# Patient Record
Sex: MALE | Race: ASIAN | NOT HISPANIC OR LATINO | URBAN - METROPOLITAN AREA
[De-identification: names, ages, dates, MRNs, and addresses within clinical notes are randomized per-mention and may not be internally consistent; named-entity substitution may affect disease eponyms.]

---

## 2017-02-20 ENCOUNTER — INPATIENT (INPATIENT)
Facility: HOSPITAL | Age: 82
LOS: 0 days | Discharge: HOME | DRG: 313 | End: 2017-02-21
Attending: INTERNAL MEDICINE | Admitting: INTERNAL MEDICINE
Payer: COMMERCIAL

## 2017-02-20 VITALS
DIASTOLIC BLOOD PRESSURE: 65 MMHG | HEART RATE: 63 BPM | OXYGEN SATURATION: 97 % | TEMPERATURE: 98 F | SYSTOLIC BLOOD PRESSURE: 132 MMHG

## 2017-02-20 DIAGNOSIS — R07.89 OTHER CHEST PAIN: ICD-10-CM

## 2017-02-20 LAB
ALBUMIN SERPL ELPH-MCNC: 3.1 G/DL — LOW (ref 3.3–5)
ALP SERPL-CCNC: 70 U/L — SIGNIFICANT CHANGE UP (ref 30–120)
ALT FLD-CCNC: 10 U/L DA — SIGNIFICANT CHANGE UP (ref 10–60)
ANION GAP SERPL CALC-SCNC: 6 MMOL/L — SIGNIFICANT CHANGE UP (ref 5–17)
APPEARANCE UR: CLEAR — SIGNIFICANT CHANGE UP
APTT BLD: 30.1 SEC — SIGNIFICANT CHANGE UP (ref 27.5–37.4)
AST SERPL-CCNC: 18 U/L — SIGNIFICANT CHANGE UP (ref 10–40)
BACTERIA # UR AUTO: ABNORMAL
BASOPHILS # BLD AUTO: 0.1 K/UL — SIGNIFICANT CHANGE UP (ref 0–0.2)
BASOPHILS NFR BLD AUTO: 0.9 % — SIGNIFICANT CHANGE UP (ref 0–2)
BILIRUB SERPL-MCNC: 1 MG/DL — SIGNIFICANT CHANGE UP (ref 0.2–1.2)
BILIRUB UR-MCNC: NEGATIVE — SIGNIFICANT CHANGE UP
BUN SERPL-MCNC: 15 MG/DL — SIGNIFICANT CHANGE UP (ref 7–23)
CALCIUM SERPL-MCNC: 10.7 MG/DL — HIGH (ref 8.4–10.5)
CHLORIDE SERPL-SCNC: 109 MMOL/L — HIGH (ref 96–108)
CHOLEST SERPL-MCNC: 171 MG/DL — SIGNIFICANT CHANGE UP (ref 10–199)
CK MB BLD-MCNC: <1.1 % — SIGNIFICANT CHANGE UP (ref 0–3.5)
CK MB CFR SERPL CALC: <0.3 NG/ML — SIGNIFICANT CHANGE UP (ref 0–3.6)
CK SERPL-CCNC: 24 U/L — LOW (ref 39–308)
CK SERPL-CCNC: 27 U/L — LOW (ref 39–308)
CK SERPL-CCNC: 61 U/L — SIGNIFICANT CHANGE UP (ref 39–308)
CO2 SERPL-SCNC: 29 MMOL/L — SIGNIFICANT CHANGE UP (ref 22–31)
COLOR SPEC: YELLOW — SIGNIFICANT CHANGE UP
CREAT SERPL-MCNC: 0.87 MG/DL — SIGNIFICANT CHANGE UP (ref 0.5–1.3)
D DIMER BLD IA.RAPID-MCNC: 206 NG/ML DDU — SIGNIFICANT CHANGE UP
DIFF PNL FLD: ABNORMAL
EOSINOPHIL # BLD AUTO: 0.1 K/UL — SIGNIFICANT CHANGE UP (ref 0–0.5)
EOSINOPHIL NFR BLD AUTO: 1.7 % — SIGNIFICANT CHANGE UP (ref 0–6)
EPI CELLS # UR: SIGNIFICANT CHANGE UP
GLUCOSE SERPL-MCNC: 106 MG/DL — HIGH (ref 70–99)
GLUCOSE UR QL: NEGATIVE MG/DL — SIGNIFICANT CHANGE UP
HBA1C BLD-MCNC: 5.8 % — HIGH (ref 4–5.6)
HCT VFR BLD CALC: 45.5 % — SIGNIFICANT CHANGE UP (ref 39–50)
HDLC SERPL-MCNC: 55 MG/DL — SIGNIFICANT CHANGE UP (ref 40–125)
HGB BLD-MCNC: 14.8 G/DL — SIGNIFICANT CHANGE UP (ref 13–17)
INR BLD: 1.2 RATIO — HIGH (ref 0.88–1.16)
KETONES UR-MCNC: NEGATIVE — SIGNIFICANT CHANGE UP
LEUKOCYTE ESTERASE UR-ACNC: ABNORMAL
LIPID PNL WITH DIRECT LDL SERPL: 102 MG/DL — SIGNIFICANT CHANGE UP
LYMPHOCYTES # BLD AUTO: 2.3 K/UL — SIGNIFICANT CHANGE UP (ref 1–3.3)
LYMPHOCYTES # BLD AUTO: 30.2 % — SIGNIFICANT CHANGE UP (ref 13–44)
MCHC RBC-ENTMCNC: 32.1 PG — SIGNIFICANT CHANGE UP (ref 27–34)
MCHC RBC-ENTMCNC: 32.6 GM/DL — SIGNIFICANT CHANGE UP (ref 32–36)
MCV RBC AUTO: 98.5 FL — SIGNIFICANT CHANGE UP (ref 80–100)
MONOCYTES # BLD AUTO: 0.7 K/UL — SIGNIFICANT CHANGE UP (ref 0–0.9)
MONOCYTES NFR BLD AUTO: 8.9 % — SIGNIFICANT CHANGE UP (ref 2–14)
NEUTROPHILS # BLD AUTO: 4.5 K/UL — SIGNIFICANT CHANGE UP (ref 1.8–7.4)
NEUTROPHILS NFR BLD AUTO: 58.3 % — SIGNIFICANT CHANGE UP (ref 43–77)
NITRITE UR-MCNC: NEGATIVE — SIGNIFICANT CHANGE UP
PH UR: 6 — SIGNIFICANT CHANGE UP (ref 4.8–8)
PLATELET # BLD AUTO: 159 K/UL — SIGNIFICANT CHANGE UP (ref 150–400)
POTASSIUM SERPL-MCNC: 4.2 MMOL/L — SIGNIFICANT CHANGE UP (ref 3.5–5.3)
POTASSIUM SERPL-SCNC: 4.2 MMOL/L — SIGNIFICANT CHANGE UP (ref 3.5–5.3)
PROT SERPL-MCNC: 6.9 G/DL — SIGNIFICANT CHANGE UP (ref 6–8.3)
PROT UR-MCNC: 30 MG/DL
PROTHROM AB SERPL-ACNC: 13.5 SEC — HIGH (ref 10–13.1)
RBC # BLD: 4.62 M/UL — SIGNIFICANT CHANGE UP (ref 4.2–5.8)
RBC # FLD: 12.8 % — SIGNIFICANT CHANGE UP (ref 10.3–14.5)
RBC CASTS # UR COMP ASSIST: ABNORMAL /HPF (ref 0–4)
SODIUM SERPL-SCNC: 144 MMOL/L — SIGNIFICANT CHANGE UP (ref 135–145)
SP GR SPEC: 1.02 — SIGNIFICANT CHANGE UP (ref 1.01–1.02)
T3 SERPL-MCNC: 94 NG/DL — SIGNIFICANT CHANGE UP (ref 80–200)
T4 AB SER-ACNC: 5.7 UG/DL — SIGNIFICANT CHANGE UP (ref 4.6–12)
TOTAL CHOLESTEROL/HDL RATIO MEASUREMENT: 3.1 RATIO — LOW (ref 3.4–9.6)
TRIGL SERPL-MCNC: 68 MG/DL — SIGNIFICANT CHANGE UP (ref 10–149)
TROPONIN I SERPL-MCNC: 0 NG/ML — LOW (ref 0.02–0.06)
TROPONIN I SERPL-MCNC: 0 NG/ML — LOW (ref 0.02–0.06)
TROPONIN I SERPL-MCNC: 0.01 NG/ML — LOW (ref 0.02–0.06)
TSH SERPL-MCNC: 1.58 UIU/ML — SIGNIFICANT CHANGE UP (ref 0.27–4.2)
UROBILINOGEN FLD QL: NEGATIVE MG/DL — SIGNIFICANT CHANGE UP
VIT B12 SERPL-MCNC: 1146 PG/ML — HIGH (ref 243–894)
WBC # BLD: 7.7 K/UL — SIGNIFICANT CHANGE UP (ref 3.8–10.5)
WBC # FLD AUTO: 7.7 K/UL — SIGNIFICANT CHANGE UP (ref 3.8–10.5)
WBC UR QL: ABNORMAL

## 2017-02-20 PROCEDURE — 71010: CPT | Mod: 26

## 2017-02-20 PROCEDURE — 99285 EMERGENCY DEPT VISIT HI MDM: CPT

## 2017-02-20 PROCEDURE — 93970 EXTREMITY STUDY: CPT | Mod: 26

## 2017-02-20 PROCEDURE — 93306 TTE W/DOPPLER COMPLETE: CPT | Mod: 26

## 2017-02-20 PROCEDURE — 93010 ELECTROCARDIOGRAM REPORT: CPT

## 2017-02-20 PROCEDURE — 99223 1ST HOSP IP/OBS HIGH 75: CPT

## 2017-02-20 RX ORDER — ATORVASTATIN CALCIUM 80 MG/1
20 TABLET, FILM COATED ORAL AT BEDTIME
Qty: 0 | Refills: 0 | Status: DISCONTINUED | OUTPATIENT
Start: 2017-02-20 | End: 2017-02-21

## 2017-02-20 RX ORDER — ACETAMINOPHEN 500 MG
650 TABLET ORAL EVERY 6 HOURS
Qty: 0 | Refills: 0 | Status: DISCONTINUED | OUTPATIENT
Start: 2017-02-20 | End: 2017-02-21

## 2017-02-20 RX ORDER — METOPROLOL TARTRATE 50 MG
12.5 TABLET ORAL EVERY 12 HOURS
Qty: 0 | Refills: 0 | Status: DISCONTINUED | OUTPATIENT
Start: 2017-02-20 | End: 2017-02-21

## 2017-02-20 RX ORDER — ENOXAPARIN SODIUM 100 MG/ML
40 INJECTION SUBCUTANEOUS DAILY
Qty: 0 | Refills: 0 | Status: DISCONTINUED | OUTPATIENT
Start: 2017-02-20 | End: 2017-02-21

## 2017-02-20 RX ORDER — FINASTERIDE 5 MG/1
5 TABLET, FILM COATED ORAL DAILY
Qty: 0 | Refills: 0 | Status: DISCONTINUED | OUTPATIENT
Start: 2017-02-20 | End: 2017-02-21

## 2017-02-20 RX ORDER — ASPIRIN/CALCIUM CARB/MAGNESIUM 324 MG
81 TABLET ORAL DAILY
Qty: 0 | Refills: 0 | Status: DISCONTINUED | OUTPATIENT
Start: 2017-02-20 | End: 2017-02-21

## 2017-02-20 RX ADMIN — Medication 12.5 MILLIGRAM(S): at 18:31

## 2017-02-20 RX ADMIN — ATORVASTATIN CALCIUM 20 MILLIGRAM(S): 80 TABLET, FILM COATED ORAL at 21:02

## 2017-02-20 RX ADMIN — ENOXAPARIN SODIUM 40 MILLIGRAM(S): 100 INJECTION SUBCUTANEOUS at 11:30

## 2017-02-20 RX ADMIN — Medication 81 MILLIGRAM(S): at 11:30

## 2017-02-20 RX ADMIN — FINASTERIDE 5 MILLIGRAM(S): 5 TABLET, FILM COATED ORAL at 18:31

## 2017-02-20 NOTE — H&P ADULT. - RS GEN PE MLT RESP DETAILS PC
breath sounds equal/respirations non-labored/clear to auscultation bilaterally/good air movement/airway patent

## 2017-02-20 NOTE — H&P ADULT. - HISTORY OF PRESENT ILLNESS
This is an 86 YO M brought to ER because of chest pain.   According to the patient he woke up with chest pain in lower front of his chest. Pain was on and off non radiating. He felt pressure and burning in nature.

## 2017-02-21 ENCOUNTER — TRANSCRIPTION ENCOUNTER (OUTPATIENT)
Age: 82
End: 2017-02-21

## 2017-02-21 VITALS
RESPIRATION RATE: 16 BRPM | SYSTOLIC BLOOD PRESSURE: 113 MMHG | OXYGEN SATURATION: 99 % | TEMPERATURE: 98 F | DIASTOLIC BLOOD PRESSURE: 73 MMHG | HEART RATE: 74 BPM

## 2017-02-21 DIAGNOSIS — R07.89 OTHER CHEST PAIN: ICD-10-CM

## 2017-02-21 DIAGNOSIS — I24.9 ACUTE ISCHEMIC HEART DISEASE, UNSPECIFIED: ICD-10-CM

## 2017-02-21 DIAGNOSIS — N40.0 BENIGN PROSTATIC HYPERPLASIA WITHOUT LOWER URINARY TRACT SYMPTOMS: ICD-10-CM

## 2017-02-21 LAB
CK SERPL-CCNC: 39 U/L — SIGNIFICANT CHANGE UP (ref 39–308)
CULTURE RESULTS: NO GROWTH — SIGNIFICANT CHANGE UP
SPECIMEN SOURCE: SIGNIFICANT CHANGE UP
TROPONIN I SERPL-MCNC: 0 NG/ML — LOW (ref 0.02–0.06)

## 2017-02-21 PROCEDURE — 84484 ASSAY OF TROPONIN QUANT: CPT

## 2017-02-21 PROCEDURE — 71045 X-RAY EXAM CHEST 1 VIEW: CPT

## 2017-02-21 PROCEDURE — 85027 COMPLETE CBC AUTOMATED: CPT

## 2017-02-21 PROCEDURE — 87086 URINE CULTURE/COLONY COUNT: CPT

## 2017-02-21 PROCEDURE — 99232 SBSQ HOSP IP/OBS MODERATE 35: CPT

## 2017-02-21 PROCEDURE — 99285 EMERGENCY DEPT VISIT HI MDM: CPT

## 2017-02-21 PROCEDURE — 84443 ASSAY THYROID STIM HORMONE: CPT

## 2017-02-21 PROCEDURE — 85610 PROTHROMBIN TIME: CPT

## 2017-02-21 PROCEDURE — 81001 URINALYSIS AUTO W/SCOPE: CPT

## 2017-02-21 PROCEDURE — 93970 EXTREMITY STUDY: CPT

## 2017-02-21 PROCEDURE — 83036 HEMOGLOBIN GLYCOSYLATED A1C: CPT

## 2017-02-21 PROCEDURE — 80061 LIPID PANEL: CPT

## 2017-02-21 PROCEDURE — 84436 ASSAY OF TOTAL THYROXINE: CPT

## 2017-02-21 PROCEDURE — 36415 COLL VENOUS BLD VENIPUNCTURE: CPT

## 2017-02-21 PROCEDURE — 82553 CREATINE MB FRACTION: CPT

## 2017-02-21 PROCEDURE — 93005 ELECTROCARDIOGRAM TRACING: CPT

## 2017-02-21 PROCEDURE — 82550 ASSAY OF CK (CPK): CPT

## 2017-02-21 PROCEDURE — 84480 ASSAY TRIIODOTHYRONINE (T3): CPT

## 2017-02-21 PROCEDURE — 93306 TTE W/DOPPLER COMPLETE: CPT

## 2017-02-21 PROCEDURE — 85730 THROMBOPLASTIN TIME PARTIAL: CPT

## 2017-02-21 PROCEDURE — 82607 VITAMIN B-12: CPT

## 2017-02-21 PROCEDURE — G0378: CPT

## 2017-02-21 PROCEDURE — 80053 COMPREHEN METABOLIC PANEL: CPT

## 2017-02-21 PROCEDURE — 85379 FIBRIN DEGRADATION QUANT: CPT

## 2017-02-21 RX ORDER — FAMOTIDINE 10 MG/ML
1 INJECTION INTRAVENOUS
Qty: 30 | Refills: 0 | OUTPATIENT
Start: 2017-02-21 | End: 2017-03-23

## 2017-02-21 RX ORDER — ATORVASTATIN CALCIUM 80 MG/1
1 TABLET, FILM COATED ORAL
Qty: 30 | Refills: 0 | OUTPATIENT
Start: 2017-02-21 | End: 2017-03-23

## 2017-02-21 RX ORDER — METOPROLOL TARTRATE 50 MG
0.5 TABLET ORAL
Qty: 30 | Refills: 0 | OUTPATIENT
Start: 2017-02-21 | End: 2017-03-23

## 2017-02-21 RX ORDER — ACETAMINOPHEN 500 MG
2 TABLET ORAL
Qty: 0 | Refills: 0 | COMMUNITY
Start: 2017-02-21

## 2017-02-21 RX ADMIN — Medication 81 MILLIGRAM(S): at 12:33

## 2017-02-21 RX ADMIN — ENOXAPARIN SODIUM 40 MILLIGRAM(S): 100 INJECTION SUBCUTANEOUS at 12:32

## 2017-02-21 NOTE — DIETITIAN INITIAL EVALUATION ADULT. - NS AS NUTRI INTERV MEALS SNACK
General/healthful diet/Texture-modified diet/[facility] soft diet, dash/tlc restriction/Fat - modified diet/Mineral - modified diet

## 2017-02-21 NOTE — DIETITIAN INITIAL EVALUATION ADULT. - OTHER INFO
87M adm c chest pain. Pt denies chest pain or sob and states he feels well at present. Pt on [facility] [place] Soft, DASH/TLC diet at present and tolerating well. Reports he had a fairly good appetite at breakfast. Pt noted not to eat beef and pork (kitchen aware). Pt states he wears dentures, which he states are at his bedside. Reports he consumes softer items at home. Pt states his UBW is around 155# but thinks he weighs 151# at present (adm wt 158.5#); pt appears well-nourished given advanced age c decreased muscle/fat mass. Skin intact.

## 2017-02-21 NOTE — DISCHARGE NOTE ADULT - HOSPITAL COURSE
This is an 88 YO M brought to ER because of chest pain.   According to the patient he woke up with chest pain in lower front of his chest. Pain was on and off non radiating. He felt pressure and burning in nature.

## 2017-02-21 NOTE — DISCHARGE NOTE ADULT - CARE PROVIDER_API CALL
Chidi Gary), Internal Medicine  [address] [place] [address]  Phone: [phone]  Fax: [phone]

## 2017-02-21 NOTE — DISCHARGE NOTE ADULT - CARE PLAN
Principal Discharge DX:	Chest pain, musculoskeletal  Goal:	CE X 3, negative  Instructions for follow-up, activity and diet:	Cardiologist  Secondary Diagnosis:	Benign prostatic hyperplasia, presence of lower urinary tract symptoms unspecified, unspecified morphology  Secondary Diagnosis:	Hypercholesteremia  Secondary Diagnosis:	ACS (acute coronary syndrome)

## 2017-02-21 NOTE — DISCHARGE NOTE ADULT - MEDICATION SUMMARY - MEDICATIONS TO TAKE
I will START or STAY ON the medications listed below when I get home from the hospital:    finasteride  -- 1 tab(s) by mouth once a day  -- Indication: For Benign prostatic hyperplasia, presence of lower urinary tract symptoms unspecified, unspecified morphology    aspirin 81 mg oral tablet  -- 1 tab(s) by mouth once a day  -- Indication: For ACS (acute coronary syndrome)    acetaminophen 325 mg oral tablet  -- 2 tab(s) by mouth every 6 hours, As needed, Moderate Pain (4 - 6) or Temp > 100.5  -- Indication: For pain and fever    atorvastatin 20 mg oral tablet  -- 1 tab(s) by mouth once a day (at bedtime)  -- Indication: For Hypercholesterolemia    metoprolol tartrate 25 mg oral tablet  -- 0.5 tab(s) by mouth 2 times a day  -- Indication: For ACS (acute coronary syndrome)    Pepcid 20 mg oral tablet  -- 1 tab(s) by mouth once a day  -- Indication: For Other chest pain    nitrofurantoin  -- 1 tab(s) by mouth 2 times a day  -- Indication: For UTI

## 2017-02-21 NOTE — DISCHARGE NOTE ADULT - SECONDARY DIAGNOSIS.
Benign prostatic hyperplasia, presence of lower urinary tract symptoms unspecified, unspecified morphology Hypercholesteremia ACS (acute coronary syndrome)

## 2017-07-31 ENCOUNTER — INPATIENT (INPATIENT)
Facility: HOSPITAL | Age: 82
LOS: 2 days | Discharge: HOME HEALTH | DRG: 872 | End: 2017-08-03
Attending: INTERNAL MEDICINE | Admitting: INTERNAL MEDICINE
Payer: COMMERCIAL

## 2017-07-31 VITALS
WEIGHT: 149.91 LBS | RESPIRATION RATE: 16 BRPM | SYSTOLIC BLOOD PRESSURE: 100 MMHG | HEART RATE: 72 BPM | TEMPERATURE: 100 F | DIASTOLIC BLOOD PRESSURE: 55 MMHG | HEIGHT: 67 IN | OXYGEN SATURATION: 97 %

## 2017-07-31 DIAGNOSIS — N41.0 ACUTE PROSTATITIS: ICD-10-CM

## 2017-07-31 DIAGNOSIS — R50.9 FEVER, UNSPECIFIED: ICD-10-CM

## 2017-07-31 DIAGNOSIS — Z95.5 PRESENCE OF CORONARY ANGIOPLASTY IMPLANT AND GRAFT: Chronic | ICD-10-CM

## 2017-07-31 DIAGNOSIS — E78.5 HYPERLIPIDEMIA, UNSPECIFIED: ICD-10-CM

## 2017-07-31 LAB
ALBUMIN SERPL ELPH-MCNC: 2.9 G/DL — LOW (ref 3.3–5)
ALP SERPL-CCNC: 58 U/L — SIGNIFICANT CHANGE UP (ref 30–120)
ALT FLD-CCNC: 10 U/L DA — SIGNIFICANT CHANGE UP (ref 10–60)
ANION GAP SERPL CALC-SCNC: 7 MMOL/L — SIGNIFICANT CHANGE UP (ref 5–17)
APPEARANCE UR: ABNORMAL
AST SERPL-CCNC: 20 U/L — SIGNIFICANT CHANGE UP (ref 10–40)
BACTERIA # UR AUTO: ABNORMAL
BASOPHILS # BLD AUTO: 0 K/UL — SIGNIFICANT CHANGE UP (ref 0–0.2)
BASOPHILS NFR BLD AUTO: 0.1 % — SIGNIFICANT CHANGE UP (ref 0–2)
BILIRUB SERPL-MCNC: 1.3 MG/DL — HIGH (ref 0.2–1.2)
BILIRUB UR-MCNC: NEGATIVE — SIGNIFICANT CHANGE UP
BUN SERPL-MCNC: 16 MG/DL — SIGNIFICANT CHANGE UP (ref 7–23)
CALCIUM SERPL-MCNC: 10.2 MG/DL — SIGNIFICANT CHANGE UP (ref 8.4–10.5)
CHLORIDE SERPL-SCNC: 100 MMOL/L — SIGNIFICANT CHANGE UP (ref 96–108)
CO2 SERPL-SCNC: 28 MMOL/L — SIGNIFICANT CHANGE UP (ref 22–31)
COLOR SPEC: SIGNIFICANT CHANGE UP
CREAT SERPL-MCNC: 0.99 MG/DL — SIGNIFICANT CHANGE UP (ref 0.5–1.3)
DIFF PNL FLD: ABNORMAL
EOSINOPHIL # BLD AUTO: 0 K/UL — SIGNIFICANT CHANGE UP (ref 0–0.5)
EOSINOPHIL NFR BLD AUTO: 0 % — SIGNIFICANT CHANGE UP (ref 0–6)
EPI CELLS # UR: SIGNIFICANT CHANGE UP
GLUCOSE SERPL-MCNC: 109 MG/DL — HIGH (ref 70–99)
GLUCOSE UR QL: NEGATIVE MG/DL — SIGNIFICANT CHANGE UP
HCT VFR BLD CALC: 38 % — LOW (ref 39–50)
HGB BLD-MCNC: 13 G/DL — SIGNIFICANT CHANGE UP (ref 13–17)
KETONES UR-MCNC: NEGATIVE — SIGNIFICANT CHANGE UP
LACTATE SERPL-SCNC: 1.2 MMOL/L — SIGNIFICANT CHANGE UP (ref 0.7–2)
LEUKOCYTE ESTERASE UR-ACNC: ABNORMAL
LYMPHOCYTES # BLD AUTO: 15.1 % — SIGNIFICANT CHANGE UP (ref 13–44)
LYMPHOCYTES # BLD AUTO: 2.3 K/UL — SIGNIFICANT CHANGE UP (ref 1–3.3)
MAGNESIUM SERPL-MCNC: 1.8 MG/DL — SIGNIFICANT CHANGE UP (ref 1.6–2.6)
MCHC RBC-ENTMCNC: 32.8 PG — SIGNIFICANT CHANGE UP (ref 27–34)
MCHC RBC-ENTMCNC: 34.1 GM/DL — SIGNIFICANT CHANGE UP (ref 32–36)
MCV RBC AUTO: 96.1 FL — SIGNIFICANT CHANGE UP (ref 80–100)
MONOCYTES # BLD AUTO: 1.2 K/UL — HIGH (ref 0–0.9)
MONOCYTES NFR BLD AUTO: 8.1 % — SIGNIFICANT CHANGE UP (ref 2–14)
NEUTROPHILS # BLD AUTO: 11.8 K/UL — HIGH (ref 1.8–7.4)
NEUTROPHILS NFR BLD AUTO: 77.4 % — HIGH (ref 43–77)
NITRITE UR-MCNC: POSITIVE
PH UR: 6.5 — SIGNIFICANT CHANGE UP (ref 5–8)
PLATELET # BLD AUTO: 150 K/UL — SIGNIFICANT CHANGE UP (ref 150–400)
POTASSIUM SERPL-MCNC: 3.9 MMOL/L — SIGNIFICANT CHANGE UP (ref 3.5–5.3)
POTASSIUM SERPL-SCNC: 3.9 MMOL/L — SIGNIFICANT CHANGE UP (ref 3.5–5.3)
PROT SERPL-MCNC: 6.7 G/DL — SIGNIFICANT CHANGE UP (ref 6–8.3)
PROT UR-MCNC: 30 MG/DL
RBC # BLD: 3.96 M/UL — LOW (ref 4.2–5.8)
RBC # FLD: 12.1 % — SIGNIFICANT CHANGE UP (ref 10.3–14.5)
RBC CASTS # UR COMP ASSIST: ABNORMAL /HPF (ref 0–4)
SODIUM SERPL-SCNC: 135 MMOL/L — SIGNIFICANT CHANGE UP (ref 135–145)
SP GR SPEC: 1.01 — SIGNIFICANT CHANGE UP (ref 1.01–1.02)
UROBILINOGEN FLD QL: NEGATIVE MG/DL — SIGNIFICANT CHANGE UP
WBC # BLD: 15.2 K/UL — HIGH (ref 3.8–10.5)
WBC # FLD AUTO: 15.2 K/UL — HIGH (ref 3.8–10.5)
WBC UR QL: >50

## 2017-07-31 PROCEDURE — 99285 EMERGENCY DEPT VISIT HI MDM: CPT

## 2017-07-31 PROCEDURE — 76775 US EXAM ABDO BACK WALL LIM: CPT | Mod: 26

## 2017-07-31 PROCEDURE — 93010 ELECTROCARDIOGRAM REPORT: CPT

## 2017-07-31 PROCEDURE — 71010: CPT | Mod: 26

## 2017-07-31 RX ORDER — ACETAMINOPHEN 500 MG
650 TABLET ORAL EVERY 6 HOURS
Qty: 0 | Refills: 0 | Status: DISCONTINUED | OUTPATIENT
Start: 2017-07-31 | End: 2017-08-03

## 2017-07-31 RX ORDER — ASPIRIN/CALCIUM CARB/MAGNESIUM 324 MG
81 TABLET ORAL DAILY
Qty: 0 | Refills: 0 | Status: DISCONTINUED | OUTPATIENT
Start: 2017-07-31 | End: 2017-08-03

## 2017-07-31 RX ORDER — CEFTRIAXONE 500 MG/1
1 INJECTION, POWDER, FOR SOLUTION INTRAMUSCULAR; INTRAVENOUS ONCE
Qty: 0 | Refills: 0 | Status: COMPLETED | OUTPATIENT
Start: 2017-07-31 | End: 2017-07-31

## 2017-07-31 RX ORDER — SODIUM CHLORIDE 9 MG/ML
1000 INJECTION INTRAMUSCULAR; INTRAVENOUS; SUBCUTANEOUS
Qty: 0 | Refills: 0 | Status: DISCONTINUED | OUTPATIENT
Start: 2017-07-31 | End: 2017-08-02

## 2017-07-31 RX ORDER — ENOXAPARIN SODIUM 100 MG/ML
40 INJECTION SUBCUTANEOUS DAILY
Qty: 0 | Refills: 0 | Status: DISCONTINUED | OUTPATIENT
Start: 2017-07-31 | End: 2017-08-03

## 2017-07-31 RX ORDER — ATORVASTATIN CALCIUM 80 MG/1
20 TABLET, FILM COATED ORAL AT BEDTIME
Qty: 0 | Refills: 0 | Status: DISCONTINUED | OUTPATIENT
Start: 2017-07-31 | End: 2017-08-03

## 2017-07-31 RX ORDER — LACTOBACILLUS ACIDOPHILUS 100MM CELL
1 CAPSULE ORAL
Qty: 0 | Refills: 0 | Status: DISCONTINUED | OUTPATIENT
Start: 2017-07-31 | End: 2017-08-03

## 2017-07-31 RX ORDER — ASPIRIN/CALCIUM CARB/MAGNESIUM 324 MG
81 TABLET ORAL DAILY
Qty: 0 | Refills: 0 | Status: DISCONTINUED | OUTPATIENT
Start: 2017-07-31 | End: 2017-07-31

## 2017-07-31 RX ORDER — CEFAZOLIN SODIUM 1 G
1000 VIAL (EA) INJECTION ONCE
Qty: 0 | Refills: 0 | Status: COMPLETED | OUTPATIENT
Start: 2017-07-31 | End: 2017-07-31

## 2017-07-31 RX ORDER — SODIUM CHLORIDE 9 MG/ML
1000 INJECTION INTRAMUSCULAR; INTRAVENOUS; SUBCUTANEOUS ONCE
Qty: 0 | Refills: 0 | Status: COMPLETED | OUTPATIENT
Start: 2017-07-31 | End: 2017-07-31

## 2017-07-31 RX ORDER — NITROFURANTOIN MACROCRYSTAL 50 MG
1 CAPSULE ORAL
Qty: 0 | Refills: 0 | COMMUNITY

## 2017-07-31 RX ORDER — FINASTERIDE 5 MG/1
5 TABLET, FILM COATED ORAL DAILY
Qty: 0 | Refills: 0 | Status: DISCONTINUED | OUTPATIENT
Start: 2017-07-31 | End: 2017-08-03

## 2017-07-31 RX ORDER — FAMOTIDINE 10 MG/ML
20 INJECTION INTRAVENOUS DAILY
Qty: 0 | Refills: 0 | Status: DISCONTINUED | OUTPATIENT
Start: 2017-07-31 | End: 2017-08-03

## 2017-07-31 RX ORDER — TAMSULOSIN HYDROCHLORIDE 0.4 MG/1
0.4 CAPSULE ORAL AT BEDTIME
Qty: 0 | Refills: 0 | Status: DISCONTINUED | OUTPATIENT
Start: 2017-07-31 | End: 2017-08-01

## 2017-07-31 RX ADMIN — SODIUM CHLORIDE 100 MILLILITER(S): 9 INJECTION INTRAMUSCULAR; INTRAVENOUS; SUBCUTANEOUS at 13:50

## 2017-07-31 RX ADMIN — ENOXAPARIN SODIUM 40 MILLIGRAM(S): 100 INJECTION SUBCUTANEOUS at 17:46

## 2017-07-31 RX ADMIN — SODIUM CHLORIDE 1000 MILLILITER(S): 9 INJECTION INTRAMUSCULAR; INTRAVENOUS; SUBCUTANEOUS at 11:59

## 2017-07-31 RX ADMIN — SODIUM CHLORIDE 1000 MILLILITER(S): 9 INJECTION INTRAMUSCULAR; INTRAVENOUS; SUBCUTANEOUS at 10:40

## 2017-07-31 RX ADMIN — Medication 100 MILLIGRAM(S): at 10:40

## 2017-07-31 RX ADMIN — ATORVASTATIN CALCIUM 20 MILLIGRAM(S): 80 TABLET, FILM COATED ORAL at 21:33

## 2017-07-31 RX ADMIN — FAMOTIDINE 20 MILLIGRAM(S): 10 INJECTION INTRAVENOUS at 17:46

## 2017-07-31 RX ADMIN — Medication 81 MILLIGRAM(S): at 17:46

## 2017-07-31 RX ADMIN — CEFTRIAXONE 100 GRAM(S): 500 INJECTION, POWDER, FOR SOLUTION INTRAMUSCULAR; INTRAVENOUS at 13:51

## 2017-07-31 RX ADMIN — Medication 1 TABLET(S): at 17:46

## 2017-07-31 RX ADMIN — TAMSULOSIN HYDROCHLORIDE 0.4 MILLIGRAM(S): 0.4 CAPSULE ORAL at 21:33

## 2017-07-31 RX ADMIN — FINASTERIDE 5 MILLIGRAM(S): 5 TABLET, FILM COATED ORAL at 17:46

## 2017-07-31 NOTE — H&P ADULT - NSHPLABSRESULTS_GEN_ALL_CORE
12.1   8.1   )-----------( 118      ( 01 Aug 2017 07:34 )             35.5     01 Aug 2017 07:34    141    |  108    |  9      ----------------------------<  105    4.0     |  27     |  0.74     Ca    9.3        01 Aug 2017 07:34  Mg     1.8       2017 10:27    TPro  6.7    /  Alb  2.9    /  TBili  1.3    /  DBili  x      /  AST  20     /  ALT  10     /  AlkPhos  58     2017 10:27    LIVER FUNCTIONS - ( 2017 10:27 )  Alb: 2.9 g/dL / Pro: 6.7 g/dL / ALK PHOS: 58 U/L / ALT: 10 U/L DA / AST: 20 U/L / GGT: x             CAPILLARY BLOOD GLUCOSE            Urinalysis Basic - ( 2017 10:33 )    Color: x / Appearance: Turbid / S.010 / pH: x  Gluc: x / Ketone: Negative  / Bili: Negative / Urobili: Negative mg/dL   Blood: x / Protein: 30 mg/dL / Nitrite: Positive   Leuk Esterase: Moderate / RBC: 3-5 /HPF / WBC >50   Sq Epi: x / Non Sq Epi: x / Bacteria: Many

## 2017-07-31 NOTE — ED ADULT NURSE NOTE - PMH
Benign prostatic hyperplasia, presence of lower urinary tract symptoms unspecified, unspecified morphology    Hyperlipidemia

## 2017-07-31 NOTE — ED PROVIDER NOTE - PROGRESS NOTE DETAILS
Spoke with Dr. Gary recommend to ID, Consult for Dr. Thakkar and accepts pt admission Dr. Harding Note: Dr. Thakkar recommend [facility], [facility].

## 2017-07-31 NOTE — ED ADULT TRIAGE NOTE - PAIN: PRESENCE, MLM
Detail Level: Detailed Continue Regimen: Bactrim DS one pill BID until course is complete \\nAugmentin 875mg one p.o.  BID until course is complete denies pain/discomfort

## 2017-07-31 NOTE — H&P ADULT - HISTORY OF PRESENT ILLNESS
This is a 87y Male complaining of urinary/bladder trouble.  pt with hx of Hyperlipidemia, Benign prostatic hyperplasia, UTI's presents to ED with son for worsening symptoms of chills, fevers, confusion and urinary frequency. per son pt was recently treated for UTI and placed on ampicillin. Denies chest pain, SOB, nausea, vomiting. No further complaints at this time.

## 2017-07-31 NOTE — ED ADULT NURSE NOTE - OBJECTIVE STATEMENT
Patient walked into ER c/o not feeling any better since having a UTI.  Patient on Ampicillin  for 10 days.

## 2017-07-31 NOTE — ED PROVIDER NOTE - OBJECTIVE STATEMENT
86 y/o M pt with hx of Hyperlipidemia, Benign prostatic hyperplasia, UTI's presents to ED with son for worsening symptoms of chills, fevers, confusion and urinary frequency. per son pt was recently treated for UTI and placed on ampicillin. Denies chest pain, SOB, nausea, vomiting. No further complaints at this time.

## 2017-08-01 LAB
ANION GAP SERPL CALC-SCNC: 6 MMOL/L — SIGNIFICANT CHANGE UP (ref 5–17)
BASOPHILS # BLD AUTO: 0 K/UL — SIGNIFICANT CHANGE UP (ref 0–0.2)
BASOPHILS NFR BLD AUTO: 0.3 % — SIGNIFICANT CHANGE UP (ref 0–2)
BUN SERPL-MCNC: 9 MG/DL — SIGNIFICANT CHANGE UP (ref 7–23)
CALCIUM SERPL-MCNC: 9.3 MG/DL — SIGNIFICANT CHANGE UP (ref 8.4–10.5)
CHLORIDE SERPL-SCNC: 108 MMOL/L — SIGNIFICANT CHANGE UP (ref 96–108)
CHOLEST SERPL-MCNC: 93 MG/DL — SIGNIFICANT CHANGE UP (ref 10–199)
CO2 SERPL-SCNC: 27 MMOL/L — SIGNIFICANT CHANGE UP (ref 22–31)
CREAT SERPL-MCNC: 0.74 MG/DL — SIGNIFICANT CHANGE UP (ref 0.5–1.3)
EOSINOPHIL # BLD AUTO: 0.1 K/UL — SIGNIFICANT CHANGE UP (ref 0–0.5)
EOSINOPHIL NFR BLD AUTO: 0.8 % — SIGNIFICANT CHANGE UP (ref 0–6)
GLUCOSE SERPL-MCNC: 105 MG/DL — HIGH (ref 70–99)
HCT VFR BLD CALC: 35.5 % — LOW (ref 39–50)
HDLC SERPL-MCNC: 38 MG/DL — LOW (ref 40–125)
HGB BLD-MCNC: 12.1 G/DL — LOW (ref 13–17)
LIPID PNL WITH DIRECT LDL SERPL: 46 MG/DL — SIGNIFICANT CHANGE UP
LYMPHOCYTES # BLD AUTO: 1.2 K/UL — SIGNIFICANT CHANGE UP (ref 1–3.3)
LYMPHOCYTES # BLD AUTO: 14.3 % — SIGNIFICANT CHANGE UP (ref 13–44)
MCHC RBC-ENTMCNC: 33.3 PG — SIGNIFICANT CHANGE UP (ref 27–34)
MCHC RBC-ENTMCNC: 34.1 GM/DL — SIGNIFICANT CHANGE UP (ref 32–36)
MCV RBC AUTO: 97.6 FL — SIGNIFICANT CHANGE UP (ref 80–100)
MONOCYTES # BLD AUTO: 0.8 K/UL — SIGNIFICANT CHANGE UP (ref 0–0.9)
MONOCYTES NFR BLD AUTO: 10.1 % — SIGNIFICANT CHANGE UP (ref 2–14)
NEUTROPHILS # BLD AUTO: 6 K/UL — SIGNIFICANT CHANGE UP (ref 1.8–7.4)
NEUTROPHILS NFR BLD AUTO: 74.5 % — SIGNIFICANT CHANGE UP (ref 43–77)
PLATELET # BLD AUTO: 118 K/UL — LOW (ref 150–400)
POTASSIUM SERPL-MCNC: 4 MMOL/L — SIGNIFICANT CHANGE UP (ref 3.5–5.3)
POTASSIUM SERPL-SCNC: 4 MMOL/L — SIGNIFICANT CHANGE UP (ref 3.5–5.3)
RBC # BLD: 3.64 M/UL — LOW (ref 4.2–5.8)
RBC # FLD: 11.8 % — SIGNIFICANT CHANGE UP (ref 10.3–14.5)
SODIUM SERPL-SCNC: 141 MMOL/L — SIGNIFICANT CHANGE UP (ref 135–145)
T3 SERPL-MCNC: 63 NG/DL — LOW (ref 80–200)
T4 AB SER-ACNC: 4.8 UG/DL — SIGNIFICANT CHANGE UP (ref 4.6–12)
TOTAL CHOLESTEROL/HDL RATIO MEASUREMENT: 2.4 RATIO — LOW (ref 3.4–9.6)
TRIGL SERPL-MCNC: 47 MG/DL — SIGNIFICANT CHANGE UP (ref 10–149)
TSH SERPL-MCNC: 0.86 UIU/ML — SIGNIFICANT CHANGE UP (ref 0.27–4.2)
WBC # BLD: 8.1 K/UL — SIGNIFICANT CHANGE UP (ref 3.8–10.5)
WBC # FLD AUTO: 8.1 K/UL — SIGNIFICANT CHANGE UP (ref 3.8–10.5)

## 2017-08-01 PROCEDURE — 76870 US EXAM SCROTUM: CPT | Mod: 26

## 2017-08-01 PROCEDURE — 74176 CT ABD & PELVIS W/O CONTRAST: CPT | Mod: 26

## 2017-08-01 RX ORDER — CEFTRIAXONE 500 MG/1
INJECTION, POWDER, FOR SOLUTION INTRAMUSCULAR; INTRAVENOUS
Qty: 0 | Refills: 0 | Status: COMPLETED | OUTPATIENT
Start: 2017-08-01 | End: 2017-08-02

## 2017-08-01 RX ORDER — CEFTRIAXONE 500 MG/1
1 INJECTION, POWDER, FOR SOLUTION INTRAMUSCULAR; INTRAVENOUS EVERY 24 HOURS
Qty: 0 | Refills: 0 | Status: COMPLETED | OUTPATIENT
Start: 2017-08-02 | End: 2017-08-02

## 2017-08-01 RX ORDER — CEFTRIAXONE 500 MG/1
1 INJECTION, POWDER, FOR SOLUTION INTRAMUSCULAR; INTRAVENOUS ONCE
Qty: 0 | Refills: 0 | Status: COMPLETED | OUTPATIENT
Start: 2017-08-01 | End: 2017-08-01

## 2017-08-01 RX ORDER — TAMSULOSIN HYDROCHLORIDE 0.4 MG/1
0.8 CAPSULE ORAL AT BEDTIME
Qty: 0 | Refills: 0 | Status: DISCONTINUED | OUTPATIENT
Start: 2017-08-01 | End: 2017-08-03

## 2017-08-01 RX ADMIN — SODIUM CHLORIDE 100 MILLILITER(S): 9 INJECTION INTRAMUSCULAR; INTRAVENOUS; SUBCUTANEOUS at 11:27

## 2017-08-01 RX ADMIN — Medication 1 TABLET(S): at 13:53

## 2017-08-01 RX ADMIN — Medication 1 TABLET(S): at 08:36

## 2017-08-01 RX ADMIN — FINASTERIDE 5 MILLIGRAM(S): 5 TABLET, FILM COATED ORAL at 11:27

## 2017-08-01 RX ADMIN — TAMSULOSIN HYDROCHLORIDE 0.8 MILLIGRAM(S): 0.4 CAPSULE ORAL at 21:14

## 2017-08-01 RX ADMIN — Medication 1 TABLET(S): at 17:23

## 2017-08-01 RX ADMIN — ENOXAPARIN SODIUM 40 MILLIGRAM(S): 100 INJECTION SUBCUTANEOUS at 11:26

## 2017-08-01 RX ADMIN — CEFTRIAXONE 100 GRAM(S): 500 INJECTION, POWDER, FOR SOLUTION INTRAMUSCULAR; INTRAVENOUS at 11:25

## 2017-08-01 RX ADMIN — Medication 81 MILLIGRAM(S): at 11:26

## 2017-08-01 RX ADMIN — FAMOTIDINE 20 MILLIGRAM(S): 10 INJECTION INTRAVENOUS at 11:26

## 2017-08-01 RX ADMIN — ATORVASTATIN CALCIUM 20 MILLIGRAM(S): 80 TABLET, FILM COATED ORAL at 21:15

## 2017-08-01 NOTE — PROGRESS NOTE ADULT - SUBJECTIVE AND OBJECTIVE BOX
Patient is a 87y old  Male who presents with a chief complaint of Fever and chills (2017 19:35)      INTERVAL OVER NIGHT EVENTS:    MEDICATIONS  (STANDING):  sodium chloride 0.9%. 1000 milliLiter(s) (100 mL/Hr) IV Continuous <Continuous>  finasteride 5 milliGRAM(s) Oral daily  atorvastatin 20 milliGRAM(s) Oral at bedtime  aspirin enteric coated 81 milliGRAM(s) Oral daily  enoxaparin Injectable 40 milliGRAM(s) SubCutaneous daily  lactobacillus acidophilus 1 Tablet(s) Oral three times a day with meals  famotidine    Tablet 20 milliGRAM(s) Oral daily  tamsulosin 0.4 milliGRAM(s) Oral at bedtime    MEDICATIONS  (PRN):  acetaminophen   Tablet 650 milliGRAM(s) Oral every 6 hours PRN Moderate Pain (4 - 6) or Temp > 100.5  acetaminophen   Tablet. 650 milliGRAM(s) Oral every 6 hours PRN Moderate Pain (4 - 6)      Allergies    No Known Allergies    Intolerances        Vital Signs Last 24 Hrs  T(C): 37.4 (01 Aug 2017 05:17), Max: 37.6 (2017 08:53)  T(F): 99.4 (01 Aug 2017 05:17), Max: 99.7 (2017 08:53)  HR: 61 (01 Aug 2017 05:17) (60 - 84)  BP: 111/56 (01 Aug 2017 05:17) (100/55 - 136/73)  BP(mean): --  RR: 18 (01 Aug 2017 05:17) (16 - 18)  SpO2: 95% (01 Aug 2017 05:17) (95% - 99%)    PHYSICAL EXAM:  GENERAL: NAD, well-groomed, well-developed  HEAD:  Atraumatic, Normocephalic  EYES: EOMI, PERRLA, conjunctiva and sclera clear  ENMT: No tonsillar erythema, exudates, or enlargement; Moist mucous membranes, Good dentition, No lesions  NECK: Supple, No JVD, Normal thyroid  NERVOUS SYSTEM:  Alert & Oriented X3, Motor Strength 5/5 B/L upper and lower extremities; DTRs 2+ intact and symmetric  CHEST/LUNG: Clear to percussion bilaterally; No rales, rhonchi, wheezing, or rubs  HEART: Regular rate and rhythm; No murmurs, rubs, or gallops  ABDOMEN: Soft, Nontender, Nondistended; Bowel sounds present  EXTREMITIES:  2+ Peripheral Pulses, No clubbing, cyanosis, or edema  LYMPH: No lymphadenopathy noted  SKIN: No rashes or lesions    LABS:                        13.0   15.2  )-----------( 150      ( 2017 10:27 )             38.0         135  |  100  |  16  ----------------------------<  109<H>  3.9   |  28  |  0.99    Ca    10.2      2017 10:27  Mg     1.8         TPro  6.7  /  Alb  2.9<L>  /  TBili  1.3<H>  /  DBili  x   /  AST  20  /  ALT  10  /  AlkPhos  58        Urinalysis Basic - ( 2017 10:33 )    Color: x / Appearance: Turbid / S.010 / pH: x  Gluc: x / Ketone: Negative  / Bili: Negative / Urobili: Negative mg/dL   Blood: x / Protein: 30 mg/dL / Nitrite: Positive   Leuk Esterase: Moderate / RBC: 3-5 /HPF / WBC >50   Sq Epi: x / Non Sq Epi: x / Bacteria: Many      CAPILLARY BLOOD GLUCOSE          RADIOLOGY & ADDITIONAL TESTS:    Imaging Personally Reviewed:  [ ] YES  [ ] NO    Consultant(s) Notes Reviewed:  [ ] YES  [ ] NO    Care Discussed with Consultants/Other Providers [ ] YES  [ ] NO

## 2017-08-01 NOTE — PHYSICAL THERAPY INITIAL EVALUATION ADULT - TRANSFER TRAINING, PT EVAL
Goals (3-5 sessions): Sit<->stand independently      Stairs: Up/down 1 flight with railing independently

## 2017-08-01 NOTE — CONSULT NOTE ADULT - ASSESSMENT
UTI (E Coli) and right epididymitis requiring antibiotic therapy, longstanding history BPH with LUTS and presenting with UR- Nj in place and will increase Tamsulosin, continue Finasteride offer voiding trial.  will request Scrotal sono to evaluate epididymis and testes

## 2017-08-01 NOTE — PHYSICAL THERAPY INITIAL EVALUATION ADULT - PERTINENT HX OF CURRENT PROBLEM, REHAB EVAL
Pt. admitted with worsening symptoms of chills, fevers, confusion and urinary frequency.  Renal US-> mild bilateral hydronephrosis; bladder stones/debris.

## 2017-08-01 NOTE — PROGRESS NOTE ADULT - SUBJECTIVE AND OBJECTIVE BOX
IDRIS DANIEL is a yMale , patient examined and chart reviewed. Patient seen and examined today being followed for possible sepsis secondary to UTI      INTERVAL HPI/ OVERNIGHT EVENTS: Events noted, had PVR of over 500 so Nj placed. Renal sono shows bilateral hydronephrosis with left kidney stone . he feels much better     PAST MEDICAL & SURGICAL HISTORY:  Hyperlipidemia  Benign prostatic hyperplasia, presence of lower urinary tract symptoms unspecified, unspecified morphology  H/O heart artery stent      For details regarding the patient's social history, family history, and other miscellaneous elements, please refer the initial infectious diseases consultation and/or the admitting history and physical examination for this admission.   :     ROS:  CONSTITUTIONAL:  Positive for fever or chills, feels well, good appetite  EYES:  Negative  blurry vision or double vision  CARDIOVASCULAR:  Negative for chest pain or palpitations  RESPIRATORY:  Negative for cough, wheezing, or SOB   GASTROINTESTINAL:  Negative for nausea, vomiting, diarrhea, constipation, or abdominal pain  GENITOURINARY:  positive for  frequency, urgency or dysuria  NEUROLOGIC:  No headache, confusion, dizziness, lightheadedness  All other systems were reviewed and are negative         Current inpatient medications :    ANTIBIOTICS/RELEVANT:  lactobacillus acidophilus 1 Tablet(s) Oral three times a day with meals      sodium chloride 0.9%. 1000 milliLiter(s) IV Continuous <Continuous>  finasteride 5 milliGRAM(s) Oral daily  acetaminophen   Tablet 650 milliGRAM(s) Oral every 6 hours PRN  atorvastatin 20 milliGRAM(s) Oral at bedtime  aspirin enteric coated 81 milliGRAM(s) Oral daily  enoxaparin Injectable 40 milliGRAM(s) SubCutaneous daily  acetaminophen   Tablet. 650 milliGRAM(s) Oral every 6 hours PRN  famotidine    Tablet 20 milliGRAM(s) Oral daily  tamsulosin 0.4 milliGRAM(s) Oral at bedtime      Objective:     @ 07:  -   @ 07:00  --------------------------------------------------------  IN: 3600 mL / OUT: 2795 mL / NET: 805 mL      T(C): 37.4 (17 @ 05:17), Max: 37.5 (17 @ 21:00)  HR: 61 (17 @ 05:17) (60 - 84)  BP: 111/56 (17 @ 05:17) (111/56 - 136/73)  RR: 18 (17 @ 05:17) (17 - 18)  SpO2: 95% (17 @ 05:17) (95% - 99%)  Wt(kg): --      Physical Exam:  General: well developed well nourished, in no acute distress  Eyes: sclera anicteric, pupils equal and reactive to light  ENMT: buccal mucosa moist, pharynx not injected  Neck: supple, trachea midline  Lungs: clear, no wheeze/rhonchi  Cardiovascular: regular rate and rhythm, S1 S2  Abdomen: soft, nontender, no organomegaly present, bowel sounds normal  Neurological: alert and oriented x3, Cranial Nerves II-XII grossly intact  Skin: no increased ecchymosis/petechiae/purpura  Lymph Nodes: no palpable cervical/supraclavicular lymph nodes enlargements  Extremities: no cyanosis/clubbing/edema            LABS:                          12.1   8.1   )-----------( 118      ( 01 Aug 2017 07:34 )             35.5       08-    141  |  108  |  9   ----------------------------<  105<H>  4.0   |  27  |  0.74    Ca    9.3      01 Aug 2017 07:34  Mg     1.8         TPro  6.7  /  Alb  2.9<L>  /  TBili  1.3<H>  /  DBili  x   /  AST  20  /  ALT  10  /  AlkPhos  58          Urinalysis Basic - ( 2017 10:33 )    Color: x / Appearance: Turbid / S.010 / pH: x  Gluc: x / Ketone: Negative  / Bili: Negative / Urobili: Negative mg/dL   Blood: x / Protein: 30 mg/dL / Nitrite: Positive   Leuk Esterase: Moderate / RBC: 3-5 /HPF / WBC >50   Sq Epi: x / Non Sq Epi: x / Bacteria: Many              RECENT CULTURES:  Culture - Urine (17 @ 15:10)    Specimen Source: .Urine Clean Catch (Midstream)    Culture Results:   >100,000 CFU/ml Escherichia coli        RADIOLOGY & ADDITIONAL STUDIES:  US Renal (17 @ 20:15) >  IMPRESSION:    Mild bilateral hydronephrosis. 1.4 cm stone lower pole left kidney.    Urinary bladder contains stones and debris. See above report.        Assessment :     88 y/o M pt with hx of Hyperlipidemia, Benign prostatic hyperplasia, UTI's presents to ED with son for worsening  symptoms of chills, fevers, confusion and urinary frequency has been on and off antibiotics for past few  weeks most likely has complicated UTI with prostatitis .He has urinary retention secondary to BPH with possible bladder stone . he also has left renal stone . Real sono shows bilateral hydronephrosis probably related to bladder out let obstruction from BPH.      Plan :   - will continue with IV Rocephin 1 gram daily pending cs results   - Await urology evaluation   - get ct stone study to rule out obstructing stone   - ambulate as tolerated   -     I have discussed the above plan of care with patient and his Son present at bedside  in detail. They expressed understanding of the  treatment plan . Risks, benefits and alternatives discussed in detail. I have asked if they have any questions or concerns and appropriately addressed them to the best of my ability .    > 35 minutes were spent in direct patient care reviewing notes, medications ,labs data/ imaging , discussion with multidisciplinary team.    Thank you for allowing me to participate in care of your patient .    Humphrey Thakkar MD  [phone]

## 2017-08-01 NOTE — CONSULT NOTE ADULT - SUBJECTIVE AND OBJECTIVE BOX
Infectious Diseases Consult by Humphrey Thakkar MD    Reason for Consult :    HPI:          86 y/o M pt with hx of Hyperlipidemia, Benign prostatic hyperplasia, UTI's presents to ED with son for worsening  symptoms of chills, fevers, confusion and urinary frequency. Per son pt was recently treated for UTI and placed on ampicillin. He saw urologist Dr. Reees and was placed on PO antibiotics . Denies chest pain, SOB, nausea, vomiting. No further complaints at this time. he claims he has difficulty urinating . 	      Past Medical & Surgical Hx:  PAST MEDICAL & SURGICAL HISTORY:  Hyperlipidemia  Benign prostatic hyperplasia, presence of lower urinary tract symptoms unspecified, unspecified morphology  H/O heart artery stent      Social History-- Retired lives alone   EtOH: denies   Tobacco: denies   Drug Use: denies     FAMILY HISTORY:      Allergies    No Known Allergies    Intolerances    Home/ Out patient  Medications :    Current Inpatient Medications :    ANTIBIOTICS:       OTHER RELEVANT MEDICATIONS :  sodium chloride 0.9%. 1000 milliLiter(s) IV Continuous <Continuous>  finasteride 5 milliGRAM(s) Oral daily  acetaminophen   Tablet 650 milliGRAM(s) Oral every 6 hours PRN  atorvastatin 20 milliGRAM(s) Oral at bedtime  aspirin enteric coated 81 milliGRAM(s) Oral daily  enoxaparin Injectable 40 milliGRAM(s) SubCutaneous daily  acetaminophen   Tablet. 650 milliGRAM(s) Oral every 6 hours PRN  famotidine    Tablet 20 milliGRAM(s) Oral daily      ROS:  CONSTITUTIONAL:  Positive  fever and chills, feels weak good appetite  EYES:  Negative  blurry vision or double vision  CARDIOVASCULAR:  Negative for chest pain or palpitations  RESPIRATORY:  Negative for cough, wheezing, or SOB   GASTROINTESTINAL:  Negative for nausea, vomiting, diarrhea, constipation, or abdominal pain  GENITOURINARY:  Positive frequency, urgency , dysuria or hematuria   NEUROLOGIC:  No headache, confusion, dizziness, lightheadedness  All other systems were reviewed and are negative          I&O's Detail    2017 07:01  -  2017 19:13  --------------------------------------------------------  IN:    Sodium Chloride 0.9% IV Bolus: 2000 mL  Total IN: 2000 mL    OUT:    Voided: 220 mL  Total OUT: 220 mL    Total NET: 1780 mL      Physical Exam:  Vital Signs Last 24 Hrs  T(C): 37.4 (2017 16:30), Max: 37.6 (2017 08:53)  T(F): 99.3 (2017 16:30), Max: 99.7 (2017 08:53)  HR: 77 (2017 16:30) (72 - 84)  BP: 136/73 (2017 16:30) (100/55 - 136/73)  BP(mean): --  RR: 18 (2017 16:30) (16 - 18)  SpO2: 96% (2017 16:30) (96% - 98%)  Height (cm): 170.18 ( @ 08:53)  Weight (kg): 68 ( @ 08:53)  BMI (kg/m2): 23.5 ( @ 08:53)  BSA (m2): 1.79 ( @ 08:53)    General: well developed well nourished, in no acute distress  Eyes: sclera anicteric, pupils equal and reactive to light  ENMT: buccal mucosa moist, pharynx not injected  Neck: supple, trachea midline  Lungs: clear, no wheeze/rhonchi  Cardiovascular: regular rate and rhythm, S1 S2  Abdomen: soft, nontender, no organomegaly present, bowel sounds normal, supra pubic fullness   Neurological:  alert and oriented x3, Cranial Nerves II-XII grossly intact  Skin:no increased ecchymosis/petechiae/purpura  Lymph Nodes: no palpable cervical/supraclavicular lymph nodes enlargements  Extremities: no cyanosis/clubbing/edema    Labs:         135  |  100  |  16  ----------------------------<  109<H>  3.9   |  28  |  0.99    Ca    10.2      2017 10:27  Mg     1.8         TPro  6.7  /  Alb  2.9<L>  /  TBili  1.3<H>  /  DBili  x   /  AST  20  /  ALT  10  /  AlkPhos  58                            13.0   15.2  )-----------( 150      ( 2017 10:27 )             38.0         Urinalysis Basic - ( 2017 10:33 )    Color: x / Appearance: Turbid / S.010 / pH: x  Gluc: x / Ketone: Negative  / Bili: Negative / Urobili: Negative mg/dL   Blood: x / Protein: 30 mg/dL / Nitrite: Positive   Leuk Esterase: Moderate / RBC: 3-5 /HPF / WBC >50   Sq Epi: x / Non Sq Epi: x / Bacteria: Many      LIVER FUNCTIONS - ( 2017 10:27 )  Alb: 2.9 g/dL / Pro: 6.7 g/dL / ALK PHOS: 58 U/L / ALT: 10 U/L DA / AST: 20 U/L / GGT: x               RECENT CULTURES:          RADIOLOGY & ADDITIONAL STUDIES:  Xray Chest 1 View AP/PA (17 @ 10:35) >    Impression: No active infiltrates.    Assessment :     86 y/o M pt with hx of Hyperlipidemia, Benign prostatic hyperplasia, UTI's presents to ED with son for worsening  symptoms of chills, fevers, confusion and urinary frequency has been on and off antibiotics for past few  weeks most likely has complicated UTI with prostatitis . he may have urinary retention .    Plan;  - check bladder scan to rule out retention   - get renal sonogram   - trend CBC   - will call his urologist in am to see what cs he had most recently.      Continue with present regime .  Appropriate use of antibiotics and adverse effects reviewed.      I have discussed the above plan of care with patient and family in detail. They expressed understanding of the treatment plan . Risks, benefits and alternatives discussed in detail. I have asked if they have any questions or concerns and appropriately addressed them to the best of my ability .      > 45 minutes spent in direct patient care reviewing  the notes, lab data/ imaging , discussion with multidisciplinary team. All questions were addressed and answered to the best of my capacity .    Thank you for allowing me to participate in the care of your patient .      Humphrey Thakkar MD  [phone]
CHIEF COMPLAINT: Difficulty urinating, fever and chills    HISTORY OF PRESENT ILLNESS:  This is a 87y Male complaining of urinary/bladder trouble.  History of Hyperlipidemia, Benign Prostatic Hyperplasia on long term Flomax/Proscar, Urolithiasis  UTI's presents to ED with son for worsening symptoms of chills, fevers, confusion and urinary frequency. per son pt was recently treated for UTI and placed on ampicillin. Denies chest pain, SOB, nausea, vomiting. No further complaints at this time.    Ur C&S positive fro E. Coli,on Ceftriaxone,  Renal Sono with hydro and left calculus, cath placed for UR and subsequent CTT without hydronephrosis, left urolithiasis noted, and renal function wnl.    PAST MEDICAL & SURGICAL HISTORY:  Hyperlipidemia  Benign prostatic hyperplasia, presence of lower urinary tract symptoms unspecified, unspecified morphology  H/O heart artery stent  Urolithiais      REVIEW OF SYSTEMS:    CONSTITUTIONAL: C/O  weakness, fevers,  chills  EYES/ENT: No visual changes;  No vertigo or throat pain   NECK: No pain or stiffness  RESPIRATORY: No cough, wheezing, hemoptysis; No shortness of breath  CARDIOVASCULAR: No chest pain or palpitations  GASTROINTESTINAL: No abdominal or epigastric pain. No nausea, vomiting, or hematemesis; No diarrhea or constipation. No melena or hematochezia.  GENITOURINARY: urinary retention  NEUROLOGICAL: No numbness or weakness  SKIN: No itching, burning, rashes, or lesions       MEDICATIONS  (STANDING):  sodium chloride 0.9%. 1000 milliLiter(s) (100 mL/Hr) IV Continuous <Continuous>  finasteride 5 milliGRAM(s) Oral daily  atorvastatin 20 milliGRAM(s) Oral at bedtime  aspirin enteric coated 81 milliGRAM(s) Oral daily  enoxaparin Injectable 40 milliGRAM(s) SubCutaneous daily  lactobacillus acidophilus 1 Tablet(s) Oral three times a day with meals  famotidine    Tablet 20 milliGRAM(s) Oral daily  tamsulosin 0.4 milliGRAM(s) Oral at bedtime  cefTRIAXone   IVPB   IV Intermittent     MEDICATIONS  (PRN):  acetaminophen   Tablet 650 milliGRAM(s) Oral every 6 hours PRN Moderate Pain (4 - 6) or Temp > 100.5  acetaminophen   Tablet. 650 milliGRAM(s) Oral every 6 hours PRN Moderate Pain (4 - 6)      Allergies    No Known Allergies    Intolerances        SOCIAL HISTORY:  no ETOH/Tobacco    FAMILY HISTORY:  Gu family history denied      Vital Signs Last 24 Hrs  T(C): 36.6 (01 Aug 2017 17:12), Max: 37.5 (2017 21:00)  T(F): 97.9 (01 Aug 2017 17:12), Max: 99.5 (2017 21:00)  HR: 53 (01 Aug 2017 17:12) (53 - 67)  BP: 98/55 (01 Aug 2017 17:12) (93/58 - 121/68)  BP(mean): --  RR: 16 (01 Aug 2017 17:12) (16 - 18)  SpO2: 96% (01 Aug 2017 17:12) (95% - 99%)    PHYSICAL EXAM:    Constitutional: NAD, well-developed  HEENT: KILO, EOMI, Normal Hearing, MMM  Neck: No LAD, No JVD  Back: Normal spine flexure, No CVA tenderness  Respiratory: CTAB   Cardiovascular: S1 and S2, RRR, no M/G/R  Abd: BS+, soft, NT/ND, No CVAT  : Normal circumcised phallus, patent meatus with indwelling Nj and grossly clear urine, bilateral descended testes, induration, enlargement  and tenderness right epididymis  Extremities: No peripheral edema  Vascular: 2+ peripheral pulses  Neurological: A/O x 3, no focal deficits  Psychiatric: Normal mood, normal affect      LABS:                        12.1   8.1   )-----------( 118      ( 01 Aug 2017 07:34 )             35.5     08-01    141  |  108  |  9   ----------------------------<  105<H>  4.0   |  27  |  0.74    Ca    9.3      01 Aug 2017 07:34  Mg     1.8     -    TPro  6.7  /  Alb  2.9<L>  /  TBili  1.3<H>  /  DBili  x   /  AST  20  /  ALT  10  /  AlkPhos  58  07-31      Urinalysis Basic - ( 2017 10:33 )    Color: x / Appearance: Turbid / S.010 / pH: x  Gluc: x / Ketone: Negative  / Bili: Negative / Urobili: Negative mg/dL   Blood: x / Protein: 30 mg/dL / Nitrite: Positive   Leuk Esterase: Moderate / RBC: 3-5 /HPF / WBC >50   Sq Epi: x / Non Sq Epi: x / Bacteria: Many      Urine Culture: Culture Results:   >100,000 CFU/ml Escherichia coli (17 @ 15:10)      Hemoglobin: 12.1 g/dL ( @ 07:34)  Hematocrit: 35.5 % ( @ 07:34)  Hematocrit: 38.0 % ( @ 10:27)  Hemoglobin: 13.0 g/dL ( @ 10:27)      RADIOLOGY & ADDITIONAL STUDIES:< from: CT Renal Stone Hunt (17 @ 09:25) >  EXAM:  CT RENAL STONE HUNT                                  PROCEDURE DATE:  2017          INTERPRETATION:  Clinical information: Pain, bleeding from urinary tract.    Comparison exam dated 2011    Axial images obtained, coronal and sagittal images computer reformatted.    Noncontrast study.    Trace bibasilar effusions. Interstitial changes present at the lung   bases, septal thickening.    Unenhanced liver unremarkable with the exception of the presence of   multiple 2 mm calcifications peripheral region right lobe. The   gallbladder contains a large stone. The spleen is normal in size and   configuration. Unenhanced pancreas is unremarkable. No adrenal lesions   evident.    Right kidney shows no masses obstructive changes or calculi. Minimal   right perinephric stranding present.    Left kidney demonstrates the presence of a stone in the lower pole   collecting system,  measuring 14 mm.   2 mm stone present upper pole collecting system left kidney. Small cyst   present, lower pole. No left hydronephrosis. Left perinephric stranding   present.    Ectasia, atherosclerotic changes, lower abdominal aorta. No   retroperitoneal lymphadenopathy. No ascites. No biliary ductal dilatation.    Small hiatus hernia present. No bowelobstruction. No sign of   appendicitis.    Nj catheter balloon visible within urinary bladder. Urinary bladder   wall is thickened. Enlarged prostate containing multiple calcifications.   Posteriorly situated bladder diverticulum containing multiple stones. No   free pelvic fluid.   Fat-containing umbilical hernia present. Right   inguinal hernia present containing nonobstructed small bowel. Possible   postsurgical changes in the left inguinal region, correlate clinically.    No acute appearing osseous abnormalities.    IMPRESSION: No hydronephrotic changes. Left nephrolithiasis. See multiple   additional findings as described above.                  MERA GONZALES M.D.,ATTENDING RADIOLOGIST  This document has been electronically signed.Aug  1 2017  9:43AM              < end of copied text >

## 2017-08-02 ENCOUNTER — TRANSCRIPTION ENCOUNTER (OUTPATIENT)
Age: 82
End: 2017-08-02

## 2017-08-02 LAB
-  AMIKACIN: SIGNIFICANT CHANGE UP
-  AMPICILLIN/SULBACTAM: SIGNIFICANT CHANGE UP
-  AMPICILLIN: SIGNIFICANT CHANGE UP
-  AZTREONAM: SIGNIFICANT CHANGE UP
-  CEFAZOLIN: SIGNIFICANT CHANGE UP
-  CEFEPIME: SIGNIFICANT CHANGE UP
-  CEFOXITIN: SIGNIFICANT CHANGE UP
-  CEFTAZIDIME: SIGNIFICANT CHANGE UP
-  CEFTRIAXONE: SIGNIFICANT CHANGE UP
-  CIPROFLOXACIN: SIGNIFICANT CHANGE UP
-  ERTAPENEM: SIGNIFICANT CHANGE UP
-  GENTAMICIN: SIGNIFICANT CHANGE UP
-  IMIPENEM: SIGNIFICANT CHANGE UP
-  LEVOFLOXACIN: SIGNIFICANT CHANGE UP
-  MEROPENEM: SIGNIFICANT CHANGE UP
-  NITROFURANTOIN: SIGNIFICANT CHANGE UP
-  PIPERACILLIN/TAZOBACTAM: SIGNIFICANT CHANGE UP
-  TOBRAMYCIN: SIGNIFICANT CHANGE UP
-  TRIMETHOPRIM/SULFAMETHOXAZOLE: SIGNIFICANT CHANGE UP
ANION GAP SERPL CALC-SCNC: 6 MMOL/L — SIGNIFICANT CHANGE UP (ref 5–17)
BASOPHILS # BLD AUTO: 0 K/UL — SIGNIFICANT CHANGE UP (ref 0–0.2)
BASOPHILS NFR BLD AUTO: 0.4 % — SIGNIFICANT CHANGE UP (ref 0–2)
BUN SERPL-MCNC: 7 MG/DL — SIGNIFICANT CHANGE UP (ref 7–23)
CALCIUM SERPL-MCNC: 8.9 MG/DL — SIGNIFICANT CHANGE UP (ref 8.4–10.5)
CHLORIDE SERPL-SCNC: 107 MMOL/L — SIGNIFICANT CHANGE UP (ref 96–108)
CO2 SERPL-SCNC: 25 MMOL/L — SIGNIFICANT CHANGE UP (ref 22–31)
CREAT SERPL-MCNC: 0.64 MG/DL — SIGNIFICANT CHANGE UP (ref 0.5–1.3)
CULTURE RESULTS: SIGNIFICANT CHANGE UP
EOSINOPHIL # BLD AUTO: 0.2 K/UL — SIGNIFICANT CHANGE UP (ref 0–0.5)
EOSINOPHIL NFR BLD AUTO: 2.4 % — SIGNIFICANT CHANGE UP (ref 0–6)
GLUCOSE SERPL-MCNC: 109 MG/DL — HIGH (ref 70–99)
HCT VFR BLD CALC: 36.2 % — LOW (ref 39–50)
HGB BLD-MCNC: 12 G/DL — LOW (ref 13–17)
LYMPHOCYTES # BLD AUTO: 1.5 K/UL — SIGNIFICANT CHANGE UP (ref 1–3.3)
LYMPHOCYTES # BLD AUTO: 20.2 % — SIGNIFICANT CHANGE UP (ref 13–44)
MCHC RBC-ENTMCNC: 31.3 PG — SIGNIFICANT CHANGE UP (ref 27–34)
MCHC RBC-ENTMCNC: 33.2 GM/DL — SIGNIFICANT CHANGE UP (ref 32–36)
MCV RBC AUTO: 94.2 FL — SIGNIFICANT CHANGE UP (ref 80–100)
METHOD TYPE: SIGNIFICANT CHANGE UP
MONOCYTES # BLD AUTO: 0.8 K/UL — SIGNIFICANT CHANGE UP (ref 0–0.9)
MONOCYTES NFR BLD AUTO: 11.1 % — SIGNIFICANT CHANGE UP (ref 2–14)
NEUTROPHILS # BLD AUTO: 4.9 K/UL — SIGNIFICANT CHANGE UP (ref 1.8–7.4)
NEUTROPHILS NFR BLD AUTO: 65.8 % — SIGNIFICANT CHANGE UP (ref 43–77)
ORGANISM # SPEC MICROSCOPIC CNT: SIGNIFICANT CHANGE UP
ORGANISM # SPEC MICROSCOPIC CNT: SIGNIFICANT CHANGE UP
PLATELET # BLD AUTO: 139 K/UL — LOW (ref 150–400)
POTASSIUM SERPL-MCNC: 3.2 MMOL/L — LOW (ref 3.5–5.3)
POTASSIUM SERPL-SCNC: 3.2 MMOL/L — LOW (ref 3.5–5.3)
RBC # BLD: 3.84 M/UL — LOW (ref 4.2–5.8)
RBC # FLD: 11.7 % — SIGNIFICANT CHANGE UP (ref 10.3–14.5)
SODIUM SERPL-SCNC: 138 MMOL/L — SIGNIFICANT CHANGE UP (ref 135–145)
SPECIMEN SOURCE: SIGNIFICANT CHANGE UP
WBC # BLD: 7.5 K/UL — SIGNIFICANT CHANGE UP (ref 3.8–10.5)
WBC # FLD AUTO: 7.5 K/UL — SIGNIFICANT CHANGE UP (ref 3.8–10.5)

## 2017-08-02 RX ORDER — CEFPODOXIME PROXETIL 100 MG
100 TABLET ORAL EVERY 12 HOURS
Qty: 0 | Refills: 0 | Status: DISCONTINUED | OUTPATIENT
Start: 2017-08-03 | End: 2017-08-03

## 2017-08-02 RX ORDER — AMPICILLIN TRIHYDRATE 250 MG
1 CAPSULE ORAL
Qty: 0 | Refills: 0 | COMMUNITY

## 2017-08-02 RX ORDER — TAMSULOSIN HYDROCHLORIDE 0.4 MG/1
1 CAPSULE ORAL
Qty: 0 | Refills: 0 | COMMUNITY
Start: 2017-08-02 | End: 2017-09-01

## 2017-08-02 RX ORDER — POTASSIUM CHLORIDE 20 MEQ
40 PACKET (EA) ORAL ONCE
Qty: 0 | Refills: 0 | Status: COMPLETED | OUTPATIENT
Start: 2017-08-02 | End: 2017-08-02

## 2017-08-02 RX ORDER — FAMOTIDINE 10 MG/ML
1 INJECTION INTRAVENOUS
Qty: 30 | Refills: 0 | OUTPATIENT
Start: 2017-08-02 | End: 2017-09-01

## 2017-08-02 RX ORDER — LACTOBACILLUS ACIDOPHILUS 100MM CELL
1 CAPSULE ORAL
Qty: 0 | Refills: 0 | COMMUNITY
Start: 2017-08-02 | End: 2017-08-22

## 2017-08-02 RX ORDER — CEFPODOXIME PROXETIL 100 MG
1 TABLET ORAL
Qty: 14 | Refills: 0 | OUTPATIENT
Start: 2017-08-02 | End: 2017-08-09

## 2017-08-02 RX ORDER — TAMSULOSIN HYDROCHLORIDE 0.4 MG/1
2 CAPSULE ORAL
Qty: 60 | Refills: 0 | OUTPATIENT
Start: 2017-08-02 | End: 2017-09-01

## 2017-08-02 RX ADMIN — ATORVASTATIN CALCIUM 20 MILLIGRAM(S): 80 TABLET, FILM COATED ORAL at 21:13

## 2017-08-02 RX ADMIN — Medication 1 TABLET(S): at 18:27

## 2017-08-02 RX ADMIN — FAMOTIDINE 20 MILLIGRAM(S): 10 INJECTION INTRAVENOUS at 12:59

## 2017-08-02 RX ADMIN — FINASTERIDE 5 MILLIGRAM(S): 5 TABLET, FILM COATED ORAL at 12:59

## 2017-08-02 RX ADMIN — Medication 1 TABLET(S): at 07:58

## 2017-08-02 RX ADMIN — Medication 1 TABLET(S): at 13:00

## 2017-08-02 RX ADMIN — Medication 81 MILLIGRAM(S): at 12:59

## 2017-08-02 RX ADMIN — CEFTRIAXONE 100 GRAM(S): 500 INJECTION, POWDER, FOR SOLUTION INTRAMUSCULAR; INTRAVENOUS at 08:44

## 2017-08-02 RX ADMIN — ENOXAPARIN SODIUM 40 MILLIGRAM(S): 100 INJECTION SUBCUTANEOUS at 12:59

## 2017-08-02 RX ADMIN — TAMSULOSIN HYDROCHLORIDE 0.8 MILLIGRAM(S): 0.4 CAPSULE ORAL at 21:14

## 2017-08-02 RX ADMIN — Medication 40 MILLIEQUIVALENT(S): at 13:00

## 2017-08-02 RX ADMIN — Medication 650 MILLIGRAM(S): at 13:00

## 2017-08-02 NOTE — DISCHARGE NOTE ADULT - PLAN OF CARE
7 more days oral antibiotics Follow urologist Flomax 0.8 mg PO daily, Proscar 5 mg PO daily Nj's in follow  Lipitor

## 2017-08-02 NOTE — DISCHARGE NOTE ADULT - HOME CARE AGENCY
[facility] - ([phone])  Nurse to visit the day after hospital discharge; physical therapist to follow. Please contact the home care agency at the above phone number if you have not heard from them by 12 noon on the day after your hospital discharge.

## 2017-08-02 NOTE — DISCHARGE NOTE ADULT - CARE PROVIDERS DIRECT ADDRESSES
[email or web address]

## 2017-08-02 NOTE — PROGRESS NOTE ADULT - SUBJECTIVE AND OBJECTIVE BOX
CHIEF COMPLAINT:     HISTORY OF PRESENT ILLNESS:  Failed TOV today,  Bladder scan with greater than 700 cc, patient uncomfortable.     This is a 87y Male complaining of urinary/bladder trouble.  History of Hyperlipidemia, Benign Prostatic Hyperplasia on long term Flomax/Proscar, Urolithiasis  UTI's presents to ED with son for worsening symptoms of chills, fevers, confusion and urinary frequency. per son pt was recently treated for UTI and placed on ampicillin. Denies chest pain, SOB, nausea, vomiting. No further complaints at this time.    Ur C&S positive fro E. Coli,on Ceftriaxone,  Renal Sono with hydro and left calculus, cath placed for UR and subsequent CTT without hydronephrosis, left urolithiasis noted, and renal function wnl.  PAST MEDICAL & SURGICAL HISTORY:  Hyperlipidemia  Benign prostatic hyperplasia, presence of lower urinary tract symptoms unspecified, unspecified morphology  H/O heart artery stent        MEDICATIONS  (STANDING):  finasteride 5 milliGRAM(s) Oral daily  atorvastatin 20 milliGRAM(s) Oral at bedtime  aspirin enteric coated 81 milliGRAM(s) Oral daily  enoxaparin Injectable 40 milliGRAM(s) SubCutaneous daily  lactobacillus acidophilus 1 Tablet(s) Oral three times a day with meals  famotidine    Tablet 20 milliGRAM(s) Oral daily  tamsulosin 0.8 milliGRAM(s) Oral at bedtime    MEDICATIONS  (PRN):  acetaminophen   Tablet 650 milliGRAM(s) Oral every 6 hours PRN Moderate Pain (4 - 6) or Temp > 100.5  acetaminophen   Tablet. 650 milliGRAM(s) Oral every 6 hours PRN Moderate Pain (4 - 6)      Allergies    No Known Allergies    Intolerances      Vital Signs Last 24 Hrs  T(C): 36.9 (02 Aug 2017 13:30), Max: 37.5 (02 Aug 2017 09:30)  T(F): 98.4 (02 Aug 2017 13:30), Max: 99.5 (02 Aug 2017 09:30)  HR: 94 (02 Aug 2017 13:30) (53 - 94)  BP: 122/80 (02 Aug 2017 13:30) (98/55 - 147/75)  BP(mean): --  RR: 18 (02 Aug 2017 13:30) (16 - 18)  SpO2: 6% (02 Aug 2017 13:30) (6% - 100%)    PHYSICAL EXAM:    Constitutional: NAD, well-developed    HEENT: KILO, EOMI, Normal Hearing, MMM  Neck: No LAD, No JVD  Back: Normal spine flexure, No CVA tenderness  Respiratory: CTAB   Cardiovascular: S1 and S2, RRR, no M/G/R  Abd: BS+, soft, No CVAT, bladder percussible 2 fb above symphysis  : Normal circumcised phallus, patent meatus  bilateral descended testes, induration, enlargement  and tenderness right epididymis  Extremities: No peripheral edema    LABS:                        12.0   7.5   )-----------( 139      ( 02 Aug 2017 07:46 )             36.2     08-02    138  |  107  |  7   ----------------------------<  109<H>  3.2<L>   |  25  |  0.64    Ca    8.9      02 Aug 2017 07:46          Urine Culture:   Hemoglobin: 12.0 g/dL (08-02 @ 07:46)  Hematocrit: 36.2 % (08-02 @ 07:46)  Hemoglobin: 12.1 g/dL (08-01 @ 07:34)  Hematocrit: 35.5 % (08-01 @ 07:34)      RADIOLOGY & ADDITIONAL STUDIES:

## 2017-08-02 NOTE — PROGRESS NOTE ADULT - SUBJECTIVE AND OBJECTIVE BOX
IDRIS DANIEL is a yMale , patient examined and chart reviewed. Patient seen and examined today being followed for possible sepsis secondary to UTI      INTERVAL HPI/ OVERNIGHT EVENTS: Events noted, seen by Urology nighat dc this am await to void  . he feels much better and wants to go home     PAST MEDICAL & SURGICAL HISTORY:  Hyperlipidemia  Benign prostatic hyperplasia, presence of lower urinary tract symptoms unspecified, unspecified morphology  H/O heart artery stent      For details regarding the patient's social history, family history, and other miscellaneous elements, please refer the initial infectious diseases consultation and/or the admitting history and physical examination for this admission.   :     ROS:  CONSTITUTIONAL:  Positive for fever or chills, feels well, good appetite  EYES:  Negative  blurry vision or double vision  CARDIOVASCULAR:  Negative for chest pain or palpitations  RESPIRATORY:  Negative for cough, wheezing, or SOB   GASTROINTESTINAL:  Negative for nausea, vomiting, diarrhea, constipation, or abdominal pain  GENITOURINARY:  positive for  frequency, urgency or dysuria  NEUROLOGIC:  No headache, confusion, dizziness, lightheadedness  All other systems were reviewed and are negative         Current inpatient medications :    MEDICATIONS  (STANDING):  sodium chloride 0.9%. 1000 milliLiter(s) (100 mL/Hr) IV Continuous <Continuous>  finasteride 5 milliGRAM(s) Oral daily  atorvastatin 20 milliGRAM(s) Oral at bedtime  aspirin enteric coated 81 milliGRAM(s) Oral daily  enoxaparin Injectable 40 milliGRAM(s) SubCutaneous daily  lactobacillus acidophilus 1 Tablet(s) Oral three times a day with meals  famotidine    Tablet 20 milliGRAM(s) Oral daily  cefTRIAXone   IVPB   IV Intermittent   cefTRIAXone   IVPB 1 Gram(s) IV Intermittent every 24 hours  tamsulosin 0.8 milliGRAM(s) Oral at bedtime    MEDICATIONS  (PRN):  acetaminophen   Tablet 650 milliGRAM(s) Oral every 6 hours PRN Moderate Pain (4 - 6) or Temp > 100.5  acetaminophen   Tablet. 650 milliGRAM(s) Oral every 6 hours PRN Moderate Pain (4 - 6)    Objective:  Vital Signs Last 24 Hrs  T(C): 37.2 (02 Aug 2017 05:34), Max: 37.2 (02 Aug 2017 05:34)  T(F): 98.9 (02 Aug 2017 05:34), Max: 98.9 (02 Aug 2017 05:34)  HR: 54 (02 Aug 2017 05:34) (53 - 64)  BP: 113/71 (02 Aug 2017 05:34) (93/58 - 147/75)  BP(mean): --  RR: 18 (02 Aug 2017 05:34) (16 - 18)  SpO2: 98% (02 Aug 2017 05:34) (96% - 100%)    Physical Exam:  General: well developed well nourished, in no acute distress  Eyes: sclera anicteric, pupils equal and reactive to light  ENMT: buccal mucosa moist, pharynx not injected  Neck: supple, trachea midline  Lungs: clear, no wheeze/rhonchi  Cardiovascular: regular rate and rhythm, S1 S2  Abdomen: soft, nontender, no organomegaly present, bowel sounds normal  Neurological: alert and oriented x3, Cranial Nerves II-XII grossly intact  Skin: no increased ecchymosis/petechiae/purpura  Lymph Nodes: no palpable cervical/supraclavicular lymph nodes enlargements  Extremities: no cyanosis/clubbing/edema            LABS:                              12.0   7.5   )-----------( 139      ( 02 Aug 2017 07:46 )             36.2     02 Aug 2017 07:46    138    |  107    |  7      ----------------------------<  109    3.2     |  25     |  0.64     Ca    8.9        02 Aug 2017 07:46          CAPILLARY BLOOD GLUCOSE            Urinalysis Basic - ( 2017 10:33 )    Color: x / Appearance: Turbid / S.010 / pH: x  Gluc: x / Ketone: Negative  / Bili: Negative / Urobili: Negative mg/dL   Blood: x / Protein: 30 mg/dL / Nitrite: Positive   Leuk Esterase: Moderate / RBC: 3-5 /HPF / WBC >50   Sq Epi: x / Non Sq Epi: x / Bacteria: Many              RECENT CULTURES:  Culture - Urine (17 @ 15:10)    Specimen Source: .Urine Clean Catch (Midstream)    Culture Results:   >100,000 CFU/ml Escherichia coli        RADIOLOGY & ADDITIONAL STUDIES:  US Testicles (17 @ 23:41) >  IMPRESSION:       1.  No evidence of torsion of the right testis.    2.  Wedge shaped hypoechoic avascular region of the medial left testis   may   represent infarct versus rete testis. The remainder of the left testis   demonstrates normal vascularity. No evidence of torsion of the left   testis.    3.  Moderate right hydrocele.    4.  Moderate left hydrocele.       CT Renal Stone Hunt (17 @ 09:25) >    IMPRESSION: No hydronephrotic changes. Left nephrolithiasis. See multiple   additional findings as described above.      US Renal (17 @ 20:15) >  IMPRESSION:    Mild bilateral hydronephrosis. 1.4 cm stone lower pole left kidney.    Urinary bladder contains stones and debris. See above report.        Assessment :     86 y/o M pt with hx of Hyperlipidemia, Benign prostatic hyperplasia, UTI's presents to ED with son for worsening  symptoms of chills, fevers, confusion and urinary frequency has been on and off antibiotics for past few  weeks most likely has complicated UTI with prostatitis .He has urinary retention secondary to BPH with possible bladder stone . he also has left renal stone . Real sono shows bilateral hydronephrosis probably related to bladder out let obstruction from BPH.      Plan :   - will continue with IV Rocephin 1 gram daily , follow up cs if sensitive to all antibiotics change to po antibiotics   - Await voiding   - dc ivf   -  ambulate as tolerated   - DC planning     I have discussed the above plan of care with patient and his Son present at bedside  in detail. They expressed understanding of the  treatment plan . Risks, benefits and alternatives discussed in detail. I have asked if they have any questions or concerns and appropriately addressed them to the best of my ability .    > 35 minutes were spent in direct patient care reviewing notes, medications ,labs data/ imaging , discussion with multidisciplinary team.    Thank you for allowing me to participate in care of your patient .    Humphrey Thakkar MD  [phone] IDRIS DANIEL is a yMale , patient examined and chart reviewed. Patient seen and examined today being followed for possible sepsis secondary to UTI      INTERVAL HPI/ OVERNIGHT EVENTS: Events noted, seen by Urology nighat dc this am await to void  . he feels much better and wants to go home     PAST MEDICAL & SURGICAL HISTORY:  Hyperlipidemia  Benign prostatic hyperplasia, presence of lower urinary tract symptoms unspecified, unspecified morphology  H/O heart artery stent      For details regarding the patient's social history, family history, and other miscellaneous elements, please refer the initial infectious diseases consultation and/or the admitting history and physical examination for this admission.   :     ROS:  CONSTITUTIONAL:  Positive for fever or chills, feels well, good appetite  EYES:  Negative  blurry vision or double vision  CARDIOVASCULAR:  Negative for chest pain or palpitations  RESPIRATORY:  Negative for cough, wheezing, or SOB   GASTROINTESTINAL:  Negative for nausea, vomiting, diarrhea, constipation, or abdominal pain  GENITOURINARY:  positive for  frequency, urgency or dysuria  NEUROLOGIC:  No headache, confusion, dizziness, lightheadedness  All other systems were reviewed and are negative         Current inpatient medications :    MEDICATIONS  (STANDING):  sodium chloride 0.9%. 1000 milliLiter(s) (100 mL/Hr) IV Continuous <Continuous>  finasteride 5 milliGRAM(s) Oral daily  atorvastatin 20 milliGRAM(s) Oral at bedtime  aspirin enteric coated 81 milliGRAM(s) Oral daily  enoxaparin Injectable 40 milliGRAM(s) SubCutaneous daily  lactobacillus acidophilus 1 Tablet(s) Oral three times a day with meals  famotidine    Tablet 20 milliGRAM(s) Oral daily  cefTRIAXone   IVPB   IV Intermittent   cefTRIAXone   IVPB 1 Gram(s) IV Intermittent every 24 hours  tamsulosin 0.8 milliGRAM(s) Oral at bedtime    MEDICATIONS  (PRN):  acetaminophen   Tablet 650 milliGRAM(s) Oral every 6 hours PRN Moderate Pain (4 - 6) or Temp > 100.5  acetaminophen   Tablet. 650 milliGRAM(s) Oral every 6 hours PRN Moderate Pain (4 - 6)    Objective:  Vital Signs Last 24 Hrs  T(C): 37.2 (02 Aug 2017 05:34), Max: 37.2 (02 Aug 2017 05:34)  T(F): 98.9 (02 Aug 2017 05:34), Max: 98.9 (02 Aug 2017 05:34)  HR: 54 (02 Aug 2017 05:34) (53 - 64)  BP: 113/71 (02 Aug 2017 05:34) (93/58 - 147/75)  BP(mean): --  RR: 18 (02 Aug 2017 05:34) (16 - 18)  SpO2: 98% (02 Aug 2017 05:34) (96% - 100%)    Physical Exam:  General: well developed well nourished, in no acute distress  Eyes: sclera anicteric, pupils equal and reactive to light  ENMT: buccal mucosa moist, pharynx not injected  Neck: supple, trachea midline  Lungs: clear, no wheeze/rhonchi  Cardiovascular: regular rate and rhythm, S1 S2  Abdomen: soft, nontender, no organomegaly present, bowel sounds normal  Neurological: alert and oriented x3, Cranial Nerves II-XII grossly intact  Skin: no increased ecchymosis/petechiae/purpura  Lymph Nodes: no palpable cervical/supraclavicular lymph nodes enlargements  Extremities: no cyanosis/clubbing/edema            LABS:                              12.0   7.5   )-----------( 139      ( 02 Aug 2017 07:46 )             36.2     02 Aug 2017 07:46    138    |  107    |  7      ----------------------------<  109    3.2     |  25     |  0.64     Ca    8.9        02 Aug 2017 07:46          CAPILLARY BLOOD GLUCOSE            Urinalysis Basic - ( 2017 10:33 )    Color: x / Appearance: Turbid / S.010 / pH: x  Gluc: x / Ketone: Negative  / Bili: Negative / Urobili: Negative mg/dL   Blood: x / Protein: 30 mg/dL / Nitrite: Positive   Leuk Esterase: Moderate / RBC: 3-5 /HPF / WBC >50   Sq Epi: x / Non Sq Epi: x / Bacteria: Many              RECENT CULTURES:  Culture - Urine (17 @ 15:10)    -  Ampicillin: R >16    -  Aztreonam: S <=4    -  Cefazolin: S 4    -  Ciprofloxacin: R >2    -  Nitrofurantoin: S <=32    -  Piperacillin/Tazobactam: S <=8    -  Trimethoprim/Sulfamethoxazole: S     -  Amikacin: S <=8    -  Ertapenem: S <=0.5    -  Tobramycin: I 8    -  Cefoxitin: S <=4    -  Imipenem: S <=1    -  Levofloxacin: R >4    -  Meropenem: S <=1    -  Ampicillin/Sulbactam: R >16/8    -  Cefepime: S <=2    -  Ceftazidime: S <=1    -  Ceftriaxone: S <=1    -  Gentamicin: R >8    Specimen Source: .Urine Clean Catch (Midstream)    Culture Results:   >100,000 CFU/ml Escherichia coli    Organism Identification: Escherichia coli    Organism: Escherichia coli    Method Type: LUIS        RADIOLOGY & ADDITIONAL STUDIES:  US Testicles (17 @ 23:41) >  IMPRESSION:       1.  No evidence of torsion of the right testis.    2.  Wedge shaped hypoechoic avascular region of the medial left testis   may   represent infarct versus rete testis. The remainder of the left testis   demonstrates normal vascularity. No evidence of torsion of the left   testis.    3.  Moderate right hydrocele.    4.  Moderate left hydrocele.       CT Renal Stone Hunt (17 @ 09:25) >    IMPRESSION: No hydronephrotic changes. Left nephrolithiasis. See multiple   additional findings as described above.      US Renal (17 @ 20:15) >  IMPRESSION:    Mild bilateral hydronephrosis. 1.4 cm stone lower pole left kidney.    Urinary bladder contains stones and debris. See above report.        Assessment :     86 y/o M pt with hx of Hyperlipidemia, Benign prostatic hyperplasia, UTI's presents to ED with son for worsening  symptoms of chills, fevers, confusion and urinary frequency has been on and off antibiotics for past few  weeks most likely has complicated UTI with prostatitis .He has urinary retention secondary to BPH with possible bladder stone . he also has left renal stone . Real sono shows bilateral hydronephrosis probably related to bladder out let obstruction from BPH.      Plan :   - will change to po Vantin 100 mg bid x 7 days from am   - Await voiding   - dc ivf   -  ambulate as tolerated   - DC planning once patient voids   - follow up with urology on dc     I have discussed the above plan of care with patient and his Son present at bedside  in detail. They expressed understanding of the  treatment plan . Risks, benefits and alternatives discussed in detail. I have asked if they have any questions or concerns and appropriately addressed them to the best of my ability .    > 35 minutes were spent in direct patient care reviewing notes, medications ,labs data/ imaging , discussion with multidisciplinary team.    Thank you for allowing me to participate in care of your patient .    Humphrey Thakkar MD  [phone]

## 2017-08-02 NOTE — DISCHARGE NOTE ADULT - CARE PROVIDER_API CALL
Toan Reese), Urology  [address] [place] [address]  Phone: [phone]  Fax: [phone] Toan Reese), Urology  [address] [place] [address]  Phone: [phone]  Fax: [phone]    Chidi Gary), Internal Medicine  [address] [place] [address]  Phone: [phone]  Fax: [phone]

## 2017-08-02 NOTE — DISCHARGE NOTE ADULT - SECONDARY DIAGNOSIS.
Benign prostatic hyperplasia, presence of lower urinary tract symptoms unspecified, unspecified morphology Hyperlipidemia, unspecified hyperlipidemia type Fever with chills

## 2017-08-02 NOTE — DISCHARGE NOTE ADULT - CARE PLAN
Principal Discharge DX:	Acute prostatitis  Goal:	7 more days oral antibiotics  Instructions for follow-up, activity and diet:	Follow urologist  Secondary Diagnosis:	Benign prostatic hyperplasia, presence of lower urinary tract symptoms unspecified, unspecified morphology  Secondary Diagnosis:	Hyperlipidemia, unspecified hyperlipidemia type  Secondary Diagnosis:	Fever with chills Principal Discharge DX:	Acute prostatitis  Goal:	7 more days oral antibiotics  Instructions for follow-up, activity and diet:	Follow urologist  Secondary Diagnosis:	Benign prostatic hyperplasia, presence of lower urinary tract symptoms unspecified, unspecified morphology  Goal:	Flomax 0.8 mg PO daily, Proscar 5 mg PO daily  Instructions for follow-up, activity and diet:	Nj's in follow   Secondary Diagnosis:	Hyperlipidemia, unspecified hyperlipidemia type  Goal:	Lipitor  Secondary Diagnosis:	Fever with chills

## 2017-08-02 NOTE — DISCHARGE NOTE ADULT - PATIENT PORTAL LINK FT
“You can access the [facility] Patient Portal, offered by [facility], by registering with the following website: [email or web address]”

## 2017-08-02 NOTE — DISCHARGE NOTE ADULT - MEDICATION SUMMARY - MEDICATIONS TO TAKE
I will START or STAY ON the medications listed below when I get home from the hospital:    finasteride  -- 1 tab(s) by mouth once a day  -- Indication: For BPH    acetaminophen 325 mg oral tablet  -- 2 tab(s) by mouth every 6 hours, As needed, Moderate Pain (4 - 6) or Temp > 100.5  -- Indication: For pain and fever    aspirin 81 mg oral tablet  -- 1 tab(s) by mouth once a day  -- Indication: For CAD    tamsulosin 0.4 mg oral capsule  -- 2 cap(s) by mouth once a day (at bedtime)  -- Indication: For BPH    atorvastatin 20 mg oral tablet  -- 1 tab(s) by mouth once a day (at bedtime)  -- Indication: For Hyperlipidemia    cefpodoxime 100 mg oral tablet  -- 1 tab(s) by mouth every 12 hours  -- Indication: For UTI/Prostatitis     famotidine 20 mg oral tablet  -- 1 tab(s) by mouth once a day  -- Indication: For GI prophy    lactobacillus acidophilus oral capsule  -- 1 cap(s) by mouth 3 times a day  -- Indication: For Probiotics

## 2017-08-02 NOTE — PROGRESS NOTE ADULT - ASSESSMENT
Failed  trial of void, BPH, continue Flomax 0.8 mg/d and Finasteride    !6 fr coude catheter placed without problem and 700+cc clear yellow urine drained, Catheter to remain in place.

## 2017-08-02 NOTE — DISCHARGE NOTE ADULT - HOSPITAL COURSE
Patient brought to ER because of fever and chills and diagnosed acute prostatitis. Initially treated with IV antibiotics his blood C&S are no growth. Urine C&S positive for > 100 K E coli and sensitive for cephalosporins. Patient home on 7 days Vantin and advised to follow Dr Toan TORRES. Patient brought to ER because of fever and chills and diagnosed acute prostatitis. Initially treated with IV antibiotics his blood C&S are no growth. Urine C&S positive for > 100 K E coli and sensitive for cephalosporins. Patient home on 7 days Vantin and advised to follow Dr Toan TORRES.   Patient had urinary retention and Nj's cath inserted by urologist and advised to follow them in there office.

## 2017-08-03 VITALS
OXYGEN SATURATION: 98 % | DIASTOLIC BLOOD PRESSURE: 62 MMHG | RESPIRATION RATE: 17 BRPM | TEMPERATURE: 99 F | HEART RATE: 62 BPM | SYSTOLIC BLOOD PRESSURE: 104 MMHG

## 2017-08-03 DIAGNOSIS — N40.1 BENIGN PROSTATIC HYPERPLASIA WITH LOWER URINARY TRACT SYMPTOMS: ICD-10-CM

## 2017-08-03 PROCEDURE — 76870 US EXAM SCROTUM: CPT

## 2017-08-03 PROCEDURE — 96374 THER/PROPH/DIAG INJ IV PUSH: CPT

## 2017-08-03 PROCEDURE — 85027 COMPLETE CBC AUTOMATED: CPT

## 2017-08-03 PROCEDURE — 87040 BLOOD CULTURE FOR BACTERIA: CPT

## 2017-08-03 PROCEDURE — 76775 US EXAM ABDO BACK WALL LIM: CPT

## 2017-08-03 PROCEDURE — 80053 COMPREHEN METABOLIC PANEL: CPT

## 2017-08-03 PROCEDURE — 80048 BASIC METABOLIC PNL TOTAL CA: CPT

## 2017-08-03 PROCEDURE — 81001 URINALYSIS AUTO W/SCOPE: CPT

## 2017-08-03 PROCEDURE — 87086 URINE CULTURE/COLONY COUNT: CPT

## 2017-08-03 PROCEDURE — 71045 X-RAY EXAM CHEST 1 VIEW: CPT

## 2017-08-03 PROCEDURE — 83735 ASSAY OF MAGNESIUM: CPT

## 2017-08-03 PROCEDURE — 97161 PT EVAL LOW COMPLEX 20 MIN: CPT

## 2017-08-03 PROCEDURE — 97116 GAIT TRAINING THERAPY: CPT

## 2017-08-03 PROCEDURE — 84436 ASSAY OF TOTAL THYROXINE: CPT

## 2017-08-03 PROCEDURE — 83605 ASSAY OF LACTIC ACID: CPT

## 2017-08-03 PROCEDURE — 80061 LIPID PANEL: CPT

## 2017-08-03 PROCEDURE — 84443 ASSAY THYROID STIM HORMONE: CPT

## 2017-08-03 PROCEDURE — 97110 THERAPEUTIC EXERCISES: CPT

## 2017-08-03 PROCEDURE — 74176 CT ABD & PELVIS W/O CONTRAST: CPT

## 2017-08-03 PROCEDURE — 99285 EMERGENCY DEPT VISIT HI MDM: CPT | Mod: 25

## 2017-08-03 PROCEDURE — 87186 SC STD MICRODIL/AGAR DIL: CPT

## 2017-08-03 PROCEDURE — 93005 ELECTROCARDIOGRAM TRACING: CPT

## 2017-08-03 PROCEDURE — 84480 ASSAY TRIIODOTHYRONINE (T3): CPT

## 2017-08-03 RX ADMIN — Medication 1 TABLET(S): at 07:49

## 2017-08-03 RX ADMIN — Medication 1 TABLET(S): at 12:46

## 2017-08-03 RX ADMIN — Medication 100 MILLIGRAM(S): at 05:54

## 2017-08-03 RX ADMIN — Medication 1 ENEMA: at 11:17

## 2017-08-03 RX ADMIN — ENOXAPARIN SODIUM 40 MILLIGRAM(S): 100 INJECTION SUBCUTANEOUS at 11:03

## 2017-08-03 RX ADMIN — Medication 100 MILLIGRAM(S): at 17:36

## 2017-08-03 RX ADMIN — Medication 1 TABLET(S): at 17:36

## 2017-08-03 RX ADMIN — Medication 81 MILLIGRAM(S): at 11:01

## 2017-08-03 RX ADMIN — FINASTERIDE 5 MILLIGRAM(S): 5 TABLET, FILM COATED ORAL at 11:03

## 2017-08-03 RX ADMIN — FAMOTIDINE 20 MILLIGRAM(S): 10 INJECTION INTRAVENOUS at 11:03

## 2017-08-03 NOTE — PROGRESS NOTE ADULT - ATTENDING COMMENTS
- Abx changed to PO Vantin 100 mg bid x 7 days   - DC plan ELIANE vs home with HHA   - D/C home with disla   - Ambulate as tolerated   - Follow up with urology on dc

## 2017-08-03 NOTE — PROGRESS NOTE ADULT - PROBLEM SELECTOR PLAN 4
He has urinary retention secondary to BPH with possible bladder stone . he also has left renal stone . Real sono shows bilateral hydronephrosis probably related to bladder out let obstruction from BPH.

## 2017-08-03 NOTE — PROGRESS NOTE ADULT - SUBJECTIVE AND OBJECTIVE BOX
Patient is a 87y old  Male who presents with a chief complaint of Fever and chills (2017 19:35)      INTERVAL OVER NIGHT EVENTS:    MEDICATIONS  (STANDING):  sodium chloride 0.9%. 1000 milliLiter(s) (100 mL/Hr) IV Continuous <Continuous>  finasteride 5 milliGRAM(s) Oral daily  atorvastatin 20 milliGRAM(s) Oral at bedtime  aspirin enteric coated 81 milliGRAM(s) Oral daily  enoxaparin Injectable 40 milliGRAM(s) SubCutaneous daily  lactobacillus acidophilus 1 Tablet(s) Oral three times a day with meals  famotidine    Tablet 20 milliGRAM(s) Oral daily  tamsulosin 0.4 milliGRAM(s) Oral at bedtime    MEDICATIONS  (PRN):  acetaminophen   Tablet 650 milliGRAM(s) Oral every 6 hours PRN Moderate Pain (4 - 6) or Temp > 100.5  acetaminophen   Tablet. 650 milliGRAM(s) Oral every 6 hours PRN Moderate Pain (4 - 6)      Allergies    No Known Allergies    Intolerances        Vital Signs Last 24 Hrs  T(C): 37.4 (01 Aug 2017 05:17), Max: 37.6 (2017 08:53)  T(F): 99.4 (01 Aug 2017 05:17), Max: 99.7 (2017 08:53)  HR: 61 (01 Aug 2017 05:17) (60 - 84)  BP: 111/56 (01 Aug 2017 05:17) (100/55 - 136/73)  BP(mean): --  RR: 18 (01 Aug 2017 05:17) (16 - 18)  SpO2: 95% (01 Aug 2017 05:17) (95% - 99%)    PHYSICAL EXAM:  GENERAL: NAD, well-groomed, well-developed  HEAD:  Atraumatic, Normocephalic  EYES: EOMI, PERRLA, conjunctiva and sclera clear  ENMT: No tonsillar erythema, exudates, or enlargement; Moist mucous membranes, Good dentition, No lesions  NECK: Supple, No JVD, Normal thyroid  NERVOUS SYSTEM:  Alert & Oriented X3, Motor Strength 5/5 B/L upper and lower extremities; DTRs 2+ intact and symmetric  CHEST/LUNG: Clear to percussion bilaterally; No rales, rhonchi, wheezing, or rubs  HEART: Regular rate and rhythm; No murmurs, rubs, or gallops  ABDOMEN: Soft, Nontender, Nondistended; Bowel sounds present, Nj's cath in place and draining clear urine.   EXTREMITIES:  2+ Peripheral Pulses, No clubbing, cyanosis, or edema  LYMPH: No lymphadenopathy noted  SKIN: No rashes or lesions    LABS:                        13.0   15.2  )-----------( 150      ( 2017 10:27 )             38.0         135  |  100  |  16  ----------------------------<  109<H>  3.9   |  28  |  0.99    Ca    10.2      2017 10:27  Mg     1.8         TPro  6.7  /  Alb  2.9<L>  /  TBili  1.3<H>  /  DBili  x   /  AST  20  /  ALT  10  /  AlkPhos  58        Urinalysis Basic - ( 2017 10:33 )    Color: x / Appearance: Turbid / S.010 / pH: x  Gluc: x / Ketone: Negative  / Bili: Negative / Urobili: Negative mg/dL   Blood: x / Protein: 30 mg/dL / Nitrite: Positive   Leuk Esterase: Moderate / RBC: 3-5 /HPF / WBC >50   Sq Epi: x / Non Sq Epi: x / Bacteria: Many      CAPILLARY BLOOD GLUCOSE          RADIOLOGY & ADDITIONAL TESTS:    Imaging Personally Reviewed:  [ ] YES  [ ] NO    Consultant(s) Notes Reviewed:  [ ] YES  [ ] NO    Care Discussed with Consultants/Other Providers [ ] YES  [ ] NO

## 2017-08-03 NOTE — PROGRESS NOTE ADULT - SUBJECTIVE AND OBJECTIVE BOX
IDRIS DANIEL is a yMale , patient examined and chart reviewed. Patient seen and examined today being followed for possible sepsis secondary to UTI      INTERVAL HPI/ OVERNIGHT EVENTS: Events noted, seen by Urology , disla reinserted as unable to void , to be dc with disla. As per PT may need ELIANE . Patient refuses rehab .     PAST MEDICAL & SURGICAL HISTORY:  Hyperlipidemia  Benign prostatic hyperplasia, presence of lower urinary tract symptoms unspecified, unspecified morphology  H/O heart artery stent      For details regarding the patient's social history, family history, and other miscellaneous elements, please refer the initial infectious diseases consultation and/or the admitting history and physical examination for this admission.   :     ROS:  CONSTITUTIONAL:  Positive for fever or chills, feels well, good appetite  EYES:  Negative  blurry vision or double vision  CARDIOVASCULAR:  Negative for chest pain or palpitations  RESPIRATORY:  Negative for cough, wheezing, or SOB   GASTROINTESTINAL:  Negative for nausea, vomiting, diarrhea, constipation, or abdominal pain  GENITOURINARY:  positive for  frequency, urgency or dysuria  NEUROLOGIC:  No headache, confusion, dizziness, lightheadedness  All other systems were reviewed and are negative         Current inpatient medications :    MEDICATIONS  (STANDING):    MEDICATIONS  (STANDING):  finasteride 5 milliGRAM(s) Oral daily  atorvastatin 20 milliGRAM(s) Oral at bedtime  aspirin enteric coated 81 milliGRAM(s) Oral daily  enoxaparin Injectable 40 milliGRAM(s) SubCutaneous daily  lactobacillus acidophilus 1 Tablet(s) Oral three times a day with meals  famotidine    Tablet 20 milliGRAM(s) Oral daily  tamsulosin 0.8 milliGRAM(s) Oral at bedtime  cefpodoxime 100 milliGRAM(s) Oral every 12 hours    MEDICATIONS  (PRN):  acetaminophen   Tablet 650 milliGRAM(s) Oral every 6 hours PRN Moderate Pain (4 - 6) or Temp > 100.5  acetaminophen   Tablet. 650 milliGRAM(s) Oral every 6 hours PRN Moderate Pain (4 - 6)    Objective:  Vital Signs Last 24 Hrs  T(C): 36.7 (03 Aug 2017 05:10), Max: 37.5 (02 Aug 2017 09:30)  T(F): 98 (03 Aug 2017 05:10), Max: 99.5 (02 Aug 2017 09:30)  HR: 65 (03 Aug 2017 05:10) (51 - 94)  BP: 124/71 (03 Aug 2017 05:10) (116/71 - 132/51)  BP(mean): --  RR: 17 (03 Aug 2017 05:10) (17 - 18)  SpO2: 97% (03 Aug 2017 05:10) (6% - 99%)    Physical Exam:  General: well developed well nourished, in no acute distress  Eyes: sclera anicteric, pupils equal and reactive to light  ENMT: buccal mucosa moist, pharynx not injected  Neck: supple, trachea midline  Lungs: clear, no wheeze/rhonchi  Cardiovascular: regular rate and rhythm, S1 S2  Abdomen: soft, nontender, no organomegaly present, bowel sounds normal  Neurological: alert and oriented x3, Cranial Nerves II-XII grossly intact  Skin: no increased ecchymosis/petechiae/purpura  Lymph Nodes: no palpable cervical/supraclavicular lymph nodes enlargements  Extremities: no cyanosis/clubbing/edema            LABS:                              12.0   7.5   )-----------( 139      ( 02 Aug 2017 07:46 )             36.2     02 Aug 2017 07:46    138    |  107    |  7      ----------------------------<  109    3.2     |  25     |  0.64     Ca    8.9        02 Aug 2017 07:46          CAPILLARY BLOOD GLUCOSE            Urinalysis Basic - ( 2017 10:33 )    Color: x / Appearance: Turbid / S.010 / pH: x  Gluc: x / Ketone: Negative  / Bili: Negative / Urobili: Negative mg/dL   Blood: x / Protein: 30 mg/dL / Nitrite: Positive   Leuk Esterase: Moderate / RBC: 3-5 /HPF / WBC >50   Sq Epi: x / Non Sq Epi: x / Bacteria: Many              RECENT CULTURES:  Culture - Urine (17 @ 15:10)    -  Ampicillin: R >16    -  Aztreonam: S <=4    -  Cefazolin: S 4    -  Ciprofloxacin: R >2    -  Nitrofurantoin: S <=32    -  Piperacillin/Tazobactam: S <=8    -  Trimethoprim/Sulfamethoxazole: S     -  Amikacin: S <=8    -  Ertapenem: S <=0.5    -  Tobramycin: I 8    -  Cefoxitin: S <=4    -  Imipenem: S <=1    -  Levofloxacin: R >4    -  Meropenem: S <=1    -  Ampicillin/Sulbactam: R >16/8    -  Cefepime: S <=2    -  Ceftazidime: S <=1    -  Ceftriaxone: S <=1    -  Gentamicin: R >8    Specimen Source: .Urine Clean Catch (Midstream)    Culture Results:   >100,000 CFU/ml Escherichia coli    Organism Identification: Escherichia coli    Organism: Escherichia coli    Method Type: [facility]        RADIOLOGY & ADDITIONAL STUDIES:  US Testicles (17 @ 23:41) >  IMPRESSION:       1.  No evidence of torsion of the right testis.    2.  Wedge shaped hypoechoic avascular region of the medial left testis   may   represent infarct versus rete testis. The remainder of the left testis   demonstrates normal vascularity. No evidence of torsion of the left   testis.    3.  Moderate right hydrocele.    4.  Moderate left hydrocele.       CT Renal Stone Hunt (17 @ 09:25) >    IMPRESSION: No hydronephrotic changes. Left nephrolithiasis. See multiple   additional findings as described above.      US Renal (17 @ 20:15) >  IMPRESSION:    Mild bilateral hydronephrosis. 1.4 cm stone lower pole left kidney.    Urinary bladder contains stones and debris. See above report.        Assessment :     88 y/o M pt with hx of Hyperlipidemia, Benign prostatic hyperplasia, UTI's presents to ED with son for worsening  symptoms of chills, fevers, confusion and urinary frequency has been on and off antibiotics for past few  weeks most likely has complicated UTI with prostatitis .He has urinary retention secondary to BPH with possible bladder stone . he also has left renal stone . Real sono shows bilateral hydronephrosis probably related to bladder out let obstruction from BPH.      Plan :   - will change to po Vantin 100 mg bid x 7 days   - DC plan ELIANE vs home with HHA   - to be dc home with disla   -  ambulate as tolerated   - follow up with urology on dc     I have discussed the above plan of care with patient and his Son present at bedside  in detail. They expressed understanding of the  treatment plan . Risks, benefits and alternatives discussed in detail. I have asked if they have any questions or concerns and appropriately addressed them to the best of my ability .    > 35 minutes were spent in direct patient care reviewing notes, medications ,labs data/ imaging , discussion with multidisciplinary team.    Thank you for allowing me to participate in care of your patient .    Humphrey Thakkar MD  [phone]

## 2017-08-05 LAB
CULTURE RESULTS: SIGNIFICANT CHANGE UP
CULTURE RESULTS: SIGNIFICANT CHANGE UP
SPECIMEN SOURCE: SIGNIFICANT CHANGE UP
SPECIMEN SOURCE: SIGNIFICANT CHANGE UP

## 2017-08-22 ENCOUNTER — EMERGENCY (EMERGENCY)
Facility: HOSPITAL | Age: 82
LOS: 1 days | Discharge: HOME | End: 2017-08-22
Attending: EMERGENCY MEDICINE | Admitting: EMERGENCY MEDICINE
Payer: COMMERCIAL

## 2017-08-22 VITALS
OXYGEN SATURATION: 96 % | DIASTOLIC BLOOD PRESSURE: 58 MMHG | HEART RATE: 96 BPM | SYSTOLIC BLOOD PRESSURE: 106 MMHG | WEIGHT: 154.98 LBS | TEMPERATURE: 98 F | HEIGHT: 67 IN | RESPIRATION RATE: 18 BRPM

## 2017-08-22 DIAGNOSIS — T83.031A LEAKAGE OF INDWELLING URETHRAL CATHETER, INITIAL ENCOUNTER: ICD-10-CM

## 2017-08-22 DIAGNOSIS — Z95.5 PRESENCE OF CORONARY ANGIOPLASTY IMPLANT AND GRAFT: Chronic | ICD-10-CM

## 2017-08-22 PROBLEM — E78.5 HYPERLIPIDEMIA, UNSPECIFIED: Chronic | Status: ACTIVE | Noted: 2017-07-31

## 2017-08-22 PROCEDURE — 99283 EMERGENCY DEPT VISIT LOW MDM: CPT | Mod: 25

## 2017-08-22 PROCEDURE — 99284 EMERGENCY DEPT VISIT MOD MDM: CPT

## 2017-08-22 PROCEDURE — 51702 INSERT TEMP BLADDER CATH: CPT

## 2017-08-22 NOTE — ED ADULT NURSE NOTE - OBJECTIVE STATEMENT
88 year old male with long term disla catheter insertion presents to ED with CC of leakage of urine at the junction of catheter and leg back

## 2017-08-22 NOTE — ED ADULT NURSE REASSESSMENT NOTE - NS ED NURSE REASSESS COMMENT FT1
complete disla catheter system changed  pt given extra supplies for home   spare leg bag and disla catheter anchor.. pt and wife instructed on proper care of catheter and surrounding skin. given moisture barrier cream for skin irritation around  catheter; pt has follow-up appt with urology on Sept 6th . All questions answered .

## 2017-08-25 ENCOUNTER — EMERGENCY (EMERGENCY)
Facility: HOSPITAL | Age: 82
LOS: 1 days | Discharge: HOME | End: 2017-08-25
Attending: EMERGENCY MEDICINE | Admitting: EMERGENCY MEDICINE
Payer: COMMERCIAL

## 2017-08-25 VITALS
OXYGEN SATURATION: 98 % | HEART RATE: 69 BPM | SYSTOLIC BLOOD PRESSURE: 119 MMHG | RESPIRATION RATE: 16 BRPM | DIASTOLIC BLOOD PRESSURE: 64 MMHG

## 2017-08-25 VITALS
HEIGHT: 67 IN | RESPIRATION RATE: 16 BRPM | WEIGHT: 154.98 LBS | SYSTOLIC BLOOD PRESSURE: 117 MMHG | DIASTOLIC BLOOD PRESSURE: 60 MMHG | HEART RATE: 92 BPM | TEMPERATURE: 99 F | OXYGEN SATURATION: 97 %

## 2017-08-25 DIAGNOSIS — Z95.5 PRESENCE OF CORONARY ANGIOPLASTY IMPLANT AND GRAFT: Chronic | ICD-10-CM

## 2017-08-25 LAB
APPEARANCE UR: ABNORMAL
BILIRUB UR-MCNC: NEGATIVE — SIGNIFICANT CHANGE UP
COLOR SPEC: YELLOW — SIGNIFICANT CHANGE UP
DIFF PNL FLD: ABNORMAL
GLUCOSE UR QL: NEGATIVE MG/DL — SIGNIFICANT CHANGE UP
KETONES UR-MCNC: NEGATIVE — SIGNIFICANT CHANGE UP
LEUKOCYTE ESTERASE UR-ACNC: ABNORMAL
NITRITE UR-MCNC: NEGATIVE — SIGNIFICANT CHANGE UP
PH UR: 6.5 — SIGNIFICANT CHANGE UP (ref 5–8)
PROT UR-MCNC: 30 MG/DL
SP GR SPEC: 1.01 — SIGNIFICANT CHANGE UP (ref 1.01–1.02)
UROBILINOGEN FLD QL: NEGATIVE MG/DL — SIGNIFICANT CHANGE UP

## 2017-08-25 PROCEDURE — 99283 EMERGENCY DEPT VISIT LOW MDM: CPT | Mod: 25

## 2017-08-25 PROCEDURE — 81001 URINALYSIS AUTO W/SCOPE: CPT

## 2017-08-25 PROCEDURE — 99283 EMERGENCY DEPT VISIT LOW MDM: CPT

## 2017-08-25 NOTE — ED ADULT NURSE NOTE - PMH
Benign prostatic hyperplasia, presence of lower urinary tract symptoms unspecified, unspecified morphology    GERD (gastroesophageal reflux disease)    Hyperlipidemia

## 2017-08-25 NOTE — ED PROVIDER NOTE - CHPI ED SYMPTOMS NEG
no vomiting/no nausea/no diarrhea/no chills/no CP. no SOB, no abd pain/no fever no vomiting/no diarrhea/no dysuria/no chills/no nausea/no burning urination/no CP. no SOB, no abd pain/no fever/no hematuria

## 2017-08-25 NOTE — ED ADULT NURSE NOTE - OBJECTIVE STATEMENT
pt c/o disla falling out today. had disla put in august 3rd pt c/o disla falling out today. had disla put in august 3rd by urologist. pt c/o pain and discomfort to penis.

## 2017-08-25 NOTE — ED ADULT NURSE REASSESSMENT NOTE - NS ED NURSE REASSESS COMMENT FT1
Indwelling disla cath placed as per MD order, sterile technique maintained, Pt tolerated procedure well. Clear yellow urine draining to leg bag secured with adhesive tape and leg straps. 600ml of urine drained. will continue to monitor. pt reports much more comfortable expresses urinary relief.

## 2017-08-25 NOTE — ED PROVIDER NOTE - PLAN OF CARE
Return to the ED for any new or worsening symptoms  Take your mediation as previously prescribed  Follow up with your urologist in 2-3 days for a recheck   Advance activity as tolerated   Keep Nj in place until urology follow up

## 2017-08-25 NOTE — ED PROVIDER NOTE - GENITOURINARY [+], MLM
+ urinary retention, + urinary frequency DIFFICULTY URINATING/+ urinary retention, + urinary frequency

## 2017-08-25 NOTE — ED PROVIDER NOTE - OBJECTIVE STATEMENT
89 y/o M pt w/ PMHx of HLD, benign prostate hyperplasia presents to the ED c/o urinary catheter complications. Pt states his Nj came out on its own this morning. Pt's son states that ever since the Nj came out the pt has been going to the bathroom approximately every 30 minutes. Pt states when he uses the bathroom a small amount of urine comes out and he feels like his bladder is still full. Pt denies fever, chills, N/V, diarrhea, CP, SOB, abd pain, any surgery on his prostate or any other complaints at this time. Urologist: Dr. Newberry

## 2017-08-25 NOTE — ED PROVIDER NOTE - NONTENDER LOCATION
Distended suprapubic region/umbilical/right costovertebral angle/left upper quadrant/right upper quadrant/left costovertebral angle/right lower quadrant/left lower quadrant/suprapubic/periumbilical

## 2017-08-25 NOTE — ED PROVIDER NOTE - NS_ ATTENDINGSCRIBEDETAILS _ED_A_ED_FT
Pt is a 87 yo male who presents to the ED with a cc of urinary retention.  PMhx of HLD, BPH on Proscar and Flomax, GERD.  Pt was recently admitted at the end of July with prostatitis and urinary retention.  he completed his abx course and was discharged home with Nj in place.  Pt reports that this morning the Nj fell out on its own.  He reports that since then he has been urinating approx every 30 min but only trickles and is not able to completely void his bladder.  he has developed suprapubic pressure/ discomfort.  Denies fever, chills, N/V/D/C, CP, SOB, abd pain, ext numbness or weakness.  On exam pt resting comfortably in bed NAD, NCAT, PERRL, heart RRR, lungs CTA bilaterally, abd soft NT/ND, distended bladder noted no TTP.  Nj placed by nursing staff with clear yellow urine noted.

## 2017-10-28 ENCOUNTER — EMERGENCY (EMERGENCY)
Facility: HOSPITAL | Age: 82
LOS: 1 days | Discharge: HOME | End: 2017-10-28
Attending: EMERGENCY MEDICINE | Admitting: EMERGENCY MEDICINE
Payer: COMMERCIAL

## 2017-10-28 VITALS
TEMPERATURE: 98 F | OXYGEN SATURATION: 98 % | RESPIRATION RATE: 16 BRPM | HEIGHT: 67 IN | SYSTOLIC BLOOD PRESSURE: 141 MMHG | HEART RATE: 63 BPM | DIASTOLIC BLOOD PRESSURE: 75 MMHG | WEIGHT: 154.98 LBS

## 2017-10-28 VITALS
HEART RATE: 60 BPM | DIASTOLIC BLOOD PRESSURE: 65 MMHG | RESPIRATION RATE: 16 BRPM | TEMPERATURE: 98 F | OXYGEN SATURATION: 98 % | SYSTOLIC BLOOD PRESSURE: 118 MMHG

## 2017-10-28 DIAGNOSIS — Z95.5 PRESENCE OF CORONARY ANGIOPLASTY IMPLANT AND GRAFT: Chronic | ICD-10-CM

## 2017-10-28 LAB
APPEARANCE UR: CLEAR — SIGNIFICANT CHANGE UP
BACTERIA # UR AUTO: ABNORMAL
BILIRUB UR-MCNC: NEGATIVE — SIGNIFICANT CHANGE UP
COLOR SPEC: YELLOW — SIGNIFICANT CHANGE UP
DIFF PNL FLD: ABNORMAL
EPI CELLS # UR: SIGNIFICANT CHANGE UP
GLUCOSE UR QL: NEGATIVE MG/DL — SIGNIFICANT CHANGE UP
KETONES UR-MCNC: NEGATIVE — SIGNIFICANT CHANGE UP
LEUKOCYTE ESTERASE UR-ACNC: ABNORMAL
NITRITE UR-MCNC: POSITIVE
PH UR: 7 — SIGNIFICANT CHANGE UP (ref 5–8)
PROT UR-MCNC: 30 MG/DL
RBC CASTS # UR COMP ASSIST: ABNORMAL /HPF (ref 0–4)
SP GR SPEC: 1.01 — SIGNIFICANT CHANGE UP (ref 1.01–1.02)
UROBILINOGEN FLD QL: NEGATIVE MG/DL — SIGNIFICANT CHANGE UP
WBC UR QL: ABNORMAL

## 2017-10-28 PROCEDURE — 99283 EMERGENCY DEPT VISIT LOW MDM: CPT

## 2017-10-28 PROCEDURE — 87086 URINE CULTURE/COLONY COUNT: CPT

## 2017-10-28 PROCEDURE — 81001 URINALYSIS AUTO W/SCOPE: CPT

## 2017-10-28 PROCEDURE — 51702 INSERT TEMP BLADDER CATH: CPT

## 2017-10-28 PROCEDURE — 99283 EMERGENCY DEPT VISIT LOW MDM: CPT | Mod: 25

## 2017-10-28 RX ORDER — FINASTERIDE 5 MG/1
1 TABLET, FILM COATED ORAL
Qty: 0 | Refills: 0 | COMMUNITY

## 2017-10-28 NOTE — ED ADULT NURSE REASSESSMENT NOTE - NS ED NURSE REASSESS COMMENT FT1
Pt current Nj catheter / urinary catheter removed and replaced with a new F 16 urinary catheter. Catheter inserted with ease. A small amount of blood clot noted otherwise urine flow is good - draining yellow color urine.

## 2017-10-28 NOTE — ED ADULT NURSE NOTE - OBJECTIVE STATEMENT
Pt complains of supra pubic pain associated with no urine coming out from his Nj catheter started at 4 pm today. Pt reported current urinary catheter was placed 4-5 weeks ago.

## 2017-10-28 NOTE — ED PROVIDER NOTE - OBJECTIVE STATEMENT
87 y/o M pt with history of BPH, GERD, hyperlipidemia, coronary stent presents to the ED c/o urinary retention caused by blocked disla catheter since 16:00 today. Denies nausea, vomiting, fever. No further complaints at this time.

## 2017-10-28 NOTE — ED PROVIDER NOTE - NS_ ATTENDINGSCRIBEDETAILS _ED_A_ED_FT
Eduin Rivers MD - The scribe's documentation has been prepared under my direction and personally reviewed by me in its entirety. I confirm that the note above accurately reflects all work, treatment, procedures, and medical decision making performed by me.

## 2017-10-31 ENCOUNTER — EMERGENCY (EMERGENCY)
Facility: HOSPITAL | Age: 82
LOS: 1 days | Discharge: HOME | End: 2017-10-31
Attending: EMERGENCY MEDICINE | Admitting: EMERGENCY MEDICINE
Payer: COMMERCIAL

## 2017-10-31 VITALS
HEIGHT: 67 IN | DIASTOLIC BLOOD PRESSURE: 72 MMHG | SYSTOLIC BLOOD PRESSURE: 139 MMHG | RESPIRATION RATE: 18 BRPM | OXYGEN SATURATION: 98 % | TEMPERATURE: 98 F | WEIGHT: 153 LBS | HEART RATE: 66 BPM

## 2017-10-31 VITALS
OXYGEN SATURATION: 97 % | SYSTOLIC BLOOD PRESSURE: 145 MMHG | RESPIRATION RATE: 18 BRPM | DIASTOLIC BLOOD PRESSURE: 76 MMHG | HEART RATE: 64 BPM | TEMPERATURE: 98 F

## 2017-10-31 DIAGNOSIS — Z95.5 PRESENCE OF CORONARY ANGIOPLASTY IMPLANT AND GRAFT: Chronic | ICD-10-CM

## 2017-10-31 LAB
CULTURE RESULTS: SIGNIFICANT CHANGE UP
SPECIMEN SOURCE: SIGNIFICANT CHANGE UP

## 2017-10-31 PROCEDURE — 51702 INSERT TEMP BLADDER CATH: CPT

## 2017-10-31 PROCEDURE — 99283 EMERGENCY DEPT VISIT LOW MDM: CPT | Mod: 25

## 2017-10-31 PROCEDURE — 99283 EMERGENCY DEPT VISIT LOW MDM: CPT

## 2017-10-31 RX ORDER — LIDOCAINE HCL 20 MG/ML
10 VIAL (ML) INJECTION ONCE
Qty: 0 | Refills: 0 | Status: COMPLETED | OUTPATIENT
Start: 2017-10-31 | End: 2017-10-31

## 2017-10-31 RX ORDER — CEFUROXIME AXETIL 250 MG
500 TABLET ORAL ONCE
Qty: 0 | Refills: 0 | Status: COMPLETED | OUTPATIENT
Start: 2017-10-31 | End: 2017-10-31

## 2017-10-31 RX ORDER — CEFUROXIME AXETIL 250 MG
1 TABLET ORAL
Qty: 20 | Refills: 0 | OUTPATIENT
Start: 2017-10-31 | End: 2017-11-10

## 2017-10-31 RX ADMIN — Medication 10 MILLILITER(S): at 00:30

## 2017-10-31 NOTE — ED ADULT NURSE NOTE - NS ED NURSE DC INFO COMPLEXITY
Patient asked questions/Returned Demonstration/Complex: Multiple Rx/Tx. Pt has difficulty understanding. Requires additional help/Verbalized Understanding

## 2017-10-31 NOTE — ED ADULT TRIAGE NOTE - CHIEF COMPLAINT QUOTE
disla cath fell out. seen in this ER last Sat for urinary retention. disla to leg bag placed at that time.

## 2017-10-31 NOTE — ED ADULT NURSE NOTE - CHPI ED SYMPTOMS NEG
no vomiting/no hematuria/no diarrhea/no abdominal distension/no chills/no blood in stool/no fever/no nausea

## 2017-10-31 NOTE — ED PROVIDER NOTE - OBJECTIVE STATEMENT
87yo male who present with urinary retention tonite. pt was seen here 2 days ago, had disla placed and sent home with catheter, he states tonite the catheter fell out and he is unable to go to the bathroom, no back pain, no fever, chills,no vomiting, no other comalints

## 2017-10-31 NOTE — ED ADULT NURSE NOTE - OBJECTIVE STATEMENT
PT STATES THE TUBE JUST FELL OUT ABOUT 30 MINUTES AGO/PT HAS URINARY CATH DEFLATED ATTACHED TO LEG BAG ON RT LEG

## 2017-11-01 DIAGNOSIS — R33.9 RETENTION OF URINE, UNSPECIFIED: ICD-10-CM

## 2018-02-20 NOTE — H&P ADULT - NEUROLOGICAL
occasional bleeding from hemorrhoids details… Alert & oriented; no sensory, motor or coordination deficits, normal reflexes

## 2018-03-23 ENCOUNTER — APPOINTMENT (OUTPATIENT)
Dept: CV DIAGNOSTICS | Facility: HOSPITAL | Age: 83
End: 2018-03-23
Payer: MEDICARE

## 2018-03-23 ENCOUNTER — OUTPATIENT (OUTPATIENT)
Dept: OUTPATIENT SERVICES | Facility: HOSPITAL | Age: 83
LOS: 1 days | End: 2018-03-23

## 2018-03-23 DIAGNOSIS — R07.89 OTHER CHEST PAIN: ICD-10-CM

## 2018-03-23 DIAGNOSIS — Z95.5 PRESENCE OF CORONARY ANGIOPLASTY IMPLANT AND GRAFT: Chronic | ICD-10-CM

## 2018-03-23 PROCEDURE — 78452 HT MUSCLE IMAGE SPECT MULT: CPT | Mod: 26

## 2018-03-23 PROCEDURE — 93016 CV STRESS TEST SUPVJ ONLY: CPT | Mod: GC

## 2018-03-23 PROCEDURE — 93018 CV STRESS TEST I&R ONLY: CPT | Mod: GC

## 2018-09-10 ENCOUNTER — EMERGENCY (EMERGENCY)
Facility: HOSPITAL | Age: 83
LOS: 1 days | Discharge: LEFT BEFORE DISCHARGE | End: 2018-09-10

## 2018-09-10 VITALS
OXYGEN SATURATION: 97 % | RESPIRATION RATE: 16 BRPM | HEART RATE: 97 BPM | SYSTOLIC BLOOD PRESSURE: 131 MMHG | WEIGHT: 151.9 LBS | DIASTOLIC BLOOD PRESSURE: 83 MMHG | TEMPERATURE: 97 F

## 2018-09-10 DIAGNOSIS — Z95.5 PRESENCE OF CORONARY ANGIOPLASTY IMPLANT AND GRAFT: Chronic | ICD-10-CM

## 2018-09-10 NOTE — ED ADULT NURSE NOTE - EXPLANATION OF PATIENT'S REASON FOR LEAVING
patient states he feels better during triage and will go to see his PMD tomorrow, does not feel he needs emergent care

## 2018-09-10 NOTE — ED ADULT NURSE NOTE - PMH
Benign prostatic hyperplasia, presence of lower urinary tract symptoms unspecified, unspecified morphology    CAD (coronary artery disease)    GERD (gastroesophageal reflux disease)    Hyperlipidemia

## 2018-09-10 NOTE — ED ADULT NURSE NOTE - NSIMPLEMENTINTERV_GEN_ALL_ED
Implemented All Fall Risk Interventions:  [place] to call system. Call bell, personal items and telephone within reach. Instruct patient to call for assistance. Room bathroom lighting operational. Non-slip footwear when patient is off stretcher. Physically safe environment: no spills, clutter or unnecessary equipment. Stretcher in lowest position, wheels locked, appropriate side rails in place. Provide visual cue, wrist band, yellow gown, etc. Monitor gait and stability. Monitor for mental status changes and reorient to person, place, and time. Review medications for side effects contributing to fall risk. Reinforce activity limits and safety measures with patient and family.

## 2018-09-11 PROBLEM — K21.9 GASTRO-ESOPHAGEAL REFLUX DISEASE WITHOUT ESOPHAGITIS: Chronic | Status: ACTIVE | Noted: 2017-08-25

## 2018-09-11 PROBLEM — N40.0 BENIGN PROSTATIC HYPERPLASIA WITHOUT LOWER URINARY TRACT SYMPTOMS: Chronic | Status: ACTIVE | Noted: 2017-02-21

## 2018-10-16 ENCOUNTER — TRANSCRIPTION ENCOUNTER (OUTPATIENT)
Age: 83
End: 2018-10-16

## 2018-10-17 ENCOUNTER — INPATIENT (INPATIENT)
Facility: HOSPITAL | Age: 83
LOS: 6 days | Discharge: NURSING, REHAB OR LONG-TERM CARE | DRG: 853 | End: 2018-10-24
Attending: FAMILY MEDICINE | Admitting: INTERNAL MEDICINE
Payer: COMMERCIAL

## 2018-10-17 VITALS — SYSTOLIC BLOOD PRESSURE: 111 MMHG | DIASTOLIC BLOOD PRESSURE: 68 MMHG

## 2018-10-17 DIAGNOSIS — K21.9 GASTRO-ESOPHAGEAL REFLUX DISEASE WITHOUT ESOPHAGITIS: ICD-10-CM

## 2018-10-17 DIAGNOSIS — A41.9 SEPSIS, UNSPECIFIED ORGANISM: ICD-10-CM

## 2018-10-17 DIAGNOSIS — N13.30 UNSPECIFIED HYDRONEPHROSIS: ICD-10-CM

## 2018-10-17 DIAGNOSIS — K59.00 CONSTIPATION, UNSPECIFIED: ICD-10-CM

## 2018-10-17 DIAGNOSIS — Z95.5 PRESENCE OF CORONARY ANGIOPLASTY IMPLANT AND GRAFT: Chronic | ICD-10-CM

## 2018-10-17 DIAGNOSIS — N40.1 BENIGN PROSTATIC HYPERPLASIA WITH LOWER URINARY TRACT SYMPTOMS: ICD-10-CM

## 2018-10-17 DIAGNOSIS — E78.5 HYPERLIPIDEMIA, UNSPECIFIED: ICD-10-CM

## 2018-10-17 DIAGNOSIS — N39.0 URINARY TRACT INFECTION, SITE NOT SPECIFIED: ICD-10-CM

## 2018-10-17 DIAGNOSIS — Z98.890 OTHER SPECIFIED POSTPROCEDURAL STATES: Chronic | ICD-10-CM

## 2018-10-17 DIAGNOSIS — H40.9 UNSPECIFIED GLAUCOMA: ICD-10-CM

## 2018-10-17 DIAGNOSIS — N20.1 CALCULUS OF URETER: ICD-10-CM

## 2018-10-17 DIAGNOSIS — Z29.9 ENCOUNTER FOR PROPHYLACTIC MEASURES, UNSPECIFIED: ICD-10-CM

## 2018-10-17 DIAGNOSIS — I25.10 ATHEROSCLEROTIC HEART DISEASE OF NATIVE CORONARY ARTERY WITHOUT ANGINA PECTORIS: ICD-10-CM

## 2018-10-17 LAB
ALBUMIN SERPL ELPH-MCNC: 2 G/DL — LOW (ref 3.3–5)
ALBUMIN SERPL ELPH-MCNC: 2.2 G/DL — LOW (ref 3.3–5)
ALP SERPL-CCNC: 66 U/L — SIGNIFICANT CHANGE UP (ref 40–120)
ALP SERPL-CCNC: 71 U/L — SIGNIFICANT CHANGE UP (ref 40–120)
ALT FLD-CCNC: 11 U/L — LOW (ref 12–78)
ALT FLD-CCNC: 20 U/L — SIGNIFICANT CHANGE UP (ref 12–78)
AMYLASE P1 CFR SERPL: 46 U/L — SIGNIFICANT CHANGE UP (ref 25–115)
ANION GAP SERPL CALC-SCNC: 10 MMOL/L — SIGNIFICANT CHANGE UP (ref 5–17)
ANION GAP SERPL CALC-SCNC: 9 MMOL/L — SIGNIFICANT CHANGE UP (ref 5–17)
APPEARANCE UR: ABNORMAL
APTT BLD: 26.4 SEC — LOW (ref 27.5–37.4)
AST SERPL-CCNC: 27 U/L — SIGNIFICANT CHANGE UP (ref 15–37)
AST SERPL-CCNC: 45 U/L — HIGH (ref 15–37)
BASE EXCESS BLDV CALC-SCNC: -5.2 MMOL/L — LOW (ref -2–2)
BASOPHILS # BLD AUTO: 0.07 K/UL — SIGNIFICANT CHANGE UP (ref 0–0.2)
BASOPHILS NFR BLD AUTO: 1 % — SIGNIFICANT CHANGE UP (ref 0–2)
BILIRUB SERPL-MCNC: 1.7 MG/DL — HIGH (ref 0.2–1.2)
BILIRUB SERPL-MCNC: 2.4 MG/DL — HIGH (ref 0.2–1.2)
BILIRUB UR-MCNC: NEGATIVE — SIGNIFICANT CHANGE UP
BLOOD GAS COMMENTS, VENOUS: SIGNIFICANT CHANGE UP
BUN SERPL-MCNC: 16 MG/DL — SIGNIFICANT CHANGE UP (ref 7–23)
BUN SERPL-MCNC: 16 MG/DL — SIGNIFICANT CHANGE UP (ref 7–23)
CALCIUM SERPL-MCNC: 8.1 MG/DL — LOW (ref 8.5–10.1)
CALCIUM SERPL-MCNC: 8.4 MG/DL — LOW (ref 8.5–10.1)
CHLORIDE SERPL-SCNC: 109 MMOL/L — HIGH (ref 96–108)
CHLORIDE SERPL-SCNC: 110 MMOL/L — HIGH (ref 96–108)
CO2 SERPL-SCNC: 21 MMOL/L — LOW (ref 22–31)
CO2 SERPL-SCNC: 23 MMOL/L — SIGNIFICANT CHANGE UP (ref 22–31)
COLOR SPEC: YELLOW — SIGNIFICANT CHANGE UP
CREAT SERPL-MCNC: 1.4 MG/DL — HIGH (ref 0.5–1.3)
CREAT SERPL-MCNC: 1.4 MG/DL — HIGH (ref 0.5–1.3)
DIFF PNL FLD: ABNORMAL
EOSINOPHIL # BLD AUTO: 0 K/UL — SIGNIFICANT CHANGE UP (ref 0–0.5)
EOSINOPHIL NFR BLD AUTO: 0 % — SIGNIFICANT CHANGE UP (ref 0–6)
GLUCOSE SERPL-MCNC: 207 MG/DL — HIGH (ref 70–99)
GLUCOSE SERPL-MCNC: 86 MG/DL — SIGNIFICANT CHANGE UP (ref 70–99)
GLUCOSE UR QL: NEGATIVE — SIGNIFICANT CHANGE UP
HCO3 BLDV-SCNC: 20 MMOL/L — LOW (ref 21–29)
HCT VFR BLD CALC: 37.9 % — LOW (ref 39–50)
HCT VFR BLD CALC: 40.2 % — SIGNIFICANT CHANGE UP (ref 39–50)
HGB BLD-MCNC: 12.5 G/DL — LOW (ref 13–17)
HGB BLD-MCNC: 13.3 G/DL — SIGNIFICANT CHANGE UP (ref 13–17)
HOROWITZ INDEX BLDV+IHG-RTO: 28 — SIGNIFICANT CHANGE UP
INR BLD: 1.36 RATIO — HIGH (ref 0.88–1.16)
KETONES UR-MCNC: NEGATIVE — SIGNIFICANT CHANGE UP
LACTATE SERPL-SCNC: 2.4 MMOL/L — HIGH (ref 0.7–2)
LACTATE SERPL-SCNC: 3.1 MMOL/L — HIGH (ref 0.7–2)
LACTATE SERPL-SCNC: 3.9 MMOL/L — HIGH (ref 0.7–2)
LEUKOCYTE ESTERASE UR-ACNC: ABNORMAL
LIDOCAIN IGE QN: 50 U/L — LOW (ref 73–393)
LYMPHOCYTES # BLD AUTO: 0.65 K/UL — LOW (ref 1–3.3)
LYMPHOCYTES # BLD AUTO: 9 % — LOW (ref 13–44)
MAGNESIUM SERPL-MCNC: 1.8 MG/DL — SIGNIFICANT CHANGE UP (ref 1.6–2.6)
MCHC RBC-ENTMCNC: 30.6 PG — SIGNIFICANT CHANGE UP (ref 27–34)
MCHC RBC-ENTMCNC: 30.6 PG — SIGNIFICANT CHANGE UP (ref 27–34)
MCHC RBC-ENTMCNC: 33 GM/DL — SIGNIFICANT CHANGE UP (ref 32–36)
MCHC RBC-ENTMCNC: 33.1 GM/DL — SIGNIFICANT CHANGE UP (ref 32–36)
MCV RBC AUTO: 92.4 FL — SIGNIFICANT CHANGE UP (ref 80–100)
MCV RBC AUTO: 92.7 FL — SIGNIFICANT CHANGE UP (ref 80–100)
MONOCYTES # BLD AUTO: 0.14 K/UL — SIGNIFICANT CHANGE UP (ref 0–0.9)
MONOCYTES NFR BLD AUTO: 2 % — SIGNIFICANT CHANGE UP (ref 2–14)
NEUTROPHILS # BLD AUTO: 6.08 K/UL — SIGNIFICANT CHANGE UP (ref 1.8–7.4)
NEUTROPHILS NFR BLD AUTO: 71 % — SIGNIFICANT CHANGE UP (ref 43–77)
NITRITE UR-MCNC: NEGATIVE — SIGNIFICANT CHANGE UP
NRBC # BLD: 0 /100 WBCS — SIGNIFICANT CHANGE UP (ref 0–0)
NT-PROBNP SERPL-SCNC: 2618 PG/ML — HIGH (ref 0–450)
PCO2 BLDV: 39 MMHG — SIGNIFICANT CHANGE UP (ref 35–50)
PH BLDV: 7.32 — LOW (ref 7.35–7.45)
PH UR: 7 — SIGNIFICANT CHANGE UP (ref 5–8)
PHOSPHATE SERPL-MCNC: 1.8 MG/DL — LOW (ref 2.5–4.5)
PLATELET # BLD AUTO: 129 K/UL — LOW (ref 150–400)
PLATELET # BLD AUTO: 142 K/UL — LOW (ref 150–400)
PO2 BLDV: 39 MMHG — SIGNIFICANT CHANGE UP (ref 25–45)
POTASSIUM SERPL-MCNC: 3.8 MMOL/L — SIGNIFICANT CHANGE UP (ref 3.5–5.3)
POTASSIUM SERPL-MCNC: 3.8 MMOL/L — SIGNIFICANT CHANGE UP (ref 3.5–5.3)
POTASSIUM SERPL-SCNC: 3.8 MMOL/L — SIGNIFICANT CHANGE UP (ref 3.5–5.3)
POTASSIUM SERPL-SCNC: 3.8 MMOL/L — SIGNIFICANT CHANGE UP (ref 3.5–5.3)
PROT SERPL-MCNC: 5.3 G/DL — LOW (ref 6–8.3)
PROT SERPL-MCNC: 5.9 G/DL — LOW (ref 6–8.3)
PROT UR-MCNC: 25 MG/DL
PROTHROM AB SERPL-ACNC: 14.9 SEC — HIGH (ref 9.8–12.7)
RAPID RVP RESULT: DETECTED
RBC # BLD: 4.09 M/UL — LOW (ref 4.2–5.8)
RBC # BLD: 4.35 M/UL — SIGNIFICANT CHANGE UP (ref 4.2–5.8)
RBC # FLD: 13.7 % — SIGNIFICANT CHANGE UP (ref 10.3–14.5)
RBC # FLD: 13.8 % — SIGNIFICANT CHANGE UP (ref 10.3–14.5)
RV+EV RNA SPEC QL NAA+PROBE: DETECTED
SAO2 % BLDV: 69 % — SIGNIFICANT CHANGE UP (ref 67–88)
SODIUM SERPL-SCNC: 141 MMOL/L — SIGNIFICANT CHANGE UP (ref 135–145)
SODIUM SERPL-SCNC: 141 MMOL/L — SIGNIFICANT CHANGE UP (ref 135–145)
SP GR SPEC: 1.01 — SIGNIFICANT CHANGE UP (ref 1.01–1.02)
UROBILINOGEN FLD QL: NEGATIVE — SIGNIFICANT CHANGE UP
WBC # BLD: 26.66 K/UL — HIGH (ref 3.8–10.5)
WBC # BLD: 7.24 K/UL — SIGNIFICANT CHANGE UP (ref 3.8–10.5)
WBC # FLD AUTO: 26.66 K/UL — HIGH (ref 3.8–10.5)
WBC # FLD AUTO: 7.24 K/UL — SIGNIFICANT CHANGE UP (ref 3.8–10.5)

## 2018-10-17 PROCEDURE — 76705 ECHO EXAM OF ABDOMEN: CPT | Mod: 26

## 2018-10-17 PROCEDURE — 99223 1ST HOSP IP/OBS HIGH 75: CPT | Mod: AI,GC

## 2018-10-17 PROCEDURE — 99285 EMERGENCY DEPT VISIT HI MDM: CPT

## 2018-10-17 PROCEDURE — 74176 CT ABD & PELVIS W/O CONTRAST: CPT | Mod: 26

## 2018-10-17 PROCEDURE — 99292 CRITICAL CARE ADDL 30 MIN: CPT | Mod: 25

## 2018-10-17 PROCEDURE — 71045 X-RAY EXAM CHEST 1 VIEW: CPT | Mod: 26

## 2018-10-17 PROCEDURE — 71045 X-RAY EXAM CHEST 1 VIEW: CPT | Mod: 26,77

## 2018-10-17 PROCEDURE — 93010 ELECTROCARDIOGRAM REPORT: CPT

## 2018-10-17 PROCEDURE — 70450 CT HEAD/BRAIN W/O DYE: CPT | Mod: 26

## 2018-10-17 PROCEDURE — 99291 CRITICAL CARE FIRST HOUR: CPT

## 2018-10-17 PROCEDURE — 71250 CT THORAX DX C-: CPT | Mod: 26

## 2018-10-17 PROCEDURE — 36556 INSERT NON-TUNNEL CV CATH: CPT | Mod: GC

## 2018-10-17 PROCEDURE — 99291 CRITICAL CARE FIRST HOUR: CPT | Mod: 25

## 2018-10-17 RX ORDER — ASPIRIN/CALCIUM CARB/MAGNESIUM 324 MG
81 TABLET ORAL DAILY
Qty: 0 | Refills: 0 | Status: DISCONTINUED | OUTPATIENT
Start: 2018-10-17 | End: 2018-10-24

## 2018-10-17 RX ORDER — SODIUM CHLORIDE 9 MG/ML
1000 INJECTION, SOLUTION INTRAVENOUS ONCE
Qty: 0 | Refills: 0 | Status: COMPLETED | OUTPATIENT
Start: 2018-10-17 | End: 2018-10-17

## 2018-10-17 RX ORDER — SODIUM CHLORIDE 9 MG/ML
1000 INJECTION INTRAMUSCULAR; INTRAVENOUS; SUBCUTANEOUS
Qty: 0 | Refills: 0 | Status: DISCONTINUED | OUTPATIENT
Start: 2018-10-17 | End: 2018-10-18

## 2018-10-17 RX ORDER — INSULIN LISPRO 100/ML
VIAL (ML) SUBCUTANEOUS AT BEDTIME
Qty: 0 | Refills: 0 | Status: DISCONTINUED | OUTPATIENT
Start: 2018-10-17 | End: 2018-10-17

## 2018-10-17 RX ORDER — SODIUM CHLORIDE 9 MG/ML
1000 INJECTION INTRAMUSCULAR; INTRAVENOUS; SUBCUTANEOUS ONCE
Qty: 0 | Refills: 0 | Status: COMPLETED | OUTPATIENT
Start: 2018-10-17 | End: 2018-10-17

## 2018-10-17 RX ORDER — INSULIN LISPRO 100/ML
VIAL (ML) SUBCUTANEOUS
Qty: 0 | Refills: 0 | Status: DISCONTINUED | OUTPATIENT
Start: 2018-10-17 | End: 2018-10-17

## 2018-10-17 RX ORDER — PIPERACILLIN AND TAZOBACTAM 4; .5 G/20ML; G/20ML
3.38 INJECTION, POWDER, LYOPHILIZED, FOR SOLUTION INTRAVENOUS EVERY 8 HOURS
Qty: 0 | Refills: 0 | Status: DISCONTINUED | OUTPATIENT
Start: 2018-10-17 | End: 2018-10-17

## 2018-10-17 RX ORDER — ESCITALOPRAM OXALATE 10 MG/1
10 TABLET, FILM COATED ORAL DAILY
Qty: 0 | Refills: 0 | Status: DISCONTINUED | OUTPATIENT
Start: 2018-10-17 | End: 2018-10-24

## 2018-10-17 RX ORDER — ACETAMINOPHEN 500 MG
650 TABLET ORAL ONCE
Qty: 0 | Refills: 0 | Status: COMPLETED | OUTPATIENT
Start: 2018-10-17 | End: 2018-10-17

## 2018-10-17 RX ORDER — NOREPINEPHRINE BITARTRATE/D5W 8 MG/250ML
0.05 PLASTIC BAG, INJECTION (ML) INTRAVENOUS
Qty: 8 | Refills: 0 | Status: DISCONTINUED | OUTPATIENT
Start: 2018-10-17 | End: 2018-10-17

## 2018-10-17 RX ORDER — SODIUM CHLORIDE 9 MG/ML
500 INJECTION INTRAMUSCULAR; INTRAVENOUS; SUBCUTANEOUS ONCE
Qty: 0 | Refills: 0 | Status: COMPLETED | OUTPATIENT
Start: 2018-10-17 | End: 2018-10-17

## 2018-10-17 RX ORDER — DEXTROSE 50 % IN WATER 50 %
15 SYRINGE (ML) INTRAVENOUS ONCE
Qty: 0 | Refills: 0 | Status: DISCONTINUED | OUTPATIENT
Start: 2018-10-17 | End: 2018-10-24

## 2018-10-17 RX ORDER — FINASTERIDE 5 MG/1
5 TABLET, FILM COATED ORAL DAILY
Qty: 0 | Refills: 0 | Status: DISCONTINUED | OUTPATIENT
Start: 2018-10-17 | End: 2018-10-24

## 2018-10-17 RX ORDER — GLUCAGON INJECTION, SOLUTION 0.5 MG/.1ML
1 INJECTION, SOLUTION SUBCUTANEOUS ONCE
Qty: 0 | Refills: 0 | Status: DISCONTINUED | OUTPATIENT
Start: 2018-10-17 | End: 2018-10-24

## 2018-10-17 RX ORDER — ONDANSETRON 8 MG/1
4 TABLET, FILM COATED ORAL ONCE
Qty: 0 | Refills: 0 | Status: COMPLETED | OUTPATIENT
Start: 2018-10-17 | End: 2018-10-17

## 2018-10-17 RX ORDER — AZITHROMYCIN 500 MG/1
500 TABLET, FILM COATED ORAL ONCE
Qty: 0 | Refills: 0 | Status: COMPLETED | OUTPATIENT
Start: 2018-10-17 | End: 2018-10-17

## 2018-10-17 RX ORDER — PANTOPRAZOLE SODIUM 20 MG/1
40 TABLET, DELAYED RELEASE ORAL ONCE
Qty: 0 | Refills: 0 | Status: COMPLETED | OUTPATIENT
Start: 2018-10-17 | End: 2018-10-17

## 2018-10-17 RX ORDER — SODIUM CHLORIDE 9 MG/ML
1000 INJECTION, SOLUTION INTRAVENOUS
Qty: 0 | Refills: 0 | Status: DISCONTINUED | OUTPATIENT
Start: 2018-10-17 | End: 2018-10-24

## 2018-10-17 RX ORDER — PIPERACILLIN AND TAZOBACTAM 4; .5 G/20ML; G/20ML
3.38 INJECTION, POWDER, LYOPHILIZED, FOR SOLUTION INTRAVENOUS EVERY 8 HOURS
Qty: 0 | Refills: 0 | Status: DISCONTINUED | OUTPATIENT
Start: 2018-10-17 | End: 2018-10-19

## 2018-10-17 RX ORDER — CHLORHEXIDINE GLUCONATE 213 G/1000ML
1 SOLUTION TOPICAL DAILY
Qty: 0 | Refills: 0 | Status: DISCONTINUED | OUTPATIENT
Start: 2018-10-17 | End: 2018-10-17

## 2018-10-17 RX ORDER — NOREPINEPHRINE BITARTRATE/D5W 8 MG/250ML
0.04 PLASTIC BAG, INJECTION (ML) INTRAVENOUS
Qty: 8 | Refills: 0 | Status: DISCONTINUED | OUTPATIENT
Start: 2018-10-17 | End: 2018-10-17

## 2018-10-17 RX ORDER — DEXTROSE 50 % IN WATER 50 %
25 SYRINGE (ML) INTRAVENOUS ONCE
Qty: 0 | Refills: 0 | Status: DISCONTINUED | OUTPATIENT
Start: 2018-10-17 | End: 2018-10-24

## 2018-10-17 RX ORDER — INSULIN LISPRO 100/ML
VIAL (ML) SUBCUTANEOUS EVERY 6 HOURS
Qty: 0 | Refills: 0 | Status: DISCONTINUED | OUTPATIENT
Start: 2018-10-17 | End: 2018-10-22

## 2018-10-17 RX ORDER — VANCOMYCIN HCL 1 G
1000 VIAL (EA) INTRAVENOUS ONCE
Qty: 0 | Refills: 0 | Status: COMPLETED | OUTPATIENT
Start: 2018-10-17 | End: 2018-10-17

## 2018-10-17 RX ORDER — TAMSULOSIN HYDROCHLORIDE 0.4 MG/1
0.4 CAPSULE ORAL AT BEDTIME
Qty: 0 | Refills: 0 | Status: DISCONTINUED | OUTPATIENT
Start: 2018-10-17 | End: 2018-10-24

## 2018-10-17 RX ORDER — VANCOMYCIN HCL 1 G
1000 VIAL (EA) INTRAVENOUS EVERY 24 HOURS
Qty: 0 | Refills: 0 | Status: CANCELLED | OUTPATIENT
Start: 2018-10-18 | End: 2018-10-17

## 2018-10-17 RX ORDER — PIPERACILLIN AND TAZOBACTAM 4; .5 G/20ML; G/20ML
3.38 INJECTION, POWDER, LYOPHILIZED, FOR SOLUTION INTRAVENOUS ONCE
Qty: 0 | Refills: 0 | Status: COMPLETED | OUTPATIENT
Start: 2018-10-17 | End: 2018-10-17

## 2018-10-17 RX ORDER — AZITHROMYCIN 500 MG/1
TABLET, FILM COATED ORAL
Qty: 0 | Refills: 0 | Status: DISCONTINUED | OUTPATIENT
Start: 2018-10-17 | End: 2018-10-17

## 2018-10-17 RX ORDER — DEXTROSE 50 % IN WATER 50 %
12.5 SYRINGE (ML) INTRAVENOUS ONCE
Qty: 0 | Refills: 0 | Status: DISCONTINUED | OUTPATIENT
Start: 2018-10-17 | End: 2018-10-24

## 2018-10-17 RX ORDER — AZITHROMYCIN 500 MG/1
500 TABLET, FILM COATED ORAL DAILY
Qty: 0 | Refills: 0 | Status: DISCONTINUED | OUTPATIENT
Start: 2018-10-17 | End: 2018-10-17

## 2018-10-17 RX ORDER — ASPIRIN/CALCIUM CARB/MAGNESIUM 324 MG
81 TABLET ORAL DAILY
Qty: 0 | Refills: 0 | Status: CANCELLED | OUTPATIENT
Start: 2018-10-18 | End: 2018-10-17

## 2018-10-17 RX ORDER — NOREPINEPHRINE BITARTRATE/D5W 8 MG/250ML
0.05 PLASTIC BAG, INJECTION (ML) INTRAVENOUS
Qty: 16 | Refills: 0 | Status: DISCONTINUED | OUTPATIENT
Start: 2018-10-17 | End: 2018-10-20

## 2018-10-17 RX ORDER — ACETAMINOPHEN 500 MG
325 TABLET ORAL ONCE
Qty: 0 | Refills: 0 | Status: COMPLETED | OUTPATIENT
Start: 2018-10-17 | End: 2018-10-17

## 2018-10-17 RX ADMIN — SODIUM CHLORIDE 500 MILLILITER(S): 9 INJECTION INTRAMUSCULAR; INTRAVENOUS; SUBCUTANEOUS at 12:39

## 2018-10-17 RX ADMIN — Medication 62.5 MILLIMOLE(S): at 22:30

## 2018-10-17 RX ADMIN — Medication 110 MILLIGRAM(S): at 20:27

## 2018-10-17 RX ADMIN — SODIUM CHLORIDE 1000 MILLILITER(S): 9 INJECTION INTRAMUSCULAR; INTRAVENOUS; SUBCUTANEOUS at 12:50

## 2018-10-17 RX ADMIN — SODIUM CHLORIDE 1000 MILLILITER(S): 9 INJECTION INTRAMUSCULAR; INTRAVENOUS; SUBCUTANEOUS at 11:45

## 2018-10-17 RX ADMIN — SODIUM CHLORIDE 2000 MILLILITER(S): 9 INJECTION, SOLUTION INTRAVENOUS at 14:48

## 2018-10-17 RX ADMIN — SODIUM CHLORIDE 1000 MILLILITER(S): 9 INJECTION INTRAMUSCULAR; INTRAVENOUS; SUBCUTANEOUS at 11:50

## 2018-10-17 RX ADMIN — CHLORHEXIDINE GLUCONATE 1 APPLICATION(S): 213 SOLUTION TOPICAL at 18:15

## 2018-10-17 RX ADMIN — Medication 3.66 MICROGRAM(S)/KG/MIN: at 20:26

## 2018-10-17 RX ADMIN — Medication 1000 MILLIGRAM(S): at 13:50

## 2018-10-17 RX ADMIN — SODIUM CHLORIDE 500 MILLILITER(S): 9 INJECTION INTRAMUSCULAR; INTRAVENOUS; SUBCUTANEOUS at 13:23

## 2018-10-17 RX ADMIN — PIPERACILLIN AND TAZOBACTAM 25 GRAM(S): 4; .5 INJECTION, POWDER, LYOPHILIZED, FOR SOLUTION INTRAVENOUS at 21:27

## 2018-10-17 RX ADMIN — SODIUM CHLORIDE 1000 MILLILITER(S): 9 INJECTION INTRAMUSCULAR; INTRAVENOUS; SUBCUTANEOUS at 12:30

## 2018-10-17 RX ADMIN — PANTOPRAZOLE SODIUM 40 MILLIGRAM(S): 20 TABLET, DELAYED RELEASE ORAL at 12:16

## 2018-10-17 RX ADMIN — Medication 325 MILLIGRAM(S): at 12:30

## 2018-10-17 RX ADMIN — ONDANSETRON 4 MILLIGRAM(S): 8 TABLET, FILM COATED ORAL at 12:44

## 2018-10-17 RX ADMIN — Medication 650 MILLIGRAM(S): at 11:30

## 2018-10-17 RX ADMIN — Medication 250 MILLIGRAM(S): at 12:51

## 2018-10-17 RX ADMIN — Medication 1: at 23:45

## 2018-10-17 RX ADMIN — SODIUM CHLORIDE 30 MILLILITER(S): 9 INJECTION INTRAMUSCULAR; INTRAVENOUS; SUBCUTANEOUS at 20:27

## 2018-10-17 RX ADMIN — Medication 110 MILLIGRAM(S): at 15:09

## 2018-10-17 RX ADMIN — Medication 6.59 MICROGRAM(S)/KG/MIN: at 17:08

## 2018-10-17 RX ADMIN — Medication 6.59 MICROGRAM(S)/KG/MIN: at 13:33

## 2018-10-17 RX ADMIN — PIPERACILLIN AND TAZOBACTAM 200 GRAM(S): 4; .5 INJECTION, POWDER, LYOPHILIZED, FOR SOLUTION INTRAVENOUS at 12:18

## 2018-10-17 RX ADMIN — Medication 650 MILLIGRAM(S): at 12:30

## 2018-10-17 RX ADMIN — Medication 325 MILLIGRAM(S): at 11:30

## 2018-10-17 RX ADMIN — SODIUM CHLORIDE 2000 MILLILITER(S): 9 INJECTION INTRAMUSCULAR; INTRAVENOUS; SUBCUTANEOUS at 13:35

## 2018-10-17 RX ADMIN — AZITHROMYCIN 255 MILLIGRAM(S): 500 TABLET, FILM COATED ORAL at 13:53

## 2018-10-17 RX ADMIN — PIPERACILLIN AND TAZOBACTAM 3.38 GRAM(S): 4; .5 INJECTION, POWDER, LYOPHILIZED, FOR SOLUTION INTRAVENOUS at 12:50

## 2018-10-17 NOTE — PROGRESS NOTE ADULT - SUBJECTIVE AND OBJECTIVE BOX
Pt is an 89yr old man with PMHx including CAD s/p stent x2 (), BPH s/p surgical intervention (2017) and UTI. Pt presents to the ER on 10/17 with chief complaint of  symptoms. Of note, pt with recent trip to [facility]. Pt with temp of 106 in the ER with rigors, refractory hypotension requiring pressor support, leukocytosis with bandemia and lactic acidosis with multiple sources of infection. Pt admitted for management of septic shock.    Pt now s/p cystoscopy with left ureter stenting. Pt returned from the OR on 0.5mcg/kg/min of levophed.     Vital Signs Last 24 Hrs  T(C): 37.6 (17 Oct 2018 17:12), Max: 41.1 (17 Oct 2018 11:39)  T(F): 99.6 (17 Oct 2018 17:12), Max: 106 (17 Oct 2018 11:39)  HR: 63 (17 Oct 2018 20:15) (62 - 112)  BP: 144/66 (17 Oct 2018 20:15) (60/36 - 148/65)  BP(mean): 95 (17 Oct 2018 20:15) (67 - 95)  RR: 20 (17 Oct 2018 20:15) (12 - 32)  SpO2: 100% (17 Oct 2018 20:15) (89% - 100%)    CBC Full  -  ( 17 Oct 2018 11:50 )  WBC Count : 7.24 K/uL  Hemoglobin : 13.3 g/dL  Hematocrit : 40.2 %  Platelet Count - Automated : 129 K/uL  Mean Cell Volume : 92.4 fl  Mean Cell Hemoglobin : 30.6 pg  Mean Cell Hemoglobin Concentration : 33.1 gm/dL  Auto Neutrophil # : 6.08 K/uL  Auto Lymphocyte # : 0.65 K/uL  Auto Monocyte # : 0.14 K/uL  Auto Eosinophil # : 0.00 K/uL  Auto Basophil # : 0.07 K/uL  Auto Neutrophil % : 71.0 %  Auto Lymphocyte % : 9.0 %  Auto Monocyte % : 2.0 %  Auto Eosinophil % : 0.0 %  Auto Basophil % : 1.0 %    10-17    141  |  109<H>  |  16  ----------------------------<  86  3.8   |  23  |  1.40<H>    Ca    8.4<L>      17 Oct 2018 12:42    TPro  5.3<L>  /  Alb  2.0<L>  /  TBili  1.7<H>  /  DBili  x   /  AST  27  /  ALT  11<L>  /  AlkPhos  66  10-17        LIVER FUNCTIONS - ( 17 Oct 2018 12:42 )  Alb: 2.0 g/dL / Pro: 5.3 g/dL / ALK PHOS: 66 U/L / ALT: 11 U/L / AST: 27 U/L / GGT: x             Urinalysis Basic - ( 17 Oct 2018 11:50 )    Color: Yellow / Appearance: Slightly Turbid / S.010 / pH: x  Gluc: x / Ketone: Negative  / Bili: Negative / Urobili: Negative   Blood: x / Protein: 25 mg/dL / Nitrite: Negative   Leuk Esterase: Moderate / RBC: 3-5 /HPF / WBC 11-25   Sq Epi: x / Non Sq Epi: Occasional / Bacteria: TNTC    Home Medications:  aspirin 81 mg oral tablet: 1 tab(s) orally once a day (17 Oct 2018 14:21)  dorzolamide 2% ophthalmic solution: 1 drop(s) to each affected eye 3 times a day (17 Oct 2018 14:21)  finasteride: 5 milligram(s) orally once a day (17 Oct 2018 14:21)  lactulose 10 g oral powder for reconstitution: orally 3 times a day (17 Oct 2018 14:21)  Lexapro 10 mg oral tablet: 1 tab(s) orally once a day (17 Oct 2018 14:21)  Multiple Vitamins oral tablet: 1 tab(s) orally once a day (17 Oct 2018 14:21)  tamsulosin 0.4 mg oral capsule: 1 cap(s) orally once a day (at bedtime) (17 Oct 2018 14:21)  MEDICATIONS  (STANDING):  aspirin  chewable 81 milliGRAM(s) Oral daily  doxycycline IVPB 100 milliGRAM(s) IV Intermittent every 12 hours  norepinephrine Infusion 0.05 MICROgram(s)/kG/Min (3.656 mL/Hr) IV Continuous <Continuous>  sodium chloride 0.9%. 1000 milliLiter(s) (30 mL/Hr) IV Continuous <Continuous>    MEDICATIONS  (PRN):

## 2018-10-17 NOTE — CONSULT NOTE ADULT - ATTENDING COMMENTS
Patient assessed independently from above    89M PMH HLD, CAD, stents, BPH, nephrolithiasis, presents with high fever and hypotension, found to have multiple sources of sepsis with septic shock. He has extensive right sided pneumonia, left obstructive hydroureteronephrosis with pyelonephritis, gallstone with GB wall thickening and pericholecystic fluid suspicious for acute cholecystitis. He is also positive for entero/rhinovirus.     He received 4.5L of ivf and started on Levophed  Lung US with diffuse b-lines b/l with thickened and irregular pleura on right lung and left base, no significant pleural effusion  Bedside ECHO with limited cardiac views, overall mild to moderate LV systolic dysfunction, VTI 15 cm, systolic septal flattening     Plan-  Neuro: AAO x 3, no focal deficits, CT head negative for acute pathology  Cardiac: continue levophed for HD support, continue aspirin, check official ECHO, no symptoms suggestive of active cardiac ischemia at present, avoid more ivf  Pulm: avoid more ivf, resp status is stable on NC at present, may need diuresis  GI: keep NPO for now  Renal: insert disla, monitor UO, renal fn and lytes  ID: started on empiric vanc, zosyn, doxy, sent BCx, UCx, Legionella and Strep Ags and RMSF serology. He needs source control initially of his left hydroureteronephrosis - scheduled to go for ureteral stent placement tonight. He may need a perc re tube also however if his HD status improves after ureteral stent placement would get HIDA first before placing a re tube. He is seen by surgery but is not a candidate for any surgical intervention at this time. His CBD is not dilated (5mm) and LFTs are not significantly elevated, perc re tube can likely wait until am unless his condition deteriorates overnight - he will then need to be transferred to [facility] emergently.   Msk: PT eval when stable  PPx: start lovenox in am depending on need for perc re     Findings and plan discussed extensively with patient's son and daughter (HCP) including possible need to transfer to [facility] if his condition worsens    Patient is critically ill, 90mins spent Patient assessed independently from above    89M PMH HLD, CAD, stents, BPH, nephrolithiasis, presents with high fever (106F) and hypotension, found to have multiple sources of sepsis with septic shock. He has extensive right sided pneumonia, left obstructive hydroureteronephrosis with pyelonephritis, gallstone with GB wall thickening and pericholecystic fluid suspicious for acute cholecystitis. He is also positive for entero/rhinovirus.     He recently traveled to [place]  He does not have any rash  He received 4.5L of ivf and started on Levophed  Lung US with diffuse b-lines b/l with thickened and irregular pleura on right lung and left base, no significant pleural effusion  Bedside ECHO with limited cardiac views, overall mild to moderate LV systolic dysfunction, VTI 15 cm, systolic septal flattening     Plan-  Neuro: AAO x 3, no focal deficits, CT head negative for acute pathology  Cardiac: continue levophed for HD support, continue aspirin, check official ECHO, no symptoms suggestive of active cardiac ischemia at present, avoid more ivf  Pulm: avoid more ivf, resp status is stable on NC at present, may need diuresis  GI: keep NPO for now  Renal: insert disla, monitor UO, renal fn and lytes  ID: started on empiric vanc, zosyn, doxy, sent BCx, UCx, Legionella and Strep Ags and RMSF serology. He needs source control initially of his left hydroureteronephrosis - scheduled to go for ureteral stent placement tonight. He may need a perc re tube also however if his HD status improves after ureteral stent placement would get HIDA first before placing a re tube. He is seen by surgery but is not a candidate for any surgical intervention at this time. His CBD is not dilated (5mm) and LFTs are not significantly elevated, perc re tube can likely wait until am unless his condition deteriorates overnight - he will then need to be transferred to [facility] emergently.   Msk: PT eval when stable  PPx: start lovenox in am depending on need for perc re     Findings and plan discussed extensively with patient's son and daughter (HCP) including possible need to transfer to [facility] if his condition worsens    Patient is critically ill, 90mins spent Patient assessed independently from above    89M PMH HLD, CAD, stents, BPH, nephrolithiasis, presents with high fever (106F) and hypotension, found to have multiple sources of sepsis with septic shock. He has extensive right sided pneumonia, left obstructive hydroureteronephrosis with pyelonephritis, gallstone with GB wall thickening and pericholecystic fluid suspicious for acute cholecystitis. He is also positive for entero/rhinovirus.     He recently traveled to [place]  He does not have any rash  He received 4.5L of ivf and started on Levophed  Lung US with diffuse b-lines b/l with thickened and irregular pleura on right lung and left base, no significant pleural effusion  Bedside ECHO with limited cardiac views, overall mild to moderate LV systolic dysfunction, VTI 15 cm, systolic septal flattening     Plan-  Neuro: AAO x 3, no focal deficits, CT head negative for acute pathology  Cardiac: continue levophed for HD support, continue aspirin, check official ECHO, no symptoms suggestive of active cardiac ischemia at present, avoid more ivf  Pulm: avoid more ivf, resp status is stable on NC at present, may need diuresis  GI: keep NPO for now  Renal: insert disla, monitor UO, renal fn and lytes  ID: started on empiric vanc, zosyn, doxy, sent BCx, UCx, Legionella and Strep Ags and RMSF serology. He needs source control initially of his left hydroureteronephrosis - scheduled to go for ureteral stent placement tonight. He may need a perc re tube also however if his HD status improves after ureteral stent placement would get HIDA first before placing a re tube. He is seen by surgery but is not a candidate for any surgical intervention at this time. His CBD is not dilated (5mm) and LFTs are not significantly elevated, perc re tube can likely wait until am unless his condition deteriorates overnight - he will then need to be transferred to [facility] emergently.   Msk: PT eval when stable  PPx: start lovenox in am depending on need for perc re     Findings and plan discussed extensively with patient's son and daughter (HCP) including possible need to transfer to [facility] if his condition worsens    Patient is critically ill, 90mins spent    No medical contraindication for emergent cystoscopy and left ureteral stent placement, BP is stable on Levo, RIJ central line placed.

## 2018-10-17 NOTE — CONSULT NOTE ADULT - SUBJECTIVE AND OBJECTIVE BOX
[facility]         Latisha Ash, Bayron, Rhonda, Tony, Joselo, Peter        [phone] (office)    Reason for Consult: pre-procedural clearance for ureteral stent    Interval HPI: Patient seen and examined at bedside. No acute events overnight.     HPI:  Patient is an 89 year old male with a PMH of CAD s/p two cardiac stents in , BPH with prostate surgery last year, history of UTI who presented to the ED with complaints of weakness, fever, rigors, and dark, foul-smelling urine for one day. Patient's 2 adult sons are present at bedside who stated that patient's wife  3 weeks ago on home hospice care secondary to end-stage COPD and URTI. One of his son's has been living with him since January. Patient's baseline is ambulatory without assistance and he is competent in ADLs. Patient got his flu shot in September.    In the ED, patient's vitals were:  T(C): 38.7 (17 Oct 2018 12:26), Max: 41.1 (17 Oct 2018 11:39)  T(F): 101.7 (17 Oct 2018 12:26), Max: 106 (17 Oct 2018 11:39)  HR: 85 (17 Oct 2018 12:49) (84 - 112)  BP: 84/36 (17 Oct 2018 12:49) (71/40 - 124/65)  RR: 22 (17 Oct 2018 12:49) (22 - 32)  SpO2: 97% (17 Oct 2018 12:49) (89% - 97%)    Band neutrophils 13.0, INR 1.36, Creatinine 1.4, Calcium 8.4, Protein 5.3, Bilirubin 1.7, GFR 44, Lipase 72, Lactate 3.9.  Positive UA, positive RVP.  CXR: Low lung volumes. No change heart mediastinum. Generalized nonspecific ground-glass parenchymal process. In setting of fever inflammation/infection is considered. Element of congestion is also possible. No sapna lobar consolidation or pleural effusion.  In the ED, patient received 500 mg IV azithromycin, 1 dose IV zosyn, 1 gm IV vancomycin, 3.5 L NS, tylenol. Admitted to ICU for sepsis secondary to UTI. (17 Oct 2018 13:36)    Patient seen and examined at the bedside, drowsy, NAD, daughter present at the bedside. Patient drowsy but rousable, history taken from daughter. Currently on pressors.    PAST MEDICAL & SURGICAL HISTORY:  CAD (coronary artery disease)  GERD (gastroesophageal reflux disease)  Hyperlipidemia  Benign prostatic hyperplasia, presence of lower urinary tract symptoms unspecified, unspecified morphology  H/O heart artery stent      SOCIAL HISTORY: No active tobacco, alcohol or illicit drug use  social:  3 weeks ago, son currently living with him  Ambulation: independently, sometimes uses cane  Flu shot 2018    FAMILY HISTORY:  No pertinent family history in first degree relatives      Home Medications:  aspirin 81 mg oral tablet: 1 tab(s) orally once a day (17 Oct 2018 14:21)  dorzolamide 2% ophthalmic solution: 1 drop(s) to each affected eye 3 times a day (17 Oct 2018 14:21)  finasteride: 5 milligram(s) orally once a day (17 Oct 2018 14:21)  lactulose 10 g oral powder for reconstitution: orally 3 times a day (17 Oct 2018 14:21)  Lexapro 10 mg oral tablet: 1 tab(s) orally once a day (17 Oct 2018 14:21)  Multiple Vitamins oral tablet: 1 tab(s) orally once a day (17 Oct 2018 14:21)  tamsulosin 0.4 mg oral capsule: 1 cap(s) orally once a day (at bedtime) (17 Oct 2018 14:21)      MEDICATIONS  (STANDING):  doxycycline IVPB      norepinephrine Infusion 0.05 MICROgram(s)/kG/Min (6.591 mL/Hr) IV Continuous <Continuous>  piperacillin/tazobactam IVPB. 3.375 Gram(s) IV Intermittent every 8 hours    MEDICATIONS  (PRN):      Allergies    No Known Allergies    Intolerances        REVIEW OF SYSTEMS: Unable to obtain    VITAL SIGNS:   Vital Signs Last 24 Hrs  T(C): 37.6 (17 Oct 2018 17:12), Max: 41.1 (17 Oct 2018 11:39)  T(F): 99.6 (17 Oct 2018 17:12), Max: 106 (17 Oct 2018 11:39)  HR: 80 (17 Oct 2018 17:12) (79 - 112)  BP: 112/58 (17 Oct 2018 17:12) (60/36 - 124/65)  BP(mean): 79 (17 Oct 2018 16:45) (67 - 80)  RR: 17 (17 Oct 2018 17:12) (17 - 32)  SpO2: 99% (17 Oct 2018 17:12) (89% - 99%)    I&O's Summary    17 Oct 2018 07:01  -  17 Oct 2018 17:26  --------------------------------------------------------  IN: 146.2 mL / OUT: 150 mL / NET: -3.8 mL        PHYSICAL EXAM:  Constitutional: NAD, Drowsy bur rousable  HEENT NC/AT, moist mucous membranes  Pulmonary: Non-labored, breath sounds are clear bilaterally, no wheezing, rales or rhonchi  Cardiovascular: +S1, S2, RRR, no murmurs, rubs, gallops  Gastrointestinal: Soft, nontender, nondistended, normoactive bowel sounds  Extremities: No peripheral edema appreciated at present.  Neurological: unable to obtain  Skin: No obvious lesions/rashes  Psych: Mood & affect appropriate    LABS: All Labs Reviewed:                        13.3   7.24  )-----------( 129      ( 17 Oct 2018 11:50 )             40.2     17 Oct 2018 12:42    141    |  109    |  16     ----------------------------<  86     3.8     |  23     |  1.40     Ca    8.4        17 Oct 2018 12:42    TPro  5.3    /  Alb  2.0    /  TBili  1.7    /  DBili  x      /  AST  27     /  ALT  11     /  AlkPhos  66     17 Oct 2018 12:42    PT/INR - ( 17 Oct 2018 11:50 )   PT: 14.9 sec;   INR: 1.36 ratio         PTT - ( 17 Oct 2018 11:50 )  PTT:26.4 sec      Blood Culture:         EKG: Sinus tachycardia at 127 BPM    RADIOLOGY:  < from: CT Chest No Cont (10.17.18 @ 15:41) >  EXAM:  CT CHEST                            PROCEDURE DATE:  10/17/2018          INTERPRETATION:  History: Fever    CT chest abdomen and pelvis no oral or IV contrast.    Limited by lack of any contrast and beam hardening artifact.  Extensive airspace infiltrate in the right upper right lower lobe   consistent with pneumonia in appropriate clinical setting. Mild dependent   atelectatic changes left base. Significant pleural effusion.  Central airways patent. No mediastinal adenopathy. Lack of IVcontrast   limits hilar evaluation. Atherosclerotic nonaneurysmal thoracic aorta.   Hiatal hernia. Normal heart size with coronary calcification. Gallbladder   distended with calcified stone. Small pericholecystic fluid and   pericholecystic stranding concerning for acute cholecystitis. Correlate   with right upper quadrant ultrasound. No gross biliary dilatation.   Unenhanced liver spleen not remarkable. Peripancreatic edema suggestive   pancreatitis. Correlate with amylase, lipase levels  No adrenal nodules. There is a 1.1 cm nonobstructive calculus left   kidney. There is mild obstructive left hydroureteronephrosis secondary to   a 6 mm mid left ureteral calculus. Bilateral perirenal stranding is   nonspecific but can be seen in the setting of UTI. Please correlate.  Prostate not significantly enlarged, demonstrates multiple calcareous   deposits. Mild bladder wall thickening and associated stranding   suggestive of cystitis. Correlate with urinalysis. Multiple bladder   calculi largest measuring up to 1.1 cm. Small fat-containing umbilical   hernia.  No inflamed or obstructed bowel. No extraluminal fluid or gas.  There is a right inguinal hernia containing fat and nonobstructive large   bowel.    Impression:    Limited by lack of contrast and artifact.  Extensive right lung pneumonia.  Cholelithiasis. Possible acute cholecystitis. Correlate with right upper   quadrant ultrasound and/or HIDA scan.  Findings suggestive of pancreatitis. Correlate with amylase, lipase levels  Mildleft hydronephrosis secondary to a mid left ureteral calculus.   Additional nonobstructive left nephrolithiasis.  Bladder calculi. Bladder wall thickening suggestive of cystitis in the   appropriate setting.  Additional findings as discussed        CAROLYNN FAITH M.D., ATTENDING RADIOLOGIST  This document has been electronically signed. Oct 17 2018  3:57PM    < end of copied text >    < from: CT Head No Cont (10.. @ 15:37) >    EXAM:  CT BRAIN                            PROCEDURE DATE:  10/17/2018          INTERPRETATION:  CLINICAL STATEMENT: Fever AMS    TECHNIQUE: CT of the head was performed without IV contrast.    COMPARISON: None.    FINDINGS:  There is moderate diffuse parenchymal volume loss. There are areas of low   attenuation in the periventricular white matter likely related to mild   chronic microvascular ischemic changes.    There is no acute intracranial hemorrhage, parenchymal mass, mass effect   or midline shift. There is no acute territorial infarct. There is no   hydrocephalus. Nonspecific prominence bilateral optic nerve sheath.    The cranium is intact.     The visualized paranasal sinuses are   well-aerated.    IMPRESSION:  No acute intracranial hemorrhage or acute territorial infarct.        BERNIE WOLF M.D., ATTENDING RADIOLOGIST  This document has been electronically signed. Oct 17 2018  3:51PM    < end of copied text >      CXR:  < from: Xray Chest 1 View AP/PA (10.17.18 @ 12:10) >  EXAM:  XR CHEST AP OR PA 1V                            PROCEDURE DATE:  10/17/2018          INTERPRETATION:  History: Fever.     AP chest. Prior 2017.  Low lung volumes. No change heart mediastinum. Generalized nonspecific   groundglass parenchymal process. In setting of fever   inflammation/infection is considered. Element of congestion is also   possible. No sapna lobar consolidation or pleural effusion.    Impression: Nonspecific bilateral groundglass process.          CAROLYNN FAITH M.D., ATTENDING RADIOLOGIST  This document has been electronically signed. Oct 17 2018 12:23PM    < end of copied text >

## 2018-10-17 NOTE — H&P ADULT - PROBLEM SELECTOR PLAN 8
IMPROVE VTE Individual Risk Assessment          RISK                                                          Points  [  ] Previous VTE                                                3  [  ] Thrombophilia                                             2  [  ] Lower limb paralysis                                   2        (unable to hold up >15 seconds)    [  ] Current Cancer                                             2         (within 6 months)  [  ] Immobilization > 24 hrs                              1  [  ] ICU/CCU stay > 24 hours                             1  [ x ] Age > 60                                                         1    IMPROVE VTE Score: 1    VTE prophylaxis as per ICU

## 2018-10-17 NOTE — CONSULT NOTE ADULT - SUBJECTIVE AND OBJECTIVE BOX
Patient is a 89y old  Male who presents with a chief complaint of   PAST MEDICAL & SURGICAL HISTORY:  CAD (coronary artery disease)  GERD (gastroesophageal reflux disease)  Hyperlipidemia  Benign prostatic hyperplasia, presence of lower urinary tract symptoms unspecified, unspecified morphology  H/O heart artery stent      BRIEF HOSPITAL COURSE:    Review of Systems:                                                    All other ROS are negative.    ICU Vital Signs Last 24 Hrs  T(C): 38.7 (17 Oct 2018 12:26), Max: 41.1 (17 Oct 2018 11:39)  T(F): 101.7 (17 Oct 2018 12:26), Max: 106 (17 Oct 2018 11:39)  HR: 85 (17 Oct 2018 12:49) (84 - 112)  BP: 84/36 (17 Oct 2018 12:49) (71/40 - 124/65)  BP(mean): --  ABP: --  ABP(mean): --  RR: 22 (17 Oct 2018 12:49) (22 - 32)  SpO2: 97% (17 Oct 2018 12:49) (89% - 97%)    Physical Examination:    General:     HEENT:     PULM:     CVS:     ABD:     EXT:     SKIN:     Neuro:          LABS:                        13.3   7.24  )-----------( 129      ( 17 Oct 2018 11:50 )             40.2                 CAPILLARY BLOOD GLUCOSE          PT/INR - ( 17 Oct 2018 11:50 )   PT: 14.9 sec;   INR: 1.36 ratio         PTT - ( 17 Oct 2018 11:50 )  PTT:26.4 sec  Urinalysis Basic - ( 17 Oct 2018 11:50 )    Color: Yellow / Appearance: Slightly Turbid / S.010 / pH: x  Gluc: x / Ketone: Negative  / Bili: Negative / Urobili: Negative   Blood: x / Protein: 25 mg/dL / Nitrite: Negative   Leuk Esterase: Moderate / RBC: 3-5 /HPF / WBC 11-25   Sq Epi: x / Non Sq Epi: Occasional / Bacteria: TNTC      CULTURES:      Medications:  azithromycin   Tablet 500 milliGRAM(s) Oral daily    norepinephrine Infusion 0.05 MICROgram(s)/kG/Min IV Continuous <Continuous>                  sodium chloride 0.9% Bolus 1000 milliLiter(s) IV Bolus once            RADIOLOGY/IMAGING/ECHO    Critical care point of care ultrasound:    Assessment/Plan:        Critical Care time:   (Reviewing data, imaging, discussing with multidisciplinary team, non inclusive of procedures, discussing goals of care with patient/family) Patient is a 89y old  Male hx CAD/cardiac stents 2yrs ago, HLD, BPH, s/p cysto 1yr ago who presents with a chief complaint of rigors and [id number] hrs.  In ED, fever 106F rectal.  Recent travel to [place] 2 wks ago visiting his son. Denies sob, chest pain, N/V/D. Mild productive cough this reported by son.      PAST MEDICAL & SURGICAL HISTORY:  CAD (coronary artery disease)  GERD (gastroesophageal reflux disease)  Hyperlipidemia  Benign prostatic hyperplasia, presence of lower urinary tract symptoms unspecified, unspecified morphology  H/O heart artery stent      BRIEF HOSPITAL COURSE:    Review of Systems:    +fever  +weakness  Neg sob  Neg chest pain  Neg abd pain  Neg dysuria  Neg LOC or dizziness                                                  All other ROS are negative.    ICU Vital Signs Last 24 Hrs  T(C): 38.7 (17 Oct 2018 12:26), Max: 41.1 (17 Oct 2018 11:39)  T(F): 101.7 (17 Oct 2018 12:26), Max: 106 (17 Oct 2018 11:39)  HR: 85 (17 Oct 2018 12:49) (84 - 112)  BP: 84/36 (17 Oct 2018 12:49) (71/40 - 124/65)  BP(mean): --  ABP: --  ABP(mean): --  RR: 22 (17 Oct 2018 12:49) (22 - 32)  SpO2: 97% (17 Oct 2018 12:49) (89% - 97%)    Physical Examination:    General: alert, frail, ill appearing, responsive     HEENT: normacephalic, pupils equal and reactive     PULM: Fine crackles RLL and RML, left clear     CVS: s1s2 RRR    ABD: soft +BS nt nd no rebound     EXT: no edema, moves all extremities    SKIN: warm, no cyanosis     Neuro: alert, oriented, follows commands and answers simple questions          LABS:                        13.3   7.24  )-----------( 129      ( 17 Oct 2018 11:50 )             40.2                 CAPILLARY BLOOD GLUCOSE          PT/INR - ( 17 Oct 2018 11:50 )   PT: 14.9 sec;   INR: 1.36 ratio         PTT - ( 17 Oct 2018 11:50 )  PTT:26.4 sec  Urinalysis Basic - ( 17 Oct 2018 11:50 )    Color: Yellow / Appearance: Slightly Turbid / S.010 / pH: x  Gluc: x / Ketone: Negative  / Bili: Negative / Urobili: Negative   Blood: x / Protein: 25 mg/dL / Nitrite: Negative   Leuk Esterase: Moderate / RBC: 3-5 /HPF / WBC 11-25   Sq Epi: x / Non Sq Epi: Occasional / Bacteria: TNTC      CULTURES:      Medications:    tamsulosin 0.4 mg oral capsule: 1 cap(s) orally once a day (at bedtime), Last Dose Taken:    · 	atorvastatin 20 mg oral tablet: 1 tab(s) orally once a day (at bedtime), Last Dose Taken:    · 	aspirin 81 mg oral tablet: 1 tab(s) orally once a day, Last Dose Taken:    · 	Lexapro 10 mg oral tablet: 1 tab(s) orally once a day, Last Dose Taken:    · 	Multiple Vitamins oral tablet: 1 tab(s) orally once a day, Last Dose Taken:    · 	dorzolamide 2% ophthalmic solution: 1 drop(s) to each affected eye 3 times a day, Last Dose Taken:    · 	finasteride: 5 milligram(s) orally once a day, Last Dose Taken:    · 	lactulose 10 g oral powder for reconstitution: orally 3 times a day, Last Dose Yunior          RADIOLOGY/IMAGING/ECHOCr     Critical care point of care ultrasound:    Assessment/Plan:    89y old  Male hx CAD/cardiac stents 2yrs ago, HLD, BPH adm to ICU w/ septic shock, r/o pneumonia, UTI, and ATUL.    CV-shock, likely sepsis, started on levo gtt. titrate to keep MAP>65, may need CVP catheter   Pulm-stable on nc, CXR>nonspecific groundglass appearance   GI-npo for now  Renal-ATUL, likely ATN 2/2 shock, dehydration, monitor I/O's  ID-severe sepsis, no leukocytosis, 13 Bands,  serial lactic levels, +RPV, check urine for legionella, f/u Bcx/Ucx's, Vanco/Zosyn/Azithro, add Doxycycline as per ID, cont IVF hydration   GI/DVt ppx     Discussed w/ Dr Josue        Critical Care time: 50min   (Reviewing data, imaging, discussing with multidisciplinary team, non inclusive of procedures, discussing goals of care with patient/family)

## 2018-10-17 NOTE — H&P ADULT - PROBLEM SELECTOR PLAN 3
chronic  s/p 2 coronary artery stents in 2014  continue chronic  s/p 2 coronary artery stents in 2014  continue asa

## 2018-10-17 NOTE — H&P ADULT - ATTENDING COMMENTS
pt seen and examine see above- 89 year old male with a PMH of CAD s/p two cardiac stents in 2014, BPH with prostate surgery last year, history of UTI who presented to the ED with complaints of weakness, fever, rigors, and dark, foul-smelling urine for one day admitted with sever sepsis hypotensive shock  on presser support  sec to  possible uti  / rvp positive  , ct abd / pelvis with out contrast as per icu , post iv fluid bolus 31/2 lit  , fu blood cult / ucult , iv abx  zithromax  , doxy  , post one vaco dose , id zelentz . hx cad - cont asa as per icu  , hx bph on meds continue as per icu  strict is and os  / r/o urinary retention . pt family bed side  aware full tt plan / son , pt is full code. pt seen and examine see above- 89 year old male with a PMH of CAD s/p two cardiac stents in 2014, BPH with prostate surgery last year, history of UTI who presented to the ED with complaints of weakness, fever, rigors, and dark, foul-smelling urine for one day admitted with sever sepsis hypotensive shock  on presser support  sec to  possible uti  / rvp positive  , ct  chest abd / pelvis with out contrast as per icu , post iv fluid bolus 31/2 lit  , fu blood cult / ucult , iv abx  zithromax  , doxy  , post one vaco dose , id zelentz . hx cad - cont asa as per icu  , hx bph on meds continue as per icu   care   mouna   sec to dehydration   r/o urinary retention  or hydronephrosis . pt family bed side  aware full tt plan / son , pt is full code.

## 2018-10-17 NOTE — H&P ADULT - PROBLEM SELECTOR PLAN 1
Lactate 3.9, temp 106 to 101.7, bands 13%  sepsis secondary to UTI  Admit to ICU  follow up blood cultures, urine cultures, repeat lactate, urine antigens  follow up CT head, CT chest, CT abdomen/pelvis  IV azithromycin  plan per ICU Lactate 3.9, temp 106 to 101.7, bands 13% sever   sepsis / hypotension  secondary to possible  UTI  Admit to ICU  follow up blood cultures, urine cultures, repeat lactate, urine antigens  follow up CT head, CT chest, CT abdomen/pelvis  IV azithromycin , vanco ,zosyne   plan per ICU , ct abd / pelvis

## 2018-10-17 NOTE — CONSULT NOTE ADULT - SUBJECTIVE AND OBJECTIVE BOX
Full note to follow  Asked by Dr. JAMARI Josue to see patient for severe sepsis  Patient with acute onset of high grade fever, report of abdominal pain and discolored urine.  Here had T106F, shock, lethargy.  Exam without clear/convincing source of infection on exam  Looks dehydrated  Chest exam grossly clear, patient not hypoxic  Abdominal exam benign  CXR interstitial infiltrates on my review of the film  WBC normal with some left shift  TB min elevated  May have been here ~1 month ago and received ceftin. Perhaps UTI at that time per family. Limited supporting documentation on visit.  Recent trip to [place] after passing of his wife, no known unusual exposures there.  Vanco x1  Zosyn 3.3.75g IVPB Q8H  Concur with azithro pending clinical course and legionella ag  Creatinine 1.4, will likely worsen with shock  Doxycycline 100mg IVPB Q12H pending RMSF serology  CT noncontrast C/A/P. Doubt he will take PO contrast and with ?ATUL defer IV contrast  F/U Cx data  D/W ICU team  Thank you for the courtesy of this referral.    Justo Petersen MD  [phone]  ------------------------------  [facility], Division of Infectious Diseases  Iban Petersen, JAMARI Neil, ROSELINE DANIEL, [facility]  89y, Male  [id number]    HPI--  *** insert HPI ***    PMH/PSH--  CAD (coronary artery disease)  GERD (gastroesophageal reflux disease)  Hyperlipidemia  Benign prostatic hyperplasia, presence of lower urinary tract symptoms unspecified, unspecified morphology  H/O heart artery stent      Allergies--      Medications--  Antibiotics: azithromycin  IVPB      doxycycline IVPB      doxycycline IVPB 100 milliGRAM(s) IV Intermittent once    Immunologic:   Other: lactated ringers Bolus  norepinephrine Infusion      Social History--  EtOH: denies ***  Tobacco: denies ***  Drug Use: denies ***    Family/Marital History--  No pertinent family history in first degree relatives    Remainder not relevant to clinical concern.    Travel/Environmental/Occupational History:  *** insert T/E/O Hx ***    Review of Systems:  A >=10-point review of systems was obtained.     Pertinent positives and negatives--  Constitutional: No fevers. No Chills. No Rigors.   Eyes:  ENMT:  Cardiovascular: No chest pain. No palpitations.  Respiratory: No shortness of breath. No cough.  Gastrointestinal: No nausea or vomiting. No diarrhea or constipation.   Genitourinary:  Musculoskeletal:  Skin:  Neurologic:  Psychiatric: Pleasant. Appropriate affect.  Endocrine:  Heme/Lymphatic:  Allergy/Immunologic:    Review of systems otherwise negative except as previously noted.    Physical Exam--  Vital Signs: T(F): 101.7 (10-17-18 @ 12:26), Max: 106 (10-17-18 @ 11:39)  HR: 86 (10-17-18 @ 14:01)  BP: 63/35 (10-17-18 @ 14:01)  RR: 22 (10-17-18 @ 13:53)  SpO2: 98% (10-17-18 @ 13:53)  Wt(kg): --  General: Nontoxic-appearing Male in no acute distress.  HEENT: AT/NC. PERRL. EOMI. Anicteric. Conjunctiva pink and moist. Oropharynx clear. Dentition fair.  Neck: Not rigid. No sense of mass.  Nodes: None palpable.  Lungs: Clear bilaterally without rales, wheezing or rhonchi  Heart: Regular rate and rhythm. No Murmur. No rub. No gallop. No palpable thrill.  Abdomen: Bowel sounds present and normoactive. Soft. Nondistended. Nontender. No sense of mass. No organomegaly.  Back: No spinal tenderness. No costovertebral angle tenderness.   Extremities: No cyanosis or clubbing. No edema.   Skin: Warm. Dry. Good turgor. No rash. No vasculitic stigmata.  Psychiatric: Appropriate affect and mood for situation.         Laboratory & Imaging Data--  CBC                        13.3   7.24  )-----------( 129      ( 17 Oct 2018 11:50 )             40.2       Chemistries  10-17    141  |  109<H>  |  16  ----------------------------<  86  3.8   |  23  |  1.40<H>    Ca    8.4<L>      17 Oct 2018 12:42    TPro  5.3<L>  /  Alb  2.0<L>  /  TBili  1.7<H>  /  DBili  x   /  AST  27  /  ALT  11<L>  /  AlkPhos  66  10-17      Culture Data          Assessment--      Suggestions--        Justo Petersen MD  [phone] Full note to follow  Asked by Dr. JAMARI Josue to see patient for severe sepsis  Patient with acute onset of high grade fever, report of abdominal pain and discolored urine.  Here had T106F, shock, lethargy.  Exam without clear/convincing source of infection on exam  Looks dehydrated  Chest exam grossly clear, patient not hypoxic  Abdominal exam benign  CXR interstitial infiltrates on my review of the film  WBC normal with some left shift  TB min elevated  May have been here ~1 month ago and received ceftin. Perhaps UTI at that time per family. Limited supporting documentation on visit.  Recent trip to [place] after passing of his wife, no known unusual exposures there.  Vanco x1  Zosyn 3.3.75g IVPB Q8H  Concur with azithro pending clinical course and legionella ag  Creatinine 1.4, will likely worsen with shock  Doxycycline 100mg IVPB Q12H pending RMSF serology  CT noncontrast C/A/P. Doubt he will take PO contrast and with ?ATUL defer IV contrast  F/U Cx data  D/W ICU team  Thank you for the courtesy of this referral.    Justo Petersen MD  [phone]  ------------------------------  [facility], Division of Infectious Diseases  Iban Petersen, JAMARI Neil, ROSELINE DANIEL, [facility]  89y, Male  [phone]    Patient previously known to Dr. MARIZA Thakkar, who will assume care for patient 10/18 upon his return.    HPI--  89M, very lethargic and not a reliable historian. son & son-in-law at bedside provide much of the immediate history. Patient's wife passed away about 3 weeks ago. Patient had recently been with family in [place] after this and doing reasonably well. No unusual encounters in [place]. Patient had been at his usual baseline ~48h prior to admission, eating and drinking reasonably well with no specif or new complaints. The night before admission patient had dinner as usual and went to bed. In the middle of the night he woke up and was quite weak, requiring assistance to get to the bathroom. He went back to bed but continued to feel generally unwell and became weak an lethargoc. At home he developed fever to >104F with chills and ultimately was rut to the emergency room. Here he had temeprature to 106F, and developed hypotension which has not responded well to IVF. He has been placed on pressors.     Patient's family notes no sick contacts. There was one episode of N/V and and urine was noted to be dark. No clear urinary symptoms PTA. Per discussion with ICU team may have had some abdominal pain. +rigors with chills. No known cough or SOB. No known CP. No rash, No clear insect bites. No recent sick contacts.    There is some data from the a possible ER visit here a month ago, but it is fragmentary. Family thinks it may have been for a UTI and based on the ED note now looks like received Ceftin.    PMH/PSH--  CAD (coronary artery disease)  GERD (gastroesophageal reflux disease)  Hyperlipidemia  Benign prostatic hyperplasia, presence of lower urinary tract symptoms unspecified, unspecified morphology  H/O heart artery stent      Allergies--NKDA      Medications--  Antibiotics: azithromycin  IVPB      doxycycline IVPB      doxycycline IVPB 100 milliGRAM(s) IV Intermittent once    Immunologic:   Other: lactated ringers Bolus  norepinephrine Infusion      Social History--  EtOH: denies  Tobacco: denies   Drug Use: denies    Family/Marital History--  No pertinent family history in first degree relatives      Review of Systems:  Review of systems unable secondary to clinical condition.     Physical Exam--  Vital Signs: T(F): 101.7 (10-17-18 @ 12:26), Max: 106 (10-17-18 @ 11:39)  HR: 86 (10-17-18 @ 14:01)  BP: 63/35 (10-17-18 @ 14:01)  RR: 22 (10-17-18 @ 13:53)  SpO2: 98% (10-17-18 @ 13:53)  Wt(kg): --  General: Frail but nontoxic-appearing Male in no acute distress.  HEENT: AT/NC. Pupils/EOM unable secondary to patient compliance. Oropharynx/dentition unable secondary to patient compliance.  Anicteric. Conjunctiva pink and moist.  Neck: Not rigid. No sense of mass.  Nodes: None palpable.  Lungs: diminished breath sounds bilaterally without rales, wheezing or rhonchi poor effort  Heart: Regular rate and rhythm. No Murmur. No rub. No gallop. No palpable thrill.  Abdomen: Bowel sounds present and normoactive. Soft. Nondistended. Nontender. No sense of mass. No organomegaly.  Back: No spinal tenderness. No costovertebral angle tenderness.   Extremities: No cyanosis or clubbing. No edema.   Skin: Warm. Dry. Poor turgor. No rash. No vasculitic stigmata.  Psychiatric: Unable        Laboratory & Imaging Data--  CBC                        13.3   7.24  )-----------( 129      ( 17 Oct 2018 11:50 )             40.2       Chemistries  10-17    141  |  109<H>  |  16  ----------------------------<  86  3.8   |  23  |  1.40<H>    Ca    8.4<L>      17 Oct 2018 12:42    TPro  5.3<L>  /  Alb  2.0<L>  /  TBili  1.7<H>  /  DBili  x   /  AST  27  /  ALT  11<L>  /  AlkPhos  66  10-17    CXR on my review with interstitial infiltrates R>L    Urinalysis (10.17.18 @ 11:50)    Glucose Qualitative, Urine: Negative    Blood, Urine: Moderate    pH Urine: 7.0    Color: Yellow    Urine Appearance: Slightly Turbid    Bilirubin: Negative    Ketone - Urine: Negative    Specific Gravity: 1.010    Protein, Urine: 25 mg/dL    Urobilinogen: Negative    Nitrite: Negative    Leukocyte Esterase Concentration: Moderate  Urine Microscopic-Add On (NC) (10.17.18 @ 11:50)    Red Blood Cell - Urine: 3-5 /HPF    White Blood Cell - Urine: 11-25    Bacteria: TNTC    Comment - Urine: a few amorphous sediment    Epithelial Cells: Occasional    Rapid Respiratory Viral Panel (10.17.18 @ 11:50)    Rapid RVP Result: Detected: The [employer] RVP Rapid uses polymerase chain reaction (PCR) and melt  curve analysis to screen for adenovirus; coronavirus HKU1, NL63, 229E,  OC43; human metapneumovirus (hMPV); human enterovirus/rhinovirus  (Entero/RV); influenza A; influenza A/H1;influenza A/H3; influenza  A/H1-2009; influenza B; parainfluenza viruses 1, 2, 3, 4; respiratory  syncytial virus; Bordetella pertussis; Mycoplasma pneumoniae; and  Chlamydophila pneumoniae.    Entero/Rhinovirus (RapRVP): Detected    Culture Data  None

## 2018-10-17 NOTE — H&P ADULT - PROBLEM SELECTOR PLAN 2
chronic, on flomax and finasteride  follow up urine culture, urine antigens  plan per ICU chronic, on flomax and finasteride / strict is/os    follow up urine culture, urine antigens  plan per ICU

## 2018-10-17 NOTE — ED PROVIDER NOTE - PHYSICAL EXAMINATION
Gen: lethargic, rigors noted  Head/eyes: NC/AT, PERRL, no scleral icterus  ENT: airway patent  Neck: supple, trachea midline  Pulm: coarse breath sounds b/l, tachypneic   CV: tachycardic, no M/R/G, +2 dist pulses (radial, pedal DP/PT, popliteal)  Abd: soft, NT/ND, +BS, no guarding/rebound tenderness  Musculoskeletal: no edema/erythema/cyanosis, FROM in all extremities, no C/T/L spine ttp  Skin: no rash, no vesicles, no petechaie, no ecchymosis, no swelling  Neuro: lethargic, moving all extremities

## 2018-10-17 NOTE — H&P ADULT - NSHPREVIEWOFSYSTEMS_GEN_ALL_CORE
Constitutional: complains of fever, chills, weakness, fatigue, malaise  Respiratory: Denies shortness of breath, cough, wheezing  Cardiovascular: Denies chest pain, palpitations, edema  Gastrointestinal: Denies nausea, vomiting, diarrhea, constipation, abdominal pain  Genitourinary: foul-smelling, dark urine; Denies hematuria  Neurologic: complains of weakness; Denies syncope, loss of consciousness, headache  ROS negative except as noted above Constitutional: complains of  fever, chills, weakness, fatigue, malaise  Respiratory: Denies shortness of breath, cough, wheezing  Cardiovascular: Denies chest pain, palpitations, edema  Gastrointestinal: Denies nausea, vomiting, diarrhea, constipation, abdominal pain  Genitourinary: foul-smelling, dark urine; Denies hematuria  Neurologic: complains of weakness; Denies syncope, loss of consciousness, headache  ROS negative except as noted above

## 2018-10-17 NOTE — ED PROVIDER NOTE - OBJECTIVE STATEMENT
88 yo male hx of CAD with stent, UTI, BIBEMS c/o rigors/shakes and minimally answering questions.  Here with family.  Rectal temp here 106.  Unable to obtain further history from patient.  Family tried to give tylenol PO, but patient unable to swallow and vomited up coffee ground particles. 89 yo male hx of CAD with stent, UTI, BIBEMS c/o rigors/shakes and minimally answering questions.  Here with family.  Rectal temp here 106.  Unable to obtain further history from patient.  Family tried to give tylenol PO, but patient unable to swallow and vomited up coffee ground particles.  PMD Dr. Ness

## 2018-10-17 NOTE — CONSULT NOTE ADULT - ASSESSMENT
Severe sepsis with septic shock, possible ATUL  Unrevealing physical exam  Possible pneumonia on CXR but chest exam unimpressive, not hypoxic  Lethargic and did have episode of N/V, at risk for aspiration  Abnormal U/A no known symptoms save dark urine which is difficult to interpret in the setting of what looks like a dehydrated man with mild elevation in TB, otherwise unimpressive LF  Possible ATUL,, expect will worsen and LFT will rise related to shock in/of itself irrespective of any other infectious component affecting the relevant organ system  +RVP does does not explain this presentation  Mild elevation in TB but other LFT ok, abdominal exam benign  No rash  No evidence of SSTI  Doubt primary CNS infection here but may need to be excluded if no other source elucidated  Merits broad spectrum coverage to cover potential UTI, pneumonia, abdominal process, though no definitive clinical support for any of these processes and as such endorse pan-CT in this critically ill man    Suggestions--  Vanco x1  Zosyn 3.3.75g IVPB Q8H  Concur with azithro pending clinical course and legionella ag  Creatinine 1.4, will likely worsen with shock  Check RMSF serology  Doxycycline 100mg IVPB Q12H pending RMSF serology, can substitute this for Azithro unless +legionella Ag, then would alter back to AZM of FQ  CT noncontrast C/A/P. Doubt he will take PO contrast and with ?ATUL defer IV contrast  F/U Cx data  IVF  Pressors  D/W ICU team  Thank you for the courtesy of this referral.

## 2018-10-17 NOTE — CONSULT NOTE ADULT - SUBJECTIVE AND OBJECTIVE BOX
CHIEF COMPLAINT:    HPI:  Patient is an 89 year old male with a PMH of CAD s/p two cardiac stents in , BPH with prostate surgery last year, history of UTI who presented to the ED with complaints of weakness, fever, rigors, and dark, foul-smelling urine for one day. Patient's 2 adult sons are present at bedside who stated that patient's wife  3 weeks ago on home hospice care secondary to end-stage COPD and URTI. One of his son's has been living with him since January. Patient's baseline is ambulatory without assistance and he is competent in ADLs. Patient got his flu shot in September.    In the ED, patient's vitals were:  T(C): 38.7 (17 Oct 2018 12:26), Max: 41.1 (17 Oct 2018 11:39)  T(F): 101.7 (17 Oct 2018 12:26), Max: 106 (17 Oct 2018 11:39)  HR: 85 (17 Oct 2018 12:49) (84 - 112)  BP: 84/36 (17 Oct 2018 12:49) (71/40 - 124/65)  RR: 22 (17 Oct 2018 12:49) (22 - 32)  SpO2: 97% (17 Oct 2018 12:49) (89% - 97%)    Band neutrophils 13.0, INR 1.36, Creatinine 1.4, Calcium 8.4, Protein 5.3, Bilirubin 1.7, GFR 44, Lipase 72, Lactate 3.9.  Positive UA, positive RVP.  CXR: Low lung volumes. No change heart mediastinum. Generalized nonspecific ground-glass parenchymal process. In setting of fever inflammation/infection is considered. Element of congestion is also possible. No sapna lobar consolidation or pleural effusion.    In the ED, patient received 500 mg IV azithromycin, 1 dose IV zosyn, 1 gm IV vancomycin, 3.5 L NS, tylenol. Admitted to ICU for sepsis secondary to UTI. (17 Oct 2018 13:36)    CT AP - 6mm mid L ureteral calculus w/ L hydro. Pt tx'd to ICU for BP support w/ levophed.      PAST MEDICAL & SURGICAL HISTORY:  CAD (coronary artery disease)  GERD (gastroesophageal reflux disease)  Hyperlipidemia  Benign prostatic hyperplasia, presence of lower urinary tract symptoms unspecified, unspecified morphology  H/O heart artery stent      REVIEW OF SYSTEMS:    CONSTITUTIONAL: per hpi  EYES/ENT: No visual changes;  No vertigo or throat pain   NECK: No pain or stiffness  RESPIRATORY: No cough, wheezing, hemoptysis; No shortness of breath  CARDIOVASCULAR: No chest pain or palpitations  GASTROINTESTINAL: No abdominal or epigastric pain. No diarrhea or constipation. No melena or hematochezia.  GENITOURINARY per hpi  NEUROLOGICAL: No numbness or weakness  SKIN: No itching, burning, rashes, or lesions   All other review of systems is negative unless indicated above.    MEDICATIONS  (STANDING):  chlorhexidine 2% Cloths 1 Application(s) Topical daily  doxycycline IVPB      norepinephrine Infusion 0.05 MICROgram(s)/kG/Min (6.591 mL/Hr) IV Continuous <Continuous>  piperacillin/tazobactam IVPB. 3.375 Gram(s) IV Intermittent every 8 hours    MEDICATIONS  (PRN):      Allergies    No Known Allergies    Intolerances        Social History:  Alcohol: Denied  Smoking: Nonsmoker  Drug Use: Denied  Marital Status:     FAMILY HISTORY:  No pertinent family history in first degree relatives      Vital Signs Last 24 Hrs  T(C): 37.6 (17 Oct 2018 17:12), Max: 41.1 (17 Oct 2018 11:39)  T(F): 99.6 (17 Oct 2018 17:12), Max: 106 (17 Oct 2018 11:39)  HR: 78 (17 Oct 2018 18:00) (75 - 112)  BP: 129/65 (17 Oct 2018 18:00) (60/36 - 129/65)  BP(mean): 93 (17 Oct 2018 18:00) (67 - 93)  RR: 21 (17 Oct 2018 18:00) (12 - 32)  SpO2: 98% (17 Oct 2018 18:00) (89% - 99%)    PHYSICAL EXAM:    Constitutional: Obtunded  HEENT: KILO, EOMI  Neck: No LAD  Back: Normal spine flexure  Respiratory: oj BS  Cardiovascular: S1 and S2, RRR  Abd: BS+, soft, NT/ND, +L CVAT  : Normal phallus,open meatus,bilateral descended testes, no masses  Nj - clldy yellow urine  Extremities: No peripheral edema  Vascular: 2+ peripheral pulses  Neurological: no focal deficits  Musculoskeletal: 5/5 strength b/l upper and lower extremities  Skin: No rashes    LABS:                        13.3   7.24  )-----------( 129      ( 17 Oct 2018 11:50 )             40.2     10-17    141  |  109<H>  |  16  ----------------------------<  86  3.8   |  23  |  1.40<H>    Ca    8.4<L>      17 Oct 2018 12:42    TPro  5.3<L>  /  Alb  2.0<L>  /  TBili  1.7<H>  /  DBili  x   /  AST  27  /  ALT  11<L>  /  AlkPhos  66  10-17    PT/INR - ( 17 Oct 2018 11:50 )   PT: 14.9 sec;   INR: 1.36 ratio         PTT - ( 17 Oct 2018 11:50 )  PTT:26.4 sec  Urinalysis Basic - ( 17 Oct 2018 11:50 )    Color: Yellow / Appearance: Slightly Turbid / S.010 / pH: x  Gluc: x / Ketone: Negative  / Bili: Negative / Urobili: Negative   Blood: x / Protein: 25 mg/dL / Nitrite: Negative   Leuk Esterase: Moderate / RBC: 3-5 /HPF / WBC 11-25   Sq Epi: x / Non Sq Epi: Occasional / Bacteria: TNTC      Urine Culture:   Blood Cultures      RADIOLOGY & ADDITIONAL STUDIES:  < from: CT Chest No Cont (10.17.18 @ 15:41) >  EXAM:  CT CHEST                            PROCEDURE DATE:  10/17/2018          INTERPRETATION:  History: Fever    CT chest abdomen and pelvis no oral or IV contrast.    Limited by lack of any contrast and beam hardening artifact.  Extensive airspace infiltrate in the right upper right lower lobe   consistent with pneumonia in appropriate clinical setting. Mild dependent   atelectatic changes left base. Significant pleural effusion.  Central airways patent. No mediastinal adenopathy. Lack of IVcontrast   limits hilar evaluation. Atherosclerotic nonaneurysmal thoracic aorta.   Hiatal hernia. Normal heart size with coronary calcification. Gallbladder   distended with calcified stone. Small pericholecystic fluid and   pericholecystic stranding concerning for acute cholecystitis. Correlate   with right upper quadrant ultrasound. No gross biliary dilatation.   Unenhanced liver spleen not remarkable. Peripancreatic edema suggestive   pancreatitis. Correlate with amylase, lipase levels  No adrenal nodules. There is a 1.1 cm nonobstructive calculus left   kidney. There is mild obstructive left hydroureteronephrosis secondary to   a 6 mm mid left ureteral calculus. Bilateral perirenal stranding is   nonspecific but can be seen in the setting of UTI. Please correlate.  Prostate not significantly enlarged, demonstrates multiple calcareous   deposits. Mild bladder wall thickening and associated stranding   suggestive of cystitis. Correlate with urinalysis. Multiple bladder   calculi largest measuring up to 1.1 cm. Small fat-containing umbilical   hernia.  No inflamed or obstructed bowel. No extraluminal fluid or gas.  There is a right inguinal hernia containing fat and nonobstructive large   bowel.    Impression:    Limited by lack of contrast and artifact.  Extensive right lung pneumonia.  Cholelithiasis. Possible acute cholecystitis. Correlate with right upper   quadrant ultrasound and/or HIDA scan.  Findings suggestive of pancreatitis. Correlate with amylase, lipase levels  Mildleft hydronephrosis secondary to a mid left ureteral calculus.   Additional nonobstructive left nephrolithiasis.  Bladder calculi. Bladder wall thickening suggestive of cystitis in the   appropriate setting.  Additional findings as discussed    < end of copied text >

## 2018-10-17 NOTE — PROGRESS NOTE ADULT - ASSESSMENT
Unable to perform ROS as pt s/p intraprocedural propofol.  Labs, radiology reports reviewed.    Neuro: Arousable, answering yes/no questions. Denies pain, endorses being comfortable. PERRL, No overt deficit appreciated  Pulm: 100% on 2LNC, diminished bilaterally from midlung down, faint crackles bilaterally from midung down  Cardiac: 0.5mcg/kg/min levophed, SBP 120s, HR 60s sinus on tele, s1,s2  Ab: +BS, no sign of tenderness with palpation, softly distended  Ex: Warm, no BLE edema, +pedal pulses    Pt is an 89yr old man with PMHx as above, now in the ICU for management of septic shock secondary to left obstructive hydro with associated UTI s/p cystoscopy with left ureter stent, right side pneumonia, +RVP +/- acute cholecystitis +/- pancreatitis.     Neuro:  --Pain management prn  --Continue to assess mental status, anesthesia vs ams (head CT from 10/17 without acute pathology noted)  --Continue home Lexapro    Pulm:  --Supplemental o2 prn  --Incentive spirometer as able  --f/u vbg     Cardiac:  --Continue levophed, titrated for MAP greater than 65  --Will add vasopressin as second agent if clinically warranted    ID:  --f/u cultures  --Continue Vanc/Zosyn/Doxy for now  --f/u lactic acid    :  --Strict I/Os  --Hold IVF for now as pt with reported b lines on pocus and chest xray consistent with pulm edema  --Replete electrolytes prn  --Continue home finasteride and tamsulosin     GI:  --NPO overnight  --Possible HIDA scan as clinically warranted 10/18  --f/u amylase/lipase    Endo:  --Fingersticks q6, pt with glucose 86, now NPO.    Prophylaxis  --SCDs Unable to perform ROS as pt s/p intraprocedural propofol.  Labs, radiology reports reviewed.    Neuro: Arousable, answering yes/no questions. Denies pain, endorses being comfortable. PERRL, No overt deficit appreciated  Pulm: 100% on 2LNC, diminished bilaterally from midlung down, faint crackles bilaterally from midung down  Cardiac: 0.5mcg/kg/min levophed, SBP 120s, HR 60s sinus on tele, s1,s2  : Nj present, draining urine with approx 50ml/hr in bag upon receiving   Ab: +BS, no sign of tenderness with palpation, softly distended  Ex: Warm, no BLE edema, +pedal pulses    Pt is an 89yr old man with PMHx as above, now in the ICU for management of septic shock secondary to left obstructive hydro with associated UTI s/p cystoscopy with left ureter stent, right side pneumonia, +RVP +/- acute cholecystitis +/- pancreatitis.     Neuro:  --Pain management prn  --Continue to assess mental status, anesthesia vs ams (head CT from 10/17 without acute pathology noted)  --Continue home Lexapro    Pulm:  --Supplemental o2 prn  --Incentive spirometer as able  --f/u vbg     Cardiac:  --Continue levophed, titrated for MAP greater than 65  --Will add vasopressin as second agent if clinically warranted    ID:  --f/u cultures  --Continue Vanc/Zosyn/Doxy for now  --f/u lactic acid    :  --Strict I/Os  --Hold IVF for now as pt with reported b lines on pocus and chest xray consistent with pulm edema  --Replete electrolytes prn  --Continue home finasteride and tamsulosin     GI:  --NPO overnight  --Possible HIDA scan as clinically warranted 10/18  --f/u amylase/lipase    Endo:  --Fingersticks q6, pt with glucose 86, now NPO.    Prophylaxis  --SCDs    f/u    2100: pt with improving mental status, arousable, responding appropriately, A&Ox3, denies pain.   Pt with lactic acid 2.4 -->3.1. with acute leukocytosis on follow up labs. Likely reactionary as pt afebrile, without increasing pressor support, making 125ml/hr urine. Will continue current antibiotic regimen and repeat lactic acid at 0015, IVF as clinically warranted.   Phos 1.8, will replete with sodium phos as k 3.8 with potentially tenuous renal function.  Glucose 208 on repeat chemistry, will add low dose sliding scale insulin coverage Unable to perform ROS as pt s/p intraprocedural propofol.  Labs, radiology reports reviewed.    Neuro: Arousable, answering yes/no questions. Denies pain, endorses being comfortable. PERRL, No overt deficit appreciated  Pulm: 100% on 2LNC, diminished bilaterally from midlung down, faint crackles bilaterally from midung down  Cardiac: 0.5mcg/kg/min levophed, SBP 120s, HR 60s sinus on tele, s1,s2  : Nj present, draining urine with approx 50ml/hr in bag upon receiving   Ab: +BS, no sign of tenderness with palpation, softly distended  Ex: Warm, no BLE edema, +pedal pulses    Pt is an 89yr old man with PMHx as above, now in the ICU for management of septic shock secondary to left obstructive hydro with associated UTI s/p cystoscopy with left ureter stent, right side pneumonia, +RVP +/- acute cholecystitis +/- pancreatitis.     Neuro:  --Pain management prn  --Continue to assess mental status, anesthesia vs ams (head CT from 10/17 without acute pathology noted)  --Continue home Lexapro    Pulm:  --Supplemental o2 prn  --Incentive spirometer as able  --f/u vbg     Cardiac:  --Continue levophed, titrated for MAP greater than 65  --Will add vasopressin as second agent if clinically warranted    ID:  --f/u cultures  --Continue Vanc/Zosyn/Doxy for now  --f/u lactic acid    :  --Strict I/Os  --Hold IVF for now as pt with reported b lines on pocus and chest xray consistent with pulm edema  --Replete electrolytes prn  --Continue home finasteride and tamsulosin     GI:  --NPO overnight  --Possible HIDA scan as clinically warranted 10/18  --f/u amylase/lipase    Endo:  --Fingersticks q6, pt with glucose 86, now NPO.    Prophylaxis  --SCDs    f/u    2100: pt with improving mental status, arousable, responding appropriately, A&Ox3, denies pain.   Pt with lactic acid 2.4 -->3.1 and acute leukocytosis on follow up labs. Likely reactionary as pt afebrile, with decreasing pressor support (0.5mcg/kg/min -->0.3), making 125ml/hr urine. Will continue current antibiotic regimen and repeat lactic acid at 0015. Pt already received over 4L IVF and is currently receiving approx 70ml/hr from drips/medication. Pt with pulm edema on imaging and B lines per report on pocus, will defer additional bolus of IVF at this time. Will get CVP.  Phos 1.8, will replete with sodium phos as k 3.8 with potentially tenuous renal function.  Glucose 208 on repeat chemistry, will add low dose sliding scale insulin coverage  Case discussed with eICU Attending Dr. Plascencia who agrees with follow up plan above.

## 2018-10-17 NOTE — CONSULT NOTE ADULT - SUBJECTIVE AND OBJECTIVE BOX
Chief Complaint:  Patient is a 89y old  Male who presents with a chief complaint of sepsis secondary to UTI Patient is an 89 year old male with a PMH of CAD s/p two cardiac stents in , BPH with prostate surgery last year, history of UTI who presented to the ED with complaints of weakness, fever, rigors, and dark, foul-smelling urine for one day. Patient's 2 adult sons are present at bedside who stated that patient's wife  3 weeks ago on home hospice care secondary to end-stage COPD and URTI. One of his son's has been living with him since January. Patient's baseline is ambulatory without assistance and he is competent in ADLs. Patient got his flu shot in September.    In the ED, patient's vitals were:  T(C): 38.7 (17 Oct 2018 12:26), Max: 41.1 (17 Oct 2018 11:39)  T(F): 101.7 (17 Oct 2018 12:26), Max: 106 (17 Oct 2018 11:39)  HR: 85 (17 Oct 2018 12:49) (84 - 112)  BP: 84/36 (17 Oct 2018 12:49) (71/40 - 124/65)  RR: 22 (17 Oct 2018 12:49) (22 - 32)  SpO2: 97% (17 Oct 2018 12:49) (89% - 97%)    Band neutrophils 13.0, INR 1.36, Creatinine 1.4, Calcium 8.4, Protein 5.3, Bilirubin 1.7, GFR 44, Lipase 72, Lactate 3.9.  Positive UA, positive RVP.  CXR: Low lung volumes. No change heart mediastinum. Generalized nonspecific ground-glass parenchymal process. In setting of fever inflammation/infection is considered. Element of congestion is also possible. No sapna lobar consolidation or pleural effusion.    In the ED, patient received 500 mg IV azithromycin, 1 dose IV zosyn, 1 gm IV vancomycin, 3.5 L NS, tylenol. Admitted to ICU for sepsis secondary to UTI.       Review of Systems:  Review of Systems: Constitutional: complains of  fever, chills, weakness, fatigue, malaise  Respiratory: Denies shortness of breath, cough, wheezing  Cardiovascular: Denies chest pain, palpitations, edema  Gastrointestinal: Denies nausea, vomiting, diarrhea, constipation, abdominal pain  Genitourinary: foul-smelling, dark urine; Denies hematuria  Neurologic: complains of weakness; Denies syncope, loss of consciousness, headache ROS negative except as noted above	    called to see pt for ruq pain /? acute cholecystitis      HPI:    Allergies:  No Known Allergies      Medications:  chlorhexidine 2% Cloths 1 Application(s) Topical daily  doxycycline IVPB      norepinephrine Infusion 0.05 MICROgram(s)/kG/Min IV Continuous <Continuous>  piperacillin/tazobactam IVPB. 3.375 Gram(s) IV Intermittent every 8 hours      PMHX/PSHX:  CAD (coronary artery disease)  GERD (gastroesophageal reflux disease)  Hyperlipidemia  Benign prostatic hyperplasia, presence of lower urinary tract symptoms unspecified, unspecified morphology  H/O heart artery stent      Family history:  No pertinent family history in first degree relatives      Social History:     ROS:     General:  No wt loss, fevers, chills, night sweats, fatigue,   Eyes:  Good vision, no reported pain  ENT:  No sore throat, pain, runny nose, dysphagia  CV:  No pain, palpitations, hypo/hypertension  Resp:  No dyspnea, cough, tachypnea, wheezing  GI:  No pain, No nausea, No vomiting, No diarrhea, No constipation, No weight loss, No fever, No pruritis, No rectal bleeding, No tarry stools, No dysphagia,  :  No pain, bleeding, incontinence, nocturia  Muscle:  No pain, weakness  Neuro:  No weakness, tingling, memory problems  Psych:  No fatigue, insomnia, mood problems, depression  Endocrine:  No polyuria, polydipsia, cold/heat intolerance  Heme:  No petechiae, ecchymosis, easy bruisability  Skin:  No rash, tattoos, scars, edema      PHYSICAL EXAM:   Vital Signs:  Vital Signs Last 24 Hrs  T(C): 37.6 (17 Oct 2018 17:12), Max: 41.1 (17 Oct 2018 11:39)  T(F): 99.6 (17 Oct 2018 17:12), Max: 106 (17 Oct 2018 11:39)  HR: 78 (17 Oct 2018 18:00) (75 - 112)  BP: 129/65 (17 Oct 2018 18:00) (60/36 - 129/65)  BP(mean): 93 (17 Oct 2018 18:00) (67 - 93)  RR: 21 (17 Oct 2018 18:00) (12 - 32)  SpO2: 98% (17 Oct 2018 18:00) (89% - 99%)  Daily Height in cm: 170.18 (17 Oct 2018 17:12)    Daily     GENERAL:  Appears stated age, well-groomed, well-nourished, no distress  HEENT:  NC/AT,  conjunctivae clear and pink, no thyromegaly, nodules, adenopathy, no JVD, sclera -anicteric  CHEST:  Full & symmetric excursion, no increased effort, breath sounds clear  HEART:  Regular rhythm, S1, S2, no murmur/rub/S3/S4, no abdominal bruit, no edema  ABDOMEN:  Soft, non-tender, non-distended, normoactive bowel sounds,  no masses ,no hepato-splenomegaly, no signs of chronic liver disease  EXTEREMITIES:  no cyanosis,clubbing or edema  SKIN:  No rash/erythema/ecchymoses/petechiae/wounds/abscess/warm/dry  NEURO:  Alert, oriented, no asterixis, no tremor, no encephalopathy    LABS:                        13.3   7.24  )-----------( 129      ( 17 Oct 2018 11:50 )             40.2     10-17    141  |  109<H>  |  16  ----------------------------<  86  3.8   |  23  |  1.40<H>    Ca    8.4<L>      17 Oct 2018 12:42    TPro  5.3<L>  /  Alb  2.0<L>  /  TBili  1.7<H>  /  DBili  x   /  AST  27  /  ALT  11<L>  /  AlkPhos  66  10-17    LIVER FUNCTIONS - ( 17 Oct 2018 12:42 )  Alb: 2.0 g/dL / Pro: 5.3 g/dL / ALK PHOS: 66 U/L / ALT: 11 U/L / AST: 27 U/L / GGT: x           PT/INR - ( 17 Oct 2018 11:50 )   PT: 14.9 sec;   INR: 1.36 ratio         PTT - ( 17 Oct 2018 11:50 )  PTT:26.4 sec  Urinalysis Basic - ( 17 Oct 2018 11:50 )    Color: Yellow / Appearance: Slightly Turbid / S.010 / pH: x  Gluc: x / Ketone: Negative  / Bili: Negative / Urobili: Negative   Blood: x / Protein: 25 mg/dL / Nitrite: Negative   Leuk Esterase: Moderate / RBC: 3-5 /HPF / WBC 11-25   Sq Epi: x / Non Sq Epi: Occasional / Bacteria: TNTC      Amylase Serum48      Lipase serum72       Ammonia--      Imaging:

## 2018-10-17 NOTE — CONSULT NOTE ADULT - ASSESSMENT
Patient is an 89 year old male with a PMH of CAD s/p two cardiac stents in 2014, BPH with prostate surgery last year, history of UTI who presented to the ED with complaints of weakness, fever, rigors, and dark, foul-smelling urine for one day, with temp of 106F in ED. Admitted to ICU for management of sepsis 2/2 UTI. for Ureteral stent emergent procedure    - Patient admitted to ICU for management of sepsis secondary to UTI  - Patient is not complaining of any cardiac symptoms at this time, low suspicion for ACS at the present time  -Patient with hx of CAD, s/p stent x2, however no active ischemic process at present time  - Monitor closely for the development of anginal symptoms or clinical signs of ischemia.     - EKG shows sinus tachycardia at 127BPM  - Continue cardiac monitor to assess for occult arrhythmias    - No meaningful evidence of volume overload.  - Echocardiogram scheduled for tomorrow, please f/u    - BP maintained with pressor support, continue pressors as per ICU  - Discontinue any anti-hypertensives at present  - Continue ASA 81 and Statin      - Monitor and replete lytes, keep K>4, Mg>2.  - Strict I/Os, daily weights.  - Pt has no active ischemia, decompensated heart failure, unstable arrythmia, or severe stenotic valvular disease, and has 1 cardiac risk factors. In the setting of low risk procedure, ureteral stenting, he/she is optimized from cardiovascular standpoint to proceed with planned procedure with routine hemodynamic monitoring.   - Patient at risk of decompensation due to requiring pressor support   - Other cardiovascular workup will depend on clinical course.  - All other workup per primary team.  - Will continue to follow. Patient is an 89 year old male with a PMH of CAD s/p two cardiac stents in 2014, BPH with prostate surgery last year, history of UTI who presented to the ED with complaints of weakness, fever, rigors, and dark, foul-smelling urine for one day, with temp of 106F in ED. Admitted to ICU for management of sepsis 2/2 UTI. for Ureteral stent emergent procedure    - Patient admitted to ICU for management of sepsis secondary to UTI  - Has not had any new cardiac symptoms in recent weeks  -Patient with hx of CAD, s/p stent x2, however no active ischemic process at present time  - Monitor closely for the development of anginal symptoms or clinical signs of ischemia.   -If possible, should try to continue baby aspirin therapy    - EKG shows sinus tachycardia at 127BPM  - Continue cardiac monitor to assess for occult arrhythmias    - No meaningful evidence of volume overload.  - Echocardiogram scheduled for tomorrow, please f/u    - BP maintained with pressor support, continue pressors as per ICU and titrate down as tolerated  - Discontinue any anti-hypertensives at present  - Continue ASA 81 and Statin      - Monitor and replete lytes, keep K>4, Mg>2.  - Strict I/Os, daily weights.  - Pt has no active ischemia, decompensated heart failure, unstable arrythmia, or severe stenotic valvular disease.   He is optimized from cardiovascular standpoint to proceed with planned procedure with routine hemodynamic monitoring.   - Patient at increased risk of perioperative cardiac complications on the basis of his critical illness  - Other cardiovascular workup will depend on clinical course.  - All other workup per primary team.  - Will continue to follow.  Discussed with wife and ICU team.  Total time spent greater than 35 minutes.

## 2018-10-17 NOTE — H&P ADULT - NSHPSOCIALHISTORY_GEN_ALL_CORE
Patient lives at home with adult son; wife passed away on home hospice 3 weeks ago  Patient ambulates without assistance  Denies drug, alcohol, or tobacco use Patient lives at home with adult son; wife passed away on home hospice 3 weeks ago  Patient ambulates without assistance  Denies drug, alcohol, or tobacco use , flu shot sept .

## 2018-10-17 NOTE — H&P ADULT - PROBLEM SELECTOR PLAN 6
IMPROVE VTE Individual Risk Assessment          RISK                                                          Points  [  ] Previous VTE                                                3  [  ] Thrombophilia                                             2  [  ] Lower limb paralysis                                   2        (unable to hold up >15 seconds)    [  ] Current Cancer                                             2         (within 6 months)  [  ] Immobilization > 24 hrs                              1  [  ] ICU/CCU stay > 24 hours                             1  [ x ] Age > 60                                                         1    IMPROVE VTE Score: 1    VTE prophylaxis as per ICU chronic, on lactulose  had 1 bowel movement today

## 2018-10-17 NOTE — H&P ADULT - ASSESSMENT
Patient is an 89 year old male with a PMH of CAD s/p two cardiac stents in 2014, BPH with prostate surgery last year, history of UTI who presented to the ED with complaints of weakness, fever, rigors, and dark, foul-smelling urine for one day.

## 2018-10-17 NOTE — CHART NOTE - NSCHARTNOTEFT_GEN_A_CORE
Patient is a 89y old  Male who presents with a chief complaint of Septic shock (17 Oct 2018 13:25)    The patient was seen and examined at bedside. No complaints, resting.     Vital Signs Last 24 Hrs  T(C): 38.7 (17 Oct 2018 12:26), Max: 41.1 (17 Oct 2018 11:39)  T(F): 101.7 (17 Oct 2018 12:26), Max: 106 (17 Oct 2018 11:39)  HR: 85 (17 Oct 2018 12:49) (84 - 112)  BP: 84/36 (17 Oct 2018 12:49) (71/40 - 124/65)  BP(mean): --  RR: 22 (17 Oct 2018 12:49) (22 - 32)  SpO2: 97% (17 Oct 2018 12:49) (89% - 97%)                          13.3   7.24  )-----------( 129      ( 17 Oct 2018 11:50 )             40.2       Lactate, Blood: 3.9 mmol/L (10-17 @ 11:50)          MEDICATIONS  (STANDING):  azithromycin  IVPB 500 milliGRAM(s) IV Intermittent once  azithromycin  IVPB      norepinephrine Infusion 0.05 MICROgram(s)/kG/Min (6.591 mL/Hr) IV Continuous <Continuous>    MEDICATIONS  (PRN):      Antibiotics Given [X]    Physical Exam:  General: NAD, pleasant, awake, alert  HEENT: NCAT, MMM, EOMI  Cardio: +S1S2 No murmurs, gallops, rubs  Pulm: CTA B/L no wheezes, rales, rhonchi  Skin: Warm, Dry, Capillary refill <2 seconds, good skin turgor  Abd: Soft, NTND  Ext: no c/c/e, 2+ pulses throughout    A/P: 89y  Male p/w   [X] Blood cultures drawn  [X] Initial Lactate drawn  [X] Repeat Lactate ordered  [X] Antibiotics given  [X] Fluid resuscitation, 30cc/kg. Total amount of fluid given 3500 cc  [X] Attending Notification Patient is a 89y old  Male who presents with a chief complaint of Septic shock (17 Oct 2018 13:25)    The patient was seen and examined at bedside. No complaints, resting.     Vital Signs Last 24 Hrs  T(C): 38.7 (17 Oct 2018 12:26), Max: 41.1 (17 Oct 2018 11:39)  T(F): 101.7 (17 Oct 2018 12:26), Max: 106 (17 Oct 2018 11:39)  HR: 85 (17 Oct 2018 12:49) (84 - 112)  BP: 84/36 (17 Oct 2018 12:49) (71/40 - 124/65)  BP(mean): --  RR: 22 (17 Oct 2018 12:49) (22 - 32)  SpO2: 97% (17 Oct 2018 12:49) (89% - 97%)                          13.3   7.24  )-----------( 129      ( 17 Oct 2018 11:50 )             40.2       Lactate, Blood: 3.9 mmol/L (10-17 @ 11:50)          MEDICATIONS  (STANDING):  azithromycin  IVPB 500 milliGRAM(s) IV Intermittent once  azithromycin  IVPB      norepinephrine Infusion 0.05 MICROgram(s)/kG/Min (6.591 mL/Hr) IV Continuous <Continuous>    MEDICATIONS  (PRN):      Antibiotics Given [X]    Physical Exam:  General: NAD, pleasant, awake, alert  HEENT: NCAT, MMM, EOMI  Cardio: +S1S2 No murmurs, gallops, rubs  Pulm: CTA B/L no wheezes, rales, rhonchi  Skin: Warm, Dry, Capillary refill <2 seconds, good skin turgor  Abd: Soft, nondistended, suprapubic tenderness  Ext: no c/c/e, 2+ pulses throughout    A/P: 89y  Male p/w   [X] Blood cultures drawn  [X] Initial Lactate drawn  [X] Repeat Lactate ordered  [X] Antibiotics given  [X] Fluid resuscitation, 30cc/kg. Total amount of fluid given 3500 cc  [X] Attending Notification

## 2018-10-17 NOTE — ED PROVIDER NOTE - PROGRESS NOTE DETAILS
patient now more awake, talking discussed case with [facility] ICU PA, will accept to ICU wants peripheral levophed, Dr. Santiago will admit

## 2018-10-17 NOTE — CONSULT NOTE ADULT - PROBLEM SELECTOR RECOMMENDATION 9
gerd precautions  in and out  ppi once a day  may need egd  advance diet
Emergent c/stent L. O/B/R/Q discussed w/ daughter and son. Consent obtained

## 2018-10-17 NOTE — PROGRESS NOTE ADULT - ATTENDING COMMENTS
Pt seen and examined with surgical team. Clinically improved. Awaiting HIDA scan to determine if acute re is present

## 2018-10-17 NOTE — PROCEDURE NOTE - NSPOSTPRCRAD_GEN_A_CORE
post-procedure radiography performed central line located in the superior vena cava/post-procedure radiography performed

## 2018-10-17 NOTE — ED ADULT NURSE NOTE - NSIMPLEMENTINTERV_GEN_ALL_ED
Implemented All Fall with Harm Risk Interventions:  [place] to call system. Call bell, personal items and telephone within reach. Instruct patient to call for assistance. Room bathroom lighting operational. Non-slip footwear when patient is off stretcher. Physically safe environment: no spills, clutter or unnecessary equipment. Stretcher in lowest position, wheels locked, appropriate side rails in place. Provide visual cue, wrist band, yellow gown, etc. Monitor gait and stability. Monitor for mental status changes and reorient to person, place, and time. Review medications for side effects contributing to fall risk. Reinforce activity limits and safety measures with patient and family. Provide visual clues: red socks.

## 2018-10-17 NOTE — ED ADULT NURSE NOTE - OBJECTIVE STATEMENT
pt BIBA from home.  as per EMS, pt family reports unk tmax and gave PO tylenol at home.  pt vomited tylenol on ambulance, coffee ground emesis noted.  +rigors

## 2018-10-17 NOTE — PROGRESS NOTE ADULT - SUBJECTIVE AND OBJECTIVE BOX
IDRIS DANIEL  MRN-[id number] 89y    GENERAL SURGERY/ DR. CRUM     88 YO MALE WITH PAST MEDICAL HISTORY OF CAD S/P 2 CORONARY STENT IN 2014, BPH, UTI PRESENTED TO ER C/O RIGORS SINCE TUESDAY NOW IN ICU ON PRESSORS AWAITING EMERGENCY CYSTO, LEFT  STENT FOR LEFT WITH DR. HOFF FOR  LEFT HYDROURETERONEPHROSIS, SURGICAL CONSULT CALLED FOR  CT FINDINGS OF ACUTE CHOLECYSTITIS , A RUQ ULTRASOUND WAS DONE, OFFICIAL REPORT IS PENDING.     PATIENTS DAUGHTER AT BEDSIDE, NO C/O N/V, ABDOMINAL PAIN, JAUNDICE, H/O PUD, ETOH USE.      PIn the ED, patient's vitals were:  T(C): 38.7 (17 Oct 2018 12:26), Max: 41.1 (17 Oct 2018 11:39)  T(F): 101.7 (17 Oct 2018 12:26), Max: 106 (17 Oct 2018 11:39)  HR: 85 (17 Oct 2018 12:49) (84 - 112)  BP: 84/36 (17 Oct 2018 12:49) (71/40 - 124/65)  RR: 22 (17 Oct 2018 12:49) (22 - 32)  SpO2: 97% (17 Oct 2018 12:49) (89% - 97%)    Band neutrophils 13.0, INR 1.36, Creatinine 1.4, Calcium 8.4, Protein 5.3, Bilirubin 1.7, GFR 44, Lipase 72, Lactate 3.9.  Positive UA, positive RVP.  CXR: Low lung volumes. No change heart mediastinum. Generalized nonspecific ground-glass parenchymal process. In setting of fever inflammation/infection is considered. Element of congestion is also possible. No sapna lobar consolidation or pleural effusion.    In the ED, patient received 500 mg IV azithromycin, 1 dose IV zosyn, 1 gm IV vancomycin, 3.5 L NS, tylenol. Admitted to ICU for sepsis secondary to UTI.    MEDICATIONS    doxycycline IVPB      norepinephrine Infusion 0.05 MICROgram(s)/kG/Min (6.591 mL/Hr) IV Continuous <Continuous>  piperacillin/tazobactam IVPB. 3.375 Gram(s) IV Intermittent every 8 hours    ICU Vital Signs Last 24 Hrs  T(C): 37.6 (17 Oct 2018 17:12), Max: 41.1 (17 Oct 2018 11:39)  T(F): 99.6 (17 Oct 2018 17:12), Max: 106 (17 Oct 2018 11:39)  HR: 80 (17 Oct 2018 17:12) (79 - 112)  BP: 112/58 (17 Oct 2018 17:12) (60/36 - 124/65)  BP(mean): 79 (17 Oct 2018 16:45) (67 - 80)  ABP: --  ABP(mean): --  RR: 17 (17 Oct 2018 17:12) (17 - 32)  SpO2: 99% (17 Oct 2018 17:12) (89% - 99%)      10-17-18 @ 07:01  -  10-17-18 @ 17:29  --------------------------------------------------------  IN: 146.2 mL / OUT: 150 mL / NET: -3.8 mL    SCLERA: ANICTERIC  SKIN: NOT JAUNDICE    ABDOMEN : DISTENDED, + BS, REDUCIBLE UMBILICAL HERNIA, NO MASS/ ORGANOMEGALY    NON TENDER TO PALPATION, NO REBOUND/ RIGIDITY/ GUARDING                                       13.3   7.24  )-----------( 129      ( 17 Oct 2018 11:50 )             40.2      10-17    141  |  109<H>  |  16  ----------------------------<  86  3.8   |  23  |  1.40<H>    Ca    8.4<L>      17 Oct 2018 12:42    TPro  5.3<L>  /  Alb  2.0<L>  /  TBili  1.7<H>  /  DBili  x   /  AST  27  /  ALT  11<L>  /  AlkPhos  66  10-17    Lactate, Blood (10.17.18 @ 14:25)    Lactate, Blood: 2.4 mmol/L    Lactate, Blood (10.17.18 @ 11:50)    Lactate, Blood: 3.9 mmol/L    Amylase, Serum Total (10.17.18 @ 12:42)    Amylase, Serum Total: 48 U/L    Lipase, Serum (10.17.18 @ 12:42)    Lipase, Serum: 72 U/L    Urine Microscopic-Add On (NC) (10.17.18 @ 11:50)    Red Blood Cell - Urine: 3-5 /HPF    White Blood Cell - Urine: 11-25    Bacteria: TNTC    Comment - Urine: a few amorphous sediment    Epithelial Cells: Occasional    Rapid Respiratory Viral Panel (10.17.18 @ 11:50)    Entero/Rhinovirus (RapRVP): Detected    CT Chest No Cont (10.17.18 @ 15:41)   EXAM:  CT CHEST                          PROCEDURE DATE:  10/17/2018      INTERPRETATION:  History: Fever    CT chest abdomen and pelvis no oral or IV contrast.    Limited by lack of any contrast and beam hardening artifact.  Extensive airspace infiltrate in the right upper right lower lobe   consistent with pneumonia in appropriate clinical setting. Mild dependent   atelectatic changes left base. Significant pleural effusion.  Central airways patent. No mediastinal adenopathy. Lack of IVcontrast   limits hilar evaluation. Atherosclerotic nonaneurysmal thoracic aorta.   Hiatal hernia. Normal heart size with coronary calcification. Gallbladder   distended with calcified stone. Small pericholecystic fluid and   pericholecystic stranding concerning for acute cholecystitis. Correlate   with right upper quadrant ultrasound. No gross biliary dilatation.   Unenhanced liver spleen not remarkable. Peripancreatic edema suggestive   pancreatitis. Correlate with amylase, lipase levels  No adrenal nodules. There is a 1.1 cm nonobstructive calculus left   kidney. There is mild obstructive left hydroureteronephrosis secondary to   a 6 mm mid left ureteral calculus. Bilateral perirenal stranding is   nonspecific but can be seen in the setting of UTI. Please correlate.  Prostate not significantly enlarged, demonstrates multiple calcareous   deposits. Mild bladder wall thickening and associated stranding   suggestive of cystitis. Correlate with urinalysis. Multiple bladder   calculi largest measuring up to 1.1 cm. Small fat-containing umbilical   hernia.  No inflamed or obstructed bowel. No extraluminal fluid or gas.  There is a right inguinal hernia containing fat and nonobstructive large   bowel.    Impression:    Limited by lack of contrast and artifact.  Extensive right lung pneumonia.  Cholelithiasis. Possible acute cholecystitis. Correlate with right upper   quadrant ultrasound and/or HIDA scan.  Findings suggestive of pancreatitis. Correlate with amylase, lipase levels  Mild left hydronephrosis secondary to a mid left ureteral calculus.   Additional nonobstructive left nephrolithiasis.  Bladder calculi. Bladder wall thickening suggestive of cystitis in the   appropriate setting.  Additional findings as discussed    CAROLYNN MARCELL M.D., ATTENDING RADIOLOGIST    Xray Chest 1 View AP/PA (10.17.18 @ 12:10)   EXAM:  XR CHEST AP OR PA 1V                         PROCEDURE DATE:  10/17/2018      INTERPRETATION:  History: Fever.     AP chest. Prior 7/31/2017.  Low lung volumes. No change heart mediastinum. Generalized nonspecific   groundglass parenchymal process. In setting of fever   inflammation/infection is considered. Element of congestion is also   possible. No sapna lobar consolidation or pleural effusion.    Impression: Nonspecific bilateral groundglass process.    CAROLYNN FAITH M.D., ATTENDING RADIOLOGIST                              ASSESSMENT &  PLAN:      88 YO MALE IN SEPTIC SHOCK ON PRESSORS   RIGHT PNEUMONIA, + ENTERO/ RHINOVIRUS, LEFT HYDROURETERONEPHROSIS, R/O UTI, CHOLECYSTITIS WITH CHOLELITHIASIS, ? PANCREATITIS    AWAITING CYSTO, LEFT STENT AT 7:30 PM TODAY WITH UROLOGY FOR LEFT HYDROURETERONEPHROSIS  F/U OFFICIAL REPORT ABDOMINAL ULTRASOUND  REPEAT AND F/U LFTs , AMYLASE, LIPASE IN AM  CONTINUE ANTIBIOTICS   NPO   GI CONSULT DR. COP[facility] CALLED   ID CONSULT NOTED , ID  FOLLOW UP    REEVALUATE IN AM FOR POSSIBLE PERCUTANEOUS CHOLECYSTOSTOMY BY IR     ABOVE DISCUSSED WITH DR. CRUM AND  ICU DR. WEISS

## 2018-10-17 NOTE — H&P ADULT - NSHPPHYSICALEXAM_GEN_ALL_CORE
Physical Exam:  General: weak, lethargic, well-nourished, NAD  HEENT: normocephalic, atraumatic, PERRLA, EOMI, dry mucous membranes   Neck: supple, non-tender, no masses  Neurology: CN II-XII grossly intact, sensation intact  Respiratory: clear to auscultation bilaterally; no wheezes  CV: regular rate and rhythm, soft S1/S2  Abdominal: soft, suprapubic tenderness present, non-distended, bowel sounds present in all 4 quadrants  Extremities: no clubbing, cyanosis, or edema; palpable peripheral pulses  Skin: warm, dry, normal color, no rash or abnormal lesions Physical Exam:  General: weak, lethargic, well-nourished, NAD  HEENT: normocephalic, atraumatic, PERRLA, EOMI,  +dry mucous membranes   Neck: supple, non-tender, no masses  Neurology:  aao/3 CN II-XII grossly intact, sensation intact  Respiratory: clear to auscultation bilaterally; no wheezes  CV: regular rate and rhythm, soft S1/S2  Abdominal: soft, suprapubic tenderness present, non-distended, bowel sounds present in all 4 quadrants  Extremities: no clubbing, cyanosis, or edema; palpable peripheral pulses  Skin: warm, dry, normal color, no rash or abnormal lesions Physical Exam:  General: weak, lethargic, well-nourished, NAD  HEENT: normocephalic, atraumatic, PERRLA, EOMI,  +dry mucous membranes   Neck: supple, non-tender, no masses  Neurology:  aao/3 CN II-XII grossly intact, sensation intact  Respiratory:  auscultation bilaterally/ rt low crackles / lt no rale  or no wheezes  CV: regular rate and rhythm, soft S1/S2  Abdominal: soft, suprapubic tenderness present, non-distended, bowel sounds present in all 4 quadrants  Extremities: no clubbing, cyanosis, or edema; palpable peripheral pulses  Skin: warm, dry, normal color, no rash or abnormal lesions

## 2018-10-17 NOTE — H&P ADULT - HISTORY OF PRESENT ILLNESS
Patient is an 89 year old male with a PMH of CAD s/p two cardiac stents in , BPH with prostate surgery last year, history of UTI who presented to the ED with complaints of weakness, fever, rigors, and dark, foul-smelling urine for one day. Patient's 2 adult sons are present at bedside who stated that patient's wife  3 weeks ago on home hospice care secondary to end-stage COPD and URTI. One of his son's has been living with him since January. Patient's baseline is ambulatory without assistance and he is competent in ADLs. Patient got his flu shot in September.    In the ED, patient's vitals were:  T(C): 38.7 (17 Oct 2018 12:26), Max: 41.1 (17 Oct 2018 11:39)  T(F): 101.7 (17 Oct 2018 12:26), Max: 106 (17 Oct 2018 11:39)  HR: 85 (17 Oct 2018 12:49) (84 - 112)  BP: 84/36 (17 Oct 2018 12:49) (71/40 - 124/65)  RR: 22 (17 Oct 2018 12:49) (22 - 32)  SpO2: 97% (17 Oct 2018 12:49) (89% - 97%)    Band neutrophils 13.0, INR 1.36, Creatinine 1.4, Calcium 8.4, Protein 5.3, Bilirubin 1.7, GFR 44, Lipase 72, Lactate 3.9.  Positive UA, positive RVP.  CXR: Low lung volumes. No change heart mediastinum. Generalized nonspecific ground-glass parenchymal process. In setting of fever inflammation/infection is considered. Element of congestion is also possible. No sapna lobar consolidation or pleural effusion.    In the ED, patient received 500 mg IV azithromycin, 1 dose IV zosyn, 1 gm IV vancomycin, 3.5 L NS, tylenol. Admitted to ICU for sepsis secondary to UTI.

## 2018-10-17 NOTE — BRIEF OPERATIVE NOTE - PROCEDURE
<<-----Click on this checkbox to enter Procedure Cystoscopy with insertion of ureteral stent in adult  10/17/2018  Left  Active  [id number]

## 2018-10-17 NOTE — CONSULT NOTE ADULT - REASON FOR ADMISSION
Septic shock
sepsis secondary to UTI

## 2018-10-17 NOTE — ED ADULT NURSE REASSESSMENT NOTE - NS ED NURSE REASSESS COMMENT FT1
Pt evaluated at bedside by Dr Santiago, Nicola Josue, Toña all came to see pt at bedside.  IVF maintained, levo increased to 0.5mcg/kg/min BP 87/45pt mentation is improved, pt awake and talking.  answering questions, able to follow small commands.  verbal report given to ICU RN Julia.  awaiting transport to CT scan and ICU.

## 2018-10-17 NOTE — PROCEDURE NOTE - NSPROCDETAILS_GEN_ALL_CORE
guidewire recovered/lumen(s) aspirated and flushed/sterile technique, catheter placed/sterile dressing applied/ultrasound guidance

## 2018-10-18 DIAGNOSIS — N13.9 OBSTRUCTIVE AND REFLUX UROPATHY, UNSPECIFIED: ICD-10-CM

## 2018-10-18 DIAGNOSIS — A41.51 SEPSIS DUE TO ESCHERICHIA COLI [E. COLI]: ICD-10-CM

## 2018-10-18 DIAGNOSIS — K80.20 CALCULUS OF GALLBLADDER WITHOUT CHOLECYSTITIS WITHOUT OBSTRUCTION: ICD-10-CM

## 2018-10-18 LAB
ALBUMIN SERPL ELPH-MCNC: 2.1 G/DL — LOW (ref 3.3–5)
ALP SERPL-CCNC: 65 U/L — SIGNIFICANT CHANGE UP (ref 40–120)
ALT FLD-CCNC: 15 U/L — SIGNIFICANT CHANGE UP (ref 12–78)
ANION GAP SERPL CALC-SCNC: 6 MMOL/L — SIGNIFICANT CHANGE UP (ref 5–17)
APTT BLD: 24.4 SEC — LOW (ref 27.5–37.4)
AST SERPL-CCNC: 30 U/L — SIGNIFICANT CHANGE UP (ref 15–37)
BASOPHILS # BLD AUTO: 0 K/UL — SIGNIFICANT CHANGE UP (ref 0–0.2)
BASOPHILS NFR BLD AUTO: 0 % — SIGNIFICANT CHANGE UP (ref 0–2)
BILIRUB SERPL-MCNC: 1.7 MG/DL — HIGH (ref 0.2–1.2)
BUN SERPL-MCNC: 14 MG/DL — SIGNIFICANT CHANGE UP (ref 7–23)
CALCIUM SERPL-MCNC: 8.8 MG/DL — SIGNIFICANT CHANGE UP (ref 8.5–10.1)
CHLORIDE SERPL-SCNC: 114 MMOL/L — HIGH (ref 96–108)
CK SERPL-CCNC: 153 U/L — SIGNIFICANT CHANGE UP (ref 26–308)
CO2 SERPL-SCNC: 25 MMOL/L — SIGNIFICANT CHANGE UP (ref 22–31)
CREAT SERPL-MCNC: 1.1 MG/DL — SIGNIFICANT CHANGE UP (ref 0.5–1.3)
E COLI DNA BLD POS QL NAA+NON-PROBE: SIGNIFICANT CHANGE UP
EOSINOPHIL # BLD AUTO: 0 K/UL — SIGNIFICANT CHANGE UP (ref 0–0.5)
EOSINOPHIL NFR BLD AUTO: 0 % — SIGNIFICANT CHANGE UP (ref 0–6)
GLUCOSE SERPL-MCNC: 140 MG/DL — HIGH (ref 70–99)
GRAM STN FLD: SIGNIFICANT CHANGE UP
GRAM STN FLD: SIGNIFICANT CHANGE UP
HBA1C BLD-MCNC: 5.9 % — HIGH (ref 4–5.6)
HCT VFR BLD CALC: 35.4 % — LOW (ref 39–50)
HGB BLD-MCNC: 11.8 G/DL — LOW (ref 13–17)
INR BLD: 1.57 RATIO — HIGH (ref 0.88–1.16)
LACTATE SERPL-SCNC: 1.1 MMOL/L — SIGNIFICANT CHANGE UP (ref 0.7–2)
LACTATE SERPL-SCNC: 2.1 MMOL/L — HIGH (ref 0.7–2)
LEGIONELLA AG UR QL: NEGATIVE — SIGNIFICANT CHANGE UP
LYMPHOCYTES # BLD AUTO: 1.32 K/UL — SIGNIFICANT CHANGE UP (ref 1–3.3)
LYMPHOCYTES # BLD AUTO: 6 % — LOW (ref 13–44)
MAGNESIUM SERPL-MCNC: 1.9 MG/DL — SIGNIFICANT CHANGE UP (ref 1.6–2.6)
MCHC RBC-ENTMCNC: 30.8 PG — SIGNIFICANT CHANGE UP (ref 27–34)
MCHC RBC-ENTMCNC: 33.3 GM/DL — SIGNIFICANT CHANGE UP (ref 32–36)
MCV RBC AUTO: 92.4 FL — SIGNIFICANT CHANGE UP (ref 80–100)
METHOD TYPE: SIGNIFICANT CHANGE UP
MONOCYTES # BLD AUTO: 0.66 K/UL — SIGNIFICANT CHANGE UP (ref 0–0.9)
MONOCYTES NFR BLD AUTO: 3 % — SIGNIFICANT CHANGE UP (ref 2–14)
NEUTROPHILS # BLD AUTO: 19.73 K/UL — HIGH (ref 1.8–7.4)
NEUTROPHILS NFR BLD AUTO: 80 % — HIGH (ref 43–77)
PHOSPHATE SERPL-MCNC: 2.3 MG/DL — LOW (ref 2.5–4.5)
PLATELET # BLD AUTO: 124 K/UL — LOW (ref 150–400)
POTASSIUM SERPL-MCNC: 3.7 MMOL/L — SIGNIFICANT CHANGE UP (ref 3.5–5.3)
POTASSIUM SERPL-SCNC: 3.7 MMOL/L — SIGNIFICANT CHANGE UP (ref 3.5–5.3)
PROT SERPL-MCNC: 5.6 G/DL — LOW (ref 6–8.3)
PROTHROM AB SERPL-ACNC: 17.3 SEC — HIGH (ref 9.8–12.7)
RBC # BLD: 3.83 M/UL — LOW (ref 4.2–5.8)
RBC # FLD: 13.9 % — SIGNIFICANT CHANGE UP (ref 10.3–14.5)
SODIUM SERPL-SCNC: 145 MMOL/L — SIGNIFICANT CHANGE UP (ref 135–145)
SPECIMEN SOURCE: SIGNIFICANT CHANGE UP
SPECIMEN SOURCE: SIGNIFICANT CHANGE UP
WBC # BLD: 21.92 K/UL — HIGH (ref 3.8–10.5)
WBC # FLD AUTO: 21.92 K/UL — HIGH (ref 3.8–10.5)

## 2018-10-18 PROCEDURE — 99233 SBSQ HOSP IP/OBS HIGH 50: CPT

## 2018-10-18 PROCEDURE — 78226 HEPATOBILIARY SYSTEM IMAGING: CPT | Mod: 26

## 2018-10-18 PROCEDURE — 93306 TTE W/DOPPLER COMPLETE: CPT | Mod: 26

## 2018-10-18 PROCEDURE — 99291 CRITICAL CARE FIRST HOUR: CPT

## 2018-10-18 RX ORDER — ENOXAPARIN SODIUM 100 MG/ML
40 INJECTION SUBCUTANEOUS DAILY
Qty: 0 | Refills: 0 | Status: DISCONTINUED | OUTPATIENT
Start: 2018-10-18 | End: 2018-10-24

## 2018-10-18 RX ORDER — PANTOPRAZOLE SODIUM 20 MG/1
40 TABLET, DELAYED RELEASE ORAL
Qty: 0 | Refills: 0 | Status: DISCONTINUED | OUTPATIENT
Start: 2018-10-18 | End: 2018-10-24

## 2018-10-18 RX ORDER — SIMETHICONE 80 MG/1
80 TABLET, CHEWABLE ORAL EVERY 6 HOURS
Qty: 0 | Refills: 0 | Status: DISCONTINUED | OUTPATIENT
Start: 2018-10-18 | End: 2018-10-24

## 2018-10-18 RX ORDER — SODIUM CHLORIDE 9 MG/ML
1000 INJECTION, SOLUTION INTRAVENOUS
Qty: 0 | Refills: 0 | Status: DISCONTINUED | OUTPATIENT
Start: 2018-10-18 | End: 2018-10-19

## 2018-10-18 RX ORDER — GLYCERIN ADULT
1 SUPPOSITORY, RECTAL RECTAL ONCE
Qty: 0 | Refills: 0 | Status: COMPLETED | OUTPATIENT
Start: 2018-10-18 | End: 2018-10-18

## 2018-10-18 RX ORDER — ATORVASTATIN CALCIUM 80 MG/1
20 TABLET, FILM COATED ORAL AT BEDTIME
Qty: 0 | Refills: 0 | Status: DISCONTINUED | OUTPATIENT
Start: 2018-10-18 | End: 2018-10-24

## 2018-10-18 RX ORDER — POTASSIUM PHOSPHATE, MONOBASIC POTASSIUM PHOSPHATE, DIBASIC 236; 224 MG/ML; MG/ML
15 INJECTION, SOLUTION INTRAVENOUS ONCE
Qty: 0 | Refills: 0 | Status: COMPLETED | OUTPATIENT
Start: 2018-10-18 | End: 2018-10-18

## 2018-10-18 RX ORDER — LACTULOSE 10 G/15ML
10 SOLUTION ORAL EVERY 6 HOURS
Qty: 0 | Refills: 0 | Status: DISCONTINUED | OUTPATIENT
Start: 2018-10-18 | End: 2018-10-19

## 2018-10-18 RX ADMIN — ATORVASTATIN CALCIUM 20 MILLIGRAM(S): 80 TABLET, FILM COATED ORAL at 21:40

## 2018-10-18 RX ADMIN — PIPERACILLIN AND TAZOBACTAM 25 GRAM(S): 4; .5 INJECTION, POWDER, LYOPHILIZED, FOR SOLUTION INTRAVENOUS at 13:32

## 2018-10-18 RX ADMIN — PIPERACILLIN AND TAZOBACTAM 25 GRAM(S): 4; .5 INJECTION, POWDER, LYOPHILIZED, FOR SOLUTION INTRAVENOUS at 21:39

## 2018-10-18 RX ADMIN — ENOXAPARIN SODIUM 40 MILLIGRAM(S): 100 INJECTION SUBCUTANEOUS at 18:31

## 2018-10-18 RX ADMIN — ESCITALOPRAM OXALATE 10 MILLIGRAM(S): 10 TABLET, FILM COATED ORAL at 13:32

## 2018-10-18 RX ADMIN — SODIUM CHLORIDE 75 MILLILITER(S): 9 INJECTION, SOLUTION INTRAVENOUS at 05:27

## 2018-10-18 RX ADMIN — PIPERACILLIN AND TAZOBACTAM 25 GRAM(S): 4; .5 INJECTION, POWDER, LYOPHILIZED, FOR SOLUTION INTRAVENOUS at 05:13

## 2018-10-18 RX ADMIN — Medication 30 MILLILITER(S): at 18:30

## 2018-10-18 RX ADMIN — Medication 1 SUPPOSITORY(S): at 13:32

## 2018-10-18 RX ADMIN — PANTOPRAZOLE SODIUM 40 MILLIGRAM(S): 20 TABLET, DELAYED RELEASE ORAL at 13:32

## 2018-10-18 RX ADMIN — SIMETHICONE 80 MILLIGRAM(S): 80 TABLET, CHEWABLE ORAL at 23:32

## 2018-10-18 RX ADMIN — Medication 1: at 18:30

## 2018-10-18 RX ADMIN — POTASSIUM PHOSPHATE, MONOBASIC POTASSIUM PHOSPHATE, DIBASIC 62.5 MILLIMOLE(S): 236; 224 INJECTION, SOLUTION INTRAVENOUS at 05:36

## 2018-10-18 RX ADMIN — SODIUM CHLORIDE 75 MILLILITER(S): 9 INJECTION, SOLUTION INTRAVENOUS at 21:40

## 2018-10-18 RX ADMIN — Medication 30 MILLILITER(S): at 23:31

## 2018-10-18 RX ADMIN — LACTULOSE 10 GRAM(S): 10 SOLUTION ORAL at 23:31

## 2018-10-18 RX ADMIN — LACTULOSE 10 GRAM(S): 10 SOLUTION ORAL at 18:30

## 2018-10-18 RX ADMIN — TAMSULOSIN HYDROCHLORIDE 0.4 MILLIGRAM(S): 0.4 CAPSULE ORAL at 21:40

## 2018-10-18 RX ADMIN — Medication 110 MILLIGRAM(S): at 05:36

## 2018-10-18 RX ADMIN — SIMETHICONE 80 MILLIGRAM(S): 80 TABLET, CHEWABLE ORAL at 18:30

## 2018-10-18 RX ADMIN — FINASTERIDE 5 MILLIGRAM(S): 5 TABLET, FILM COATED ORAL at 13:32

## 2018-10-18 RX ADMIN — Medication 1: at 00:06

## 2018-10-18 NOTE — PROGRESS NOTE ADULT - ASSESSMENT
Assessment  Pt is an 89yr old man with PMHx as above, now in the ICU for management of septic shock secondary to left obstructive hydro with associated UTI s/p cystoscopy with left ureter stent, now off pressors, right side pneumonia, +RVP +/- acute cholecystitis +/- pancreatitis.     Plan-    Neuro: continue lexapro  Cardio: levophed ggt   Pulm: stable, ORA  ID: on Zosyn, bcx w/ gram neg rods, E Coli by PCR   GI:  Renal/lytes:  Endo:  Heme/Onc:  Skin:  Dispo:    Lines:   Nj:   Diet/GI prophylaxis:   Anticoagulation:   PT/OT: Assessment  Pt is an 89yr old man PMHx CAD s/p two cardiac stents in 2014, BPH,  now in the ICU for management of septic shock secondary to left obstructive hydro with associated UTI s/p cystoscopy with left ureter stent, now off pressors, right side pneumonia, +RVP, cholelithiasis and pericholecystic fluid on CT with some peripancreatic edema (lipase trending down), HIDA scan negative for cholecystitis.     Plan-    Neuro: continue lexapro  Cardio: levophed ggt (now on 0.2), ASA 81  Pulm: stable, now on nasal canula 2L at 97%  ID: on Zosyn, bcx w/ gram neg rods, E Coli by PCR w/ sensitivities pending, RVP+, HIDA negative for cholecystitis, peripancreatic edema on CT lipase trending down, no abdominal pain   GI: protonix 40 mg, simethicone and maalox q6h, lactulose q6h, HIDA negative for cholecystitis,   Renal/lytes: Continue home finasteride and tamsulosin , replete lytes prn   Endo: HbA1C 5.9 on insulin sliding scale   Heme/Onc: WBC trending down to 21 from 26, H/H stable   Dispo: continue ICU management Assessment  Pt is an 89yr old man PMHx CAD s/p two cardiac stents in 2014, BPH,  p/w F 106, rigors and chills with foul smelling urine, now in the ICU for management of septic shock secondary to left obstructive hydro with associated UTI s/p cystoscopy with left ureter stent, now off pressors, right side pneumonia, +RVP, cholelithiasis and pericholecystic fluid on CT with some peripancreatic edema (lipase trending down), HIDA scan negative for cholecystitis., now on Zosyn (10/17-), afebrile on low dose levophed ggt    Plan-    Neuro: continue lexapro  Cardio: levophed ggt (now on 0.2), ASA 81  Pulm: stable, now on nasal canula 2L at 97%  ID: on Zosyn, bcx w/ gram neg rods, E Coli by PCR w/ sensitivities pending, RVP+, HIDA negative for cholecystitis, peripancreatic edema on CT lipase trending down, no abdominal pain, f/u blood and urine cx, urine strep ag, and yann mountain spotted fever   GI: protonix 40 mg, simethicone and maalox q6h, lactulose q6h, HIDA negative for cholecystitis,   Renal/lytes: Continue home finasteride and tamsulosin , replete lytes prn   Endo: HbA1C 5.9 on insulin sliding scale   Heme/Onc: WBC trending down to 21 from 26, H/H stable   Dispo: continue ICU management

## 2018-10-18 NOTE — PROGRESS NOTE ADULT - SUBJECTIVE AND OBJECTIVE BOX
IDRIS DANIEL  [id number] 89y    GENERAL SURGERY/ DR. CRUM    SON AT BEDSIDE  ALERT AND AWAKE , NO COMPLAINTS  DENIES ANY N/V, ABDOMINAL PAIN  NO BM/ FLATUS    MEDICATIONS  (STANDING):  aspirin  chewable 81 milliGRAM(s) Oral daily  dextrose 5%. 1000 milliLiter(s) (50 mL/Hr) IV Continuous <Continuous>  dextrose 50% Injectable 12.5 Gram(s) IV Push once  dextrose 50% Injectable 25 Gram(s) IV Push once  dextrose 50% Injectable 25 Gram(s) IV Push once  doxycycline IVPB 100 milliGRAM(s) IV Intermittent every 12 hours  escitalopram 10 milliGRAM(s) Oral daily  finasteride 5 milliGRAM(s) Oral daily  insulin lispro (HumaLOG) corrective regimen sliding scale   SubCutaneous every 6 hours  lactated ringers. 1000 milliLiter(s) (75 mL/Hr) IV Continuous <Continuous>  norepinephrine Infusion 0.05 MICROgram(s)/kG/Min (3.656 mL/Hr) IV Continuous <Continuous>  piperacillin/tazobactam IVPB. 3.375 Gram(s) IV Intermittent every 8 hours  tamsulosin 0.4 milliGRAM(s) Oral at bedtime    MEDICATIONS  (PRN):  dextrose 40% Gel 15 Gram(s) Oral once PRN Blood Glucose LESS THAN 70 milliGRAM(s)/deciliter  glucagon  Injectable 1 milliGRAM(s) IntraMuscular once PRN Glucose LESS THAN 70 milligrams/deciliter      ICU Vital Signs Last 24 Hrs  T(C): 37.2 (18 Oct 2018 05:22), Max: 41.1 (17 Oct 2018 11:39)  T(F): 98.9 (18 Oct 2018 05:22), Max: 106 (17 Oct 2018 11:39)  HR: 73 (18 Oct 2018 05:30) (60 - 112)  BP: 108/58 (18 Oct 2018 05:30) (60/36 - 163/63)  BP(mean): 79 (18 Oct 2018 05:30) (7 - 103)  ABP: --  ABP(mean): --  RR: 12 (18 Oct 2018 05:30) (10 - 34)  SpO2: 98% (18 Oct 2018 05:30) (89% - 100%)    10-17-18 @ 07:01  -  10-18-18 @ 06:19  --------------------------------------------------------  IN: 2313.2 mL / OUT: 2530 mL / NET: -216.8 mL    REN CATH 2530 ML CLEAR URINE      SCLERA: ANICTERIC  SKIN: NOT JAUNDICE    ABDOMEN : SOME DISTENTION,  + BS, REDUCIBLE UMBILICAL HERNIA    MILD TENDERNESS ON DEEP PALPATION RUQ AND MIDEPIGASTRIC   NO REBOUND/ GUARDING,  NEGATIVE MOREAU'S SIGN                         11.8   21.92 )-----------( 124      ( 18 Oct 2018 04:54 )             35.4     Complete Blood Count (10.17.18 @ 20:40)    Nucleated RBC: 0 /100 WBCs    WBC Count: 26.66: Specimen Integrity Verified. K/uL    RBC Count: 4.09 M/uL    Hemoglobin: 12.5 g/dL    Hematocrit: 37.9 %    Mean Cell Volume: 92.7 fl    Mean Cell Hemoglobin: 30.6 pg    Mean Cell Hemoglobin Conc: 33.0 gm/dL    Red Cell Distrib Width: 13.7 %    Platelet Count - Automated: 142 K/uL     10-18    145  |  114<H>  |  14  ----------------------------<  140<H>  3.7   |  25  |  1.10    Ca    8.8      18 Oct 2018 04:54  Phos  2.3     10-18  Mg     1.9     10-18    TPro  5.6<L>  /  Alb  2.1<L>  /  TBili  1.7<H>  /  DBili  .40<H>  /  AST  30  /  ALT  15  /  AlkPhos  65  10-18    Lactate, Blood (10.18.18 @ 04:54)    Lactate, Blood: 1.1 mmol/L    US Gallbladder (10.17.18 @ 17:01) >  EXAM:  US GALLBLADDER                          PROCEDURE DATE:  10/17/2018      INTERPRETATION:  History: Evaluate gallbladder in ICU patient    Portable ultrasound of the gallbladder is performed and correlated with   pertinent images from the CAT scan of earlier in the day.    The gallbladder demonstrates a large stone measuring 2.4 x 1.1 x 1.8 cm.   There is gallbladder wall edema with thickening which measures up to 7   mm. The common duct is normal at 5 mm. There is a negative sonographic   Moreau's sign.    Impression: Cholelithiasis with single large gallstone. There is   gallbladder wall thickening. This may indicate acute or chronic   cholecystitis.    ABEL SHEPHERD M.D.,ATTENDING RADIOLOGIST                        ASSESSMENT &  PLAN:      89 YO MALE SEPTIC SHOCK ON LOW DOSE PRESSORS   RIGHT PNEUMONIA, + ENTERO/ RHINOVIRUS, LEFT HYDROURETERONEPHROSIS, CHOLECYSTITIS WITH CHOLELITHIASIS, ? PANCREATITIS    S/P  CYSTO, LEFT STENT   GALLBLADDER ULTRASOUND AND GI CONSULT NOTED   CONTINUE ANTIBIOTICS   NPO   ID AND GI  FOLLOW UP    HIDA SCAN ,  REEVALUATE  FOR POSSIBLE PERCUTANEOUS CHOLECYSTOSTOMY BY IR

## 2018-10-18 NOTE — PROGRESS NOTE ADULT - ASSESSMENT
89 year old male with a PMH of CAD s/p two cardiac stents in 2014, BPH with prostate surgery last year, history of UTI who presented to the ED with complaints of weakness, fever, rigors, and dark, foul-smelling urine for one day, with temp of 106F in ED. Admitted to ICU for management of sepsis 2/2 UTI. s/p Ureteral stent emergent procedure    - Patient admitted to ICU for management of urosepsis, requiring ureteral stent  - remains with pressor-dependent hypotension, though his pressor requirements are decreased  - cont to wean pressors as tolerated    - no acute ischemia  -Patient with hx of CAD, s/p stent x2    -cont asa  -statin unless contraindicated    -remains in sr  -monitor for af/vt     - No meaningful evidence of volume overload.  - await echo     - Monitor and replete lytes, keep K>4, Mg>2.  - Strict I/Os, daily weights.   - Other cardiovascular workup will depend on clinical course.  - All other workup per primary team.  - Will continue to follow.    The patient is at risk of abrupt decompensation.  35 minutes of critical care time was spent with this patient.

## 2018-10-18 NOTE — PROGRESS NOTE ADULT - PROBLEM SELECTOR PLAN 3
initially some concern for acute cholecystitis, but pt has no symptoms or physical exam findings of acute re and the HIDA scan this morning was normal.  outpt f/up

## 2018-10-18 NOTE — PROGRESS NOTE ADULT - PROBLEM SELECTOR PLAN 3
CT showed extensive right lung pneumonia, ? acute re, ?pancreatitis  US showed cholelithiasis with single large gallstone, wall thickening, ?acute or chronic cholecystitis  amylase/lipase not elevated  check hida scan  may need perc drainage  f/u surgery recs  trend lfts  f/u bld cxs  id following  pain management  monitor abd exam  icu care  will follow start bowel regimen  lactulose 10cc q 6 hours and glycerin suppository  gas pill with simethicone and maalox q 6 hours  in and out

## 2018-10-18 NOTE — PROGRESS NOTE ADULT - ATTENDING COMMENTS
Note written by attending, see above.  Time spent: 45min. More than 50% of the visit was spent counseling the patient and pt's daughter on his condition - septic shock, E coli bacteremia, obstructive uropathy.

## 2018-10-18 NOTE — PROGRESS NOTE ADULT - SUBJECTIVE AND OBJECTIVE BOX
Patient is a 89y old  Male who presents with a chief complaint of fever and foul smelling urine.       INTERVAL HPI/OVERNIGHT EVENTS: Pt had emergent left ureteral stent placement to decompress left hydronephrosis. Levophed has been almost tapered off. HIDA scan this morning negative for acute cholecystitis. Pt states he feels much better. Denies fever, dysuria, CP, SOB, palpitations, abd pain, cough.     MEDICATIONS  (STANDING):  aluminum hydroxide/magnesium hydroxide/simethicone Suspension 30 milliLiter(s) Oral every 6 hours  aspirin  chewable 81 milliGRAM(s) Oral daily  atorvastatin 20 milliGRAM(s) Oral at bedtime  dextrose 5%. 1000 milliLiter(s) (50 mL/Hr) IV Continuous <Continuous>  dextrose 50% Injectable 12.5 Gram(s) IV Push once  dextrose 50% Injectable 25 Gram(s) IV Push once  dextrose 50% Injectable 25 Gram(s) IV Push once  enoxaparin Injectable 40 milliGRAM(s) SubCutaneous daily  escitalopram 10 milliGRAM(s) Oral daily  finasteride 5 milliGRAM(s) Oral daily  insulin lispro (HumaLOG) corrective regimen sliding scale   SubCutaneous every 6 hours  lactated ringers. 1000 milliLiter(s) (75 mL/Hr) IV Continuous <Continuous>  lactulose Syrup 10 Gram(s) Oral every 6 hours  norepinephrine Infusion 0.05 MICROgram(s)/kG/Min (3.656 mL/Hr) IV Continuous <Continuous>  pantoprazole    Tablet 40 milliGRAM(s) Oral before breakfast  piperacillin/tazobactam IVPB. 3.375 Gram(s) IV Intermittent every 8 hours  simethicone 80 milliGRAM(s) Chew every 6 hours  tamsulosin 0.4 milliGRAM(s) Oral at bedtime    MEDICATIONS  (PRN):  dextrose 40% Gel 15 Gram(s) Oral once PRN Blood Glucose LESS THAN 70 milliGRAM(s)/deciliter  glucagon  Injectable 1 milliGRAM(s) IntraMuscular once PRN Glucose LESS THAN 70 milligrams/deciliter      Allergies    No Known Allergies    Intolerances        REVIEW OF SYSTEMS:  CONSTITUTIONAL: +generalized weakness; No fever or chills  HEENT:  No headache, no sore throat  RESPIRATORY: No cough, wheezing, or shortness of breath  CARDIOVASCULAR: No chest pain, palpitations, or leg swelling  GASTROINTESTINAL: No abd pain, nausea, vomiting, or diarrhea  GENITOURINARY: Urinary urgency improving; No dysuria, or hematuria  NEUROLOGICAL: no focal weakness or dizziness  MUSCULOSKELETAL: no myalgias     Vital Signs Last 24 Hrs  T(C): 36.8 (18 Oct 2018 19:59), Max: 37.4 (18 Oct 2018 13:57)  T(F): 98.2 (18 Oct 2018 19:59), Max: 99.3 (18 Oct 2018 13:57)  HR: 71 (18 Oct 2018 22:00) (66 - 106)  BP: 127/59 (18 Oct 2018 22:00) (83/49 - 141/64)  BP(mean): 85 (18 Oct 2018 22:00) (61 - 94)  RR: 24 (18 Oct 2018 22:00) (10 - 51)  SpO2: 100% (18 Oct 2018 22:00) (96% - 100%)    PHYSICAL EXAM:  GENERAL: NAD  HEENT:  EOMI, moist mucous membranes  CHEST/LUNG:  CTA b/l, no rales, wheezes, or rhonchi  HEART:  RRR, S1, S2  ABDOMEN:  BS+, soft, nontender, nondistended  EXTREMITIES: no edema, cyanosis, or calf tenderness  NERVOUS SYSTEM: AA&Ox3    LABS:                        11.8   21.92 )-----------( 124      ( 18 Oct 2018 04:54 )             35.4     CBC Full  -  ( 18 Oct 2018 04:54 )  WBC Count : 21.92 K/uL  Hemoglobin : 11.8 g/dL  Hematocrit : 35.4 %  Platelet Count - Automated : 124 K/uL  Mean Cell Volume : 92.4 fl  Mean Cell Hemoglobin : 30.8 pg  Mean Cell Hemoglobin Concentration : 33.3 gm/dL  Auto Neutrophil # : 19.73 K/uL  Auto Lymphocyte # : 1.32 K/uL  Auto Monocyte # : 0.66 K/uL  Auto Eosinophil # : 0.00 K/uL  Auto Basophil # : 0.00 K/uL  Auto Neutrophil % : 80.0 %  Auto Lymphocyte % : 6.0 %  Auto Monocyte % : 3.0 %  Auto Eosinophil % : 0.0 %  Auto Basophil % : 0.0 %    18 Oct 2018 04:54    145    |  114    |  14     ----------------------------<  140    3.7     |  25     |  1.10     Ca    8.8        18 Oct 2018 04:54  Phos  2.3       18 Oct 2018 04:54  Mg     1.9       18 Oct 2018 04:54    TPro  5.6    /  Alb  2.1    /  TBili  1.7    /  DBili  .40    /  AST  30     /  ALT  15     /  AlkPhos  65     18 Oct 2018 04:54    PT/INR - ( 18 Oct 2018 04:54 )   PT: 17.3 sec;   INR: 1.57 ratio         PTT - ( 18 Oct 2018 04:54 )  PTT:24.4 sec  Urinalysis Basic - ( 17 Oct 2018 11:50 )    Color: Yellow / Appearance: Slightly Turbid / S.010 / pH: x  Gluc: x / Ketone: Negative  / Bili: Negative / Urobili: Negative   Blood: x / Protein: 25 mg/dL / Nitrite: Negative   Leuk Esterase: Moderate / RBC: 3-5 /HPF / WBC 11-25   Sq Epi: x / Non Sq Epi: Occasional / Bacteria: TNTC      CAPILLARY BLOOD GLUCOSE      POCT Blood Glucose.: 151 mg/dL (18 Oct 2018 18:11)  POCT Blood Glucose.: 109 mg/dL (18 Oct 2018 11:35)  POCT Blood Glucose.: 128 mg/dL (18 Oct 2018 05:07)  POCT Blood Glucose.: 189 mg/dL (17 Oct 2018 23:45)        Culture - Urine (collected 10-17-18 @ 16:03)  Source: .Urine Clean Catch (Midstream)  Preliminary Report (10-18-18 @ 15:42):    >100,000 CFU/ml Escherichia coli    Culture - Blood (collected 10-17-18 @ 15:39)  Source: .Blood Blood  Gram Stain (10-18-18 @ 03:31):    Growth in aerobic and anaerobic bottles:    Gram Negative Rods  Preliminary Report (10-18-18 @ 19:05):    Growth in aerobic and anaerobic bottles:    Escherichia coli [facility] [place] [id number]    Culture - Blood (collected 10-17-18 @ 15:39)  Source: .Blood Blood  Gram Stain (10-18-18 @ 04:34):    Growth in aerobic and anaerobic bottles:    Gram Negative Rods  Preliminary Report (10-18-18 @ 19:11):    Growth in aerobic and anaerobic bottles: Escherichia coli    "Due to technical problems, Proteus sp. will Not be reported as part of    the BCID panel until further notice"    ***Blood Panel PCR results on this specimen are available    approximately 3 hours after the Gram stain result.***    Gram stain, PCR, and/or culture results may not always    correspond due to difference in methodologies.    ************************************************************    This PCR assay was performed using [employer].    The following targets are tested for: Enterococcus,    vancomycin resistant enterococci, Listeria monocytogenes,    coagulase negative staphylococci, S. aureus,    methicillin resistant S. aureus, Streptococcus agalactiae    (Group B), S. pneumoniae, S.pyogenes (Group A),    Acinetobacter baumannii, Enterobacter cloacae, E. coli,    Klebsiella oxytoca, K. pneumoniae, Proteus sp.,    Serratia marcescens, Haemophilus influenzae,    Neisseria meningitidis, Pseudomonas aeruginosa, Candida    albicans, C. glabrata, C krusei, C parapsilosis,    C. tropicalis and the KPC resistance gene.  Organism: Blood Culture PCR (10-18-18 @ 06:21)  Organism: Blood Culture PCR (10-18-18 @ 06:21)      -  Escherichia coli: Detec      Method Type: PCR        RADIOLOGY & ADDITIONAL TESTS:    Personally reviewed.     Consultant(s) Notes Reviewed:  [x] YES  [ ] NO Patient is a 89y old  Male who presents with a chief complaint of fever and foul smelling urine.       INTERVAL HPI/OVERNIGHT EVENTS: Pt had emergent left ureteral stent placement to decompress left hydronephrosis. Levophed has been almost tapered off. HIDA scan this morning negative for acute cholecystitis. Pt states he feels much better. Denies fever, dysuria, CP, SOB, palpitations, abd pain, cough.     MEDICATIONS  (STANDING):  aluminum hydroxide/magnesium hydroxide/simethicone Suspension 30 milliLiter(s) Oral every 6 hours  aspirin  chewable 81 milliGRAM(s) Oral daily  atorvastatin 20 milliGRAM(s) Oral at bedtime  dextrose 5%. 1000 milliLiter(s) (50 mL/Hr) IV Continuous <Continuous>  dextrose 50% Injectable 12.5 Gram(s) IV Push once  dextrose 50% Injectable 25 Gram(s) IV Push once  dextrose 50% Injectable 25 Gram(s) IV Push once  enoxaparin Injectable 40 milliGRAM(s) SubCutaneous daily  escitalopram 10 milliGRAM(s) Oral daily  finasteride 5 milliGRAM(s) Oral daily  insulin lispro (HumaLOG) corrective regimen sliding scale   SubCutaneous every 6 hours  lactated ringers. 1000 milliLiter(s) (75 mL/Hr) IV Continuous <Continuous>  lactulose Syrup 10 Gram(s) Oral every 6 hours  norepinephrine Infusion 0.05 MICROgram(s)/kG/Min (3.656 mL/Hr) IV Continuous <Continuous>  pantoprazole    Tablet 40 milliGRAM(s) Oral before breakfast  piperacillin/tazobactam IVPB. 3.375 Gram(s) IV Intermittent every 8 hours  simethicone 80 milliGRAM(s) Chew every 6 hours  tamsulosin 0.4 milliGRAM(s) Oral at bedtime    MEDICATIONS  (PRN):  dextrose 40% Gel 15 Gram(s) Oral once PRN Blood Glucose LESS THAN 70 milliGRAM(s)/deciliter  glucagon  Injectable 1 milliGRAM(s) IntraMuscular once PRN Glucose LESS THAN 70 milligrams/deciliter      Allergies    No Known Allergies    Intolerances        REVIEW OF SYSTEMS:  CONSTITUTIONAL: +generalized weakness; No fever or chills  HEENT:  No headache, no sore throat  RESPIRATORY: No cough, wheezing, or shortness of breath  CARDIOVASCULAR: No chest pain, palpitations, or leg swelling  GASTROINTESTINAL: No abd pain, nausea, vomiting, or diarrhea  GENITOURINARY: Urinary urgency improving; No dysuria, or hematuria  NEUROLOGICAL: no focal weakness or dizziness  MUSCULOSKELETAL: no myalgias     Vital Signs Last 24 Hrs  T(F): 97.8 (10-18-18 @ 08:29), Max: 106 (10-17-18 @ 11:39)  HR: 70 (10-18-18 @ 09:00) (60 - 112)  BP: 97/50 (10-18-18 @ 09:00) (60/36 - 163/63)  RR: 20 (10-18-18 @ 09:00) (10 - 34)  SpO2: 100% (10-18-18 @ 09:00) (89% - 100%)    PHYSICAL EXAM:  GENERAL: NAD, frail, covered in several blankets  HEENT:  poor dentition, anicteric, moist mucous membranes  CHEST/LUNG: somewhat diminished breath sounds on right base, exam limited as pt not taking deep breaths ; rest CTA  HEART:  RRR, S1, S2  ABDOMEN:  BS+, soft, nontender, nondistended  EXTREMITIES: no edema, cyanosis, or calf tenderness  NERVOUS SYSTEM: answering questions and following commands appropriately for the most part    LABS:                        11.8   21.92 )-----------( 124      ( 18 Oct 2018 04:54 )             35.4     CBC Full  -  ( 18 Oct 2018 04:54 )  WBC Count : 21.92 K/uL  Hemoglobin : 11.8 g/dL  Hematocrit : 35.4 %  Platelet Count - Automated : 124 K/uL  Mean Cell Volume : 92.4 fl  Mean Cell Hemoglobin : 30.8 pg  Mean Cell Hemoglobin Concentration : 33.3 gm/dL  Auto Neutrophil # : 19.73 K/uL  Auto Lymphocyte # : 1.32 K/uL  Auto Monocyte # : 0.66 K/uL  Auto Eosinophil # : 0.00 K/uL  Auto Basophil # : 0.00 K/uL  Auto Neutrophil % : 80.0 %  Auto Lymphocyte % : 6.0 %  Auto Monocyte % : 3.0 %  Auto Eosinophil % : 0.0 %  Auto Basophil % : 0.0 %    18 Oct 2018 04:54    145    |  114    |  14     ----------------------------<  140    3.7     |  25     |  1.10     Ca    8.8        18 Oct 2018 04:54  Phos  2.3       18 Oct 2018 04:54  Mg     1.9       18 Oct 2018 04:54    TPro  5.6    /  Alb  2.1    /  TBili  1.7    /  DBili  .40    /  AST  30     /  ALT  15     /  AlkPhos  65     18 Oct 2018 04:54    PT/INR - ( 18 Oct 2018 04:54 )   PT: 17.3 sec;   INR: 1.57 ratio         PTT - ( 18 Oct 2018 04:54 )  PTT:24.4 sec  Urinalysis Basic - ( 17 Oct 2018 11:50 )    Color: Yellow / Appearance: Slightly Turbid / S.010 / pH: x  Gluc: x / Ketone: Negative  / Bili: Negative / Urobili: Negative   Blood: x / Protein: 25 mg/dL / Nitrite: Negative   Leuk Esterase: Moderate / RBC: 3-5 /HPF / WBC 11-25   Sq Epi: x / Non Sq Epi: Occasional / Bacteria: TNTC      CAPILLARY BLOOD GLUCOSE      POCT Blood Glucose.: 109 mg/dL (18 Oct 2018 11:35)  POCT Blood Glucose.: 128 mg/dL (18 Oct 2018 05:07)  POCT Blood Glucose.: 189 mg/dL (17 Oct 2018 23:45)        Culture - Urine (collected 10-17-18 @ 16:03)  Source: .Urine Clean Catch (Midstream)  Preliminary Report (10-18-18 @ 15:42):    >100,000 CFU/ml Escherichia coli    Culture - Blood (collected 10-17-18 @ 15:39)  Source: .Blood Blood  Gram Stain (10-18-18 @ 03:31):    Growth in aerobic and anaerobic bottles:    Gram Negative Rods  Preliminary Report (10-18-18 @ 19:05):    Growth in aerobic and anaerobic bottles:    Escherichia coli [facility] [place] [id number]    Culture - Blood (collected 10-17-18 @ 15:39)  Source: .Blood Blood  Gram Stain (10-18-18 @ 04:34):    Growth in aerobic and anaerobic bottles:    Gram Negative Rods  Preliminary Report (10-18-18 @ 19:11):    Growth in aerobic and anaerobic bottles: Escherichia coli    "Due to technical problems, Proteus sp. will Not be reported as part of    the BCID panel until further notice"    ***Blood Panel PCR results on this specimen are available    approximately 3 hours after the Gram stain result.***    Gram stain, PCR, and/or culture results may not always    correspond due to difference in methodologies.    ************************************************************    This PCR assay was performed using [employer].    The following targets are tested for: Enterococcus,    vancomycin resistant enterococci, Listeria monocytogenes,    coagulase negative staphylococci, S. aureus,    methicillin resistant S. aureus, Streptococcus agalactiae    (Group B), S. pneumoniae, S.pyogenes (Group A),    Acinetobacter baumannii, Enterobacter cloacae, E. coli,    Klebsiella oxytoca, K. pneumoniae, Proteus sp.,    Serratia marcescens, Haemophilus influenzae,    Neisseria meningitidis, Pseudomonas aeruginosa, Candida    albicans, C. glabrata, C krusei, C parapsilosis,    C. tropicalis and the KPC resistance gene.  Organism: Blood Culture PCR (10-18-18 @ 06:21)  Organism: Blood Culture PCR (10-18-18 @ 06:21)      -  Escherichia coli: Detec      Method Type: PCR        RADIOLOGY & ADDITIONAL TESTS:  CT Chest/Abd/P: Limited by lack of contrast and artifact.  Extensive right lung pneumonia.  Cholelithiasis. Possible acute cholecystitis. Correlate with right upper   quadrant ultrasound and/or HIDA scan.  Findings suggestive of pancreatitis. Correlate with amylase, lipase levels  Mild left hydronephrosis secondary to a mid left ureteral calculus.   Additional nonobstructive left nephrolithiasis.  Bladder calculi. Bladder wall thickening suggestive of cystitis in the   appropriate setting.    Personally reviewed.     Consultant(s) Notes Reviewed:  [x] YES  [ ] NO

## 2018-10-18 NOTE — PROGRESS NOTE ADULT - SUBJECTIVE AND OBJECTIVE BOX
24 hour events:     no acute events        Review of Systems:  Constitutional: No fever  Neuro: No headache  Resp: No cough  CVS: No chest pain  GI: No abdominal pain  : No dysuria  Skin: No itching  Msk: No joint pain      T(F): 97.8 (10-18-18 @ 08:29), Max: 106 (10-17-18 @ 11:39)  HR: 70 (10-18-18 @ 09:00) (60 - 112)  BP: 97/50 (10-18-18 @ 09:00) (60/36 - 163/63)  RR: 20 (10-18-18 @ 09:00) (10 - 34)  SpO2: 100% (10-18-18 @ 09:00) (89% - 100%)        10-17-18 @ 07:01  -  10-18-18 @ 07:00  --------------------------------------------------------  IN: 2395.2 mL / OUT: 2880 mL / NET: -484.8 mL    10-18-18 @ 07:01  -  10-18-18 @ 11:24  --------------------------------------------------------  IN: 152.8 mL / OUT: 170 mL / NET: -17.2 mL        CAPILLARY BLOOD GLUCOSE      POCT Blood Glucose.: 128 mg/dL (18 Oct 2018 05:07)      I&O's Summary    17 Oct 2018 07:  -  18 Oct 2018 07:00  --------------------------------------------------------  IN: 2395.2 mL / OUT: 2880 mL / NET: -484.8 mL    18 Oct 2018 07:01  -  18 Oct 2018 11:24  --------------------------------------------------------  IN: 152.8 mL / OUT: 170 mL / NET: -17.2 mL        Physical Exam:   Gen:  Neuro:  HEENT:  Resp:  CVS:  Abd:  Ext:  Skin:    Meds:  piperacillin/tazobactam IVPB. IV Intermittent    norepinephrine Infusion IV Continuous  tamsulosin Oral    dextrose 40% Gel Oral PRN  dextrose 50% Injectable IV Push  dextrose 50% Injectable IV Push  dextrose 50% Injectable IV Push  finasteride Oral  glucagon  Injectable IntraMuscular PRN  insulin lispro (HumaLOG) corrective regimen sliding scale SubCutaneous      escitalopram Oral      aspirin  chewable Oral        dextrose 5%. IV Continuous  lactated ringers. IV Continuous                                  11.8   21.92 )-----------( 124      ( 18 Oct 2018 04:54 )             35.4     Bands 10.0  Bands 13.0    10-18    145  |  114<H>  |  14  ----------------------------<  140<H>  3.7   |  25  |  1.10    Ca    8.8      18 Oct 2018 04:54  Phos  2.3     10-18  Mg     1.9     10-18    TPro  5.6<L>  /  Alb  2.1<L>  /  TBili  1.7<H>  /  DBili  .40<H>  /  AST  30  /  ALT  15  /  AlkPhos  65  10-18    Lactate 1.1           10-18 @ 04:54    Lactate 2.1           10-17 @ 23:57    Lactate 3.1           10-17 @ 20:40    Lactate 2.4           10-17 @ 14:25    Lactate 3.9           10-17 @ 11:50      CARDIAC MARKERS ( 18 Oct 2018 04:54 )  x     / x     / 153 U/L / x     / x          PT/INR - ( 18 Oct 2018 04:54 )   PT: 17.3 sec;   INR: 1.57 ratio         PTT - ( 18 Oct 2018 04:54 )  PTT:24.4 sec  Urinalysis Basic - ( 17 Oct 2018 11:50 )    Color: Yellow / Appearance: Slightly Turbid / S.010 / pH: x  Gluc: x / Ketone: Negative  / Bili: Negative / Urobili: Negative   Blood: x / Protein: 25 mg/dL / Nitrite: Negative   Leuk Esterase: Moderate / RBC: 3-5 /HPF / WBC 11-25   Sq Epi: x / Non Sq Epi: Occasional / Bacteria: TNTC      .Blood Blood   Growth in aerobic bottle:  Gram Negative Rods  "Due to technical problems, Proteus sp. will Not be reported as part of  the BCID panel until further notice"  ***Blood Panel PCR results on this specimen are available  approximately 3 hours after theGram stain result.***  Gram stain, PCR, and/or culture results may not always  correspond due to difference in methodologies.  ************************************************************  This PCR assay was performed using [employer].  The following targets are tested for: Enterococcus,  vancomycin resistant enterococci, Listeria monocytogenes,  coagulase negative staphylococci, S. aureus,  methicillin resistant S. aureus, Streptococcus agalactiae  (Group B), S. pneumoniae, S. pyogenes (Group A),  Acinetobacter baumannii, Enterobacter cloacae, E. coli,  Klebsiella oxytoca, K. pneumoniae, Proteus sp.,  Serratia marcescens, Haemophilus influenzae,  Neisseria meningitidis, Pseudomonas aeruginosa, Candida  albicans, C. glabrata, C krusei,C parapsilosis,  C. tropicalis and the KPC resistance gene.  Growth in anaerobic bottle:  Gram Negative Rods   Growth in aerobic and anaerobic bottles:  Gram Negative Rods 10-17 @ 15:39      Rapid RVP Result: Detected (10-17 @ 11:50)          Radiology: f/u HIDA scan today      CENTRAL LINE: N/Y          DATE INSERTED:              REMOVE: Y/N  REN: N/Y                       DATE INSERTED:              REMOVE: Y/N  A-LINE: N/Y                       DATE INSERTED:              REMOVE: Y/N    GLOBAL ISSUE/BEST PRACTICE:  Analgesia:  pain well controlled  Sedation: none  CAM-ICU:   HOB elevation: yes  Stress ulcer prophylaxis:  VTE prophylaxis:   Glycemic control: well controlled  Nutrition: NPO    CODE STATUS: FULL 24 hour events:     no acute events        Review of Systems:  Constitutional: No fever  Neuro: No headache  Resp: No cough  CVS: No chest pain  GI: No abdominal pain  : No dysuria  Skin: No itching  Msk: No joint pain      T(F): 97.8 (10-18-18 @ 08:29), Max: 106 (10-17-18 @ 11:39)  HR: 70 (10-18-18 @ 09:00) (60 - 112)  BP: 97/50 (10-18-18 @ 09:00) (60/36 - 163/63)  RR: 20 (10-18-18 @ 09:00) (10 - 34)  SpO2: 100% (10-18-18 @ 09:00) (89% - 100%)        10-17-18 @ 07:01  -  10-18-18 @ 07:00  --------------------------------------------------------  IN: 2395.2 mL / OUT: 2880 mL / NET: -484.8 mL    10-18-18 @ 07:01  -  10-18-18 @ 11:24  --------------------------------------------------------  IN: 152.8 mL / OUT: 170 mL / NET: -17.2 mL        CAPILLARY BLOOD GLUCOSE      POCT Blood Glucose.: 128 mg/dL (18 Oct 2018 05:07)      I&O's Summary    17 Oct 2018 07:  -  18 Oct 2018 07:00  --------------------------------------------------------  IN: 2395.2 mL / OUT: 2880 mL / NET: -484.8 mL    18 Oct 2018 07:01  -  18 Oct 2018 11:24  --------------------------------------------------------  IN: 152.8 mL / OUT: 170 mL / NET: -17.2 mL        Physical Exam:   Gen: shivering this AM and cold, a second heated blanket provided  Neuro: follows commands, nonfocal, AOx2 (patient wasn't sure what month/date it was)  HEENT: sclera clear, oropharynx dry  Resp: CTAB though poor effort, no use of accessory muscles   CVS:  regular rate and rhythm, normal S1/1S2  Abd: soft, nontender, no rebound or guarding, + bowel sounds  Ext: no clubbing, cyanosis or edema  Skin: warm and well perfused     Meds:  piperacillin/tazobactam IVPB. IV Intermittent    norepinephrine Infusion IV Continuous  tamsulosin Oral    dextrose 40% Gel Oral PRN  dextrose 50% Injectable IV Push  dextrose 50% Injectable IV Push  dextrose 50% Injectable IV Push  finasteride Oral  glucagon  Injectable IntraMuscular PRN  insulin lispro (HumaLOG) corrective regimen sliding scale SubCutaneous      escitalopram Oral      aspirin  chewable Oral        dextrose 5%. IV Continuous  lactated ringers. IV Continuous                                  11.8   21.92 )-----------( 124      ( 18 Oct 2018 04:54 )             35.4     Bands 10.0  Bands 13.0    10-18    145  |  114<H>  |  14  ----------------------------<  140<H>  3.7   |  25  |  1.10    Ca    8.8      18 Oct 2018 04:54  Phos  2.3     10  Mg     1.9     10-18    TPro  5.6<L>  /  Alb  2.1<L>  /  TBili  1.7<H>  /  DBili  .40<H>  /  AST  30  /  ALT  15  /  AlkPhos  65  10-18    Lactate 1.1           10-18 @ 04:54    Lactate 2.1           10 @ 23:57    Lactate 3.1           10-17 @ 20:40    Lactate 2.4           10 @ 14:25    Lactate 3.9           10 @ 11:50      CARDIAC MARKERS ( 18 Oct 2018 04:54 )  x     / x     / 153 U/L / x     / x          PT/INR - ( 18 Oct 2018 04:54 )   PT: 17.3 sec;   INR: 1.57 ratio         PTT - ( 18 Oct 2018 04:54 )  PTT:24.4 sec  Urinalysis Basic - ( 17 Oct 2018 11:50 )    Color: Yellow / Appearance: Slightly Turbid / S.010 / pH: x  Gluc: x / Ketone: Negative  / Bili: Negative / Urobili: Negative   Blood: x / Protein: 25 mg/dL / Nitrite: Negative   Leuk Esterase: Moderate / RBC: 3-5 /HPF / WBC 11-25   Sq Epi: x / Non Sq Epi: Occasional / Bacteria: TNTC      .Blood Blood   Growth in aerobic bottle:  Gram Negative Rods  "Due to technical problems, Proteus sp. will Not be reported as part of  the BCID panel until further notice"  ***Blood Panel PCR results on this specimen are available  approximately 3 hours after theGram stain result.***  Gram stain, PCR, and/or culture results may not always  correspond due to difference in methodologies.  ************************************************************  This PCR assay was performed using [employer].  The following targets are tested for: Enterococcus,  vancomycin resistant enterococci, Listeria monocytogenes,  coagulase negative staphylococci, S. aureus,  methicillin resistant S. aureus, Streptococcus agalactiae  (Group B), S. pneumoniae, S. pyogenes (Group A),  Acinetobacter baumannii, Enterobacter cloacae, E. coli,  Klebsiella oxytoca, K. pneumoniae, Proteus sp.,  Serratia marcescens, Haemophilus influenzae,  Neisseria meningitidis, Pseudomonas aeruginosa, Candida  albicans, C. glabrata, C krusei,C parapsilosis,  C. tropicalis and the KPC resistance gene.  Growth in anaerobic bottle:  Gram Negative Rods   Growth in aerobic and anaerobic bottles:  Gram Negative Rods 10-17 @ 15:39      Rapid RVP Result: Detected (10-17 @ 11:50)          Radiology: f/u HIDA scan today      CENTRAL LINE: Y          DATE INSERTED: 10/17             REMOVE: N  REN: Y                       DATE INSERTED:  10/17            REMOVE: N  A-LINE: N    GLOBAL ISSUE/BEST PRACTICE:  Analgesia:  pain well controlled  Sedation: none  CAM-ICU:   HOB elevation: yes  Stress ulcer prophylaxis: protonix 40 mg daily   VTE prophylaxis: lovenox 40 mg daily   Glycemic control: well controlled  Nutrition: NPO    CODE STATUS: FULL

## 2018-10-18 NOTE — PROGRESS NOTE ADULT - SUBJECTIVE AND OBJECTIVE BOX
The patient was interviewed and evaluated.    89y Male    T(C): 36.6 (10-18-18 @ 08:29), Max: 41.1 (10-17-18 @ 11:39)  HR: 68 (10-18-18 @ 08:29) (60 - 112)  BP: 91/50 (10-18-18 @ 08:29) (60/36 - 163/63)  RR: 21 (10-18-18 @ 08:29) (10 - 34)  SpO2: 100% (10-18-18 @ 08:29) (89% - 100%)  Wt(kg): --    No Nausea/vomiting, recall, sore throat or headache.    No anesthesia related complaints or sequelae.

## 2018-10-18 NOTE — PROGRESS NOTE ADULT - SUBJECTIVE AND OBJECTIVE BOX
IDRIS DANIEL is a 89y Male , patient examined and chart reviewed. Patient being followed for severe sepsis secondary to obstructive uropathy . Had emergent cystoscopy and placement of left ureteral stent .     INTERVAL HPI/ OVERNIGHT EVENTS: Events noted doing much better , off pressors . More alert and awake, hungry wants food . Had emergent cystoscopy and insertion of left ureteral stent for obstruction left ureteral stone . Blood cs positive for GNR. He was treated for UTI 1 month ago . His abdominal pain is resolved       Past Medical History--  PAST MEDICAL & SURGICAL HISTORY:  CAD (coronary artery disease)  GERD (gastroesophageal reflux disease)  Hyperlipidemia  Benign prostatic hyperplasia, presence of lower urinary tract symptoms unspecified, unspecified morphology  H/O heart artery stent      For details regarding the patient's social history, family history, and other miscellaneous elements, please refer the initial infectious diseases consultation and/or the admitting history and physical examination for this admission.      ROS:  CONSTITUTIONAL:  Negative fever or chills, feels well, good appetite  EYES:  Negative  blurry vision or double vision  CARDIOVASCULAR:  Negative for chest pain or palpitations  RESPIRATORY:  Negative for cough, wheezing, or SOB   GASTROINTESTINAL:  Negative for nausea, vomiting, diarrhea, constipation, or abdominal pain  GENITOURINARY:  Negative frequency, urgency , dysuria or hematuria   NEUROLOGIC:  No headache, confusion, dizziness, lightheadedness  All other systems were reviewed and are negative     Allergies:      Current inpatient medications :    ANTIBIOTICS/RELEVANT:  doxycycline IVPB 100 milliGRAM(s) IV Intermittent every 12 hours  piperacillin/tazobactam IVPB. 3.375 Gram(s) IV Intermittent every 8 hours      aspirin  chewable 81 milliGRAM(s) Oral daily  dextrose 40% Gel 15 Gram(s) Oral once PRN  dextrose 5%. 1000 milliLiter(s) IV Continuous <Continuous>  dextrose 50% Injectable 12.5 Gram(s) IV Push once  dextrose 50% Injectable 25 Gram(s) IV Push once  dextrose 50% Injectable 25 Gram(s) IV Push once  escitalopram 10 milliGRAM(s) Oral daily  finasteride 5 milliGRAM(s) Oral daily  glucagon  Injectable 1 milliGRAM(s) IntraMuscular once PRN  insulin lispro (HumaLOG) corrective regimen sliding scale   SubCutaneous every 6 hours  lactated ringers. 1000 milliLiter(s) IV Continuous <Continuous>  norepinephrine Infusion 0.05 MICROgram(s)/kG/Min IV Continuous <Continuous>  tamsulosin 0.4 milliGRAM(s) Oral at bedtime      Objective:    10-17 @ 07:01  -  10-18 @ 07:00  --------------------------------------------------------  IN: 2395.2 mL / OUT: 2880 mL / NET: -484.8 mL    10-18 @ 07:01  -  10-18 @ 11:02  --------------------------------------------------------  IN: 152.8 mL / OUT: 170 mL / NET: -17.2 mL      T(C): 36.6 (10-18-18 @ 08:29), Max: 41.1 (10-17-18 @ 11:39)  HR: 70 (10-18-18 @ 09:00) (60 - 112)  BP: 97/50 (10-18-18 @ 09:00) (60/36 - 163/63)  RR: 20 (10-18-18 @ 09:00) (10 - 34)  SpO2: 100% (10-18-18 @ 09:00) (89% - 100%)  Wt(kg): --      Physical Exam:  GEN: NAD, pleasant in NAD  HEENT: normocephalic and atraumatic. EOMI. KILO. Moist mucosa. Clear Posterior pharynx.  NECK: Supple. No carotid bruits.  No lymphadenopathy or thyromegaly.  LUNGS: Clear to auscultation.  HEART: Regular rate and rhythm without murmur.  ABDOMEN: Soft, nontender, and nondistended.  Positive bowel sounds.  No hepatosplenomegaly was noted. NO CVA tenderness   EXTREMITIES: Without any cyanosis, clubbing, rash, lesions or edema.  NEUROLOGIC: A & O x3, No focal neurological deficits   SKIN: No ulceration or induration present.      LABS:                        11.8   21.92 )-----------( 124      ( 18 Oct 2018 04:54 )             35.4       10-18    145  |  114<H>  |  14  ----------------------------<  140<H>  3.7   |  25  |  1.10    Ca    8.8      18 Oct 2018 04:54  Phos  2.3     10-18  Mg     1.9     10-18    TPro  5.6<L>  /  Alb  2.1<L>  /  TBili  1.7<H>  /  DBili  .40<H>  /  AST  30  /  ALT  15  /  AlkPhos  65  10-18      PT/INR - ( 18 Oct 2018 04:54 )   PT: 17.3 sec;   INR: 1.57 ratio         PTT - ( 18 Oct 2018 04:54 )  PTT:24.4 sec  Urinalysis Basic - ( 17 Oct 2018 11:50 )    Color: Yellow / Appearance: Slightly Turbid / S.010 / pH: x  Gluc: x / Ketone: Negative  / Bili: Negative / Urobili: Negative   Blood: x / Protein: 25 mg/dL / Nitrite: Negative   Leuk Esterase: Moderate / RBC: 3-5 /HPF / WBC 11-25   Sq Epi: x / Non Sq Epi: Occasional / Bacteria: TNTC    Lactate, Blood: 1.1 mmol/L (10-18-18 @ 04:54)  Lactate, Blood: 2.1 mmol/L (10-17-18 @ 23:57)  Lactate, Blood: 3.1 mmol/L (10-17-18 @ 20:40)  Lactate, Blood: 2.4 mmol/L (10-17-18 @ 14:25)  Lactate, Blood: 3.9 mmol/L (10-17-18 @ 11:50)       CARDIAC MARKERS ( 18 Oct 2018 04:54 )  x     / x     / 153 U/L / x     / x            RECENT CULTURES:    Culture - Blood (collected 10-17-18 @ 15:39)  Source: .Blood Blood  Gram Stain (10-18-18 @ 03:31):    Growth in aerobic and anaerobic bottles:    Gram Negative Rods  Preliminary Report (10-18-18 @ 03:31):    Growth in aerobic and anaerobic bottles:    Gram Negative Rods    Culture - Blood (collected 10-17-18 @ 15:39)  Source: .Blood Blood  Gram Stain (10-18-18 @ 04:34):    Growth in aerobic and anaerobic bottles:    Gram Negative Rods  Preliminary Report (10-18-18 @ 04:35):    Growth in aerobic bottle:    Gram Negative Rods    "Due to technical problems, Proteus sp. will Not be reported as part of    the BCID panel until further notice"    ***Blood Panel PCR results on this specimen are available    approximately 3 hours after theGram stain result.***    Gram stain, PCR, and/or culture results may not always    correspond due to difference in methodologies.    ************************************************************    This PCR assay was performed using [employer].    The following targets are tested for: Enterococcus,    vancomycin resistant enterococci, Listeria monocytogenes,    coagulase negative staphylococci, S. aureus,    methicillin resistant S. aureus, Streptococcus agalactiae    (Group B), S. pneumoniae, S. pyogenes (Group A),    Acinetobacter baumannii, Enterobacter cloacae, E. coli,    Klebsiella oxytoca, K. pneumoniae, Proteus sp.,    Serratia marcescens, Haemophilus influenzae,    Neisseria meningitidis, Pseudomonas aeruginosa, Candida    albicans, C. glabrata, C krusei,C parapsilosis,    C. tropicalis and the KPC resistance gene.    Growth in anaerobic bottle:    Gram Negative Rods  Organism: Blood Culture PCR (10-18-18 @ 06:21)  Organism: Blood Culture PCR (10-18-18 @ 06:21)      -  Escherichia coli: Detec      Method Type: PCR            RADIOLOGY & ADDITIONAL STUDIES:  Xray Chest 1 View- PORTABLE-Urgent (10.17.18 @ 18:27) >  Impression: Right IJ line overlying the junction of the SVC and right   atrium without pneumothorax  Focal consolidation is now seen at the right lung base superimposed on   prominent interstitial markings    US Gallbladder (10.17.18 @ 17:01) >    Impression: Cholelithiasis with single large gallstone. There is   gallbladder wall thickening. This may indicate acute or chronic   cholecystitis.    < from: CT Chest No Cont (10.17.18 @ 15:41) >  Limited by lack of any contrast and beam hardening artifact.  Extensive airspace infiltrate in the right upper right lower lobe   consistent with pneumonia in appropriate clinical setting. Mild dependent   atelectatic changes left base. Significant pleural effusion.  Central airways patent. No mediastinal adenopathy. Lack of IVcontrast   limits hilar evaluation. Atherosclerotic nonaneurysmal thoracic aorta.   Hiatal hernia. Normal heart size with coronary calcification. Gallbladder   distended with calcified stone. Small pericholecystic fluid and   pericholecystic stranding concerning for acute cholecystitis. Correlate   with right upper quadrant ultrasound. No gross biliary dilatation.   Unenhanced liver spleen not remarkable. Peripancreatic edema suggestive   pancreatitis. Correlate with amylase, lipase levels  No adrenal nodules. There is a 1.1 cm nonobstructive calculus left   kidney. There is mild obstructive left hydroureteronephrosis secondary to   a 6 mm mid left ureteral calculus. Bilateral perirenal stranding is   nonspecific but can be seen in the setting of UTI. Please correlate.  Prostate not significantly enlarged, demonstrates multiple calcareous   deposits. Mild bladder wall thickening and associated stranding   suggestive of cystitis. Correlate with urinalysis. Multiple bladder   calculi largest measuring up to 1.1 cm. Small fat-containing umbilical   hernia.  No inflamed or obstructed bowel. No extraluminal fluid or gas.  There is a right inguinal hernia containing fat and nonobstructive large   bowel.    Impression:    Limited by lack of contrast and artifact.  Extensive right lung pneumonia.  Cholelithiasis. Possible acute cholecystitis. Correlate with right upper   quadrant ultrasound and/or HIDA scan.  Findings suggestive of pancreatitis. Correlate with amylase, lipase levels  Mildleft hydronephrosis secondary to a mid left ureteral calculus.   Additional nonobstructive left nephrolithiasis.  Bladder calculi. Bladder wall thickening suggestive of cystitis in the   appropriate setting.  Additional findings as discussed      Assessment :    89y old  Male hx CAD/cardiac stents 2yrs ago, HLD, BPH adm to ICU w/ septic shock secondary to E coli bacteremia likely secondary to obstructive uropathy for left ureteral stone. Ct chest and abdomen also shows extensive right dised pneumonia and possible gall stone pancreatitis, although he has minimal cough or GI symptoms .    He has responded to antibiotics and supportive care and improved remarkably once he has left ureteral stent placed, so suspect primary problem to be  infection     he had ATUL which has improved     Plan:     -will dc Doxycycline , continue with IV Zosyn pending final cs   - repeat blood cs x 2   - follow HIDA scan   - trend cbc, LFT's   - resume diet after HIDA scan       I have discussed the above plan of care with patient and his Son present at bedside  in detail. They expressed understanding of the the treatment plan . Risks, benefits and alternatives discussed in detail. I have asked if they have any questions or concerns and appropriately addressed them to the best of my ability .      Critical care time greater then 35 minutes reviewing notes, labs data/ imaging , discussion with multidisciplinary team.    Thank you for allowing me to participate in care of your patient .        Humphrey Thakkar MD  [phone] IDRIS DANIEL is a 89y Male , patient examined and chart reviewed. He is well known to me from prior hospitalization . Appreciated Dr. Petersen's coverage,  Patient being followed for severe sepsis secondary to obstructive uropathy . Had emergent cystoscopy and placement of left ureteral stent .     INTERVAL HPI/ OVERNIGHT EVENTS: Events noted doing much better , off pressors . More alert and awake, hungry wants food . Had emergent cystoscopy and insertion of left ureteral stent for obstruction left ureteral stone . Blood cs positive for GNR. He was treated for UTI 1 month ago . His abdominal pain is resolved       Past Medical History--  PAST MEDICAL & SURGICAL HISTORY:  CAD (coronary artery disease)  GERD (gastroesophageal reflux disease)  Hyperlipidemia  Benign prostatic hyperplasia, presence of lower urinary tract symptoms unspecified, unspecified morphology  H/O heart artery stent      For details regarding the patient's social history, family history, and other miscellaneous elements, please refer the initial infectious diseases consultation and/or the admitting history and physical examination for this admission.      ROS:  CONSTITUTIONAL:  Negative fever or chills, feels well, good appetite  EYES:  Negative  blurry vision or double vision  CARDIOVASCULAR:  Negative for chest pain or palpitations  RESPIRATORY:  Negative for cough, wheezing, or SOB   GASTROINTESTINAL:  Negative for nausea, vomiting, diarrhea, constipation, or abdominal pain  GENITOURINARY:  Negative frequency, urgency , dysuria or hematuria   NEUROLOGIC:  No headache, confusion, dizziness, lightheadedness  All other systems were reviewed and are negative     Allergies:      Current inpatient medications :    ANTIBIOTICS/RELEVANT:  doxycycline IVPB 100 milliGRAM(s) IV Intermittent every 12 hours  piperacillin/tazobactam IVPB. 3.375 Gram(s) IV Intermittent every 8 hours      aspirin  chewable 81 milliGRAM(s) Oral daily  dextrose 40% Gel 15 Gram(s) Oral once PRN  dextrose 5%. 1000 milliLiter(s) IV Continuous <Continuous>  dextrose 50% Injectable 12.5 Gram(s) IV Push once  dextrose 50% Injectable 25 Gram(s) IV Push once  dextrose 50% Injectable 25 Gram(s) IV Push once  escitalopram 10 milliGRAM(s) Oral daily  finasteride 5 milliGRAM(s) Oral daily  glucagon  Injectable 1 milliGRAM(s) IntraMuscular once PRN  insulin lispro (HumaLOG) corrective regimen sliding scale   SubCutaneous every 6 hours  lactated ringers. 1000 milliLiter(s) IV Continuous <Continuous>  norepinephrine Infusion 0.05 MICROgram(s)/kG/Min IV Continuous <Continuous>  tamsulosin 0.4 milliGRAM(s) Oral at bedtime      Objective:    10-17 @ 07:01  -  10-18 @ 07:00  --------------------------------------------------------  IN: 2395.2 mL / OUT: 2880 mL / NET: -484.8 mL    10-18 @ 07:01  -  10-18 @ 11:02  --------------------------------------------------------  IN: 152.8 mL / OUT: 170 mL / NET: -17.2 mL      T(C): 36.6 (10-18-18 @ 08:29), Max: 41.1 (10-17-18 @ 11:39)  HR: 70 (10-18-18 @ 09:00) (60 - 112)  BP: 97/50 (10-18-18 @ 09:00) (60/36 - 163/63)  RR: 20 (10-18-18 @ 09:00) (10 - 34)  SpO2: 100% (10-18-18 @ 09:00) (89% - 100%)  Wt(kg): --      Physical Exam:  GEN: NAD, pleasant in NAD  HEENT: normocephalic and atraumatic. EOMI. KILO. Moist mucosa. Clear Posterior pharynx.  NECK: Supple. No carotid bruits.  No lymphadenopathy or thyromegaly.  LUNGS: Clear to auscultation.  HEART: Regular rate and rhythm without murmur.  ABDOMEN: Soft, nontender, and nondistended.  Positive bowel sounds.  No hepatosplenomegaly was noted. NO CVA tenderness   EXTREMITIES: Without any cyanosis, clubbing, rash, lesions or edema.  NEUROLOGIC: A & O x3, No focal neurological deficits   SKIN: No ulceration or induration present.      LABS:                        11.8   21.92 )-----------( 124      ( 18 Oct 2018 04:54 )             35.4       10-18    145  |  114<H>  |  14  ----------------------------<  140<H>  3.7   |  25  |  1.10    Ca    8.8      18 Oct 2018 04:54  Phos  2.3     10-18  Mg     1.9     10-18    TPro  5.6<L>  /  Alb  2.1<L>  /  TBili  1.7<H>  /  DBili  .40<H>  /  AST  30  /  ALT  15  /  AlkPhos  65  10-18      PT/INR - ( 18 Oct 2018 04:54 )   PT: 17.3 sec;   INR: 1.57 ratio         PTT - ( 18 Oct 2018 04:54 )  PTT:24.4 sec  Urinalysis Basic - ( 17 Oct 2018 11:50 )    Color: Yellow / Appearance: Slightly Turbid / S.010 / pH: x  Gluc: x / Ketone: Negative  / Bili: Negative / Urobili: Negative   Blood: x / Protein: 25 mg/dL / Nitrite: Negative   Leuk Esterase: Moderate / RBC: 3-5 /HPF / WBC 11-25   Sq Epi: x / Non Sq Epi: Occasional / Bacteria: TNTC    Lactate, Blood: 1.1 mmol/L (10-18-18 @ 04:54)  Lactate, Blood: 2.1 mmol/L (10-17-18 @ 23:57)  Lactate, Blood: 3.1 mmol/L (10-17-18 @ 20:40)  Lactate, Blood: 2.4 mmol/L (10-17-18 @ 14:25)  Lactate, Blood: 3.9 mmol/L (10-17-18 @ 11:50)       CARDIAC MARKERS ( 18 Oct 2018 04:54 )  x     / x     / 153 U/L / x     / x            RECENT CULTURES:    Culture - Blood (collected 10-17-18 @ 15:39)  Source: .Blood Blood  Gram Stain (10-18-18 @ 03:31):    Growth in aerobic and anaerobic bottles:    Gram Negative Rods  Preliminary Report (10-18-18 @ 03:31):    Growth in aerobic and anaerobic bottles:    Gram Negative Rods    Culture - Blood (collected 10-17-18 @ 15:39)  Source: .Blood Blood  Gram Stain (10-18-18 @ 04:34):    Growth in aerobic and anaerobic bottles:    Gram Negative Rods  Preliminary Report (10-18-18 @ 04:35):    Growth in aerobic bottle:    Gram Negative Rods    "Due to technical problems, Proteus sp. will Not be reported as part of    the BCID panel until further notice"    ***Blood Panel PCR results on this specimen are available    approximately 3 hours after theGram stain result.***    Gram stain, PCR, and/or culture results may not always    correspond due to difference in methodologies.    ************************************************************    This PCR assay was performed using [employer].    The following targets are tested for: Enterococcus,    vancomycin resistant enterococci, Listeria monocytogenes,    coagulase negative staphylococci, S. aureus,    methicillin resistant S. aureus, Streptococcus agalactiae    (Group B), S. pneumoniae, S. pyogenes (Group A),    Acinetobacter baumannii, Enterobacter cloacae, E. coli,    Klebsiella oxytoca, K. pneumoniae, Proteus sp.,    Serratia marcescens, Haemophilus influenzae,    Neisseria meningitidis, Pseudomonas aeruginosa, Candida    albicans, C. glabrata, C krusei,C parapsilosis,    C. tropicalis and the KPC resistance gene.    Growth in anaerobic bottle:    Gram Negative Rods  Organism: Blood Culture PCR (10-18-18 @ 06:21)  Organism: Blood Culture PCR (10-18-18 @ 06:21)      -  Escherichia coli: Detec      Method Type: PCR            RADIOLOGY & ADDITIONAL STUDIES:  Xray Chest 1 View- PORTABLE-Urgent (10.17.18 @ 18:27) >  Impression: Right IJ line overlying the junction of the SVC and right   atrium without pneumothorax  Focal consolidation is now seen at the right lung base superimposed on   prominent interstitial markings    US Gallbladder (10.17.18 @ 17:01) >    Impression: Cholelithiasis with single large gallstone. There is   gallbladder wall thickening. This may indicate acute or chronic   cholecystitis.     CT Chest No Cont (10.17.18 @ 15:41) >  Limited by lack of any contrast and beam hardening artifact.  Extensive airspace infiltrate in the right upper right lower lobe   consistent with pneumonia in appropriate clinical setting. Mild dependent   atelectatic changes left base. Significant pleural effusion.  Central airways patent. No mediastinal adenopathy. Lack of IVcontrast   limits hilar evaluation. Atherosclerotic nonaneurysmal thoracic aorta.   Hiatal hernia. Normal heart size with coronary calcification. Gallbladder   distended with calcified stone. Small pericholecystic fluid and   pericholecystic stranding concerning for acute cholecystitis. Correlate   with right upper quadrant ultrasound. No gross biliary dilatation.   Unenhanced liver spleen not remarkable. Peripancreatic edema suggestive   pancreatitis. Correlate with amylase, lipase levels  No adrenal nodules. There is a 1.1 cm nonobstructive calculus left   kidney. There is mild obstructive left hydroureteronephrosis secondary to   a 6 mm mid left ureteral calculus. Bilateral perirenal stranding is   nonspecific but can be seen in the setting of UTI. Please correlate.  Prostate not significantly enlarged, demonstrates multiple calcareous   deposits. Mild bladder wall thickening and associated stranding   suggestive of cystitis. Correlate with urinalysis. Multiple bladder   calculi largest measuring up to 1.1 cm. Small fat-containing umbilical   hernia.  No inflamed or obstructed bowel. No extraluminal fluid or gas.  There is a right inguinal hernia containing fat and nonobstructive large   bowel.    Impression:    Limited by lack of contrast and artifact.  Extensive right lung pneumonia.  Cholelithiasis. Possible acute cholecystitis. Correlate with right upper   quadrant ultrasound and/or HIDA scan.  Findings suggestive of pancreatitis. Correlate with amylase, lipase levels  Mildleft hydronephrosis secondary to a mid left ureteral calculus.   Additional nonobstructive left nephrolithiasis.  Bladder calculi. Bladder wall thickening suggestive of cystitis in the   appropriate setting.  Additional findings as discussed      Assessment :    89y old  Male hx CAD/cardiac stents 2yrs ago, HLD, BPH adm to ICU w/ septic shock secondary to E coli bacteremia likely secondary to obstructive uropathy for left ureteral stone. Ct chest and abdomen also shows extensive right dised pneumonia and possible gall stone pancreatitis, although he has minimal cough or GI symptoms .    He has responded to antibiotics and supportive care and improved remarkably once he has left ureteral stent placed, so suspect primary problem to be  infection     he had ATUL which has improved     Plan:     -will dc Doxycycline , continue with IV Zosyn pending final cs   - repeat blood cs x 2   - follow HIDA scan   - trend cbc, LFT's   - resume diet after HIDA scan   - repeat CXR in am     D/W with ICU team       I have discussed the above plan of care with patient and his Son present at bedside  in detail. They expressed understanding of the the treatment plan . Risks, benefits and alternatives discussed in detail. I have asked if they have any questions or concerns and appropriately addressed them to the best of my ability .      Critical care time greater then 35 minutes reviewing notes, labs data/ imaging , discussion with multidisciplinary team.    Thank you for allowing me to participate in care of your patient .        Humphrey Thakkar MD  [phone]

## 2018-10-18 NOTE — DIETITIAN INITIAL EVALUATION ADULT. - PROBLEM SELECTOR PLAN 2
chronic, on flomax and finasteride / strict is/os    follow up urine culture, urine antigens  plan per ICU

## 2018-10-18 NOTE — DIETITIAN INITIAL EVALUATION ADULT. - NS AS NUTRI INTERV MEALS SNACK
Other (specify)/When medically feasible recommend clear liquid diet and advance as tolerated to Dash/TLC, mechanical soft, no beef/no pork.

## 2018-10-18 NOTE — DIETITIAN INITIAL EVALUATION ADULT. - OTHER INFO
Pt A+Ox4 with son at bedside. Dx Sepsis. S/p cystoscopy with ureteral stent placement POD1. Plan for HIDA today. R/o acute re, pancreatitis. NPO on IVF. No report N/V at present. Hx constipation. BM documented 10/18. Per pt/family fair appetite/po intake pta however reports pt drinking well. Poor dentition- needs chopped foods. No beef/no pork. Pt agreeable to nutritional supplements upon diet initiation/advancement.

## 2018-10-18 NOTE — PROGRESS NOTE ADULT - ATTENDING COMMENTS
88 yo man with h/o CAD s/p stents x2 in 2014, p/w fever 106 and rigors, found to have elevated lactate, bandemia, ATUL, R consolidation on POCUS, + RVP entero/rhonivirus, CTA suggestive of pancreatitis, and acute cholecystitis as well as obstructing L ureteral stone and L hydro; shock on iv pressors;  yesterday had L ureteral stent placed and has had significant improvement since with decreased pressor requirement, decreased lactate.  Today had HIDA neg for acute cholecystitis.  Blood cx growing E. coli    neuro: MS is good, NAD, awake and alert, has not required pain meds  CV: remains on minimal iv norepi; ASA restarted; prophylactic AC started  Pulm: R sided consolidation on CT/XR; without respiratory symptoms or compromise, +RVP  GI: CT suggested pancreatitis and cholecystitis; no clinical evidence of either; HIDA neg; can take po and advance diet as tolerated; continue home lactulose and PPI for GERD  :  ATUL, nephrolithiasis, s/p L ureteral stent yesterday; good UOP, improved creatinine  ID: urosepsis/E coli bacteremia; Abx narrowed to Zosyn; responding well after stent placement; f/u cultures and sensitivities 88 yo man with h/o CAD s/p stents x2 in 2014, p/w fever 106 and rigors, found to have elevated lactate, bandemia, ATUL, R consolidation on POCUS, + RVP entero/rhonivirus, CTA suggestive of pancreatitis, and acute cholecystitis as well as obstructing L ureteral stone and L hydro; shock on iv pressors;  yesterday had L ureteral stent placed and has had significant improvement since with decreased pressor requirement, decreased lactate.  Today had HIDA neg for acute cholecystitis.  Blood cx growing E. coli    neuro: MS is good, NAD, awake and alert, has not required pain meds  CV: remains on minimal iv norepi; ASA restarted; prophylactic AC started  Pulm: R sided consolidation on CT/XR; without respiratory symptoms or compromise, +RVP  GI: CT suggested pancreatitis and cholecystitis; no clinical evidence of either; HIDA neg; can take po and advance diet as tolerated; continue home lactulose and PPI for GERD  :  ATUL, nephrolithiasis, s/p L ureteral stent yesterday; good UOP, improved creatinine  ID: urosepsis/E coli bacteremia; Abx narrowed to Zosyn; responding well after stent placement; f/u cultures and sensitivities  Endo: HgbA1c 5.9 pre diabetic likely; continue FS ISS  Much improved but remains guarded

## 2018-10-18 NOTE — DIETITIAN INITIAL EVALUATION ADULT. - FACTORS AFF FOOD INTAKE
persistent constipation/dx sepsis r/o PNA, acute cholesystitis, pancreatitis/difficulty chewing/other (specify)

## 2018-10-18 NOTE — PROGRESS NOTE ADULT - SUBJECTIVE AND OBJECTIVE BOX
[facility]    Latisha Ash, Bayron, Rhonda, Tony, Joselo, Peter      [phone]    CHIEF COMPLAINT: Patient is a 89y old  Male who presents with a chief complaint of sepsis secondary to UTI (18 Oct 2018 09:08)      Follow Up: urosepsis, hypotension    Interim history: The patient reports no new symptoms.  Denies chest discomfort and shortness of breath.  No abdominal pain.  No new neurologic symptoms.      MEDICATIONS  (STANDING):  aspirin  chewable 81 milliGRAM(s) Oral daily  dextrose 5%. 1000 milliLiter(s) (50 mL/Hr) IV Continuous <Continuous>  dextrose 50% Injectable 12.5 Gram(s) IV Push once  dextrose 50% Injectable 25 Gram(s) IV Push once  dextrose 50% Injectable 25 Gram(s) IV Push once  doxycycline IVPB 100 milliGRAM(s) IV Intermittent every 12 hours  escitalopram 10 milliGRAM(s) Oral daily  finasteride 5 milliGRAM(s) Oral daily  insulin lispro (HumaLOG) corrective regimen sliding scale   SubCutaneous every 6 hours  lactated ringers. 1000 milliLiter(s) (75 mL/Hr) IV Continuous <Continuous>  norepinephrine Infusion 0.05 MICROgram(s)/kG/Min (3.656 mL/Hr) IV Continuous <Continuous>  piperacillin/tazobactam IVPB. 3.375 Gram(s) IV Intermittent every 8 hours  tamsulosin 0.4 milliGRAM(s) Oral at bedtime    MEDICATIONS  (PRN):  dextrose 40% Gel 15 Gram(s) Oral once PRN Blood Glucose LESS THAN 70 milliGRAM(s)/deciliter  glucagon  Injectable 1 milliGRAM(s) IntraMuscular once PRN Glucose LESS THAN 70 milligrams/deciliter      REVIEW OF SYSTEMS:  eye, ent, GI, , allergic, dermatologic, musculoskeletal and neurologic are negative except as described above    Vital Signs Last 24 Hrs  T(C): 36.6 (18 Oct 2018 08:29), Max: 41.1 (17 Oct 2018 11:39)  T(F): 97.8 (18 Oct 2018 08:29), Max: 106 (17 Oct 2018 11:39)  HR: 70 (18 Oct 2018 09:00) (60 - 112)  BP: 97/50 (18 Oct 2018 09:00) (60/36 - 163/63)  BP(mean): 69 (18 Oct 2018 09:00) (7 - 103)  RR: 20 (18 Oct 2018 09:00) (10 - 34)  SpO2: 100% (18 Oct 2018 09:00) (89% - 100%)    I&O's Summary    17 Oct 2018 07:01  -  18 Oct 2018 07:00  --------------------------------------------------------  IN: 2395.2 mL / OUT: 2880 mL / NET: -484.8 mL    18 Oct 2018 07:01  -  18 Oct 2018 09:18  --------------------------------------------------------  IN: 152.8 mL / OUT: 170 mL / NET: -17.2 mL        Telemetry past 24h:sr    PHYSICAL EXAM:    Constitutional: well-nourished, well-developed, NAD   HEENT:  MMM, sclerae anicteric, conjunctivae clear, no oral cyanosis.  Pulmonary: Non-labored, breath sounds are clear bilaterally, No wheezing, rales or rhonchi  Cardiovascular: Regular, S1 and S2.  No murmur.  No rubs, gallops or clicks  Gastrointestinal: Bowel Sounds present, soft, nontender.   Lymph: No peripheral edema.   Neurological: Alert, no focal deficits  Skin: No rashes.  Psych:  Mood & affect appropriate    LABS: All Labs Reviewed:                        11.8   21.92 )-----------( 124      ( 18 Oct 2018 04:54 )             35.4                         12.5   26.66 )-----------( 142      ( 17 Oct 2018 20:40 )             37.9                         13.3   7.24  )-----------( 129      ( 17 Oct 2018 11:50 )             40.2     18 Oct 2018 04:54    145    |  114    |  14     ----------------------------<  140    3.7     |  25     |  1.10   17 Oct 2018 20:40    141    |  110    |  16     ----------------------------<  207    3.8     |  21     |  1.40   17 Oct 2018 12:42    141    |  109    |  16     ----------------------------<  86     3.8     |  23     |  1.40     Ca    8.8        18 Oct 2018 04:54  Ca    8.1        17 Oct 2018 20:40  Ca    8.4        17 Oct 2018 12:42  Phos  2.3       18 Oct 2018 04:54  Phos  1.8       17 Oct 2018 20:40  Mg     1.9       18 Oct 2018 04:54  Mg     1.8       17 Oct 2018 20:40    TPro  5.6    /  Alb  2.1    /  TBili  1.7    /  DBili  .40    /  AST  30     /  ALT  15     /  AlkPhos  65     18 Oct 2018 04:54  TPro  5.9    /  Alb  2.2    /  TBili  2.4    /  DBili  x      /  AST  45     /  ALT  20     /  AlkPhos  71     17 Oct 2018 20:40  TPro  5.3    /  Alb  2.0    /  TBili  1.7    /  DBili  x      /  AST  27     /  ALT  11     /  AlkPhos  66     17 Oct 2018 12:42    PT/INR - ( 18 Oct 2018 04:54 )   PT: 17.3 sec;   INR: 1.57 ratio         PTT - ( 18 Oct 2018 04:54 )  PTT:24.4 sec  CARDIAC MARKERS ( 18 Oct 2018 04:54 )  x     / x     / 153 U/L / x     / x          Blood Culture: Organism Blood Culture PCR  Gram Stain Blood -- Gram Stain   Growth in aerobic and anaerobic bottles:  Gram Negative Rods  Specimen Source .Blood Blood  Culture-Blood --      10-17 @ 20:40  Pro Bnp [id number]        RADIOLOGY:    EKG:    Echo:

## 2018-10-18 NOTE — PROGRESS NOTE ADULT - PROBLEM SELECTOR PLAN 2
tart bowel regimen  lactulose 10cc q 6 hours and glycerin suppository  gas pill with simethicone and maalox q 6 hours  in and out gerd precautions  elev hob  ppi qd

## 2018-10-18 NOTE — PROGRESS NOTE ADULT - SUBJECTIVE AND OBJECTIVE BOX
INTERVAL HPI/OVERNIGHT EVENTS:  pt seen and examined  denies n/v/d/abd pain  per overnight rn no vomiting no diarrhea  labs noted      MEDICATIONS  (STANDING):  aspirin  chewable 81 milliGRAM(s) Oral daily  dextrose 5%. 1000 milliLiter(s) (50 mL/Hr) IV Continuous <Continuous>  dextrose 50% Injectable 12.5 Gram(s) IV Push once  dextrose 50% Injectable 25 Gram(s) IV Push once  dextrose 50% Injectable 25 Gram(s) IV Push once  doxycycline IVPB 100 milliGRAM(s) IV Intermittent every 12 hours  escitalopram 10 milliGRAM(s) Oral daily  finasteride 5 milliGRAM(s) Oral daily  insulin lispro (HumaLOG) corrective regimen sliding scale   SubCutaneous every 6 hours  lactated ringers. 1000 milliLiter(s) (75 mL/Hr) IV Continuous <Continuous>  norepinephrine Infusion 0.05 MICROgram(s)/kG/Min (3.656 mL/Hr) IV Continuous <Continuous>  piperacillin/tazobactam IVPB. 3.375 Gram(s) IV Intermittent every 8 hours  tamsulosin 0.4 milliGRAM(s) Oral at bedtime    MEDICATIONS  (PRN):  dextrose 40% Gel 15 Gram(s) Oral once PRN Blood Glucose LESS THAN 70 milliGRAM(s)/deciliter  glucagon  Injectable 1 milliGRAM(s) IntraMuscular once PRN Glucose LESS THAN 70 milligrams/deciliter      Allergies    No Known Allergies    Intolerances        Review of Systems:    General:  No wt loss, fevers, chills, night sweats, fatigue   Eyes:  Good vision, no reported pain  ENT:  No sore throat, pain, runny nose, dysphagia  CV:  No pain, palpitations, hypo/hypertension  Resp:  No dyspnea, cough, tachypnea, wheezing  GI:  No pain, No nausea, No vomiting, No diarrhea, No constipation, No weight loss, No fever, No pruritis, No rectal bleeding, No melena, No dysphagia  :  No pain, bleeding, incontinence, nocturia  Muscle:  No pain, weakness  Neuro:  No weakness, tingling, memory problems  Psych:  No fatigue, insomnia, mood problems, depression  Endocrine:  No polyuria, polydypsia, cold/heat intolerance  Heme:  No petechiae, ecchymosis, easy bruisability  Skin:  No rash, tattoos, scars, edema      Vital Signs Last 24 Hrs  T(C): 36.6 (18 Oct 2018 08:29), Max: 41.1 (17 Oct 2018 11:39)  T(F): 97.8 (18 Oct 2018 08:29), Max: 106 (17 Oct 2018 11:39)  HR: 70 (18 Oct 2018 09:00) (60 - 112)  BP: 97/50 (18 Oct 2018 09:00) (60/36 - 163/63)  BP(mean): 69 (18 Oct 2018 09:00) (7 - 103)  RR: 20 (18 Oct 2018 09:00) (10 - 34)  SpO2: 100% (18 Oct 2018 09:00) (89% - 100%)    PHYSICAL EXAM:    Constitutional: NAD, lying in bed  HEENT: ncat  Neck: No LAD  Respiratory: dec bs  Cardiovascular: S1 and S2, RRR  Gastrointestinal: soft mild ttp ruq/epigastric region nd  Extremities: No peripheral edema  Vascular: 2+ peripheral pulses  Neurological: Awake alert able to make needs known  Skin: No rashes      LABS:                        11.8   21.92 )-----------( 124      ( 18 Oct 2018 04:54 )             35.4     10-18    145  |  114<H>  |  14  ----------------------------<  140<H>  3.7   |  25  |  1.10    Ca    8.8      18 Oct 2018 04:54  Phos  2.3     10-18  Mg     1.9     10-18    TPro  5.6<L>  /  Alb  2.1<L>  /  TBili  1.7<H>  /  DBili  .40<H>  /  AST  30  /  ALT  15  /  AlkPhos  65  10-18    PT/INR - ( 18 Oct 2018 04:54 )   PT: 17.3 sec;   INR: 1.57 ratio         PTT - ( 18 Oct 2018 04:54 )  PTT:24.4 sec  Urinalysis Basic - ( 17 Oct 2018 11:50 )    Color: Yellow / Appearance: Slightly Turbid / S.010 / pH: x  Gluc: x / Ketone: Negative  / Bili: Negative / Urobili: Negative   Blood: x / Protein: 25 mg/dL / Nitrite: Negative   Leuk Esterase: Moderate / RBC: 3-5 /HPF / WBC 11-25   Sq Epi: x / Non Sq Epi: Occasional / Bacteria: TNTC        RADIOLOGY & ADDITIONAL TESTS:

## 2018-10-18 NOTE — DIETITIAN INITIAL EVALUATION ADULT. - PROBLEM SELECTOR PLAN 1
Lactate 3.9, temp 106 to 101.7, bands 13% sever   sepsis / hypotension  secondary to possible  UTI  Admit to ICU  follow up blood cultures, urine cultures, repeat lactate, urine antigens  follow up CT head, CT chest, CT abdomen/pelvis  IV azithromycin , vanco ,zosyne   plan per ICU , ct abd / pelvis

## 2018-10-18 NOTE — PROGRESS NOTE ADULT - PROBLEM SELECTOR PLAN 1
Lactate 3.9, temp 106 to 101.7, bands 13% on admission  due to obstructed left kidney from left ureteral stone -- now s/p emergent ureteral stent placement with decompression of the left hydro on 10/17  -pt greatly improved subsequent to the stent placement ; blood and urine cultures growing E coli, so urine is the likely source  -patient suspected to also have pneumonia based on CT but very little respiratory symptoms   -question of acute cholecystitis on CT so HIDA done today and is normal.   Admit to ICU  follow up blood cultures, urine cultures sensitivities -- f/up repeat BCx to ensure clearance  follow up CT head, CT chest, CT abdomen/pelvis  Abx tapered down to zosyn as per ID recs

## 2018-10-18 NOTE — PROGRESS NOTE ADULT - PROBLEM SELECTOR PLAN 1
gerd precautions  elev hob  ppi qd CT showed extensive right lung pneumonia, ? acute re, ?pancreatitis  US showed cholelithiasis with single large gallstone, wall thickening, ?acute or chronic cholecystitis  amylase/lipase not elevated  check hida scan  ?may need perc drainage  f/u surgery recs  trend lfts  f/u bld cxs  id following  abx per id  npo/ivf  pain management  monitor abd exam  icu care  will follow, further recs pending above

## 2018-10-19 ENCOUNTER — TRANSCRIPTION ENCOUNTER (OUTPATIENT)
Age: 83
End: 2018-10-19

## 2018-10-19 LAB
-  AMIKACIN: SIGNIFICANT CHANGE UP
-  AMIKACIN: SIGNIFICANT CHANGE UP
-  AMOXICILLIN/CLAVULANIC ACID: SIGNIFICANT CHANGE UP
-  AMPICILLIN/SULBACTAM: SIGNIFICANT CHANGE UP
-  AMPICILLIN/SULBACTAM: SIGNIFICANT CHANGE UP
-  AMPICILLIN: SIGNIFICANT CHANGE UP
-  AMPICILLIN: SIGNIFICANT CHANGE UP
-  AZTREONAM: SIGNIFICANT CHANGE UP
-  AZTREONAM: SIGNIFICANT CHANGE UP
-  CEFAZOLIN: SIGNIFICANT CHANGE UP
-  CEFAZOLIN: SIGNIFICANT CHANGE UP
-  CEFEPIME: SIGNIFICANT CHANGE UP
-  CEFEPIME: SIGNIFICANT CHANGE UP
-  CEFOXITIN: SIGNIFICANT CHANGE UP
-  CEFOXITIN: SIGNIFICANT CHANGE UP
-  CEFTRIAXONE: SIGNIFICANT CHANGE UP
-  CEFTRIAXONE: SIGNIFICANT CHANGE UP
-  CIPROFLOXACIN: SIGNIFICANT CHANGE UP
-  CIPROFLOXACIN: SIGNIFICANT CHANGE UP
-  ERTAPENEM: SIGNIFICANT CHANGE UP
-  ERTAPENEM: SIGNIFICANT CHANGE UP
-  GENTAMICIN: SIGNIFICANT CHANGE UP
-  GENTAMICIN: SIGNIFICANT CHANGE UP
-  IMIPENEM: SIGNIFICANT CHANGE UP
-  IMIPENEM: SIGNIFICANT CHANGE UP
-  LEVOFLOXACIN: SIGNIFICANT CHANGE UP
-  LEVOFLOXACIN: SIGNIFICANT CHANGE UP
-  MEROPENEM: SIGNIFICANT CHANGE UP
-  MEROPENEM: SIGNIFICANT CHANGE UP
-  NITROFURANTOIN: SIGNIFICANT CHANGE UP
-  PIPERACILLIN/TAZOBACTAM: SIGNIFICANT CHANGE UP
-  PIPERACILLIN/TAZOBACTAM: SIGNIFICANT CHANGE UP
-  TIGECYCLINE: SIGNIFICANT CHANGE UP
-  TOBRAMYCIN: SIGNIFICANT CHANGE UP
-  TOBRAMYCIN: SIGNIFICANT CHANGE UP
-  TRIMETHOPRIM/SULFAMETHOXAZOLE: SIGNIFICANT CHANGE UP
-  TRIMETHOPRIM/SULFAMETHOXAZOLE: SIGNIFICANT CHANGE UP
ALBUMIN SERPL ELPH-MCNC: 2.2 G/DL — LOW (ref 3.3–5)
ALP SERPL-CCNC: 66 U/L — SIGNIFICANT CHANGE UP (ref 40–120)
ALT FLD-CCNC: 15 U/L — SIGNIFICANT CHANGE UP (ref 12–78)
ANION GAP SERPL CALC-SCNC: 7 MMOL/L — SIGNIFICANT CHANGE UP (ref 5–17)
APTT BLD: 34.2 SEC — SIGNIFICANT CHANGE UP (ref 27.5–37.4)
AST SERPL-CCNC: 33 U/L — SIGNIFICANT CHANGE UP (ref 15–37)
BASOPHILS # BLD AUTO: 0.01 K/UL — SIGNIFICANT CHANGE UP (ref 0–0.2)
BASOPHILS NFR BLD AUTO: 0.1 % — SIGNIFICANT CHANGE UP (ref 0–2)
BILIRUB DIRECT SERPL-MCNC: 0.4 MG/DL — HIGH (ref 0.05–0.2)
BILIRUB INDIRECT FLD-MCNC: 1.2 MG/DL — HIGH (ref 0.2–1)
BILIRUB SERPL-MCNC: 1.6 MG/DL — HIGH (ref 0.2–1.2)
BUN SERPL-MCNC: 15 MG/DL — SIGNIFICANT CHANGE UP (ref 7–23)
CALCIUM SERPL-MCNC: 9.2 MG/DL — SIGNIFICANT CHANGE UP (ref 8.5–10.1)
CHLORIDE SERPL-SCNC: 108 MMOL/L — SIGNIFICANT CHANGE UP (ref 96–108)
CO2 SERPL-SCNC: 28 MMOL/L — SIGNIFICANT CHANGE UP (ref 22–31)
CREAT SERPL-MCNC: 0.92 MG/DL — SIGNIFICANT CHANGE UP (ref 0.5–1.3)
CULTURE RESULTS: SIGNIFICANT CHANGE UP
EOSINOPHIL # BLD AUTO: 0.14 K/UL — SIGNIFICANT CHANGE UP (ref 0–0.5)
EOSINOPHIL NFR BLD AUTO: 1.1 % — SIGNIFICANT CHANGE UP (ref 0–6)
GLUCOSE SERPL-MCNC: 103 MG/DL — HIGH (ref 70–99)
HCT VFR BLD CALC: 35.9 % — LOW (ref 39–50)
HGB BLD-MCNC: 11.8 G/DL — LOW (ref 13–17)
IMM GRANULOCYTES NFR BLD AUTO: 0.4 % — SIGNIFICANT CHANGE UP (ref 0–1.5)
INR BLD: 1.52 RATIO — HIGH (ref 0.88–1.16)
LYMPHOCYTES # BLD AUTO: 0.65 K/UL — LOW (ref 1–3.3)
LYMPHOCYTES # BLD AUTO: 5.3 % — LOW (ref 13–44)
MAGNESIUM SERPL-MCNC: 1.9 MG/DL — SIGNIFICANT CHANGE UP (ref 1.6–2.6)
MCHC RBC-ENTMCNC: 30.5 PG — SIGNIFICANT CHANGE UP (ref 27–34)
MCHC RBC-ENTMCNC: 32.9 GM/DL — SIGNIFICANT CHANGE UP (ref 32–36)
MCV RBC AUTO: 92.8 FL — SIGNIFICANT CHANGE UP (ref 80–100)
METHOD TYPE: SIGNIFICANT CHANGE UP
METHOD TYPE: SIGNIFICANT CHANGE UP
MONOCYTES # BLD AUTO: 0.84 K/UL — SIGNIFICANT CHANGE UP (ref 0–0.9)
MONOCYTES NFR BLD AUTO: 6.8 % — SIGNIFICANT CHANGE UP (ref 2–14)
NEUTROPHILS # BLD AUTO: 10.61 K/UL — HIGH (ref 1.8–7.4)
NEUTROPHILS NFR BLD AUTO: 86.3 % — HIGH (ref 43–77)
ORGANISM # SPEC MICROSCOPIC CNT: SIGNIFICANT CHANGE UP
PHOSPHATE SERPL-MCNC: 2 MG/DL — LOW (ref 2.5–4.5)
PLATELET # BLD AUTO: 113 K/UL — LOW (ref 150–400)
POTASSIUM SERPL-MCNC: 3.6 MMOL/L — SIGNIFICANT CHANGE UP (ref 3.5–5.3)
POTASSIUM SERPL-SCNC: 3.6 MMOL/L — SIGNIFICANT CHANGE UP (ref 3.5–5.3)
PROT SERPL-MCNC: 6.1 G/DL — SIGNIFICANT CHANGE UP (ref 6–8.3)
PROTHROM AB SERPL-ACNC: 16.7 SEC — HIGH (ref 9.8–12.7)
R RICKETTSI AB SER-ACNC: NEGATIVE — SIGNIFICANT CHANGE UP
R RICKETTSI IGM SER-ACNC: 0.61 INDEX — SIGNIFICANT CHANGE UP (ref 0–0.89)
RBC # BLD: 3.87 M/UL — LOW (ref 4.2–5.8)
RBC # FLD: 14.1 % — SIGNIFICANT CHANGE UP (ref 10.3–14.5)
RICK SF IGG TITR SER IF: NEGATIVE — SIGNIFICANT CHANGE UP
RICK SF IGM TITR SER IF: 0.61 INDEX — SIGNIFICANT CHANGE UP (ref 0–0.89)
SODIUM SERPL-SCNC: 143 MMOL/L — SIGNIFICANT CHANGE UP (ref 135–145)
SPECIMEN SOURCE: SIGNIFICANT CHANGE UP
WBC # BLD: 12.3 K/UL — HIGH (ref 3.8–10.5)
WBC # FLD AUTO: 12.3 K/UL — HIGH (ref 3.8–10.5)

## 2018-10-19 PROCEDURE — 99233 SBSQ HOSP IP/OBS HIGH 50: CPT

## 2018-10-19 PROCEDURE — 71045 X-RAY EXAM CHEST 1 VIEW: CPT | Mod: 26

## 2018-10-19 PROCEDURE — 99291 CRITICAL CARE FIRST HOUR: CPT

## 2018-10-19 PROCEDURE — 99233 SBSQ HOSP IP/OBS HIGH 50: CPT | Mod: GC

## 2018-10-19 RX ORDER — POTASSIUM CHLORIDE 20 MEQ
40 PACKET (EA) ORAL ONCE
Qty: 0 | Refills: 0 | Status: COMPLETED | OUTPATIENT
Start: 2018-10-19 | End: 2018-10-19

## 2018-10-19 RX ORDER — CEFTRIAXONE 500 MG/1
2 INJECTION, POWDER, FOR SOLUTION INTRAMUSCULAR; INTRAVENOUS EVERY 24 HOURS
Qty: 0 | Refills: 0 | Status: COMPLETED | OUTPATIENT
Start: 2018-10-20 | End: 2018-10-22

## 2018-10-19 RX ORDER — POTASSIUM CHLORIDE 20 MEQ
40 PACKET (EA) ORAL ONCE
Qty: 0 | Refills: 0 | Status: DISCONTINUED | OUTPATIENT
Start: 2018-10-19 | End: 2018-10-19

## 2018-10-19 RX ORDER — CEFTRIAXONE 500 MG/1
2 INJECTION, POWDER, FOR SOLUTION INTRAMUSCULAR; INTRAVENOUS ONCE
Qty: 0 | Refills: 0 | Status: COMPLETED | OUTPATIENT
Start: 2018-10-19 | End: 2018-10-19

## 2018-10-19 RX ORDER — ACETAMINOPHEN 500 MG
650 TABLET ORAL EVERY 6 HOURS
Qty: 0 | Refills: 0 | Status: DISCONTINUED | OUTPATIENT
Start: 2018-10-19 | End: 2018-10-24

## 2018-10-19 RX ORDER — CEFTRIAXONE 500 MG/1
INJECTION, POWDER, FOR SOLUTION INTRAMUSCULAR; INTRAVENOUS
Qty: 0 | Refills: 0 | Status: COMPLETED | OUTPATIENT
Start: 2018-10-19 | End: 2018-10-22

## 2018-10-19 RX ORDER — LACTOBACILLUS ACIDOPHILUS 100MM CELL
1 CAPSULE ORAL
Qty: 0 | Refills: 0 | Status: DISCONTINUED | OUTPATIENT
Start: 2018-10-19 | End: 2018-10-24

## 2018-10-19 RX ADMIN — LACTULOSE 10 GRAM(S): 10 SOLUTION ORAL at 05:01

## 2018-10-19 RX ADMIN — Medication 81 MILLIGRAM(S): at 11:47

## 2018-10-19 RX ADMIN — Medication 30 MILLILITER(S): at 11:46

## 2018-10-19 RX ADMIN — SIMETHICONE 80 MILLIGRAM(S): 80 TABLET, CHEWABLE ORAL at 05:02

## 2018-10-19 RX ADMIN — SIMETHICONE 80 MILLIGRAM(S): 80 TABLET, CHEWABLE ORAL at 11:47

## 2018-10-19 RX ADMIN — Medication 650 MILLIGRAM(S): at 05:15

## 2018-10-19 RX ADMIN — Medication 650 MILLIGRAM(S): at 06:09

## 2018-10-19 RX ADMIN — Medication 650 MILLIGRAM(S): at 12:33

## 2018-10-19 RX ADMIN — ESCITALOPRAM OXALATE 10 MILLIGRAM(S): 10 TABLET, FILM COATED ORAL at 11:47

## 2018-10-19 RX ADMIN — Medication 30 MILLILITER(S): at 22:55

## 2018-10-19 RX ADMIN — SIMETHICONE 80 MILLIGRAM(S): 80 TABLET, CHEWABLE ORAL at 17:36

## 2018-10-19 RX ADMIN — PIPERACILLIN AND TAZOBACTAM 25 GRAM(S): 4; .5 INJECTION, POWDER, LYOPHILIZED, FOR SOLUTION INTRAVENOUS at 05:01

## 2018-10-19 RX ADMIN — SIMETHICONE 80 MILLIGRAM(S): 80 TABLET, CHEWABLE ORAL at 22:56

## 2018-10-19 RX ADMIN — TAMSULOSIN HYDROCHLORIDE 0.4 MILLIGRAM(S): 0.4 CAPSULE ORAL at 22:56

## 2018-10-19 RX ADMIN — Medication 30 MILLILITER(S): at 17:36

## 2018-10-19 RX ADMIN — Medication 40 MILLIEQUIVALENT(S): at 11:46

## 2018-10-19 RX ADMIN — PIPERACILLIN AND TAZOBACTAM 25 GRAM(S): 4; .5 INJECTION, POWDER, LYOPHILIZED, FOR SOLUTION INTRAVENOUS at 14:09

## 2018-10-19 RX ADMIN — ENOXAPARIN SODIUM 40 MILLIGRAM(S): 100 INJECTION SUBCUTANEOUS at 11:46

## 2018-10-19 RX ADMIN — CEFTRIAXONE 100 GRAM(S): 500 INJECTION, POWDER, FOR SOLUTION INTRAMUSCULAR; INTRAVENOUS at 19:12

## 2018-10-19 RX ADMIN — Medication 30 MILLILITER(S): at 05:01

## 2018-10-19 RX ADMIN — PANTOPRAZOLE SODIUM 40 MILLIGRAM(S): 20 TABLET, DELAYED RELEASE ORAL at 06:09

## 2018-10-19 RX ADMIN — Medication 650 MILLIGRAM(S): at 13:15

## 2018-10-19 RX ADMIN — Medication 1 TABLET(S): at 17:36

## 2018-10-19 RX ADMIN — Medication 1: at 17:35

## 2018-10-19 RX ADMIN — FINASTERIDE 5 MILLIGRAM(S): 5 TABLET, FILM COATED ORAL at 11:47

## 2018-10-19 NOTE — DISCHARGE NOTE ADULT - CARE PROVIDERS DIRECT ADDRESSES
[email or web address]

## 2018-10-19 NOTE — PROGRESS NOTE ADULT - SUBJECTIVE AND OBJECTIVE BOX
24 hour events:     Bcx w/ Ecoli (Ucx as well)     Review of Systems:  Constitutional: No fever, chills, fatigue  Neuro: No headache, numbness, weakness  Resp: No cough, wheezing, shortness of breath  CVS: No chest pain, palpitations, leg swelling  GI: No abdominal pain, nausea, vomiting, diarrhea   : No dysuria, frequency, incontinence  Skin: No itching, burning, rashes, or lesions   Msk: No joint pain or swelling  Psych: No depression, anxiety, mood swings    T(F): 92.9 (10-19-18 @ 07:40), Max: 99.3 (10-18-18 @ 13:57)  HR: 56 (10-19-18 @ 07:00) (56 - 106)  BP: 93/54 (10-19-18 @ 07:00) (91/50 - 156/71)  RR: 15 (10-19-18 @ 07:00) (15 - 51)  SpO2: 99% (10-19-18 @ 07:00) (96% - 100%)      10-18-18 @ 07:01  -  10-19-18 @ 07:00  --------------------------------------------------------  IN: 2342.6 mL / OUT: 2910 mL / NET: -567.4 mL        CAPILLARY BLOOD GLUCOSE      POCT Blood Glucose.: 101 mg/dL (19 Oct 2018 05:08)      I&O's Summary    18 Oct 2018 07:01  -  19 Oct 2018 07:00  --------------------------------------------------------  IN: 2342.6 mL / OUT: 2910 mL / NET: -567.4 mL        Physical Exam:   Gen:  Neuro:  HEENT:  Resp:  CVS:  Abd:  Ext:  Skin:    Meds:  piperacillin/tazobactam IVPB. IV Intermittent    norepinephrine Infusion IV Continuous  tamsulosin Oral    atorvastatin Oral  dextrose 40% Gel Oral PRN  dextrose 50% Injectable IV Push  dextrose 50% Injectable IV Push  dextrose 50% Injectable IV Push  finasteride Oral  glucagon  Injectable IntraMuscular PRN  insulin lispro (HumaLOG) corrective regimen sliding scale SubCutaneous      acetaminophen   Tablet .. Oral PRN  escitalopram Oral      aspirin  chewable Oral  enoxaparin Injectable SubCutaneous    aluminum hydroxide/magnesium hydroxide/simethicone Suspension Oral  lactulose Syrup Oral  pantoprazole    Tablet Oral  simethicone Chew      dextrose 5%. IV Continuous  lactated ringers. IV Continuous                                  11.8   12.30 )-----------( 113      ( 19 Oct 2018 05:19 )             35.9       10    143  |  108  |  15  ----------------------------<  103<H>  3.6   |  28  |  0.92    Ca    9.2      19 Oct 2018 05:19  Phos  2.0     10-19  Mg     1.9     10-19    TPro  6.1  /  Alb  2.2<L>  /  TBili  1.6<H>  /  DBili  .40<H>  /  AST  33  /  ALT  15  /  AlkPhos  66  1019      CARDIAC MARKERS ( 18 Oct 2018 04:54 )  x     / x     / 153 U/L / x     / x          PT/INR - ( 19 Oct 2018 05:19 )   PT: 16.7 sec;   INR: 1.52 ratio         PTT - ( 19 Oct 2018 05:19 )  PTT:34.2 sec  Urinalysis Basic - ( 17 Oct 2018 11:50 )    Color: Yellow / Appearance: Slightly Turbid / S.010 / pH: x  Gluc: x / Ketone: Negative  / Bili: Negative / Urobili: Negative   Blood: x / Protein: 25 mg/dL / Nitrite: Negative   Leuk Esterase: Moderate / RBC: 3-5 /HPF / WBC 11-25   Sq Epi: x / Non Sq Epi: Occasional / Bacteria: TNTC      .Urine Clean Catch (Midstream)   >100,000 CFU/ml Escherichia coli -- 10-17 @ 16:03  .Blood Blood   Growth in aerobic and anaerobic bottles: Escherichia coli  "Due to technical problems, Proteus sp. will Not be reported as part of  the BCID panel until further notice"  ***Blood Panel PCR results on this specimen are available  approximately 3 hours after the Gram stain result.***  Gram stain, PCR, and/or culture results may not always  correspond due to difference in methodologies.  ************************************************************  This PCR assay was performed using [employer].  The following targets are tested for: Enterococcus,  vancomycin resistant enterococci, Listeria monocytogenes,  coagulase negative staphylococci, S. aureus,  methicillin resistant S. aureus, Streptococcus agalactiae  (Group B), S. pneumoniae, S.pyogenes (Group A),  Acinetobacter baumannii, Enterobacter cloacae, E. coli,  Klebsiella oxytoca, K. pneumoniae, Proteus sp.,  Serratia marcescens, Haemophilus influenzae,  Neisseria meningitidis, Pseudomonas aeruginosa, Candida  albicans, C. glabrata, C krusei, C parapsilosis,  C. tropicalis and the KPC resistance gene.   Growth in aerobic and anaerobic bottles:  Gram Negative Rods 10-17 @ 15:39      Rapid RVP Result: Detected (10-17 @ 11:50)        Radiology: HIDA negative for cholecystitis       CENTRAL LINE: Y          DATE INSERTED: 10/17             REMOVE: N  REN: N                     A-LINE: N    GLOBAL ISSUE/BEST PRACTICE:  Analgesia:  pain well controlled  Sedation: none  CAM-ICU:   HOB elevation: yes  Stress ulcer prophylaxis: protonix 40 mg daily   VTE prophylaxis: lovenox 40 mg daily   Glycemic control: well controlled  Nutrition: NPO    CODE STATUS: FULL 24 hour events:     Bcx w/ Ecoli (Ucx as well)     Review of Systems:  Constitutional: No fever, chills, fatigue  Neuro: No headache, numbness, weakness  Resp: No cough, wheezing, shortness of breath  CVS: No chest pain, palpitations, leg swelling  GI: No abdominal pain, nausea, vomiting, diarrhea   : No dysuria, frequency, incontinence  Skin: No itching, burning, rashes, or lesions   Msk: No joint pain or swelling  Psych: No depression, anxiety, mood swings    T(F): 92.9 (10-19-18 @ 07:40), Max: 99.3 (10-18-18 @ 13:57)  HR: 56 (10-19-18 @ 07:00) (56 - 106)  BP: 93/54 (10-19-18 @ 07:00) (91/50 - 156/71)  RR: 15 (10-19-18 @ 07:00) (15 - 51)  SpO2: 99% (10-19-18 @ 07:00) (96% - 100%)      10-18-18 @ 07:01  -  10-19-18 @ 07:00  --------------------------------------------------------  IN: 2342.6 mL / OUT: 2910 mL / NET: -567.4 mL        CAPILLARY BLOOD GLUCOSE      POCT Blood Glucose.: 101 mg/dL (19 Oct 2018 05:08)      I&O's Summary    18 Oct 2018 07:01  -  19 Oct 2018 07:00  --------------------------------------------------------  IN: 2342.6 mL / OUT: 2910 mL / NET: -567.4 mL        Physical Exam:   Gen: well appearing sitting in chair  Neuro: follows commands, nonfocal   HEENT: sclera clear, oropharynx without exudates0  Resp: poor inspiratory effort, slight rhonci , no use of accessory muscles   CVS: regular rate and rhythm, no murmurs appreciated  Abd: soft, nontender  Ext: no clubbing or cyanosis, mild pain to palpation on L thigh from mid-thigh to knee  Skin: warm and well perfused    Meds:  piperacillin/tazobactam IVPB. IV Intermittent    norepinephrine Infusion IV Continuous  tamsulosin Oral    atorvastatin Oral  dextrose 40% Gel Oral PRN  dextrose 50% Injectable IV Push  dextrose 50% Injectable IV Push  dextrose 50% Injectable IV Push  finasteride Oral  glucagon  Injectable IntraMuscular PRN  insulin lispro (HumaLOG) corrective regimen sliding scale SubCutaneous      acetaminophen   Tablet .. Oral PRN  escitalopram Oral      aspirin  chewable Oral  enoxaparin Injectable SubCutaneous    aluminum hydroxide/magnesium hydroxide/simethicone Suspension Oral  lactulose Syrup Oral  pantoprazole    Tablet Oral  simethicone Chew      dextrose 5%. IV Continuous  lactated ringers. IV Continuous                                  11.8   12.30 )-----------( 113      ( 19 Oct 2018 05:19 )             35.9       10-    143  |  108  |  15  ----------------------------<  103<H>  3.6   |  28  |  0.92    Ca    9.2      19 Oct 2018 05:19  Phos  2.0     10-  Mg     1.9     10-    TPro  6.1  /  Alb  2.2<L>  /  TBili  1.6<H>  /  DBili  .40<H>  /  AST  33  /  ALT  15  /  AlkPhos  66  10-19      CARDIAC MARKERS ( 18 Oct 2018 04:54 )  x     / x     / 153 U/L / x     / x          PT/INR - ( 19 Oct 2018 05:19 )   PT: 16.7 sec;   INR: 1.52 ratio         PTT - ( 19 Oct 2018 05:19 )  PTT:34.2 sec  Urinalysis Basic - ( 17 Oct 2018 11:50 )    Color: Yellow / Appearance: Slightly Turbid / S.010 / pH: x  Gluc: x / Ketone: Negative  / Bili: Negative / Urobili: Negative   Blood: x / Protein: 25 mg/dL / Nitrite: Negative   Leuk Esterase: Moderate / RBC: 3-5 /HPF / WBC 11-25   Sq Epi: x / Non Sq Epi: Occasional / Bacteria: TNTC      .Urine Clean Catch (Midstream)   >100,000 CFU/ml Escherichia coli -- 10-17 @ 16:03  .Blood Blood   Growth in aerobic and anaerobic bottles: Escherichia coli  "Due to technical problems, Proteus sp. will Not be reported as part of  the BCID panel until further notice"  ***Blood Panel PCR results on this specimen are available  approximately 3 hours after the Gram stain result.***  Gram stain, PCR, and/or culture results may not always  correspond due to difference in methodologies.  ************************************************************  This PCR assay was performed using [employer].  The following targets are tested for: Enterococcus,  vancomycin resistant enterococci, Listeria monocytogenes,  coagulase negative staphylococci, S. aureus,  methicillin resistant S. aureus, Streptococcus agalactiae  (Group B), S. pneumoniae, S.pyogenes (Group A),  Acinetobacter baumannii, Enterobacter cloacae, E. coli,  Klebsiella oxytoca, K. pneumoniae, Proteus sp.,  Serratia marcescens, Haemophilus influenzae,  Neisseria meningitidis, Pseudomonas aeruginosa, Candida  albicans, C. glabrata, C krusei, C parapsilosis,  C. tropicalis and the KPC resistance gene.   Growth in aerobic and anaerobic bottles:  Gram Negative Rods 10-17 @ 15:39      Rapid RVP Result: Detected (10-17 @ 11:50)        Radiology: HIDA negative for cholecystitis       CENTRAL LINE: Y          DATE INSERTED: 10/17             REMOVE: Y (to be d/c'd today)   REN: N   (d/c'd today)                   A-LINE: N    GLOBAL ISSUE/BEST PRACTICE:  Analgesia:  pain well controlled  Sedation: none  CAM-ICU:   HOB elevation: yes  Stress ulcer prophylaxis: protonix 40 mg daily   VTE prophylaxis: lovenox 40 mg daily   Glycemic control: well controlled  Nutrition: NPO    CODE STATUS: FULL

## 2018-10-19 NOTE — PROGRESS NOTE ADULT - ASSESSMENT
89M with PMHx including CAD s/p stent x2 (2014), BPH s/p surgical intervention (2017) and UTI. admitted with septic shock, acute cholecystitis, pancreatitis, left hydronephrosis, Possible pna, +RVP entero/rhinovirus, mouna, E. Coli bactermia, Hypomagnesemia    -Neuro stable  -HD's improved, now off pressors  -Respiratory stable  -Although CT evidence of pancreatitis/cholecystitis, HIDA was negative. Monitor  -PO diet. PPI.   -Monitor UOP/Lytes. Replete Mg+  -Abx adjusted. Monitor wbc/temp  -S/P left ureter stent placement. Monitor UOP  -DVT ppx  -Supportive care

## 2018-10-19 NOTE — DISCHARGE NOTE ADULT - LEARNING BEHAVIORAL ACTIVITIES TO COPE WITH URGES. FOR EXAMPLE, DISTRACTION AND CHANGING ROUTINES.
Repeat  PSA in 6 months  TRT with Androgel  Repeat T level in 8 weeks. F/U  In 6 months.   Call with dose adjustments as needed.   Statement Selected

## 2018-10-19 NOTE — PROGRESS NOTE ADULT - ASSESSMENT
Assessment  Pt is an 89yr old man PMHx CAD s/p two cardiac stents in 2014, BPH,  p/w F 106, rigors and chills with foul smelling urine, now in the ICU for management of septic shock secondary to left obstructive hydro with associated UTI s/p cystoscopy with left ureter stent, now off pressors, right side pneumonia suspicion by CT in [place] though respiratory status stable ORA w/ O2 sat >95%, +RVP, cholelithiasis and pericholecystic fluid on CT with some peripancreatic edema (lipase trending down), HIDA scan negative for cholecystitis., now on Zosyn (10/17-), Ucx and Bcx now with E coli (sensitivities pending), afebrile and now off pressors.       Plan  Neuro: continue lexapro  Cardio: now off pressors, BP stable, continue ASA 81 and statin  Pulm: stable, ORA at 97%  ID: on Zosyn, ucx and bcx w/ E coli w/ sensitivities pending, RVP+, HIDA negative for cholecystitis, peripancreatic edema on CT lipase trending down, no abdominal pain, f/u blood and urine cx, urine legionnella negative, f/u strep ag, and yann mountain spotted fever   GI: protonix 40 mg, simethicone and maalox q6h, lactulose q6h, HIDA negative for cholecystitis, appreciate GI recs   Renal/lytes: Continue home finasteride and tamsulosin , replete lytes prn   Endo: HbA1C 5.9 on insulin sliding scale   Heme/Onc: WBC trending down to 21 from 26, H/H stable   Dispo: continue ICU management Assessment  Pt is an 89yr old man PMHx CAD s/p two cardiac stents in 2014, BPH,  p/w F 106, rigors and chills with foul smelling urine, now in the ICU for management of septic shock secondary to left obstructive hydro with associated UTI s/p cystoscopy with left ureter stent, now off pressors, right side pneumonia suspicion by CT in [place] though respiratory status stable ORA w/ O2 sat >95%, +RVP, cholelithiasis and pericholecystic fluid on CT with some peripancreatic edema (lipase trending down), HIDA scan negative for cholecystitis., now on Zosyn (10/17-), Ucx and Bcx now with E coli (sensitivities pending), afebrile and now off pressors w/ stable BP .       Plan  Neuro: continue lexapro  Cardio: now off pressors, BP stable, continue ASA 81 and statin  Pulm: stable, ORA at 97%  ID: on Zosyn, ucx and bcx w/ E coli w/ sensitivities pending, RVP+, HIDA negative for cholecystitis, peripancreatic edema on CT lipase trending down, no abdominal pain, f/u blood and urine cx, urine legionnella negative, f/u strep ag, and yann mountain spotted fever   GI: protonix 40 mg, simethicone and maalox q6h, lactulose q6h, HIDA negative for cholecystitis, appreciate GI recs   Renal/lytes: Continue home finasteride and tamsulosin , replete lytes prn   Endo: HbA1C 5.9 on insulin sliding scale   Heme/Onc: WBC trending down to 21 from 26, H/H stable   Dispo: continue ICU management Assessment  Pt is an 89yr old man PMHx CAD s/p two cardiac stents in 2014, BPH,  p/w F 106, rigors and chills with foul smelling urine, now in the ICU for management of septic shock secondary to left obstructive hydro with associated UTI s/p cystoscopy with left ureter stent, now off pressors, right side pneumonia suspicion by CT in [place] though respiratory status stable ORA w/ O2 sat >95%, +RVP, cholelithiasis and pericholecystic fluid on CT with some peripancreatic edema (lipase trending down), HIDA scan negative for cholecystitis., now on Zosyn (10/17-), Ucx and Bcx now with E coli (sensitivities pending), afebrile and now off pressors w/ stable BP .       Plan  Neuro: continue lexapro  Cardio: now off pressors, BP stable, continue ASA 81 and statin  Pulm: stable, ORA at 97%  ID: on Zosyn, ucx and bcx w/ E coli w/ sensitivities pending, switch to Rocephin 2 g IV daily pending sensitivity results per ID, appreciate recs,  RVP+, HIDA negative for cholecystitis, peripancreatic edema on CT lipase trending down, no abdominal pain, f/u blood and urine cx, urine legionnella negative, f/u strep ag, and yann mountain spotted fever   GI: protonix 40 mg, simethicone and maalox q6h, lactulose q6h, HIDA negative for cholecystitis, appreciate GI recs   Renal/lytes: Continue home finasteride and tamsulosin , replete lytes prn   Endo: HbA1C 5.9 on insulin sliding scale   Heme/Onc: WBC trending down to 21 from 26, H/H stable   Dispo: continue ICU management Assessment  Pt is an 89yr old man PMHx CAD s/p two cardiac stents in 2014, BPH,  p/w F 106, rigors and chills with foul smelling urine, now in the ICU for management of septic shock secondary to left obstructive hydro with associated UTI s/p cystoscopy with left ureter stent, now off pressors, right side pneumonia suspicion by CT in [place] though respiratory status stable ORA w/ O2 sat >95%, +RVP, cholelithiasis and pericholecystic fluid on CT with some peripancreatic edema (lipase trending down), HIDA scan negative for cholecystitis., now on Zosyn (10/17-), Ucx and Bcx now with E coli (sensitivities pending), afebrile and now off pressors w/ stable BP .       Plan  Neuro: continue lexapro  Cardio: now off pressors, BP stable, continue ASA 81 and statin  Pulm: stable, ORA at 97%  ID: on Zosyn, ucx and bcx w/ E coli w/ sensitivities pending, switch to Rocephin 2 g IV daily pending sensitivity results per ID, appreciate recs,  RVP+, HIDA negative for cholecystitis, peripancreatic edema on CT lipase trending down, no abdominal pain, f/u blood and urine cx, urine legionnella negative, f/u strep ag, and yann mountain spotted fever   GI: protonix 40 mg, simethicone and maalox q6h, lactulose q6h, HIDA negative for cholecystitis, appreciate GI recs   Renal/lytes: Continue home finasteride and tamsulosin , replete lytes prn   Endo: HbA1C 5.9 on insulin sliding scale   Heme/Onc: WBC trending down to 21 from 26, H/H stable   MSK: complained of left leg pain from midthigh to above the knee, old pain from home, does not radiate, mild pain, gets worse at the end of a day of walking, slightly tender to palpation, pain elicited by hip flexion, given tylenol 650 and warm compresses and pain subsided, will continue to reassess  Dispo: continue ICU management

## 2018-10-19 NOTE — PROGRESS NOTE ADULT - SUBJECTIVE AND OBJECTIVE BOX
Patient is a 89y old  Male who presents with a chief complaint of fever and foul smelling urine.      INTERVAL HPI/OVERNIGHT EVENTS: Pt states he is improving. Still reports generalized weakness. Denies fever, chills, abd pain, CP, SOB,     MEDICATIONS  (STANDING):  aluminum hydroxide/magnesium hydroxide/simethicone Suspension 30 milliLiter(s) Oral every 6 hours  aspirin  chewable 81 milliGRAM(s) Oral daily  atorvastatin 20 milliGRAM(s) Oral at bedtime  cefTRIAXone   IVPB      dextrose 5%. 1000 milliLiter(s) (50 mL/Hr) IV Continuous <Continuous>  dextrose 50% Injectable 12.5 Gram(s) IV Push once  dextrose 50% Injectable 25 Gram(s) IV Push once  dextrose 50% Injectable 25 Gram(s) IV Push once  enoxaparin Injectable 40 milliGRAM(s) SubCutaneous daily  escitalopram 10 milliGRAM(s) Oral daily  finasteride 5 milliGRAM(s) Oral daily  insulin lispro (HumaLOG) corrective regimen sliding scale   SubCutaneous every 6 hours  lactobacillus acidophilus 1 Tablet(s) Oral three times a day with meals  norepinephrine Infusion 0.05 MICROgram(s)/kG/Min (3.656 mL/Hr) IV Continuous <Continuous>  pantoprazole    Tablet 40 milliGRAM(s) Oral before breakfast  simethicone 80 milliGRAM(s) Chew every 6 hours  tamsulosin 0.4 milliGRAM(s) Oral at bedtime    MEDICATIONS  (PRN):  acetaminophen   Tablet .. 650 milliGRAM(s) Oral every 6 hours PRN Moderate Pain (4 - 6)  dextrose 40% Gel 15 Gram(s) Oral once PRN Blood Glucose LESS THAN 70 milliGRAM(s)/deciliter  glucagon  Injectable 1 milliGRAM(s) IntraMuscular once PRN Glucose LESS THAN 70 milligrams/deciliter      Allergies    No Known Allergies    Intolerances        REVIEW OF SYSTEMS:  CONSTITUTIONAL: No fever or chills  HEENT:  No headache, no sore throat  RESPIRATORY: No cough, wheezing, or shortness of breath  CARDIOVASCULAR: No chest pain, palpitations, or leg swelling  GASTROINTESTINAL: No abd pain, nausea, vomiting, or diarrhea  GENITOURINARY: No dysuria, frequency, or hematuria  NEUROLOGICAL: no focal weakness or dizziness  MUSCULOSKELETAL: no myalgias     Vital Signs Last 24 Hrs  T(C): 36.9 (19 Oct 2018 20:58), Max: 36.9 (19 Oct 2018 20:58)  T(F): 98.4 (19 Oct 2018 20:58), Max: 98.4 (19 Oct 2018 20:58)  HR: 62 (19 Oct 2018 20:58) (54 - 79)  BP: 129/75 (19 Oct 2018 20:58) (85/54 - 156/71)  BP(mean): 99 (19 Oct 2018 20:00) (64 - 102)  RR: 19 (19 Oct 2018 20:58) (15 - 38)  SpO2: 96% (19 Oct 2018 20:58) (95% - 100%)    PHYSICAL EXAM:  GENERAL: NAD  HEENT:  EOMI, moist mucous membranes  CHEST/LUNG:  CTA b/l, no rales, wheezes, or rhonchi  HEART:  RRR, S1, S2  ABDOMEN:  BS+, soft, nontender, nondistended  EXTREMITIES: no edema, cyanosis, or calf tenderness  NERVOUS SYSTEM: AA&Ox3    LABS:                        11.8   12.30 )-----------( 113      ( 19 Oct 2018 05:19 )             35.9     CBC Full  -  ( 19 Oct 2018 05:19 )  WBC Count : 12.30 K/uL  Hemoglobin : 11.8 g/dL  Hematocrit : 35.9 %  Platelet Count - Automated : 113 K/uL  Mean Cell Volume : 92.8 fl  Mean Cell Hemoglobin : 30.5 pg  Mean Cell Hemoglobin Concentration : 32.9 gm/dL  Auto Neutrophil # : 10.61 K/uL  Auto Lymphocyte # : 0.65 K/uL  Auto Monocyte # : 0.84 K/uL  Auto Eosinophil # : 0.14 K/uL  Auto Basophil # : 0.01 K/uL  Auto Neutrophil % : 86.3 %  Auto Lymphocyte % : 5.3 %  Auto Monocyte % : 6.8 %  Auto Eosinophil % : 1.1 %  Auto Basophil % : 0.1 %    19 Oct 2018 05:19    143    |  108    |  15     ----------------------------<  103    3.6     |  28     |  0.92     Ca    9.2        19 Oct 2018 05:19  Phos  2.0       19 Oct 2018 05:19  Mg     1.9       19 Oct 2018 05:19    TPro  6.1    /  Alb  2.2    /  TBili  1.6    /  DBili  .40    /  AST  33     /  ALT  15     /  AlkPhos  66     19 Oct 2018 05:19    PT/INR - ( 19 Oct 2018 05:19 )   PT: 16.7 sec;   INR: 1.52 ratio         PTT - ( 19 Oct 2018 05:19 )  PTT:34.2 sec    CAPILLARY BLOOD GLUCOSE      POCT Blood Glucose.: 162 mg/dL (19 Oct 2018 17:24)  POCT Blood Glucose.: 124 mg/dL (19 Oct 2018 11:31)  POCT Blood Glucose.: 101 mg/dL (19 Oct 2018 05:08)  POCT Blood Glucose.: 98 mg/dL (18 Oct 2018 23:17)        Culture - Blood (collected 10-18-18 @ 18:37)  Source: .Blood Blood-Peripheral  Preliminary Report (10-19-18 @ 19:01):    No growth to date.    Culture - Blood (collected 10-18-18 @ 18:37)  Source: .Blood Blood-Peripheral  Preliminary Report (10-19-18 @ 19:01):    No growth to date.    Culture - Urine (collected 10-17-18 @ 16:03)  Source: .Urine Clean Catch (Midstream)  Final Report (10-19-18 @ 17:17):    >100,000 CFU/ml Escherichia coli  Organism: Escherichia coli (10-19-18 @ 17:17)  Organism: Escherichia coli (10-19-18 @ 17:17)      -  Amikacin: S <=8      -  Amoxicillin/Clavulanic Acid: S <=8/4      -  Ampicillin: R >16 These ampicillin results predict results for amoxicillin      -  Ampicillin/Sulbactam: R >16/8      -  Aztreonam: S <=4      -  Cefazolin: R 8 For uncomplicated UTI with K. pneumoniae, E. coli, or P. mirablis: LUIS <=16 is sensitive and LUIS >=32 is resistant. This also predicts results for oral agents cefaclor, cefdinir, cefpodoxime, cefprozil, cefuroxime axetil, cephalexin and locarbef for uncomplicated UTI. Note that some isolates may be susceptible to these agents while testing resistant to cefazolin.      -  Cefepime: S <=2      -  Cefoxitin: S <=4      -  Ceftriaxone: S <=1 Enterobacter, Citrobacter, and Serratia may develop resistance during prolonged therapy      -  Ciprofloxacin: R >2      -  Ertapenem: S <=0.5      -  Gentamicin: R >8      -  Imipenem: S <=1      -  Levofloxacin: R >4      -  Meropenem: S <=1      -  Nitrofurantoin: S <=32 Should not be used to treat pyelonephritis      -  Piperacillin/Tazobactam: S <=8      -  Tigecycline: S <=1      -  Tobramycin: R >8      -  Trimethoprim/Sulfamethoxazole: S 2/38      Method Type: LUIS    Culture - Blood (collected 10-17-18 @ 15:39)  Source: .Blood Blood  Gram Stain (10-18-18 @ 03:31):    Growth in aerobic and anaerobic bottles:    Gram Negative Rods  Final Report (10-19-18 @ 19:33):    Growth in aerobic and anaerobic bottles:    Escherichia coli [facility] [place] [id number]    Culture - Blood (collected 10-17-18 @ 15:39)  Source: .Blood Blood  Gram Stain (10-18-18 @ 04:34):    Growth in aerobic and anaerobic bottles:    Gram Negative Rods  Final Report (10-19-18 @ 19:32):    Growth in aerobic and anaerobic bottles: Escherichia coli    "Due to technical problems, Proteus sp. will Not be reported as part of    the BCID panel until further notice"    ***Blood Panel PCR results on this specimen are available    approximately 3 hours after the Gram stain result.***    Gram stain, PCR, and/or culture results may not always    correspond due to difference in methodologies.    ************************************************************    This PCR assay was performed using [employer].    The following targets are tested for: Enterococcus,    vancomycin resistant enterococci, Listeria monocytogenes,    coagulase negative staphylococci, S. aureus,    methicillin resistant S. aureus, Streptococcus agalactiae    (Group B), S. pneumoniae, S.pyogenes (Group A),    Acinetobacter baumannii, Enterobacter cloacae, E. coli,    Klebsiella oxytoca, K. pneumoniae, Proteus sp.,    Serratia marcescens, Haemophilus influenzae,    Neisseria meningitidis, Pseudomonas aeruginosa, Candida    albicans, C. glabrata, C krusei, C parapsilosis,    C. tropicalis and the KPC resistance gene.  Organism: Blood Culture PCR  Escherichia coli (10-19-18 @ 19:32)  Organism: Escherichia coli (10-19-18 @ 19:32)      -  Amikacin: S <=8      -  Ampicillin: R >16 These ampicillin results predict results for amoxicillin      -  Ampicillin/Sulbactam: R >16/8      -  Aztreonam: S <=4      -  Cefazolin: S <=2      -  Cefepime: S <=2      -  Cefoxitin: S <=4      -  Ceftriaxone: S <=1 Enterobacter, Citrobacter, and Serratia may develop resistance during prolonged therapy      -  Ciprofloxacin: R >2      -  Ertapenem: S <=0.5      -  Gentamicin: R >8      -  Imipenem: S <=1      -  Levofloxacin: R >4      -  Meropenem: S <=1      -  Piperacillin/Tazobactam: S <=8      -  Tobramycin: I 8      -  Trimethoprim/Sulfamethoxazole: S 1/19      Method Type: LUIS  Organism: Blood Culture PCR (10-19-18 @ 19:32)      -  Escherichia coli: Detec      Method Type: PCR        RADIOLOGY & ADDITIONAL TESTS:    Personally reviewed.     Consultant(s) Notes Reviewed:  [x] YES  [ ] NO Patient is a 89y old  Male who presents with a chief complaint of fever and foul smelling urine.      INTERVAL HPI/OVERNIGHT EVENTS: Pt states he is improving. Still reports generalized weakness. Denies fever, chills, abd pain, CP, SOB,     MEDICATIONS  (STANDING):  aluminum hydroxide/magnesium hydroxide/simethicone Suspension 30 milliLiter(s) Oral every 6 hours  aspirin  chewable 81 milliGRAM(s) Oral daily  atorvastatin 20 milliGRAM(s) Oral at bedtime  cefTRIAXone   IVPB      dextrose 5%. 1000 milliLiter(s) (50 mL/Hr) IV Continuous <Continuous>  dextrose 50% Injectable 12.5 Gram(s) IV Push once  dextrose 50% Injectable 25 Gram(s) IV Push once  dextrose 50% Injectable 25 Gram(s) IV Push once  enoxaparin Injectable 40 milliGRAM(s) SubCutaneous daily  escitalopram 10 milliGRAM(s) Oral daily  finasteride 5 milliGRAM(s) Oral daily  insulin lispro (HumaLOG) corrective regimen sliding scale   SubCutaneous every 6 hours  lactobacillus acidophilus 1 Tablet(s) Oral three times a day with meals  norepinephrine Infusion 0.05 MICROgram(s)/kG/Min (3.656 mL/Hr) IV Continuous <Continuous>  pantoprazole    Tablet 40 milliGRAM(s) Oral before breakfast  simethicone 80 milliGRAM(s) Chew every 6 hours  tamsulosin 0.4 milliGRAM(s) Oral at bedtime    MEDICATIONS  (PRN):  acetaminophen   Tablet .. 650 milliGRAM(s) Oral every 6 hours PRN Moderate Pain (4 - 6)  dextrose 40% Gel 15 Gram(s) Oral once PRN Blood Glucose LESS THAN 70 milliGRAM(s)/deciliter  glucagon  Injectable 1 milliGRAM(s) IntraMuscular once PRN Glucose LESS THAN 70 milligrams/deciliter        Allergies    No Known Allergies    Intolerances        REVIEW OF SYSTEMS:  CONSTITUTIONAL: +generalized weakness; No fever or chills  HEENT:  No headache, no sore throat  RESPIRATORY: No cough, wheezing, or shortness of breath  CARDIOVASCULAR: No chest pain, palpitations, or leg swelling  GASTROINTESTINAL: No abd pain, nausea, vomiting, or diarrhea  GENITOURINARY: Urinary urgency improving; No dysuria, or hematuria  NEUROLOGICAL: no focal weakness or dizziness  MUSCULOSKELETAL: no myalgias     Vital Signs Last 24 Hrs  T(C): 36.9 (19 Oct 2018 20:58), Max: 36.9 (19 Oct 2018 20:58)  T(F): 98.4 (19 Oct 2018 20:58), Max: 98.4 (19 Oct 2018 20:58)  HR: 62 (19 Oct 2018 20:58) (54 - 79)  BP: 129/75 (19 Oct 2018 20:58) (85/54 - 156/71)  BP(mean): 99 (19 Oct 2018 20:00) (64 - 102)  RR: 19 (19 Oct 2018 20:58) (15 - 38)  SpO2: 96% (19 Oct 2018 20:58) (95% - 100%)    PHYSICAL EXAM:  GENERAL: NAD, frail  HEENT:  poor dentition, anicteric, moist mucous membranes  CHEST/LUNG: somewhat diminished breath sounds on right base, exam limited as pt not taking deep breaths ; rest of lungs CTA  HEART:  RRR, S1, S2  ABDOMEN:  BS+, soft, nontender, nondistended  EXTREMITIES: no edema, cyanosis, or calf tenderness  NERVOUS SYSTEM: answering questions and following commands appropriately    LABS:                        11.8   12.30 )-----------( 113      ( 19 Oct 2018 05:19 )             35.9     CBC Full  -  ( 19 Oct 2018 05:19 )  WBC Count : 12.30 K/uL  Hemoglobin : 11.8 g/dL  Hematocrit : 35.9 %  Platelet Count - Automated : 113 K/uL  Mean Cell Volume : 92.8 fl  Mean Cell Hemoglobin : 30.5 pg  Mean Cell Hemoglobin Concentration : 32.9 gm/dL  Auto Neutrophil # : 10.61 K/uL  Auto Lymphocyte # : 0.65 K/uL  Auto Monocyte # : 0.84 K/uL  Auto Eosinophil # : 0.14 K/uL  Auto Basophil # : 0.01 K/uL  Auto Neutrophil % : 86.3 %  Auto Lymphocyte % : 5.3 %  Auto Monocyte % : 6.8 %  Auto Eosinophil % : 1.1 %  Auto Basophil % : 0.1 %    19 Oct 2018 05:19    143    |  108    |  15     ----------------------------<  103    3.6     |  28     |  0.92     Ca    9.2        19 Oct 2018 05:19  Phos  2.0       19 Oct 2018 05:19  Mg     1.9       19 Oct 2018 05:19    TPro  6.1    /  Alb  2.2    /  TBili  1.6    /  DBili  .40    /  AST  33     /  ALT  15     /  AlkPhos  66     19 Oct 2018 05:19    PT/INR - ( 19 Oct 2018 05:19 )   PT: 16.7 sec;   INR: 1.52 ratio         PTT - ( 19 Oct 2018 05:19 )  PTT:34.2 sec    CAPILLARY BLOOD GLUCOSE      POCT Blood Glucose.: 162 mg/dL (19 Oct 2018 17:24)  POCT Blood Glucose.: 124 mg/dL (19 Oct 2018 11:31)  POCT Blood Glucose.: 101 mg/dL (19 Oct 2018 05:08)  POCT Blood Glucose.: 98 mg/dL (18 Oct 2018 23:17)        Culture - Blood (collected 10-18-18 @ 18:37)  Source: .Blood Blood-Peripheral  Preliminary Report (10-19-18 @ 19:01):    No growth to date.    Culture - Blood (collected 10-18-18 @ 18:37)  Source: .Blood Blood-Peripheral  Preliminary Report (10-19-18 @ 19:01):    No growth to date.    Culture - Urine (collected 10-17-18 @ 16:03)  Source: .Urine Clean Catch (Midstream)  Final Report (10-19-18 @ 17:17):    >100,000 CFU/ml Escherichia coli  Organism: Escherichia coli (10-19-18 @ 17:17)  Organism: Escherichia coli (10-19-18 @ 17:17)      -  Amikacin: S <=8      -  Amoxicillin/Clavulanic Acid: S <=8/4      -  Ampicillin: R >16 These ampicillin results predict results for amoxicillin      -  Ampicillin/Sulbactam: R >16/8      -  Aztreonam: S <=4      -  Cefazolin: R 8 For uncomplicated UTI with K. pneumoniae, E. coli, or P. mirablis: LUIS <=16 is sensitive and LUIS >=32 is resistant. This also predicts results for oral agents cefaclor, cefdinir, cefpodoxime, cefprozil, cefuroxime axetil, cephalexin and locarbef for uncomplicated UTI. Note that some isolates may be susceptible to these agents while testing resistant to cefazolin.      -  Cefepime: S <=2      -  Cefoxitin: S <=4      -  Ceftriaxone: S <=1 Enterobacter, Citrobacter, and Serratia may develop resistance during prolonged therapy      -  Ciprofloxacin: R >2      -  Ertapenem: S <=0.5      -  Gentamicin: R >8      -  Imipenem: S <=1      -  Levofloxacin: R >4      -  Meropenem: S <=1      -  Nitrofurantoin: S <=32 Should not be used to treat pyelonephritis      -  Piperacillin/Tazobactam: S <=8      -  Tigecycline: S <=1      -  Tobramycin: R >8      -  Trimethoprim/Sulfamethoxazole: S 2/38      Method Type: LUIS    Culture - Blood (collected 10-17-18 @ 15:39)  Source: .Blood Blood  Gram Stain (10-18-18 @ 03:31):    Growth in aerobic and anaerobic bottles:    Gram Negative Rods  Final Report (10-19-18 @ 19:33):    Growth in aerobic and anaerobic bottles:    Escherichia coli [facility] [id number]    Culture - Blood (collected 10-17-18 @ 15:39)  Source: .Blood Blood  Gram Stain (10-18-18 @ 04:34):    Growth in aerobic and anaerobic bottles:    Gram Negative Rods  Final Report (10-19-18 @ 19:32):    Growth in aerobic and anaerobic bottles: Escherichia coli    "Due to technical problems, Proteus sp. will Not be reported as part of    the BCID panel until further notice"    ***Blood Panel PCR results on this specimen are available    approximately 3 hours after the Gram stain result.***    Gram stain, PCR, and/or culture results may not always    correspond due to difference in methodologies.    ************************************************************    This PCR assay was performed using [employer].    The following targets are tested for: Enterococcus,    vancomycin resistant enterococci, Listeria monocytogenes,    coagulase negative staphylococci, S. aureus,    methicillin resistant S. aureus, Streptococcus agalactiae    (Group B), S. pneumoniae, S.pyogenes (Group A),    Acinetobacter baumannii, Enterobacter cloacae, E. coli,    Klebsiella oxytoca, K. pneumoniae, Proteus sp.,    Serratia marcescens, Haemophilus influenzae,    Neisseria meningitidis, Pseudomonas aeruginosa, Candida    albicans, C. glabrata, C krusei, C parapsilosis,    C. tropicalis and the KPC resistance gene.  Organism: Blood Culture PCR  Escherichia coli (10-19-18 @ 19:32)  Organism: Escherichia coli (10-19-18 @ 19:32)      -  Amikacin: S <=8      -  Ampicillin: R >16 These ampicillin results predict results for amoxicillin      -  Ampicillin/Sulbactam: R >16/8      -  Aztreonam: S <=4      -  Cefazolin: S <=2      -  Cefepime: S <=2      -  Cefoxitin: S <=4      -  Ceftriaxone: S <=1 Enterobacter, Citrobacter, and Serratia may develop resistance during prolonged therapy      -  Ciprofloxacin: R >2      -  Ertapenem: S <=0.5      -  Gentamicin: R >8      -  Imipenem: S <=1      -  Levofloxacin: R >4      -  Meropenem: S <=1      -  Piperacillin/Tazobactam: S <=8      -  Tobramycin: I 8      -  Trimethoprim/Sulfamethoxazole: S 1/19      Method Type: LUIS  Organism: Blood Culture PCR (10-19-18 @ 19:32)      -  Escherichia coli: Detec      Method Type: PCR        RADIOLOGY & ADDITIONAL TESTS:    Personally reviewed.     Consultant(s) Notes Reviewed:  [x] YES  [ ] NO

## 2018-10-19 NOTE — PROGRESS NOTE ADULT - ATTENDING COMMENTS
88 yo man with h/o CAD s/p stents x2 in 2014, p/w fever 106 and rigors, found to have elevated lactate, bandemia, ATUL, R consolidation on POCUS, + RVP entero/rhonivirus, CTA suggestive of pancreatitis, and acute cholecystitis as well as obstructing L ureteral stone and L hydro; shock on iv pressors;  had L ureteral stent placed 10/17 and has had significant improvement since with decreased pressor requirement (now off x 36 hours), decreased lactate.  HIDA neg for acute cholecystitis.  Blood cx growing E. coli, sens Pending    neuro: MS is good, NAD, awake and alert, has not required pain meds; lexapro  CV: off pressors; ASA restarted; prophylactic AC   Pulm:  +RVP, stable on RA  GI: CT suggested pancreatitis and cholecystitis; no clinical evidence of either; HIDA neg; can take po and advance diet as tolerated; continue home lactulose and PPI for GERD, monitor bm's and adjust lactulose as needed  :  ATUL, nephrolithiasis, s/p L ureteral stent 10/17; good UOP, improved creatinine  ID: urosepsis/E coli bacteremia; Abx narrowed to Zosyn; responding well after stent placement; f/u E coli sensitivities; if sensitive can change to ceftriaxone 2gm q 24h as per ID  Endo: HgbA1c 5.9 pre diabetic likely; continue FS ISS  Much improved and stable for floor bed

## 2018-10-19 NOTE — PHYSICAL THERAPY INITIAL EVALUATION ADULT - ADDITIONAL COMMENTS
Pt lives with his son and son's family in a house, + steps. Pt ambulates independently with SC or RW and is independent with ADLs.

## 2018-10-19 NOTE — DISCHARGE NOTE ADULT - CARE PLAN
Principal Discharge DX:	Septic shock due to Escherichia coli  Goal:	Resolved  Assessment and plan of treatment:	Due to UTI.  Complete course of antibiotics as prescribed.  Secondary Diagnosis:	Obstructive uropathy  Assessment and plan of treatment:	A ureteral stent was placed.  Follow up with urology for outpatient management.  Secondary Diagnosis:	GERD (gastroesophageal reflux disease)  Assessment and plan of treatment:	Continue home meds.  Secondary Diagnosis:	Hyperlipidemia  Assessment and plan of treatment:	Continue home meds.  Secondary Diagnosis:	BPH with obstruction/lower urinary tract symptoms  Assessment and plan of treatment:	Resume home meds.  Secondary Diagnosis:	Altered mental status  Assessment and plan of treatment:	Likely due to underlying dementia and acute metabolic encephalopathy from UTI and ATUL.  Improved.  Continue supportive care, reorientation.  Secondary Diagnosis:	Glaucoma  Assessment and plan of treatment:	Resume home meds.

## 2018-10-19 NOTE — DISCHARGE NOTE ADULT - SECONDARY DIAGNOSIS.
Obstructive uropathy GERD (gastroesophageal reflux disease) Hyperlipidemia BPH with obstruction/lower urinary tract symptoms Altered mental status Glaucoma

## 2018-10-19 NOTE — PROGRESS NOTE ADULT - SUBJECTIVE AND OBJECTIVE BOX
Patient is a 89y old  Male who presents with a chief complaint of sepsis secondary to UTI (19 Oct 2018 12:59)    24 hour events: Chart/labs reviewed. Pt seen and examined at bedside. No acute events    PAST MEDICAL & SURGICAL HISTORY:  CAD (coronary artery disease)  GERD (gastroesophageal reflux disease)  Hyperlipidemia  Benign prostatic hyperplasia, presence of lower urinary tract symptoms unspecified, unspecified morphology  H/O heart artery stent      Review of Systems:  Constitutional: No fever, chills, fatigue  Neuro: No headache, numbness, weakness  Resp: No cough, wheezing, shortness of breath  CVS: No chest pain, palpitations, leg swelling  GI: No abdominal pain, nausea, vomiting, diarrhea   : No dysuria, frequency, incontinence  Skin: No itching, burning, rashes, or lesions   Msk: No joint pain or swelling  Psych: No depression, anxiety, mood swings    T(F): 98.2 (10-19-18 @ 16:20), Max: 98.2 (10-18-18 @ 19:59)  HR: 65 (10-19-18 @ 19:00) (54 - 79)  BP: 123/57 (10-19-18 @ 19:00) (85/54 - 156/71)  RR: 17 (10-19-18 @ 19:00) (15 - 34)  SpO2: 98% (10-19-18 @ 19:00) (95% - 100%)  Wt(kg): --        CAPILLARY BLOOD GLUCOSE      POCT Blood Glucose.: 162 mg/dL (19 Oct 2018 17:24)      I&O's Summary    18 Oct 2018 07:01  -  19 Oct 2018 07:00  --------------------------------------------------------  IN: 2342.6 mL / OUT: 2910 mL / NET: -567.4 mL    19 Oct 2018 07:01  -  19 Oct 2018 19:25  --------------------------------------------------------  IN: 1260 mL / OUT: 0 mL / NET: 1260 mL        Physical Exam:     Gen: WD/WG male  Neuro: A&Ox3, non-focal  HEENT: NC/AT  Resp: Some rhonchi  CVS: nl S1/S2, RRR  Abd: soft, nt, nd, +bs  Ext: no edema, +pulses  Skin: well perfused, warm      Meds:    cefTRIAXone   IVPB   norepinephrine Infusion IV Continuous  tamsulosin Oral  atorvastatin Oral  finasteride Oral  glucagon  Injectable IntraMuscular PRN  insulin lispro (HumaLOG) corrective regimen sliding scale SubCutaneous  acetaminophen   Tablet .. Oral PRN  escitalopram Oral  aspirin  chewable Oral  enoxaparin Injectable SubCutaneous  aluminum hydroxide/magnesium hydroxide/simethicone Suspension Oral  pantoprazole    Tablet Oral  simethicone Chew  dextrose 5%. IV Continuous  lactobacillus acidophilus Oral                            11.8   12.30 )-----------( 113      ( 19 Oct 2018 05:19 )             35.9       10-19    143  |  108  |  15  ----------------------------<  103<H>  3.6   |  28  |  0.92    Ca    9.2      19 Oct 2018 05:19  Phos  2.0     10-19  Mg     1.9     10-19    TPro  6.1  /  Alb  2.2<L>  /  TBili  1.6<H>  /  DBili  .40<H>  /  AST  33  /  ALT  15  /  AlkPhos  66  10-19      CARDIAC MARKERS ( 18 Oct 2018 04:54 )  x     / x     / 153 U/L / x     / x          PT/INR - ( 19 Oct 2018 05:19 )   PT: 16.7 sec;   INR: 1.52 ratio         PTT - ( 19 Oct 2018 05:19 )  PTT:34.2 sec    .Blood Blood-Peripheral   No growth to date. -- 10-18 @ 18:37  .Urine Clean Catch (Midstream)   >100,000 CFU/ml Escherichia coli -- 10-17 @ 16:03  .Blood Blood   Growth in aerobic and anaerobic bottles: Escherichia coli  "Due to technical problems, Proteus sp. will Not be reported as part of  the BCID panel until further notice"  ***Blood Panel PCR results on this specimen are available  approximately 3 hours after the Gram stain result.***  Gram stain, PCR, and/or culture results may not always  correspond due to difference in methodologies.  ************************************************************  This PCR assay was performed using [employer].  The following targets are tested for: Enterococcus,  vancomycin resistant enterococci, Listeria monocytogenes,  coagulase negative staphylococci, S. aureus,  methicillin resistant S. aureus, Streptococcus agalactiae  (Group B), S. pneumoniae, S.pyogenes (Group A),  Acinetobacter baumannii, Enterobacter cloacae, E. coli,  Klebsiella oxytoca, K. pneumoniae, Proteus sp.,  Serratia marcescens, Haemophilus influenzae,  Neisseria meningitidis, Pseudomonas aeruginosa, Candida  albicans, C. glabrata, C krusei, C parapsilosis,  C. tropicalis and the KPC resistance gene.   Growth in aerobic and anaerobic bottles:  Gram Negative Rods 10-17 @ 15:39      Rapid RVP Result: Detected (10-17 @ 11:50)      Radiology: ***    Bedside lung ultrasound: ***    Bedside ECHO: ***    CENTRAL LINE: Y/N          DATE INSERTED:              REMOVE: Y/N    REN: Y/N                        DATE INSERTED:              REMOVE: Y/N    A-LINE: Y/N                       DATE INSERTED:              REMOVE: Y/N    GLOBAL ISSUE/BEST PRACTICE:  Analgesia:  Sedation:  HOB elevation: yes  Stress ulcer prophylaxis:  VTE prophylaxis:  Glycemic control:  Nutrition:      CODE STATUS: ***  GOC discussion: Y  Critical care time spent (mins): ***  (Reviewing data, imaging, discussing with multidisciplinary team, non inclusive of procedures, discussing goals of care with patient/family) Patient is a 89y old  Male who presents with a chief complaint of sepsis secondary to UTI (19 Oct 2018 12:59)    24 hour events: Chart/labs reviewed. Pt seen and examined at bedside. No acute events. No off pressor support    PAST MEDICAL & SURGICAL HISTORY:  CAD (coronary artery disease)  GERD (gastroesophageal reflux disease)  Hyperlipidemia  Benign prostatic hyperplasia, presence of lower urinary tract symptoms unspecified, unspecified morphology  H/O heart artery stent      Review of Systems:  Constitutional: No fever, chills, fatigue  Neuro: No headache, numbness, weakness  Resp: No cough, wheezing, shortness of breath  CVS: No chest pain, palpitations, leg swelling  GI: No abdominal pain, nausea, vomiting, diarrhea   : No dysuria, frequency, incontinence  Skin: No itching, burning, rashes, or lesions   Msk: No joint pain or swelling  Psych: No depression, anxiety, mood swings    T(F): 98.2 (10-19-18 @ 16:20), Max: 98.2 (10-18-18 @ 19:59)  HR: 65 (10-19-18 @ 19:00) (54 - 79)  BP: 123/57 (10-19-18 @ 19:00) (85/54 - 156/71)  RR: 17 (10-19-18 @ 19:00) (15 - 34)  SpO2: 98% (10-19-18 @ 19:00) (95% - 100%)  Wt(kg): --        CAPILLARY BLOOD GLUCOSE      POCT Blood Glucose.: 162 mg/dL (19 Oct 2018 17:24)      I&O's Summary    18 Oct 2018 07:01  -  19 Oct 2018 07:00  --------------------------------------------------------  IN: 2342.6 mL / OUT: 2910 mL / NET: -567.4 mL    19 Oct 2018 07:01  -  19 Oct 2018 19:25  --------------------------------------------------------  IN: 1260 mL / OUT: 0 mL / NET: 1260 mL        Physical Exam:     Gen: WD/WG male  Neuro: A&Ox3, non-focal  HEENT: NC/AT  Resp: Some rhonchi  CVS: nl S1/S2, RRR  Abd: soft, nt, nd, +bs  Ext: no edema, +pulses  Skin: well perfused, warm      Meds:    cefTRIAXone   IVPB   norepinephrine Infusion IV Continuous  tamsulosin Oral  atorvastatin Oral  finasteride Oral  glucagon  Injectable IntraMuscular PRN  insulin lispro (HumaLOG) corrective regimen sliding scale SubCutaneous  acetaminophen   Tablet .. Oral PRN  escitalopram Oral  aspirin  chewable Oral  enoxaparin Injectable SubCutaneous  aluminum hydroxide/magnesium hydroxide/simethicone Suspension Oral  pantoprazole    Tablet Oral  simethicone Chew  dextrose 5%. IV Continuous  lactobacillus acidophilus Oral                            11.8   12.30 )-----------( 113      ( 19 Oct 2018 05:19 )             35.9       10-19    143  |  108  |  15  ----------------------------<  103<H>  3.6   |  28  |  0.92    Ca    9.2      19 Oct 2018 05:19  Phos  2.0     10-19  Mg     1.9     10-19    TPro  6.1  /  Alb  2.2<L>  /  TBili  1.6<H>  /  DBili  .40<H>  /  AST  33  /  ALT  15  /  AlkPhos  66  10-19      CARDIAC MARKERS ( 18 Oct 2018 04:54 )  x     / x     / 153 U/L / x     / x          PT/INR - ( 19 Oct 2018 05:19 )   PT: 16.7 sec;   INR: 1.52 ratio         PTT - ( 19 Oct 2018 05:19 )  PTT:34.2 sec    .Blood Blood-Peripheral   No growth to date. -- 10-18 @ 18:37  .Urine Clean Catch (Midstream)   >100,000 CFU/ml Escherichia coli -- 10-17 @ 16:03  .Blood Blood   Growth in aerobic and anaerobic bottles: Escherichia coli  "Due to technical problems, Proteus sp. will Not be reported as part of  the BCID panel until further notice"  ***Blood Panel PCR results on this specimen are available  approximately 3 hours after the Gram stain result.***  Gram stain, PCR, and/or culture results may not always  correspond due to difference in methodologies.  ************************************************************  This PCR assay was performed using [employer].  The following targets are tested for: Enterococcus,  vancomycin resistant enterococci, Listeria monocytogenes,  coagulase negative staphylococci, S. aureus,  methicillin resistant S. aureus, Streptococcus agalactiae  (Group B), S. pneumoniae, S.pyogenes (Group A),  Acinetobacter baumannii, Enterobacter cloacae, E. coli,  Klebsiella oxytoca, K. pneumoniae, Proteus sp.,  Serratia marcescens, Haemophilus influenzae,  Neisseria meningitidis, Pseudomonas aeruginosa, Candida  albicans, C. glabrata, C krusei, C parapsilosis,  C. tropicalis and the KPC resistance gene.   Growth in aerobic and anaerobic bottles:  Gram Negative Rods 10-17 @ 15:39      Rapid RVP Result: Detected (10-17 @ 11:50)    CXR:    Findings: Multifocal patchy opacities throughout both lungs appear grossly   unchanged when compared to the prior x-ray examination. The heart size is at   the upper limits of normal. The aorta is tortuous. There are mild multilevel   degenerative changes of the thoracic spine.     There is a right IJ central line present with its tip at the SVC.     IMPRESSION: As above, no interval change.       CENTRAL LINE: Yes    REN: Yes    A-LINE: No    GLOBAL ISSUE/BEST PRACTICE:  Analgesia: no  Sedation: no  HOB elevation: yes  Stress ulcer prophylaxis: yes  VTE prophylaxis: yes  Glycemic control: yes  Nutrition: yes      CODE STATUS: Full  GOC discussion: Y  Critical care time spent (mins): 25  (Reviewing data, imaging, discussing with multidisciplinary team, non inclusive of procedures, discussing goals of care with patient/family)

## 2018-10-19 NOTE — DISCHARGE NOTE ADULT - MEDICATION SUMMARY - MEDICATIONS TO STOP TAKING
I will STOP taking the medications listed below when I get home from the hospital:    lactulose 10 g oral powder for reconstitution  -- orally 3 times a day

## 2018-10-19 NOTE — PROGRESS NOTE ADULT - SUBJECTIVE AND OBJECTIVE BOX
IDRIS DANIEL  MRN-[id number] 89y    GENERAL SURGERY/ DR. [facility]    POOR INTAKE , HOWEVER TOLERATING  PUREE DIET   NO FEVER,  CHILLS, N/V, ABDOMINAL PAIN   REN DISCONTINUED 6 AM THIS MORNING     MEDICATIONS  (STANDING):  aluminum hydroxide/magnesium hydroxide/simethicone Suspension 30 milliLiter(s) Oral every 6 hours  aspirin  chewable 81 milliGRAM(s) Oral daily  atorvastatin 20 milliGRAM(s) Oral at bedtime  dextrose 5%. 1000 milliLiter(s) (50 mL/Hr) IV Continuous <Continuous>  dextrose 50% Injectable 12.5 Gram(s) IV Push once  dextrose 50% Injectable 25 Gram(s) IV Push once  dextrose 50% Injectable 25 Gram(s) IV Push once  enoxaparin Injectable 40 milliGRAM(s) SubCutaneous daily  escitalopram 10 milliGRAM(s) Oral daily  finasteride 5 milliGRAM(s) Oral daily  insulin lispro (HumaLOG) corrective regimen sliding scale   SubCutaneous every 6 hours  lactated ringers. 1000 milliLiter(s) (75 mL/Hr) IV Continuous <Continuous>  lactulose Syrup 10 Gram(s) Oral every 6 hours  norepinephrine Infusion 0.05 MICROgram(s)/kG/Min (3.656 mL/Hr) IV Continuous <Continuous>  pantoprazole    Tablet 40 milliGRAM(s) Oral before breakfast  piperacillin/tazobactam IVPB. 3.375 Gram(s) IV Intermittent every 8 hours  simethicone 80 milliGRAM(s) Chew every 6 hours  tamsulosin 0.4 milliGRAM(s) Oral at bedtime    ICU Vital Signs Last 24 Hrs  T(C): 36.4 (18 Oct 2018 23:57), Max: 37.4 (18 Oct 2018 13:57)  T(F): 97.5 (18 Oct 2018 23:57), Max: 99.3 (18 Oct 2018 13:57)  HR: 70 (19 Oct 2018 06:00) (68 - 106)  BP: 118/55 (19 Oct 2018 06:00) (83/49 - 156/71)  BP(mean): 79 (19 Oct 2018 06:00) (61 - 102)  ABP: --  ABP(mean): --  RR: 30 (19 Oct 2018 06:00) (16 - 51)  SpO2: 100% (19 Oct 2018 06:00) (96% - 100%)      10-17-18 @ 07:01  -  10-18-18 @ 07:00  --------------------------------------------------------  IN: 2395.2 mL / OUT: 2880 mL / NET: -484.8 mL    10-18-18 @ 07:01  -  10-19-18 @ 06:54  --------------------------------------------------------  IN: 2342.6 mL / OUT: 2910 mL / NET: -567.4 mL       ABDOMEN : + BS, REDUCIBLE UMBILICAL HERNIA, SOFT, NOT DISTENDED, NON TENDER TO  PALPATION                                              11.8   12.30 )-----------( 113      ( 19 Oct 2018 05:19 )             35.9      10-19    143  |  108  |  15  ----------------------------<  103<H>  3.6   |  28  |  0.92    Ca    9.2      19 Oct 2018 05:19  Phos  2.0     10-19  Mg     1.9     10-19    TPro  6.1  /  Alb  2.2<L>  /  TBili  1.6<H>  /  DBili  .40<H>  /  AST  33  /  ALT  15  /  AlkPhos  66  10-19    NM Hepatobiliary Scan w/wo Gall Bladder (10.18.18 @ 12:43)   EXAM:  NM HEPATOBILIARY IMG                          INTERPRETATION:  RADIOPHARMACEUTICAL: 3.0 mCi Tc-99m-Mebrofenin, I.V.    CLINICAL STATEMENT: 89-year-old male with cholelithiasis and sepsis.   There is gallbladder wall edema with thickening on ultrasound from   10/17/2018. Referred to evaluate for acute cholecystitis which may be    the source of sepsis.    TECHNIQUE:  Dynamic imaging of the anterior abdomen was performed for 40   minutes following injection of radiotracer. Static images of the abdomen   in the anterior, right lateral, and CAMPBELL views were obtained immediately   thereafter.    FINDINGS: There is prompt, homogeneous uptake of radiotracer by the   hepatocytes. Activity is first seen inthe gallbladder at 30 minutes and   in the bowel at 10 minutes. There is good clearance of activity from the   liver by the end of the study.    IMPRESSION: Normal hepatobiliary scan.    No evidence of acute cholecystitis.    KENNETH RAYO M.D., NUCLEAR MEDICINE ATTENDING      ASSESSMENT &  PLAN:      89 YO MALE SEPTIC SHOCK RESOLVING   RIGHT PNEUMONIA, + ENTERO/ RHINOVIRUS, LEFT HYDROURETERONEPHROSIS  S/P  CYSTO, LEFT STENT , REN CATH DISCONTINUED 6 AM ,  HIDA SCAN NEGATIVE FOR ACUTE CHOLECYSTITIS, TOLERATING DIET , ASYMPTOMATIC   ANTIBIOTICS AS PER ID ON ZOSYN   ID, GI, CARDIOLOGY, MEDICAL AND ICU MANAGEMENT NOTED

## 2018-10-19 NOTE — PROGRESS NOTE ADULT - SUBJECTIVE AND OBJECTIVE BOX
ID progress note     Name: IDRIS DANIEL  Age: 89y  Gender: Male  MRN: [id number]    Interval History-- Events noted, doing much better, awake, alert , offers no new complaints . Afebrile . Has not been OOB . Repeat blood cs pending   Notes reviewed    Past Medical History--  CAD (coronary artery disease)  GERD (gastroesophageal reflux disease)  Hyperlipidemia  Benign prostatic hyperplasia, presence of lower urinary tract symptoms unspecified, unspecified morphology  H/O heart artery stent      For details regarding the patient's social history, family history, and other miscellaneous elements, please refer the initial infectious diseases consultation and/or the admitting history and physical examination for this admission.    Allergies--  Allergies    No Known Allergies    Intolerances        Medications--  Antibiotics:  piperacillin/tazobactam IVPB. 3.375 Gram(s) IV Intermittent every 8 hours    Immunologic:    Other:  acetaminophen   Tablet .. PRN  aluminum hydroxide/magnesium hydroxide/simethicone Suspension  aspirin  chewable  atorvastatin  dextrose 40% Gel PRN  dextrose 5%.  dextrose 50% Injectable  dextrose 50% Injectable  dextrose 50% Injectable  enoxaparin Injectable  escitalopram  finasteride  glucagon  Injectable PRN  insulin lispro (HumaLOG) corrective regimen sliding scale  lactated ringers.  lactulose Syrup  norepinephrine Infusion  pantoprazole    Tablet  potassium chloride   Powder  simethicone  tamsulosin      Review of Systems--  A 10-point review of systems was obtained.     Pertinent positives and negatives--  Constitutional: No fevers. No Chills. No Rigors.   Cardiovascular: No chest pain. No palpitations.  Respiratory: No shortness of breath. No cough.  Gastrointestinal: No nausea or vomiting. No diarrhea or constipation.   Psychiatric: Pleasant. Appropriate affect.    Review of systems otherwise negative except as previously noted.    Physical Examination--  Vital Signs: T(F): 98.2 (10-19-18 @ 07:40), Max: 99.3 (10-18-18 @ 13:57)  HR: 67 (10-19-18 @ 09:00)  BP: 85/54 (10-19-18 @ 09:00)  RR: 26 (10-19-18 @ 09:00)  SpO2: 96% (10-19-18 @ 09:00)  Wt(kg): --  General: Nontoxic-appearing Male in no acute distress.  HEENT: AT/NC. PERRL. EOMI. Anicteric. Conjunctiva pink and moist. Oropharynx clear. Dentition fair.  Neck: Not rigid. No sense of mass. right CVP in place   Nodes: None palpable.  Lungs: Coarse breath sounds  bilaterally without rales, wheezing or rhonchi  Heart: Regular rate and rhythm. No Murmur. No rub. No gallop. No palpable thrill.  Abdomen: Bowel sounds present and normoactive. Soft. Nondistended. Nontender. No sense of mass. No organomegaly.  Back: No spinal tenderness. No costovertebral angle tenderness.   Extremities: No cyanosis or clubbing. No edema.   Skin: Warm. Dry. Good turgor. No rash. No vasculitic stigmata.  Psychiatric: Appropriate affect and mood for situation.         Laboratory Studies--  CBC                        11.8   12.30 )-----------( 113      ( 19 Oct 2018 05:19 )             35.9       Chemistries  10-19    143  |  108  |  15  ----------------------------<  103<H>  3.6   |  28  |  0.92    Ca    9.2      19 Oct 2018 05:19  Phos  2.0     10-19  Mg     1.9     10-19    TPro  6.1  /  Alb  2.2<L>  /  TBili  1.6<H>  /  DBili  .40<H>  /  AST  33  /  ALT  15  /  AlkPhos  66  10-19    CAPILLARY BLOOD GLUCOSE      POCT Blood Glucose.: 101 mg/dL (19 Oct 2018 05:08)  POCT Blood Glucose.: 98 mg/dL (18 Oct 2018 23:17)  POCT Blood Glucose.: 151 mg/dL (18 Oct 2018 18:11)  POCT Blood Glucose.: 109 mg/dL (18 Oct 2018 11:35)    Lactate, Blood: 1.1 mmol/L (10-18-18 @ 04:54)  Lactate, Blood: 2.1 mmol/L (10-17-18 @ 23:57)  Lactate, Blood: 3.1 mmol/L (10-17-18 @ 20:40)  Lactate, Blood: 2.4 mmol/L (10-17-18 @ 14:25)  Lactate, Blood: 3.9 mmol/L (10-17-18 @ 11:50)      RECENT CULTURES    Culture - Urine (collected 17 Oct 2018 16:03)  Source: .Urine Clean Catch (Midstream)  Preliminary Report (18 Oct 2018 15:42):    >100,000 CFU/ml Escherichia coli    Culture - Blood (collected 17 Oct 2018 15:39)  Source: .Blood Blood  Gram Stain (18 Oct 2018 03:31):    Growth in aerobic and anaerobic bottles:    Gram Negative Rods  Preliminary Report (18 Oct 2018 19:05):    Growth in aerobic and anaerobic bottles:    Escherichia coli [facility] [id number]    Culture - Blood (collected 17 Oct 2018 15:39)  Source: .Blood Blood  Gram Stain (18 Oct 2018 04:34):    Growth in aerobic and anaerobic bottles:    Gram Negative Rods  Preliminary Report (18 Oct 2018 19:11):    Growth in aerobic and anaerobic bottles: Escherichia coli    "Due to technical problems, Proteus sp. will Not be reported as part of    the BCID panel until further notice"    ***Blood Panel PCR results on this specimen are available    approximately 3 hours after the Gram stain result.***    Gram stain, PCR, and/or culture results may not always    correspond due to difference in methodologies.    ************************************************************    This PCR assay was performed using [employer].    The following targets are tested for: Enterococcus,    vancomycin resistant enterococci, Listeria monocytogenes,    coagulase negative staphylococci, S. aureus,    methicillin resistant S. aureus, Streptococcus agalactiae    (Group B), S. pneumoniae, S.pyogenes (Group A),    Acinetobacter baumannii, Enterobacter cloacae, E. coli,    Klebsiella oxytoca, K. pneumoniae, Proteus sp.,    Serratia marcescens, Haemophilus influenzae,    Neisseria meningitidis, Pseudomonas aeruginosa, Candida    albicans, C. glabrata, C krusei, C parapsilosis,    C. tropicalis and the KPC resistance gene.  Organism: Blood Culture PCR (18 Oct 2018 06:21)  Organism: Blood Culture PCR (18 Oct 2018 06:21)        RADIOLOGY:  Xray Chest 1 View- PORTABLE-Routine (10.19.18 @ 06:05) >  Comparison: Prior chest x-ray examination from 10/17/2018.    Findings: Multifocal patchy opacities throughout both lungs appear   grossly unchanged when compared to the prior x-ray examination. The heart   size is at the upper limits of normal. The aorta is tortuous. There are   mild multilevel degenerative changes of the thoracic spine.    There is a right IJ central line present with its tip at the SVC.    IMPRESSION: As above, no interval change.      NM Hepatobiliary Scan w/wo Gall Bladder (10.18.18 @ 12:43) >    IMPRESSION: Normal hepatobiliary scan.    No evidence of acute cholecystitis.     CT Chest , Abdomen and pelvis No Cont (10.17.18 @ 15:41) >    Impression:    Limited by lack of contrast and artifact.  Extensive right lung pneumonia.  Cholelithiasis. Possible acute cholecystitis. Correlate with right upper   quadrant ultrasound and/or HIDA scan.  Findings suggestive of pancreatitis. Correlate with amylase, lipase levels  Mildleft hydronephrosis secondary to a mid left ureteral calculus.   Additional nonobstructive left nephrolithiasis.  Bladder calculi. Bladder wall thickening suggestive of cystitis in the   appropriate setting.  Additional findings as discussed    Assessment--    89y old  Male hx CAD/cardiac stents 2yrs ago, HLD, BPH adm to ICU w/ septic shock secondary to E coli bacteremia likely secondary to obstructive uropathy for left ureteral stone. Ct chest and abdomen also shows extensive right sided  pneumonia and possible gall stone pancreatitis, although he has minimal cough or GI symptoms .    He has responded to antibiotics and supportive care and improved remarkably once he has left ureteral stent placed, so suspect primary problem to be  infection     he had ATUL which has improved     CXR findings may suggest superimposed fluid overload         Plan:     -will continue with IV Zosyn pending final cs , if E coli sensitive to Rocephin , he can be changed to IV Rocephin 2 grams daily   - repeat blood cs x 2 pending   - consider DC CVP line   - trend cbc, LFT's   - PT evaluation and OOB     D/W with ICU team     Continue with present regime .  Appropriate use of antibiotics and adverse effects reviewed.    I have discussed the above plan of care with patient and his family in detail. They expressed understanding of the treatment plan . Risks, benefits and alternatives discussed in detail. I have asked if they have any questions or concerns and appropriately addressed them to the best of my ability .      > 35 minutes spent in direct patient care reviewing  the notes, lab data/ imaging , discussion with multidisciplinary team. All questions were addressed and answered to the best of my capacity .    Thank you for allowing me to participate in the care of your patient .        Humphrey Thakkar MD  [phone]

## 2018-10-19 NOTE — PROGRESS NOTE ADULT - PROBLEM SELECTOR PLAN 2
due to left ureteral stone  -stent placed and rec to f/up as outpatient when stable for lithotripsy and stent removal

## 2018-10-19 NOTE — PROGRESS NOTE ADULT - PROBLEM SELECTOR PLAN 1
Lactate 3.9, temp 106F, bands 13% on admission  due to obstructed left kidney from left ureteral stone -- now s/p emergent ureteral stent placement with decompression of the left hydro on 10/17  -pt greatly improved subsequent to the stent placement ; blood and urine cultures growing E coli, so urine is the likely source  -leukocytosis improving well; now afebrile, tapered off pressors.  -patient suspected to also have pneumonia based on CT but very little respiratory symptoms   -question of acute cholecystitis on CT so HIDA done yesterday and is normal.   -blood and urine cultures back this evening with slight inconsistency (BCx sensitive to cephalexin while urine culture is resistant to it) -- this is pertinent because it makes transition to po antibiotics less straightforward (fluoroquinolones resistance; Bactrim and macrobid likely not preferred in cases of pyelonephritis). F/up ID recs.  -consider switching zosyn to rocephin 2g IV daily in the meantime as per ID recs.  -repeat BCx negative x 24 hours this evening.

## 2018-10-19 NOTE — DISCHARGE NOTE ADULT - PATIENT PORTAL LINK FT
You can access the [facility], offered by [facility], by registering with the following website: [email or web address]

## 2018-10-19 NOTE — PROGRESS NOTE ADULT - PROBLEM SELECTOR PLAN 3
initially some concern for acute cholecystitis, but pt has no symptoms or physical exam findings of acute re and the HIDA scan was normal.  outpt f/up  pt has indirect hyperbilirubinemia which is not consistent with biliary obstruction -- given H&H stable hemolysis unlikely ; perhaps element of Gilbert's syndrome

## 2018-10-19 NOTE — PROGRESS NOTE ADULT - SUBJECTIVE AND OBJECTIVE BOX
Follow up: urosepsis, hypotension    HPI:  Patient is an 89 year old male with a PMH of CAD s/p two cardiac stents in , BPH with prostate surgery last year, history of UTI who presented to the ED with complaints of weakness, fever, rigors, and dark, foul-smelling urine for one day. Patient's 2 adult sons are present at bedside who stated that patient's wife  3 weeks ago on home hospice care secondary to end-stage COPD and URTI. One of his son's has been living with him since January. Patient's baseline is ambulatory without assistance and he is competent in ADLs. Patient got his flu shot in September.    He is sleeping this morning but arousable.      PAST MEDICAL & SURGICAL HISTORY:  CAD (coronary artery disease)  GERD (gastroesophageal reflux disease)  Hyperlipidemia  Benign prostatic hyperplasia, presence of lower urinary tract symptoms unspecified, unspecified morphology  H/O heart artery stent      MEDICATIONS  (STANDING):  aluminum hydroxide/magnesium hydroxide/simethicone Suspension 30 milliLiter(s) Oral every 6 hours  aspirin  chewable 81 milliGRAM(s) Oral daily  atorvastatin 20 milliGRAM(s) Oral at bedtime  dextrose 5%. 1000 milliLiter(s) (50 mL/Hr) IV Continuous <Continuous>  dextrose 50% Injectable 12.5 Gram(s) IV Push once  dextrose 50% Injectable 25 Gram(s) IV Push once  dextrose 50% Injectable 25 Gram(s) IV Push once  enoxaparin Injectable 40 milliGRAM(s) SubCutaneous daily  escitalopram 10 milliGRAM(s) Oral daily  finasteride 5 milliGRAM(s) Oral daily  insulin lispro (HumaLOG) corrective regimen sliding scale   SubCutaneous every 6 hours  lactated ringers. 1000 milliLiter(s) (75 mL/Hr) IV Continuous <Continuous>  lactulose Syrup 10 Gram(s) Oral every 6 hours  norepinephrine Infusion 0.05 MICROgram(s)/kG/Min (3.656 mL/Hr) IV Continuous <Continuous>  pantoprazole    Tablet 40 milliGRAM(s) Oral before breakfast  piperacillin/tazobactam IVPB. 3.375 Gram(s) IV Intermittent every 8 hours  potassium chloride   Powder 40 milliEquivalent(s) Oral once  simethicone 80 milliGRAM(s) Chew every 6 hours  tamsulosin 0.4 milliGRAM(s) Oral at bedtime    MEDICATIONS  (PRN):  acetaminophen   Tablet .. 650 milliGRAM(s) Oral every 6 hours PRN Moderate Pain (4 - 6)  dextrose 40% Gel 15 Gram(s) Oral once PRN Blood Glucose LESS THAN 70 milliGRAM(s)/deciliter  glucagon  Injectable 1 milliGRAM(s) IntraMuscular once PRN Glucose LESS THAN 70 milligrams/deciliter      Vital Signs Last 24 Hrs  T(C): 36.8 (19 Oct 2018 07:40), Max: 37.4 (18 Oct 2018 13:57)  T(F): 98.2 (19 Oct 2018 07:40), Max: 99.3 (18 Oct 2018 13:57)  HR: 59 (19 Oct 2018 08:00) (56 - 106)  BP: 99/54 (19 Oct 2018 08:00) (93/54 - 156/71)  BP(mean): 73 (19 Oct 2018 08:00) (68 - 102)  RR: 24 (19 Oct 2018 08:00) (15 - 51)  SpO2: 97% (19 Oct 2018 08:00) (96% - 100%)    I&O's Summary    18 Oct 2018 07:01  -  19 Oct 2018 07:00  --------------------------------------------------------  IN: 2342.6 mL / OUT: 2910 mL / NET: -567.4 mL        PHYSICAL EXAM:    Constitutional: NAD, well-developed  Eyes:  Pupils round, no lesions  ENMT: no exudate or erythema  Pulmonary: Non-labored, breath sounds are clear bilaterally, No wheezing, rales or rhonchi  Cardiovascular: PMI not palpable RRR normal S1 and S2, no murmurs, rubs, gallops or clicks  Gastrointestinal: Bowel Sounds present, soft, nontender.   Lymph: No cervical lymphadenopathy.  Neurological:, no focal deficits  Skin: No rashes.  No cyanosis.  Psych:  cannott assess  Ext: No lower ext edema                            11.8   12.30 )-----------( 113      ( 19 Oct 2018 05:19 )             35.9     10-19    143  |  108  |  15  ----------------------------<  103<H>  3.6   |  28  |  0.92    Ca    9.2      19 Oct 2018 05:19  Phos  2.0     10  Mg     1.9     10-19    TPro  6.1  /  Alb  2.2<L>  /  TBili  1.6<H>  /  DBili  .40<H>  /  AST  33  /  ALT  15  /  AlkPhos  66  10-19    < from: Xray Chest 1 View- PORTABLE-Routine (10.19.18 @ 06:05) >    EXAM:  XR CHEST PORTABLE ROUTINE 1V                            PROCEDURE DATE:  10/19/2018          INTERPRETATION:  Clinical information: Pneumonia.    Technique: Frontal view of the chest.    Comparison: Prior chest x-ray examination from 10/17/2018.    Findings: Multifocal patchy opacities throughout both lungs appear   grossly unchanged when compared to the prior x-ray examination. The heart   size is at the upper limits of normal. The aorta is tortuous. There are   mild multilevel degenerative changes of the thoracic spine.    There is a right IJ central line present with its tip at the SVC.    IMPRESSION: As above, no interval change.                MAKENZIE JORDAN M.D., ATTENDING RADIOLOGIST  This document has been electronically signed. Oct 19 2018  9:23AM                < end of copied text >  < from: TTE Echo Doppler w/o Cont (10.18.18 @ 16:14) >     EXAM:  ECHO TTE W/O CON COMP W/DOPPLR         PROCEDURE DATE:  10/18/2018        INTERPRETATION:  INDICATION: CHF  Referring M.D.:Santiago  Blood Pressure 140/60        Weight (kg) :78     Height (cm):170       BSA (sq m): 1.9  Technician: KL    Dimensions:    LA 2.8       Normal Values: 2.0 - 4.0 cm    Ao 3.5        Normal Values: 2.0 - 3.8 cm  SEPTUM O.8       Normal Values: 0.6 - 1.2 cm  PWT 0.8       Normal Values: 0.6 - 1.1 cm  LVIDd 3.6         Normal Values: 3.0 - 5.6 cm  LVIDs 2.5         Normal Values: 1.8 - 4.0 cm      OBSERVATIONS:    Mitral Valve: Mitral annular calcification with mild mitral regurgitation  Aortic Valve/Aorta: Aortic sclerosis  Tricuspid Valve: Normal tricuspid valve. Mild tricuspid regurgitation.  Pulmonic Valve: The pulmonic valve is not well visualized. Probably   normal.  Left Atrium: Normal  Right Atrium: Normal  Left Ventricle: Normal left ventricular size, wall thickness, and global   systolic function. The EF is approximately 55%.  Right Ventricle: Normal right ventricular size and function.  Pericardium/Pleura: No pericardial effusion noted.  Pulmonary/RV Pressure: The right ventricular systolic pressure is   estimated to be 22mmHg, assuming that the right atrial pressure is   estimated to be 8 mmHg. This is consistent with no pulmonary pressures.  LV Diastolic Function: Doppler evidence suggestive of diastolic   dysfunction.    Conclusion: Technically limited study  1. Normal left ventricular size, wall thickness, and global systolic   function  2. Mitral annular calcification with mild mitral regurgitation  3. Aortic sclerosis  4. Normal pulmonary pressures  5. Doppler evidence suggestive of diastolic dysfunction                  HERNESTO VAIL M.D., ATTENDING CARDIOLOGIST  This document has been electronically signed. Oct 19 2018  7:35AM                < end of copied text >

## 2018-10-19 NOTE — PROGRESS NOTE ADULT - ASSESSMENT
89yo M with a PMH of CAD s/p two cardiac stents in 2014, BPH with prostate surgery last year, history of UTI, who presented to the ED with complaints of weakness, fever, rigors, and dark, foul-smelling urine a/w septic shock with E coli bacteremia due to left pyelonephritis in setting of hydronephrosis from left ureteral obstructing calculus, s/p emergent left ureteral stent placement.

## 2018-10-19 NOTE — PROGRESS NOTE ADULT - ASSESSMENT
89 year old male with a PMH of CAD s/p two cardiac stents in 2014, BPH with prostate surgery last year, history of UTI who presented to the ED with complaints of weakness, fever, rigors, and dark, foul-smelling urine for one day, with temp of 106F in ED. Admitted to ICU for management of sepsis 2/2 UTI. s/p Ureteral stent emergent procedure   Patient admitted to ICU for management of urosepsis, requiring ureteral stent. He is much improved now off pressor therapy. No evidence for acute cardiac processes such as ischemia, heart failure  Echo with normal LV fct      Recommend:    - post op uro care  -cont asa  - DVT prophylaxis  -statin unless contraindicated  - Abx per ICU team  -remains in sr  -monitor for af/vt   - No meaningful evidence of volume overload.  - Monitor and replete lytes, keep K>4, Mg>2.  - Strict I/Os, daily weights.   - Other cardiovascular workup will depend on clinical course.  - All other workup per primary team.  - Will continue to follow.  - consider tsfr to floor    The patient is at risk of abrupt decompensation.  35 minutes of critical care time was spent with this patient.

## 2018-10-19 NOTE — DISCHARGE NOTE ADULT - PLAN OF CARE
Resolved Due to UTI.  Complete course of antibiotics as prescribed. A ureteral stent was placed.  Follow up with urology for outpatient management. Continue home meds. Resume home meds. Likely due to underlying dementia and acute metabolic encephalopathy from UTI and ATUL.  Improved.  Continue supportive care, reorientation.

## 2018-10-19 NOTE — DISCHARGE NOTE ADULT - CARE PROVIDER_API CALL
Angeline Ness), Internal Medicine  [address] [place] [address]  Phone: [phone]  Fax: [phone]    Luis F Bundy), Urology  [address] [place] [address]  Phone: [phone]  Fax: [phone]

## 2018-10-19 NOTE — PROGRESS NOTE ADULT - SUBJECTIVE AND OBJECTIVE BOX
INTERVAL HPI/OVERNIGHT EVENTS:  pt seen and examined earlier this am  denies n/v/abd pain, c/o loose stool x 1 yesterday  per overnight rn no acute gi issues labs noted    MEDICATIONS  (STANDING):  aluminum hydroxide/magnesium hydroxide/simethicone Suspension 30 milliLiter(s) Oral every 6 hours  aspirin  chewable 81 milliGRAM(s) Oral daily  atorvastatin 20 milliGRAM(s) Oral at bedtime  dextrose 5%. 1000 milliLiter(s) (50 mL/Hr) IV Continuous <Continuous>  dextrose 50% Injectable 12.5 Gram(s) IV Push once  dextrose 50% Injectable 25 Gram(s) IV Push once  dextrose 50% Injectable 25 Gram(s) IV Push once  enoxaparin Injectable 40 milliGRAM(s) SubCutaneous daily  escitalopram 10 milliGRAM(s) Oral daily  finasteride 5 milliGRAM(s) Oral daily  insulin lispro (HumaLOG) corrective regimen sliding scale   SubCutaneous every 6 hours  lactated ringers. 1000 milliLiter(s) (75 mL/Hr) IV Continuous <Continuous>  norepinephrine Infusion 0.05 MICROgram(s)/kG/Min (3.656 mL/Hr) IV Continuous <Continuous>  pantoprazole    Tablet 40 milliGRAM(s) Oral before breakfast  piperacillin/tazobactam IVPB. 3.375 Gram(s) IV Intermittent every 8 hours  potassium chloride   Powder 40 milliEquivalent(s) Oral once  simethicone 80 milliGRAM(s) Chew every 6 hours  tamsulosin 0.4 milliGRAM(s) Oral at bedtime    MEDICATIONS  (PRN):  acetaminophen   Tablet .. 650 milliGRAM(s) Oral every 6 hours PRN Moderate Pain (4 - 6)  dextrose 40% Gel 15 Gram(s) Oral once PRN Blood Glucose LESS THAN 70 milliGRAM(s)/deciliter  glucagon  Injectable 1 milliGRAM(s) IntraMuscular once PRN Glucose LESS THAN 70 milligrams/deciliter      Allergies    No Known Allergies    Intolerances        Review of Systems:    General:  No wt loss, fevers, chills, night sweats, fatigue   Eyes:  Good vision, no reported pain  ENT:  No sore throat, pain, runny nose, dysphagia  CV:  No pain, palpitations, hypo/hypertension  Resp:  No dyspnea, cough, tachypnea, wheezing  GI:  No pain, No nausea, No vomiting, loose stool x1 , No constipation, No weight loss, No fever, No pruritis, No rectal bleeding, No melena, No dysphagia  :  No pain, bleeding, incontinence, nocturia  Muscle:  No pain, weakness  Neuro:  No weakness, tingling, memory problems  Psych:  No fatigue, insomnia, mood problems, depression  Endocrine:  No polyuria, polydypsia, cold/heat intolerance  Heme:  No petechiae, ecchymosis, easy bruisability  Skin:  No rash, tattoos, scars, edema      Vital Signs Last 24 Hrs  T(C): 36.8 (19 Oct 2018 07:40), Max: 37.4 (18 Oct 2018 13:57)  T(F): 98.2 (19 Oct 2018 07:40), Max: 99.3 (18 Oct 2018 13:57)  HR: 54 (19 Oct 2018 11:00) (54 - 106)  BP: 95/55 (19 Oct 2018 11:00) (85/54 - 156/71)  BP(mean): 72 (19 Oct 2018 11:00) (64 - 102)  RR: 17 (19 Oct 2018 11:00) (15 - 49)  SpO2: 96% (19 Oct 2018 11:00) (96% - 100%)    PHYSICAL EXAM:    Constitutional: NAD, lying in bed  HEENT: ncat  Neck: No LAD  Respiratory: dec bs  Cardiovascular: S1 and S2, RRR  Gastrointestinal: soft nt nd  Extremities: No peripheral edema  Vascular: 2+ peripheral pulses  Neurological: Awake alert able to make needs known  Skin: No rashes      LABS:                        11.8   12.30 )-----------( 113      ( 19 Oct 2018 05:19 )             35.9     10-19    143  |  108  |  15  ----------------------------<  103<H>  3.6   |  28  |  0.92    Ca    9.2      19 Oct 2018 05:19  Phos  2.0     10-19  Mg     1.9     10-19    TPro  6.1  /  Alb  2.2<L>  /  TBili  1.6<H>  /  DBili  .40<H>  /  AST  33  /  ALT  15  /  AlkPhos  66  10-19    PT/INR - ( 19 Oct 2018 05:19 )   PT: 16.7 sec;   INR: 1.52 ratio         PTT - ( 19 Oct 2018 05:19 )  PTT:34.2 sec  Urinalysis Basic - ( 17 Oct 2018 11:50 )    Color: Yellow / Appearance: Slightly Turbid / S.010 / pH: x  Gluc: x / Ketone: Negative  / Bili: Negative / Urobili: Negative   Blood: x / Protein: 25 mg/dL / Nitrite: Negative   Leuk Esterase: Moderate / RBC: 3-5 /HPF / WBC 11-25   Sq Epi: x / Non Sq Epi: Occasional / Bacteria: TNTC        RADIOLOGY & ADDITIONAL TESTS:  < from: NM Hepatobiliary Scan w/wo Gall Bladder (10.18.18 @ 12:43) >    EXAM:  NM HEPATOBILIARY IMG                            PROCEDURE DATE:  10/18/2018          INTERPRETATION:  RADIOPHARMACEUTICAL: 3.0 mCi Tc-99m-Mebrofenin, I.V.    CLINICAL STATEMENT: 89-year-old male with cholelithiasis and sepsis.   There is gallbladder wall edema with thickening on ultrasound from   10/17/2018. Referred to evaluate for acute cholecystitis which may be    the source of sepsis.    TECHNIQUE:  Dynamic imaging of the anterior abdomen was performed for 40   minutes following injection of radiotracer. Static images of the abdomen   in the anterior, right lateral, and CAMPBELL views were obtained immediately   thereafter.    FINDINGS: There is prompt, homogeneous uptake of radiotracer by the   hepatocytes. Activity is first seen inthe gallbladder at 30 minutes and   in the bowel at 10 minutes. There is good clearance of activity from the   liver by the end of the study.    IMPRESSION: Normal hepatobiliary scan.    No evidence of acute cholecystitis.                    KENNETH RAYO M.D., NUCLEAR MEDICINE ATTENDING  This document has been electronically signed. Oct 18 2018 12:35PM                < end of copied text >

## 2018-10-19 NOTE — DISCHARGE NOTE ADULT - HOSPITAL COURSE
Pt is an 89yr old man PMHx CAD s/p two cardiac stents in 2014, BPH,  p/w F 106, rigors and chills with foul smelling urine, now in the ICU for management of septic shock secondary to left obstructive hydro with associated UTI s/p cystoscopy with left ureter stent, now off pressors, right side pneumonia suspicion by CT in RLL though respiratory status stable ORA w/ O2 sat >95%, +RVP, cholelithiasis and pericholecystic fluid on CT with some peripancreatic edema (lipase trending down), HIDA scan negative for cholecystitis., now on Zosyn (10/17-), Ucx and Bcx now with E coli (sensitivities pending), afebrile and now off pressors w/ stable BP. Pt is an 89yr old man PMHx CAD s/p two cardiac stents in 2014, BPH, p/w fever of 106F, rigors and chills with foul smelling urine, admitted to the ICU for management of septic shock due to E coli bacteremia in setting of left obstructive hydro with associated pyelonephritis s/p cystoscopy with left ureteral stent. Pt weaned off pressors. There was also evidence of right sided pneumonia on CT, however pt without respiratory symptoms and respiratory status stable on RA. RVP positive for rhino/enterovirus. Admission CT also showed cholelithiasis and pericholecystic fluid with some peripancreatic edema with some concern for acute re and pancreatitis on imaging. However, pt has no abdominal symptoms or tenderness on exam; lipase normal, HIDA scan negative for acute cholecystitis. Pt being treated with Zosyn (10/17-), Ucx and Bcx now with E coli (sensitivities pending), afebrile and now off pressors w/ stable BP. ID ([facility]), GI ([facility]), surgery ([facility]), and cardio ([facility]) consulting on the case. Pt is an 89yr old man PMHx CAD s/p two cardiac stents in 2014, BPH, p/w fever of 106F, rigors and chills with foul smelling urine, admitted to the ICU for management of septic shock due to E coli bacteremia in setting of left obstructive hydro with associated pyelonephritis s/p cystoscopy with left ureteral stent. Pt weaned off pressors. There was also evidence of right sided pneumonia on CT, however pt without respiratory symptoms and respiratory status stable on RA. RVP positive for rhino/enterovirus. Admission CT also showed cholelithiasis and pericholecystic fluid with some peripancreatic edema with some concern for acute re and pancreatitis on imaging. However, pt had no abdominal symptoms or tenderness on exam; lipase normal, HIDA scan negative for acute cholecystitis. Pt being treated with Zosyn and then transitioned to Rocephin based on culture sensitivities, bacteremia cleared, and then patient converted to Vantin to complete course outpatient. Followed by  (Niharika), ID ([facility]), GI ([facility]), surgery ([facility]), and cardio ([facility]) consulting on the case.     Patient continued to improve. ATUL resolved. Evaluated by PT and recommended ELIANE.     Seen and examined on day of discharge:  Vital Signs Last 24 Hrs  T(C): 36.8 (24 Oct 2018 05:41), Max: 37 (23 Oct 2018 13:07)  T(F): 98.3 (24 Oct 2018 05:41), Max: 98.6 (23 Oct 2018 13:07)  HR: 67 (24 Oct 2018 05:41) (65 - 90)  BP: 110/73 (24 Oct 2018 05:41) (90/57 - 115/69)  BP(mean): --  RR: 18 (23 Oct 2018 20:25) (18 - 18)  SpO2: 94% (23 Oct 2018 20:25) (94% - 94%)    PHYSICAL EXAM:     GENERAL: elderly male, no acute distress  HEENT: NC/AT, EOMI, neck supple, MMM  RESPIRATORY: LCTAB/L, no rhonchi, rales, or wheezing  CARDIOVASCULAR: RRR, no murmurs, gallops, rubs  ABDOMINAL: soft, non-tender, non-distended, positive bowel sounds   EXTREMITIES: no clubbing, cyanosis, or edema  NEUROLOGICAL: awake and alert, confused, follows commands, grossly non-focal otherwise  SKIN: no rashes or lesions   MUSCULOSKELETAL: no gross joint deformity    Patient is stable for discharge to [facility] ([place]) and outpatient urology follow-up for stent management.     Time spent: 40 minutes.

## 2018-10-19 NOTE — PROGRESS NOTE ADULT - ATTENDING COMMENTS
Note written by attending, see above.  Time spent: 40min. More than 50% of the visit was spent counseling the patient and pt's family on his condition - septic shock, E coli bacteremia, obstructive uropathy.

## 2018-10-20 DIAGNOSIS — R41.82 ALTERED MENTAL STATUS, UNSPECIFIED: ICD-10-CM

## 2018-10-20 LAB
ALBUMIN SERPL ELPH-MCNC: 2 G/DL — LOW (ref 3.3–5)
ALP SERPL-CCNC: 76 U/L — SIGNIFICANT CHANGE UP (ref 40–120)
ALT FLD-CCNC: 16 U/L — SIGNIFICANT CHANGE UP (ref 12–78)
ANION GAP SERPL CALC-SCNC: 6 MMOL/L — SIGNIFICANT CHANGE UP (ref 5–17)
AST SERPL-CCNC: 29 U/L — SIGNIFICANT CHANGE UP (ref 15–37)
BILIRUB SERPL-MCNC: 0.8 MG/DL — SIGNIFICANT CHANGE UP (ref 0.2–1.2)
BUN SERPL-MCNC: 14 MG/DL — SIGNIFICANT CHANGE UP (ref 7–23)
CALCIUM SERPL-MCNC: 8.6 MG/DL — SIGNIFICANT CHANGE UP (ref 8.5–10.1)
CHLORIDE SERPL-SCNC: 109 MMOL/L — HIGH (ref 96–108)
CO2 SERPL-SCNC: 27 MMOL/L — SIGNIFICANT CHANGE UP (ref 22–31)
CREAT SERPL-MCNC: 0.79 MG/DL — SIGNIFICANT CHANGE UP (ref 0.5–1.3)
GLUCOSE SERPL-MCNC: 102 MG/DL — HIGH (ref 70–99)
GRAM STN FLD: SIGNIFICANT CHANGE UP
HCT VFR BLD CALC: 33.2 % — LOW (ref 39–50)
HGB BLD-MCNC: 11 G/DL — LOW (ref 13–17)
INR BLD: 1.39 RATIO — HIGH (ref 0.88–1.16)
MAGNESIUM SERPL-MCNC: 1.9 MG/DL — SIGNIFICANT CHANGE UP (ref 1.6–2.6)
MCHC RBC-ENTMCNC: 30.3 PG — SIGNIFICANT CHANGE UP (ref 27–34)
MCHC RBC-ENTMCNC: 33.1 GM/DL — SIGNIFICANT CHANGE UP (ref 32–36)
MCV RBC AUTO: 91.5 FL — SIGNIFICANT CHANGE UP (ref 80–100)
NRBC # BLD: 0 /100 WBCS — SIGNIFICANT CHANGE UP (ref 0–0)
PHOSPHATE SERPL-MCNC: 1.4 MG/DL — LOW (ref 2.5–4.5)
PLATELET # BLD AUTO: 117 K/UL — LOW (ref 150–400)
POTASSIUM SERPL-MCNC: 3.4 MMOL/L — LOW (ref 3.5–5.3)
POTASSIUM SERPL-SCNC: 3.4 MMOL/L — LOW (ref 3.5–5.3)
PROT SERPL-MCNC: 5.8 G/DL — LOW (ref 6–8.3)
PROTHROM AB SERPL-ACNC: 15.2 SEC — HIGH (ref 9.8–12.7)
RBC # BLD: 3.63 M/UL — LOW (ref 4.2–5.8)
RBC # FLD: 13.5 % — SIGNIFICANT CHANGE UP (ref 10.3–14.5)
SODIUM SERPL-SCNC: 142 MMOL/L — SIGNIFICANT CHANGE UP (ref 135–145)
WBC # BLD: 8.3 K/UL — SIGNIFICANT CHANGE UP (ref 3.8–10.5)
WBC # FLD AUTO: 8.3 K/UL — SIGNIFICANT CHANGE UP (ref 3.8–10.5)

## 2018-10-20 PROCEDURE — 99232 SBSQ HOSP IP/OBS MODERATE 35: CPT

## 2018-10-20 PROCEDURE — 99233 SBSQ HOSP IP/OBS HIGH 50: CPT

## 2018-10-20 RX ORDER — POTASSIUM PHOSPHATE, MONOBASIC POTASSIUM PHOSPHATE, DIBASIC 236; 224 MG/ML; MG/ML
15 INJECTION, SOLUTION INTRAVENOUS ONCE
Qty: 0 | Refills: 0 | Status: COMPLETED | OUTPATIENT
Start: 2018-10-20 | End: 2018-10-20

## 2018-10-20 RX ADMIN — POTASSIUM PHOSPHATE, MONOBASIC POTASSIUM PHOSPHATE, DIBASIC 62.5 MILLIMOLE(S): 236; 224 INJECTION, SOLUTION INTRAVENOUS at 13:44

## 2018-10-20 RX ADMIN — Medication 1 TABLET(S): at 17:53

## 2018-10-20 RX ADMIN — Medication 30 MILLILITER(S): at 12:43

## 2018-10-20 RX ADMIN — Medication 81 MILLIGRAM(S): at 12:44

## 2018-10-20 RX ADMIN — ATORVASTATIN CALCIUM 20 MILLIGRAM(S): 80 TABLET, FILM COATED ORAL at 21:46

## 2018-10-20 RX ADMIN — TAMSULOSIN HYDROCHLORIDE 0.4 MILLIGRAM(S): 0.4 CAPSULE ORAL at 21:46

## 2018-10-20 RX ADMIN — FINASTERIDE 5 MILLIGRAM(S): 5 TABLET, FILM COATED ORAL at 12:44

## 2018-10-20 RX ADMIN — Medication 1 TABLET(S): at 09:29

## 2018-10-20 RX ADMIN — ESCITALOPRAM OXALATE 10 MILLIGRAM(S): 10 TABLET, FILM COATED ORAL at 12:44

## 2018-10-20 RX ADMIN — Medication 1 TABLET(S): at 12:44

## 2018-10-20 RX ADMIN — SIMETHICONE 80 MILLIGRAM(S): 80 TABLET, CHEWABLE ORAL at 12:44

## 2018-10-20 RX ADMIN — Medication 30 MILLILITER(S): at 17:53

## 2018-10-20 RX ADMIN — Medication 30 MILLILITER(S): at 05:17

## 2018-10-20 RX ADMIN — ENOXAPARIN SODIUM 40 MILLIGRAM(S): 100 INJECTION SUBCUTANEOUS at 12:44

## 2018-10-20 RX ADMIN — SIMETHICONE 80 MILLIGRAM(S): 80 TABLET, CHEWABLE ORAL at 17:53

## 2018-10-20 RX ADMIN — CEFTRIAXONE 100 GRAM(S): 500 INJECTION, POWDER, FOR SOLUTION INTRAMUSCULAR; INTRAVENOUS at 17:53

## 2018-10-20 RX ADMIN — SIMETHICONE 80 MILLIGRAM(S): 80 TABLET, CHEWABLE ORAL at 05:17

## 2018-10-20 NOTE — PROGRESS NOTE ADULT - PROBLEM SELECTOR PLAN 1
suspected urosepsis/E coli bacteremia  CT showed extensive right lung pneumonia, ? acute re, ?pancreatitis  US showed cholelithiasis with single large gallstone, wall thickening, ?acute or chronic cholecystitis  amylase/lipase not elevated  hida neg  abd pain resolved  f/u surgery recs  trend lfts  f/u repeat bld cxs  id following  abx per id  diet as tolerated   pain management  monitor abd exam  icu care  will follow

## 2018-10-20 NOTE — PROGRESS NOTE ADULT - ASSESSMENT
89 year old male with a PMH of CAD s/p two cardiac stents in 2014, BPH with prostate surgery last year, history of UTI who presented to the ED with complaints of weakness, fever, rigors, and dark, foul-smelling urine for one day, with temp of 106F in ED. Admitted to ICU for management of sepsis 2/2 UTI. s/p Ureteral stent emergent procedure   Patient admitted to ICU for management of urosepsis, requiring ureteral stent. He is much improved now off pressor therapy. No evidence for acute cardiac processes such as ischemia, heart failure  Echo with normal LV fct      Recommend:    - s/p cysto, left ureteral stent 10/17  - transferred from ICU to floor 10/20  -cont asa  - DVT prophylaxis  - cont with lipitor  - Abx per ID  - Hemodynamics stable per flow sheet off tele no evidence of arrhythmia HR stable  - monitor for af/vt   - No meaningful evidence of volume overload.  - Monitor and replete lytes, keep K>4, Mg>2.  - Strict I/Os, daily weights.   - Other cardiovascular workup will depend on clinical course.  - All other workup per primary team.  - Will continue to follow.    Nhi Reynoso, LEXI-C  Cardiology 89 year old male with a PMH of CAD s/p two cardiac stents in 2014, BPH with prostate surgery last year, history of UTI who presented to the ED with complaints of weakness, fever, rigors, and dark, foul-smelling urine for one day, with temp of 106F in ED. Admitted to ICU for management of sepsis 2/2 UTI. s/p Ureteral stent emergent procedure   Patient admitted to ICU for management of urosepsis, requiring ureteral stent. He is much improved now off pressor therapy. No evidence for acute cardiac processes such as ischemia, heart failure  Echo with normal LV fct      Recommend:    - s/p cysto, left ureteral stent 10/17  - transferred from ICU to floor 10/20  -cont asa  - DVT prophylaxis  - cont with lipitor  - Abx per ID  - Hemodynamics stable per flow sheet off tele no evidence of arrhythmia HR stable  - low grade fever this am with leukocytosis resolved.   - monitor for af/vt   - No meaningful evidence of volume overload.  - Monitor and replete lytes, keep K>4, Mg>2.  - Strict I/Os, daily weights.   - Other cardiovascular workup will depend on clinical course.  - All other workup per primary team.  - Will continue to follow.    Nhi Reynoso, LEXI-C  Cardiology 89 year old male with a PMH of CAD s/p two cardiac stents in 2014, BPH with prostate surgery last year, history of UTI who presented to the ED with complaints of weakness, fever, rigors, and dark, foul-smelling urine for one day, with temp of 106F in ED. Admitted to ICU for management of sepsis 2/2 UTI. s/p Ureteral stent emergent procedure   Patient admitted to ICU for management of urosepsis, requiring ureteral stent. He is much improved now off pressor therapy. No evidence for acute cardiac processes such as ischemia, heart failure  Echo with normal LV fct      Recommend:    - s/p cysto, left ureteral stent 10/17  - transferred from ICU to floor 10/20  -cont asa  - DVT prophylaxis  - cont with lipitor  - Abx per ID  - Hemodynamics stable per flow sheet off tele no evidence of arrhythmia HR stable  - low grade fever this am with leukocytosis resolved.   - monitor for af/vt   - No meaningful evidence of volume overload.  - Echo revealed LVEF 55% with normal LV systolic function, MAC with mild MR, Aortic sclerosis and doppler evidence of Diastolic Dysfunction.  - Monitor and replete lytes, keep K>4, Mg>2.  - Strict I/Os, daily weights.   - Other cardiovascular workup will depend on clinical course.  - All other workup per primary team.  - Will continue to follow.    Nhi Reynoso NP-TYE  Cardiology

## 2018-10-20 NOTE — PROGRESS NOTE ADULT - SUBJECTIVE AND OBJECTIVE BOX
ID progress note     Name: IDRIS DANIEL  Age: 89y  Gender: Male  MRN: [id number]    Interval History-- Events noted , doing well, appetite is better , remains confused , feels weak   Notes reviewed    Past Medical History--  CAD (coronary artery disease)  GERD (gastroesophageal reflux disease)  Hyperlipidemia  Benign prostatic hyperplasia, presence of lower urinary tract symptoms unspecified, unspecified morphology  H/O heart artery stent      For details regarding the patient's social history, family history, and other miscellaneous elements, please refer the initial infectious diseases consultation and/or the admitting history and physical examination for this admission.    Allergies--  Allergies    No Known Allergies    Intolerances        Medications--  Antibiotics:  cefTRIAXone   IVPB      cefTRIAXone   IVPB 2 Gram(s) IV Intermittent every 24 hours    Immunologic:    Other:  acetaminophen   Tablet .. PRN  aluminum hydroxide/magnesium hydroxide/simethicone Suspension  aspirin  chewable  atorvastatin  dextrose 40% Gel PRN  dextrose 5%.  dextrose 50% Injectable  dextrose 50% Injectable  dextrose 50% Injectable  enoxaparin Injectable  escitalopram  finasteride  glucagon  Injectable PRN  insulin lispro (HumaLOG) corrective regimen sliding scale  lactobacillus acidophilus  pantoprazole    Tablet  simethicone  tamsulosin      Review of Systems--  A 10-point review of systems was obtained.     Pertinent positives and negatives--  Constitutional: No fevers. No Chills. No Rigors.   Cardiovascular: No chest pain. No palpitations.  Respiratory: No shortness of breath. No cough.  Gastrointestinal: No nausea or vomiting. No diarrhea or constipation.   Psychiatric: Pleasant. Appropriate affect.    Review of systems otherwise negative except as previously noted.    Physical Examination--  Vital Signs: T(F): 99.4 (10-20-18 @ 12:43), Max: 100.1 (10-20-18 @ 04:45)  HR: 63 (10-20-18 @ 12:43)  BP: 120/67 (10-20-18 @ 12:43)  RR: 17 (10-20-18 @ 12:43)  SpO2: 96% (10-20-18 @ 12:43)  Wt(kg): --  General: Nontoxic-appearing Male in no acute distress.  HEENT: AT/NC. PERRL. EOMI. Anicteric. Conjunctiva pink and moist. Oropharynx clear. Dentition fair.  Neck: Not rigid. No sense of mass.  Nodes: None palpable.  Lungs: Clear bilaterally without rales, wheezing or rhonchi  Heart: Regular rate and rhythm. No Murmur. No rub. No gallop. No palpable thrill.  Abdomen: Bowel sounds present and normoactive. Soft. Nondistended. Nontender. No sense of mass. No organomegaly.  Back: No spinal tenderness. No costovertebral angle tenderness.   Extremities: No cyanosis or clubbing. No edema.   Skin: Warm. Dry. Good turgor. No rash. No vasculitic stigmata.  Psychiatric: Appropriate affect and mood for situation.         Laboratory Studies--  CBC                        11.0   8.30  )-----------( 117      ( 20 Oct 2018 07:35 )             33.2       Chemistries  10-20    142  |  109<H>  |  14  ----------------------------<  102<H>  3.4<L>   |  27  |  0.79    Ca    8.6      20 Oct 2018 07:35  Phos  1.4     10-20  Mg     1.9     10-20    TPro  5.8<L>  /  Alb  2.0<L>  /  TBili  0.8  /  DBili  x   /  AST  29  /  ALT  16  /  AlkPhos  76  10-20    CAPILLARY BLOOD GLUCOSE      POCT Blood Glucose.: 113 mg/dL (20 Oct 2018 11:32)  POCT Blood Glucose.: 107 mg/dL (20 Oct 2018 08:12)  POCT Blood Glucose.: 102 mg/dL (19 Oct 2018 22:31)  POCT Blood Glucose.: 162 mg/dL (19 Oct 2018 17:24)    Culture Data    Culture - Blood (collected 18 Oct 2018 18:37)  Source: .Blood Blood-Peripheral  Preliminary Report (19 Oct 2018 19:01):    No growth to date.    Culture - Blood (collected 18 Oct 2018 18:37)  Source: .Blood Blood-Peripheral  Gram Stain (20 Oct 2018 09:09):    Growth in anaerobic bottle: Gram Negative Rods  Preliminary Report (20 Oct 2018 09:09):    Growth in anaerobic bottle: Gram Negative Rods    Culture - Urine (collected 17 Oct 2018 16:03)  Source: .Urine Clean Catch (Midstream)  Final Report (19 Oct 2018 17:17):    >100,000 CFU/ml Escherichia coli  Organism: Escherichia coli (19 Oct 2018 17:17)  Organism: Escherichia coli (19 Oct 2018 17:17)    Culture - Blood (collected 17 Oct 2018 15:39)  Source: .Blood Blood  Gram Stain (18 Oct 2018 03:31):    Growth in aerobic and anaerobic bottles:    Gram Negative Rods  Final Report (19 Oct 2018 19:33):    Growth in aerobic and anaerobic bottles:    Escherichia coli [facility] [id number]    Culture - Blood (collected 17 Oct 2018 15:39)  Source: .Blood Blood  Gram Stain (18 Oct 2018 04:34):    Growth in aerobic and anaerobic bottles:    Gram Negative Rods  Final Report (19 Oct 2018 19:32):    Growth in aerobic and anaerobic bottles: Escherichia coli    "Due to technical problems, Proteus sp. will Not be reported as part of    the BCID panel until further notice"    ***Blood Panel PCR results on this specimen are available    approximately 3 hours after the Gram stain result.***    Gram stain, PCR, and/or culture results may not always    correspond due to difference in methodologies.    ************************************************************    This PCR assay was performed using [employer].    The following targets are tested for: Enterococcus,    vancomycin resistant enterococci, Listeria monocytogenes,    coagulase negative staphylococci, S. aureus,    methicillin resistant S. aureus, Streptococcus agalactiae    (Group B), S. pneumoniae, S.pyogenes (Group A),    Acinetobacter baumannii, Enterobacter cloacae, E. coli,    Klebsiella oxytoca, K. pneumoniae, Proteus sp.,    Serratia marcescens, Haemophilus influenzae,    Neisseria meningitidis, Pseudomonas aeruginosa, Candida    albicans, C. glabrata, C krusei, C parapsilosis,    C. tropicalis and the KPC resistance gene.  Organism: Blood Culture PCR  Escherichia coli (19 Oct 2018 19:32)  Organism: Escherichia coli (19 Oct 2018 19:32)  Organism: Blood Culture PCR (19 Oct 2018 19:32)      RADIOLOGY:   CT Chest , Abdomen and pelvis No Cont (10.17.18 @ 15:41) >    Impression:    Limited by lack of contrast and artifact.  Extensive right lung pneumonia.  Cholelithiasis. Possible acute cholecystitis. Correlate with right upper   quadrant ultrasound and/or HIDA scan.  Findings suggestive of pancreatitis. Correlate with amylase, lipase levels  Mildleft hydronephrosis secondary to a mid left ureteral calculus.   Additional nonobstructive left nephrolithiasis.  Bladder calculi. Bladder wall thickening suggestive of cystitis in the   appropriate setting.  Additional findings as discussed      Assessment--  89y old  Male hx CAD/cardiac stents 2yrs ago, HLD, BPH adm to ICU w/ septic shock secondary to E coli bacteremia likely secondary to obstructive uropathy for left ureteral stone. Ct chest and abdomen also shows extensive right sided  pneumonia and possible gall stone pancreatitis, although he has minimal cough or GI symptoms . Repeat blood cs also positive     He has responded to antibiotics and supportive care and improved remarkably once he has left ureteral stent placed, so suspect primary problem to be  infection     he had ATUL which has improved     CXR findings may suggest superimposed fluid overload       Suggestions--  - continue with IV Rocephin 2 gram daily till repeat  bc negative then change to po Vantin 200 mg bid to complete total 14 days of therapy   - PT evaluation for ambulation   - trend cbc     I have discussed the above plan of care with patient's family in detail. They expressed understanding of the treatment plan . Risks, benefits and alternatives discussed in detail. I have asked if they have any questions or concerns and appropriately addressed them to the best of my ability .      > 25 minutes spent in direct patient care reviewing  the notes, lab data/ imaging , discussion with multidisciplinary team. All questions were addressed and answered to the best of my capacity .    Thank you for allowing me to participate in the care of your patient .        Humphrey Thakkar MD  [phone]

## 2018-10-20 NOTE — PROGRESS NOTE ADULT - SUBJECTIVE AND OBJECTIVE BOX
INTERVAL HPI/OVERNIGHT EVENTS:    No new overnight event.  No N/V/D.  Tolerating diet.    Allergies    No Known Allergies    Intolerances          General:  No wt loss, fevers, chills, night sweats, fatigue,   Eyes:  Good vision, no reported pain  ENT:  No sore throat, pain, runny nose, dysphagia  CV:  No pain, palpitations, hypo/hypertension  Resp:  No dyspnea, cough, tachypnea, wheezing  GI:  No pain, No nausea, No vomiting, No diarrhea, No constipation, No weight loss, No fever, No pruritis, No rectal bleeding, No tarry stools, No dysphagia,  :  No pain, bleeding, incontinence, nocturia  Muscle:  No pain, weakness  Neuro:  No weakness, tingling, memory problems  Psych:  No fatigue, insomnia, mood problems, depression  Endocrine:  No polyuria, polydipsia, cold/heat intolerance  Heme:  No petechiae, ecchymosis, easy bruisability  Skin:  No rash, tattoos, scars, edema      PHYSICAL EXAM:   Vital Signs:  Vital Signs Last 24 Hrs  T(C): 37.4 (20 Oct 2018 12:43), Max: 37.8 (20 Oct 2018 04:45)  T(F): 99.4 (20 Oct 2018 12:43), Max: 100.1 (20 Oct 2018 04:45)  HR: 63 (20 Oct 2018 12:43) (62 - 74)  BP: 120/67 (20 Oct 2018 12:43) (119/85 - 129/75)  BP(mean): 99 (19 Oct 2018 20:00) (99 - 99)  RR: 17 (20 Oct 2018 12:43) (17 - 38)  SpO2: 96% (20 Oct 2018 12:43) (91% - 97%)  Daily     Daily I&O's Summary    19 Oct 2018 07:01  -  20 Oct 2018 07:00  --------------------------------------------------------  IN: 1720 mL / OUT: 150 mL / NET: 1570 mL    20 Oct 2018 07:01  -  20 Oct 2018 19:17  --------------------------------------------------------  IN: 300 mL / OUT: 0 mL / NET: 300 mL        GENERAL:  Appears stated age, well-groomed, well-nourished, no distress  HEENT:  NC/AT,  conjunctivae clear and pink, no thyromegaly, nodules, adenopathy, no JVD, sclera -anicteric  CHEST:  Full & symmetric excursion, no increased effort, breath sounds clear  HEART:  Regular rhythm, S1, S2, no murmur/rub/S3/S4, no abdominal bruit, no edema  ABDOMEN:  Soft, non-tender, non-distended, normoactive bowel sounds,  no masses ,no hepato-splenomegaly, no signs of chronic liver disease  EXTEREMITIES:  no cyanosis,clubbing or edema  SKIN:  No rash/erythema/ecchymoses/petechiae/wounds/abscess/warm/dry  NEURO:  Alert, oriented, no asterixis, no tremor, no encephalopathy      LABS:                        11.0   8.30  )-----------( 117      ( 20 Oct 2018 07:35 )             33.2     10-20    142  |  109<H>  |  14  ----------------------------<  102<H>  3.4<L>   |  27  |  0.79    Ca    8.6      20 Oct 2018 07:35  Phos  1.4     10-20  Mg     1.9     10-20    TPro  5.8<L>  /  Alb  2.0<L>  /  TBili  0.8  /  DBili  x   /  AST  29  /  ALT  16  /  AlkPhos  76  10-20    PT/INR - ( 20 Oct 2018 07:35 )   PT: 15.2 sec;   INR: 1.39 ratio         PTT - ( 19 Oct 2018 05:19 )  PTT:34.2 sec    amylaseAmylase, Serum Total: 46 U/L (10-17 @ 20:40)  Amylase, Serum Total: 48 U/L (10-17 @ 12:42)     lipaseLipase, Serum: 50 U/L (10-17 @ 20:40)  Lipase, Serum: 72 U/L (10-17 @ 12:42)    RADIOLOGY & ADDITIONAL TESTS:

## 2018-10-20 NOTE — PROGRESS NOTE ADULT - SUBJECTIVE AND OBJECTIVE BOX
[facility] -- Latisha Ash, Rhonda Verma, Joselo Jimenez Savella  Office # [phone]      Follow Up:  Urosepsis/Hypotension    Subjective/Observations: Seen and examined.  Son at bedside.  Lying flat, awake and alert with no new complaints.  Incontinent of loose stool being cleaned up.  No signs of orthopnea or PND.        REVIEW OF SYSTEMS: All other review of systems is negative unless indicated above    PAST MEDICAL & SURGICAL HISTORY:  CAD (coronary artery disease)  GERD (gastroesophageal reflux disease)  Hyperlipidemia  Benign prostatic hyperplasia, presence of lower urinary tract symptoms unspecified, unspecified morphology  H/O heart artery stent      MEDICATIONS  (STANDING):  aluminum hydroxide/magnesium hydroxide/simethicone Suspension 30 milliLiter(s) Oral every 6 hours  aspirin  chewable 81 milliGRAM(s) Oral daily  atorvastatin 20 milliGRAM(s) Oral at bedtime  cefTRIAXone   IVPB      cefTRIAXone   IVPB 2 Gram(s) IV Intermittent every 24 hours  dextrose 5%. 1000 milliLiter(s) (50 mL/Hr) IV Continuous <Continuous>  dextrose 50% Injectable 12.5 Gram(s) IV Push once  dextrose 50% Injectable 25 Gram(s) IV Push once  dextrose 50% Injectable 25 Gram(s) IV Push once  enoxaparin Injectable 40 milliGRAM(s) SubCutaneous daily  escitalopram 10 milliGRAM(s) Oral daily  finasteride 5 milliGRAM(s) Oral daily  insulin lispro (HumaLOG) corrective regimen sliding scale   SubCutaneous every 6 hours  lactobacillus acidophilus 1 Tablet(s) Oral three times a day with meals  norepinephrine Infusion 0.05 MICROgram(s)/kG/Min (3.656 mL/Hr) IV Continuous <Continuous>  pantoprazole    Tablet 40 milliGRAM(s) Oral before breakfast  simethicone 80 milliGRAM(s) Chew every 6 hours  tamsulosin 0.4 milliGRAM(s) Oral at bedtime    MEDICATIONS  (PRN):  acetaminophen   Tablet .. 650 milliGRAM(s) Oral every 6 hours PRN Moderate Pain (4 - 6)  dextrose 40% Gel 15 Gram(s) Oral once PRN Blood Glucose LESS THAN 70 milliGRAM(s)/deciliter  glucagon  Injectable 1 milliGRAM(s) IntraMuscular once PRN Glucose LESS THAN 70 milligrams/deciliter      Allergies    No Known Allergies    Intolerances            Vital Signs Last 24 Hrs  T(C): 37.8 (20 Oct 2018 04:45), Max: 37.8 (20 Oct 2018 04:45)  T(F): 100.1 (20 Oct 2018 04:45), Max: 100.1 (20 Oct 2018 04:45)  HR: 73 (20 Oct 2018 04:45) (54 - 74)  BP: 126/67 (20 Oct 2018 04:45) (95/55 - 135/64)  BP(mean): 99 (19 Oct 2018 20:00) (72 - 99)  RR: 19 (20 Oct 2018 04:45) (17 - 38)  SpO2: 91% (20 Oct 2018 04:45) (91% - 98%)    I&O's Summary    19 Oct 2018 07:01  -  20 Oct 2018 07:00  --------------------------------------------------------  IN: 1720 mL / OUT: 150 mL / NET: 1570 mL          PHYSICAL EXAM:  TELE: Not on tele  Constitutional: NAD, awake and alert, well-developed  HEENT: Moist Mucous Membranes, Anicteric  Pulmonary: Non-labored, breath sounds are clear bilaterally, No wheezing, rales or rhonchi  Cardiovascular: Regular, S1 and S2, No murmurs, rubs, gallops or clicks  Gastrointestinal: Bowel Sounds present, soft, nontender.   Lymph: No peripheral edema. No lymphadenopathy.  Skin: No visible rashes or ulcers.  Psych:  Mood & affect appropriate    LABS: All Labs Reviewed:                        11.0   8.30  )-----------( 117      ( 20 Oct 2018 07:35 )             33.2                         11.8   12.30 )-----------( 113      ( 19 Oct 2018 05:19 )             35.9                         11.8   21.92 )-----------( 124      ( 18 Oct 2018 04:54 )             35.4     20 Oct 2018 07:35    142    |  109    |  14     ----------------------------<  102    3.4     |  27     |  0.79   19 Oct 2018 05:19    143    |  108    |  15     ----------------------------<  103    3.6     |  28     |  0.92   18 Oct 2018 04:54    145    |  114    |  14     ----------------------------<  140    3.7     |  25     |  1.10     Ca    8.6        20 Oct 2018 07:35  Ca    9.2        19 Oct 2018 05:19  Ca    8.8        18 Oct 2018 04:54  Phos  1.4       20 Oct 2018 07:35  Phos  2.0       19 Oct 2018 05:19  Phos  2.3       18 Oct 2018 04:54  Mg     1.9       20 Oct 2018 07:35  Mg     1.9       19 Oct 2018 05:19  Mg     1.9       18 Oct 2018 04:54    TPro  5.8    /  Alb  2.0    /  TBili  0.8    /  DBili  x      /  AST  29     /  ALT  16     /  AlkPhos  76     20 Oct 2018 07:35  TPro  6.1    /  Alb  2.2    /  TBili  1.6    /  DBili  .40    /  AST  33     /  ALT  15     /  AlkPhos  66     19 Oct 2018 05:19  TPro  5.6    /  Alb  2.1    /  TBili  1.7    /  DBili  .40    /  AST  30     /  ALT  15     /  AlkPhos  65     18 Oct 2018 04:54    PT/INR - ( 20 Oct 2018 07:35 )   PT: 15.2 sec;   INR: 1.39 ratio         PTT - ( 19 Oct 2018 05:19 )  PTT:34.2 sec    < from: TTE Echo Doppler w/o Cont (10.18.18 @ 16:14) >  EXAM:  ECHO TTE W/O CON COMP W/DOPPLR         PROCEDURE DATE:  10/18/2018        INTERPRETATION:  INDICATION: CHF  Referring M.D.:Santiago  Blood Pressure 140/60        Weight (kg) :78     Height (cm):170       BSA (sq m): 1.9  Technician: KAYLA    Dimensions:    LA 2.8       Normal Values: 2.0 - 4.0 cm    Ao 3.5        Normal Values: 2.0 - 3.8 cm  SEPTUM O.8       Normal Values: 0.6 - 1.2 cm  PWT 0.8       Normal Values: 0.6 - 1.1 cm  LVIDd 3.6         Normal Values: 3.0 - 5.6 cm  LVIDs 2.5         Normal Values: 1.8 - 4.0 cm      OBSERVATIONS:    Mitral Valve: Mitral annular calcification with mild mitral regurgitation  Aortic Valve/Aorta: Aortic sclerosis  Tricuspid Valve: Normal tricuspid valve. Mild tricuspid regurgitation.  Pulmonic Valve: The pulmonic valve is not well visualized. Probably   normal.  Left Atrium: Normal  Right Atrium: Normal  Left Ventricle: Normal left ventricular size, wall thickness, and global   systolic function. The EF is approximately 55%.  Right Ventricle: Normal right ventricular size and function.  Pericardium/Pleura: No pericardial effusion noted.  Pulmonary/RV Pressure: The right ventricular systolic pressure is   estimated to be 22mmHg, assuming that the right atrial pressure is   estimated to be 8 mmHg. This is consistent with no pulmonary pressures.  LV Diastolic Function: Doppler evidence suggestive of diastolic   dysfunction.    Conclusion: Technically limited study  1. Normal left ventricular size, wall thickness, and global systolic   function  2. Mitral annular calcification with mild mitral regurgitation  3. Aortic sclerosis  4. Normal pulmonary pressures  5. Doppler evidence suggestive of diastolic dysfunction                  HERNESTO VERMA M.D., ATTENDING CARDIOLOGIST  This document has been electronically signed. Oct 19 2018  7:35AM          < end of copied text >    < from: Xray Chest 1 View- PORTABLE-Routine (10.19.18 @ 06:05) >  EXAM:  XR CHEST PORTABLE ROUTINE 1V                            PROCEDURE DATE:  10/19/2018          INTERPRETATION:  Clinical information: Pneumonia.    Technique: Frontal view of the chest.    Comparison: Prior chest x-ray examination from 10/17/2018.    Findings: Multifocal patchy opacities throughout both lungs appear   grossly unchanged when compared to the prior x-ray examination. The heart   size is at the upper limits of normal. The aorta is tortuous. There are   mild multilevel degenerative changes of the thoracic spine.    There is a right IJ central line present with its tip at the SVC.    IMPRESSION: As above, no interval change.                MAKENZIE JORDAN M.D., ATTENDING RADIOLOGIST  This document has been electronically signed. Oct 19 2018  9:23AM        < end of copied text >    < from: NM Hepatobiliary Scan w/wo Gall Bladder (10.18.18 @ 12:43) >  EXAM:  NM HEPATOBILIARY IMG                            PROCEDURE DATE:  10/18/2018          INTERPRETATION:  RADIOPHARMACEUTICAL: 3.0 mCi Tc-99m-Mebrofenin, I.V.    CLINICAL STATEMENT: 89-year-old male with cholelithiasis and sepsis.   There is gallbladder wall edema with thickening on ultrasound from   10/17/2018. Referred to evaluate for acute cholecystitis which may be    the source of sepsis.    TECHNIQUE:  Dynamic imaging of the anterior abdomen was performed for 40   minutes following injection of radiotracer. Static images of the abdomen   in the anterior, right lateral, and CAMPBELL views were obtained immediately   thereafter.    FINDINGS: There is prompt, homogeneous uptake of radiotracer by the   hepatocytes. Activity is first seen inthe gallbladder at 30 minutes and   in the bowel at 10 minutes. There is good clearance of activity from the   liver by the end of the study.    IMPRESSION: Normal hepatobiliary scan.    No evidence of acute cholecystitis.                    KENNETH RAYO M.D., NUCLEAR MEDICINE ATTENDING  This document has been electronically signed. Oct 18 2018 12:35PM                < end of copied text >

## 2018-10-20 NOTE — PROGRESS NOTE ADULT - PROBLEM SELECTOR PLAN 1
due to obstructed left kidney from left ureteral stone -- now s/p emergent ureteral stent placement with decompression of the left hydro on 10/17  Improved s/p stent placement ; blood and urine cultures growing E coli, so urine is the likely source. Now stable off pressors.  -Now on Rocephin.  -patient suspected to also have pneumonia based on CT but very little respiratory symptoms   -question of acute cholecystitis on CT. Had HIDA on 10/18--negative   -First set of blood and urine cultures show slight inconsistency (BCx sensitive to cephalexin while urine culture is resistant to it) Will discuss this with ID given its implications when choosing an oral agent to complete treatment course after bacteremia clears.   -Repeat blood cultures from 10/18 growing GNR. Continue abx and repeat set sent today.

## 2018-10-20 NOTE — PROGRESS NOTE ADULT - SUBJECTIVE AND OBJECTIVE BOX
HPI:  Patient is an 89 year old male with a PMH of CAD s/p two cardiac stents in 2014, BPH with prostate surgery last year, history of UTI who presented to the ED with complaints of weakness, fever, rigors, and dark, foul-smelling urine for one day.     INTERVAL EVENTS:   Patient seen and examined. Blood cultures from 10/18 resulted this AM--growing GNR. Repeat cultures sent. Patient confused today, asking to send a telegraph to a . Son reports he has memory issues at home, but says his current mental status if significantly off baseline. Temp 100.1 this AM.    REVIEW OF SYSTEMS:  Limited due to his confusion, but denies any chest pain, SOB, dizziness, N/V/D.     VITAL SIGNS:  Vital Signs Last 24 Hrs  T(C): 37.8 (10-20-18 @ 04:45), Max: 37.8 (10-20-18 @ 04:45)  T(F): 100.1 (10-20-18 @ 04:45), Max: 100.1 (10-20-18 @ 04:45)  HR: 73 (10-20-18 @ 04:45) (57 - 74)  BP: 126/67 (10-20-18 @ 04:45) (109/53 - 135/64)  BP(mean): 99 (10-19-18 @ 20:00) (77 - 99)  RR: 19 (10-20-18 @ 04:45) (17 - 38)  SpO2: 91% (10-20-18 @ 04:45) (91% - 98%)      PHYSICAL EXAM:     GENERAL: elderly male, no acute distress  HEENT: NC/AT, EOMI, neck supple, MMM  RESPIRATORY: LCTAB/L, no rhonchi, rales, or wheezing  CARDIOVASCULAR: RRR, no murmurs, gallops, rubs  ABDOMINAL: soft, non-tender, non-distended, positive bowel sounds   EXTREMITIES: no clubbing, cyanosis, or edema  NEUROLOGICAL: awake and alert, significantly confused, follows commands, grossly non-focal otherwise  SKIN: no rashes or lesions   MUSCULOSKELETAL: no gross joint deformity                          11.0   8.30  )-----------( 117      ( 20 Oct 2018 07:35 )             33.2     10-20    142  |  109<H>  |  14  ----------------------------<  102<H>  3.4<L>   |  27  |  0.79    Ca    8.6      20 Oct 2018 07:35  Phos  1.4     10-20  Mg     1.9     10-20    TPro  5.8<L>  /  Alb  2.0<L>  /  TBili  0.8  /  DBili  x   /  AST  29  /  ALT  16  /  AlkPhos  76  10-20    PT/INR - ( 20 Oct 2018 07:35 )   PT: 15.2 sec;   INR: 1.39 ratio         PTT - ( 19 Oct 2018 05:19 )  PTT:34.2 sec    CAPILLARY BLOOD GLUCOSE  POCT Blood Glucose.: 113 mg/dL (20 Oct 2018 11:32)  POCT Blood Glucose.: 107 mg/dL (20 Oct 2018 08:12)  POCT Blood Glucose.: 102 mg/dL (19 Oct 2018 22:31)  POCT Blood Glucose.: 162 mg/dL (19 Oct 2018 17:24)      MEDICATIONS  (STANDING):  aluminum hydroxide/magnesium hydroxide/simethicone Suspension 30 milliLiter(s) Oral every 6 hours  aspirin  chewable 81 milliGRAM(s) Oral daily  atorvastatin 20 milliGRAM(s) Oral at bedtime  cefTRIAXone   IVPB      cefTRIAXone   IVPB 2 Gram(s) IV Intermittent every 24 hours  dextrose 5%. 1000 milliLiter(s) (50 mL/Hr) IV Continuous <Continuous>  dextrose 50% Injectable 12.5 Gram(s) IV Push once  dextrose 50% Injectable 25 Gram(s) IV Push once  dextrose 50% Injectable 25 Gram(s) IV Push once  enoxaparin Injectable 40 milliGRAM(s) SubCutaneous daily  escitalopram 10 milliGRAM(s) Oral daily  finasteride 5 milliGRAM(s) Oral daily  insulin lispro (HumaLOG) corrective regimen sliding scale   SubCutaneous every 6 hours  lactobacillus acidophilus 1 Tablet(s) Oral three times a day with meals  pantoprazole    Tablet 40 milliGRAM(s) Oral before breakfast  potassium phosphate IVPB 15 milliMole(s) IV Intermittent once  simethicone 80 milliGRAM(s) Chew every 6 hours  tamsulosin 0.4 milliGRAM(s) Oral at bedtime    MEDICATIONS  (PRN):  acetaminophen   Tablet .. 650 milliGRAM(s) Oral every 6 hours PRN Moderate Pain (4 - 6)  dextrose 40% Gel 15 Gram(s) Oral once PRN Blood Glucose LESS THAN 70 milliGRAM(s)/deciliter  glucagon  Injectable 1 milliGRAM(s) IntraMuscular once PRN Glucose LESS THAN 70 milligrams/deciliter

## 2018-10-20 NOTE — PROVIDER CONTACT NOTE (CRITICAL VALUE NOTIFICATION) - ACTION/TREATMENT ORDERED:
IVF, IVAB
blood cultures to be repeated
no new order
see new order
continue antibiotic.

## 2018-10-21 LAB
ALBUMIN SERPL ELPH-MCNC: 2.2 G/DL — LOW (ref 3.3–5)
ALP SERPL-CCNC: 78 U/L — SIGNIFICANT CHANGE UP (ref 40–120)
ALT FLD-CCNC: 20 U/L — SIGNIFICANT CHANGE UP (ref 12–78)
ANION GAP SERPL CALC-SCNC: 8 MMOL/L — SIGNIFICANT CHANGE UP (ref 5–17)
AST SERPL-CCNC: 33 U/L — SIGNIFICANT CHANGE UP (ref 15–37)
BILIRUB SERPL-MCNC: 0.7 MG/DL — SIGNIFICANT CHANGE UP (ref 0.2–1.2)
BUN SERPL-MCNC: 11 MG/DL — SIGNIFICANT CHANGE UP (ref 7–23)
CALCIUM SERPL-MCNC: 8.7 MG/DL — SIGNIFICANT CHANGE UP (ref 8.5–10.1)
CHLORIDE SERPL-SCNC: 107 MMOL/L — SIGNIFICANT CHANGE UP (ref 96–108)
CO2 SERPL-SCNC: 26 MMOL/L — SIGNIFICANT CHANGE UP (ref 22–31)
CREAT SERPL-MCNC: 0.72 MG/DL — SIGNIFICANT CHANGE UP (ref 0.5–1.3)
CULTURE RESULTS: SIGNIFICANT CHANGE UP
GLUCOSE SERPL-MCNC: 98 MG/DL — SIGNIFICANT CHANGE UP (ref 70–99)
HCT VFR BLD CALC: 35.2 % — LOW (ref 39–50)
HGB BLD-MCNC: 11.6 G/DL — LOW (ref 13–17)
MCHC RBC-ENTMCNC: 30 PG — SIGNIFICANT CHANGE UP (ref 27–34)
MCHC RBC-ENTMCNC: 33 GM/DL — SIGNIFICANT CHANGE UP (ref 32–36)
MCV RBC AUTO: 91 FL — SIGNIFICANT CHANGE UP (ref 80–100)
NRBC # BLD: 0 /100 WBCS — SIGNIFICANT CHANGE UP (ref 0–0)
PHOSPHATE SERPL-MCNC: 2.1 MG/DL — LOW (ref 2.5–4.5)
PLATELET # BLD AUTO: 137 K/UL — LOW (ref 150–400)
POTASSIUM SERPL-MCNC: 3.4 MMOL/L — LOW (ref 3.5–5.3)
POTASSIUM SERPL-SCNC: 3.4 MMOL/L — LOW (ref 3.5–5.3)
PROT SERPL-MCNC: 6.3 G/DL — SIGNIFICANT CHANGE UP (ref 6–8.3)
RBC # BLD: 3.87 M/UL — LOW (ref 4.2–5.8)
RBC # FLD: 13.9 % — SIGNIFICANT CHANGE UP (ref 10.3–14.5)
SODIUM SERPL-SCNC: 141 MMOL/L — SIGNIFICANT CHANGE UP (ref 135–145)
SPECIMEN SOURCE: SIGNIFICANT CHANGE UP
WBC # BLD: 8.16 K/UL — SIGNIFICANT CHANGE UP (ref 3.8–10.5)
WBC # FLD AUTO: 8.16 K/UL — SIGNIFICANT CHANGE UP (ref 3.8–10.5)

## 2018-10-21 PROCEDURE — 99232 SBSQ HOSP IP/OBS MODERATE 35: CPT

## 2018-10-21 PROCEDURE — 99233 SBSQ HOSP IP/OBS HIGH 50: CPT

## 2018-10-21 RX ORDER — DORZOLAMIDE HYDROCHLORIDE 20 MG/ML
SOLUTION/ DROPS OPHTHALMIC
Qty: 0 | Refills: 0 | Status: DISCONTINUED | OUTPATIENT
Start: 2018-10-21 | End: 2018-10-21

## 2018-10-21 RX ORDER — DORZOLAMIDE HYDROCHLORIDE 20 MG/ML
1 SOLUTION/ DROPS OPHTHALMIC ONCE
Qty: 0 | Refills: 0 | Status: COMPLETED | OUTPATIENT
Start: 2018-10-21 | End: 2018-10-21

## 2018-10-21 RX ORDER — POTASSIUM PHOSPHATE, MONOBASIC POTASSIUM PHOSPHATE, DIBASIC 236; 224 MG/ML; MG/ML
15 INJECTION, SOLUTION INTRAVENOUS ONCE
Qty: 0 | Refills: 0 | Status: DISCONTINUED | OUTPATIENT
Start: 2018-10-21 | End: 2018-10-24

## 2018-10-21 RX ORDER — POTASSIUM CHLORIDE 20 MEQ
40 PACKET (EA) ORAL ONCE
Qty: 0 | Refills: 0 | Status: COMPLETED | OUTPATIENT
Start: 2018-10-21 | End: 2018-10-21

## 2018-10-21 RX ADMIN — CEFTRIAXONE 100 GRAM(S): 500 INJECTION, POWDER, FOR SOLUTION INTRAMUSCULAR; INTRAVENOUS at 17:38

## 2018-10-21 RX ADMIN — Medication 30 MILLILITER(S): at 00:10

## 2018-10-21 RX ADMIN — SIMETHICONE 80 MILLIGRAM(S): 80 TABLET, CHEWABLE ORAL at 23:42

## 2018-10-21 RX ADMIN — ATORVASTATIN CALCIUM 20 MILLIGRAM(S): 80 TABLET, FILM COATED ORAL at 21:46

## 2018-10-21 RX ADMIN — Medication 30 MILLILITER(S): at 05:43

## 2018-10-21 RX ADMIN — PANTOPRAZOLE SODIUM 40 MILLIGRAM(S): 20 TABLET, DELAYED RELEASE ORAL at 05:43

## 2018-10-21 RX ADMIN — ESCITALOPRAM OXALATE 10 MILLIGRAM(S): 10 TABLET, FILM COATED ORAL at 11:54

## 2018-10-21 RX ADMIN — FINASTERIDE 5 MILLIGRAM(S): 5 TABLET, FILM COATED ORAL at 11:53

## 2018-10-21 RX ADMIN — Medication 81 MILLIGRAM(S): at 11:53

## 2018-10-21 RX ADMIN — SIMETHICONE 80 MILLIGRAM(S): 80 TABLET, CHEWABLE ORAL at 00:10

## 2018-10-21 RX ADMIN — ENOXAPARIN SODIUM 40 MILLIGRAM(S): 100 INJECTION SUBCUTANEOUS at 11:54

## 2018-10-21 RX ADMIN — Medication 30 MILLILITER(S): at 23:42

## 2018-10-21 RX ADMIN — SIMETHICONE 80 MILLIGRAM(S): 80 TABLET, CHEWABLE ORAL at 11:54

## 2018-10-21 RX ADMIN — Medication 1 TABLET(S): at 11:53

## 2018-10-21 RX ADMIN — SIMETHICONE 80 MILLIGRAM(S): 80 TABLET, CHEWABLE ORAL at 17:44

## 2018-10-21 RX ADMIN — Medication 30 MILLILITER(S): at 17:45

## 2018-10-21 RX ADMIN — Medication 40 MILLIEQUIVALENT(S): at 11:54

## 2018-10-21 RX ADMIN — Medication 1 TABLET(S): at 17:44

## 2018-10-21 RX ADMIN — SIMETHICONE 80 MILLIGRAM(S): 80 TABLET, CHEWABLE ORAL at 05:43

## 2018-10-21 RX ADMIN — Medication 1 TABLET(S): at 08:39

## 2018-10-21 RX ADMIN — TAMSULOSIN HYDROCHLORIDE 0.4 MILLIGRAM(S): 0.4 CAPSULE ORAL at 21:46

## 2018-10-21 NOTE — PROGRESS NOTE ADULT - ASSESSMENT
89 year old male with a PMH of CAD s/p two cardiac stents in 2014, BPH with prostate surgery last year, history of UTI who presented to the ED with complaints of weakness, fever, rigors, and dark, foul-smelling urine for one day, with temp of 106F in ED. Admitted to ICU for management of sepsis 2/2 UTI. s/p Ureteral stent emergent procedure   Patient admitted to ICU for management of urosepsis, requiring ureteral stent. He is much improved now off pressor therapy. No evidence for acute cardiac processes such as ischemia, heart failure  Echo with normal LV fct      Recommend:    -cont asa  - DVT prophylaxis  - cont with lipitor  - Abx per ID  - Hemodynamics stable per flow sheet off tele no evidence of arrhythmia HR stable  - low grade fever this am with leukocytosis resolved.   - monitor for af/vt   - No meaningful evidence of volume overload.  - Echo revealed LVEF 55% with normal LV systolic function, MAC with mild MR, Aortic sclerosis and doppler evidence of Diastolic Dysfunction.  - Monitor and replete lytes, keep K>4, Mg>2.  - Strict I/Os, daily weights.   - Other cardiovascular workup will depend on clinical course.  - All other workup per primary team.  - Will continue to follow.    Luis E Baig [facility]  Cardiology

## 2018-10-21 NOTE — PROGRESS NOTE ADULT - PROBLEM SELECTOR PLAN 1
due to obstructed left kidney from left ureteral stone -- now s/p emergent ureteral stent placement with decompression of the left hydro on 10/17  Improved s/p stent placement ; blood and urine cultures growing E coli, so urine is the likely source. Now stable off pressors.  -Now on Rocephin.  -patient suspected to also have pneumonia based on CT but very little respiratory symptoms   -question of acute cholecystitis on CT. Had HIDA on 10/18--negative   -First set of blood and urine cultures show slight inconsistency (BCx sensitive to cephalexin while urine culture is resistant to it) Will discuss this with ID given its implications when choosing an oral agent to complete treatment course after bacteremia clears.   -Repeat blood cultures from 10/18 growing GNR. Continue abx and repeat set sent  yesterday, f/u result.

## 2018-10-21 NOTE — PROGRESS NOTE ADULT - SUBJECTIVE AND OBJECTIVE BOX
INTERVAL HPI/OVERNIGHT EVENTS:  No new overnight event.  No N/V/D.  Tolerating diet.    Allergies    No Known Allergies    Intolerances          General:  No wt loss, fevers, chills, night sweats, fatigue,   Eyes:  Good vision, no reported pain  ENT:  No sore throat, pain, runny nose, dysphagia  CV:  No pain, palpitations, hypo/hypertension  Resp:  No dyspnea, cough, tachypnea, wheezing  GI:  No pain, No nausea, No vomiting, No diarrhea, No constipation, No weight loss, No fever, No pruritis, No rectal bleeding, No tarry stools, No dysphagia,  :  No pain, bleeding, incontinence, nocturia  Muscle:  No pain, weakness  Neuro:  No weakness, tingling, memory problems  Psych:  No fatigue, insomnia, mood problems, depression  Endocrine:  No polyuria, polydipsia, cold/heat intolerance  Heme:  No petechiae, ecchymosis, easy bruisability  Skin:  No rash, tattoos, scars, edema      PHYSICAL EXAM:   Vital Signs:  Vital Signs Last 24 Hrs  T(C): 37.4 (21 Oct 2018 14:18), Max: 37.4 (21 Oct 2018 14:18)  T(F): 99.4 (21 Oct 2018 14:18), Max: 99.4 (21 Oct 2018 14:18)  HR: 63 (21 Oct 2018 14:18) (62 - 69)  BP: 128/68 (21 Oct 2018 14:18) (123/66 - 131/63)  BP(mean): --  RR: 15 (21 Oct 2018 14:18) (15 - 16)  SpO2: 93% (21 Oct 2018 14:18) (91% - 93%)  Daily     Daily I&O's Summary    20 Oct 2018 07:01  -  21 Oct 2018 07:00  --------------------------------------------------------  IN: 300 mL / OUT: 0 mL / NET: 300 mL        GENERAL:  Appears stated age, well-groomed, well-nourished, no distress  HEENT:  NC/AT,  conjunctivae clear and pink, no thyromegaly, nodules, adenopathy, no JVD, sclera -anicteric  CHEST:  Full & symmetric excursion, no increased effort, breath sounds clear  HEART:  Regular rhythm, S1, S2, no murmur/rub/S3/S4, no abdominal bruit, no edema  ABDOMEN:  Soft, non-tender, non-distended, normoactive bowel sounds,  no masses ,no hepato-splenomegaly, no signs of chronic liver disease  EXTEREMITIES:  no cyanosis,clubbing or edema  SKIN:  No rash/erythema/ecchymoses/petechiae/wounds/abscess/warm/dry  NEURO:  Alert, oriented, no asterixis, no tremor, no encephalopathy      LABS:                        11.6   8.16  )-----------( 137      ( 21 Oct 2018 07:29 )             35.2     10-21    141  |  107  |  11  ----------------------------<  98  3.4<L>   |  26  |  0.72    Ca    8.7      21 Oct 2018 07:29  Phos  2.1     10-21  Mg     1.9     10-20    TPro  6.3  /  Alb  2.2<L>  /  TBili  0.7  /  DBili  x   /  AST  33  /  ALT  20  /  AlkPhos  78  10-21    PT/INR - ( 20 Oct 2018 07:35 )   PT: 15.2 sec;   INR: 1.39 ratio             amylaseAmylase, Serum Total: 46 U/L (10-17 @ 20:40)  Amylase, Serum Total: 48 U/L (10-17 @ 12:42)     lipaseLipase, Serum: 50 U/L (10-17 @ 20:40)  Lipase, Serum: 72 U/L (10-17 @ 12:42)    RADIOLOGY & ADDITIONAL TESTS:

## 2018-10-21 NOTE — PROGRESS NOTE ADULT - SUBJECTIVE AND OBJECTIVE BOX
Coverage for Dr. Bundy  POD#4 left JJ stent insertion  INTERVAL Hx:  No acute events.  ID note appreciated: to continue Rocephin 2gms until negative blood cultures.  Afebrile.    MEDICATIONS  (STANDING):  aluminum hydroxide/magnesium hydroxide/simethicone Suspension 30 milliLiter(s) Oral every 6 hours  aspirin  chewable 81 milliGRAM(s) Oral daily  atorvastatin 20 milliGRAM(s) Oral at bedtime  cefTRIAXone   IVPB      cefTRIAXone   IVPB 2 Gram(s) IV Intermittent every 24 hours  dextrose 5%. 1000 milliLiter(s) (50 mL/Hr) IV Continuous <Continuous>  dextrose 50% Injectable 12.5 Gram(s) IV Push once  dextrose 50% Injectable 25 Gram(s) IV Push once  dextrose 50% Injectable 25 Gram(s) IV Push once  enoxaparin Injectable 40 milliGRAM(s) SubCutaneous daily  escitalopram 10 milliGRAM(s) Oral daily  finasteride 5 milliGRAM(s) Oral daily  insulin lispro (HumaLOG) corrective regimen sliding scale   SubCutaneous every 6 hours  lactobacillus acidophilus 1 Tablet(s) Oral three times a day with meals  pantoprazole    Tablet 40 milliGRAM(s) Oral before breakfast  potassium phosphate IVPB 15 milliMole(s) IV Intermittent once  simethicone 80 milliGRAM(s) Chew every 6 hours  tamsulosin 0.4 milliGRAM(s) Oral at bedtime    MEDICATIONS  (PRN):  acetaminophen   Tablet .. 650 milliGRAM(s) Oral every 6 hours PRN Moderate Pain (4 - 6)  dextrose 40% Gel 15 Gram(s) Oral once PRN Blood Glucose LESS THAN 70 milliGRAM(s)/deciliter  glucagon  Injectable 1 milliGRAM(s) IntraMuscular once PRN Glucose LESS THAN 70 milligrams/deciliter        Vital Signs Last 24 Hrs  T(C): 36.8 (21 Oct 2018 05:11), Max: 37.4 (20 Oct 2018 12:43)  T(F): 98.2 (21 Oct 2018 05:11), Max: 99.4 (20 Oct 2018 12:43)  HR: 62 (21 Oct 2018 05:11) (62 - 69)  BP: 123/66 (21 Oct 2018 05:11) (120/67 - 131/63)  BP(mean): --  RR: 16 (21 Oct 2018 05:11) (16 - 17)  SpO2: 91% (21 Oct 2018 05:11) (91% - 96%)    PHYSICAL EXAM:    ABDOMEN: soft, NT, no CVAT    LABS:                        11.6   8.16  )-----------( 137      ( 21 Oct 2018 07:29 )             35.2     10-21    141  |  107  |  11  ----------------------------<  98  3.4<L>   |  26  |  0.72    Ca    8.7      21 Oct 2018 07:29  Phos  2.1     10-21  Mg     1.9     10-20    TPro  6.3  /  Alb  2.2<L>  /  TBili  0.7  /  DBili  x   /  AST  33  /  ALT  20  /  AlkPhos  78  10-21    Urine culture:  10-18 @ 18:37 --   Growth in anaerobic bottle: Escherichia coli  See previous culture [id number]  Urine culture:  10-17 @ 16:03 --   >100,000 CFU/ml Escherichia coli  Urine culture:  10-17 @ 15:39 --   Growth in aerobic and anaerobic bottles: Escherichia coli  "Due to technical problems, Proteus sp. will Not be reported as part of  the BCID panel until further notice"  ***Blood Panel PCR results on this specimen are available  approximately 3 hours after the Gram stain result.***  Gram stain, PCR, and/or culture results may not always  correspond due to difference in methodologies.  ************************************************************  This PCR assay was performed using [employer].  The following targets are tested for: Enterococcus,  vancomycin resistant enterococci, Listeria monocytogenes,  coagulase negative staphylococci, S. aureus,  methicillin resistant S. aureus, Streptococcus agalactiae  (Group B), S. pneumoniae, S.pyogenes (Group A),  Acinetobacter baumannii, Enterobacter cloacae, E. coli,  Klebsiella oxytoca, K. pneumoniae, Proteus sp.,  Serratia marcescens, Haemophilus influenzae,  Neisseria meningitidis, Pseudomonas aeruginosa, Candida  albicans, C. glabrata, C krusei, C parapsilosis,  C. tropicalis and the KPC resistance gene.

## 2018-10-21 NOTE — PROGRESS NOTE ADULT - SUBJECTIVE AND OBJECTIVE BOX
[facility] -- Latisha Ash, Rhonda Verma, Joselo Jimenez Savella  Office # [phone]      Follow Up:  Urosepsis/Hypotension    Subjective/Observations: No events overnight resting comfortably in bed without complaints       REVIEW OF SYSTEMS: All other review of systems is negative unless indicated above    PAST MEDICAL & SURGICAL HISTORY:  CAD (coronary artery disease)  GERD (gastroesophageal reflux disease)  Hyperlipidemia  Benign prostatic hyperplasia, presence of lower urinary tract symptoms unspecified, unspecified morphology  H/O heart artery stent      MEDICATIONS  (STANDING):  aluminum hydroxide/magnesium hydroxide/simethicone Suspension 30 milliLiter(s) Oral every 6 hours  aspirin  chewable 81 milliGRAM(s) Oral daily  atorvastatin 20 milliGRAM(s) Oral at bedtime  cefTRIAXone   IVPB      cefTRIAXone   IVPB 2 Gram(s) IV Intermittent every 24 hours  dextrose 5%. 1000 milliLiter(s) (50 mL/Hr) IV Continuous <Continuous>  dextrose 50% Injectable 12.5 Gram(s) IV Push once  dextrose 50% Injectable 25 Gram(s) IV Push once  dextrose 50% Injectable 25 Gram(s) IV Push once  enoxaparin Injectable 40 milliGRAM(s) SubCutaneous daily  escitalopram 10 milliGRAM(s) Oral daily  finasteride 5 milliGRAM(s) Oral daily  insulin lispro (HumaLOG) corrective regimen sliding scale   SubCutaneous every 6 hours  lactobacillus acidophilus 1 Tablet(s) Oral three times a day with meals  pantoprazole    Tablet 40 milliGRAM(s) Oral before breakfast  potassium phosphate IVPB 15 milliMole(s) IV Intermittent once  simethicone 80 milliGRAM(s) Chew every 6 hours  tamsulosin 0.4 milliGRAM(s) Oral at bedtime    MEDICATIONS  (PRN):  acetaminophen   Tablet .. 650 milliGRAM(s) Oral every 6 hours PRN Moderate Pain (4 - 6)  dextrose 40% Gel 15 Gram(s) Oral once PRN Blood Glucose LESS THAN 70 milliGRAM(s)/deciliter  glucagon  Injectable 1 milliGRAM(s) IntraMuscular once PRN Glucose LESS THAN 70 milligrams/deciliter      Allergies    No Known Allergies    Intolerances            Vital Signs Last 24 Hrs  T(C): 36.8 (21 Oct 2018 05:11), Max: 36.8 (21 Oct 2018 05:11)  T(F): 98.2 (21 Oct 2018 05:11), Max: 98.2 (21 Oct 2018 05:11)  HR: 62 (21 Oct 2018 05:11) (62 - 69)  BP: 123/66 (21 Oct 2018 05:11) (123/66 - 131/63)  BP(mean): --  RR: 16 (21 Oct 2018 05:11) (16 - 16)  SpO2: 91% (21 Oct 2018 05:11) (91% - 93%)    I&O's Summary    20 Oct 2018 07:01  -  21 Oct 2018 07:00  --------------------------------------------------------  IN: 300 mL / OUT: 0 mL / NET: 300 mL          PHYSICAL EXAM:  TELE:   Constitutional: NAD, awake and alert, well-developed  HEENT: Moist Mucous Membranes, Anicteric  Pulmonary: Non-labored, breath sounds are clear bilaterally, No wheezing, rales or rhonchi  Cardiovascular: Regular, S1 and S2, No murmurs, rubs, gallops or clicks  Gastrointestinal: Bowel Sounds present, soft, nontender.   Lymph: No peripheral edema. No lymphadenopathy.  Skin: No visible rashes or ulcers.  Psych:  Mood & affect appropriate    LABS: All Labs Reviewed:                        11.6   8.16  )-----------( 137      ( 21 Oct 2018 07:29 )             35.2                         11.0   8.30  )-----------( 117      ( 20 Oct 2018 07:35 )             33.2                         11.8   12.30 )-----------( 113      ( 19 Oct 2018 05:19 )             35.9     21 Oct 2018 07:29    141    |  107    |  11     ----------------------------<  98     3.4     |  26     |  0.72   20 Oct 2018 07:35    142    |  109    |  14     ----------------------------<  102    3.4     |  27     |  0.79   19 Oct 2018 05:19    143    |  108    |  15     ----------------------------<  103    3.6     |  28     |  0.92     Ca    8.7        21 Oct 2018 07:29  Ca    8.6        20 Oct 2018 07:35  Ca    9.2        19 Oct 2018 05:19  Phos  2.1       21 Oct 2018 07:29  Phos  1.4       20 Oct 2018 07:35  Phos  2.0       19 Oct 2018 05:19  Mg     1.9       20 Oct 2018 07:35  Mg     1.9       19 Oct 2018 05:19    TPro  6.3    /  Alb  2.2    /  TBili  0.7    /  DBili  x      /  AST  33     /  ALT  20     /  AlkPhos  78     21 Oct 2018 07:29  TPro  5.8    /  Alb  2.0    /  TBili  0.8    /  DBili  x      /  AST  29     /  ALT  16     /  AlkPhos  76     20 Oct 2018 07:35  TPro  6.1    /  Alb  2.2    /  TBili  1.6    /  DBili  .40    /  AST  33     /  ALT  15     /  AlkPhos  66     19 Oct 2018 05:19    PT/INR - ( 20 Oct 2018 07:35 )   PT: 15.2 sec;   INR: 1.39 ratio                    ECG:    Echo:  < from: TTE Echo Doppler w/o Cont (10.18.18 @ 16:14) >  XAM:  ECHO TTE W/O CON COMP W/DOPPLR         PROCEDURE DATE:  10/18/2018        INTERPRETATION:  INDICATION: CHF  Referring M.D.:Jack  Blood Pressure 140/60        Weight (kg) :78     Height (cm):170       BSA (sq m): 1.9  Technician: KAYLA    Dimensions:    LA 2.8       Normal Values: 2.0 - 4.0 cm    Ao 3.5        Normal Values: 2.0 - 3.8 cm  SEPTUM O.8       Normal Values: 0.6 - 1.2 cm  PWT 0.8       Normal Values: 0.6 - 1.1 cm  LVIDd 3.6         Normal Values: 3.0 - 5.6 cm  LVIDs 2.5         Normal Values: 1.8 - 4.0 cm      OBSERVATIONS:    Mitral Valve: Mitral annular calcification with mild mitral regurgitation  Aortic Valve/Aorta: Aortic sclerosis  Tricuspid Valve: Normal tricuspid valve. Mild tricuspid regurgitation.  Pulmonic Valve: The pulmonic valve is not well visualized. Probably   normal.  Left Atrium: Normal  Right Atrium: Normal  Left Ventricle: Normal left ventricular size, wall thickness, and global   systolic function. The EF is approximately 55%.  Right Ventricle: Normal right ventricular size and function.  Pericardium/Pleura: No pericardial effusion noted.  Pulmonary/RV Pressure: The right ventricular systolic pressure is   estimated to be 22mmHg, assuming that the right atrial pressure is   estimated to be 8 mmHg. This is consistent with no pulmonary pressures.  LV Diastolic Function: Doppler evidence suggestive of diastolic   dysfunction.    Conclusion: Technically limited study  1. Normal left ventricular size, wall thickness, and global systolic   function  2. Mitral annular calcification with mild mitral regurgitation  3. Aortic sclerosis  4. Normal pulmonary pressures  5. Doppler evidence suggestive of diastolic dysfunction                  HERNESTO VERMA M.D., ATTENDING CARDIOLOGIST  This document has been electronically signed. Oct 19 2018  7:35AM                < end of copied text >    Radiology:  < from: Xray Chest 1 View- PORTABLE-Routine (10.19.18 @ 06:05) >  EXAM:  XR CHEST PORTABLE ROUTINE 1V                            PROCEDURE DATE:  10/19/2018          INTERPRETATION:  Clinical information: Pneumonia.    Technique: Frontal view of the chest.    Comparison: Prior chest x-ray examination from 10/17/2018.    Findings: Multifocal patchy opacities throughout both lungs appear   grossly unchanged when compared to the prior x-ray examination. The heart   size is at the upper limits of normal. The aorta is tortuous. There are   mild multilevel degenerative changes of the thoracic spine.    There is a right IJ central line present with its tip at the SVC.    IMPRESSION: As above, no interval change.                MAKENZIE JORDAN M.D., ATTENDING RADIOLOGIST  This document has been electronically signed. Oct 19 2018  9:23AM          < end of copied text >           Hernesto Baig [facility]   Cardiology

## 2018-10-21 NOTE — PROGRESS NOTE ADULT - ASSESSMENT
Improving after left ureteral stent placement.  Plan per ID  Outpatient f/u with Dr. Bundy for treatment of stone and stent removal

## 2018-10-21 NOTE — PROGRESS NOTE ADULT - SUBJECTIVE AND OBJECTIVE BOX
HPI:  Patient is an 89 year old male with a PMH of CAD s/p two cardiac stents in 2014, BPH with prostate surgery last year, history of UTI who presented to the ED with complaints of weakness, fever, rigors, and dark, foul-smelling urine for one day.     INTERVAL EVENTS:   Patient seen and examined. Several episodes of loose stool this AM. No abdominal pain/N/V. Eating small amounts per son at bedside.    REVIEW OF SYSTEMS:  Limited due to his confusion, but denies any chest pain, SOB, N/V/D.     VITAL SIGNS:  Vital Signs Last 24 Hrs  T(C): 37.4 (21 Oct 2018 14:18), Max: 37.4 (21 Oct 2018 14:18)  T(F): 99.4 (21 Oct 2018 14:18), Max: 99.4 (21 Oct 2018 14:18)  HR: 63 (21 Oct 2018 14:18) (62 - 69)  BP: 128/68 (21 Oct 2018 14:18) (123/66 - 131/63)  BP(mean): --  RR: 15 (21 Oct 2018 14:18) (15 - 16)  SpO2: 93% (21 Oct 2018 14:18) (91% - 93%)      PHYSICAL EXAM:     GENERAL: elderly male, no acute distress  HEENT: NC/AT, EOMI, neck supple, MMM  RESPIRATORY: LCTAB/L, no rhonchi, rales, or wheezing  CARDIOVASCULAR: RRR, no murmurs, gallops, rubs  ABDOMINAL: soft, non-tender, non-distended, positive bowel sounds   EXTREMITIES: no clubbing, cyanosis, or edema  NEUROLOGICAL: awake and alert, significantly confused, follows commands, grossly non-focal otherwise  SKIN: no rashes or lesions   MUSCULOSKELETAL: no gross joint deformity                          11.6   8.16  )-----------( 137      ( 21 Oct 2018 07:29 )             35.2   10-21    141  |  107  |  11  ----------------------------<  98  3.4<L>   |  26  |  0.72    Ca    8.7      21 Oct 2018 07:29  Phos  2.1     10-21  Mg     1.9     10-20    TPro  6.3  /  Alb  2.2<L>  /  TBili  0.7  /  DBili  x   /  AST  33  /  ALT  20  /  AlkPhos  78  10-21    CAPILLARY BLOOD GLUCOSE  POCT Blood Glucose.: 122 mg/dL (21 Oct 2018 11:35)  POCT Blood Glucose.: 102 mg/dL (21 Oct 2018 06:05)  POCT Blood Glucose.: 107 mg/dL (21 Oct 2018 00:03)  POCT Blood Glucose.: 118 mg/dL (20 Oct 2018 16:38)    MEDICATIONS  (STANDING):  aluminum hydroxide/magnesium hydroxide/simethicone Suspension 30 milliLiter(s) Oral every 6 hours  aspirin  chewable 81 milliGRAM(s) Oral daily  atorvastatin 20 milliGRAM(s) Oral at bedtime  cefTRIAXone   IVPB      cefTRIAXone   IVPB 2 Gram(s) IV Intermittent every 24 hours  dextrose 5%. 1000 milliLiter(s) (50 mL/Hr) IV Continuous <Continuous>  dextrose 50% Injectable 12.5 Gram(s) IV Push once  dextrose 50% Injectable 25 Gram(s) IV Push once  dextrose 50% Injectable 25 Gram(s) IV Push once  enoxaparin Injectable 40 milliGRAM(s) SubCutaneous daily  escitalopram 10 milliGRAM(s) Oral daily  finasteride 5 milliGRAM(s) Oral daily  insulin lispro (HumaLOG) corrective regimen sliding scale   SubCutaneous every 6 hours  lactobacillus acidophilus 1 Tablet(s) Oral three times a day with meals  pantoprazole    Tablet 40 milliGRAM(s) Oral before breakfast  potassium phosphate IVPB 15 milliMole(s) IV Intermittent once  simethicone 80 milliGRAM(s) Chew every 6 hours  tamsulosin 0.4 milliGRAM(s) Oral at bedtime    MEDICATIONS  (PRN):  acetaminophen   Tablet .. 650 milliGRAM(s) Oral every 6 hours PRN Moderate Pain (4 - 6)  dextrose 40% Gel 15 Gram(s) Oral once PRN Blood Glucose LESS THAN 70 milliGRAM(s)/deciliter  glucagon  Injectable 1 milliGRAM(s) IntraMuscular once PRN Glucose LESS THAN 70 milligrams/deciliter

## 2018-10-21 NOTE — PROGRESS NOTE ADULT - ATTENDING COMMENTS
Hypophosphatemia: repleted. Recheck tomorrow.  Diarrhea: likely antibiotic associated diarrhea in combination with puree diet. Will continue to monitor and may have to send stood for C.diff if diarrhea persists.

## 2018-10-21 NOTE — PROGRESS NOTE ADULT - SUBJECTIVE AND OBJECTIVE BOX
ID Progress note     Name: IDRIS DANIEL  Age: 89y  Gender: Male  MRN: [id number]    Interval History--  Events noted , doing well, appetite is better , remains confused , feels weak   Notes reviewed    Past Medical History--  CAD (coronary artery disease)  GERD (gastroesophageal reflux disease)  Hyperlipidemia  Benign prostatic hyperplasia, presence of lower urinary tract symptoms unspecified, unspecified morphology  H/O heart artery stent      For details regarding the patient's social history, family history, and other miscellaneous elements, please refer the initial infectious diseases consultation and/or the admitting history and physical examination for this admission.    Allergies--  Allergies    No Known Allergies    Intolerances        Medications--  Antibiotics:  cefTRIAXone   IVPB      cefTRIAXone   IVPB 2 Gram(s) IV Intermittent every 24 hours    Immunologic:    Other:  acetaminophen   Tablet .. PRN  aluminum hydroxide/magnesium hydroxide/simethicone Suspension  aspirin  chewable  atorvastatin  dextrose 40% Gel PRN  dextrose 5%.  dextrose 50% Injectable  dextrose 50% Injectable  dextrose 50% Injectable  enoxaparin Injectable  escitalopram  finasteride  glucagon  Injectable PRN  insulin lispro (HumaLOG) corrective regimen sliding scale  lactobacillus acidophilus  pantoprazole    Tablet  simethicone  tamsulosin      Review of Systems--  Review of systems unable to be obtained secondary to clinical condition.    Physical Examination--    Vital Signs: T(F): 98.2 (10-21-18 @ 05:11), Max: 99.4 (10-20-18 @ 12:43)  HR: 62 (10-21-18 @ 05:11)  BP: 123/66 (10-21-18 @ 05:11)  RR: 16 (10-21-18 @ 05:11)  SpO2: 91% (10-21-18 @ 05:11)  Wt(kg): --  General: Nontoxic-appearing Male in no acute distress.  HEENT: AT/NC. PERRL. EOMI. Anicteric. Conjunctiva pink and moist. Oropharynx clear. Dentition fair.  Neck: Not rigid. No sense of mass.  Nodes: None palpable.  Lungs: Clear bilaterally without rales, wheezing or rhonchi  Heart: Regular rate and rhythm. No Murmur. No rub. No gallop. No palpable thrill.  Abdomen: Bowel sounds present and normoactive. Soft. Nondistended. Nontender. No sense of mass. No organomegaly.  Back: No spinal tenderness. No costovertebral angle tenderness.   Extremities: No cyanosis or clubbing. No edema.   Skin: Warm. Dry. Good turgor. No rash. No vasculitic stigmata.  Psychiatric: Appropriate affect and mood for situation.       Laboratory Studies--  CBC                        11.6   8.16  )-----------( 137      ( 21 Oct 2018 07:29 )             35.2       Chemistries  10-21    141  |  107  |  11  ----------------------------<  98  3.4<L>   |  26  |  0.72    Ca    8.7      21 Oct 2018 07:29  Phos  2.1     10-21  Mg     1.9     10-20    TPro  6.3  /  Alb  2.2<L>  /  TBili  0.7  /  DBili  x   /  AST  33  /  ALT  20  /  AlkPhos  78  10-21      Culture Data    Culture - Blood (collected 18 Oct 2018 18:37)  Source: .Blood Blood-Peripheral  Preliminary Report (19 Oct 2018 19:01):    No growth to date.    Culture - Blood (collected 18 Oct 2018 18:37)  Source: .Blood Blood-Peripheral  Gram Stain (20 Oct 2018 09:09):    Growth in anaerobic bottle: Gram Negative Rods  Preliminary Report (20 Oct 2018 09:09):    Growth in anaerobic bottle: Gram Negative Rods    Culture - Urine (collected 17 Oct 2018 16:03)  Source: .Urine Clean Catch (Midstream)  Final Report (19 Oct 2018 17:17):    >100,000 CFU/ml Escherichia coli  Organism: Escherichia coli (19 Oct 2018 17:17)  Organism: Escherichia coli (19 Oct 2018 17:17)    Culture - Blood (collected 17 Oct 2018 15:39)  Source: .Blood Blood  Gram Stain (18 Oct 2018 03:31):    Growth in aerobic and anaerobic bottles:    Gram Negative Rods  Final Report (19 Oct 2018 19:33):    Growth in aerobic and anaerobic bottles:    Escherichia coli [facility] [id number]    Culture - Blood (collected 17 Oct 2018 15:39)  Source: .Blood Blood  Gram Stain (18 Oct 2018 04:34):    Growth in aerobic and anaerobic bottles:    Gram Negative Rods  Final Report (19 Oct 2018 19:32):    Growth in aerobic and anaerobic bottles: Escherichia coli    "Due to technical problems, Proteus sp. will Not be reported as part of    the BCID panel until further notice"    ***Blood Panel PCR results on this specimen are available    approximately 3 hours after the Gram stain result.***    Gram stain, PCR, and/or culture results may not always    correspond due to difference in methodologies.    ************************************************************    This PCR assay was performed using [employer].    The following targets are tested for: Enterococcus,    vancomycin resistant enterococci, Listeria monocytogenes,    coagulase negative staphylococci, S. aureus,    methicillin resistant S. aureus, Streptococcus agalactiae    (Group B), S. pneumoniae, S.pyogenes (Group A),    Acinetobacter baumannii, Enterobacter cloacae, E. coli,    Klebsiella oxytoca, K. pneumoniae, Proteus sp.,    Serratia marcescens, Haemophilus influenzae,    Neisseria meningitidis, Pseudomonas aeruginosa, Candida    albicans, C. glabrata, C krusei, C parapsilosis,    C. tropicalis and the KPC resistance gene.  Organism: Blood Culture PCR  Escherichia coli (19 Oct 2018 19:32)  Organism: Escherichia coli (19 Oct 2018 19:32)  Organism: Blood Culture PCR (19 Oct 2018 19:32)     CT Chest , Abdomen and pelvis No Cont (10.17.18 @ 15:41) >    Impression:    Limited by lack of contrast and artifact.  Extensive right lung pneumonia.  Cholelithiasis. Possible acute cholecystitis. Correlate with right upper   quadrant ultrasound and/or HIDA scan.  Findings suggestive of pancreatitis. Correlate with amylase, lipase levels  Mildleft hydronephrosis secondary to a mid left ureteral calculus.   Additional nonobstructive left nephrolithiasis.  Bladder calculi. Bladder wall thickening suggestive of cystitis in the   appropriate setting.  Additional findings as discussed      Assessment--  89y old  Male hx CAD/cardiac stents 2yrs ago, HLD, BPH adm to ICU w/ septic shock secondary to E coli bacteremia likely secondary to obstructive uropathy for left ureteral stone. Ct chest and abdomen also shows extensive right sided  pneumonia and possible gall stone pancreatitis, although he has minimal cough or GI symptoms . Repeat blood cs also positive     He has responded to antibiotics and supportive care and improved remarkably once he has left ureteral stent placed, so suspect primary problem to be  infection     he had ATUL which has improved     CXR findings may suggest superimposed fluid overload       Suggestions--  - continue with IV Rocephin 2 gram daily till repeat  bc negative then change to po Vantin 200 mg bid to complete total 14 days of therapy   - PT evaluation for ambulation   - trend cbc     Continue with present regime .  Appropriate use of antibiotics and adverse effects reviewed.    I have discussed the above plan of care with patient/family in detail. They expressed understanding of the treatment plan . Risks, benefits and alternatives discussed in detail. I have asked if they have any questions or concerns and appropriately addressed them to the best of my ability .      > 25 minutes spent in direct patient care reviewing  the notes, lab data/ imaging , discussion with multidisciplinary team. All questions were addressed and answered to the best of my capacity .    Thank you for allowing me to participate in the care of your patient .        Humphrey Thakkar MD  [phone]

## 2018-10-22 LAB
ANION GAP SERPL CALC-SCNC: 4 MMOL/L — LOW (ref 5–17)
BUN SERPL-MCNC: 11 MG/DL — SIGNIFICANT CHANGE UP (ref 7–23)
CALCIUM SERPL-MCNC: 9.4 MG/DL — SIGNIFICANT CHANGE UP (ref 8.5–10.1)
CHLORIDE SERPL-SCNC: 105 MMOL/L — SIGNIFICANT CHANGE UP (ref 96–108)
CO2 SERPL-SCNC: 30 MMOL/L — SIGNIFICANT CHANGE UP (ref 22–31)
CREAT SERPL-MCNC: 0.72 MG/DL — SIGNIFICANT CHANGE UP (ref 0.5–1.3)
GLUCOSE SERPL-MCNC: 100 MG/DL — HIGH (ref 70–99)
HCT VFR BLD CALC: 35.8 % — LOW (ref 39–50)
HGB BLD-MCNC: 12.4 G/DL — LOW (ref 13–17)
MCHC RBC-ENTMCNC: 30 PG — SIGNIFICANT CHANGE UP (ref 27–34)
MCHC RBC-ENTMCNC: 34.6 GM/DL — SIGNIFICANT CHANGE UP (ref 32–36)
MCV RBC AUTO: 86.7 FL — SIGNIFICANT CHANGE UP (ref 80–100)
NRBC # BLD: 0 /100 WBCS — SIGNIFICANT CHANGE UP (ref 0–0)
PHOSPHATE SERPL-MCNC: 2.2 MG/DL — LOW (ref 2.5–4.5)
PLATELET # BLD AUTO: 146 K/UL — LOW (ref 150–400)
POTASSIUM SERPL-MCNC: 4.1 MMOL/L — SIGNIFICANT CHANGE UP (ref 3.5–5.3)
POTASSIUM SERPL-SCNC: 4.1 MMOL/L — SIGNIFICANT CHANGE UP (ref 3.5–5.3)
RBC # BLD: 4.13 M/UL — LOW (ref 4.2–5.8)
RBC # FLD: 13.3 % — SIGNIFICANT CHANGE UP (ref 10.3–14.5)
S PNEUM AG UR QL: NEGATIVE — SIGNIFICANT CHANGE UP
SODIUM SERPL-SCNC: 139 MMOL/L — SIGNIFICANT CHANGE UP (ref 135–145)
WBC # BLD: 9.64 K/UL — SIGNIFICANT CHANGE UP (ref 3.8–10.5)
WBC # FLD AUTO: 9.64 K/UL — SIGNIFICANT CHANGE UP (ref 3.8–10.5)

## 2018-10-22 PROCEDURE — 99233 SBSQ HOSP IP/OBS HIGH 50: CPT

## 2018-10-22 PROCEDURE — 99232 SBSQ HOSP IP/OBS MODERATE 35: CPT

## 2018-10-22 RX ORDER — CEFPODOXIME PROXETIL 100 MG
200 TABLET ORAL EVERY 12 HOURS
Qty: 0 | Refills: 0 | Status: DISCONTINUED | OUTPATIENT
Start: 2018-10-23 | End: 2018-10-24

## 2018-10-22 RX ORDER — POTASSIUM PHOSPHATE, MONOBASIC POTASSIUM PHOSPHATE, DIBASIC 236; 224 MG/ML; MG/ML
15 INJECTION, SOLUTION INTRAVENOUS ONCE
Qty: 0 | Refills: 0 | Status: COMPLETED | OUTPATIENT
Start: 2018-10-22 | End: 2018-10-22

## 2018-10-22 RX ORDER — BRINZOLAMIDE 10 MG/ML
1 SUSPENSION/ DROPS OPHTHALMIC
Qty: 0 | Refills: 0 | Status: DISCONTINUED | OUTPATIENT
Start: 2018-10-22 | End: 2018-10-24

## 2018-10-22 RX ADMIN — Medication 81 MILLIGRAM(S): at 12:45

## 2018-10-22 RX ADMIN — ESCITALOPRAM OXALATE 10 MILLIGRAM(S): 10 TABLET, FILM COATED ORAL at 12:46

## 2018-10-22 RX ADMIN — CEFTRIAXONE 100 GRAM(S): 500 INJECTION, POWDER, FOR SOLUTION INTRAMUSCULAR; INTRAVENOUS at 17:23

## 2018-10-22 RX ADMIN — SIMETHICONE 80 MILLIGRAM(S): 80 TABLET, CHEWABLE ORAL at 17:15

## 2018-10-22 RX ADMIN — Medication 1 TABLET(S): at 12:45

## 2018-10-22 RX ADMIN — Medication 1 TABLET(S): at 10:51

## 2018-10-22 RX ADMIN — PANTOPRAZOLE SODIUM 40 MILLIGRAM(S): 20 TABLET, DELAYED RELEASE ORAL at 05:48

## 2018-10-22 RX ADMIN — SIMETHICONE 80 MILLIGRAM(S): 80 TABLET, CHEWABLE ORAL at 12:45

## 2018-10-22 RX ADMIN — FINASTERIDE 5 MILLIGRAM(S): 5 TABLET, FILM COATED ORAL at 12:45

## 2018-10-22 RX ADMIN — ENOXAPARIN SODIUM 40 MILLIGRAM(S): 100 INJECTION SUBCUTANEOUS at 12:45

## 2018-10-22 RX ADMIN — Medication 1 TABLET(S): at 17:15

## 2018-10-22 RX ADMIN — SIMETHICONE 80 MILLIGRAM(S): 80 TABLET, CHEWABLE ORAL at 05:48

## 2018-10-22 RX ADMIN — TAMSULOSIN HYDROCHLORIDE 0.4 MILLIGRAM(S): 0.4 CAPSULE ORAL at 21:20

## 2018-10-22 RX ADMIN — Medication 30 MILLILITER(S): at 05:48

## 2018-10-22 RX ADMIN — ATORVASTATIN CALCIUM 20 MILLIGRAM(S): 80 TABLET, FILM COATED ORAL at 21:20

## 2018-10-22 RX ADMIN — POTASSIUM PHOSPHATE, MONOBASIC POTASSIUM PHOSPHATE, DIBASIC 62.5 MILLIMOLE(S): 236; 224 INJECTION, SOLUTION INTRAVENOUS at 13:01

## 2018-10-22 NOTE — PROGRESS NOTE ADULT - PROBLEM SELECTOR PLAN 1
due to obstructed left kidney from left ureteral stone -- now s/p emergent ureteral stent placement with decompression of the left hydro on 10/17  Improved s/p stent placement ; blood and urine cultures growing E coli, so urine is the likely source. Now stable off pressors.  -Now on Rocephin.  -patient suspected to also have pneumonia based on CT but very little respiratory symptoms   -question of acute cholecystitis on CT. Had HIDA on 10/18--negative   -D/C Rocephin after today's dose and transition to Vantin for 14 days to complete course.

## 2018-10-22 NOTE — PROGRESS NOTE ADULT - ATTENDING COMMENTS
Hypophosphatemia: Repleted. Recheck tomorrow.  Diarrhea: ?likely antibiotic associated diarrhea in combination with puree diet. This has resolved today. Continue to monitor.  Dispo: Patient much weaker today. Will need ELIANE. PT aware and working on this.

## 2018-10-22 NOTE — PROGRESS NOTE ADULT - SUBJECTIVE AND OBJECTIVE BOX
INTERVAL HPI/OVERNIGHT EVENTS:  pt seen and examined family present  denies n/v/d/abd pain +bm yesterday  per overnight rn no acute gi issues  afebrile overnight no new labs to assess    MEDICATIONS  (STANDING):  aluminum hydroxide/magnesium hydroxide/simethicone Suspension 30 milliLiter(s) Oral every 6 hours  aspirin  chewable 81 milliGRAM(s) Oral daily  atorvastatin 20 milliGRAM(s) Oral at bedtime  cefTRIAXone   IVPB      cefTRIAXone   IVPB 2 Gram(s) IV Intermittent every 24 hours  dextrose 5%. 1000 milliLiter(s) (50 mL/Hr) IV Continuous <Continuous>  dextrose 50% Injectable 12.5 Gram(s) IV Push once  dextrose 50% Injectable 25 Gram(s) IV Push once  dextrose 50% Injectable 25 Gram(s) IV Push once  enoxaparin Injectable 40 milliGRAM(s) SubCutaneous daily  escitalopram 10 milliGRAM(s) Oral daily  finasteride 5 milliGRAM(s) Oral daily  insulin lispro (HumaLOG) corrective regimen sliding scale   SubCutaneous every 6 hours  lactobacillus acidophilus 1 Tablet(s) Oral three times a day with meals  pantoprazole    Tablet 40 milliGRAM(s) Oral before breakfast  potassium phosphate IVPB 15 milliMole(s) IV Intermittent once  simethicone 80 milliGRAM(s) Chew every 6 hours  tamsulosin 0.4 milliGRAM(s) Oral at bedtime    MEDICATIONS  (PRN):  acetaminophen   Tablet .. 650 milliGRAM(s) Oral every 6 hours PRN Moderate Pain (4 - 6)  dextrose 40% Gel 15 Gram(s) Oral once PRN Blood Glucose LESS THAN 70 milliGRAM(s)/deciliter  glucagon  Injectable 1 milliGRAM(s) IntraMuscular once PRN Glucose LESS THAN 70 milligrams/deciliter      Allergies    No Known Allergies    Intolerances        Review of Systems:    General:  No wt loss, fevers, chills, night sweats, fatigue   Eyes:  Good vision, no reported pain  ENT:  No sore throat, pain, runny nose, dysphagia  CV:  No pain, palpitations, hypo/hypertension  Resp:  No dyspnea, cough, tachypnea, wheezing  GI:  No pain, No nausea, No vomiting, No diarrhea, No constipation, No weight loss, No fever, No pruritis, No rectal bleeding, No melena, No dysphagia  :  No pain, bleeding, incontinence, nocturia  Muscle:  No pain, weakness  Neuro:  No weakness, tingling, memory problems  Psych:  No fatigue, insomnia, mood problems, depression  Endocrine:  No polyuria, polydypsia, cold/heat intolerance  Heme:  No petechiae, ecchymosis, easy bruisability  Skin:  No rash, tattoos, scars, edema      Vital Signs Last 24 Hrs  T(C): 36.9 (22 Oct 2018 04:49), Max: 37.4 (21 Oct 2018 14:18)  T(F): 98.5 (22 Oct 2018 04:49), Max: 99.4 (21 Oct 2018 14:18)  HR: 77 (22 Oct 2018 04:49) (60 - 77)  BP: 133/74 (22 Oct 2018 04:49) (117/72 - 133/74)  BP(mean): --  RR: 18 (22 Oct 2018 04:49) (15 - 18)  SpO2: 94% (22 Oct 2018 04:49) (93% - 100%)    PHYSICAL EXAM:  Constitutional: NAD, lying in bed  HEENT: ncat  Neck: No LAD  Respiratory: dec bs  Cardiovascular: S1 and S2, RRR  Gastrointestinal: soft nt nd  Extremities: No peripheral edema  Vascular: 2+ peripheral pulses  Neurological: Awake alert responds appropriately  Skin: No rashes      LABS:                        11.6   8.16  )-----------( 137      ( 21 Oct 2018 07:29 )             35.2     10-21    141  |  107  |  11  ----------------------------<  98  3.4<L>   |  26  |  0.72    Ca    8.7      21 Oct 2018 07:29  Phos  2.1     10-21    TPro  6.3  /  Alb  2.2<L>  /  TBili  0.7  /  DBili  x   /  AST  33  /  ALT  20  /  AlkPhos  78  10-21          RADIOLOGY & ADDITIONAL TESTS:

## 2018-10-22 NOTE — ADVANCED PRACTICE NURSE CONSULT - REASON FOR CONSULT
89y    Male    Patient is a 89y old  Male who presents with a chief complaint of sepsis secondary to UTI (22 Oct 2018 15:35)      HPI:  Patient is an 89 year old male with a PMH of CAD s/p two cardiac stents in , BPH with prostate surgery last year, history of UTI who presented to the ED with complaints of weakness, fever, rigors, and dark, foul-smelling urine for one day. Patient's 2 adult sons are present at bedside who stated that patient's wife  3 weeks ago on home hospice care secondary to end-stage COPD and URTI. One of his son's has been living with him since January. Patient's baseline is ambulatory without assistance and he is competent in ADLs. Patient got his flu shot in September.    In the ED, patient's vitals were:  T(C): 38.7 (17 Oct 2018 12:26), Max: 41.1 (17 Oct 2018 11:39)  T(F): 101.7 (17 Oct 2018 12:26), Max: 106 (17 Oct 2018 11:39)  HR: 85 (17 Oct 2018 12:49) (84 - 112)  BP: 84/36 (17 Oct 2018 12:49) (71/40 - 124/65)  RR: 22 (17 Oct 2018 12:49) (22 - 32)  SpO2: 97% (17 Oct 2018 12:49) (89% - 97%)    Band neutrophils 13.0, INR 1.36, Creatinine 1.4, Calcium 8.4, Protein 5.3, Bilirubin 1.7, GFR 44, Lipase 72, Lactate 3.9.  Positive UA, positive RVP.  CXR: Low lung volumes. No change heart mediastinum. Generalized nonspecific ground-glass parenchymal process. In setting of fever inflammation/infection is considered. Element of congestion is also possible. No sapna lobar consolidation or pleural effusion.    In the ED, patient received 500 mg IV azithromycin, 1 dose IV zosyn, 1 gm IV vancomycin, 3.5 L NS, tylenol. Admitted to ICU for sepsis secondary to UTI. (17 Oct 2018 13:36)      PAST MEDICAL & SURGICAL HISTORY:  CAD (coronary artery disease)  GERD (gastroesophageal reflux disease)  Hyperlipidemia  Benign prostatic hyperplasia, presence of lower urinary tract symptoms unspecified, unspecified morphology  H/O heart artery stent      MEDICATIONS  (STANDING):  aspirin  chewable 81 milliGRAM(s) Oral daily  atorvastatin 20 milliGRAM(s) Oral at bedtime  brinzolamide 1% Ophthalmic Suspension 1 Drop(s) Both EYES two times a day  dextrose 5%. 1000 milliLiter(s) (50 mL/Hr) IV Continuous <Continuous>  dextrose 50% Injectable 12.5 Gram(s) IV Push once  dextrose 50% Injectable 25 Gram(s) IV Push once  dextrose 50% Injectable 25 Gram(s) IV Push once  enoxaparin Injectable 40 milliGRAM(s) SubCutaneous daily  escitalopram 10 milliGRAM(s) Oral daily  finasteride 5 milliGRAM(s) Oral daily  lactobacillus acidophilus 1 Tablet(s) Oral three times a day with meals  pantoprazole    Tablet 40 milliGRAM(s) Oral before breakfast  potassium phosphate IVPB 15 milliMole(s) IV Intermittent once  simethicone 80 milliGRAM(s) Chew every 6 hours  tamsulosin 0.4 milliGRAM(s) Oral at bedtime

## 2018-10-22 NOTE — PROGRESS NOTE ADULT - ASSESSMENT
88yo M with a PMH of CAD s/p two cardiac stents in 2014, BPH with prostate surgery last year, history of UTI, who presented to the ED with complaints of weakness, fever, rigors, and dark, foul-smelling urine a/w septic shock with E coli bacteremia due to left pyelonephritis in setting of hydronephrosis from left ureteral obstructing calculus, s/p emergent left ureteral stent placement.

## 2018-10-22 NOTE — PROGRESS NOTE ADULT - SUBJECTIVE AND OBJECTIVE BOX
[facility] -- Latisha Ash, Bayron, Rhonda, Joselo Jimenez Savella  Office # [phone]      Follow Up:  CAD    Subjective/Observations: Patient seen and examined. Events noted. Resting comfortably in bed. No complaints of chest pain, dyspnea, or palpitations reported. No signs of orthopnea or PND.       REVIEW OF SYSTEMS: All other review of systems is negative unless indicated above    PAST MEDICAL & SURGICAL HISTORY:  CAD (coronary artery disease)  GERD (gastroesophageal reflux disease)  Hyperlipidemia  Benign prostatic hyperplasia, presence of lower urinary tract symptoms unspecified, unspecified morphology  H/O heart artery stent      MEDICATIONS  (STANDING):  aluminum hydroxide/magnesium hydroxide/simethicone Suspension 30 milliLiter(s) Oral every 6 hours  aspirin  chewable 81 milliGRAM(s) Oral daily  atorvastatin 20 milliGRAM(s) Oral at bedtime  cefTRIAXone   IVPB      cefTRIAXone   IVPB 2 Gram(s) IV Intermittent every 24 hours  dextrose 5%. 1000 milliLiter(s) (50 mL/Hr) IV Continuous <Continuous>  dextrose 50% Injectable 12.5 Gram(s) IV Push once  dextrose 50% Injectable 25 Gram(s) IV Push once  dextrose 50% Injectable 25 Gram(s) IV Push once  enoxaparin Injectable 40 milliGRAM(s) SubCutaneous daily  escitalopram 10 milliGRAM(s) Oral daily  finasteride 5 milliGRAM(s) Oral daily  insulin lispro (HumaLOG) corrective regimen sliding scale   SubCutaneous every 6 hours  lactobacillus acidophilus 1 Tablet(s) Oral three times a day with meals  pantoprazole    Tablet 40 milliGRAM(s) Oral before breakfast  potassium phosphate IVPB 15 milliMole(s) IV Intermittent once  simethicone 80 milliGRAM(s) Chew every 6 hours  tamsulosin 0.4 milliGRAM(s) Oral at bedtime    MEDICATIONS  (PRN):  acetaminophen   Tablet .. 650 milliGRAM(s) Oral every 6 hours PRN Moderate Pain (4 - 6)  dextrose 40% Gel 15 Gram(s) Oral once PRN Blood Glucose LESS THAN 70 milliGRAM(s)/deciliter  glucagon  Injectable 1 milliGRAM(s) IntraMuscular once PRN Glucose LESS THAN 70 milligrams/deciliter      Allergies    No Known Allergies    Intolerances            Vital Signs Last 24 Hrs  T(C): 36.9 (22 Oct 2018 04:49), Max: 37.4 (21 Oct 2018 14:18)  T(F): 98.5 (22 Oct 2018 04:49), Max: 99.4 (21 Oct 2018 14:18)  HR: 77 (22 Oct 2018 04:49) (60 - 77)  BP: 133/74 (22 Oct 2018 04:49) (117/72 - 133/74)  BP(mean): --  RR: 18 (22 Oct 2018 04:49) (15 - 18)  SpO2: 94% (22 Oct 2018 04:49) (93% - 100%)    I&O's Summary        PHYSICAL EXAM:     Constitutional: NAD, awake and alert, well-developed  HEENT: Moist Mucous Membranes, Anicteric  Pulmonary: Decreased breath sounds b/l. No rales, crackles or wheeze appreciated.   Cardiovascular: Regular, S1 and S2, 2/6SM   Gastrointestinal: Bowel Sounds present, soft, nontender.   Lymph: No peripheral edema. No lymphadenopathy.  Skin: No visible rashes or ulcers.  Psych:  Mood & affect appropriate for situation    LABS: All Labs Reviewed:                        12.4   9.64  )-----------( 146      ( 22 Oct 2018 08:16 )             35.8                         11.6   8.16  )-----------( 137      ( 21 Oct 2018 07:29 )             35.2                         11.0   8.30  )-----------( 117      ( 20 Oct 2018 07:35 )             33.2     21 Oct 2018 07:29    141    |  107    |  11     ----------------------------<  98     3.4     |  26     |  0.72   20 Oct 2018 07:35    142    |  109    |  14     ----------------------------<  102    3.4     |  27     |  0.79     Ca    8.7        21 Oct 2018 07:29  Ca    8.6        20 Oct 2018 07:35  Phos  2.1       21 Oct 2018 07:29  Phos  1.4       20 Oct 2018 07:35  Mg     1.9       20 Oct 2018 07:35    TPro  6.3    /  Alb  2.2    /  TBili  0.7    /  DBili  x      /  AST  33     /  ALT  20     /  AlkPhos  78     21 Oct 2018 07:29  TPro  5.8    /  Alb  2.0    /  TBili  0.8    /  DBili  x      /  AST  29     /  ALT  16     /  AlkPhos  76     20 Oct 2018 07:35

## 2018-10-22 NOTE — PROGRESS NOTE ADULT - SUBJECTIVE AND OBJECTIVE BOX
HPI:  Patient is an 89 year old male with a PMH of CAD s/p two cardiac stents in 2014, BPH with prostate surgery last year, history of UTI who presented to the ED with complaints of weakness, fever, rigors, and dark, foul-smelling urine for one day.     INTERVAL EVENTS:   Patient seen and examined. No abdominal pain/N/V. Diarrhea has resolved. Weak when trying to ambulate per staff.    REVIEW OF SYSTEMS:  Limited due to his confusion, but denies any chest pain, SOB, N/V/D.     VITAL SIGNS:  Vital Signs Last 24 Hrs  T(C): 37.1 (22 Oct 2018 14:25), Max: 37.2 (22 Oct 2018 10:38)  T(F): 98.8 (22 Oct 2018 14:25), Max: 98.9 (22 Oct 2018 10:38)  HR: 73 (22 Oct 2018 14:25) (60 - 85)  BP: 137/50 (22 Oct 2018 14:25) (108/66 - 137/50)  BP(mean): --  RR: 16 (22 Oct 2018 14:25) (16 - 18)  SpO2: 97% (22 Oct 2018 14:25) (94% - 100%)    PHYSICAL EXAM:     GENERAL: elderly male, no acute distress  HEENT: NC/AT, EOMI, neck supple, MMM  RESPIRATORY: LCTAB/L, no rhonchi, rales, or wheezing  CARDIOVASCULAR: RRR, no murmurs, gallops, rubs  ABDOMINAL: soft, non-tender, non-distended, positive bowel sounds   EXTREMITIES: no clubbing, cyanosis, or edema  NEUROLOGICAL: awake and alert, significantly confused, follows commands, grossly non-focal otherwise  SKIN: no rashes or lesions   MUSCULOSKELETAL: no gross joint deformity                        12.4   9.64  )-----------( 146      ( 22 Oct 2018 08:16 )             35.8   10-22    139  |  105  |  11  ----------------------------<  100<H>  4.1   |  30  |  0.72    Ca    9.4      22 Oct 2018 08:16  Phos  2.2     10-22    TPro  6.3  /  Alb  2.2<L>  /  TBili  0.7  /  DBili  x   /  AST  33  /  ALT  20  /  AlkPhos  78  10-21    CAPILLARY BLOOD GLUCOSE  POCT Blood Glucose.: 129 mg/dL (22 Oct 2018 11:38)  POCT Blood Glucose.: 101 mg/dL (22 Oct 2018 07:46)  POCT Blood Glucose.: 98 mg/dL (22 Oct 2018 05:42)  POCT Blood Glucose.: 94 mg/dL (21 Oct 2018 21:48)  POCT Blood Glucose.: 96 mg/dL (21 Oct 2018 17:36)    MEDICATIONS  (STANDING):  aspirin  chewable 81 milliGRAM(s) Oral daily  atorvastatin 20 milliGRAM(s) Oral at bedtime  brinzolamide 1% Ophthalmic Suspension 1 Drop(s) Both EYES two times a day  cefTRIAXone   IVPB      cefTRIAXone   IVPB 2 Gram(s) IV Intermittent every 24 hours  dextrose 5%. 1000 milliLiter(s) (50 mL/Hr) IV Continuous <Continuous>  dextrose 50% Injectable 12.5 Gram(s) IV Push once  dextrose 50% Injectable 25 Gram(s) IV Push once  dextrose 50% Injectable 25 Gram(s) IV Push once  enoxaparin Injectable 40 milliGRAM(s) SubCutaneous daily  escitalopram 10 milliGRAM(s) Oral daily  finasteride 5 milliGRAM(s) Oral daily  insulin lispro (HumaLOG) corrective regimen sliding scale   SubCutaneous every 6 hours  lactobacillus acidophilus 1 Tablet(s) Oral three times a day with meals  pantoprazole    Tablet 40 milliGRAM(s) Oral before breakfast  potassium phosphate IVPB 15 milliMole(s) IV Intermittent once  simethicone 80 milliGRAM(s) Chew every 6 hours  tamsulosin 0.4 milliGRAM(s) Oral at bedtime    MEDICATIONS  (PRN):  acetaminophen   Tablet .. 650 milliGRAM(s) Oral every 6 hours PRN Moderate Pain (4 - 6)  dextrose 40% Gel 15 Gram(s) Oral once PRN Blood Glucose LESS THAN 70 milliGRAM(s)/deciliter  glucagon  Injectable 1 milliGRAM(s) IntraMuscular once PRN Glucose LESS THAN 70 milligrams/deciliter

## 2018-10-22 NOTE — PROGRESS NOTE ADULT - NSHPATTENDINGPLANDISCUSS_GEN_ALL_CORE
surgery and patient.
patient/son, both counseled on current treatment plan, need for repeat cultures, altered mental status and suspected metabolic encephalopathy with likely underlying dementia, continued IV antibiotic therapy.
patient/son, both counseled on current treatment plan, need to continue iv abx until cultures negative, altered mental status and suspected metabolic encephalopathy with likely underlying dementia
patient/son, both counseled on current treatment plan, transition to po abx, altered mental status and suspected metabolic encephalopathy
pt, pt's daughter, consultants, ICU
pt, pt's family, consultants, ICU

## 2018-10-22 NOTE — PROGRESS NOTE ADULT - SUBJECTIVE AND OBJECTIVE BOX
ID Progress note     Name: IDRIS DANIEL  Age: 89y  Gender: Male  MRN: [id number]    Interval History-- awake doing well, weak. await PT to se patient .son at bedside   Notes reviewed    Past Medical History--  CAD (coronary artery disease)  GERD (gastroesophageal reflux disease)  Hyperlipidemia  Benign prostatic hyperplasia, presence of lower urinary tract symptoms unspecified, unspecified morphology  H/O heart artery stent      For details regarding the patient's social history, family history, and other miscellaneous elements, please refer the initial infectious diseases consultation and/or the admitting history and physical examination for this admission.    Allergies--  Allergies    No Known Allergies    Intolerances        Medications--  Antibiotics:  cefTRIAXone   IVPB      cefTRIAXone   IVPB 2 Gram(s) IV Intermittent every 24 hours    Immunologic:    Other:  acetaminophen   Tablet .. PRN  aluminum hydroxide/magnesium hydroxide/simethicone Suspension  aspirin  chewable  atorvastatin  brinzolamide 1% Ophthalmic Suspension  dextrose 40% Gel PRN  dextrose 5%.  dextrose 50% Injectable  dextrose 50% Injectable  dextrose 50% Injectable  enoxaparin Injectable  escitalopram  finasteride  glucagon  Injectable PRN  insulin lispro (HumaLOG) corrective regimen sliding scale  lactobacillus acidophilus  pantoprazole    Tablet  potassium phosphate IVPB  potassium phosphate IVPB  simethicone  tamsulosin      Review of Systems--  Review of systems unable to be obtained secondary to clinical condition.    Physical Examination--    Vital Signs: T(F): 98.9 (10-22-18 @ 10:38), Max: 99.4 (10-21-18 @ 14:18)  HR: 85 (10-22-18 @ 10:38)  BP: 108/66 (10-22-18 @ 10:38)  RR: 16 (10-22-18 @ 10:38)  SpO2: 94% (10-22-18 @ 10:38)  Wt(kg): --  General: Nontoxic-appearing frail Male in no acute distress.  HEENT: AT/NC. PERRL. EOMI. Anicteric. Conjunctiva pink and moist. Oropharynx clear. Dentition fair.  Neck: Not rigid. No sense of mass.  Nodes: None palpable.  Lungs: Clear bilaterally without rales, wheezing or rhonchi  Heart: Regular rate and rhythm. No Murmur. No rub. No gallop. No palpable thrill.  Abdomen: Bowel sounds present and normoactive. Soft. Nondistended. Nontender. No sense of mass. No organomegaly.  Back: No spinal tenderness. No costovertebral angle tenderness.   Extremities: No cyanosis or clubbing. No edema.   Skin: Warm. Dry. Good turgor. No rash. No vasculitic stigmata.  Psychiatric: Appropriate affect and mood for situation.         Laboratory Studies--  CBC                        12.4   9.64  )-----------( 146      ( 22 Oct 2018 08:16 )             35.8       Chemistries  10-22    139  |  105  |  11  ----------------------------<  100<H>  4.1   |  30  |  0.72    Ca    9.4      22 Oct 2018 08:16  Phos  2.2     10-22    TPro  6.3  /  Alb  2.2<L>  /  TBili  0.7  /  DBili  x   /  AST  33  /  ALT  20  /  AlkPhos  78  10-21      Culture Data    Culture - Blood (collected 20 Oct 2018 16:36)  Source: .Blood Blood-Venous  Preliminary Report (21 Oct 2018 17:01):    No growth to date.    Culture - Blood (collected 20 Oct 2018 16:36)  Source: .Blood Blood-Venous  Preliminary Report (21 Oct 2018 17:01):    No growth to date.    Culture - Blood (collected 18 Oct 2018 18:37)  Source: .Blood Blood-Peripheral  Preliminary Report (19 Oct 2018 19:01):    No growth to date.    Culture - Blood (collected 18 Oct 2018 18:37)  Source: .Blood Blood-Peripheral  Gram Stain (20 Oct 2018 09:09):    Growth in anaerobic bottle: Gram Negative Rods  Final Report (21 Oct 2018 09:03):    Growth in anaerobic bottle: Escherichia coli    See previous culture [id number]    Culture - Urine (collected 17 Oct 2018 16:03)  Source: .Urine Clean Catch (Midstream)  Final Report (19 Oct 2018 17:17):    >100,000 CFU/ml Escherichia coli  Organism: Escherichia coli (19 Oct 2018 17:17)  Organism: Escherichia coli (19 Oct 2018 17:17)    Culture - Blood (collected 17 Oct 2018 15:39)  Source: .Blood Blood  Gram Stain (18 Oct 2018 03:31):    Growth in aerobic and anaerobic bottles:    Gram Negative Rods  Final Report (19 Oct 2018 19:33):    Growth in aerobic and anaerobic bottles:    Escherichia coli [facility] [place] [id number]    Culture - Blood (collected 17 Oct 2018 15:39)  Source: .Blood Blood  Gram Stain (18 Oct 2018 04:34):    Growth in aerobic and anaerobic bottles:    Gram Negative Rods  Final Report (19 Oct 2018 19:32):    Growth in aerobic and anaerobic bottles: Escherichia coli    "Due to technical problems, Proteus sp. will Not be reported as part of    the BCID panel until further notice"    ***Blood Panel PCR results on this specimen are available    approximately 3 hours after the Gram stain result.***    Gram stain, PCR, and/or culture results may not always    correspond due to difference in methodologies.    ************************************************************    This PCR assay was performed using [employer].    The following targets are tested for: Enterococcus,    vancomycin resistant enterococci, Listeria monocytogenes,    coagulase negative staphylococci, S. aureus,    methicillin resistant S. aureus, Streptococcus agalactiae    (Group B), S. pneumoniae, S.pyogenes (Group A),    Acinetobacter baumannii, Enterobacter cloacae, E. coli,    Klebsiella oxytoca, K. pneumoniae, Proteus sp.,    Serratia marcescens, Haemophilus influenzae,    Neisseria meningitidis, Pseudomonas aeruginosa, Candida    albicans, C. glabrata, C krusei, C parapsilosis,    C. tropicalis and the KPC resistance gene.  Organism: Blood Culture PCR  Escherichia coli (19 Oct 2018 19:32)  Organism: Escherichia coli (19 Oct 2018 19:32)  Organism: Blood Culture PCR (19 Oct 2018 19:32)          Radiology:   CT Chest , Abdomen and pelvis No Cont (10.17.18 @ 15:41) >    Impression:    Limited by lack of contrast and artifact.  Extensive right lung pneumonia.  Cholelithiasis. Possible acute cholecystitis. Correlate with right upper   quadrant ultrasound and/or HIDA scan.  Findings suggestive of pancreatitis. Correlate with amylase, lipase levels  Mildleft hydronephrosis secondary to a mid left ureteral calculus.   Additional nonobstructive left nephrolithiasis.  Bladder calculi. Bladder wall thickening suggestive of cystitis in the   appropriate setting.  Additional findings as discussed      Assessment--  89y old  Male hx CAD/cardiac stents 2yrs ago, HLD, BPH adm to ICU w/ septic shock secondary to E coli bacteremia likely secondary to obstructive uropathy for left ureteral stone. Ct chest and abdomen also shows extensive right sided  pneumonia and possible gall stone pancreatitis, although he has minimal cough or GI symptoms . Repeat blood cs also positive     He has responded to antibiotics and supportive care and improved remarkably once he has left ureteral stent placed, so suspect primary problem to be  infection     he had ATUL which has improved     CXR findings may suggest superimposed fluid overload       Suggestions--  - DC IV Rocephin after today's dose as repeat  bc negative then change to po Vantin 200 mg bid to complete total 14 days of therapy from am  - PT evaluation for ambulation   - trend cbc   - dc planning with home care PT      Continue with present regime .  Appropriate use of antibiotics and adverse effects reviewed.    I have discussed the above plan of care with patient and family in detail. They expressed understanding of the treatment plan . Risks, benefits and alternatives discussed in detail. I have asked if they have any questions or concerns and appropriately addressed them to the best of my ability .      > 35 minutes spent in direct patient care reviewing  the notes, lab data/ imaging , discussion with multidisciplinary team. All questions were addressed and answered to the best of my capacity .    Thank you for allowing me to participate in the care of your patient .        Humphrey Thakkar MD  [phone]

## 2018-10-22 NOTE — PROGRESS NOTE ADULT - PROBLEM SELECTOR PLAN 3
resolved  no loose bms overnight per nursing  bacid tid  monitor stool output and need for further w/u if w recurrence of loose stools

## 2018-10-22 NOTE — ADVANCED PRACTICE NURSE CONSULT - ASSESSMENT
Vital Signs Last 24 Hrs  T(C): 37.1 (22 Oct 2018 14:25), Max: 37.2 (22 Oct 2018 10:38)  T(F): 98.8 (22 Oct 2018 14:25), Max: 98.9 (22 Oct 2018 10:38)  HR: 73 (22 Oct 2018 14:25) (60 - 85)  BP: 137/50 (22 Oct 2018 14:25) (108/66 - 137/50)  BP(mean): --  RR: 16 (22 Oct 2018 14:25) (16 - 18)  SpO2: 97% (22 Oct 2018 14:25) (94% - 100%)    Hemoglobin A1C, Whole Blood: 5.9 % (10-18-18 @ 07:35)   eGFR if Non African American: 83 mL/min/1.73M2 (10-22-18 @ 08:16)  eGFR if : 96 mL/min/1.73M2 (10-22-18 @ 08:16)  eGFR if Non African American: 83 mL/min/1.73M2 (10-21-18 @ 07:29)  eGFR if : 96 mL/min/1.73M2 (10-21-18 @ 07:29)  eGFR if Non African American: 80 mL/min/1.73M2 (10-20-18 @ 07:35)  eGFR if : 92 mL/min/1.73M2 (10-20-18 @ 07:35)      CAPILLARY BLOOD GLUCOSE      POCT Blood Glucose.: 109 mg/dL (22 Oct 2018 16:37)  POCT Blood Glucose.: 129 mg/dL (22 Oct 2018 11:38)  POCT Blood Glucose.: 101 mg/dL (22 Oct 2018 07:46)  POCT Blood Glucose.: 98 mg/dL (22 Oct 2018 05:42)  POCT Blood Glucose.: 94 mg/dL (21 Oct 2018 21:48)      DIET:

## 2018-10-22 NOTE — PROGRESS NOTE ADULT - PROBLEM SELECTOR PLAN 1
suspected urosepsis/E coli bacteremia  CT showed extensive right lung pneumonia, ? acute re, ?pancreatitis  US showed cholelithiasis with single large gallstone, wall thickening, ?acute or chronic cholecystitis  amylase/lipase not elevated  hida neg for acute re  abd pain resolved  seen by surgery  repeat bld cxs ngtd  id following  abx per id  diet as tolerated   pain management  monitor abd exam  will follow

## 2018-10-22 NOTE — PROGRESS NOTE ADULT - ASSESSMENT
89 year old male with a PMH of CAD s/p two cardiac stents in 2014, BPH with prostate surgery last year, history of UTI who presented to the ED with complaints of weakness, fever, rigors, and dark, foul-smelling urine for one day, with temp of 106F in ED. Admitted to ICU for management of sepsis 2/2 UTI. s/p Ureteral stent emergent procedure   Patient admitted to ICU for management of urosepsis, requiring ureteral stent. He is much improved now off pressor therapy.     - No signs of significant ischemia or volume overload.   - BP stable off of pressor therapy. Cont to monitor.   - cont asa  - cont with lipitor  - Abx per ID  - Hemodynamics stable per flow sheet off tele no evidence of arrhythmia HR stable  - Echo revealed LVEF 55% with normal LV systolic function, MAC with mild MR, Aortic sclerosis and doppler evidence of Diastolic Dysfunction.  - Monitor and replete lytes, keep K>4, Mg>2.  - Strict I/Os, daily weights.   - Other cardiovascular workup will depend on clinical course.  - All other workup per primary team.  - Will continue to follow.

## 2018-10-22 NOTE — ADVANCED PRACTICE NURSE CONSULT - RECOMMEDATIONS
88 yo with 5.9%  risk for hypoglycemia  BG < 120 mg/dL  corrective scale disscontinued  Goal 100-180 mg/dL

## 2018-10-23 LAB
ANION GAP SERPL CALC-SCNC: 6 MMOL/L — SIGNIFICANT CHANGE UP (ref 5–17)
BUN SERPL-MCNC: 12 MG/DL — SIGNIFICANT CHANGE UP (ref 7–23)
CALCIUM SERPL-MCNC: 9 MG/DL — SIGNIFICANT CHANGE UP (ref 8.5–10.1)
CHLORIDE SERPL-SCNC: 106 MMOL/L — SIGNIFICANT CHANGE UP (ref 96–108)
CO2 SERPL-SCNC: 28 MMOL/L — SIGNIFICANT CHANGE UP (ref 22–31)
CREAT SERPL-MCNC: 0.76 MG/DL — SIGNIFICANT CHANGE UP (ref 0.5–1.3)
CULTURE RESULTS: SIGNIFICANT CHANGE UP
GLUCOSE SERPL-MCNC: 98 MG/DL — SIGNIFICANT CHANGE UP (ref 70–99)
HCT VFR BLD CALC: 38.3 % — LOW (ref 39–50)
HGB BLD-MCNC: 12.6 G/DL — LOW (ref 13–17)
MCHC RBC-ENTMCNC: 29.9 PG — SIGNIFICANT CHANGE UP (ref 27–34)
MCHC RBC-ENTMCNC: 32.9 GM/DL — SIGNIFICANT CHANGE UP (ref 32–36)
MCV RBC AUTO: 91 FL — SIGNIFICANT CHANGE UP (ref 80–100)
NRBC # BLD: 0 /100 WBCS — SIGNIFICANT CHANGE UP (ref 0–0)
PHOSPHATE SERPL-MCNC: 2.4 MG/DL — LOW (ref 2.5–4.5)
PLATELET # BLD AUTO: 193 K/UL — SIGNIFICANT CHANGE UP (ref 150–400)
POTASSIUM SERPL-MCNC: 3.9 MMOL/L — SIGNIFICANT CHANGE UP (ref 3.5–5.3)
POTASSIUM SERPL-SCNC: 3.9 MMOL/L — SIGNIFICANT CHANGE UP (ref 3.5–5.3)
RBC # BLD: 4.21 M/UL — SIGNIFICANT CHANGE UP (ref 4.2–5.8)
RBC # FLD: 13.6 % — SIGNIFICANT CHANGE UP (ref 10.3–14.5)
SODIUM SERPL-SCNC: 140 MMOL/L — SIGNIFICANT CHANGE UP (ref 135–145)
SPECIMEN SOURCE: SIGNIFICANT CHANGE UP
WBC # BLD: 9.37 K/UL — SIGNIFICANT CHANGE UP (ref 3.8–10.5)
WBC # FLD AUTO: 9.37 K/UL — SIGNIFICANT CHANGE UP (ref 3.8–10.5)

## 2018-10-23 PROCEDURE — 99232 SBSQ HOSP IP/OBS MODERATE 35: CPT

## 2018-10-23 RX ORDER — POTASSIUM CHLORIDE 20 MEQ
20 PACKET (EA) ORAL ONCE
Qty: 0 | Refills: 0 | Status: COMPLETED | OUTPATIENT
Start: 2018-10-23 | End: 2018-10-23

## 2018-10-23 RX ORDER — SODIUM,POTASSIUM PHOSPHATES 278-250MG
1 POWDER IN PACKET (EA) ORAL
Qty: 0 | Refills: 0 | Status: COMPLETED | OUTPATIENT
Start: 2018-10-23 | End: 2018-10-23

## 2018-10-23 RX ADMIN — Medication 1 TABLET(S): at 08:51

## 2018-10-23 RX ADMIN — Medication 200 MILLIGRAM(S): at 17:17

## 2018-10-23 RX ADMIN — ENOXAPARIN SODIUM 40 MILLIGRAM(S): 100 INJECTION SUBCUTANEOUS at 12:38

## 2018-10-23 RX ADMIN — Medication 200 MILLIGRAM(S): at 05:26

## 2018-10-23 RX ADMIN — ATORVASTATIN CALCIUM 20 MILLIGRAM(S): 80 TABLET, FILM COATED ORAL at 21:49

## 2018-10-23 RX ADMIN — ESCITALOPRAM OXALATE 10 MILLIGRAM(S): 10 TABLET, FILM COATED ORAL at 12:38

## 2018-10-23 RX ADMIN — Medication 1 TABLET(S): at 14:36

## 2018-10-23 RX ADMIN — BRINZOLAMIDE 1 DROP(S): 10 SUSPENSION/ DROPS OPHTHALMIC at 05:27

## 2018-10-23 RX ADMIN — Medication 1 TABLET(S): at 12:38

## 2018-10-23 RX ADMIN — FINASTERIDE 5 MILLIGRAM(S): 5 TABLET, FILM COATED ORAL at 12:38

## 2018-10-23 RX ADMIN — SIMETHICONE 80 MILLIGRAM(S): 80 TABLET, CHEWABLE ORAL at 01:35

## 2018-10-23 RX ADMIN — Medication 20 MILLIEQUIVALENT(S): at 08:51

## 2018-10-23 RX ADMIN — SIMETHICONE 80 MILLIGRAM(S): 80 TABLET, CHEWABLE ORAL at 05:27

## 2018-10-23 RX ADMIN — BRINZOLAMIDE 1 DROP(S): 10 SUSPENSION/ DROPS OPHTHALMIC at 17:17

## 2018-10-23 RX ADMIN — Medication 81 MILLIGRAM(S): at 12:39

## 2018-10-23 RX ADMIN — Medication 1 TABLET(S): at 17:17

## 2018-10-23 RX ADMIN — TAMSULOSIN HYDROCHLORIDE 0.4 MILLIGRAM(S): 0.4 CAPSULE ORAL at 21:49

## 2018-10-23 RX ADMIN — SIMETHICONE 80 MILLIGRAM(S): 80 TABLET, CHEWABLE ORAL at 12:39

## 2018-10-23 RX ADMIN — PANTOPRAZOLE SODIUM 40 MILLIGRAM(S): 20 TABLET, DELAYED RELEASE ORAL at 06:54

## 2018-10-23 RX ADMIN — SIMETHICONE 80 MILLIGRAM(S): 80 TABLET, CHEWABLE ORAL at 17:17

## 2018-10-23 NOTE — PROGRESS NOTE ADULT - PROBLEM SELECTOR PLAN 1
Resolved.   Due to obstructed left kidney from left ureteral stone -- now s/p emergent ureteral stent placement with decompression of the left hydro on 10/17  Improved s/p stent placement ; blood and urine cultures both grew E coli.   Now stable off pressors.  - Now on PO Vantin.  -patient suspected to also have pneumonia based on CT but very little respiratory symptoms   -question of acute cholecystitis on CT. Had HIDA on 10/18--negative

## 2018-10-23 NOTE — PROGRESS NOTE ADULT - PROBLEM SELECTOR PROBLEM 5
BPH with obstruction/lower urinary tract symptoms
CAD (coronary artery disease)
CAD (coronary artery disease)

## 2018-10-23 NOTE — PROGRESS NOTE ADULT - ASSESSMENT
INCOMPLETE 88yo M with a PMH of CAD s/p two cardiac stents in 2014, BPH with prostate surgery last year, history of UTI, who presented to the ED with complaints of weakness, fever, rigors, and dark, foul-smelling urine a/w septic shock with E coli bacteremia due to left pyelonephritis in setting of hydronephrosis from left ureteral obstructing calculus, s/p emergent left ureteral stent placement.

## 2018-10-23 NOTE — PROGRESS NOTE ADULT - SUBJECTIVE AND OBJECTIVE BOX
CHIEF COMPLAINT: Patient is a 89y old  Male who presents with a chief complaint of sepsis secondary to UTI (22 Oct 2018 15:35)      HPI:  Patient is an 89 year old male with a PMH of CAD s/p two cardiac stents in , BPH with prostate surgery last year, history of UTI who presented to the ED with complaints of weakness, fever, rigors, and dark, foul-smelling urine for one day. Patient's 2 adult sons are present at bedside who stated that patient's wife  3 weeks ago on home hospice care secondary to end-stage COPD and URTI. One of his son's has been living with him since January. Patient's baseline is ambulatory without assistance and he is competent in ADLs. Patient got his flu shot in September.    In the ED, patient's vitals were:  T(C): 38.7 (17 Oct 2018 12:26), Max: 41.1 (17 Oct 2018 11:39)  T(F): 101.7 (17 Oct 2018 12:26), Max: 106 (17 Oct 2018 11:39)  HR: 85 (17 Oct 2018 12:49) (84 - 112)  BP: 84/36 (17 Oct 2018 12:49) (71/40 - 124/65)  RR: 22 (17 Oct 2018 12:49) (22 - 32)  SpO2: 97% (17 Oct 2018 12:49) (89% - 97%)    Band neutrophils 13.0, INR 1.36, Creatinine 1.4, Calcium 8.4, Protein 5.3, Bilirubin 1.7, GFR 44, Lipase 72, Lactate 3.9.  Positive UA, positive RVP.  CXR: Low lung volumes. No change heart mediastinum. Generalized nonspecific ground-glass parenchymal process. In setting of fever inflammation/infection is considered. Element of congestion is also possible. No sapna lobar consolidation or pleural effusion.    In the ED, patient received 500 mg IV azithromycin, 1 dose IV zosyn, 1 gm IV vancomycin, 3.5 L NS, tylenol. Admitted to ICU for sepsis secondary to UTI. (17 Oct 2018 13:36)      Follow Up:    Interval History: Patient seen and examined, denies CP, orthopnea, dyspnea, palpitations and able to lay flat. NAD noted.  EKG:  < from: 12 Lead ECG (10.17.18 @ 11:12) >  Ventricular Rate 127 BPM    Atrial Rate 127 BPM    P-R Interval 178 ms    QRS Duration 84 ms    Q-T Interval 274 ms    QTC Calculation(Bezet) 398 ms    P Axis 31 degrees    R Axis 79 degrees    T Axis 18 degrees    Diagnosis Line Sinus tachycardia  Otherwise normal ECG      REVIEW OF SYSTEMS:    CONSTITUTIONAL: No weakness, fevers or chills  EYES/ENT: No visual changes;  No vertigo or throat pain   NECK: No pain or stiffness  RESPIRATORY: No cough, wheezing, hemoptysis; No shortness of breath  CARDIOVASCULAR: No chest pain or palpitations  GASTROINTESTINAL: No abdominal or epigastric pain. No nausea, vomiting, or hematemesis; No diarrhea or constipation. No melena or hematochezia.  GENITOURINARY: No dysuria, frequency or hematuria  NEUROLOGICAL: No numbness or weakness  SKIN: No itching, rashes      PAST MEDICAL & SURGICAL HISTORY:  CAD (coronary artery disease)  GERD (gastroesophageal reflux disease)  Hyperlipidemia  Benign prostatic hyperplasia, presence of lower urinary tract symptoms unspecified, unspecified morphology  H/O heart artery stent      SOCIAL HISTORY:  No tobacco, ethanol, or drug abuse.    FAMILY HISTORY:  No pertinent family history in first degree relatives    No family history of acute MI or sudden cardiac death.    MEDICATIONS  (STANDING):  aspirin  chewable 81 milliGRAM(s) Oral daily  atorvastatin 20 milliGRAM(s) Oral at bedtime  brinzolamide 1% Ophthalmic Suspension 1 Drop(s) Both EYES two times a day  cefpodoxime 200 milliGRAM(s) Oral every 12 hours  dextrose 5%. 1000 milliLiter(s) (50 mL/Hr) IV Continuous <Continuous>  dextrose 50% Injectable 12.5 Gram(s) IV Push once  dextrose 50% Injectable 25 Gram(s) IV Push once  dextrose 50% Injectable 25 Gram(s) IV Push once  enoxaparin Injectable 40 milliGRAM(s) SubCutaneous daily  escitalopram 10 milliGRAM(s) Oral daily  finasteride 5 milliGRAM(s) Oral daily  lactobacillus acidophilus 1 Tablet(s) Oral three times a day with meals  pantoprazole    Tablet 40 milliGRAM(s) Oral before breakfast  potassium phosphate IVPB 15 milliMole(s) IV Intermittent once  simethicone 80 milliGRAM(s) Chew every 6 hours  tamsulosin 0.4 milliGRAM(s) Oral at bedtime    MEDICATIONS  (PRN):  acetaminophen   Tablet .. 650 milliGRAM(s) Oral every 6 hours PRN Moderate Pain (4 - 6)  dextrose 40% Gel 15 Gram(s) Oral once PRN Blood Glucose LESS THAN 70 milliGRAM(s)/deciliter  glucagon  Injectable 1 milliGRAM(s) IntraMuscular once PRN Glucose LESS THAN 70 milligrams/deciliter      Allergies    No Known Allergies    Intolerances      Home meds:  Home Medications:  aspirin 81 mg oral tablet: 1 tab(s) orally once a day (19 Oct 2018 18:00)  dorzolamide 2% ophthalmic solution: 1 drop(s) to each affected eye 3 times a day (19 Oct 2018 18:00)  finasteride: 5 milligram(s) orally once a day (19 Oct 2018 18:00)  lactulose 10 g oral powder for reconstitution: orally 3 times a day (19 Oct 2018 18:00)  Lexapro 10 mg oral tablet: 1 tab(s) orally once a day (19 Oct 2018 18:00)  Multiple Vitamins oral tablet: 1 tab(s) orally once a day (19 Oct 2018 18:00)  tamsulosin 0.4 mg oral capsule: 1 cap(s) orally once a day (at bedtime) (19 Oct 2018 18:00)      VITAL SIGNS:   Vital Signs Last 24 Hrs  T(C): 37.2 (23 Oct 2018 04:30), Max: 37.2 (22 Oct 2018 10:38)  T(F): 99 (23 Oct 2018 04:30), Max: 99 (23 Oct 2018 04:30)  HR: 69 (23 Oct 2018 04:30) (65 - 85)  BP: 132/68 (23 Oct 2018 04:30) (108/66 - 137/50)  BP(mean): --  RR: 18 (23 Oct 2018 04:30) (16 - 18)  SpO2: 94% (23 Oct 2018 05:00) (91% - 97%)    I&O's Summary    22 Oct 2018 07:01  -  23 Oct 2018 07:00  --------------------------------------------------------  IN: 240 mL / OUT: 0 mL / NET: 240 mL      On Exam:  TELE:   Constitutional: NAD, awake and alert, well-developed  HEENT: Moist Mucous Membranes, Anicteric  Pulmonary: Non-labored, decreased breath sounds bilaterally, No wheezing, rales or rhonchi  Cardiovascular: Regular, S1 and S2, 2/6 sys murmurs, rubs, gallops or clicks  Gastrointestinal: Bowel Sounds present, soft, nontender.   Lymph: No peripheral edema. No lymphadenopathy.  Skin: No visible rashes or ulcers.  Psych:  Mood & affect appropriate    LABS: All Labs Reviewed:                        12.6   9.37  )-----------( 193      ( 23 Oct 2018 07:55 )             38.3                         12.4   9.64  )-----------( 146      ( 22 Oct 2018 08:16 )             35.8                         11.6   8.16  )-----------( 137      ( 21 Oct 2018 07:29 )             35.2     22 Oct 2018 08:16    139    |  105    |  11     ----------------------------<  100    4.1     |  30     |  0.72   21 Oct 2018 07:29    141    |  107    |  11     ----------------------------<  98     3.4     |  26     |  0.72     Ca    9.4        22 Oct 2018 08:16  Ca    8.7        21 Oct 2018 07:29  Phos  2.2       22 Oct 2018 08:16  Phos  2.1       21 Oct 2018 07:29    TPro  6.3    /  Alb  2.2    /  TBili  0.7    /  DBili  x      /  AST  33     /  ALT  20     /  AlkPhos  78     21 Oct 2018 07:29      Blood Culture: Organism --  Gram Stain Blood -- Gram Stain --  Specimen Source .Blood Blood-Venous  Culture-Blood --    Organism --  Gram Stain Blood -- Gram Stain   Growth in anaerobic bottle: Gram Negative Rods  Specimen Source .Blood Blood-Peripheral  Culture-Blood --      < from: TTE Echo Doppler w/o Cont (10.18.18 @ 16:14) >  OBSERVATIONS:    Mitral Valve: Mitral annular calcification with mild mitral regurgitation  Aortic Valve/Aorta: Aortic sclerosis  Tricuspid Valve: Normal tricuspid valve. Mild tricuspid regurgitation.  Pulmonic Valve: The pulmonic valve is not well visualized. Probably   normal.  Left Atrium: Normal  Right Atrium: Normal  Left Ventricle: Normal left ventricular size, wall thickness, and global   systolic function. The EF is approximately 55%.  Right Ventricle: Normal right ventricular size and function.  Pericardium/Pleura: No pericardial effusion noted.  Pulmonary/RV Pressure: The right ventricular systolic pressure is   estimated to be 22mmHg, assuming that the right atrial pressure is   estimated to be 8 mmHg. This is consistent with no pulmonary pressures.  LV Diastolic Function: Doppler evidence suggestive of diastolic   dysfunction.    Conclusion: Technically limited study  1. Normal left ventricular size, wall thickness, and global systolic   function  2. Mitral annular calcification with mild mitral regurgitation  3. Aortic sclerosis  4. Normal pulmonary pressures  5. Doppler evidence suggestive of diastolic dysfunction       RADIOLOGY:    < from: Xray Chest 1 View- PORTABLE-Routine (10.19.18 @ 06:05) >    Findings: Multifocal patchy opacities throughout both lungs appear   grossly unchanged when compared to the prior x-ray examination. The heart   size is at the upper limits of normal. The aorta is tortuous. There are   mild multilevel degenerative changes of the thoracic spine.    There is a right IJ central line present with its tip at the SVC.    IMPRESSION: As above, no interval change.

## 2018-10-23 NOTE — PROGRESS NOTE ADULT - SUBJECTIVE AND OBJECTIVE BOX
INTERVAL HPI/OVERNIGHT EVENTS:  pt seen and examined family present  denies n/v/d/abd pain, reports +bm overnight  per overnight rn no acute gi issues, no diarrhea  afebrile overnight labs noted    MEDICATIONS  (STANDING):  aspirin  chewable 81 milliGRAM(s) Oral daily  atorvastatin 20 milliGRAM(s) Oral at bedtime  brinzolamide 1% Ophthalmic Suspension 1 Drop(s) Both EYES two times a day  cefpodoxime 200 milliGRAM(s) Oral every 12 hours  dextrose 5%. 1000 milliLiter(s) (50 mL/Hr) IV Continuous <Continuous>  dextrose 50% Injectable 12.5 Gram(s) IV Push once  dextrose 50% Injectable 25 Gram(s) IV Push once  dextrose 50% Injectable 25 Gram(s) IV Push once  enoxaparin Injectable 40 milliGRAM(s) SubCutaneous daily  escitalopram 10 milliGRAM(s) Oral daily  finasteride 5 milliGRAM(s) Oral daily  lactobacillus acidophilus 1 Tablet(s) Oral three times a day with meals  pantoprazole    Tablet 40 milliGRAM(s) Oral before breakfast  potassium acid phosphate/sodium acid phosphate tablet (K-PHOS No. 2) 1 Tablet(s) Oral four times a day with meals  potassium chloride    Tablet ER 20 milliEquivalent(s) Oral once  potassium phosphate IVPB 15 milliMole(s) IV Intermittent once  simethicone 80 milliGRAM(s) Chew every 6 hours  tamsulosin 0.4 milliGRAM(s) Oral at bedtime    MEDICATIONS  (PRN):  acetaminophen   Tablet .. 650 milliGRAM(s) Oral every 6 hours PRN Moderate Pain (4 - 6)  dextrose 40% Gel 15 Gram(s) Oral once PRN Blood Glucose LESS THAN 70 milliGRAM(s)/deciliter  glucagon  Injectable 1 milliGRAM(s) IntraMuscular once PRN Glucose LESS THAN 70 milligrams/deciliter      Allergies    No Known Allergies    Intolerances        Review of Systems:    General:  No wt loss, fevers, chills, night sweats, fatigue   Eyes:  Good vision, no reported pain  ENT:  No sore throat, pain, runny nose, dysphagia  CV:  No pain, palpitations, hypo/hypertension  Resp:  No dyspnea, cough, tachypnea, wheezing  GI:  No pain, No nausea, No vomiting, No diarrhea, No constipation, No weight loss, No fever, No pruritis, No rectal bleeding, No melena, No dysphagia  :  No pain, bleeding, incontinence, nocturia  Muscle:  No pain, weakness  Neuro:  No weakness, tingling, memory problems  Psych:  No fatigue, insomnia, mood problems, depression  Endocrine:  No polyuria, polydypsia, cold/heat intolerance  Heme:  No petechiae, ecchymosis, easy bruisability  Skin:  No rash, tattoos, scars, edema      Vital Signs Last 24 Hrs  T(C): 37.2 (23 Oct 2018 04:30), Max: 37.2 (22 Oct 2018 10:38)  T(F): 99 (23 Oct 2018 04:30), Max: 99 (23 Oct 2018 04:30)  HR: 69 (23 Oct 2018 04:30) (65 - 85)  BP: 132/68 (23 Oct 2018 04:30) (108/66 - 137/50)  BP(mean): --  RR: 18 (23 Oct 2018 04:30) (16 - 18)  SpO2: 94% (23 Oct 2018 05:00) (91% - 97%)    PHYSICAL EXAM:    Constitutional: NAD, lying in bed  HEENT: ncat  Neck: No LAD  Respiratory: dec bs  Cardiovascular: S1 and S2, RRR  Gastrointestinal: soft nt nd  Extremities: No peripheral edema  Vascular: 2+ peripheral pulses  Neurological: Awake alert responds appropriately  Skin: No rashes    LABS:                        12.6   9.37  )-----------( 193      ( 23 Oct 2018 07:55 )             38.3     10-23    140  |  106  |  12  ----------------------------<  98  3.9   |  28  |  0.76    Ca    9.0      23 Oct 2018 07:55  Phos  2.4     10-23            RADIOLOGY & ADDITIONAL TESTS:

## 2018-10-23 NOTE — PROGRESS NOTE ADULT - PROBLEM SELECTOR PLAN 4
Multifactorial. Has improved. Suspect metabolic encephalopathy due to his active infection, likely superimposed on some underlying dementia that was more stable in his familiar settings. Likely an element of hospital delirium as well.  Continue supportive care.
Multifactorial. Suspect metabolic encephalopathy due to his active infection, likely superimposed on some underlying dementia that was more stable in his familiar settings. Likely an element of hospital delirium as well.  Continue supportive care.
Multifactorial. Suspect metabolic encephalopathy due to his active infection, likely superimposed on some underlying dementia that was more stable in his familiar settings. Likely an element of hospital delirium as well.  Continue supportive care.
Multifactorial. Continues to improve. Suspect metabolic encephalopathy due to his active infection, likely superimposed on some underlying dementia that was more stable in his familiar settings. Likely an element of hospital delirium as well.  Continue supportive care.
c/w flomax and finasteride  strict I's & O's
c/w flomax and finasteride  strict I's & O's

## 2018-10-23 NOTE — PROGRESS NOTE ADULT - PROBLEM SELECTOR PROBLEM 7
Hyperlipidemia
GERD (gastroesophageal reflux disease)
GERD (gastroesophageal reflux disease)
Hyperlipidemia

## 2018-10-23 NOTE — PROGRESS NOTE ADULT - ATTENDING COMMENTS
Hypophosphatemia: Resolved  Diarrhea: ?likely antibiotic associated diarrhea in combination with puree diet. Resolved. Continue to monitor.  Dispo:  Will need ELIANE. PT aware and working on this. Auth is pending.

## 2018-10-23 NOTE — PROGRESS NOTE ADULT - ASSESSMENT
89 year old male with a PMH of CAD s/p two cardiac stents in 2014, BPH with prostate surgery last year, history of UTI who presented to the ED with complaints of weakness, fever, rigors, and dark, foul-smelling urine for one day, with temp of 106F in ED. Admitted to ICU for management of sepsis 2/2 UTI. s/p Ureteral stent emergent procedure   Patient admitted to ICU for management of urosepsis, requiring ureteral stent. He is much improved now off pressor therapy and on medicine floor.    - No signs of significant ischemia or volume overload.   - BP stable off of pressor therapy. Cont to monitor.   - cont asa  - cont with lipitor  - Abx per ID  - Hemodynamics stable per flow sheet off tele no evidence of arrhythmia HR stable  - Echo revealed LVEF 55% with normal LV systolic function, MAC with mild MR, Aortic sclerosis and doppler evidence of Diastolic Dysfunction.  - Monitor and replete lytes, keep K>4, Mg>2.  - Strict I/Os, daily weights.   - Other cardiovascular workup will depend on clinical course.  - All other workup per primary team.  - Will continue to follow. 89 year old male with a PMH of CAD s/p two cardiac stents in 2014, BPH with prostate surgery last year, history of UTI who presented to the ED with complaints of weakness, fever, rigors, and dark, foul-smelling urine for one day, with temp of 106F in ED. Admitted to ICU for management of sepsis 2/2 UTI. s/p Ureteral stent emergent procedure   Patient admitted to ICU for management of urosepsis, requiring ureteral stent. He is much improved now off pressor therapy and on medicine floor.    - No signs of significant ischemia or volume overload.   - BP stable off of pressor therapy. Cont to monitor.   - cont asa  - cont with lipitor  - Abx per ID  - Hemodynamics stable per flow sheet off tele no evidence of arrhythmia HR stable  - Echo revealed LVEF 55% with normal LV systolic function, MAC with mild MR, Aortic sclerosis and doppler evidence of Diastolic Dysfunction.  - Monitor and replete lytes, keep K>4, Mg>2. (K 3.9 replete with 20meq; Phos 2.4 replete with K-Phos)  - Strict I/Os, daily weights.   - Other cardiovascular workup will depend on clinical course.  - All other workup per primary team.  - Will continue to follow. 89 year old male with a PMH of CAD s/p two cardiac stents in 2014, BPH with prostate surgery last year, history of UTI who presented to the ED with complaints of weakness, fever, rigors, and dark, foul-smelling urine for one day, with temp of 106F in ED. Admitted to ICU for management of sepsis 2/2 UTI. s/p Ureteral stent emergent procedure   Patient admitted to ICU for management of urosepsis, requiring ureteral stent. He is much improved now off pressor therapy and on medicine floor.    - No signs of significant ischemia or volume overload.   - cont asa  - cont with lipitor  - Abx per ID  - Hemodynamics stable per flow sheet off tele no evidence of arrhythmia HR stable  - Echo revealed LVEF 55% with normal LV systolic function, MAC with mild MR, Aortic sclerosis and doppler evidence of Diastolic Dysfunction.  - Monitor and replete lytes, keep K>4, Mg>2. (K 3.9 replete with 20meq; Phos 2.4 replete with K-Phos)  - Strict I/Os, daily weights.   - Other cardiovascular workup will depend on clinical course.  - All other workup and dc planning per primary team.  - Will continue to follow.

## 2018-10-23 NOTE — PROGRESS NOTE ADULT - PROBLEM SELECTOR PROBLEM 8
GERD (gastroesophageal reflux disease)
Glaucoma
Glaucoma

## 2018-10-23 NOTE — PROGRESS NOTE ADULT - SUBJECTIVE AND OBJECTIVE BOX
ID Progress note     Name: IDRIS DANIEL  Age: 89y  Gender: Male  MRN: [id number]    Interval History-- Events noted, doing well, going to [facility] as too weak to ambulate  Notes reviewed    Past Medical History--  CAD (coronary artery disease)  GERD (gastroesophageal reflux disease)  Hyperlipidemia  Benign prostatic hyperplasia, presence of lower urinary tract symptoms unspecified, unspecified morphology  H/O heart artery stent      For details regarding the patient's social history, family history, and other miscellaneous elements, please refer the initial infectious diseases consultation and/or the admitting history and physical examination for this admission.    Allergies--  Allergies    No Known Allergies    Intolerances        Medications--  Antibiotics:  cefpodoxime 200 milliGRAM(s) Oral every 12 hours    Immunologic:    Other:  acetaminophen   Tablet .. PRN  aspirin  chewable  atorvastatin  brinzolamide 1% Ophthalmic Suspension  dextrose 40% Gel PRN  dextrose 5%.  dextrose 50% Injectable  dextrose 50% Injectable  dextrose 50% Injectable  enoxaparin Injectable  escitalopram  finasteride  glucagon  Injectable PRN  lactobacillus acidophilus  pantoprazole    Tablet  potassium acid phosphate/sodium acid phosphate tablet (K-PHOS No. 2)  potassium phosphate IVPB  simethicone  tamsulosin      Review of Systems--  Review of systems unable to be obtained secondary to clinical condition.    Physical Examination--    Vital Signs: T(F): 98.6 (10-23-18 @ 13:07), Max: 99 (10-23-18 @ 04:30)  HR: 90 (10-23-18 @ 13:07)  BP: 90/57 (10-23-18 @ 13:07)  RR: 18 (10-23-18 @ 13:07)  SpO2: 94% (10-23-18 @ 13:07)  Wt(kg): --  General: Nontoxic-appearing frail Male in no acute distress.  HEENT: AT/NC. PERRL. EOMI. Anicteric. Conjunctiva pink and moist. Oropharynx clear. Dentition fair.  Neck: Not rigid. No sense of mass.  Nodes: None palpable.  Lungs: Clear bilaterally without rales, wheezing or rhonchi  Heart: Regular rate and rhythm. No Murmur. No rub. No gallop. No palpable thrill.  Abdomen: Bowel sounds present and normoactive. Soft. Nondistended. Nontender. No sense of mass. No organomegaly.  Back: No spinal tenderness. No costovertebral angle tenderness.   Extremities: No cyanosis or clubbing. No edema.   Skin: Warm. Dry. Good turgor. No rash. No vasculitic stigmata.  Psychiatric: Appropriate affect and mood for situation.       Laboratory Studies--  CBC                        12.6   9.37  )-----------( 193      ( 23 Oct 2018 07:55 )             38.3       Chemistries  10-23    140  |  106  |  12  ----------------------------<  98  3.9   |  28  |  0.76    Ca    9.0      23 Oct 2018 07:55  Phos  2.4     10-23        Culture Data    Culture - Blood (collected 20 Oct 2018 16:36)  Source: .Blood Blood-Venous  Preliminary Report (21 Oct 2018 17:01):    No growth to date.    Culture - Blood (collected 20 Oct 2018 16:36)  Source: .Blood Blood-Venous  Preliminary Report (21 Oct 2018 17:01):    No growth to date.    Culture - Blood (collected 18 Oct 2018 18:37)  Source: .Blood Blood-Peripheral  Preliminary Report (19 Oct 2018 19:01):    No growth to date.    Culture - Blood (collected 18 Oct 2018 18:37)  Source: .Blood Blood-Peripheral  Gram Stain (20 Oct 2018 09:09):    Growth in anaerobic bottle: Gram Negative Rods  Final Report (21 Oct 2018 09:03):    Growth in anaerobic bottle: Escherichia coli    See previous culture [id number]    Culture - Urine (collected 17 Oct 2018 16:03)  Source: .Urine Clean Catch (Midstream)  Final Report (19 Oct 2018 17:17):    >100,000 CFU/ml Escherichia coli  Organism: Escherichia coli (19 Oct 2018 17:17)  Organism: Escherichia coli (19 Oct 2018 17:17)    Culture - Blood (collected 17 Oct 2018 15:39)  Source: .Blood Blood  Gram Stain (18 Oct 2018 03:31):    Growth in aerobic and anaerobic bottles:    Gram Negative Rods  Final Report (19 Oct 2018 19:33):    Growth in aerobic and anaerobic bottles:    Escherichia coli [facility] [place] [id number]    Culture - Blood (collected 17 Oct 2018 15:39)  Source: .Blood Blood  Gram Stain (18 Oct 2018 04:34):    Growth in aerobic and anaerobic bottles:    Gram Negative Rods  Final Report (19 Oct 2018 19:32):    Growth in aerobic and anaerobic bottles: Escherichia coli    "Due to technical problems, Proteus sp. will Not be reported as part of    the BCID panel until further notice"    ***Blood Panel PCR results on this specimen are available    approximately 3 hours after the Gram stain result.***    Gram stain, PCR, and/or culture results may not always    correspond due to difference in methodologies.    ************************************************************    This PCR assay was performed using [employer].    The following targets are tested for: Enterococcus,    vancomycin resistant enterococci, Listeria monocytogenes,    coagulase negative staphylococci, S. aureus,    methicillin resistant S. aureus, Streptococcus agalactiae    (Group B), S. pneumoniae, S.pyogenes (Group A),    Acinetobacter baumannii, Enterobacter cloacae, E. coli,    Klebsiella oxytoca, K. pneumoniae, Proteus sp.,    Serratia marcescens, Haemophilus influenzae,    Neisseria meningitidis, Pseudomonas aeruginosa, Candida    albicans, C. glabrata, C krusei, C parapsilosis,    C. tropicalis and the KPC resistance gene.  Organism: Blood Culture PCR  Escherichia coli (19 Oct 2018 19:32)  Organism: Escherichia coli (19 Oct 2018 19:32)  Organism: Blood Culture PCR (19 Oct 2018 19:32)    Assessment--  89y old  Male hx CAD/cardiac stents 2yrs ago, HLD, BPH adm to ICU w/ septic shock secondary to E coli bacteremia likely secondary to obstructive uropathy for left ureteral stone. Ct chest and abdomen also shows extensive right sided  pneumonia and possible gall stone pancreatitis, although he has minimal cough or GI symptoms . Repeat blood cs also positive     He has responded to antibiotics and supportive care and improved remarkably once he has left ureteral stent placed, so suspect primary problem to be  infection     he had ATUL which has improved     CXR findings may suggest superimposed fluid overload       Suggestions--  - continue with  po Vantin 200 mg bid to complete total 14 days of therapy   - PT evaluation for ambulation   - trend cbc   - dc planning to [facility]    Continue with present regime .  Appropriate use of antibiotics and adverse effects reviewed.      I have discussed the above plan of care with patient/family in detail. They expressed understanding of the treatment plan . Risks, benefits and alternatives discussed in detail. I have asked if they have any questions or concerns and appropriately addressed them to the best of my ability .      > 15 minutes spent in direct patient care reviewing  the notes, lab data/ imaging , discussion with multidisciplinary team. All questions were addressed and answered to the best of my capacity .    Thank you for allowing me to participate in the care of your patient .        Humphrey Thakkar MD  [phone]

## 2018-10-23 NOTE — PROGRESS NOTE ADULT - PROBLEM SELECTOR PLAN 6
s/p 2 coronary artery stents in 2014  -c/w ASA and lipitor
c/w lipitor
c/w lipitor
s/p 2 coronary artery stents in 2014  -c/w ASA and lipitor

## 2018-10-23 NOTE — PROGRESS NOTE ADULT - PROBLEM SELECTOR PROBLEM 6
CAD (coronary artery disease)
Hyperlipidemia
Hyperlipidemia

## 2018-10-23 NOTE — PROGRESS NOTE ADULT - PROBLEM SELECTOR PROBLEM 4
Altered mental status
BPH with obstruction/lower urinary tract symptoms
BPH with obstruction/lower urinary tract symptoms

## 2018-10-23 NOTE — PROGRESS NOTE ADULT - PROBLEM SELECTOR PLAN 3
resolved, afebrile, no leukocytosis  no loose bms overnight per nursing  bacid tid  monitor stool output and need for further w/u if w recurrence of loose stools

## 2018-10-23 NOTE — PROGRESS NOTE ADULT - PROBLEM SELECTOR PLAN 1
suspected urosepsis/E coli bacteremia  CT showed extensive right lung pneumonia, ? acute re, ?pancreatitis  US showed cholelithiasis with single large gallstone, wall thickening, ?acute or chronic cholecystitis  amylase/lipase not elevated  hida neg for acute re  abd pain resolved  seen by surgery  repeat bld cxs ngtd  id following  abx per id, switched to po  diet as tolerated   monitor abd exam  will follow

## 2018-10-23 NOTE — PROGRESS NOTE ADULT - SUBJECTIVE AND OBJECTIVE BOX
HPI:  Patient is an 89 year old male with a PMH of CAD s/p two cardiac stents in 2014, BPH with prostate surgery last year, history of UTI who presented to the ED with complaints of weakness, fever, rigors, and dark, foul-smelling urine for one day.     INTERVAL EVENTS:   Patient seen and examined. Doing better today. Feels well and has no complaints. No abdominal pain/N/V. Diarrhea has resolved. Remains weak.     REVIEW OF SYSTEMS:  Limited due to his confusion, but denies any chest pain, SOB, N/V/D.     VITAL SIGNS:  Vital Signs Last 24 Hrs  T(C): 37 (23 Oct 2018 13:07), Max: 37.2 (23 Oct 2018 04:30)  T(F): 98.6 (23 Oct 2018 13:07), Max: 99 (23 Oct 2018 04:30)  HR: 90 (23 Oct 2018 13:07) (65 - 90)  BP: 90/57 (23 Oct 2018 13:07) (90/57 - 132/68)  BP(mean): --  RR: 18 (23 Oct 2018 13:07) (18 - 18)  SpO2: 94% (23 Oct 2018 13:07) (91% - 97%)    PHYSICAL EXAM:     GENERAL: elderly male, no acute distress  HEENT: NC/AT, EOMI, neck supple, MMM  RESPIRATORY: LCTAB/L, no rhonchi, rales, or wheezing  CARDIOVASCULAR: RRR, no murmurs, gallops, rubs  ABDOMINAL: soft, non-tender, non-distended, positive bowel sounds   EXTREMITIES: no clubbing, cyanosis, or edema  NEUROLOGICAL: awake and alert, significantly confused, follows commands, grossly non-focal otherwise  SKIN: no rashes or lesions   MUSCULOSKELETAL: no gross joint deformity                          12.6   9.37  )-----------( 193      ( 23 Oct 2018 07:55 )             38.3   10-23    140  |  106  |  12  ----------------------------<  98  3.9   |  28  |  0.76    Ca    9.0      23 Oct 2018 07:55  Phos  2.4     10-23    CAPILLARY BLOOD GLUCOSE  POCT Blood Glucose.: 109 mg/dL (23 Oct 2018 16:50)  POCT Blood Glucose.: 107 mg/dL (22 Oct 2018 21:21)    MEDICATIONS  (STANDING):  aspirin  chewable 81 milliGRAM(s) Oral daily  atorvastatin 20 milliGRAM(s) Oral at bedtime  brinzolamide 1% Ophthalmic Suspension 1 Drop(s) Both EYES two times a day  cefpodoxime 200 milliGRAM(s) Oral every 12 hours  dextrose 5%. 1000 milliLiter(s) (50 mL/Hr) IV Continuous <Continuous>  dextrose 50% Injectable 12.5 Gram(s) IV Push once  dextrose 50% Injectable 25 Gram(s) IV Push once  dextrose 50% Injectable 25 Gram(s) IV Push once  enoxaparin Injectable 40 milliGRAM(s) SubCutaneous daily  escitalopram 10 milliGRAM(s) Oral daily  finasteride 5 milliGRAM(s) Oral daily  lactobacillus acidophilus 1 Tablet(s) Oral three times a day with meals  pantoprazole    Tablet 40 milliGRAM(s) Oral before breakfast  potassium phosphate IVPB 15 milliMole(s) IV Intermittent once  simethicone 80 milliGRAM(s) Chew every 6 hours  tamsulosin 0.4 milliGRAM(s) Oral at bedtime    MEDICATIONS  (PRN):  acetaminophen   Tablet .. 650 milliGRAM(s) Oral every 6 hours PRN Moderate Pain (4 - 6)  dextrose 40% Gel 15 Gram(s) Oral once PRN Blood Glucose LESS THAN 70 milliGRAM(s)/deciliter  glucagon  Injectable 1 milliGRAM(s) IntraMuscular once PRN Glucose LESS THAN 70 milligrams/deciliter

## 2018-10-24 VITALS — DIASTOLIC BLOOD PRESSURE: 73 MMHG | SYSTOLIC BLOOD PRESSURE: 110 MMHG | TEMPERATURE: 98 F | HEART RATE: 67 BPM

## 2018-10-24 LAB
ANION GAP SERPL CALC-SCNC: 7 MMOL/L — SIGNIFICANT CHANGE UP (ref 5–17)
BUN SERPL-MCNC: 13 MG/DL — SIGNIFICANT CHANGE UP (ref 7–23)
CALCIUM SERPL-MCNC: 9 MG/DL — SIGNIFICANT CHANGE UP (ref 8.5–10.1)
CHLORIDE SERPL-SCNC: 105 MMOL/L — SIGNIFICANT CHANGE UP (ref 96–108)
CO2 SERPL-SCNC: 27 MMOL/L — SIGNIFICANT CHANGE UP (ref 22–31)
CREAT SERPL-MCNC: 0.7 MG/DL — SIGNIFICANT CHANGE UP (ref 0.5–1.3)
GLUCOSE SERPL-MCNC: 93 MG/DL — SIGNIFICANT CHANGE UP (ref 70–99)
HCT VFR BLD CALC: 36.5 % — LOW (ref 39–50)
HGB BLD-MCNC: 12.4 G/DL — LOW (ref 13–17)
MCHC RBC-ENTMCNC: 30.5 PG — SIGNIFICANT CHANGE UP (ref 27–34)
MCHC RBC-ENTMCNC: 34 GM/DL — SIGNIFICANT CHANGE UP (ref 32–36)
MCV RBC AUTO: 89.9 FL — SIGNIFICANT CHANGE UP (ref 80–100)
NRBC # BLD: 0 /100 WBCS — SIGNIFICANT CHANGE UP (ref 0–0)
PHOSPHATE SERPL-MCNC: 2.4 MG/DL — LOW (ref 2.5–4.5)
PLATELET # BLD AUTO: 222 K/UL — SIGNIFICANT CHANGE UP (ref 150–400)
POTASSIUM SERPL-MCNC: 3.7 MMOL/L — SIGNIFICANT CHANGE UP (ref 3.5–5.3)
POTASSIUM SERPL-SCNC: 3.7 MMOL/L — SIGNIFICANT CHANGE UP (ref 3.5–5.3)
RBC # BLD: 4.06 M/UL — LOW (ref 4.2–5.8)
RBC # FLD: 13.7 % — SIGNIFICANT CHANGE UP (ref 10.3–14.5)
SODIUM SERPL-SCNC: 139 MMOL/L — SIGNIFICANT CHANGE UP (ref 135–145)
WBC # BLD: 8.85 K/UL — SIGNIFICANT CHANGE UP (ref 3.8–10.5)
WBC # FLD AUTO: 8.85 K/UL — SIGNIFICANT CHANGE UP (ref 3.8–10.5)

## 2018-10-24 PROCEDURE — 99239 HOSP IP/OBS DSCHRG MGMT >30: CPT

## 2018-10-24 PROCEDURE — 99232 SBSQ HOSP IP/OBS MODERATE 35: CPT

## 2018-10-24 RX ORDER — CEFPODOXIME PROXETIL 100 MG
1 TABLET ORAL
Qty: 0 | Refills: 0 | COMMUNITY
Start: 2018-10-24

## 2018-10-24 RX ORDER — LACTULOSE 10 G/15ML
0 SOLUTION ORAL
Qty: 0 | Refills: 0 | COMMUNITY

## 2018-10-24 RX ORDER — PANTOPRAZOLE SODIUM 20 MG/1
1 TABLET, DELAYED RELEASE ORAL
Qty: 0 | Refills: 0 | COMMUNITY
Start: 2018-10-24

## 2018-10-24 RX ORDER — DORZOLAMIDE HYDROCHLORIDE 20 MG/ML
1 SOLUTION/ DROPS OPHTHALMIC
Qty: 0 | Refills: 0 | COMMUNITY

## 2018-10-24 RX ORDER — LACTOBACILLUS ACIDOPHILUS 100MM CELL
1 CAPSULE ORAL
Qty: 0 | Refills: 0 | COMMUNITY
Start: 2018-10-24

## 2018-10-24 RX ORDER — BRINZOLAMIDE 10 MG/ML
1 SUSPENSION/ DROPS OPHTHALMIC
Qty: 0 | Refills: 0 | COMMUNITY
Start: 2018-10-24

## 2018-10-24 RX ADMIN — BRINZOLAMIDE 1 DROP(S): 10 SUSPENSION/ DROPS OPHTHALMIC at 06:17

## 2018-10-24 RX ADMIN — SIMETHICONE 80 MILLIGRAM(S): 80 TABLET, CHEWABLE ORAL at 01:27

## 2018-10-24 RX ADMIN — SIMETHICONE 80 MILLIGRAM(S): 80 TABLET, CHEWABLE ORAL at 12:39

## 2018-10-24 RX ADMIN — Medication 1 TABLET(S): at 12:39

## 2018-10-24 RX ADMIN — Medication 81 MILLIGRAM(S): at 12:39

## 2018-10-24 RX ADMIN — Medication 200 MILLIGRAM(S): at 06:17

## 2018-10-24 RX ADMIN — Medication 1 TABLET(S): at 07:57

## 2018-10-24 RX ADMIN — ENOXAPARIN SODIUM 40 MILLIGRAM(S): 100 INJECTION SUBCUTANEOUS at 12:39

## 2018-10-24 RX ADMIN — SIMETHICONE 80 MILLIGRAM(S): 80 TABLET, CHEWABLE ORAL at 06:17

## 2018-10-24 RX ADMIN — FINASTERIDE 5 MILLIGRAM(S): 5 TABLET, FILM COATED ORAL at 12:39

## 2018-10-24 RX ADMIN — ESCITALOPRAM OXALATE 10 MILLIGRAM(S): 10 TABLET, FILM COATED ORAL at 12:39

## 2018-10-24 RX ADMIN — PANTOPRAZOLE SODIUM 40 MILLIGRAM(S): 20 TABLET, DELAYED RELEASE ORAL at 06:17

## 2018-10-24 NOTE — CHART NOTE - NSCHARTNOTEFT_GEN_A_CORE
Assessment: 88yo M with a PMH of CAD s/p two cardiac stents in 2014, BPH with prostate surgery last year, history of UTI, who presented to the ED with complaints of weakness, fever, rigors, and dark, foul-smelling urine a/w septic shock with E coli bacteremia due to left pyelonephritis in setting of hydronephrosis from left ureteral obstructing calculus, s/p emergent left ureteral stent placement. Pt asleep, son present at bedside. Son endorses good po intake and need for pureed diet due to lack of dentition.  Pt scheduled for discharge to rehab today.  BM 10/23, prior episodes of diarrhea on 10/22 resolved. Phos 2.4 L, replaced with Kphos Rx.       Factors impacting intake: [ ] none [ ] nausea  [ ] vomiting [ ] diarrhea [ ] constipation  [ ]chewing problems [ ] swallowing issues  [ ] other:     Diet Presciption: Diet, Pureed:   DASH/TLC {Sodium & Cholesterol Restricted}  No Beef  No Pork (10-18-18 @ 17:49)    Intake: good per son and EMR documentation    Current Weight: Weight (kg): 78 (10-17 @ 17:17)  % Weight Change    Pertinent Medications: MEDICATIONS  (STANDING):  aspirin  chewable 81 milliGRAM(s) Oral daily  atorvastatin 20 milliGRAM(s) Oral at bedtime  brinzolamide 1% Ophthalmic Suspension 1 Drop(s) Both EYES two times a day  cefpodoxime 200 milliGRAM(s) Oral every 12 hours  dextrose 5%. 1000 milliLiter(s) (50 mL/Hr) IV Continuous <Continuous>  dextrose 50% Injectable 12.5 Gram(s) IV Push once  dextrose 50% Injectable 25 Gram(s) IV Push once  dextrose 50% Injectable 25 Gram(s) IV Push once  enoxaparin Injectable 40 milliGRAM(s) SubCutaneous daily  escitalopram 10 milliGRAM(s) Oral daily  finasteride 5 milliGRAM(s) Oral daily  lactobacillus acidophilus 1 Tablet(s) Oral three times a day with meals  pantoprazole    Tablet 40 milliGRAM(s) Oral before breakfast  potassium phosphate IVPB 15 milliMole(s) IV Intermittent once  simethicone 80 milliGRAM(s) Chew every 6 hours  tamsulosin 0.4 milliGRAM(s) Oral at bedtime    MEDICATIONS  (PRN):  acetaminophen   Tablet .. 650 milliGRAM(s) Oral every 6 hours PRN Moderate Pain (4 - 6)  dextrose 40% Gel 15 Gram(s) Oral once PRN Blood Glucose LESS THAN 70 milliGRAM(s)/deciliter  glucagon  Injectable 1 milliGRAM(s) IntraMuscular once PRN Glucose LESS THAN 70 milligrams/deciliter    Pertinent Labs: 10-24 Na139 mmol/L Glu 93 mg/dL K+ 3.7 mmol/L Cr  0.70 mg/dL BUN 13 mg/dL 10-24 Phos 2.4 mg/dL<L> 10-21 Alb 2.2 g/dL<L> 10-18 [id number] 5.9 %<H>     CAPILLARY BLOOD GLUCOSE      POCT Blood Glucose.: 122 mg/dL (24 Oct 2018 11:33)  POCT Blood Glucose.: 112 mg/dL (24 Oct 2018 07:26)  POCT Blood Glucose.: 107 mg/dL (23 Oct 2018 21:33)  POCT Blood Glucose.: 109 mg/dL (23 Oct 2018 16:50)    Skin: sacral I  Edema: none noted    Estimated Needs:   [x] no change since previous assessment  [ ] recalculated:     Previous Nutrition Diagnosis:    [x] Inadequate Oral Intake     Nutrition Diagnosis is [ ] ongoing  [x] resolved [ ] not applicable     New Nutrition Diagnosis: [x] not applicable       Interventions:   Recommend continue current diet. Pt may still benefit from nutrition supplement to ensure pt meets needs.   [ ] Change Diet To:  [ ] Nutrition Supplement  [ ] Nutrition Support  [x Other: daily MVI    Monitoring and Evaluation:   [x] PO intake [ x ] Tolerance to diet prescription [ x ] weights [ x ] labs[ x ] follow up per protocol  [ ] other:

## 2018-10-24 NOTE — PROGRESS NOTE ADULT - PROVIDER SPECIALTY LIST ADULT
Anesthesia
Cardiology
Critical Care
Gastroenterology
Hospitalist
Infectious Disease
Surgery
Urology
Urology
Gastroenterology

## 2018-10-24 NOTE — PROGRESS NOTE ADULT - ASSESSMENT
89 year old male with a PMH of CAD s/p two cardiac stents in 2014, BPH with prostate surgery last year, history of UTI who presented to the ED with complaints of weakness, fever, rigors, and dark, foul-smelling urine for one day, with temp of 106F in ED. Admitted to ICU for management of sepsis 2/2 UTI. s/p Ureteral stent emergent procedure   Patient admitted to ICU for management of urosepsis, requiring ureteral stent. He is much improved now off pressor therapy and on medicine floor.    - No signs of significant ischemia or volume overload.   - cont asa  - cont with lipitor  - Abx per ID  - Hemodynamics stable per flow sheet off tele no evidence of arrhythmia HR stable  - Echo revealed LVEF 55% with normal LV systolic function, MAC with mild MR, Aortic sclerosis and doppler evidence of Diastolic Dysfunction.  - Monitor and replete lytes, keep K>4, Mg>2. (K 3.9 replete with 20meq; Phos 2.4 replete with K-Phos)  - Strict I/Os, daily weights.   - Other cardiovascular workup will depend on clinical course.  - All other workup and dc planning per primary team.  - Will continue to follow.

## 2018-10-24 NOTE — PROGRESS NOTE ADULT - SUBJECTIVE AND OBJECTIVE BOX
ID Progress note     Name: IDRIS DANIEL  Age: 89y  Gender: Male  MRN: [id number]    Interval History-- Events noted, doing well, going to [facility] as too weak to ambulate, waiting for authorization  Notes reviewed    Past Medical History--  CAD (coronary artery disease)  GERD (gastroesophageal reflux disease)  Hyperlipidemia  Benign prostatic hyperplasia, presence of lower urinary tract symptoms unspecified, unspecified morphology  H/O heart artery stent      For details regarding the patient's social history, family history, and other miscellaneous elements, please refer the initial infectious diseases consultation and/or the admitting history and physical examination for this admission.    Allergies--  Allergies    No Known Allergies    Intolerances        Medications--  Antibiotics:  cefpodoxime 200 milliGRAM(s) Oral every 12 hours    Immunologic:    Other:  acetaminophen   Tablet .. PRN  aspirin  chewable  atorvastatin  brinzolamide 1% Ophthalmic Suspension  dextrose 40% Gel PRN  dextrose 5%.  dextrose 50% Injectable  dextrose 50% Injectable  dextrose 50% Injectable  enoxaparin Injectable  escitalopram  finasteride  glucagon  Injectable PRN  lactobacillus acidophilus  pantoprazole    Tablet  potassium phosphate IVPB  simethicone  tamsulosin      Review of Systems--  Review of systems unable to be obtained secondary to clinical condition.    Physical Examination--    Vital Signs: T(F): 98.3 (10-24-18 @ 05:41), Max: 98.6 (10-23-18 @ 13:07)  HR: 67 (10-24-18 @ 05:41)  BP: 110/73 (10-24-18 @ 05:41)  RR: 18 (10-23-18 @ 20:25)  SpO2: 94% (10-23-18 @ 20:25)  Wt(kg): --  General: Nontoxic-appearing frail Male in no acute distress.  HEENT: AT/NC. PERRL. EOMI. Anicteric. Conjunctiva pink and moist. Oropharynx clear. Dentition fair.  Neck: Not rigid. No sense of mass.  Nodes: None palpable.  Lungs: Clear bilaterally without rales, wheezing or rhonchi  Heart: Regular rate and rhythm. No Murmur. No rub. No gallop. No palpable thrill.  Abdomen: Bowel sounds present and normoactive. Soft. Nondistended. Nontender. No sense of mass. No organomegaly.  Back: No spinal tenderness. No costovertebral angle tenderness.   Extremities: No cyanosis or clubbing. No edema.   Skin: Warm. Dry. Good turgor. No rash. No vasculitic stigmata.  Psychiatric: Appropriate affect and mood for situation.       Laboratory Studies--  CBC                        12.6   9.37  )-----------( 193      ( 23 Oct 2018 07:55 )             38.3       Chemistries  10-23    140  |  106  |  12  ----------------------------<  98  3.9   |  28  |  0.76    Ca    9.0      23 Oct 2018 07:55  Phos  2.4     10-23        Culture Data    Culture - Blood (collected 20 Oct 2018 16:36)  Source: .Blood Blood-Venous  Preliminary Report (21 Oct 2018 17:01):    No growth to date.    Culture - Blood (collected 20 Oct 2018 16:36)  Source: .Blood Blood-Venous  Preliminary Report (21 Oct 2018 17:01):    No growth to date.    Culture - Blood (collected 18 Oct 2018 18:37)  Source: .Blood Blood-Peripheral  Final Report (23 Oct 2018 19:01):    No growth at 5 days.    Culture - Blood (collected 18 Oct 2018 18:37)  Source: .Blood Blood-Peripheral  Gram Stain (20 Oct 2018 09:09):    Growth in anaerobic bottle: Gram Negative Rods  Final Report (21 Oct 2018 09:03):    Growth in anaerobic bottle: Escherichia coli    See previous culture [id number]    Culture - Urine (collected 17 Oct 2018 16:03)  Source: .Urine Clean Catch (Midstream)  Final Report (19 Oct 2018 17:17):    >100,000 CFU/ml Escherichia coli  Organism: Escherichia coli (19 Oct 2018 17:17)  Organism: Escherichia coli (19 Oct 2018 17:17)    Culture - Blood (collected 17 Oct 2018 15:39)  Source: .Blood Blood  Gram Stain (18 Oct 2018 03:31):    Growth in aerobic and anaerobic bottles:    Gram Negative Rods  Final Report (19 Oct 2018 19:33):    Growth in aerobic and anaerobic bottles:    Escherichia coli [facility] [id number]    Culture - Blood (collected 17 Oct 2018 15:39)  Source: .Blood Blood  Gram Stain (18 Oct 2018 04:34):    Growth in aerobic and anaerobic bottles:    Gram Negative Rods  Final Report (19 Oct 2018 19:32):    Growth in aerobic and anaerobic bottles: Escherichia coli    "Due to technical problems, Proteus sp. will Not be reported as part of    the BCID panel until further notice"    ***Blood Panel PCR results on this specimen are available    approximately 3 hours after the Gram stain result.***    Gram stain, PCR, and/or culture results may not always    correspond due to difference in methodologies.    ************************************************************    This PCR assay was performed using [employer].    The following targets are tested for: Enterococcus,    vancomycin resistant enterococci, Listeria monocytogenes,    coagulase negative staphylococci, S. aureus,    methicillin resistant S. aureus, Streptococcus agalactiae    (Group B), S. pneumoniae, S.pyogenes (Group A),    Acinetobacter baumannii, Enterobacter cloacae, E. coli,    Klebsiella oxytoca, K. pneumoniae, Proteus sp.,    Serratia marcescens, Haemophilus influenzae,    Neisseria meningitidis, Pseudomonas aeruginosa, Candida    albicans, C. glabrata, C krusei, C parapsilosis,    C. tropicalis and the KPC resistance gene.  Organism: Blood Culture PCR  Escherichia coli (19 Oct 2018 19:32)  Organism: Escherichia coli (19 Oct 2018 19:32)  Organism: Blood Culture PCR (19 Oct 2018 19:32)    Assessment--  89y old  Male hx CAD/cardiac stents 2yrs ago, HLD, BPH adm to ICU w/ septic shock secondary to E coli bacteremia likely secondary to obstructive uropathy for left ureteral stone. Ct chest and abdomen also shows extensive right sided  pneumonia and possible gall stone pancreatitis, although he has minimal cough or GI symptoms . Repeat blood cs also positive     He has responded to antibiotics and supportive care and improved remarkably once he has left ureteral stent placed, so suspect primary problem to be  infection     he had ATUL which has improved     CXR findings may suggest superimposed fluid overload       Suggestions--  - continue with  po Vantin 200 mg bid to complete total 14 days of therapy   - PT evaluation for ambulation   - trend cbc   - dc planning to [facility]    Continue with present regime .  Appropriate use of antibiotics and adverse effects reviewed.    I have discussed the above plan of care with patient's family in detail. They expressed understanding of the treatment plan . Risks, benefits and alternatives discussed in detail. I have asked if they have any questions or concerns and appropriately addressed them to the best of my ability .      > 15 minutes spent in direct patient care reviewing  the notes, lab data/ imaging , discussion with multidisciplinary team. All questions were addressed and answered to the best of my capacity .    Thank you for allowing me to participate in the care of your patient .        Humphrey Thakkar MD  [phone]

## 2018-10-24 NOTE — PROGRESS NOTE ADULT - PROBLEM SELECTOR PLAN 1
improving, suspected urosepsis/E coli bacteremia  CT showed extensive right lung pneumonia, ? acute re, ?pancreatitis  US showed cholelithiasis with single large gallstone, wall thickening, ?acute or chronic cholecystitis  amylase/lipase not elevated  hida neg for acute re  abd pain resolved  seen by surgery  repeat bld cxs ngtd  id following  abx per id, switched to po  diet as tolerated   monitor abd exam  dc planning per primary team  will follow

## 2018-10-24 NOTE — PROGRESS NOTE ADULT - SUBJECTIVE AND OBJECTIVE BOX
[facility] - Latisha Ash, Bayron, Rhonda, Tony, Peter Josue  Office Number:  [phone]    Patient resting comfortably in bed in NAD.  Laying flat with no respiratory distress.  No complaints of chest pain, dyspnea, palpitations, PND, or orthopnea.    F/U for:  CAD      MEDICATIONS  (STANDING):  aspirin  chewable 81 milliGRAM(s) Oral daily  atorvastatin 20 milliGRAM(s) Oral at bedtime  brinzolamide 1% Ophthalmic Suspension 1 Drop(s) Both EYES two times a day  cefpodoxime 200 milliGRAM(s) Oral every 12 hours  dextrose 5%. 1000 milliLiter(s) (50 mL/Hr) IV Continuous <Continuous>  dextrose 50% Injectable 12.5 Gram(s) IV Push once  dextrose 50% Injectable 25 Gram(s) IV Push once  dextrose 50% Injectable 25 Gram(s) IV Push once  enoxaparin Injectable 40 milliGRAM(s) SubCutaneous daily  escitalopram 10 milliGRAM(s) Oral daily  finasteride 5 milliGRAM(s) Oral daily  lactobacillus acidophilus 1 Tablet(s) Oral three times a day with meals  pantoprazole    Tablet 40 milliGRAM(s) Oral before breakfast  potassium phosphate IVPB 15 milliMole(s) IV Intermittent once  simethicone 80 milliGRAM(s) Chew every 6 hours  tamsulosin 0.4 milliGRAM(s) Oral at bedtime    MEDICATIONS  (PRN):  acetaminophen   Tablet .. 650 milliGRAM(s) Oral every 6 hours PRN Moderate Pain (4 - 6)  dextrose 40% Gel 15 Gram(s) Oral once PRN Blood Glucose LESS THAN 70 milliGRAM(s)/deciliter  glucagon  Injectable 1 milliGRAM(s) IntraMuscular once PRN Glucose LESS THAN 70 milligrams/deciliter      Allergies    No Known Allergies    Intolerances        Vital Signs Last 24 Hrs  T(C): 36.8 (24 Oct 2018 05:41), Max: 36.8 (24 Oct 2018 05:41)  T(F): 98.3 (24 Oct 2018 05:41), Max: 98.3 (24 Oct 2018 05:41)  HR: 67 (24 Oct 2018 05:41) (65 - 67)  BP: 110/73 (24 Oct 2018 05:41) (110/73 - 115/69)  BP(mean): --  RR: 18 (23 Oct 2018 20:25) (18 - 18)  SpO2: 94% (23 Oct 2018 20:25) (94% - 94%)    I&O's Summary      ON EXAM:    Constitutional: NAD, awake and alert, well-developed  HEENT: Moist Mucous Membranes, Anicteric  Pulmonary: Non-labored, decreased breath sounds bilaterally, No wheezing, rales or rhonchi  Cardiovascular: Regular, S1 and S2, 2/6 sys murmurs, rubs, gallops or clicks  Gastrointestinal: Bowel Sounds present, soft, nontender.   Lymph: No peripheral edema. No lymphadenopathy.  Skin: No visible rashes or ulcers.  Psych:  Mood & affect appropriate      LABS: All Labs Reviewed:                        12.4   8.85  )-----------( 222      ( 24 Oct 2018 09:30 )             36.5                         12.6   9.37  )-----------( 193      ( 23 Oct 2018 07:55 )             38.3                         12.4   9.64  )-----------( 146      ( 22 Oct 2018 08:16 )             35.8     24 Oct 2018 09:30    139    |  105    |  13     ----------------------------<  93     3.7     |  27     |  0.70   23 Oct 2018 07:55    140    |  106    |  12     ----------------------------<  98     3.9     |  28     |  0.76   22 Oct 2018 08:16    139    |  105    |  11     ----------------------------<  100    4.1     |  30     |  0.72     Ca    9.0        24 Oct 2018 09:30  Ca    9.0        23 Oct 2018 07:55  Ca    9.4        22 Oct 2018 08:16  Phos  2.4       24 Oct 2018 09:30  Phos  2.4       23 Oct 2018 07:55  Phos  2.2       22 Oct 2018 08:16            Blood Culture: Organism --  Gram Stain Blood -- Gram Stain --  Specimen Source .Blood Blood-Venous  Culture-Blood --

## 2018-10-24 NOTE — PROGRESS NOTE ADULT - REASON FOR ADMISSION
sepsis secondary to UTI

## 2018-10-24 NOTE — PROGRESS NOTE ADULT - SUBJECTIVE AND OBJECTIVE BOX
INTERVAL HPI/OVERNIGHT EVENTS:  pt seen and examined, family present  denies n/v/d/abd pain   afebrile overnight no new labs to assess  per overnight rn no diarrhea    MEDICATIONS  (STANDING):  aspirin  chewable 81 milliGRAM(s) Oral daily  atorvastatin 20 milliGRAM(s) Oral at bedtime  brinzolamide 1% Ophthalmic Suspension 1 Drop(s) Both EYES two times a day  cefpodoxime 200 milliGRAM(s) Oral every 12 hours  dextrose 5%. 1000 milliLiter(s) (50 mL/Hr) IV Continuous <Continuous>  dextrose 50% Injectable 12.5 Gram(s) IV Push once  dextrose 50% Injectable 25 Gram(s) IV Push once  dextrose 50% Injectable 25 Gram(s) IV Push once  enoxaparin Injectable 40 milliGRAM(s) SubCutaneous daily  escitalopram 10 milliGRAM(s) Oral daily  finasteride 5 milliGRAM(s) Oral daily  lactobacillus acidophilus 1 Tablet(s) Oral three times a day with meals  pantoprazole    Tablet 40 milliGRAM(s) Oral before breakfast  potassium phosphate IVPB 15 milliMole(s) IV Intermittent once  simethicone 80 milliGRAM(s) Chew every 6 hours  tamsulosin 0.4 milliGRAM(s) Oral at bedtime    MEDICATIONS  (PRN):  acetaminophen   Tablet .. 650 milliGRAM(s) Oral every 6 hours PRN Moderate Pain (4 - 6)  dextrose 40% Gel 15 Gram(s) Oral once PRN Blood Glucose LESS THAN 70 milliGRAM(s)/deciliter  glucagon  Injectable 1 milliGRAM(s) IntraMuscular once PRN Glucose LESS THAN 70 milligrams/deciliter      Allergies    No Known Allergies    Intolerances        Review of Systems:    General:  No wt loss, fevers, chills, night sweats, fatigue   Eyes:  Good vision, no reported pain  ENT:  No sore throat, pain, runny nose, dysphagia  CV:  No pain, palpitations, hypo/hypertension  Resp:  No dyspnea, cough, tachypnea, wheezing  GI:  No pain, No nausea, No vomiting, No diarrhea, No constipation, No weight loss, No fever, No pruritis, No rectal bleeding, No melena, No dysphagia  :  No pain, bleeding, incontinence, nocturia  Muscle:  No pain, weakness  Neuro:  No weakness, tingling, memory problems  Psych:  No fatigue, insomnia, mood problems, depression  Endocrine:  No polyuria, polydypsia, cold/heat intolerance  Heme:  No petechiae, ecchymosis, easy bruisability  Skin:  No rash, tattoos, scars, edema      Vital Signs Last 24 Hrs  T(C): 36.8 (24 Oct 2018 05:41), Max: 37 (23 Oct 2018 13:07)  T(F): 98.3 (24 Oct 2018 05:41), Max: 98.6 (23 Oct 2018 13:07)  HR: 67 (24 Oct 2018 05:41) (65 - 90)  BP: 110/73 (24 Oct 2018 05:41) (90/57 - 115/69)  BP(mean): --  RR: 18 (23 Oct 2018 20:25) (18 - 18)  SpO2: 94% (23 Oct 2018 20:25) (94% - 94%)    PHYSICAL EXAM:  Constitutional: NAD, lying in bed  HEENT: ncat  Neck: No LAD  Respiratory: dec bs  Cardiovascular: S1 and S2, RRR  Gastrointestinal: soft nt nd  Extremities: No peripheral edema  Vascular: 2+ peripheral pulses  Neurological: Awake alert responds appropriately  Skin: No rashes      LABS:                        12.6   9.37  )-----------( 193      ( 23 Oct 2018 07:55 )             38.3     10-23    140  |  106  |  12  ----------------------------<  98  3.9   |  28  |  0.76    Ca    9.0      23 Oct 2018 07:55  Phos  2.4     10-23            RADIOLOGY & ADDITIONAL TESTS:

## 2018-11-11 PROCEDURE — 85027 COMPLETE CBC AUTOMATED: CPT

## 2018-11-11 PROCEDURE — 83880 ASSAY OF NATRIURETIC PEPTIDE: CPT

## 2018-11-11 PROCEDURE — 76705 ECHO EXAM OF ABDOMEN: CPT

## 2018-11-11 PROCEDURE — 87633 RESP VIRUS 12-25 TARGETS: CPT

## 2018-11-11 PROCEDURE — 93005 ELECTROCARDIOGRAM TRACING: CPT

## 2018-11-11 PROCEDURE — 97116 GAIT TRAINING THERAPY: CPT

## 2018-11-11 PROCEDURE — 97530 THERAPEUTIC ACTIVITIES: CPT

## 2018-11-11 PROCEDURE — 93306 TTE W/DOPPLER COMPLETE: CPT

## 2018-11-11 PROCEDURE — 80048 BASIC METABOLIC PNL TOTAL CA: CPT

## 2018-11-11 PROCEDURE — 82803 BLOOD GASES ANY COMBINATION: CPT

## 2018-11-11 PROCEDURE — 85610 PROTHROMBIN TIME: CPT

## 2018-11-11 PROCEDURE — 87186 SC STD MICRODIL/AGAR DIL: CPT

## 2018-11-11 PROCEDURE — 87040 BLOOD CULTURE FOR BACTERIA: CPT

## 2018-11-11 PROCEDURE — 81001 URINALYSIS AUTO W/SCOPE: CPT

## 2018-11-11 PROCEDURE — 83605 ASSAY OF LACTIC ACID: CPT

## 2018-11-11 PROCEDURE — 86757 RICKETTSIA ANTIBODY: CPT

## 2018-11-11 PROCEDURE — 85730 THROMBOPLASTIN TIME PARTIAL: CPT

## 2018-11-11 PROCEDURE — 97161 PT EVAL LOW COMPLEX 20 MIN: CPT

## 2018-11-11 PROCEDURE — 74176 CT ABD & PELVIS W/O CONTRAST: CPT

## 2018-11-11 PROCEDURE — 99285 EMERGENCY DEPT VISIT HI MDM: CPT | Mod: 25

## 2018-11-11 PROCEDURE — C2617: CPT

## 2018-11-11 PROCEDURE — 80076 HEPATIC FUNCTION PANEL: CPT

## 2018-11-11 PROCEDURE — 82962 GLUCOSE BLOOD TEST: CPT

## 2018-11-11 PROCEDURE — 87086 URINE CULTURE/COLONY COUNT: CPT

## 2018-11-11 PROCEDURE — 87150 DNA/RNA AMPLIFIED PROBE: CPT

## 2018-11-11 PROCEDURE — 78226 HEPATOBILIARY SYSTEM IMAGING: CPT

## 2018-11-11 PROCEDURE — A9537: CPT

## 2018-11-11 PROCEDURE — 71045 X-RAY EXAM CHEST 1 VIEW: CPT

## 2018-11-11 PROCEDURE — 87449 NOS EACH ORGANISM AG IA: CPT

## 2018-11-11 PROCEDURE — 71250 CT THORAX DX C-: CPT

## 2018-11-11 PROCEDURE — 82150 ASSAY OF AMYLASE: CPT

## 2018-11-11 PROCEDURE — 83690 ASSAY OF LIPASE: CPT

## 2018-11-11 PROCEDURE — 83036 HEMOGLOBIN GLYCOSYLATED A1C: CPT

## 2018-11-11 PROCEDURE — 82248 BILIRUBIN DIRECT: CPT

## 2018-11-11 PROCEDURE — 96375 TX/PRO/DX INJ NEW DRUG ADDON: CPT

## 2018-11-11 PROCEDURE — 87486 CHLMYD PNEUM DNA AMP PROBE: CPT

## 2018-11-11 PROCEDURE — 80053 COMPREHEN METABOLIC PANEL: CPT

## 2018-11-11 PROCEDURE — 76000 FLUOROSCOPY <1 HR PHYS/QHP: CPT

## 2018-11-11 PROCEDURE — 84100 ASSAY OF PHOSPHORUS: CPT

## 2018-11-11 PROCEDURE — 96365 THER/PROPH/DIAG IV INF INIT: CPT

## 2018-11-11 PROCEDURE — 96367 TX/PROPH/DG ADDL SEQ IV INF: CPT

## 2018-11-11 PROCEDURE — 87899 AGENT NOS ASSAY W/OPTIC: CPT

## 2018-11-11 PROCEDURE — C1769: CPT

## 2018-11-11 PROCEDURE — 70450 CT HEAD/BRAIN W/O DYE: CPT

## 2018-11-11 PROCEDURE — 36415 COLL VENOUS BLD VENIPUNCTURE: CPT

## 2018-11-11 PROCEDURE — 83735 ASSAY OF MAGNESIUM: CPT

## 2018-11-11 PROCEDURE — C1758: CPT

## 2018-11-11 PROCEDURE — 82550 ASSAY OF CK (CPK): CPT

## 2018-11-11 PROCEDURE — 87581 M.PNEUMON DNA AMP PROBE: CPT

## 2018-11-11 PROCEDURE — 87798 DETECT AGENT NOS DNA AMP: CPT

## 2018-11-25 NOTE — PATIENT PROFILE ADULT. - FUNCTIONAL SCREEN CURRENT LEVEL: BATHING, MLM
Ventricular Rate : 65   Atrial Rate : 0   P-R Interval : 50   QRS Duration : 102   Q-T Interval : 441   QTC Calculation(Bezet) : 459   P Axis : 9   R Axis : -3   T Axis : 9   Diagnosis : Sinus rhythm~Atrial premature complex~~Confirmed by Sonny Joe ([id number]) on 4/16/2017 11:07:03 AM     
(0) independent

## 2019-01-01 NOTE — DISCHARGE NOTE ADULT - CAREGIVER PHONE NUMBER
While feeding infant, with first time poly-vits in feeding, infant had desaturation down to high 60's, with heart rate to low 80's.  Infant recovered herself within  30-45 seconds.   [phone]

## 2019-01-08 ENCOUNTER — INPATIENT (INPATIENT)
Facility: HOSPITAL | Age: 84
LOS: 1 days | Discharge: HOME | DRG: 185 | End: 2019-01-10
Attending: FAMILY MEDICINE | Admitting: FAMILY MEDICINE
Payer: COMMERCIAL

## 2019-01-08 VITALS
DIASTOLIC BLOOD PRESSURE: 58 MMHG | WEIGHT: 158.95 LBS | OXYGEN SATURATION: 100 % | SYSTOLIC BLOOD PRESSURE: 94 MMHG | HEIGHT: 67 IN | RESPIRATION RATE: 18 BRPM | HEART RATE: 70 BPM | TEMPERATURE: 98 F

## 2019-01-08 DIAGNOSIS — J98.11 ATELECTASIS: ICD-10-CM

## 2019-01-08 DIAGNOSIS — Z95.5 PRESENCE OF CORONARY ANGIOPLASTY IMPLANT AND GRAFT: Chronic | ICD-10-CM

## 2019-01-08 DIAGNOSIS — K21.9 GASTRO-ESOPHAGEAL REFLUX DISEASE WITHOUT ESOPHAGITIS: ICD-10-CM

## 2019-01-08 DIAGNOSIS — I25.10 ATHEROSCLEROTIC HEART DISEASE OF NATIVE CORONARY ARTERY WITHOUT ANGINA PECTORIS: ICD-10-CM

## 2019-01-08 DIAGNOSIS — S22.39XA FRACTURE OF ONE RIB, UNSPECIFIED SIDE, INITIAL ENCOUNTER FOR CLOSED FRACTURE: ICD-10-CM
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
## 2019-01-08 DIAGNOSIS — N40.0 BENIGN PROSTATIC HYPERPLASIA WITHOUT LOWER URINARY TRACT SYMPTOMS: ICD-10-CM

## 2019-01-08 DIAGNOSIS — H40.9 UNSPECIFIED GLAUCOMA: ICD-10-CM

## 2019-01-08 DIAGNOSIS — Z29.9 ENCOUNTER FOR PROPHYLACTIC MEASURES, UNSPECIFIED: ICD-10-CM

## 2019-01-08 DIAGNOSIS — Z98.890 OTHER SPECIFIED POSTPROCEDURAL STATES: Chronic | ICD-10-CM

## 2019-01-08 DIAGNOSIS — R54 AGE-RELATED PHYSICAL DEBILITY: ICD-10-CM

## 2019-01-08 DIAGNOSIS — E83.52 HYPERCALCEMIA: ICD-10-CM

## 2019-01-08 DIAGNOSIS — E78.5 HYPERLIPIDEMIA, UNSPECIFIED: ICD-10-CM

## 2019-01-08 LAB
ALBUMIN SERPL ELPH-MCNC: 3.1 G/DL — LOW (ref 3.3–5)
ALP SERPL-CCNC: 114 U/L — SIGNIFICANT CHANGE UP (ref 40–120)
ALT FLD-CCNC: 14 U/L — SIGNIFICANT CHANGE UP (ref 12–78)
ANION GAP SERPL CALC-SCNC: 7 MMOL/L — SIGNIFICANT CHANGE UP (ref 5–17)
APTT BLD: 29.4 SEC — SIGNIFICANT CHANGE UP (ref 27.5–36.3)
AST SERPL-CCNC: 23 U/L — SIGNIFICANT CHANGE UP (ref 15–37)
BASOPHILS # BLD AUTO: 0.02 K/UL — SIGNIFICANT CHANGE UP (ref 0–0.2)
BASOPHILS NFR BLD AUTO: 0.3 % — SIGNIFICANT CHANGE UP (ref 0–2)
BILIRUB SERPL-MCNC: 1.4 MG/DL — HIGH (ref 0.2–1.2)
BUN SERPL-MCNC: 16 MG/DL — SIGNIFICANT CHANGE UP (ref 7–23)
CALCIUM SERPL-MCNC: 10.5 MG/DL — HIGH (ref 8.5–10.1)
CHLORIDE SERPL-SCNC: 104 MMOL/L — SIGNIFICANT CHANGE UP (ref 96–108)
CO2 SERPL-SCNC: 29 MMOL/L — SIGNIFICANT CHANGE UP (ref 22–31)
CREAT SERPL-MCNC: 0.92 MG/DL — SIGNIFICANT CHANGE UP (ref 0.5–1.3)
EOSINOPHIL # BLD AUTO: 0.1 K/UL — SIGNIFICANT CHANGE UP (ref 0–0.5)
EOSINOPHIL NFR BLD AUTO: 1.3 % — SIGNIFICANT CHANGE UP (ref 0–6)
GLUCOSE SERPL-MCNC: 108 MG/DL — HIGH (ref 70–99)
HCT VFR BLD CALC: 41.6 % — SIGNIFICANT CHANGE UP (ref 39–50)
HGB BLD-MCNC: 13.5 G/DL — SIGNIFICANT CHANGE UP (ref 13–17)
IMM GRANULOCYTES NFR BLD AUTO: 0.4 % — SIGNIFICANT CHANGE UP (ref 0–1.5)
INR BLD: 1.21 RATIO — HIGH (ref 0.88–1.16)
LIDOCAIN IGE QN: 163 U/L — SIGNIFICANT CHANGE UP (ref 73–393)
LYMPHOCYTES # BLD AUTO: 1.64 K/UL — SIGNIFICANT CHANGE UP (ref 1–3.3)
LYMPHOCYTES # BLD AUTO: 22 % — SIGNIFICANT CHANGE UP (ref 13–44)
MCHC RBC-ENTMCNC: 29.5 PG — SIGNIFICANT CHANGE UP (ref 27–34)
MCHC RBC-ENTMCNC: 32.5 GM/DL — SIGNIFICANT CHANGE UP (ref 32–36)
MCV RBC AUTO: 91 FL — SIGNIFICANT CHANGE UP (ref 80–100)
MONOCYTES # BLD AUTO: 0.62 K/UL — SIGNIFICANT CHANGE UP (ref 0–0.9)
MONOCYTES NFR BLD AUTO: 8.3 % — SIGNIFICANT CHANGE UP (ref 2–14)
NEUTROPHILS # BLD AUTO: 5.05 K/UL — SIGNIFICANT CHANGE UP (ref 1.8–7.4)
NEUTROPHILS NFR BLD AUTO: 67.7 % — SIGNIFICANT CHANGE UP (ref 43–77)
NRBC # BLD: 0 /100 WBCS — SIGNIFICANT CHANGE UP (ref 0–0)
PLATELET # BLD AUTO: 174 K/UL — SIGNIFICANT CHANGE UP (ref 150–400)
POTASSIUM SERPL-MCNC: 4.1 MMOL/L — SIGNIFICANT CHANGE UP (ref 3.5–5.3)
POTASSIUM SERPL-SCNC: 4.1 MMOL/L — SIGNIFICANT CHANGE UP (ref 3.5–5.3)
PROT SERPL-MCNC: 7.8 G/DL — SIGNIFICANT CHANGE UP (ref 6–8.3)
PROTHROM AB SERPL-ACNC: 13.7 SEC — HIGH (ref 10–12.9)
RBC # BLD: 4.57 M/UL — SIGNIFICANT CHANGE UP (ref 4.2–5.8)
RBC # FLD: 15 % — HIGH (ref 10.3–14.5)
SODIUM SERPL-SCNC: 140 MMOL/L — SIGNIFICANT CHANGE UP (ref 135–145)
WBC # BLD: 7.46 K/UL — SIGNIFICANT CHANGE UP (ref 3.8–10.5)
WBC # FLD AUTO: 7.46 K/UL — SIGNIFICANT CHANGE UP (ref 3.8–10.5)

## 2019-01-08 PROCEDURE — 74177 CT ABD & PELVIS W/CONTRAST: CPT | Mod: 26

## 2019-01-08 PROCEDURE — 99223 1ST HOSP IP/OBS HIGH 75: CPT | Mod: GC,AI

## 2019-01-08 PROCEDURE — 71260 CT THORAX DX C+: CPT | Mod: 26

## 2019-01-08 PROCEDURE — 70450 CT HEAD/BRAIN W/O DYE: CPT | Mod: 26

## 2019-01-08 PROCEDURE — 72125 CT NECK SPINE W/O DYE: CPT | Mod: 26

## 2019-01-08 PROCEDURE — 99222 1ST HOSP IP/OBS MODERATE 55: CPT

## 2019-01-08 PROCEDURE — 99285 EMERGENCY DEPT VISIT HI MDM: CPT

## 2019-01-08 PROCEDURE — 93010 ELECTROCARDIOGRAM REPORT: CPT

## 2019-01-08 RX ORDER — ESCITALOPRAM OXALATE 10 MG/1
10 TABLET, FILM COATED ORAL DAILY
Qty: 0 | Refills: 0 | Status: DISCONTINUED | OUTPATIENT
Start: 2019-01-08 | End: 2019-01-10

## 2019-01-08 RX ORDER — SIMVASTATIN 20 MG/1
20 TABLET, FILM COATED ORAL AT BEDTIME
Qty: 0 | Refills: 0 | Status: DISCONTINUED | OUTPATIENT
Start: 2019-01-08 | End: 2019-01-10

## 2019-01-08 RX ORDER — ASPIRIN/CALCIUM CARB/MAGNESIUM 324 MG
81 TABLET ORAL DAILY
Qty: 0 | Refills: 0 | Status: DISCONTINUED | OUTPATIENT
Start: 2019-01-08 | End: 2019-01-10

## 2019-01-08 RX ORDER — OXYCODONE HYDROCHLORIDE 5 MG/1
5 TABLET ORAL
Qty: 0 | Refills: 0 | Status: DISCONTINUED | OUTPATIENT
Start: 2019-01-08 | End: 2019-01-10

## 2019-01-08 RX ORDER — DORZOLAMIDE HYDROCHLORIDE 20 MG/ML
1 SOLUTION/ DROPS OPHTHALMIC THREE TIMES A DAY
Qty: 0 | Refills: 0 | Status: DISCONTINUED | OUTPATIENT
Start: 2019-01-08 | End: 2019-01-10

## 2019-01-08 RX ORDER — LIDOCAINE 4 G/100G
1 CREAM TOPICAL DAILY
Qty: 0 | Refills: 0 | Status: DISCONTINUED | OUTPATIENT
Start: 2019-01-08 | End: 2019-01-10

## 2019-01-08 RX ORDER — MORPHINE SULFATE 50 MG/1
2 CAPSULE, EXTENDED RELEASE ORAL ONCE
Qty: 0 | Refills: 0 | Status: DISCONTINUED | OUTPATIENT
Start: 2019-01-08 | End: 2019-01-08

## 2019-01-08 RX ORDER — ONDANSETRON 8 MG/1
4 TABLET, FILM COATED ORAL ONCE
Qty: 0 | Refills: 0 | Status: COMPLETED | OUTPATIENT
Start: 2019-01-08 | End: 2019-01-08

## 2019-01-08 RX ORDER — FINASTERIDE 5 MG/1
5 TABLET, FILM COATED ORAL DAILY
Qty: 0 | Refills: 0 | Status: DISCONTINUED | OUTPATIENT
Start: 2019-01-08 | End: 2019-01-10

## 2019-01-08 RX ORDER — ACETAMINOPHEN 500 MG
1000 TABLET ORAL
Qty: 0 | Refills: 0 | Status: DISCONTINUED | OUTPATIENT
Start: 2019-01-08 | End: 2019-01-10

## 2019-01-08 RX ORDER — DOCUSATE SODIUM 100 MG
100 CAPSULE ORAL THREE TIMES A DAY
Qty: 0 | Refills: 0 | Status: DISCONTINUED | OUTPATIENT
Start: 2019-01-08 | End: 2019-01-10

## 2019-01-08 RX ORDER — ESCITALOPRAM OXALATE 10 MG/1
1 TABLET, FILM COATED ORAL
Qty: 0 | Refills: 0 | COMMUNITY

## 2019-01-08 RX ORDER — TAMSULOSIN HYDROCHLORIDE 0.4 MG/1
0.4 CAPSULE ORAL AT BEDTIME
Qty: 0 | Refills: 0 | Status: DISCONTINUED | OUTPATIENT
Start: 2019-01-08 | End: 2019-01-10

## 2019-01-08 RX ADMIN — OXYCODONE HYDROCHLORIDE 5 MILLIGRAM(S): 5 TABLET ORAL at 17:46

## 2019-01-08 RX ADMIN — MORPHINE SULFATE 2 MILLIGRAM(S): 50 CAPSULE, EXTENDED RELEASE ORAL at 13:31

## 2019-01-08 RX ADMIN — Medication 1000 MILLIGRAM(S): at 20:30

## 2019-01-08 RX ADMIN — ONDANSETRON 4 MILLIGRAM(S): 8 TABLET, FILM COATED ORAL at 12:48

## 2019-01-08 RX ADMIN — OXYCODONE HYDROCHLORIDE 5 MILLIGRAM(S): 5 TABLET ORAL at 20:30

## 2019-01-08 RX ADMIN — Medication 1000 MILLIGRAM(S): at 18:47

## 2019-01-08 RX ADMIN — MORPHINE SULFATE 2 MILLIGRAM(S): 50 CAPSULE, EXTENDED RELEASE ORAL at 12:48

## 2019-01-08 NOTE — H&P ADULT - PSH
H/O heart artery stent H/O heart artery stent  stent x 2  History of prostate surgery    S/P ureteral stent placement  Left sided

## 2019-01-08 NOTE — CONSULT NOTE ADULT - SUBJECTIVE AND OBJECTIVE BOX
CHIEF COMPLAINT: Patient is a 89y old  Male who presents with a chief complaint of Fall (2019 16:55)      HPI:  89 year old male with a PMH of CAD s/p two cardiac stents (), BPH s/p prostate surgery (2017), UTI, GERD, HLD, presents to the ED with c/o left sided head, rib and abdominal pain s/p mechanical fall. As per family, patient is frail and unsteady, and is noncompliant with walker. Patient's son who is present in the room with him providing history along with patient. Per son, the patient walked using his walker to the bathroom yesterday at approx 2 pm, left the walker outside the bathroom and proceeded inside and attempted to pass a bowel movement. Per patient, he has been constipated over the past 2 days. After straining, patient arose from toilet, lost balance and fell, unsure what he struck his ribs on. Denies any LOC or head injury. Took tylenol and applied bengay to the left ribs with some relief of pain, but awoke this morning in significant pain and requested his son to bring him to the hospital. Denies any numbness/tingling in the UE. Has a hx of recurrent falls over the past 2 months. No new medications. Denies any fever, chills SOB n/v/d, chest pain.     In the ED, patient vitals were T(F): 98, HR: 70, BP: 94/58, RR: 18, SpO2: 100% on RA. CBC exhibited WBC 7.46, Hgb 4.57, Hct 41.6, Plt 174. CMP exhibited Na 140, K 4.1, Cl 104, CO2 29, BUN 16, Cr 0.92, Gluc 108, Ca 10.5. Total Bilirubin was 1.4. Serum Albumin was 3.1. ProBNP was 699. CT head and neck exhibited multilevel degenerative changes, but no acute fractures or intracranial pathology. CT abd/pelvis and chest exhibited multiple left-sided rib fractures including the fifth, sixth, eighth, tenth and eleventh ribs, with no pneumothorax or acute visceral organ injury noted. CT also exhibited left-sided ureteral stent with moderate left-sided hydroureteronephrosis including two 6 mm calcifications within the mid to distal ureter and distal left ureter adjacent to the stent, with urothelial enhancement, which may be secondary to inflammation.  EKG: pending    Patient was seen and evaluated at the bedside in the ED with son present. Complains of continued pain at the left ribs, but denies any other complaints at the present time. Admits to having had frequent falls over the past month or so, the latest being  when he got out of bed to use the bathroom in the middle of the night. Pain and discomfort resolved with tylenol OTC at the time.    In the ED, patient was administered zofran, tylenol, lidocaine patch, morphine and oxycodone. Patient was admitted to [facility] for management of multiple rib fractures.     C/O of pleuritic chest pain in area of trauma  Denies any dyspnea, palpitations, PND, orthopnea, near syncope, syncope, lower extremity edema, stroke like symptoms.       EKG: NA    REVIEW OF SYSTEMS:   All other review of systems are negative unless indicated above    PAST MEDICAL & SURGICAL HISTORY:  CAD (coronary artery disease)  GERD (gastroesophageal reflux disease)  Hyperlipidemia  Benign prostatic hyperplasia, presence of lower urinary tract symptoms unspecified, unspecified morphology  S/P ureteral stent placement: Left sided  History of prostate surgery  H/O heart artery stent: stent x 2      SOCIAL HISTORY:  No tobacco, ethanol, or drug abuse.    FAMILY HISTORY:  No pertinent family history in first degree relatives    No family history of acute MI or sudden cardiac death.    MEDICATIONS  (STANDING):  acetaminophen   Tablet .. 1000 milliGRAM(s) Oral two times a day  aspirin enteric coated 81 milliGRAM(s) Oral daily  docusate sodium 100 milliGRAM(s) Oral three times a day  dorzolamide 2% Ophthalmic Solution 1 Drop(s) Both EYES three times a day  escitalopram 10 milliGRAM(s) Oral daily  finasteride 5 milliGRAM(s) Oral daily  lidocaine   Patch 1 Patch Transdermal daily  simvastatin 20 milliGRAM(s) Oral at bedtime  tamsulosin 0.4 milliGRAM(s) Oral at bedtime    MEDICATIONS  (PRN):  bisacodyl 5 milliGRAM(s) Oral every 12 hours PRN Constipation  guaiFENesin   Syrup  (Sugar-Free) 200 milliGRAM(s) Oral every 6 hours PRN Cough  oxyCODONE    IR 5 milliGRAM(s) Oral four times a day PRN Severe Pain (7 - 10)      Allergies    No Known Allergies    Intolerances        Home meds:  Home Medications:  aspirin 81 mg oral tablet: 1 tab(s) orally once a day (2019 17:18)  dorzolamide 2% ophthalmic solution: 1 drop(s) to each affected eye 2 times a day (2019 17:18)  escitalopram 10 mg oral tablet: 1 tab(s) orally once a day (2019 17:18)  finasteride: 5 milligram(s) orally once a day (2019 17:18)  simvastatin 20 mg oral tablet: 1 tab(s) orally once a day (at bedtime) (2019 17:18)  tamsulosin 0.4 mg oral capsule: 1 cap(s) orally once a day (at bedtime) (2019 17:18)        VITAL SIGNS:   Vital Signs Last 24 Hrs  T(C): 36.7 (2019 11:43), Max: 36.7 (2019 11:43)  T(F): 98 (2019 11:43), Max: 98 (2019 11:43)  HR: 71 (2019 15:30) (70 - 74)  BP: 104/55 (2019 13:30) (94/58 - 104/55)  BP(mean): --  RR: 18 (2019 11:43) (18 - 18)  SpO2: 100% (2019 11:43) (100% - 100%)    I&O's Summary      On Exam:     Constitutional: NAD, awake and alert, well-developed  HEENT: Moist Mucous Membranes, Anicteric  Pulmonary: Decreased breath sounds b/l. No rales, crackles or wheeze appreciated.   Cardiovascular: Regular, S1 and S2, No murmurs, rubs, gallops or clicks  Gastrointestinal: Bowel Sounds present, soft, nontender.   Lymph: No peripheral edema. No lymphadenopathy.  Skin: No visible rashes or ulcers.  Psych:  Mood & affect appropriate    LABS: All Labs Reviewed:                        13.5   7.46  )-----------( 174      ( 2019 12:45 )             41.6     2019 12:45    140    |  104    |  16     ----------------------------<  108    4.1     |  29     |  0.92     Ca    10.5       2019 12:45    TPro  7.8    /  Alb  3.1    /  TBili  1.4    /  DBili  x      /  AST  23     /  ALT  14     /  AlkPhos  114    2019 12:45    PT/INR - ( 2019 12:45 )   PT: 13.7 sec;   INR: 1.21 ratio         PTT - ( 2019 12:45 )  PTT:29.4 sec      Blood Culture:    @ 12:45  Pro Bnp 699     @ 12:45  TSH: 1.54      RADIOLOGY:    < from: CT Chest w/ IV Cont (19 @ 14:14) >    EXAM:  CT ABDOMEN AND PELVIS IC                          EXAM:  CT CHEST IC                            PROCEDURE DATE:  2019          INTERPRETATION:  CT CHEST/ABDOMEN/PELVIS:     CLINICAL INFORMATION:  Left-sided chest and abdominal pain following fall.    COMPARISON: CT scan chest abdomen pelvis 10/17/2018    PROCEDURE:  Using multislice helical CT, following the intravenous   administration of 95 cc Omnipaque 350, 2.5 mm sections were obtained from   the thoracic inlet through the ischial tuberosities.    Multiplanar reformatted images.    There are acute, minimally displaced fractures of the lateral aspect the   left fifth and sixth ribs.  There is a mildly displaced fracture of the lateral left eighth rib.  There are nondisplacedfractures of the medial aspect of the left 10th   and 11th ribs, near the margin of the vertebral bodies.    No pneumothorax is noted.    There is a trace small left-sided pleural effusion.  There are bibasilar atelectatic changes.    The central airways remain patent. There is scarring/fibrosis inferior   aspect left lingula lobe.  There are scattered areas of peripheral subpleural reticular banding,   likely representing cysts sequelae of scarring/fibrosis.  No lobar lung consolidation is noted.    No mediastinal hematoma is noted.  No pericardial effusion is noted.  There is arteries carotid calcification of the thoracic aorta with   coronary artery calcifications.    There are small, shotty pretracheal, AP mediastinal and subcarinal   mediastinal lymph nodes.  No enlarged hilar lymphadenopathy is noted.    Cholelithiasis is noted, without lobar or wall thickening.  There is fatty infiltration of the liver. The spleen, adrenal glands and   pancreas are unremarkable in appearance.    There is a large, 14 mm nonobstructing intrarenal calculus at the left   lower pole calyces.  There has been interval placement of a left nephroureteral bladder stent,   proximal extent aspect of the stent at the left ureteropelvic junction   and distalaspect within the urinary bladder.  There is persistent moderate left-sided hydronephrosis. There is   urothelial enhancement, which may reflect urinary tract   infection/pyelonephritis.   There is a hydroureter.  There is a 6 mm calcification withinthe mid to distal left ureter   adjacent to the stent.  There is an additional 6 mm calcification in the more distal left ureter   adjacent to the stent. This is slightly proximal to the ureterovesical   junction.    There is a 2 cm indeterminate hypodense exophytic lesion at the lower   pole the right kidney. There is a smaller, indeterminate hypodense lesion   at the upper pole of the left kidney.    No right-sided hydroureteronephrosis is noted.    There are several dependent intraluminal bladder calcifications,   measuring up to 10 mm.    There is arteriosclerotic calcification of the abdominal aorta and major   branch vessels.  No enlarged retroperitoneal lymphadenopathy is noted. No retroperitoneal   hematoma is noted.    Evaluation of the stomach and gastrointestinal tract is limited without   distention.  There is a moderate size hiatal hernia.    No bowel obstruction is noted.  No localized intra-abdominal fluid collection or pneumoperitoneum is   noted.    There is a fat-containing periumbilical hernia.  There is a fat-containing right inguinal hernia.    No pelvic mass or free fluid is noted.    There are multilevel degenerative changes of the spine; no acute lumbar   compression fracture is noted.    Impression:    Multiple left-sided rib fractures including the fifth, sixth, eighth,   10th and 11th ribs as discussed.  No pneumothorax noted.    No acute visceral organ injury noted.    Left-sided ureteral stent with moderate left-sided hydroureteronephrosis   including two 6 mm calcifications within the mid to distal ureter and   distal left ureter adjacent to the stent.  Urothelial enhancement, may be secondary to inflammation, correlate for   urinary tract infection.    Findings discussed by telephone with Dr. Patel at the time of   interpretation on 2018.    Other findings as discussed above.                                      FABIÁN LIN M.D., ATTENDING RADIOLOGIST  This document has been electronically signed. 2019  3:13PM                < end of copied text >       < from: Nuclear Stress Test-Pharmacologic (18 @ 14:50) >    PATIENT: IDRIS DANIEL  : 1929   AGE: 88 (M)   MR#: [id number]  STUDY DATE: 2018  LOCATION: O/P  REF. PHYSICIAN(S): Sylvia Gary MD  FELLOW: Toan Garcia M.D.  ------------------------------------------------------------------------  TYPE OF TEST: Stress Pharmacologic  INDICATION: Abnormal electrocardiogram (ECG) (EKG)  (R94.31)  ------------------------------------------------------------------------  HISTORY:  CARDIAC HISTORY: 88 year old man with prior history of CAD  and stents with an abnormal EKG.  MEDICATIONS: ASA, atorvastatin, ceftin, dorzolamide,  pepsid, proscar, lactulose, tamsulosin  ------------------------------------------------------------------------  BASELINE ELECTROCARDIOGRAM:  Rhythm: Sinus Rhythm - 67 BPM - 1st Degree Heart Block  Conduction Defect: None  Q Waves: None  ST: Sinus rhythm with 1st degree heart block  ------------------------------------------------------------------------  HEMODYNAMIC PARAMETERS:                                      HR     BP  Baseline  Pre-Injection             61  111/52  00:00     Inject Regadenoson         0  00:30     Post Injection             0  01:00     Post Injection            60  111/52  02:00     Post Injection            86  120/77  03:00     Post Injection            81  105/53  04:00     Post Injection            80  114/53  05:00     Recovery                  68  116/55  06:00     Recovery                  73  117/53  07:00     Recovery                  79  117/56  08:00     Recovery       73  111/53  ------------------------------------------------------------------------  Agent: Regadenoson 0.4 mg/5 ml NS. injected over 10 sec.  HR: Baseline HR: 61 bpm   Peak HR: 86 bpm (65% of MPHR)  MPHR: 132 bpm   85% of MPHR: 112 bpm  BP: Baseline BP: 111/52 mmHg   Peak BP: 120/77 mmHg   Peak  RPP: [id number] (Rate Pressure Product)  Last Caffeine intake: 12 hrs  Terminated: Completion of protocol  ------------------------------------------------------------------------  SYMPTOMS/FINDINGS:  Symptoms: No Symptom  Chest pain: No chest pain with administration of  Regadenoson  ------------------------------------------------------------------------  ECG ABNORMALITIES DURING/AFTER STRESS:   Abnormalities: None   ST Changes:No ischemic changes  ------------------------------------------------------------------------  NEW ARRHYTHMIAS DEVELOPED DURING/AFTER STRESS:  None  ------------------------------------------------------------------------  STRESS TEST IMPRESSIONS:  Chest Pain: No chest pain with administration of  Regadenoson.  Symptom: No Symptom.  HR Response: Appropriate.  BP Response: Appropriate.  Heart Rhythm: Sinus Rhythm - 67 BPM - 1st Degree Heart  Block.  Baseline ECG: Sinus rhythm with 1st degree heart block.  ECG Abnormalities: None.  ECG Changes: No ischemic changes.  Arrhythmia: None.  ------------------------------------------------------------------------  PROCEDURE:  7.98 mCi of Tc 99m Tetrofosmin were injected during stress  protocol. Approximately 45 minuteslater, tomographic  images were obtained in a 180 degree arc from right  anterior oblique to left anterior oblique with 64 stops.  The tomographic slices were reconstructed in 3 orthogonal  planes (short axis, horizontal long axis and vertical long  axis).  Interpretation was performed both by visual and  quantitative analysis.  Stress images were acquired using CZT-based system with  pinhole collimation ([employer] 530 c, MD.Voice),  and reconstructed using MLEM algorithm. Images were  re-acquired with the patient in a prone position.  ------------------------------------------------------------------------  NUCLEAR FINDINGS:  The left ventricle was normal in size. Normal myocardial  perfusion scan,with no evidence of infarction or inducible  ischemia.  ------------------------------------------------------------------------  GATED ANALYSIS:  Post-stress gated wall motion analysis was performed (LVEF  = 70 %;LVEDV = 65 ml.)  ------------------------------------------------------------------------  IMPRESSIONS:Normal Study  * The left ventricle was normal in size.  * Tracer uptake was homogeneous throughout the left  ventricle.  * Normal study; no evidence for myocardial infarction or  ischemia.  * Gated wall motion analysis was performed, and shows  normal wall motion.  ------------------------------------------------------------------------  Confirmed on  3/23/2018 - 16:13:26 by Rex Montaño M.D.  ------------------------------------------------------------------------    < end of copied text >    < from: TTE Echo Doppler w/o Cont (10.18.18 @ 16:14) >     EXAM:  ECHO TTE W/O CON COMP W/DOPPLR         PROCEDURE DATE:  10/18/2018        INTERPRETATION:  INDICATION: CHF  Referring M.D.:Jack  Blood Pressure 140/60        Weight (kg) :78     Height (cm):170       BSA (sq m): 1.9  Technician: KL    Dimensions:    LA 2.8       Normal Values: 2.0 - 4.0 cm    Ao 3.5        Normal Values: 2.0 - 3.8 cm  SEPTUM O.8       Normal Values: 0.6 - 1.2 cm  PWT 0.8       Normal Values: 0.6 - 1.1 cm  LVIDd 3.6         Normal Values: 3.0 - 5.6 cm  LVIDs 2.5         Normal Values: 1.8 - 4.0 cm      OBSERVATIONS:    Mitral Valve: Mitral annular calcification with mild mitral regurgitation  Aortic Valve/Aorta: Aortic sclerosis  Tricuspid Valve: Normal tricuspid valve. Mild tricuspid regurgitation.  Pulmonic Valve: The pulmonic valve is not well visualized. Probably   normal.  Left Atrium: Normal  Right Atrium: Normal  Left Ventricle: Normal left ventricular size, wall thickness, and global   systolic function. The EF is approximately 55%.  Right Ventricle: Normal right ventricular size and function.  Pericardium/Pleura: No pericardial effusion noted.  Pulmonary/RV Pressure: The right ventricular systolic pressure is   estimated to be 22mmHg, assuming that the right atrial pressure is   estimated to be 8 mmHg. This is consistent with no pulmonary pressures.  LV Diastolic Function: Doppler evidence suggestive of diastolic   dysfunction.    Conclusion: Technically limited study  1. Normal left ventricular size, wall thickness, and global systolic   function  2. Mitral annular calcification with mild mitral regurgitation  3. Aortic sclerosis  4. Normal pulmonary pressures  5. Doppler evidence suggestive of diastolic dysfunction                  HERNESTO VAIL M.D., ATTENDING CARDIOLOGIST  This document has been electronically signed. Oct 19 2018  7:35AM                < end of copied text >

## 2019-01-08 NOTE — ED ADULT NURSE NOTE - NSIMPLEMENTINTERV_GEN_ALL_ED
Implemented All Fall with Harm Risk Interventions:  [place] to call system. Call bell, personal items and telephone within reach. Instruct patient to call for assistance. Room bathroom lighting operational. Non-slip footwear when patient is off stretcher. Physically safe environment: no spills, clutter or unnecessary equipment. Stretcher in lowest position, wheels locked, appropriate side rails in place. Provide visual cue, wrist band, yellow gown, etc. Monitor gait and stability. Monitor for mental status changes and reorient to person, place, and time. Review medications for side effects contributing to fall risk. Reinforce activity limits and safety measures with patient and family. Provide visual clues: red socks.

## 2019-01-08 NOTE — H&P ADULT - PROBLEM SELECTOR PLAN 2
Patient found to have 5 Left sided rib fractures s/p fall in bathroom  -CT positive for Left fracture of ribs 5, 6,8,10,11  -Pain control with oxycodone   - Check routine SpO2  - Encourage use of incentive spirometry  - Palliative ([place]) consulted Patient found to have 5 Left sided rib fractures s/p fall in bathroom  -CT positive for Left fracture of ribs 5, 6,8,10,11  -Pain control with oxycodone   - Check routine SpO2  - Encourage use of incentive spirometry  - Pulmonary ([place]) consulted

## 2019-01-08 NOTE — ED ADULT NURSE NOTE - OBJECTIVE STATEMENT
Fall yesterday at in home in bathroom, family denies LOC, Pt c/o left hip and Rib pain, AxOx4, skin intact, family reports increase in falls

## 2019-01-08 NOTE — ED ADULT NURSE NOTE - PSH
H/O heart artery stent  stent x 2  History of prostate surgery    S/P ureteral stent placement  Left sided

## 2019-01-08 NOTE — CONSULT NOTE ADULT - PROBLEM SELECTOR RECOMMENDATION 9
rib fx  fall   ct chest reviewed  lidoderm patch  tylenol around the clock   oxycodone PRN for severe pain  PT  bowel regimen PRN  assist with all ADL

## 2019-01-08 NOTE — ED PROVIDER NOTE - NS ED MD DISPO ADMITTING SERVICE
I was able to make contact with the patient. She received my message last night and stopped her Lipitor. Patient will plan to have labs performed on Friday as we discussed by telephone CMP and INR. Patient will also plan to get ultrasound of the liver performed tomorrow. Patient to call with any acute changes in her picture of health, patient to go to the emergency room with any emergencies    José Luis Alba, DO     MED

## 2019-01-08 NOTE — CONSULT NOTE ADULT - SUBJECTIVE AND OBJECTIVE BOX
Date/Time Patient Seen:  		  Referring MD:   Data Reviewed	       Patient is a 89y old  Male who presents with a chief complaint of     Subjective/HPI    in bed  seen and examined  s/p fall  rib fx on ct  spoke with son and patient  ER provider note reviewed  ct chest reviewed  labs reviewed    ED Provider Note [Charted Location: \[facility]\"" ED] [Authored: 08-Jan-2019 13:18]- for Visit: [id number], Complete, Revised, Signed in Full, General    HISTORY OF PRESENT ILLNESS:    High Risk Travel:  International Travel? No(1)    · Chief Complaint: The patient is a 89y Male complaining of fall.  · HPI Objective Statement: pt c/o left sided head, rib and abd pain s/p fall. family relates pt chronically unsteady is supposed to use walker, but doesn't always. no loc, ha neck pain, weakness, numbness, sob, arm or leg pain.  pmd - massand  · Presenting Symptoms: PAIN, TENDERNESS  · Negative Findings: no deformity, no numbness, no weakness  · Location: left ribs and abdomen  · Timing: sudden onset  · Quality: aching  · Severity: moderate  · Context: trauma  · Incident Location: home  · Aggravated Factors: movement, palpation  · Relieving Factors: none       PAST MEDICAL & SURGICAL HISTORY:  CAD (coronary artery disease)  GERD (gastroesophageal reflux disease)  Hyperlipidemia  Benign prostatic hyperplasia, presence of lower urinary tract symptoms unspecified, unspecified morphology  H/O heart artery stent        Medication list         MEDICATIONS  (STANDING):  acetaminophen   Tablet .. 1000 milliGRAM(s) Oral two times a day  lidocaine   Patch 1 Patch Transdermal daily    MEDICATIONS  (PRN):  bisacodyl 5 milliGRAM(s) Oral every 12 hours PRN Constipation  guaiFENesin   Syrup  (Sugar-Free) 200 milliGRAM(s) Oral every 6 hours PRN Cough  oxyCODONE    IR 5 milliGRAM(s) Oral four times a day PRN Severe Pain (7 - 10)         Vitals log        ICU Vital Signs Last 24 Hrs  T(C): 36.7 (08 Jan 2019 11:43), Max: 36.7 (08 Jan 2019 11:43)  T(F): 98 (08 Jan 2019 11:43), Max: 98 (08 Jan 2019 11:43)  HR: 70 (08 Jan 2019 11:43) (70 - 70)  BP: 94/58 (08 Jan 2019 11:43) (94/58 - 94/58)  BP(mean): --  ABP: --  ABP(mean): --  RR: 18 (08 Jan 2019 11:43) (18 - 18)  SpO2: 100% (08 Jan 2019 11:43) (100% - 100%)           Input and Output:  I&O's Detail      Lab Data                        13.5   7.46  )-----------( 174      ( 08 Jan 2019 12:45 )             41.6     01-08    140  |  104  |  16  ----------------------------<  108<H>  4.1   |  29  |  0.92    Ca    10.5<H>      08 Jan 2019 12:45    TPro  7.8  /  Alb  3.1<L>  /  TBili  1.4<H>  /  DBili  x   /  AST  23  /  ALT  14  /  AlkPhos  114  01-08    non smoker  non drinker          Review of Systems	  left rib pain   s/p fall    Objective     Physical Examination  head at  heart s1s2  lung dec BS  abd soft  cn grossly int  frail  weak  alert  hard of hearing  verbal        Pertinent Lab findings & Imaging      Nj:  NO   Adequate UO     I&O's Detail           Discussed with:     Cultures:	        Radiology    EXAM:  CT ABDOMEN AND PELVIS IC                          EXAM:  CT CHEST IC                            PROCEDURE DATE:  01/08/2019          INTERPRETATION:  CT CHEST/ABDOMEN/PELVIS:     CLINICAL INFORMATION:  Left-sided chest and abdominal pain following fall.    COMPARISON: CT scan chest abdomen pelvis 10/17/2018    PROCEDURE:  Using multislice helical CT, following the intravenous   administration of 95 cc Omnipaque 350, 2.5 mm sections were obtained from   the thoracic inlet through the ischial tuberosities.    Multiplanar reformatted images.    There are acute, minimally displaced fractures of the lateral aspect the   left fifth and sixth ribs.  There is a mildly displaced fracture of the lateral left eighth rib.  There are nondisplaced fractures of the medial aspect of the left 10th   and 11th ribs, near the margin of the vertebral bodies.    No pneumothorax is noted.    There is a trace small left-sided pleural effusion.  There are bibasilar atelectatic changes.    The central airways remain patent. There is scarring/fibrosis inferior   aspect left lingula lobe.  There are scattered areas of peripheral subpleural reticular banding,   likely representing cysts sequelae of scarring/fibrosis.  No lobar lung consolidation is noted.    No mediastinal hematoma is noted.  No pericardial effusion is noted.  There is arteries carotid calcification of the thoracic aorta with   coronary artery calcifications.    There are small, shotty pretracheal, AP mediastinal and subcarinal   mediastinal lymph nodes.  No enlarged hilar lymphadenopathy is noted.    Cholelithiasis is noted, without lobar or wall thickening.  There is fatty infiltration of the liver. The spleen, adrenal glands and   pancreas are unremarkable in appearance.    There is a large, 14 mm nonobstructing intrarenal calculus at the left   lower pole calyces.  There has been interval placement of a left nephroureteral bladder stent,   proximal extent aspect of the stent at the left ureteropelvic junction   and distal aspect within the urinary bladder.  There is persistent moderate left-sided hydronephrosis. There is   urothelial enhancement, which may reflect urinary tract   infection/pyelonephritis.   There is a hydroureter.  There is a 6 mm calcification within the mid to distal left ureter   adjacent to the stent.  There is an additional 6 mm calcification in the more distal left ureter   adjacent to the stent. This is slightly proximal to the ureterovesical   junction.    There is a 2 cm indeterminate hypodense exophytic lesion at the lower   pole the right kidney. There is a smaller, indeterminate hypodense lesion   at the upper pole of the left kidney.    No right-sided hydroureteronephrosis is noted.    There are several dependent intraluminal bladder calcifications,   measuring up to 10 mm.    There is arteriosclerotic calcification of the abdominal aorta and major   branch vessels.  No enlarged retroperitoneal lymphadenopathy is noted. No retroperitoneal   hematoma is noted.    Evaluation of the stomach and gastrointestinal tract is limited without   distention.  There is a moderate size hiatal hernia.    No bowel obstruction is noted.  No localized intra-abdominal fluid collection or pneumoperitoneum is   noted.    There is a fat-containing periumbilical hernia.  There is a fat-containing right inguinal hernia.    No pelvic mass or free fluid is noted.    There are multilevel degenerative changes of the spine; no acute lumbar   compression fracture is noted.    Impression:    Multiple left-sided rib fractures including the fifth, sixth, eighth,   10th and 11th ribs as discussed.  No pneumothorax noted.    No acute visceral organ injury noted.    Left-sided ureteral stent with moderate left-sided hydroureteronephrosis   including two 6 mm calcifications within the mid to distal ureter and   distal left ureter adjacent to the stent.  Urothelial enhancement, may be secondary to inflammation, correlate for   urinary tract infection.    Findings discussed by telephone with Dr. Patel at the time of   interpretation on 1/8/2018.    Other findings as discussed above.

## 2019-01-08 NOTE — H&P ADULT - NSHPPHYSICALEXAM_GEN_ALL_CORE
Physical Exam:  General: Well developed, well nourished, elderly south  male, NAD  HEENT: NCAT, PERRLA, EOMI bl,  cataracts noted b/l eyes, moist mucous membranes, no maxillary dentition, few mandibular teeth noted  Neck: Supple, nontender, no mass  Neurology: A&Ox3, nonfocal, CN II-XII grossly intact, sensation intact, no gait abnormalities (with walker)  Respiratory: clear to auscultation b/l, no wheezes, rales, rhonchi. Left sided rib tenderness in the anterior axillary line at approx level of rib 5 and down  CV: RRR, S1/S2, no murmurs, rubs or gallops  Abdominal: Soft, NT, ND, bowel sounds in 4 quadrants  Extremities: DP/PT and radial peripheral pulses palpated  MSK: Normal ROM, no joint erythema or warmth, no joint swelling, 3/5 strength in LE, 5/5 in UE  Skin: warm, dry, normal color

## 2019-01-08 NOTE — H&P ADULT - HISTORY OF PRESENT ILLNESS
89 year old male with a PMH of CAD s/p two cardiac stents (2014), BPH s/p prostate surgery (2017), UTI, GERD, HLD, presents to the ED with c/o left sided head, rib and abdominal pain s/p mechanical fall. As per family, patient is frail and unsteady, and is noncompliant with walker.      In the ED, patient vitals were T(F): 98, HR: 70, BP: 94/58, RR: 18, SpO2: 100% on RA. CBC exhibited WBC 7.46, Hgb 4.57, Hct 41.6, Plt 174. CMP exhibited Na 140, K 4.1, Cl 104, CO2 29, BUN 16, Cr 0.92, Gluc 108, Ca 10.5. Total Bilirubin was 1.4. Serum Albumin was 3.1. ProBNP was 699. CT head and neck exhibited multilevel degenerative changes, but no acute fractures or intracranial pathology. CT abd/pelvis and chest exhibited multiple left-sided rib fractures including the fifth, sixth, eighth, tenth and eleventh ribs, with o pneumothorax or acute visceral organ injury noted. CT also exhibited left-sided ureteral stent with moderate left-sided hydroureteronephrosis including two 6 mm calcifications within the mid to distal ureter and distal left ureter adjacent to the stent, with urothelial enhancement, which may be secondary to inflammation.    In the ED, patient was administered zofran, tylenol, lidocaine patch, morphine and oxycodone. Patient was admitted to multiple rib fractures. 89 year old male with a PMH of CAD s/p two cardiac stents (2014), BPH s/p prostate surgery (2017), UTI, GERD, HLD, presents to the ED with c/o left sided head, rib and abdominal pain s/p mechanical fall. As per family, patient is frail and unsteady, and is noncompliant with walker. Patient's son who is present in the room with him providing history along with patient. Per son, the patient walked using his walker to the bathroom yesterday at approx 2 pm, left the walker outside the bathroom and proceeded inside and attempted to pass a bowel movement. Per patient, he has been constipated over the past 2 days. After straining, patient arose from toilet, lost balance and fell, unsure what he struck his ribs on. Denies any LOC or head injury. Took tylenol and applied bengay to the left ribs with some relief of pain, but awoke this morning in significant pain and requested his son to bring him to the hospital. Denies any numbness/tingling in the UE. Has a hx of recurrent falls over the past 2 months. No new medications. Denies any fever, chills SOB n/v/d, chest pain.     In the ED, patient vitals were T(F): 98, HR: 70, BP: 94/58, RR: 18, SpO2: 100% on RA. CBC exhibited WBC 7.46, Hgb 4.57, Hct 41.6, Plt 174. CMP exhibited Na 140, K 4.1, Cl 104, CO2 29, BUN 16, Cr 0.92, Gluc 108, Ca 10.5. Total Bilirubin was 1.4. Serum Albumin was 3.1. ProBNP was 699. CT head and neck exhibited multilevel degenerative changes, but no acute fractures or intracranial pathology. CT abd/pelvis and chest exhibited multiple left-sided rib fractures including the fifth, sixth, eighth, tenth and eleventh ribs, with no pneumothorax or acute visceral organ injury noted. CT also exhibited left-sided ureteral stent with moderate left-sided hydroureteronephrosis including two 6 mm calcifications within the mid to distal ureter and distal left ureter adjacent to the stent, with urothelial enhancement, which may be secondary to inflammation.  EKG: pending    Patient was seen and evaluated at the bedside in the ED with son present. Complains of continued pain at the left ribs, but denies any other complaints at the present time. Admits to having had frequent falls over the past month or so, the latest being Jan 2 when he got out of bed to use the bathroom in the middle of the night. Pain and discomfort resolved with tylenol OTC at the time.    In the ED, patient was administered zofran, tylenol, lidocaine patch, morphine and oxycodone. Patient was admitted to [facility] for management of multiple rib fractures.

## 2019-01-08 NOTE — H&P ADULT - NSHPSOCIALHISTORY_GEN_ALL_CORE
EtOH: Denies  Tobacco: Denies  Recreational drugs: Denies  Lives: Alone, son visiting from [place] staying with him  Ambulates: With walker (questionable compliance)  Marital:  (sept 2018)

## 2019-01-08 NOTE — PHYSICAL THERAPY INITIAL EVALUATION ADULT - PATIENT/FAMILY/SIGNIFICANT OTHER GOALS STATEMENT, PT EVAL
Pt son reports the patient loses his balance frequently and is non-compliant with the walker at times.

## 2019-01-08 NOTE — H&P ADULT - ASSESSMENT
89 year old male with a PMH of CAD s/p two cardiac stents (2014), BPH s/p prostate surgery (2017), UTI, GERD, HLD, presents to the ED with c/o left sided head, rib and abdominal pain s/p mechanical fall. Patient was admitted to [facility] for management of multiple rib fractures.

## 2019-01-08 NOTE — H&P ADULT - ATTENDING COMMENTS
89 year old male with a PMH of CAD s/p two cardiac stents (2014), BPH s/p prostate surgery (2017), UTI, GERD, HLD, presents to the ED with c/o left sided head, rib and abdominal pain s/p mechanical fall. Patient was admitted to [facility] for management of multiple rib fractures. Plan: apprec pulm/palliative recs, pain control, consider gi for severe constipation and neuro for repetative falls poss vasovagal syncope, check orthostatics, conisder cardio eval, apprec PT eval, monitor clnical course, fall precautions

## 2019-01-08 NOTE — CONSULT NOTE ADULT - ASSESSMENT
89 year old male with a PMH of CAD s/p two cardiac stents (2014), BPH s/p prostate surgery (2017), UTI, GERD, HLD, presents to the ED with c/o left sided head, rib and abdominal pain s/p mechanical fall.      - Fall is mechanical in nature.   - No syncope    - No signs of significant ischemia or volume overload.   - EKG NA for review. Please repeat.   - Recent nuclear stress was normal.    - Cont asa and statin for remote PCI    - Pain control for rib fx  - incentive filippo    - Monitor and replete electrolytes. Keep K>4.0 and Mg>2.0.   - Further cardiac workup will depend on clinical course.   - All other workup per primary team. Will followup.

## 2019-01-08 NOTE — H&P ADULT - NSHPREVIEWOFSYSTEMS_GEN_ALL_CORE
Constitutional: denies fever, chills, diaphoresis   HEENT: denies blurry vision, admits to difficulty hearing  Respiratory: denies SOB, MILLS, cough, sputum production, wheezing, hemoptysis  Cardiovascular: denies chest pain, palpitations, edema  Gastrointestinal: denies nausea, vomiting, diarrhea,  abdominal pain, admits to constipation  Genitourinary: denies dysuria, frequency, urgency, hematuria   Skin/Breast: denies rash, itching  Musculoskeletal: denies myalgias, joint swelling, admits to LE weakness  Neurologic: denies headache, paresthesias, numbness/tingling, admits to LE weakness  ROS negative except as noted above

## 2019-01-08 NOTE — ED PROVIDER NOTE - OBJECTIVE STATEMENT
pt c/o left sided head, rib and abd pain s/p fall. family relates pt chronically unsteady is supposed to use walker, but doesn't always. no loc, ha neck pain, weakness, numbness, sob, arm or leg pain.  pmd - massand

## 2019-01-08 NOTE — H&P ADULT - PROBLEM SELECTOR PLAN 1
Patient with recent history of increasing falls, possible due to non-compliance with use of walker vs other etiology  -Endorses some dizziness after rising from toilet just prior to fall  -Consult Neuro for recurrent falls (Aracelis)  -Will consult cardio ([facility]) for recurrent falls  -Pt consulted to evaluate and treat

## 2019-01-08 NOTE — CONSULT NOTE ADULT - PROBLEM SELECTOR RECOMMENDATION 5
atelectasis  rib fx  ct chest reviewed  monitor vs and HD and sat  keep sat > 88 pct  I filippo - assist with I filippo   pain relief

## 2019-01-08 NOTE — PHYSICAL THERAPY INITIAL EVALUATION ADULT - PERTINENT HX OF CURRENT PROBLEM, REHAB EVAL
89 year old male with a PMH of CAD s/p two cardiac stents (2014), BPH s/p prostate surgery (2017), UTI, GERD, HLD, presents to the ED with c/o left sided head, rib and abdominal pain s/p mechanical fall. As per family, patient is frail and unsteady, and is noncompliant with walker. Pt sustained left rib frx's.

## 2019-01-08 NOTE — H&P ADULT - PROBLEM SELECTOR PLAN 8
IMPROVE VTE Individual Risk Assessment          RISK                                                          Points  [  ] Previous VTE                                                3  [  ] Thrombophilia                                            2  [  ] Lower limb paralysis                                  2        (unable to hold up >15 seconds)    [  ] Current Cancer                                            2         (within 6 months)  [  ] Immobilization > 24 hrs                             1  [  ] ICU/CCU stay > 24 hours                           1  [ x ] Age > 60                                                        1    IMPROVE VTE Score: 1  Due to fall risk,will hold pharmacologic VTE prophylaxis, use SCDs, and ambulate with assistance as tolerated.

## 2019-01-09 LAB
ALBUMIN SERPL ELPH-MCNC: 2.6 G/DL — LOW (ref 3.3–5)
ALP SERPL-CCNC: 94 U/L — SIGNIFICANT CHANGE UP (ref 40–120)
ALT FLD-CCNC: 12 U/L — SIGNIFICANT CHANGE UP (ref 12–78)
ANION GAP SERPL CALC-SCNC: 7 MMOL/L — SIGNIFICANT CHANGE UP (ref 5–17)
AST SERPL-CCNC: 20 U/L — SIGNIFICANT CHANGE UP (ref 15–37)
BASOPHILS # BLD AUTO: 0.03 K/UL — SIGNIFICANT CHANGE UP (ref 0–0.2)
BASOPHILS NFR BLD AUTO: 0.4 % — SIGNIFICANT CHANGE UP (ref 0–2)
BILIRUB SERPL-MCNC: 1.2 MG/DL — SIGNIFICANT CHANGE UP (ref 0.2–1.2)
BUN SERPL-MCNC: 15 MG/DL — SIGNIFICANT CHANGE UP (ref 7–23)
CALCIUM SERPL-MCNC: 9.4 MG/DL — SIGNIFICANT CHANGE UP (ref 8.5–10.1)
CHLORIDE SERPL-SCNC: 105 MMOL/L — SIGNIFICANT CHANGE UP (ref 96–108)
CO2 SERPL-SCNC: 30 MMOL/L — SIGNIFICANT CHANGE UP (ref 22–31)
CREAT SERPL-MCNC: 0.89 MG/DL — SIGNIFICANT CHANGE UP (ref 0.5–1.3)
EOSINOPHIL # BLD AUTO: 0.22 K/UL — SIGNIFICANT CHANGE UP (ref 0–0.5)
EOSINOPHIL NFR BLD AUTO: 3 % — SIGNIFICANT CHANGE UP (ref 0–6)
GLUCOSE SERPL-MCNC: 102 MG/DL — HIGH (ref 70–99)
HCT VFR BLD CALC: 37.5 % — LOW (ref 39–50)
HGB BLD-MCNC: 11.9 G/DL — LOW (ref 13–17)
IMM GRANULOCYTES NFR BLD AUTO: 0.3 % — SIGNIFICANT CHANGE UP (ref 0–1.5)
LYMPHOCYTES # BLD AUTO: 1.64 K/UL — SIGNIFICANT CHANGE UP (ref 1–3.3)
LYMPHOCYTES # BLD AUTO: 22.5 % — SIGNIFICANT CHANGE UP (ref 13–44)
MCHC RBC-ENTMCNC: 29.2 PG — SIGNIFICANT CHANGE UP (ref 27–34)
MCHC RBC-ENTMCNC: 31.7 GM/DL — LOW (ref 32–36)
MCV RBC AUTO: 92.1 FL — SIGNIFICANT CHANGE UP (ref 80–100)
MONOCYTES # BLD AUTO: 0.67 K/UL — SIGNIFICANT CHANGE UP (ref 0–0.9)
MONOCYTES NFR BLD AUTO: 9.2 % — SIGNIFICANT CHANGE UP (ref 2–14)
NEUTROPHILS # BLD AUTO: 4.71 K/UL — SIGNIFICANT CHANGE UP (ref 1.8–7.4)
NEUTROPHILS NFR BLD AUTO: 64.6 % — SIGNIFICANT CHANGE UP (ref 43–77)
PLATELET # BLD AUTO: 144 K/UL — LOW (ref 150–400)
POTASSIUM SERPL-MCNC: 4.1 MMOL/L — SIGNIFICANT CHANGE UP (ref 3.5–5.3)
POTASSIUM SERPL-SCNC: 4.1 MMOL/L — SIGNIFICANT CHANGE UP (ref 3.5–5.3)
PROT SERPL-MCNC: 6.5 G/DL — SIGNIFICANT CHANGE UP (ref 6–8.3)
RBC # BLD: 4.07 M/UL — LOW (ref 4.2–5.8)
RBC # FLD: 14.6 % — HIGH (ref 10.3–14.5)
SODIUM SERPL-SCNC: 142 MMOL/L — SIGNIFICANT CHANGE UP (ref 135–145)
WBC # BLD: 7.29 K/UL — SIGNIFICANT CHANGE UP (ref 3.8–10.5)
WBC # FLD AUTO: 7.29 K/UL — SIGNIFICANT CHANGE UP (ref 3.8–10.5)

## 2019-01-09 PROCEDURE — 99233 SBSQ HOSP IP/OBS HIGH 50: CPT | Mod: GC

## 2019-01-09 PROCEDURE — 99232 SBSQ HOSP IP/OBS MODERATE 35: CPT

## 2019-01-09 RX ORDER — ENOXAPARIN SODIUM 100 MG/ML
40 INJECTION SUBCUTANEOUS DAILY
Qty: 0 | Refills: 0 | Status: DISCONTINUED | OUTPATIENT
Start: 2019-01-09 | End: 2019-01-10

## 2019-01-09 RX ORDER — GABAPENTIN 400 MG/1
100 CAPSULE ORAL THREE TIMES A DAY
Qty: 0 | Refills: 0 | Status: DISCONTINUED | OUTPATIENT
Start: 2019-01-09 | End: 2019-01-10

## 2019-01-09 RX ORDER — CELECOXIB 200 MG/1
100 CAPSULE ORAL DAILY
Qty: 0 | Refills: 0 | Status: DISCONTINUED | OUTPATIENT
Start: 2019-01-09 | End: 2019-01-10

## 2019-01-09 RX ADMIN — ENOXAPARIN SODIUM 40 MILLIGRAM(S): 100 INJECTION SUBCUTANEOUS at 12:37

## 2019-01-09 RX ADMIN — FINASTERIDE 5 MILLIGRAM(S): 5 TABLET, FILM COATED ORAL at 12:37

## 2019-01-09 RX ADMIN — Medication 1000 MILLIGRAM(S): at 17:44

## 2019-01-09 RX ADMIN — Medication 100 MILLIGRAM(S): at 05:09

## 2019-01-09 RX ADMIN — GABAPENTIN 100 MILLIGRAM(S): 400 CAPSULE ORAL at 14:01

## 2019-01-09 RX ADMIN — CELECOXIB 100 MILLIGRAM(S): 200 CAPSULE ORAL at 12:37

## 2019-01-09 RX ADMIN — ESCITALOPRAM OXALATE 10 MILLIGRAM(S): 10 TABLET, FILM COATED ORAL at 12:37

## 2019-01-09 RX ADMIN — LIDOCAINE 1 PATCH: 4 CREAM TOPICAL at 19:00

## 2019-01-09 RX ADMIN — Medication 1000 MILLIGRAM(S): at 05:09

## 2019-01-09 RX ADMIN — DORZOLAMIDE HYDROCHLORIDE 1 DROP(S): 20 SOLUTION/ DROPS OPHTHALMIC at 21:54

## 2019-01-09 RX ADMIN — TAMSULOSIN HYDROCHLORIDE 0.4 MILLIGRAM(S): 0.4 CAPSULE ORAL at 21:54

## 2019-01-09 RX ADMIN — Medication 100 MILLIGRAM(S): at 21:54

## 2019-01-09 RX ADMIN — Medication 1000 MILLIGRAM(S): at 06:04

## 2019-01-09 RX ADMIN — LIDOCAINE 1 PATCH: 4 CREAM TOPICAL at 12:36

## 2019-01-09 RX ADMIN — DORZOLAMIDE HYDROCHLORIDE 1 DROP(S): 20 SOLUTION/ DROPS OPHTHALMIC at 14:01

## 2019-01-09 RX ADMIN — GABAPENTIN 100 MILLIGRAM(S): 400 CAPSULE ORAL at 21:54

## 2019-01-09 RX ADMIN — SIMVASTATIN 20 MILLIGRAM(S): 20 TABLET, FILM COATED ORAL at 21:54

## 2019-01-09 RX ADMIN — CELECOXIB 100 MILLIGRAM(S): 200 CAPSULE ORAL at 14:01

## 2019-01-09 RX ADMIN — Medication 81 MILLIGRAM(S): at 12:37

## 2019-01-09 RX ADMIN — Medication 100 MILLIGRAM(S): at 14:01

## 2019-01-09 NOTE — PROGRESS NOTE ADULT - SUBJECTIVE AND OBJECTIVE BOX
[facility] -- Latisha Ash, Rhonda Verma, Joselo Jimenez Savella  Office # [phone]      Follow Up:    CAD  Subjective/Observations:   No events overnight resting comfortably in bed.  No complaints of chest pain, dyspnea, or palpitations reported. No signs of orthopnea or PND. Has L sided pain with palpation on lateral chest wall    REVIEW OF SYSTEMS: All other review of systems is negative unless indicated above    PAST MEDICAL & SURGICAL HISTORY:  CAD (coronary artery disease)  GERD (gastroesophageal reflux disease)  Hyperlipidemia  Benign prostatic hyperplasia, presence of lower urinary tract symptoms unspecified, unspecified morphology  S/P ureteral stent placement: Left sided  History of prostate surgery  H/O heart artery stent: stent x 2      MEDICATIONS  (STANDING):  acetaminophen   Tablet .. 1000 milliGRAM(s) Oral two times a day  aspirin enteric coated 81 milliGRAM(s) Oral daily  celecoxib 100 milliGRAM(s) Oral daily  docusate sodium 100 milliGRAM(s) Oral three times a day  dorzolamide 2% Ophthalmic Solution 1 Drop(s) Both EYES three times a day  escitalopram 10 milliGRAM(s) Oral daily  finasteride 5 milliGRAM(s) Oral daily  gabapentin 100 milliGRAM(s) Oral three times a day  lidocaine   Patch 1 Patch Transdermal daily  simvastatin 20 milliGRAM(s) Oral at bedtime  tamsulosin 0.4 milliGRAM(s) Oral at bedtime    MEDICATIONS  (PRN):  bisacodyl 5 milliGRAM(s) Oral every 12 hours PRN Constipation  guaiFENesin   Syrup  (Sugar-Free) 200 milliGRAM(s) Oral every 6 hours PRN Cough  oxyCODONE    IR 5 milliGRAM(s) Oral four times a day PRN Severe Pain (7 - 10)      Allergies    No Known Allergies    Intolerances        Vital Signs Last 24 Hrs  T(C): 36.6 (09 Jan 2019 04:42), Max: 36.7 (08 Jan 2019 11:43)  T(F): 97.8 (09 Jan 2019 04:42), Max: 98 (08 Jan 2019 11:43)  HR: 57 (09 Jan 2019 04:42) (54 - 79)  BP: 151/72 (09 Jan 2019 04:42) (94/58 - 151/72)  BP(mean): --  RR: 17 (09 Jan 2019 04:42) (16 - 18)  SpO2: 97% (09 Jan 2019 04:42) (96% - 100%)    I&O's Summary    08 Jan 2019 07:01  -  09 Jan 2019 07:00  --------------------------------------------------------  IN: 0 mL / OUT: 100 mL / NET: -100 mL      Weight (kg): 72.1 (01-08 @ 11:43)    PHYSICAL EXAM:  TELE: nsr No events on tele overnight   Constitutional: NAD, awake and alert, well-developed  HEENT: Moist Mucous Membranes, Anicteric  Pulmonary: Non-labored, breath sounds with Bilaterally diminished at bases  No  wheezes, crackles or rhonchi   Cardiovascular: Regular, S1 and S2 nl, No murmurs, rubs, gallops or clicks  Gastrointestinal: Bowel Sounds present, soft, nontender.   Lymph: No lymphadenopathy. No peripheral edema.  Skin: No visible rashes or ulcers.  Psych:  Mood & affect appropriate    LABS: All Labs Reviewed:                        13.5   7.46  )-----------( 174      ( 08 Jan 2019 12:45 )             41.6     08 Jan 2019 12:45    140    |  104    |  16     ----------------------------<  108    4.1     |  29     |  0.92     Ca    10.5       08 Jan 2019 12:45    TPro  7.8    /  Alb  3.1    /  TBili  1.4    /  DBili  x      /  AST  23     /  ALT  14     /  AlkPhos  114    08 Jan 2019 12:45    PT/INR - ( 08 Jan 2019 12:45 )   PT: 13.7 sec;   INR: 1.21 ratio         PTT - ( 08 Jan 2019 12:45 )  PTT:29.4 sec         ECG:  < from: 12 Lead ECG (10.17.18 @ 11:12) >  Ventricular Rate 127 BPM    Atrial Rate 127 BPM    P-R Interval 178 ms    QRS Duration 84 ms    Q-T Interval 274 ms    QTC Calculation(Bezet) 398 ms    P Axis 31 degrees    R Axis 79 degrees    T Axis 18 degrees    Diagnosis Line Sinus tachycardia  Otherwise normal ECG    Confirmed by Rangel Dolan ([id number]) on 10/18/2018 11:12:35 AM    < end of copied text >    Echo:    Radiology:  < from: CT Chest w/ IV Cont (01.08.19 @ 14:14) >  EXAM:  CT ABDOMEN AND PELVIS IC                          EXAM:  CT CHEST IC                            PROCEDURE DATE:  01/08/2019          INTERPRETATION:  CT CHEST/ABDOMEN/PELVIS:     CLINICAL INFORMATION:  Left-sided chest and abdominal pain following fall.    COMPARISON: CT scan chest abdomen pelvis 10/17/2018    PROCEDURE:  Using multislice helical CT, following the intravenous   administration of 95 cc Omnipaque 350, 2.5 mm sections were obtained from   the thoracic inlet through the ischial tuberosities.    Multiplanar reformatted images.    There are acute, minimally displaced fractures of the lateral aspect the   left fifth and sixth ribs.  There is a mildly displaced fracture of the lateral left eighth rib.  There are nondisplacedfractures of the medial aspect of the left 10th   and 11th ribs, near the margin of the vertebral bodies.    No pneumothorax is noted.    There is a trace small left-sided pleural effusion.  There are bibasilar atelectatic changes.    The central airways remain patent. There is scarring/fibrosis inferior   aspect left lingula lobe.  There are scattered areas of peripheral subpleural reticular banding,   likely representing cysts sequelae of scarring/fibrosis.  No lobar lung consolidation is noted.    No mediastinal hematoma is noted.  No pericardial effusion is noted.  There is arteries carotid calcification of the thoracic aorta with   coronary artery calcifications.    There are small, shotty pretracheal, AP mediastinal and subcarinal   mediastinal lymph nodes.  No enlarged hilar lymphadenopathy is noted.    Cholelithiasis is noted, without lobar or wall thickening.  There is fatty infiltration of the liver. The spleen, adrenal glands and   pancreas are unremarkable in appearance.    There is a large, 14 mm nonobstructing intrarenal calculus at the left   lower pole calyces.  There has been interval placement of a left nephroureteral bladder stent,   proximal extent aspect of the stent at the left ureteropelvic junction   and distalaspect within the urinary bladder.  There is persistent moderate left-sided hydronephrosis. There is   urothelial enhancement, which may reflect urinary tract   infection/pyelonephritis.   There is a hydroureter.  There is a 6 mm calcification withinthe mid to distal left ureter   adjacent to the stent.  There is an additional 6 mm calcification in the more distal left ureter   adjacent to the stent. This is slightly proximal to the ureterovesical   junction.    There is a 2 cm indeterminate hypodense exophytic lesion at the lower   pole the right kidney. There is a smaller, indeterminate hypodense lesion   at the upper pole of the left kidney.    No right-sided hydroureteronephrosis is noted.    There are several dependent intraluminal bladder calcifications,   measuring up to 10 mm.    There is arteriosclerotic calcification of the abdominal aorta and major   branch vessels.  No enlarged retroperitoneal lymphadenopathy is noted. No retroperitoneal   hematoma is noted.    Evaluation of the stomach and gastrointestinal tract is limited without   distention.  There is a moderate size hiatal hernia.    No bowel obstruction is noted.  No localized intra-abdominal fluid collection or pneumoperitoneum is   noted.    There is a fat-containing periumbilical hernia.  There is a fat-containing right inguinal hernia.    No pelvic mass or free fluid is noted.    There are multilevel degenerative changes of the spine; no acute lumbar   compression fracture is noted.    Impression:    Multiple left-sided rib fractures including the fifth, sixth, eighth,   10th and 11th ribs as discussed.  No pneumothorax noted.    No acute visceral organ injury noted.    Left-sided ureteral stent with moderate left-sided hydroureteronephrosis   including two 6 mm calcifications within the mid to distal ureter and   distal left ureter adjacent to the stent.  Urothelial enhancement, may be secondary to inflammation, correlate for   urinary tract infection.    Findings discussed by telephone with Dr. Patel at the time of   interpretation on 1/8/2018.    Other findings as discussed above.                                      FABIÁN LIN M.D., ATTENDING RADIOLOGIST  This document has been electronically signed. Jan 8 2019  3:13PM                < end of copied text >           Luis E Baig [facility]   Cardiology

## 2019-01-09 NOTE — GOALS OF CARE CONVERSATION - PERSONAL ADVANCE DIRECTIVE - CONVERSATION DETAILS
met pt confirmed hcp from EMR last admission: pt daughter is hcp, hcp placed in chart. contacted Wade on phone, unable to talk, will return call 1330 hrs to further discuss AD, resuscitation. PC will follow met pt confirmed hcp from EMR last admission: pt daughter is hcp, hcp placed in chart. contacted Wade on phone, unable to talk, will return call 1330 hrs to further discuss AD, resuscitation. PC will follow    1/10/19: 1120hrs: spoke to pt daughter, did not visit to speak to pt of his wishes, request give to copy of molst to her brother. Met brother , reviewed molst form, to complete with PMD. contact # given.

## 2019-01-09 NOTE — PROGRESS NOTE ADULT - SUBJECTIVE AND OBJECTIVE BOX
Patient is a 89y old  Male who presents with a chief complaint of Fall (09 Jan 2019 08:39)      HPI:  89 year old male with a PMH of CAD s/p two cardiac stents (2014), BPH s/p prostate surgery (2017), UTI, GERD, HLD, presents to the ED with c/o left sided head, rib and abdominal pain s/p mechanical fall. As per family, patient is frail and unsteady, and is noncompliant with walker. Patient's son who is present in the room with him providing history along with patient. Per son, the patient walked using his walker to the bathroom yesterday at approx 2 pm, left the walker outside the bathroom and proceeded inside and attempted to pass a bowel movement. Per patient, he has been constipated over the past 2 days. After straining, patient arose from toilet, lost balance and fell, unsure what he struck his ribs on. Denies any LOC or head injury. Took tylenol and applied bengay to the left ribs with some relief of pain, but awoke this morning in significant pain and requested his son to bring him to the hospital. Denies any numbness/tingling in the UE. Has a hx of recurrent falls over the past 2 months. No new medications. Denies any fever, chills SOB n/v/d, chest pain.  admitted with post mec fall with .  multiple lt side  rib fractures.      pt seen and examine today -alert awake  no fever ,  but  c/o rib pain , no resp distress , tolerating po .    INTERVAL HPI/OVERNIGHT EVENTS:  T(C): 36.6 (01-09-19 @ 04:42), Max: 36.7 (01-08-19 @ 11:43)  HR: 57 (01-09-19 @ 04:42) (54 - 79)  BP: 151/72 (01-09-19 @ 04:42) (94/58 - 151/72)  RR: 17 (01-09-19 @ 04:42) (16 - 18)  SpO2: 97% (01-09-19 @ 04:42) (96% - 100%)  Wt(kg): --  I&O's Summary    08 Jan 2019 07:01  -  09 Jan 2019 07:00  --------------------------------------------------------  IN: 0 mL / OUT: 100 mL / NET: -100 mL        REVIEW OF SYSTEMS:  CONSTITUTIONAL: No fever, weight loss,  +fatigue  EYES: No eye pain, visual disturbances, or discharge  ENMT:  No sinus or throat pain  NECK: No pain or stiffness  RESPIRATORY: No cough, wheezing, chills No shortness of breath  CARDIOVASCULAR: No chest pain, palpitations, dizziness, or leg swelling  GASTROINTESTINAL: No abdominal or epigastric pain. No nausea, vomiting, No diarrhea or constipation. No melena or hematochezia.  GENITOURINARY: No dysuria, frequency, hematuria, or incontinence  NEUROLOGICAL: No headaches, memory loss, loss of strength, numbness, or tremors  SKIN: No itching, burning, rashes, or lesions  MUSCULOSKELETAL: No joint pain or swelling; No muscle, back, or extremity pain , rib pain +      PHYSICAL EXAM:  GENERAL: NAD, well-groomed, well-developed  HEAD:  Atraumatic, Normocephalic  EYES: EOMI, PERRLA, conjunctiva and sclera clear  ENMT:; Moist mucous membranes,  No lesions  NECK: Supple, No JVD,  NERVOUS SYSTEM:  Alert & Oriented X3, Good concentration; Motor Strength 5/5 B/L upper and lower extremities; DTRs 2+ intact and symmetric  CHEST/LUNG: Clear to percussion bilaterally; No rales, rhonchi, wheezing, rib tenderness   HEART: Regular rate and rhythm; No murmurs,   ABDOMEN: Soft, Nontender, Nondistended; Bowel sounds present  EXTREMITIES:  2+ Peripheral Pulses, No clubbing, cyanosis, or edema  LYMPH: No lymphadenopathy noted  SKIN: No rashes or lesions    MEDICATIONS  (STANDING):  acetaminophen   Tablet .. 1000 milliGRAM(s) Oral two times a day  aspirin enteric coated 81 milliGRAM(s) Oral daily  celecoxib 100 milliGRAM(s) Oral daily  docusate sodium 100 milliGRAM(s) Oral three times a day  dorzolamide 2% Ophthalmic Solution 1 Drop(s) Both EYES three times a day  escitalopram 10 milliGRAM(s) Oral daily  finasteride 5 milliGRAM(s) Oral daily  gabapentin 100 milliGRAM(s) Oral three times a day  lidocaine   Patch 1 Patch Transdermal daily  simvastatin 20 milliGRAM(s) Oral at bedtime  tamsulosin 0.4 milliGRAM(s) Oral at bedtime    MEDICATIONS  (PRN):  bisacodyl 5 milliGRAM(s) Oral every 12 hours PRN Constipation  guaiFENesin   Syrup  (Sugar-Free) 200 milliGRAM(s) Oral every 6 hours PRN Cough  oxyCODONE    IR 5 milliGRAM(s) Oral four times a day PRN Severe Pain (7 - 10)      LABS:                        11.9   7.29  )-----------( 144      ( 09 Jan 2019 08:43 )             37.5     01-09    142  |  105  |  15  ----------------------------<  102<H>  4.1   |  30  |  0.89    Ca    9.4      09 Jan 2019 08:43    TPro  6.5  /  Alb  2.6<L>  /  TBili  1.2  /  DBili  x   /  AST  20  /  ALT  12  /  AlkPhos  94  01-09    PT/INR - ( 08 Jan 2019 12:45 )   PT: 13.7 sec;   INR: 1.21 ratio         PTT - ( 08 Jan 2019 12:45 )  PTT:29.4 sec    CAPILLARY BLOOD GLUCOSE                  RADIOLOGY & ADDITIONAL TESTS:    Imaging Personally Reviewed:       Advance Directives:      Palliative Care:

## 2019-01-09 NOTE — PROGRESS NOTE ADULT - ASSESSMENT
89 year old male with a PMH of CAD s/p two cardiac stents (2014), BPH s/p prostate surgery (2017), UTI, GERD, HLD, presents to the ED with c/o left sided head, rib and abdominal pain s/p mechanical fall. Patient was admitted to [facility] for management of multiple rib fractures.

## 2019-01-09 NOTE — PROGRESS NOTE ADULT - PROBLEM SELECTOR PLAN 2
Patient found to have 5 Left sided rib fractures s/p fall in bathroom  -CT positive for Left fracture of ribs 5, 6,8,10,11  -Pain control with oxycodone   - Check routine SpO2  - Encourage use of incentive spirometry

## 2019-01-09 NOTE — PROGRESS NOTE ADULT - PROBLEM SELECTOR PLAN 8
IMPROVE VTE Individual Risk Assessment          RISK                                                          Points  [  ] Previous VTE                                                3  [  ] Thrombophilia                                            2  [  ] Lower limb paralysis                                  2        (unable to hold up >15 seconds)    [  ] Current Cancer                                            2         (within 6 months)  [  ] Immobilization > 24 hrs                             1  [  ] ICU/CCU stay > 24 hours                           1  [ x ] Age > 60                                                        1    IMPROVE VTE Score: 1  dvt prophy lovenox

## 2019-01-09 NOTE — PROGRESS NOTE ADULT - SUBJECTIVE AND OBJECTIVE BOX
Date/Time Patient Seen:  		  Referring MD:   Data Reviewed	       Patient is a 89y old  Male who presents with a chief complaint of Fall (08 Jan 2019 18:33)      Subjective/HPI     PAST MEDICAL & SURGICAL HISTORY:  CAD (coronary artery disease)  GERD (gastroesophageal reflux disease)  Hyperlipidemia  Benign prostatic hyperplasia, presence of lower urinary tract symptoms unspecified, unspecified morphology  S/P ureteral stent placement: Left sided  History of prostate surgery  H/O heart artery stent: stent x 2        Medication list         MEDICATIONS  (STANDING):  acetaminophen   Tablet .. 1000 milliGRAM(s) Oral two times a day  aspirin enteric coated 81 milliGRAM(s) Oral daily  celecoxib 100 milliGRAM(s) Oral daily  docusate sodium 100 milliGRAM(s) Oral three times a day  dorzolamide 2% Ophthalmic Solution 1 Drop(s) Both EYES three times a day  escitalopram 10 milliGRAM(s) Oral daily  finasteride 5 milliGRAM(s) Oral daily  gabapentin 100 milliGRAM(s) Oral three times a day  lidocaine   Patch 1 Patch Transdermal daily  simvastatin 20 milliGRAM(s) Oral at bedtime  tamsulosin 0.4 milliGRAM(s) Oral at bedtime    MEDICATIONS  (PRN):  bisacodyl 5 milliGRAM(s) Oral every 12 hours PRN Constipation  guaiFENesin   Syrup  (Sugar-Free) 200 milliGRAM(s) Oral every 6 hours PRN Cough  oxyCODONE    IR 5 milliGRAM(s) Oral four times a day PRN Severe Pain (7 - 10)         Vitals log        ICU Vital Signs Last 24 Hrs  T(C): 36.6 (09 Jan 2019 04:42), Max: 36.7 (08 Jan 2019 11:43)  T(F): 97.8 (09 Jan 2019 04:42), Max: 98 (08 Jan 2019 11:43)  HR: 57 (09 Jan 2019 04:42) (54 - 79)  BP: 151/72 (09 Jan 2019 04:42) (94/58 - 151/72)  BP(mean): --  ABP: --  ABP(mean): --  RR: 17 (09 Jan 2019 04:42) (16 - 18)  SpO2: 97% (09 Jan 2019 04:42) (96% - 100%)           Input and Output:  I&O's Detail    08 Jan 2019 07:01  -  09 Jan 2019 06:27  --------------------------------------------------------  IN:  Total IN: 0 mL    OUT:    Voided: 100 mL  Total OUT: 100 mL    Total NET: -100 mL          Lab Data                        13.5   7.46  )-----------( 174      ( 08 Jan 2019 12:45 )             41.6     01-08    140  |  104  |  16  ----------------------------<  108<H>  4.1   |  29  |  0.92    Ca    10.5<H>      08 Jan 2019 12:45    TPro  7.8  /  Alb  3.1<L>  /  TBili  1.4<H>  /  DBili  x   /  AST  23  /  ALT  14  /  AlkPhos  114  01-08            Review of Systems	      Objective     Physical Examination    heart s1s2  lung dec BS  on room air  abd soft  cn grossly int  pain reported - left ribs      Pertinent Lab findings & Imaging      Ondina:  NO   Adequate UO     I&O's Detail    08 Jan 2019 07:01  -  09 Jan 2019 06:27  --------------------------------------------------------  IN:  Total IN: 0 mL    OUT:    Voided: 100 mL  Total OUT: 100 mL    Total NET: -100 mL               Discussed with:     Cultures:	        Radiology

## 2019-01-09 NOTE — PROGRESS NOTE ADULT - ASSESSMENT
89 year old male with a PMH of CAD s/p two cardiac stents (2014), BPH s/p prostate surgery (2017), UTI, GERD, HLD, presents to the ED with c/o left sided head, rib and abdominal pain s/p mechanical fall.      - Fall is mechanical in nature.   - No syncope    - No signs of significant ischemia or volume overload.   - EKG NA for review. Please repeat.   - Recent nuclear stress was normal.    - Cont asa and statin for remote PCI    - Pain control for rib fx  - incentive filippo    - Monitor and replete electrolytes. Keep K>4.0 and Mg>2.0.   - Further cardiac workup will depend on clinical course.   - All other workup per primary team. Will followup.     Luis E MONTELONGO  Cardiology 89 year old male with a PMH of CAD s/p two cardiac stents (2014), BPH s/p prostate surgery (2017), UTI, GERD, HLD, presents to the ED with c/o left sided head, rib and abdominal pain s/p mechanical fall.      - Fall is mechanical in nature.   - No syncope    - No signs of significant ischemia or volume overload.   - EKG is SR with 1st degree av block  - Recent nuclear stress was normal.    - Cont asa and statin for remote PCI    - Pain control for rib fx  - incentive filippo    - Monitor and replete electrolytes. Keep K>4.0 and Mg>2.0.   - Further cardiac workup will depend on clinical course.   - All other workup per primary team. Will followup.     Luis E MONTELONGO  Cardiology

## 2019-01-10 ENCOUNTER — TRANSCRIPTION ENCOUNTER (OUTPATIENT)
Age: 84
End: 2019-01-10

## 2019-01-10 VITALS — HEART RATE: 91 BPM

## 2019-01-10 LAB
ANION GAP SERPL CALC-SCNC: 5 MMOL/L — SIGNIFICANT CHANGE UP (ref 5–17)
BUN SERPL-MCNC: 14 MG/DL — SIGNIFICANT CHANGE UP (ref 7–23)
CALCIUM SERPL-MCNC: 9.6 MG/DL — SIGNIFICANT CHANGE UP (ref 8.5–10.1)
CHLORIDE SERPL-SCNC: 105 MMOL/L — SIGNIFICANT CHANGE UP (ref 96–108)
CO2 SERPL-SCNC: 32 MMOL/L — HIGH (ref 22–31)
CREAT SERPL-MCNC: 0.92 MG/DL — SIGNIFICANT CHANGE UP (ref 0.5–1.3)
GLUCOSE SERPL-MCNC: 107 MG/DL — HIGH (ref 70–99)
HCT VFR BLD CALC: 43 % — SIGNIFICANT CHANGE UP (ref 39–50)
HGB BLD-MCNC: 13.9 G/DL — SIGNIFICANT CHANGE UP (ref 13–17)
MCHC RBC-ENTMCNC: 29.4 PG — SIGNIFICANT CHANGE UP (ref 27–34)
MCHC RBC-ENTMCNC: 32.3 GM/DL — SIGNIFICANT CHANGE UP (ref 32–36)
MCV RBC AUTO: 91.1 FL — SIGNIFICANT CHANGE UP (ref 80–100)
NRBC # BLD: 0 /100 WBCS — SIGNIFICANT CHANGE UP (ref 0–0)
PLATELET # BLD AUTO: 167 K/UL — SIGNIFICANT CHANGE UP (ref 150–400)
POTASSIUM SERPL-MCNC: 3.9 MMOL/L — SIGNIFICANT CHANGE UP (ref 3.5–5.3)
POTASSIUM SERPL-SCNC: 3.9 MMOL/L — SIGNIFICANT CHANGE UP (ref 3.5–5.3)
RBC # BLD: 4.72 M/UL — SIGNIFICANT CHANGE UP (ref 4.2–5.8)
RBC # FLD: 14.6 % — HIGH (ref 10.3–14.5)
SODIUM SERPL-SCNC: 142 MMOL/L — SIGNIFICANT CHANGE UP (ref 135–145)
WBC # BLD: 6.62 K/UL — SIGNIFICANT CHANGE UP (ref 3.8–10.5)
WBC # FLD AUTO: 6.62 K/UL — SIGNIFICANT CHANGE UP (ref 3.8–10.5)

## 2019-01-10 PROCEDURE — 99239 HOSP IP/OBS DSCHRG MGMT >30: CPT

## 2019-01-10 PROCEDURE — 99285 EMERGENCY DEPT VISIT HI MDM: CPT | Mod: 25

## 2019-01-10 PROCEDURE — 97116 GAIT TRAINING THERAPY: CPT

## 2019-01-10 PROCEDURE — 85610 PROTHROMBIN TIME: CPT

## 2019-01-10 PROCEDURE — 85730 THROMBOPLASTIN TIME PARTIAL: CPT

## 2019-01-10 PROCEDURE — 80053 COMPREHEN METABOLIC PANEL: CPT

## 2019-01-10 PROCEDURE — 74177 CT ABD & PELVIS W/CONTRAST: CPT

## 2019-01-10 PROCEDURE — 72125 CT NECK SPINE W/O DYE: CPT

## 2019-01-10 PROCEDURE — 71260 CT THORAX DX C+: CPT

## 2019-01-10 PROCEDURE — 83690 ASSAY OF LIPASE: CPT

## 2019-01-10 PROCEDURE — 83880 ASSAY OF NATRIURETIC PEPTIDE: CPT

## 2019-01-10 PROCEDURE — 85027 COMPLETE CBC AUTOMATED: CPT

## 2019-01-10 PROCEDURE — 99232 SBSQ HOSP IP/OBS MODERATE 35: CPT

## 2019-01-10 PROCEDURE — 93005 ELECTROCARDIOGRAM TRACING: CPT

## 2019-01-10 PROCEDURE — 80048 BASIC METABOLIC PNL TOTAL CA: CPT

## 2019-01-10 PROCEDURE — 96374 THER/PROPH/DIAG INJ IV PUSH: CPT

## 2019-01-10 PROCEDURE — 96375 TX/PRO/DX INJ NEW DRUG ADDON: CPT

## 2019-01-10 PROCEDURE — 97530 THERAPEUTIC ACTIVITIES: CPT

## 2019-01-10 PROCEDURE — 70450 CT HEAD/BRAIN W/O DYE: CPT

## 2019-01-10 PROCEDURE — 97162 PT EVAL MOD COMPLEX 30 MIN: CPT

## 2019-01-10 PROCEDURE — 36415 COLL VENOUS BLD VENIPUNCTURE: CPT

## 2019-01-10 PROCEDURE — 84443 ASSAY THYROID STIM HORMONE: CPT

## 2019-01-10 RX ORDER — LIDOCAINE 4 G/100G
1 CREAM TOPICAL
Qty: 10 | Refills: 0
Start: 2019-01-10 | End: 2019-01-19

## 2019-01-10 RX ORDER — DOCUSATE SODIUM 100 MG
1 CAPSULE ORAL
Qty: 14 | Refills: 0
Start: 2019-01-10 | End: 2019-01-16

## 2019-01-10 RX ORDER — SIMVASTATIN 20 MG/1
1 TABLET, FILM COATED ORAL
Qty: 0 | Refills: 0 | COMMUNITY

## 2019-01-10 RX ORDER — OXYCODONE HYDROCHLORIDE 5 MG/1
1 TABLET ORAL
Qty: 20 | Refills: 0
Start: 2019-01-10 | End: 2019-01-14

## 2019-01-10 RX ORDER — ESCITALOPRAM OXALATE 10 MG/1
1 TABLET, FILM COATED ORAL
Qty: 0 | Refills: 0 | COMMUNITY

## 2019-01-10 RX ORDER — FINASTERIDE 5 MG/1
1 TABLET, FILM COATED ORAL
Qty: 0 | Refills: 0 | DISCHARGE
Start: 2019-01-10

## 2019-01-10 RX ORDER — FINASTERIDE 5 MG/1
5 TABLET, FILM COATED ORAL
Qty: 0 | Refills: 0 | COMMUNITY

## 2019-01-10 RX ORDER — TAMSULOSIN HYDROCHLORIDE 0.4 MG/1
1 CAPSULE ORAL
Qty: 0 | Refills: 0 | DISCHARGE
Start: 2019-01-10

## 2019-01-10 RX ORDER — SIMVASTATIN 20 MG/1
1 TABLET, FILM COATED ORAL
Qty: 0 | Refills: 0 | DISCHARGE
Start: 2019-01-10

## 2019-01-10 RX ADMIN — Medication 100 MILLIGRAM(S): at 05:26

## 2019-01-10 RX ADMIN — DORZOLAMIDE HYDROCHLORIDE 1 DROP(S): 20 SOLUTION/ DROPS OPHTHALMIC at 05:26

## 2019-01-10 RX ADMIN — Medication 81 MILLIGRAM(S): at 13:05

## 2019-01-10 RX ADMIN — CELECOXIB 100 MILLIGRAM(S): 200 CAPSULE ORAL at 13:05

## 2019-01-10 RX ADMIN — LIDOCAINE 1 PATCH: 4 CREAM TOPICAL at 13:04

## 2019-01-10 RX ADMIN — ENOXAPARIN SODIUM 40 MILLIGRAM(S): 100 INJECTION SUBCUTANEOUS at 13:04

## 2019-01-10 RX ADMIN — FINASTERIDE 5 MILLIGRAM(S): 5 TABLET, FILM COATED ORAL at 13:05

## 2019-01-10 RX ADMIN — Medication 5 MILLIGRAM(S): at 13:05

## 2019-01-10 RX ADMIN — Medication 1000 MILLIGRAM(S): at 06:00

## 2019-01-10 RX ADMIN — ESCITALOPRAM OXALATE 10 MILLIGRAM(S): 10 TABLET, FILM COATED ORAL at 13:05

## 2019-01-10 RX ADMIN — LIDOCAINE 1 PATCH: 4 CREAM TOPICAL at 00:36

## 2019-01-10 RX ADMIN — CELECOXIB 100 MILLIGRAM(S): 200 CAPSULE ORAL at 13:20

## 2019-01-10 RX ADMIN — GABAPENTIN 100 MILLIGRAM(S): 400 CAPSULE ORAL at 05:26

## 2019-01-10 RX ADMIN — Medication 1000 MILLIGRAM(S): at 05:26

## 2019-01-10 NOTE — PROGRESS NOTE ADULT - PROBLEM SELECTOR PLAN 1
fall, rib fx left side, frail and weak elderly, atelectasis  I filippo with assist  PT  out of bed as tolerated, with assist  Lidoderm patch for pain, Neurontin 100 mg PO TID for pain, Celebrex 100 mg po daily for pain, Tylenol 1000 mg PO BID for pain,   Oxycodone PRN QID for severe pain  dulcolax PRN for bowel regimen  monitor vs and HD and Sat  consider for ELIANE DC plan
frail, weak, fall, rib fx, atelectasis  lidoderm patch  tylenol 1 gram BID PO  neurontin 100 mg PO TID  celebrex 100 mg po daily  oxycodone PRN for severe pain  bowel regimen  I filippo if able to tolerated  monitor sat on exertion  will follow and monitor  assess for ELIANE DC plan
Patient with recent history of increasing falls, possible due to non-compliance with use of walker   -Consult Neuro for recurrent falls (Aracelis) , ct head neg   -Will consult cardio ([facility]) for recurrent falls  -Pt consulted / need rehab

## 2019-01-10 NOTE — DISCHARGE NOTE ADULT - CARE PLAN
Principal Discharge DX:	Fall  Goal:	[facility] fall  Assessment and plan of treatment:	continue  home physical therapy / fall precaution  Secondary Diagnosis:	Rib fractures  Goal:	post fall lt rib fracture  Assessment and plan of treatment:	incentive spirometry , pain meds with stool softener  Secondary Diagnosis:	BPH (benign prostatic hyperplasia)  Goal:	chr stable  Assessment and plan of treatment:	continue home meds  Secondary Diagnosis:	Hyperlipidemia  Goal:	chr  stable statin  Assessment and plan of treatment:	home meds. fu lipid profile  out pt .

## 2019-01-10 NOTE — DISCHARGE NOTE ADULT - ADDITIONAL INSTRUCTIONS
Dominique Calderon), Neurology  [address] [place] [address]  Phone: [phone]  Fax: [phone]  fu with  1wk to 2 wk . fu  pmd dr vania oshea  with in 1wk .

## 2019-01-10 NOTE — DISCHARGE NOTE ADULT - HOSPITAL COURSE
89 year old male with a PMH of CAD s/p two cardiac stents (2014), BPH s/p prostate surgery (2017), UTI, GERD, HLD, presents to the ED with c/o left sided head, rib and abdominal pain s/p mechanical fall. Patient was admitted to [facility] for management of multiple rib fractures.   Fall.  Patient with recent history of increasing falls, possible due to non-compliance with use of walker   -Consult Neuro for recurrent falls (Aracelis) , ct head neg  ruled out any cva   -Will consult cardio ([facility]) for recurrent falls  -Pt consulted / need rehab.   : Rib fractures  Patient found to have 5 Left sided rib fractures s/p fall in bathroom  -CT positive for Left fracture of ribs 5, 6,8,10,11  -Pain control with oxycodone   - Checked ra   SpO2  above 90  - Encourage use of incentive spirometry.   hx CAD (coronary artery disease). Chronic, stable  -S/p stents x 2 in 2014  - Continue ASA 81 and simvastatin.   pt seen by neuro dr frankel   as  episode of frequent falls, suspect this is multifactorial secondary to age-related changes, deconditioning, spinal disk disease, arthritic changes, doubt that this is from a cerebrovascular accident.For tremors, suspect as possibly developed essential tremors.  At present no clear cut signs to suggest Parkinson.For  mild cognitive impairment, spoke with son, while in the hospital setting memory  sec hosp induce sundowning present  , also need work up for  out pt neuro for dementia, tsh wnl. pt medically stable to be dc , pt and son refused blanche this time.  physical exam  1-10-18  day of dc   Vital Signs Last 24 Hrs  T(C): 36.4 (10 Celestino 2019 04:59), Max: 36.4 (09 Jan 2019 13:16)  T(F): 97.6 (10 Celestino 2019 04:59), Max: 97.6 (09 Jan 2019 13:16)  HR: 91 (10 Celestino 2019 09:09) (62 - 91)  BP: 156/71 (10 Celestino 2019 04:59) (127/62 - 156/71)  BP(mean): --  RR: 17 (10 Celestino 2019 04:59) (16 - 17)  SpO2: 99% (10 Celestino 2019 04:59) (96% - 99%) GEN no distress , HEENT nt/nc , perrla , CVS s1s2 no tachy , CHEST bl air entery  present  , lt chest tenderness  , CNS aao/3 , no focal deficit  , GI soft , bs present ,  EXT no edema, pedal pulse present , SKIN no rash. pt son refused blanche /rsik vs benefit explained , pt will fu pmd dr burns office monday .

## 2019-01-10 NOTE — PROGRESS NOTE ADULT - SUBJECTIVE AND OBJECTIVE BOX
Date/Time Patient Seen:  		  Referring MD:   Data Reviewed	       Patient is a 89y old  Male who presents with a chief complaint of Fall (09 Jan 2019 10:34)      Subjective/HPI     PAST MEDICAL & SURGICAL HISTORY:  CAD (coronary artery disease)  GERD (gastroesophageal reflux disease)  Hyperlipidemia  Benign prostatic hyperplasia, presence of lower urinary tract symptoms unspecified, unspecified morphology  S/P ureteral stent placement: Left sided  History of prostate surgery  H/O heart artery stent: stent x 2        Medication list         MEDICATIONS  (STANDING):  acetaminophen   Tablet .. 1000 milliGRAM(s) Oral two times a day  aspirin enteric coated 81 milliGRAM(s) Oral daily  celecoxib 100 milliGRAM(s) Oral daily  docusate sodium 100 milliGRAM(s) Oral three times a day  dorzolamide 2% Ophthalmic Solution 1 Drop(s) Both EYES three times a day  enoxaparin Injectable 40 milliGRAM(s) SubCutaneous daily  escitalopram 10 milliGRAM(s) Oral daily  finasteride 5 milliGRAM(s) Oral daily  gabapentin 100 milliGRAM(s) Oral three times a day  lidocaine   Patch 1 Patch Transdermal daily  simvastatin 20 milliGRAM(s) Oral at bedtime  tamsulosin 0.4 milliGRAM(s) Oral at bedtime    MEDICATIONS  (PRN):  bisacodyl 5 milliGRAM(s) Oral every 12 hours PRN Constipation  guaiFENesin   Syrup  (Sugar-Free) 200 milliGRAM(s) Oral every 6 hours PRN Cough  oxyCODONE    IR 5 milliGRAM(s) Oral four times a day PRN Severe Pain (7 - 10)         Vitals log        ICU Vital Signs Last 24 Hrs  T(C): 36.4 (10 Celestino 2019 04:59), Max: 36.4 (09 Jan 2019 13:16)  T(F): 97.6 (10 Celestino 2019 04:59), Max: 97.6 (09 Jan 2019 13:16)  HR: 62 (10 Celestino 2019 04:59) (62 - 72)  BP: 156/71 (10 Celestino 2019 04:59) (127/62 - 156/71)  BP(mean): --  ABP: --  ABP(mean): --  RR: 17 (10 Celestino 2019 04:59) (16 - 17)  SpO2: 99% (10 Celestino 2019 04:59) (96% - 99%)           Input and Output:  I&O's Detail    09 Jan 2019 07:01  -  10 Celestino 2019 07:00  --------------------------------------------------------  IN:    Oral Fluid: 560 mL  Total IN: 560 mL    OUT:    Voided: 200 mL  Total OUT: 200 mL    Total NET: 360 mL          Lab Data                        11.9   7.29  )-----------( 144      ( 09 Jan 2019 08:43 )             37.5     01-09    142  |  105  |  15  ----------------------------<  102<H>  4.1   |  30  |  0.89    Ca    9.4      09 Jan 2019 08:43    TPro  6.5  /  Alb  2.6<L>  /  TBili  1.2  /  DBili  x   /  AST  20  /  ALT  12  /  AlkPhos  94  01-09            Review of Systems	      Objective     Physical Examination    heart s1s2  lung dec BS  abd soft  chest symmetric  cn grossly int      Pertinent Lab findings & Imaging      Ondina:  NO   Adequate UO     I&O's Detail    09 Jan 2019 07:01  -  10 Celestino 2019 07:00  --------------------------------------------------------  IN:    Oral Fluid: 560 mL  Total IN: 560 mL    OUT:    Voided: 200 mL  Total OUT: 200 mL    Total NET: 360 mL               Discussed with:     Cultures:	        Radiology

## 2019-01-10 NOTE — PROGRESS NOTE ADULT - ASSESSMENT
89 year old male with a PMH of CAD s/p two cardiac stents (2014), BPH s/p prostate surgery (2017), UTI, GERD, HLD, presents to the ED with c/o left sided head, rib and abdominal pain s/p mechanical fall.      - Fall is mechanical in nature.   - No syncope    - No signs of significant ischemia or volume overload.   - EKG is SR with 1st degree av block  - Recent nuclear stress was normal.    - Cont asa and statin for remote PCI    - Pain control for rib fx  - incentive filippo    -From a cardiac standpoint the patient may be discharged home   - Monitor and replete electrolytes. Keep K>4.0 and Mg>2.0.   - Further cardiac workup will depend on clinical course.   - All other workup per primary team. Will followup.     Luis E Baig [facility]  Cardiology

## 2019-01-10 NOTE — DISCHARGE NOTE ADULT - CARE PROVIDER_API CALL
Dominique Calderon), Neurology  [address] [place] [address]  Phone: [phone]  Fax: [phone]

## 2019-01-10 NOTE — PROGRESS NOTE ADULT - SUBJECTIVE AND OBJECTIVE BOX
[facility] -- Latisha Ash, Bayron, Rhonda, Joselo Jimenez Savella  Office # [phone]      Follow Up:    CAD  Subjective/Observations:   No events overnight resting comfortably in bed.  No complaints of chest pain, dyspnea, or palpitations reported. No signs of orthopnea or PND.     REVIEW OF SYSTEMS: All other review of systems is negative unless indicated above    PAST MEDICAL & SURGICAL HISTORY:  CAD (coronary artery disease)  GERD (gastroesophageal reflux disease)  Hyperlipidemia  Benign prostatic hyperplasia, presence of lower urinary tract symptoms unspecified, unspecified morphology  S/P ureteral stent placement: Left sided  History of prostate surgery  H/O heart artery stent: stent x 2      MEDICATIONS  (STANDING):  acetaminophen   Tablet .. 1000 milliGRAM(s) Oral two times a day  aspirin enteric coated 81 milliGRAM(s) Oral daily  celecoxib 100 milliGRAM(s) Oral daily  docusate sodium 100 milliGRAM(s) Oral three times a day  dorzolamide 2% Ophthalmic Solution 1 Drop(s) Both EYES three times a day  enoxaparin Injectable 40 milliGRAM(s) SubCutaneous daily  escitalopram 10 milliGRAM(s) Oral daily  finasteride 5 milliGRAM(s) Oral daily  gabapentin 100 milliGRAM(s) Oral three times a day  lidocaine   Patch 1 Patch Transdermal daily  simvastatin 20 milliGRAM(s) Oral at bedtime  tamsulosin 0.4 milliGRAM(s) Oral at bedtime    MEDICATIONS  (PRN):  bisacodyl 5 milliGRAM(s) Oral every 12 hours PRN Constipation  guaiFENesin   Syrup  (Sugar-Free) 200 milliGRAM(s) Oral every 6 hours PRN Cough  oxyCODONE    IR 5 milliGRAM(s) Oral four times a day PRN Severe Pain (7 - 10)      Allergies    No Known Allergies    Intolerances        Vital Signs Last 24 Hrs  T(C): 36.4 (10 Celestino 2019 04:59), Max: 36.4 (09 Jan 2019 21:13)  T(F): 97.6 (10 Celestino 2019 04:59), Max: 97.6 (10 Celestino 2019 04:59)  HR: 91 (10 Celestino 2019 09:09) (62 - 91)  BP: 156/71 (10 Celestino 2019 04:59) (127/62 - 156/71)  BP(mean): --  RR: 17 (10 Celestino 2019 04:59) (16 - 17)  SpO2: 99% (10 Celestino 2019 04:59) (98% - 99%)    I&O's Summary    09 Jan 2019 07:01  -  10 Celestino 2019 07:00  --------------------------------------------------------  IN: 560 mL / OUT: 200 mL / NET: 360 mL          PHYSICAL EXAM:  TELE: Sb No events on tele overnight   Constitutional: NAD, awake and alert, well-developed  HEENT: Moist Mucous Membranes, Anicteric  Pulmonary: Non-labored, breath sounds are clear bilaterally, No wheezing, crackles or rhonchi  Cardiovascular: Regular, S1 and S2 nl, No murmurs, rubs, gallops or clicks  Gastrointestinal: Bowel Sounds present, soft, nontender.   Lymph: No lymphadenopathy. No peripheral edema.  Skin: No visible rashes or ulcers.  Psych:  Mood & affect appropriate    LABS: All Labs Reviewed:                        13.9   6.62  )-----------( 167      ( 10 Celestino 2019 08:54 )             43.0                         11.9   7.29  )-----------( 144      ( 09 Jan 2019 08:43 )             37.5                         13.5   7.46  )-----------( 174      ( 08 Jan 2019 12:45 )             41.6     10 Celestino 2019 08:54    142    |  105    |  14     ----------------------------<  107    3.9     |  32     |  0.92   09 Jan 2019 08:43    142    |  105    |  15     ----------------------------<  102    4.1     |  30     |  0.89   08 Jan 2019 12:45    140    |  104    |  16     ----------------------------<  108    4.1     |  29     |  0.92     Ca    9.6        10 Celestino 2019 08:54  Ca    9.4        09 Jan 2019 08:43  Ca    10.5       08 Jan 2019 12:45    TPro  6.5    /  Alb  2.6    /  TBili  1.2    /  DBili  x      /  AST  20     /  ALT  12     /  AlkPhos  94     09 Jan 2019 08:43  TPro  7.8    /  Alb  3.1    /  TBili  1.4    /  DBili  x      /  AST  23     /  ALT  14     /  AlkPhos  114    08 Jan 2019 12:45        ECG:  < from: 12 Lead ECG (10.17.18 @ 11:12) >  Ventricular Rate 127 BPM    Atrial Rate 127 BPM    P-R Interval 178 ms    QRS Duration 84 ms    Q-T Interval 274 ms    QTC Calculation(Bezet) 398 ms    P Axis 31 degrees    R Axis 79 degrees    T Axis 18 degrees    Diagnosis Line Sinus tachycardia  Otherwise normal ECG    Confirmed by Rangel Dolan ([id number]) on 10/18/2018 11:12:35 AM    < end of copied text >    Echo:    Radiology:  < from: CT Chest w/ IV Cont (01.08.19 @ 14:14) >  EXAM:  CT ABDOMEN AND PELVIS IC                          EXAM:  CT CHEST IC                            PROCEDURE DATE:  01/08/2019          INTERPRETATION:  CT CHEST/ABDOMEN/PELVIS:     CLINICAL INFORMATION:  Left-sided chest and abdominal pain following fall.    COMPARISON: CT scan chest abdomen pelvis 10/17/2018    PROCEDURE:  Using multislice helical CT, following the intravenous   administration of 95 cc Omnipaque 350, 2.5 mm sections were obtained from   the thoracic inlet through the ischial tuberosities.    Multiplanar reformatted images.    There are acute, minimally displaced fractures of the lateral aspect the   left fifth and sixth ribs.  There is a mildly displaced fracture of the lateral left eighth rib.  There are nondisplacedfractures of the medial aspect of the left 10th   and 11th ribs, near the margin of the vertebral bodies.    No pneumothorax is noted.    There is a trace small left-sided pleural effusion.  There are bibasilar atelectatic changes.    The central airways remain patent. There is scarring/fibrosis inferior   aspect left lingula lobe.  There are scattered areas of peripheral subpleural reticular banding,   likely representing cysts sequelae of scarring/fibrosis.  No lobar lung consolidation is noted.    No mediastinal hematoma is noted.  No pericardial effusion is noted.  There is arteries carotid calcification of the thoracic aorta with   coronary artery calcifications.    There are small, shotty pretracheal, AP mediastinal and subcarinal   mediastinal lymph nodes.  No enlarged hilar lymphadenopathy is noted.    Cholelithiasis is noted, without lobar or wall thickening.  There is fatty infiltration of the liver. The spleen, adrenal glands and   pancreas are unremarkable in appearance.    There is a large, 14 mm nonobstructing intrarenal calculus at the left   lower pole calyces.  There has been interval placement of a left nephroureteral bladder stent,   proximal extent aspect of the stent at the left ureteropelvic junction   and distalaspect within the urinary bladder.  There is persistent moderate left-sided hydronephrosis. There is   urothelial enhancement, which may reflect urinary tract   infection/pyelonephritis.   There is a hydroureter.  There is a 6 mm calcification withinthe mid to distal left ureter   adjacent to the stent.  There is an additional 6 mm calcification in the more distal left ureter   adjacent to the stent. This is slightly proximal to the ureterovesical   junction.    There is a 2 cm indeterminate hypodense exophytic lesion at the lower   pole the right kidney. There is a smaller, indeterminate hypodense lesion   at the upper pole of the left kidney.    No right-sided hydroureteronephrosis is noted.    There are several dependent intraluminal bladder calcifications,   measuring up to 10 mm.    There is arteriosclerotic calcification of the abdominal aorta and major   branch vessels.  No enlarged retroperitoneal lymphadenopathy is noted. No retroperitoneal   hematoma is noted.    Evaluation of the stomach and gastrointestinal tract is limited without   distention.  There is a moderate size hiatal hernia.    No bowel obstruction is noted.  No localized intra-abdominal fluid collection or pneumoperitoneum is   noted.    There is a fat-containing periumbilical hernia.  There is a fat-containing right inguinal hernia.    No pelvic mass or free fluid is noted.    There are multilevel degenerative changes of the spine; no acute lumbar   compression fracture is noted.    Impression:    Multiple left-sided rib fractures including the fifth, sixth, eighth,   10th and 11th ribs as discussed.  No pneumothorax noted.    No acute visceral organ injury noted.    Left-sided ureteral stent with moderate left-sided hydroureteronephrosis   including two 6 mm calcifications within the mid to distal ureter and   distal left ureter adjacent to the stent.  Urothelial enhancement, may be secondary to inflammation, correlate for   urinary tract infection.    Findings discussed by telephone with Dr. Patel at the time of   interpretation on 1/8/2018.    Other findings as discussed above.                                      FABIÁN LIN M.D., ATTENDING RADIOLOGIST  This document has been electronically signed. Jan 8 2019  3:13PM                < end of copied text >           Luis E Baig ANP   Cardiology

## 2019-01-10 NOTE — DISCHARGE NOTE ADULT - PLAN OF CARE
mech fall continue  home physical therapy / fall precaution post fall lt rib fracture incentive spirometry , pain meds with stool softener chr stable continue home meds chr  stable statin home meds. fu lipid profile  out pt .

## 2019-01-10 NOTE — PROGRESS NOTE ADULT - ATTENDING COMMENTS
89 year old male with a PMH of CAD s/p two cardiac stents (2014), BPH s/p prostate surgery (2017), UTI, GERD, HLD,   admitted  for  s/p mechanical fall  multiple  lt side rib fracture - no syncope this time , need blanche .   hx of Left-sided ureteral stent with moderate left-sided hydroureteronephrosis   including two 6 mm calcifications within the mid to distal ureter and   distal left ureter adjacent to the stent  cr stable / continue to fu .
The patient was personally seen and examined, in addition to being examined and evaluated by NP.  All elements of the note were edited where appropriate.
Seen/examined. agree with above.

## 2019-01-10 NOTE — GOALS OF CARE CONVERSATION - PERSONAL ADVANCE DIRECTIVE - CONVERSATION DETAILS
addendum: 1/9/19: met pt confirmed hcp: Wade is hcp, discussed pt resuscitation wishes: wants to discuss further w family. contacted pt daughter, Wade, on phone, she has not had conversations w pt of his wishes, informed her pt told me he may want dnr. will follow up after family talk with pt.    1/10/19: 1120 hrs: pt daughter contacted me , did not visit to discuss directives with pt, spoke of molst form, inquired if can given form to her brother and they will complete it with pt & PMD after discharge.  saw pt son, educated on molst form, will speak to PMD home.  contact # given.

## 2019-01-10 NOTE — DISCHARGE NOTE ADULT - MEDICATION SUMMARY - MEDICATIONS TO TAKE
I will START or STAY ON the medications listed below when I get home from the hospital:    finasteride 5 mg oral tablet  -- 1 tab(s) by mouth once a day  -- Indication: For BPH (benign prostatic hyperplasia)    oxyCODONE 5 mg oral tablet  -- 1 tab(s) by mouth 4 times a day, As Needed -Severe Pain (7 - 10) MDD:4  -- Indication: For Rib fractures    Tylenol 325 mg oral tablet  -- 2 tab(s) by mouth every 6 hours, As Needed mild to moderate pain   -- Indication: For Rib fractures    aspirin 81 mg oral tablet  -- 1 tab(s) by mouth once a day  -- Indication: For CAD (coronary artery disease)    tamsulosin 0.4 mg oral capsule  -- 1 cap(s) by mouth once a day (at bedtime)  -- Indication: For BPH (benign prostatic hyperplasia)    simvastatin 20 mg oral tablet  -- 1 tab(s) by mouth once a day (at bedtime)  -- Indication: For Hyperlipidemia    lidocaine 5% topical film  -- Apply on skin to affected area once a day  12hron/12 hr off   -- Indication: For Rib fractures    docusate sodium 100 mg oral capsule  -- 1 cap(s) by mouth 2 times a day, As Needed   -- Indication: For  constipation     bisacodyl 5 mg oral delayed release tablet  -- 1 tab(s) by mouth every 12 hours, As Needed -Constipation   -- Indication: For Constipation     dorzolamide 2% ophthalmic solution  -- 1 drop(s) to each affected eye 2 times a day  -- Indication: For Glaucoma

## 2019-01-10 NOTE — DISCHARGE NOTE ADULT - PATIENT PORTAL LINK FT
You can access the [facility] Portal, offered by [facility], by registering with the following website: [email or web address]

## 2019-01-10 NOTE — PROGRESS NOTE ADULT - SUBJECTIVE AND OBJECTIVE BOX
Neurology follow up note    IDRIS [id number]      Interval History:    Patient feels ok no new complaints seen with son occ rib pain     MEDICATIONS    acetaminophen   Tablet .. 1000 milliGRAM(s) Oral two times a day  aspirin enteric coated 81 milliGRAM(s) Oral daily  bisacodyl 5 milliGRAM(s) Oral every 12 hours PRN  celecoxib 100 milliGRAM(s) Oral daily  docusate sodium 100 milliGRAM(s) Oral three times a day  dorzolamide 2% Ophthalmic Solution 1 Drop(s) Both EYES three times a day  enoxaparin Injectable 40 milliGRAM(s) SubCutaneous daily  escitalopram 10 milliGRAM(s) Oral daily  finasteride 5 milliGRAM(s) Oral daily  gabapentin 100 milliGRAM(s) Oral three times a day  guaiFENesin   Syrup  (Sugar-Free) 200 milliGRAM(s) Oral every 6 hours PRN  lidocaine   Patch 1 Patch Transdermal daily  oxyCODONE    IR 5 milliGRAM(s) Oral four times a day PRN  simvastatin 20 milliGRAM(s) Oral at bedtime  tamsulosin 0.4 milliGRAM(s) Oral at bedtime      Allergies    No Known Allergies    Intolerances            Vital Signs Last 24 Hrs  T(C): 36.4 (10 Celestino 2019 04:59), Max: 36.4 (09 Jan 2019 13:16)  T(F): 97.6 (10 Celestino 2019 04:59), Max: 97.6 (09 Jan 2019 13:16)  HR: 91 (10 Celestino 2019 09:09) (62 - 91)  BP: 156/71 (10 Celestino 2019 04:59) (127/62 - 156/71)  BP(mean): --  RR: 17 (10 Celestino 2019 04:59) (16 - 17)  SpO2: 99% (10 Celestino 2019 04:59) (96% - 99%)      REVIEW OF SYSTEMS:    Constitutional: No fever, chills, fatigue, weakness  Eyes: no eye pain, visual disturbances, or discharge  ENT:  No difficulty hearing, tinnitus, vertigo; No sinus or throat pain  Neck: No pain or stiffness  Respiratory: No cough, dyspnea, wheezing   Cardiovascular: No chest pain, palpitations,   Gastrointestinal: No abdominal or epigastric pain. No nausea, vomiting  No diarrhea or constipation.   Genitourinary: No dysuria, frequency, hematuria or incontinence  Neurological: No headaches, lightheadedness, vertigo, numbness or tremors  Psychiatric: No depression, anxiety, mood swings or difficulty sleeping  Musculoskeletal: occ rib pain   Skin: No itching, burning, rashes or lesions   Lymph Nodes: No enlarged glands  Endocrine: No heat or cold intolerance; No hair loss   Allergy and Immunologic: No hives or eczema    On Neurological Examination:    The patient is awake, alert.    Extraocular movements were intact.    Pupils equal, round, and reactive bilaterally, 3 mm to 2.    Speech was fluent.    Smile was symmetric.    Motor, bilateral upper and lower 4+/5.    Sensory, bilateral upper and lower intact to light touch.  No drift.     No cogwheel rigidity was noted.      Follow simple commands    GENERAL Exam: Nontoxic , No Acute Distress   	  HEENT:  normocephalic, atraumatic  		  LUNGS: Clear bilaterally      HEART: Normal S1S2   No murmur RRR        	  GI/ ABDOMEN:  Soft  Non tender    EXTREMITIES:   No Edema  No Clubbing  No Cyanosis     MUSCULOSKELETAL: Normal Range of Motion   	   SKIN: Normal  No Ecchymosis               LABS:  CBC Full  -  ( 10 Celestino 2019 08:54 )  WBC Count : 6.62 K/uL  Hemoglobin : 13.9 g/dL  Hematocrit : 43.0 %  Platelet Count - Automated : 167 K/uL  Mean Cell Volume : 91.1 fl  Mean Cell Hemoglobin : 29.4 pg  Mean Cell Hemoglobin Concentration : 32.3 gm/dL  Auto Neutrophil # : x  Auto Lymphocyte # : x  Auto Monocyte # : x  Auto Eosinophil # : x  Auto Basophil # : x  Auto Neutrophil % : x  Auto Lymphocyte % : x  Auto Monocyte % : x  Auto Eosinophil % : x  Auto Basophil % : x      01-10    142  |  105  |  14  ----------------------------<  107<H>  3.9   |  32<H>  |  0.92    Ca    9.6      10 Celestino 2019 08:54    TPro  6.5  /  Alb  2.6<L>  /  TBili  1.2  /  DBili  x   /  AST  20  /  ALT  12  /  AlkPhos  94  01-09    Hemoglobin A1C:     LIVER FUNCTIONS - ( 09 Jan 2019 08:43 )  Alb: 2.6 g/dL / Pro: 6.5 g/dL / ALK PHOS: 94 U/L / ALT: 12 U/L / AST: 20 U/L / GGT: x           Vitamin B12   PT/INR - ( 08 Jan 2019 12:45 )   PT: 13.7 sec;   INR: 1.21 ratio         PTT - ( 08 Jan 2019 12:45 )  PTT:29.4 sec      RADIOLOGY    ANALYSIS AND PLAN:  This is an 89-year-old with episode of falls, history of slight forgetfulness as per son and tremors.  1.	For episode of frequent falls, suspect this is multifactorial secondary to age-related changes, deconditioning, spinal disk disease, arthritic changes, doubt that this is from a cerebrovascular accident.  2.	I would recommend physical therapy evaluation.  3.	I would recommend fall precautions.  4.	For tremors, suspect as possibly developed essential tremors.  At present no clear cut signs to suggest Parkinson.  5.	For subtle mild cognitive impairment, spoke with son, while in the hospital setting memory may become worse.  He understands this especially towards the evening.  I will check TSH, vitamin B12 and folate.  6.	Spoke with son, his name is Jamel at bedside, telephone number is [phone], he stated he will be the primary . 1/10/19 at bedside     Thank you for the courtesy of consultation.    Physical therapy evaluation as tolerated  OOB to chair/ambulation with assistance only if possible.  Advanced care planning was discussed with family.  Pain is accessed and addressed.  Pt was screened for signs of clinical depression. Appropriate referral made.  Risk of falls accessed. Fall prevention and plan of care was discussed with family.  Pt is screened for tobacco and alcohol use. Counseling was done for smoking and alcohol cessation.  Use of narcotic pain meds was discussed. Pt is advised to use narcotic meds wisely and to refrain from over using them.  Plan of care was discussed with family. Questions answered.  Would continue to follow.  Greater than 45 minutes spent in direct patient care reviewing  the notes, lab data/ imaging , discussion with multidisciplinary team.

## 2019-02-06 ENCOUNTER — INPATIENT (INPATIENT)
Facility: HOSPITAL | Age: 84
LOS: 2 days | Discharge: HOME HEALTH | DRG: 312 | End: 2019-02-09
Attending: HOSPITALIST | Admitting: INTERNAL MEDICINE
Payer: COMMERCIAL

## 2019-02-06 VITALS
OXYGEN SATURATION: 98 % | SYSTOLIC BLOOD PRESSURE: 133 MMHG | HEIGHT: 66 IN | WEIGHT: 156.09 LBS | DIASTOLIC BLOOD PRESSURE: 80 MMHG | TEMPERATURE: 98 F | RESPIRATION RATE: 19 BRPM | HEART RATE: 78 BPM

## 2019-02-06 DIAGNOSIS — R29.6 REPEATED FALLS: ICD-10-CM

## 2019-02-06 DIAGNOSIS — Z95.5 PRESENCE OF CORONARY ANGIOPLASTY IMPLANT AND GRAFT: Chronic | ICD-10-CM

## 2019-02-06 DIAGNOSIS — Z98.890 OTHER SPECIFIED POSTPROCEDURAL STATES: Chronic | ICD-10-CM

## 2019-02-06 LAB
APPEARANCE UR: ABNORMAL
APTT BLD: 30.2 SEC — SIGNIFICANT CHANGE UP (ref 27.5–36.3)
BACTERIA # UR AUTO: ABNORMAL
BASOPHILS # BLD AUTO: 0 K/UL — SIGNIFICANT CHANGE UP (ref 0–0.2)
BASOPHILS NFR BLD AUTO: 0.1 % — SIGNIFICANT CHANGE UP (ref 0–2)
BILIRUB UR-MCNC: NEGATIVE — SIGNIFICANT CHANGE UP
COLOR SPEC: YELLOW — SIGNIFICANT CHANGE UP
DIFF PNL FLD: ABNORMAL
EOSINOPHIL # BLD AUTO: 0.1 K/UL — SIGNIFICANT CHANGE UP (ref 0–0.5)
EOSINOPHIL NFR BLD AUTO: 0.9 % — SIGNIFICANT CHANGE UP (ref 0–5)
EPI CELLS # UR: SIGNIFICANT CHANGE UP
GLUCOSE UR QL: NEGATIVE MG/DL — SIGNIFICANT CHANGE UP
HCT VFR BLD CALC: 39.5 % — LOW (ref 42–52)
HGB BLD-MCNC: 13.1 G/DL — LOW (ref 14–18)
INR BLD: 1.24 RATIO — HIGH (ref 0.88–1.16)
KETONES UR-MCNC: NEGATIVE — SIGNIFICANT CHANGE UP
LEUKOCYTE ESTERASE UR-ACNC: ABNORMAL
LIDOCAIN IGE QN: 49 U/L — SIGNIFICANT CHANGE UP (ref 22–51)
LYMPHOCYTES # BLD AUTO: 1.5 K/UL — SIGNIFICANT CHANGE UP (ref 1–4.8)
LYMPHOCYTES # BLD AUTO: 17.5 % — LOW (ref 20–55)
MAGNESIUM SERPL-MCNC: 2.1 MG/DL — SIGNIFICANT CHANGE UP (ref 1.6–2.6)
MCHC RBC-ENTMCNC: 29.8 PG — SIGNIFICANT CHANGE UP (ref 27–31)
MCHC RBC-ENTMCNC: 33.2 G/DL — SIGNIFICANT CHANGE UP (ref 32–36)
MCV RBC AUTO: 89.8 FL — SIGNIFICANT CHANGE UP (ref 80–94)
MONOCYTES # BLD AUTO: 0.8 K/UL — SIGNIFICANT CHANGE UP (ref 0–0.8)
MONOCYTES NFR BLD AUTO: 9.9 % — SIGNIFICANT CHANGE UP (ref 3–10)
NEUTROPHILS # BLD AUTO: 6 K/UL — SIGNIFICANT CHANGE UP (ref 1.8–8)
NEUTROPHILS NFR BLD AUTO: 71.4 % — SIGNIFICANT CHANGE UP (ref 37–73)
NITRITE UR-MCNC: POSITIVE
NT-PROBNP SERPL-SCNC: 85 PG/ML — SIGNIFICANT CHANGE UP (ref 0–300)
PH UR: 6.5 — SIGNIFICANT CHANGE UP (ref 5–8)
PLATELET # BLD AUTO: 147 K/UL — LOW (ref 150–400)
PROT UR-MCNC: 100 MG/DL
PROTHROM AB SERPL-ACNC: 14.4 SEC — HIGH (ref 10–12.9)
RBC # BLD: 4.4 M/UL — LOW (ref 4.6–6.2)
RBC # FLD: 15.5 % — SIGNIFICANT CHANGE UP (ref 11–15.6)
RBC CASTS # UR COMP ASSIST: >50 /HPF (ref 0–4)
SP GR SPEC: 1.01 — SIGNIFICANT CHANGE UP (ref 1.01–1.02)
UROBILINOGEN FLD QL: NEGATIVE MG/DL — SIGNIFICANT CHANGE UP
WBC # BLD: 8.5 K/UL — SIGNIFICANT CHANGE UP (ref 4.8–10.8)
WBC # FLD AUTO: 8.5 K/UL — SIGNIFICANT CHANGE UP (ref 4.8–10.8)
WBC UR QL: >50

## 2019-02-06 PROCEDURE — 70450 CT HEAD/BRAIN W/O DYE: CPT | Mod: 26

## 2019-02-06 PROCEDURE — 74176 CT ABD & PELVIS W/O CONTRAST: CPT | Mod: 26

## 2019-02-06 PROCEDURE — 71045 X-RAY EXAM CHEST 1 VIEW: CPT | Mod: 26

## 2019-02-06 PROCEDURE — 93010 ELECTROCARDIOGRAM REPORT: CPT

## 2019-02-06 PROCEDURE — 71250 CT THORAX DX C-: CPT | Mod: 26

## 2019-02-06 PROCEDURE — 99285 EMERGENCY DEPT VISIT HI MDM: CPT

## 2019-02-06 PROCEDURE — 99223 1ST HOSP IP/OBS HIGH 75: CPT

## 2019-02-06 PROCEDURE — 72125 CT NECK SPINE W/O DYE: CPT | Mod: 26

## 2019-02-06 RX ORDER — CEFTRIAXONE 500 MG/1
1 INJECTION, POWDER, FOR SOLUTION INTRAMUSCULAR; INTRAVENOUS ONCE
Qty: 0 | Refills: 0 | Status: COMPLETED | OUTPATIENT
Start: 2019-02-06 | End: 2019-02-06

## 2019-02-06 RX ORDER — TAMSULOSIN HYDROCHLORIDE 0.4 MG/1
0.4 CAPSULE ORAL AT BEDTIME
Qty: 0 | Refills: 0 | Status: DISCONTINUED | OUTPATIENT
Start: 2019-02-06 | End: 2019-02-09

## 2019-02-06 RX ORDER — ACETAMINOPHEN 500 MG
650 TABLET ORAL EVERY 6 HOURS
Qty: 0 | Refills: 0 | Status: DISCONTINUED | OUTPATIENT
Start: 2019-02-06 | End: 2019-02-09

## 2019-02-06 RX ORDER — FINASTERIDE 5 MG/1
5 TABLET, FILM COATED ORAL DAILY
Qty: 0 | Refills: 0 | Status: DISCONTINUED | OUTPATIENT
Start: 2019-02-06 | End: 2019-02-09

## 2019-02-06 RX ORDER — SACCHAROMYCES BOULARDII 250 MG
250 POWDER IN PACKET (EA) ORAL
Qty: 0 | Refills: 0 | Status: DISCONTINUED | OUTPATIENT
Start: 2019-02-06 | End: 2019-02-09

## 2019-02-06 RX ORDER — ATORVASTATIN CALCIUM 80 MG/1
20 TABLET, FILM COATED ORAL AT BEDTIME
Qty: 0 | Refills: 0 | Status: DISCONTINUED | OUTPATIENT
Start: 2019-02-06 | End: 2019-02-09

## 2019-02-06 RX ORDER — ASPIRIN/CALCIUM CARB/MAGNESIUM 324 MG
81 TABLET ORAL DAILY
Qty: 0 | Refills: 0 | Status: DISCONTINUED | OUTPATIENT
Start: 2019-02-06 | End: 2019-02-09

## 2019-02-06 RX ORDER — DORZOLAMIDE HYDROCHLORIDE 20 MG/ML
1 SOLUTION/ DROPS OPHTHALMIC
Qty: 0 | Refills: 0 | Status: DISCONTINUED | OUTPATIENT
Start: 2019-02-06 | End: 2019-02-08

## 2019-02-06 RX ORDER — CEFTRIAXONE 500 MG/1
1 INJECTION, POWDER, FOR SOLUTION INTRAMUSCULAR; INTRAVENOUS EVERY 24 HOURS
Qty: 0 | Refills: 0 | Status: DISCONTINUED | OUTPATIENT
Start: 2019-02-06 | End: 2019-02-07

## 2019-02-06 RX ADMIN — CEFTRIAXONE 100 GRAM(S): 500 INJECTION, POWDER, FOR SOLUTION INTRAMUSCULAR; INTRAVENOUS at 22:24

## 2019-02-06 NOTE — ED ADULT NURSE NOTE - NS ED PATIENT SAFETY CONERN FT
Problem: Pressure Injury, Risk for  Goal: # Skin remains intact  Outcome: Outcome Not Met, Continue to Monitor  Patient remains on sand bed.       pt might need help at home

## 2019-02-06 NOTE — ED PROVIDER NOTE - SECONDARY DIAGNOSIS.
CAD (coronary artery disease) Hyperlipidemia S/P ureteral stent placement Thoracic spine fracture H/O heart artery stent

## 2019-02-06 NOTE — H&P ADULT - NEUROLOGICAL DETAILS
normal strength/responds to verbal commands/sensation intact/alert and oriented x 3/cranial nerves intact

## 2019-02-06 NOTE — ED ADULT TRIAGE NOTE - CHIEF COMPLAINT QUOTE
patient c/o dizzyness and fall today, denies LOC, stated feeling dizzy for the past 5 days, no sign of trauma, patient is alert and oriented x4.

## 2019-02-06 NOTE — ED PROVIDER NOTE - CARE PLAN
Principal Discharge DX:	Frequent falls  Secondary Diagnosis:	CAD (coronary artery disease)  Secondary Diagnosis:	Hyperlipidemia  Secondary Diagnosis:	S/P ureteral stent placement  Secondary Diagnosis:	H/O heart artery stent  Secondary Diagnosis:	Thoracic spine fracture

## 2019-02-06 NOTE — H&P ADULT - ASSESSMENT
88 y/o male with PMH of CAD, BPH, HLD came to the ED complaining of fall.     Frequent fall   Admit to monitored bed   Will check orthostasis   Patient is taking Lasix 20mg at home; will not continue for now due to frequent fall. No LE edema, lungs CTA b/l   CT head: no acute intracranial hemorrhage or infarct   PT evaluation in AM   Fall precaution     Compression fracture   CT showed: acute T3 compression fracture   Orthopedic consulted   Tylenol 650mg q6h PRN for pain     UTI   UA +ve   Ceftriaxone 1gm once given in the ED   Will continue for now     Hyponatremia -Mild   Na: 134  Will repeat BMP in AM     BPH  Continue Tamsulosin 0.4mg   Finasteride 5mg     CAD  Continue ASA 81mg   Atorvastatin 20mg     Supportive   DVT prophylaxis: heparin 5000 units q8h   Diet: DASH

## 2019-02-06 NOTE — ED PROVIDER NOTE - OBJECTIVE STATEMENT
The patient is a 89 year old male presents with dizziness and fell 3 times and states that his L sided chest wall is hurting him.  Feels generalized weak and something is not right and make him passed out. No fever, No chill, No SOB, No abd pain, No motor No sensory loss The patient is a 89 year old male presents with dizziness and fell 3 times and states that his L sided chest wall and back pain is hurting him.  Feels generalized weak and something is not right and make him passed out. No fever, No chill, No SOB, No abd pain, No motor No sensory loss

## 2019-02-06 NOTE — PROGRESS NOTE ADULT - SUBJECTIVE AND OBJECTIVE BOX
Pt Name: IDRIS DANIEL    MRN: [id number]      Patient is a 89y Male presenting to the emergency department with his son after a fall today. .  He states he has had multiple falls recently after he began new medication.  Patient denies LOC.  Denies back pain.  No complaints at this time.  Comfortable in bed.    HEALTH ISSUES - PROBLEM Dx:      .REVIEW OF SYSTEMS      General:  comfotable in bed, NAD	    Skin/Breast:: Back;  Skin clear no lesions, no abrasions noted.   	  Ophthalmologic: YARA  	  Respiratory and Thorax: Bilat air entry  	  Cardiac stent x 2 , CAD regular rate    Musculoskeletal:	 See HPI    Neurological:	Intact    ROS is otherwise negative.    PAST MEDICAL & SURGICAL HISTORY:  PAST MEDICAL & SURGICAL HISTORY:  CAD (coronary artery disease)  GERD (gastroesophageal reflux disease)  Hyperlipidemia  Benign prostatic hyperplasia, presence of lower urinary tract symptoms unspecified, unspecified morphology  S/P ureteral stent placement: Left sided  History of prostate surgery  H/O heart artery stent: stent x 2      Allergies: No Known Allergies      Medications:     FAMILY HISTORY:  No pertinent family history in first degree relatives  : non-contributory    Social History:     Ambulation: Walking With Walker, unsteady gait                        13.1   8.5   )-----------( 147      ( 06 Feb 2019 13:42 )             39.5     02-06    134<L>  |  100  |  13.0  ----------------------------<  96  4.4   |  24.0  |  1.20    Ca    10.7<H>      06 Feb 2019 13:40  Mg     2.1     02-06    TPro  7.6  /  Alb  3.5  /  TBili  0.7  /  DBili  x   /  AST  23  /  ALT  6   /  AlkPhos  119  02-06      PHYSICAL EXAM:    Vital Signs Last 24 Hrs  T(C): 36.8 (06 Feb 2019 12:48), Max: 36.8 (06 Feb 2019 12:48)  T(F): 98.3 (06 Feb 2019 12:48), Max: 98.3 (06 Feb 2019 12:48)  HR: 78 (06 Feb 2019 12:48) (78 - 78)  BP: 133/80 (06 Feb 2019 12:48) (133/80 - 133/80)  BP(mean): --  RR: 19 (06 Feb 2019 12:48) (19 - 19)  SpO2: 98% (06 Feb 2019 12:48) (98% - 98%)  Daily Height in cm: 167.64 (06 Feb 2019 12:48)    Daily     Appearance: Alert, responsive, in no acute distress.    Neurological: Sensation is grossly intact to light touch. 5/5 motor function of all extremities. No focal deficits or weaknesses found.    Skin: no rash on visible skin. Skin is clean, dry and intact. No bleeding. No abrasions. No ulcerations.    Vascular: 2+ distal pulses. Cap refill < 2 sec. No signs of venous insuffiencey or stasis. No extremity ulcerations. No cyanosis.    Musculoskeletal:  NO tenderness to palpation of thoracic, or lumbar spine.        Left Upper Extremity :Full painless ROM       Right Upper Extremity: :Full painless ROM         Left Lower Extremity: :Full painless ROM         Right Lower Extremity:: Full painless ROM      Imaging Studies:   EXAM:  CT CHEST                         EXAM:  CT ABDOMEN AND PELVIS                          *** ADDENDUM 02/06/2019  ***    8 mm calculus in the mid left ureter has progressed slightly distally and   the 7 mm calculus in the distal left ureter is unchanged in position   since 1/8/2019.      *** END OF ADDENDUM 02/06/2019  ***      PROCEDURE DATE:  02/06/2019          INTERPRETATION:  CLINICAL INFORMATION: Status post fall.           COMPARISON: CT chest/abdomen/pelvis 1/8/2019 and 10/17/2018    PROCEDURE:   CT of the Chest, Abdomen and Pelvis was performed without intravenous   contrast.   Intravenous contrast: None.  Oral contrast: None.  Sagittal and coronal reformats were performed.    FINDINGS:    CHEST:     LUNGS AND LARGE AIRWAYS: Patent central airways. Subpleural reticular   opacities and mild bronchiectasis with a lower lung predominance   consistent with fibrotic change. Areas of fissural thickening are noted   along the right minor fissure measuring up to 2 mm, unchanged. Opacity in   the lingula, unchanged and 1/8/2019, likely atelectasis or scarring. No   acute lung consolidation. Calcified granulomas are noted.  PLEURA: No pleural effusion.  VESSELS: Thoracic aorta normal in caliber with mild calcified plaque.   There are coronary artery calcifications.  HEART: Heart size is normal. No pericardial effusion.  MEDIASTINUM AND QUETA: Stable small mediastinal lymph nodes.   Moderate-sized hiatal hernia.  CHEST WALL AND LOWER NECK: Within normal limits.    ABDOMEN ANDPELVIS:    LIVER: Normal size. Calcified granulomas are noted.  BILE DUCTS: Normal caliber.  GALLBLADDER: There is cholelithiasis.  SPLEEN: Within normal limits.  PANCREAS: Within normal limits.  ADRENALS: Within normal limits.  KIDNEYS/URETERS: Leftsided double-J ureteral stent with the tips in the   renal pelvis and bladder. Moderate left hydroureteronephrosis which has   increased since 1/8/2019. Staghorn type calculus in the lower pole of the   left kidney measuring up to approximately 2.3 cm. Punctate nonobstructing   calculi in the right kidney and punctate nonobstructing calculus in the   mid left kidney. Low-attenuation lesion again seen arising from the lower   pole of the left kidney, likely a cyst. Subcentimeter low-attenuation   lesion in the upper pole of the left kidney, too small to characterize.   Calculi in the left ureter including 8 mm in the mid ureter 7 mm in the   distal ureter, unchanged in position since 1/8/2019.     BLADDER: Double-J ureteral stent tip in the bladder. Multiple bladder   calculi again noted measuring up to 1.4 cm. Bladder diverticula are   noted. There is a thin radiopaque tubular structure extending from the   bladder into the prosthetic urethra; clinical correlation is recommended.  REPRODUCTIVE ORGANS: Prostate gland not enlarged.    BOWEL: No bowel obstruction. Appendix is normal.  PERITONEUM: No ascites.  VESSELS:  Abdominal aorta normal in caliber with calcified plaque.  RETROPERITONEUM: No lymphadenopathy.    ABDOMINAL WALL: Small fat-containing umbilical hernia. Right inguinal   containing normal caliber loops of small bowel, previously containing fat   on 1/8/2019. The patient appears to be status post left femoral hernia   repair.  BONES: Mildly displaced fractures of the left fifth and sixth ribs are   again noted with evidence of healing/callus formation. A mildly displaced   fracture of the left eighth rib is also again noted which also appears to   demonstrate callus formation. There is also healing of previously seen   fractures at the posterior aspect of the left 4th-12th ribs (better seen   on current study with callus formation). There is deformity of the   posterior aspect of the right eighth and ninth ribs which may be related   to old fractures. There is anew compression deformity of the superior   endplate of T3.       IMPRESSION:     New compression fracture involving the superior endplate of T3, either   acute or subacute.    Multiple healing left-sided rib fractures as described above.    Left-sided double-J ureteral stent with moderate hydroureteronephrosis,   increased since 1/8/2019; a urologic consultation is recommended.    Left ureteral calculi, unchanged in position since 1/8/2019.    Multiple bladder calculi.    Right inguinal hernia containing normal caliber loops of small bowel,   previously containing only fat on 1/8/2019.    The findings were discussed with Dr. Richter at 4:59 PM on 2/6/2019.      ***Please see the addendum at the top of this report. It may contain   additional important information or changes.****            A/P:  Pt is a  89y Male with acute/ vs chronic  T3 compression fracture    PLAN:   * Pain control  WBAT  PT/ OT   Plan discussed with Dr Ng Pt Name: IDRIS DANIEL    MRN: [id number]      Patient is a 89y Male presenting to the emergency department with his son after a fall today. .  He states he has had multiple falls recently after he began new medication.  Patient denies LOC.  Denies back pain.  No complaints at this time.  Comfortable in bed.    HEALTH ISSUES - PROBLEM Dx:      .REVIEW OF SYSTEMS      General:  comfotable in bed, NAD	    Skin/Breast:: Back;  Skin clear no lesions, no abrasions noted.   	  Ophthalmologic: YARA  	  Respiratory and Thorax: Bilat air entry  	  Cardiac stent x 2 , CAD regular rate    Musculoskeletal:	 See HPI    Neurological:	Intact    ROS is otherwise negative.    PAST MEDICAL & SURGICAL HISTORY:  PAST MEDICAL & SURGICAL HISTORY:  CAD (coronary artery disease)  GERD (gastroesophageal reflux disease)  Hyperlipidemia  Benign prostatic hyperplasia, presence of lower urinary tract symptoms unspecified, unspecified morphology  S/P ureteral stent placement: Left sided  History of prostate surgery  H/O heart artery stent: stent x 2      Allergies: No Known Allergies      Medications:     FAMILY HISTORY:  No pertinent family history in first degree relatives  : non-contributory    Social History:     Ambulation: Walking With Walker, unsteady gait                        13.1   8.5   )-----------( 147      ( 06 Feb 2019 13:42 )             39.5     02-06    134<L>  |  100  |  13.0  ----------------------------<  96  4.4   |  24.0  |  1.20    Ca    10.7<H>      06 Feb 2019 13:40  Mg     2.1     02-06    TPro  7.6  /  Alb  3.5  /  TBili  0.7  /  DBili  x   /  AST  23  /  ALT  6   /  AlkPhos  119  02-06      PHYSICAL EXAM:    Vital Signs Last 24 Hrs  T(C): 36.8 (06 Feb 2019 12:48), Max: 36.8 (06 Feb 2019 12:48)  T(F): 98.3 (06 Feb 2019 12:48), Max: 98.3 (06 Feb 2019 12:48)  HR: 78 (06 Feb 2019 12:48) (78 - 78)  BP: 133/80 (06 Feb 2019 12:48) (133/80 - 133/80)  BP(mean): --  RR: 19 (06 Feb 2019 12:48) (19 - 19)  SpO2: 98% (06 Feb 2019 12:48) (98% - 98%)  Daily Height in cm: 167.64 (06 Feb 2019 12:48)    Daily     Appearance: Alert, responsive, in no acute distress.    Neurological: Sensation is grossly intact to light touch. 5/5 motor function of all extremities. No focal deficits or weaknesses found.    Skin: no rash on visible skin. Skin is clean, dry and intact. No bleeding. No abrasions. No ulcerations.    Vascular: 2+ distal pulses. Cap refill < 2 sec. No signs of venous insuffiencey or stasis. No extremity ulcerations. No cyanosis.    Musculoskeletal:  NO tenderness to palpation of thoracic, or lumbar spine.        Left Upper Extremity :Full painless ROM Atraumatic with normal alignment NROM. No crepitus. No bony tenderness.          Right Upper Extremity: :Full painless ROM Atraumatic with normal alignment NROM. No crepitus. No bony tenderness.            Left Lower Extremity: :Full painless ROM Atraumatic with normal alignment NROM. No crepitus. No bony tenderness.            Right Lower Extremity:: Full painless ROM Atraumatic with normal alignment NROM. No crepitus. No bony tenderness.     Neurological: Sensation is grossly intact to light touch. No focal deficits or weaknesses found.    Pathologic reflexes:  Maldonado,   Clonus            Reflexes:   Biceps, Bracioradialis, Patella, Achilles intact            Motor exam: [  ]           Upper extremity              Bi(c5)  WE(c6)  EE(c7)   FF(c8)                                                R         5/5        5/5        5/5       5/5                                               L          5/5        5/5        5/5       5/5        Lower extremeity          HF(l2)   KE(l3)    TA(l4)   EHL(l5)  GS(s1)                                                 R        5/5        5/5        5/5       5/5         5/5                                               L         5/5        5/5       5/5       5/5          5/5        Imaging Studies:   EXAM:  CT CHEST                         EXAM:  CT ABDOMEN AND PELVIS                          *** ADDENDUM 02/06/2019  ***    8 mm calculus in the mid left ureter has progressed slightly distally and   the 7 mm calculus in the distal left ureter is unchanged in position   since 1/8/2019.      *** END OF ADDENDUM 02/06/2019  ***      PROCEDURE DATE:  02/06/2019          INTERPRETATION:  CLINICAL INFORMATION: Status post fall.           COMPARISON: CT chest/abdomen/pelvis 1/8/2019 and 10/17/2018    PROCEDURE:   CT of the Chest, Abdomen and Pelvis was performed without intravenous   contrast.   Intravenous contrast: None.  Oral contrast: None.  Sagittal and coronal reformats were performed.    FINDINGS:    CHEST:     LUNGS AND LARGE AIRWAYS: Patent central airways. Subpleural reticular   opacities and mild bronchiectasis with a lower lung predominance   consistent with fibrotic change. Areas of fissural thickening are noted   along the right minor fissure measuring up to 2 mm, unchanged. Opacity in   the lingula, unchanged and 1/8/2019, likely atelectasis or scarring. No   acute lung consolidation. Calcified granulomas are noted.  PLEURA: No pleural effusion.  VESSELS: Thoracic aorta normal in caliber with mild calcified plaque.   There are coronary artery calcifications.  HEART: Heart size is normal. No pericardial effusion.  MEDIASTINUM AND QUETA: Stable small mediastinal lymph nodes.   Moderate-sized hiatal hernia.  CHEST WALL AND LOWER NECK: Within normal limits.    ABDOMEN ANDPELVIS:    LIVER: Normal size. Calcified granulomas are noted.  BILE DUCTS: Normal caliber.  GALLBLADDER: There is cholelithiasis.  SPLEEN: Within normal limits.  PANCREAS: Within normal limits.  ADRENALS: Within normal limits.  KIDNEYS/URETERS: Leftsided double-J ureteral stent with the tips in the   renal pelvis and bladder. Moderate left hydroureteronephrosis which has   increased since 1/8/2019. Staghorn type calculus in the lower pole of the   left kidney measuring up to approximately 2.3 cm. Punctate nonobstructing   calculi in the right kidney and punctate nonobstructing calculus in the   mid left kidney. Low-attenuation lesion again seen arising from the lower   pole of the left kidney, likely a cyst. Subcentimeter low-attenuation   lesion in the upper pole of the left kidney, too small to characterize.   Calculi in the left ureter including 8 mm in the mid ureter 7 mm in the   distal ureter, unchanged in position since 1/8/2019.     BLADDER: Double-J ureteral stent tip in the bladder. Multiple bladder   calculi again noted measuring up to 1.4 cm. Bladder diverticula are   noted. There is a thin radiopaque tubular structure extending from the   bladder into the prosthetic urethra; clinical correlation is recommended.  REPRODUCTIVE ORGANS: Prostate gland not enlarged.    BOWEL: No bowel obstruction. Appendix is normal.  PERITONEUM: No ascites.  VESSELS:  Abdominal aorta normal in caliber with calcified plaque.  RETROPERITONEUM: No lymphadenopathy.    ABDOMINAL WALL: Small fat-containing umbilical hernia. Right inguinal   containing normal caliber loops of small bowel, previously containing fat   on 1/8/2019. The patient appears to be status post left femoral hernia   repair.  BONES: Mildly displaced fractures of the left fifth and sixth ribs are   again noted with evidence of healing/callus formation. A mildly displaced   fracture of the left eighth rib is also again noted which also appears to   demonstrate callus formation. There is also healing of previously seen   fractures at the posterior aspect of the left 4th-12th ribs (better seen   on current study with callus formation). There is deformity of the   posterior aspect of the right eighth and ninth ribs which may be related   to old fractures. There is anew compression deformity of the superior   endplate of T3.       IMPRESSION:     New compression fracture involving the superior endplate of T3, either   acute or subacute.    Multiple healing left-sided rib fractures as described above.    Left-sided double-J ureteral stent with moderate hydroureteronephrosis,   increased since 1/8/2019; a urologic consultation is recommended.    Left ureteral calculi, unchanged in position since 1/8/2019.    Multiple bladder calculi.    Right inguinal hernia containing normal caliber loops of small bowel,   previously containing only fat on 1/8/2019.    The findings were discussed with Dr. Richter at 4:59 PM on 2/6/2019.      ***Please see the addendum at the top of this report. It may contain   additional important information or changes.****            A/P:  Pt is a  89y Male with acute/ vs chronic  T3 compression fracture    PLAN:   * Pain control  WBAT  PT/ OT   Plan discussed with Dr Ng

## 2019-02-06 NOTE — ED PROVIDER NOTE - MEDICAL DECISION MAKING DETAILS
The patient presents with frequent falls and has new thoracic spine fx and also hydronephrosis worsening.

## 2019-02-06 NOTE — H&P ADULT - HISTORY OF PRESENT ILLNESS
89 y/o male with PMH of CAD, BPH, HLD came to the ED complaining of fall. Patient said he fell on his side hitting his head too as he was walking out of the bathroom. As per patient's granddaughter at bed side, patient has been falling frequently in the last 3 months. Patient said this is the 7th time. The last time prior to this episode, he suffered fractured ribs. Prior to that, he fell and was in rehab for almost a month. Patient also reported dizziness with standing. Patient reported pain in his chest and back. He has no shortness of breath, palpitation, fever, chills, abdominal pain, no change in bowel/urinary habit, no weakness, tingling, numbness, LOC, syncope.

## 2019-02-07 DIAGNOSIS — N40.1 BENIGN PROSTATIC HYPERPLASIA WITH LOWER URINARY TRACT SYMPTOMS: ICD-10-CM

## 2019-02-07 DIAGNOSIS — N20.1 CALCULUS OF URETER: ICD-10-CM

## 2019-02-07 LAB
ANION GAP SERPL CALC-SCNC: 11 MMOL/L — SIGNIFICANT CHANGE UP (ref 5–17)
BUN SERPL-MCNC: 14 MG/DL — SIGNIFICANT CHANGE UP (ref 8–20)
CALCIUM SERPL-MCNC: 10.2 MG/DL — SIGNIFICANT CHANGE UP (ref 8.6–10.2)
CHLORIDE SERPL-SCNC: 102 MMOL/L — SIGNIFICANT CHANGE UP (ref 98–107)
CO2 SERPL-SCNC: 26 MMOL/L — SIGNIFICANT CHANGE UP (ref 22–29)
CREAT SERPL-MCNC: 0.98 MG/DL — SIGNIFICANT CHANGE UP (ref 0.5–1.3)
GLUCOSE SERPL-MCNC: 101 MG/DL — SIGNIFICANT CHANGE UP (ref 70–115)
POTASSIUM SERPL-MCNC: 4.7 MMOL/L — SIGNIFICANT CHANGE UP (ref 3.5–5.3)
POTASSIUM SERPL-SCNC: 4.7 MMOL/L — SIGNIFICANT CHANGE UP (ref 3.5–5.3)
SODIUM SERPL-SCNC: 139 MMOL/L — SIGNIFICANT CHANGE UP (ref 135–145)

## 2019-02-07 PROCEDURE — 99232 SBSQ HOSP IP/OBS MODERATE 35: CPT

## 2019-02-07 RX ORDER — MEROPENEM 1 G/30ML
500 INJECTION INTRAVENOUS EVERY 8 HOURS
Qty: 0 | Refills: 0 | Status: DISCONTINUED | OUTPATIENT
Start: 2019-02-07 | End: 2019-02-09

## 2019-02-07 RX ORDER — MEROPENEM 1 G/30ML
INJECTION INTRAVENOUS
Qty: 0 | Refills: 0 | Status: DISCONTINUED | OUTPATIENT
Start: 2019-02-07 | End: 2019-02-09

## 2019-02-07 RX ORDER — MEROPENEM 1 G/30ML
500 INJECTION INTRAVENOUS ONCE
Qty: 0 | Refills: 0 | Status: COMPLETED | OUTPATIENT
Start: 2019-02-07 | End: 2019-02-07

## 2019-02-07 RX ADMIN — ATORVASTATIN CALCIUM 20 MILLIGRAM(S): 80 TABLET, FILM COATED ORAL at 22:58

## 2019-02-07 RX ADMIN — DORZOLAMIDE HYDROCHLORIDE 1 DROP(S): 20 SOLUTION/ DROPS OPHTHALMIC at 23:01

## 2019-02-07 RX ADMIN — TAMSULOSIN HYDROCHLORIDE 0.4 MILLIGRAM(S): 0.4 CAPSULE ORAL at 23:00

## 2019-02-07 RX ADMIN — FINASTERIDE 5 MILLIGRAM(S): 5 TABLET, FILM COATED ORAL at 12:25

## 2019-02-07 RX ADMIN — MEROPENEM 100 MILLIGRAM(S): 1 INJECTION INTRAVENOUS at 22:58

## 2019-02-07 RX ADMIN — Medication 81 MILLIGRAM(S): at 12:25

## 2019-02-07 RX ADMIN — DORZOLAMIDE HYDROCHLORIDE 1 DROP(S): 20 SOLUTION/ DROPS OPHTHALMIC at 09:53

## 2019-02-07 RX ADMIN — Medication 250 MILLIGRAM(S): at 17:27

## 2019-02-07 RX ADMIN — Medication 250 MILLIGRAM(S): at 06:18

## 2019-02-07 NOTE — CONSULT NOTE ADULT - PROBLEM SELECTOR RECOMMENDATION 2
Patient reports voiding well with flomax and finasteride.  Continue with both.  FOllow up with his urologist as outpatient.  Will see prn

## 2019-02-07 NOTE — ED ADULT NURSE REASSESSMENT NOTE - NS ED NURSE REASSESS COMMENT FT1
Report received from off going RN, charting as noted. Patient alert with confusion, negative obvious pain or discomfort. Respirations even & unlabored, denies any numbness or tingling. Cardiac monitor in place. Saline lock in place, patent, negative s/s phlebitis or infiltration. Will monitor.

## 2019-02-07 NOTE — PROGRESS NOTE ADULT - ASSESSMENT
89 y/o male with PMH of CAD, BPH, HLD came to the ED complaining of fall. - possible presyncope- rule out arrythmia     Frequent fall   Admit to monitored bed   Will check orthostasis -for frequent falls   Patient is taking Lasix 20mg at home; will not continue for now due to frequent fall. No LE edema, lungs CTA b/l   CT head: no acute intracranial hemorrhage or infarct   PT evaluation in AM   Fall precaution     Compression fracture   CT showed: acute T3 compression fracture   Orthopedic consulted   Tylenol 650mg q6h PRN for pain     UTI with ureteral stent and instrumentation - UA positive despite last week on PO antibiotics   UA +ve   start Meropenem   Will continue for now     Hyponatremia -Mild   Na: 134    BPH  Continue Tamsulosin 0.4mg   Finasteride 5mg     CAD  Continue ASA 81mg   Atorvastatin 20mg     Supportive   DVT prophylaxis: heparin 5000 units q8h   Diet: DASH

## 2019-02-07 NOTE — PROGRESS NOTE ADULT - SUBJECTIVE AND OBJECTIVE BOX
IDRIS DANIEL Patient is a 89y old  Male who presents with a chief complaint of Fall (2019 09:44)     HPI:  88 y/o male with PMH of CAD, BPH, HLD came to the ED complaining of fall. Patient said he fell on his side hitting his head too as he was walking out of the bathroom. As per patient's granddaughter at bed side, patient has been falling frequently in the last 3 months. Patient said this is the 7th time. The last time prior to this episode, he suffered fractured ribs. Prior to that, he fell and was in rehab for almost a month. Patient also reported dizziness with standing. Patient reported pain in his chest and back. He has no shortness of breath, palpitation, fever, chills, abdominal pain, no change in bowel/urinary habit, no weakness, tingling, numbness, LOC, syncope. (2019 22:17)    The patient was seen and evaluated   The patient is in no acute distress.  Denied any fever chest pain, palpitations, shortness of breath, abdominal pain, fever, dysuria, cough, edema   Complains of weakness  I&O's Summary    Allergies    No Known Allergies    Intolerances      HEALTH ISSUES - PROBLEM Dx:  BPH with urinary obstruction: BPH with urinary obstruction  Ureteral stone: Ureteral stone        PAST MEDICAL & SURGICAL HISTORY:  CAD (coronary artery disease)  GERD (gastroesophageal reflux disease)  Hyperlipidemia  Benign prostatic hyperplasia, presence of lower urinary tract symptoms unspecified, unspecified morphology  S/P ureteral stent placement: Left sided  History of prostate surgery  H/O heart artery stent: stent x 2          Vital Signs Last 24 Hrs  T(C): 36.7 (2019 16:30), Max: 36.9 (2019 20:35)  T(F): 98 (2019 16:30), Max: 98.5 (2019 20:35)  HR: 72 (2019 16:30) (66 - 94)  BP: 117/70 (2019 16:30) (117/70 - 150/72)  BP(mean): --  RR: 18 (2019 16:30) (17 - 18)  SpO2: 96% (2019 16:30) (96% - 99%)T(C): 36.7 (02-07-19 @ 16:30), Max: 36.9 (19 @ 20:35)  HR: 72 (19 @ 16:30) (66 - 94)  BP: 117/70 (19 @ 16:30) (117/70 - 150/72)  RR: 18 (19 @ 16:30) (17 - 18)  SpO2: 96% (19 @ 16:30) (96% - 99%)  Wt(kg): --    PHYSICAL EXAM:    GENERAL: NAD, frail elderly   HEAD:  Atraumatic, Normocephalic  EYES: EOMI, PERRL  ENMT:  Moist mucous membranes,  No lesions  NERVOUS SYSTEM:  Alert & Oriented X3,  in bed Moves upper and lower extremities; CNS-II-XII  CHEST/LUNG: Clear to auscultation bilaterally; No rales, rhonchi, wheezing,   HEART: Regular rate and rhythm; No murmurs,   ABDOMEN: Soft, Nontender, Nondistended; Bowel sounds present  EXTREMITIES:  Peripheral Pulses, No  cyanosis, or edema  SKIN: No rashes or lesions  psychiatry- mood and affect approprite, Insight and judgement intact     acetaminophen   Tablet .. 650 milliGRAM(s) Oral every 6 hours PRN  aspirin  chewable 81 milliGRAM(s) Oral daily  atorvastatin 20 milliGRAM(s) Oral at bedtime  cefTRIAXone   IVPB 1 Gram(s) IV Intermittent every 24 hours  dorzolamide 2% Ophthalmic Solution 1 Drop(s) Both EYES <User Schedule>  finasteride 5 milliGRAM(s) Oral daily  saccharomyces boulardii 250 milliGRAM(s) Oral two times a day  tamsulosin 0.4 milliGRAM(s) Oral at bedtime      LABS:                          13.1   8.5   )-----------( 147      ( 2019 13:42 )             39.5     02-07    139  |  102  |  14.0  ----------------------------<  101  4.7   |  26.0  |  0.98    Ca    10.2      2019 14:10  Mg     2.1     -    TPro  7.6  /  Alb  3.5  /  TBili  0.7  /  DBili  x   /  AST  23  /  ALT  6   /  AlkPhos  119  -    LIVER FUNCTIONS - ( 2019 13:40 )  Alb: 3.5 g/dL / Pro: 7.6 g/dL / ALK PHOS: 119 U/L / ALT: 6 U/L / AST: 23 U/L / GGT: x           PT/INR - ( 2019 13:40 )   PT: 14.4 sec;   INR: 1.24 ratio         PTT - ( 2019 13:40 )  PTT:30.2 sec  CARDIAC MARKERS ( 2019 13:40 )  x     / <0.01 ng/mL / x     / x     / x          Urinalysis Basic - ( 2019 18:37 )    Color: Yellow / Appearance: Slightly Turbid / S.015 / pH: x  Gluc: x / Ketone: Negative  / Bili: Negative / Urobili: Negative mg/dL   Blood: x / Protein: 100 mg/dL / Nitrite: Positive   Leuk Esterase: Moderate / RBC: >50 /HPF / WBC >50   Sq Epi: x / Non Sq Epi: Occasional / Bacteria: Many      CAPILLARY BLOOD GLUCOSE          RADIOLOGY & ADDITIONAL TESTS:      Consultant notes reviewed    Case discussed with consultant/provider/ family /patient

## 2019-02-07 NOTE — CONSULT NOTE ADULT - PROBLEM SELECTOR RECOMMENDATION 9
The patient has left ureteral stones and left renal calculi.  He is s/p left stent insertion by Dr. Bundy.    The patient is to follow up with Dr. Bundy after discharge for definitive stone management.  No acute urologic intervention now.

## 2019-02-08 LAB
ANION GAP SERPL CALC-SCNC: 9 MMOL/L — SIGNIFICANT CHANGE UP (ref 5–17)
BUN SERPL-MCNC: 14 MG/DL — SIGNIFICANT CHANGE UP (ref 8–20)
CALCIUM SERPL-MCNC: 10.9 MG/DL — HIGH (ref 8.6–10.2)
CHLORIDE SERPL-SCNC: 102 MMOL/L — SIGNIFICANT CHANGE UP (ref 98–107)
CO2 SERPL-SCNC: 27 MMOL/L — SIGNIFICANT CHANGE UP (ref 22–29)
CREAT SERPL-MCNC: 0.83 MG/DL — SIGNIFICANT CHANGE UP (ref 0.5–1.3)
GLUCOSE SERPL-MCNC: 101 MG/DL — SIGNIFICANT CHANGE UP (ref 70–115)
HCT VFR BLD CALC: 43.6 % — SIGNIFICANT CHANGE UP (ref 42–52)
HGB BLD-MCNC: 14.1 G/DL — SIGNIFICANT CHANGE UP (ref 14–18)
MAGNESIUM SERPL-MCNC: 2.2 MG/DL — SIGNIFICANT CHANGE UP (ref 1.6–2.6)
MCHC RBC-ENTMCNC: 28.8 PG — SIGNIFICANT CHANGE UP (ref 27–31)
MCHC RBC-ENTMCNC: 32.3 G/DL — SIGNIFICANT CHANGE UP (ref 32–36)
MCV RBC AUTO: 89 FL — SIGNIFICANT CHANGE UP (ref 80–94)
PHOSPHATE SERPL-MCNC: 2.2 MG/DL — LOW (ref 2.4–4.7)
PLATELET # BLD AUTO: 190 K/UL — SIGNIFICANT CHANGE UP (ref 150–400)
POTASSIUM SERPL-MCNC: 4.6 MMOL/L — SIGNIFICANT CHANGE UP (ref 3.5–5.3)
POTASSIUM SERPL-SCNC: 4.6 MMOL/L — SIGNIFICANT CHANGE UP (ref 3.5–5.3)
RBC # BLD: 4.9 M/UL — SIGNIFICANT CHANGE UP (ref 4.6–6.2)
RBC # FLD: 15.4 % — SIGNIFICANT CHANGE UP (ref 11–15.6)
SODIUM SERPL-SCNC: 138 MMOL/L — SIGNIFICANT CHANGE UP (ref 135–145)
WBC # BLD: 8.5 K/UL — SIGNIFICANT CHANGE UP (ref 4.8–10.8)
WBC # FLD AUTO: 8.5 K/UL — SIGNIFICANT CHANGE UP (ref 4.8–10.8)

## 2019-02-08 PROCEDURE — 99233 SBSQ HOSP IP/OBS HIGH 50: CPT

## 2019-02-08 PROCEDURE — 99222 1ST HOSP IP/OBS MODERATE 55: CPT

## 2019-02-08 PROCEDURE — 72146 MRI CHEST SPINE W/O DYE: CPT | Mod: 26

## 2019-02-08 RX ORDER — LIDOCAINE 4 G/100G
1 CREAM TOPICAL DAILY
Qty: 0 | Refills: 0 | Status: DISCONTINUED | OUTPATIENT
Start: 2019-02-08 | End: 2019-02-09

## 2019-02-08 RX ORDER — ENOXAPARIN SODIUM 100 MG/ML
40 INJECTION SUBCUTANEOUS DAILY
Qty: 0 | Refills: 0 | Status: DISCONTINUED | OUTPATIENT
Start: 2019-02-08 | End: 2019-02-08

## 2019-02-08 RX ORDER — BRINZOLAMIDE 10 MG/ML
1 SUSPENSION/ DROPS OPHTHALMIC
Qty: 0 | Refills: 0 | Status: DISCONTINUED | OUTPATIENT
Start: 2019-02-08 | End: 2019-02-09

## 2019-02-08 RX ORDER — HEPARIN SODIUM 5000 [USP'U]/ML
5000 INJECTION INTRAVENOUS; SUBCUTANEOUS EVERY 12 HOURS
Qty: 0 | Refills: 0 | Status: DISCONTINUED | OUTPATIENT
Start: 2019-02-08 | End: 2019-02-09

## 2019-02-08 RX ORDER — POLYETHYLENE GLYCOL 3350 17 G/17G
17 POWDER, FOR SOLUTION ORAL DAILY
Qty: 0 | Refills: 0 | Status: DISCONTINUED | OUTPATIENT
Start: 2019-02-08 | End: 2019-02-09

## 2019-02-08 RX ORDER — LACTULOSE 10 G/15ML
10 SOLUTION ORAL DAILY
Qty: 0 | Refills: 0 | Status: DISCONTINUED | OUTPATIENT
Start: 2019-02-08 | End: 2019-02-09

## 2019-02-08 RX ORDER — DOCUSATE SODIUM 100 MG
100 CAPSULE ORAL
Qty: 0 | Refills: 0 | Status: DISCONTINUED | OUTPATIENT
Start: 2019-02-08 | End: 2019-02-09

## 2019-02-08 RX ADMIN — TAMSULOSIN HYDROCHLORIDE 0.4 MILLIGRAM(S): 0.4 CAPSULE ORAL at 22:43

## 2019-02-08 RX ADMIN — HEPARIN SODIUM 5000 UNIT(S): 5000 INJECTION INTRAVENOUS; SUBCUTANEOUS at 18:14

## 2019-02-08 RX ADMIN — Medication 81 MILLIGRAM(S): at 14:05

## 2019-02-08 RX ADMIN — POLYETHYLENE GLYCOL 3350 17 GRAM(S): 17 POWDER, FOR SOLUTION ORAL at 14:02

## 2019-02-08 RX ADMIN — Medication 100 MILLIGRAM(S): at 18:14

## 2019-02-08 RX ADMIN — MEROPENEM 100 MILLIGRAM(S): 1 INJECTION INTRAVENOUS at 05:49

## 2019-02-08 RX ADMIN — Medication 250 MILLIGRAM(S): at 18:14

## 2019-02-08 RX ADMIN — FINASTERIDE 5 MILLIGRAM(S): 5 TABLET, FILM COATED ORAL at 14:03

## 2019-02-08 RX ADMIN — LIDOCAINE 1 PATCH: 4 CREAM TOPICAL at 14:03

## 2019-02-08 RX ADMIN — Medication 250 MILLIGRAM(S): at 05:48

## 2019-02-08 RX ADMIN — DORZOLAMIDE HYDROCHLORIDE 1 DROP(S): 20 SOLUTION/ DROPS OPHTHALMIC at 08:26

## 2019-02-08 RX ADMIN — MEROPENEM 100 MILLIGRAM(S): 1 INJECTION INTRAVENOUS at 14:04

## 2019-02-08 RX ADMIN — MEROPENEM 100 MILLIGRAM(S): 1 INJECTION INTRAVENOUS at 22:42

## 2019-02-08 RX ADMIN — ATORVASTATIN CALCIUM 20 MILLIGRAM(S): 80 TABLET, FILM COATED ORAL at 22:43

## 2019-02-08 NOTE — PROGRESS NOTE ADULT - SUBJECTIVE AND OBJECTIVE BOX
CC: Fall/UTI/Thoracic Compression fracture    INTERVAL HPI/OVERNIGHT EVENTS: Patient seen and examined. No acute issues overnight. No chest pain, SOB, dizziness, nausea, vomiting, fever, chills. Answers questions appropriately. Asked for pureed diet do to poor dentition. C/O chronic constipations. Left rib pain from prior fall.     Vital Signs Last 24 Hrs  T(C): 36.9 (2019 08:00), Max: 37 (2019 00:06)  T(F): 98.4 (2019 08:00), Max: 98.6 (2019 00:06)  HR: 74 (2019 08:00) (66 - 74)  BP: 126/75 (2019 08:00) (103/57 - 126/75)  BP(mean): --  RR: 18 (2019 08:00) (18 - 18)  SpO2: 98% (2019 08:00) (96% - 99%)    PHYSICAL EXAM:    GENERAL: NAD, frail elderly   HEAD:  Atraumatic, Normocephalic  EYES: EOMI, PERRL  ENMT:  Moist mucous membranes,  No lesions  NERVOUS SYSTEM:  Alert & Oriented X3,  in bed Moves upper and lower extremities; CNS-II-XII  CHEST/LUNG: Clear to auscultation bilaterally; No rales, rhonchi, wheezing,   HEART: Regular rate and rhythm; No murmurs,   ABDOMEN: Soft, Nontender, Nondistended; Bowel sounds present  EXTREMITIES:  Peripheral Pulses, No  cyanosis, or edema  SKIN: No rashes or lesions  PSYCH: Calm and cooperative        I&O's Detail                                14.1   8.5   )-----------( 190      ( 2019 11:53 )             43.6     2019 11:53    138    |  102    |  14.0   ----------------------------<  101    4.6     |  27.0   |  0.83     Ca    10.9       2019 11:53  Phos  2.2       2019 11:53  Mg     2.2       2019 11:53        CAPILLARY BLOOD GLUCOSE          Urinalysis Basic - ( 2019 18:37 )    Color: Yellow / Appearance: Slightly Turbid / S.015 / pH: x  Gluc: x / Ketone: Negative  / Bili: Negative / Urobili: Negative mg/dL   Blood: x / Protein: 100 mg/dL / Nitrite: Positive   Leuk Esterase: Moderate / RBC: >50 /HPF / WBC >50   Sq Epi: x / Non Sq Epi: Occasional / Bacteria: Many        MEDICATIONS  (STANDING):  aspirin  chewable 81 milliGRAM(s) Oral daily  atorvastatin 20 milliGRAM(s) Oral at bedtime  brinzolamide 1% Ophthalmic Suspension 1 Drop(s) Both EYES two times a day  docusate sodium 100 milliGRAM(s) Oral two times a day  finasteride 5 milliGRAM(s) Oral daily  heparin  Injectable 5000 Unit(s) SubCutaneous every 12 hours  lidocaine   Patch 1 Patch Transdermal daily  meropenem  IVPB 500 milliGRAM(s) IV Intermittent every 8 hours  meropenem  IVPB      polyethylene glycol 3350 17 Gram(s) Oral daily  saccharomyces boulardii 250 milliGRAM(s) Oral two times a day  tamsulosin 0.4 milliGRAM(s) Oral at bedtime    MEDICATIONS  (PRN):  acetaminophen   Tablet .. 650 milliGRAM(s) Oral every 6 hours PRN Temp greater or equal to 38C (100.4F), Moderate Pain (4 - 6)  lactulose Syrup 10 Gram(s) Oral daily PRN constipation      RADIOLOGY & ADDITIONAL TESTS: CC: Fall/UTI/Thoracic Compression fracture    INTERVAL HPI/OVERNIGHT EVENTS: Patient seen and examined. No acute issues overnight. No chest pain, SOB, dizziness, nausea, vomiting, fever, chills. Answers questions appropriately. Asked for pureed diet do to poor dentition. C/O chronic constipations. Left rib pain from prior fall.     Vital Signs Last 24 Hrs  T(C): 36.9 (2019 08:00), Max: 37 (2019 00:06)  T(F): 98.4 (2019 08:00), Max: 98.6 (2019 00:06)  HR: 74 (2019 08:00) (66 - 74)  BP: 126/75 (2019 08:00) (103/57 - 126/75)  BP(mean): --  RR: 18 (2019 08:00) (18 - 18)  SpO2: 98% (2019 08:00) (96% - 99%)    PHYSICAL EXAM:    GENERAL: NAD, frail elderly   HEAD:  Atraumatic, Normocephalic  EYES: EOMI, PERRL  ENMT:  Moist mucous membranes,  No lesions  NERVOUS SYSTEM:  Alert & Oriented X3,  in bed Moves upper and lower extremities; CNS-II-XII  CHEST/LUNG: Clear to auscultation bilaterally; No rales, rhonchi, wheezing,   HEART: Regular rate and rhythm; No murmurs,   ABDOMEN: Soft, Nontender, Nondistended; Bowel sounds present  EXTREMITIES:  Peripheral Pulses, No  cyanosis, or edema  SKIN: No rashes or lesions  PSYCH: Calm and cooperative        I&O's Detail                                14.1   8.5   )-----------( 190      ( 2019 11:53 )             43.6     2019 11:53    138    |  102    |  14.0   ----------------------------<  101    4.6     |  27.0   |  0.83     Ca    10.9       2019 11:53  Phos  2.2       2019 11:53  Mg     2.2       2019 11:53        CAPILLARY BLOOD GLUCOSE          Urinalysis Basic - ( 2019 18:37 )    Color: Yellow / Appearance: Slightly Turbid / S.015 / pH: x  Gluc: x / Ketone: Negative  / Bili: Negative / Urobili: Negative mg/dL   Blood: x / Protein: 100 mg/dL / Nitrite: Positive   Leuk Esterase: Moderate / RBC: >50 /HPF / WBC >50   Sq Epi: x / Non Sq Epi: Occasional / Bacteria: Many        MEDICATIONS  (STANDING):  aspirin  chewable 81 milliGRAM(s) Oral daily  atorvastatin 20 milliGRAM(s) Oral at bedtime  brinzolamide 1% Ophthalmic Suspension 1 Drop(s) Both EYES two times a day  docusate sodium 100 milliGRAM(s) Oral two times a day  finasteride 5 milliGRAM(s) Oral daily  heparin  Injectable 5000 Unit(s) SubCutaneous every 12 hours  lidocaine   Patch 1 Patch Transdermal daily  meropenem  IVPB 500 milliGRAM(s) IV Intermittent every 8 hours  meropenem  IVPB      polyethylene glycol 3350 17 Gram(s) Oral daily  saccharomyces boulardii 250 milliGRAM(s) Oral two times a day  tamsulosin 0.4 milliGRAM(s) Oral at bedtime    MEDICATIONS  (PRN):  acetaminophen   Tablet .. 650 milliGRAM(s) Oral every 6 hours PRN Temp greater or equal to 38C (100.4F), Moderate Pain (4 - 6)  lactulose Syrup 10 Gram(s) Oral daily PRN constipation      RADIOLOGY & ADDITIONAL TESTS:     EXAM:  CT CHEST                         EXAM:  CT ABDOMEN AND PELVIS                          *** ADDENDUM 2019  ***    8 mm calculus in the mid left ureter has progressed slightly distally and   the 7 mm calculus in the distal left ureter is unchanged in position   since 2019.      *** END OF ADDENDUM 2019  ***      PROCEDURE DATE:  2019          INTERPRETATION:  CLINICAL INFORMATION: Status post fall.           COMPARISON: CT chest/abdomen/pelvis 2019 and 10/17/2018    PROCEDURE:   CT of the Chest, Abdomen and Pelvis was performed without intravenous   contrast.   Intravenous contrast: None.  Oral contrast: None.  Sagittal and coronal reformats were performed.    FINDINGS:    CHEST:     LUNGS AND LARGE AIRWAYS: Patent central airways. Subpleural reticular   opacities and mild bronchiectasis with a lower lung predominance   consistent with fibrotic change. Areas of fissural thickening are noted   along the right minor fissure measuring up to 2 mm, unchanged. Opacity in   the lingula, unchanged and 2019, likely atelectasis or scarring. No   acute lung consolidation. Calcified granulomas are noted.  PLEURA: No pleural effusion.  VESSELS: Thoracic aorta normal in caliber with mild calcified plaque.   There are coronary artery calcifications.  HEART: Heart size is normal. No pericardial effusion.  MEDIASTINUM AND QUETA: Stable small mediastinal lymph nodes.   Moderate-sized hiatal hernia.  CHEST WALL AND LOWER NECK: Within normal limits.    ABDOMEN ANDPELVIS:    LIVER: Normal size. Calcified granulomas are noted.  BILE DUCTS: Normal caliber.  GALLBLADDER: There is cholelithiasis.  SPLEEN: Within normal limits.  PANCREAS: Within normal limits.  ADRENALS: Within normal limits.  KIDNEYS/URETERS: Leftsided double-J ureteral stent with the tips in the   renal pelvis and bladder. Moderate left hydroureteronephrosis which has   increased since 2019. Staghorn type calculus in the lower pole of the   left kidney measuring up to approximately 2.3 cm. Punctate nonobstructing   calculi in the right kidney and punctate nonobstructing calculus in the   mid left kidney. Low-attenuation lesion again seen arising from the lower   pole of the left kidney, likely a cyst. Subcentimeter low-attenuation   lesion in the upper pole of the left kidney, too small to characterize.   Calculi in the left ureter including 8 mm in the mid ureter 7 mm in the   distal ureter, unchanged in position since 2019.     BLADDER: Double-J ureteral stent tip in the bladder. Multiple bladder   calculi again noted measuring up to 1.4 cm. Bladder diverticula are   noted. There is a thin radiopaque tubular structure extending from the   bladder into the prosthetic urethra; clinical correlation is recommended.  REPRODUCTIVE ORGANS: Prostate gland not enlarged.    BOWEL: No bowel obstruction. Appendix is normal.  PERITONEUM: No ascites.  VESSELS:  Abdominal aorta normal in caliber with calcified plaque.  RETROPERITONEUM: No lymphadenopathy.    ABDOMINAL WALL: Small fat-containing umbilical hernia. Right inguinal   containing normal caliber loops of small bowel, previously containing fat   on 2019. The patient appears to be status post left femoral hernia   repair.  BONES: Mildly displaced fractures of the left fifth and sixth ribs are   again noted with evidence of healing/callus formation. A mildly displaced   fracture of the left eighth rib is also again noted which also appears to   demonstrate callus formation. There is also healing of previously seen   fractures at the posterior aspect of the left 4th-12th ribs (better seen   on current study with callus formation). There is deformity of the   posterior aspect of the right eighth and ninth ribs which may be related   to old fractures. There is anew compression deformity of the superior   endplate of T3.       IMPRESSION:     New compression fracture involving the superior endplate of T3, either   acute or subacute.    Multiple healing left-sided rib fractures as described above.    Left-sided double-J ureteral stent with moderate hydroureteronephrosis,   increased since 2019; a urologic consultation is recommended.    Left ureteral calculi, unchanged in position since 2019.    Multiple bladder calculi.    Right inguinal hernia containing normal caliber loops of small bowel,   previously containing only fat on 2019.    The findings were discussed with Dr. Richter at 4:59 PM on 2019.      ***Please see the addendum at the top of this report. It may contain   additional important information or changes.****          MERA GOLDSTEIN   This document has been electronically signed. 2019  5:06PM  Addend:  MERA GOLDSTEIN   This addendum was electronically signed on: 2019  5:26PM.

## 2019-02-08 NOTE — CONSULT NOTE ADULT - ASSESSMENT
Mr. Gomez is an 88 y/o male with PMH of CAD, BPH and HLD presented to the ED 2/6 after a fall. Being worked up for a.fib. CT Chest revealed acute vs. subacute T3 compression fracture. Clinically nonfocal, no UMN signs, only with TTP over midthoracic region.   Patient's ambulation has been progressively unsteady over the last several months consistently associated with prodromal symptoms and without mechanical gait or balance instabilities. No cognitive decline.    PLAN:  - Orthotist contacted regarding brace  - Await final read of MRI Thoracic  - PT once brace obtained  - Discussed with Dr. Kee

## 2019-02-08 NOTE — CONSULT NOTE ADULT - ATTENDING COMMENTS
NSGY Attg:    see above    patient seen and examined    LE 5/5  sensation intact    imaging reviewed — T3 superior endplate compression fracture of indeterminate chronicity    MRI T-spine pending

## 2019-02-08 NOTE — PROGRESS NOTE ADULT - ASSESSMENT
88 y/o male with PMH of CAD, BPH, HLD came to the ED complaining of fall. -Possible presyncope- rule out arrythmia     Frequent fall   Continue CM x 24 hours, echo ordered  Check orthostasis -for frequent falls   CT head: no acute intracranial hemorrhage or infarct   PT evaluation once cleared by Ortho spine  Fall precaution     Compression fracture   CT showed: acute T3 compression fracture   Ortho/ spine consulted  Tylenol 650mg q6h PRN for pain   MRI thoracic spine ordered    UTI with ureteral stent and instrumentation - UA positive despite last week on PO antibiotics   UA +ve   started on Meropenem   Will continue for now   urine culture ordered  bladder scan ordered  seen by Urology, recommend outpatient follow up with private urologist  blood cultures ordered    Hyponatremia -Mild   Na: 134    BPH  Continue Tamsulosin 0.4mg   Finasteride 5mg     CAD  Continue ASA 81mg   Atorvastatin 20mg   Echo ordered    Constipation  Bowel RX added    Supportive   DVT prophylaxis: heparin 5000 units q12   Diet: changed to Pureed as per request  Palliative Care consult for goals of care 89 y/o male with PMH of CAD, BPH, HLD came to the ED complaining of fall. -Possible presyncope- rule out arrythmia     Frequent fall   Continue CM x 24 hours, echo ordered  Check orthostasis -for frequent falls   CT head: no acute intracranial hemorrhage or infarct   PT evaluation once cleared by Ortho spine  Fall precaution     Compression fracture   CT showed: acute T3 compression fracture   Ortho/ spine consulted  Tylenol 650mg q6h PRN for pain   MRI thoracic spine ordered    UTI with ureteral stent and instrumentation - UA positive despite last week on PO antibiotics   UA +ve   started on Meropenem   Will continue for now   urine culture ordered  bladder scan ordered  seen by Urology, recommend outpatient follow up with private urologist  blood cultures ordered    Hyponatremia -Mild   Na: 134    BPH  Continue Tamsulosin 0.4mg   Finasteride 5mg     CAD  Continue ASA 81mg   Atorvastatin 20mg   Echo ordered    Right Inguinal Hernia  Asymptomatic  Outpatient follow up    Constipation  Bowel RX added    Supportive   DVT prophylaxis: heparin 5000 units q12   Diet: changed to Pureed as per request  Palliative Care consult for goals of care

## 2019-02-09 ENCOUNTER — TRANSCRIPTION ENCOUNTER (OUTPATIENT)
Age: 84
End: 2019-02-09

## 2019-02-09 VITALS
SYSTOLIC BLOOD PRESSURE: 130 MMHG | OXYGEN SATURATION: 98 % | TEMPERATURE: 98 F | RESPIRATION RATE: 18 BRPM | HEART RATE: 88 BPM | DIASTOLIC BLOOD PRESSURE: 79 MMHG

## 2019-02-09 PROCEDURE — 83690 ASSAY OF LIPASE: CPT

## 2019-02-09 PROCEDURE — 93306 TTE W/DOPPLER COMPLETE: CPT

## 2019-02-09 PROCEDURE — 83735 ASSAY OF MAGNESIUM: CPT

## 2019-02-09 PROCEDURE — 93005 ELECTROCARDIOGRAM TRACING: CPT

## 2019-02-09 PROCEDURE — 71250 CT THORAX DX C-: CPT

## 2019-02-09 PROCEDURE — 81001 URINALYSIS AUTO W/SCOPE: CPT

## 2019-02-09 PROCEDURE — 84100 ASSAY OF PHOSPHORUS: CPT

## 2019-02-09 PROCEDURE — 84484 ASSAY OF TROPONIN QUANT: CPT

## 2019-02-09 PROCEDURE — 22310 CLOSED TX VERT FX W/O MANJ: CPT

## 2019-02-09 PROCEDURE — 99223 1ST HOSP IP/OBS HIGH 75: CPT | Mod: 57

## 2019-02-09 PROCEDURE — 72125 CT NECK SPINE W/O DYE: CPT

## 2019-02-09 PROCEDURE — 99232 SBSQ HOSP IP/OBS MODERATE 35: CPT

## 2019-02-09 PROCEDURE — 74176 CT ABD & PELVIS W/O CONTRAST: CPT

## 2019-02-09 PROCEDURE — 85730 THROMBOPLASTIN TIME PARTIAL: CPT

## 2019-02-09 PROCEDURE — 99239 HOSP IP/OBS DSCHRG MGMT >30: CPT

## 2019-02-09 PROCEDURE — 85027 COMPLETE CBC AUTOMATED: CPT

## 2019-02-09 PROCEDURE — 99285 EMERGENCY DEPT VISIT HI MDM: CPT | Mod: 25

## 2019-02-09 PROCEDURE — 80048 BASIC METABOLIC PNL TOTAL CA: CPT

## 2019-02-09 PROCEDURE — 85610 PROTHROMBIN TIME: CPT

## 2019-02-09 PROCEDURE — 36415 COLL VENOUS BLD VENIPUNCTURE: CPT

## 2019-02-09 PROCEDURE — 83880 ASSAY OF NATRIURETIC PEPTIDE: CPT

## 2019-02-09 PROCEDURE — 80053 COMPREHEN METABOLIC PANEL: CPT

## 2019-02-09 PROCEDURE — 71045 X-RAY EXAM CHEST 1 VIEW: CPT

## 2019-02-09 PROCEDURE — 93306 TTE W/DOPPLER COMPLETE: CPT | Mod: 26

## 2019-02-09 PROCEDURE — 70450 CT HEAD/BRAIN W/O DYE: CPT

## 2019-02-09 PROCEDURE — 72146 MRI CHEST SPINE W/O DYE: CPT

## 2019-02-09 RX ORDER — SACCHAROMYCES BOULARDII 250 MG
1 POWDER IN PACKET (EA) ORAL
Qty: 14 | Refills: 0
Start: 2019-02-09 | End: 2019-02-15

## 2019-02-09 RX ADMIN — POLYETHYLENE GLYCOL 3350 17 GRAM(S): 17 POWDER, FOR SOLUTION ORAL at 13:46

## 2019-02-09 RX ADMIN — FINASTERIDE 5 MILLIGRAM(S): 5 TABLET, FILM COATED ORAL at 13:45

## 2019-02-09 RX ADMIN — HEPARIN SODIUM 5000 UNIT(S): 5000 INJECTION INTRAVENOUS; SUBCUTANEOUS at 06:19

## 2019-02-09 RX ADMIN — LIDOCAINE 1 PATCH: 4 CREAM TOPICAL at 13:45

## 2019-02-09 RX ADMIN — LIDOCAINE 1 PATCH: 4 CREAM TOPICAL at 02:20

## 2019-02-09 RX ADMIN — Medication 81 MILLIGRAM(S): at 13:46

## 2019-02-09 RX ADMIN — LIDOCAINE 1 PATCH: 4 CREAM TOPICAL at 06:20

## 2019-02-09 RX ADMIN — Medication 250 MILLIGRAM(S): at 06:20

## 2019-02-09 RX ADMIN — MEROPENEM 100 MILLIGRAM(S): 1 INJECTION INTRAVENOUS at 13:46

## 2019-02-09 RX ADMIN — MEROPENEM 100 MILLIGRAM(S): 1 INJECTION INTRAVENOUS at 06:19

## 2019-02-09 RX ADMIN — Medication 100 MILLIGRAM(S): at 06:20

## 2019-02-09 NOTE — PROGRESS NOTE ADULT - SUBJECTIVE AND OBJECTIVE BOX
INTERVAL EVENTS:  No overnight events. MRI reviewed. No new complaints. Awaiting PT eval.     Vital Signs Last 24 Hrs  T(C): 36.9 (09 Feb 2019 10:18), Max: 36.9 (09 Feb 2019 10:18)  T(F): 98.4 (09 Feb 2019 10:18), Max: 98.4 (09 Feb 2019 10:18)  HR: 86 (09 Feb 2019 10:18) (66 - 88)  BP: 140/77 (09 Feb 2019 10:18) (114/64 - 140/77)  RR: 18 (09 Feb 2019 10:18) (18 - 18)  SpO2: 100% (09 Feb 2019 10:18) (95% - 100%)  PHYSICAL EXAM:  GENERAL: NAD, well-groomed, frail  HEAD:  Atraumatic, normocephalic  EYES: +arcus senalis  NECK: Supple, no JVD  MENTAL STATUS: Alert and oriented to self, place, naming family and high frequency objects, able to follow commands  CRANIAL NERVES: PERRL; EOMI; no asymmetric facial activation  MOTOR: strength 5/5 B/L upper and lower extremities - no focal deficits  SPINE: TTP over the midthoracic region  SENSATION: grossly intact to light touch all extremities  MUSCLE STRETCH REFLEXES: no clonus b/l  PLANTAR: downgoing    LABS:             14.1   8.5   )-----------( 190                  43.6     138  |  102  |  14.0  ----------------------------<  101  4.6   |  27.0  |  0.83    Ca    10.9<H>      08 Feb 2019 11:53  Phos  2.2     02-08  Mg     2.2     02-08    RADIOLOGY & ADDITIONAL TESTS:  MR Thoracic Spine No Cont (02.08.19 @ 17:23)  IMPRESSION:        1. Prominent kyphosis with generalized degenerative changes but no   significant stenosis.     CT Chest No Cont (02.06.19 @ 16:08)  IMPRESSION:   New compression fracture involving the superior endplate of T3, either   acute or subacute.

## 2019-02-09 NOTE — DISCHARGE NOTE ADULT - CARE PROVIDER_API CALL
PMD,   Phone: (   )    -  Fax: (   )    -  Follow Up Time:     Billy Gann)  Urology  [address] [place] [id number]  Phone: [phone]  Fax: [phone]  Follow Up Time:

## 2019-02-09 NOTE — DISCHARGE NOTE ADULT - CARE PLAN
Principal Discharge DX:	DDD (degenerative disc disease), thoracic  Goal:	prison care .  Assessment and plan of treatment:	Follow up with pmd in 3-5 day s  Ambulate with assistance at home   Home physical therapy   Regular diet  Secondary Diagnosis:	Coronary artery disease involving native coronary artery of native heart without angina pectoris  Secondary Diagnosis:	BPH with urinary obstruction  Secondary Diagnosis:	Frequent falls  Secondary Diagnosis:	Mixed hyperlipidemia  Secondary Diagnosis:	Ureteral stone

## 2019-02-09 NOTE — DISCHARGE NOTE ADULT - PATIENT PORTAL LINK FT
You can access the [facility] Patient Portal, offered by [facility], by registering with the following website: [email or web address]

## 2019-02-09 NOTE — PROGRESS NOTE ADULT - ASSESSMENT
88 y/o male with PMH of CAD, BPH, HLD came to the ED complaining of fall. -Possible presyncope- rule out arrythmia     Frequent fall   CT head: no acute intracranial hemorrhage or infarct   PT evaluation once cleared by Ortho spine  Fall precaution     Compression fracture   Spine MRI showing prominent kyphosis.  Degenerative changes. No spinal stenosis.   Neurosurgery consult recommend brace, PT  Pain control     UTI with ureteral stent and instrumentation - UA positive despite last week on PO antibiotics   UA +ve   started on Meropenem   Will continue for now   urine culture pending collection. Blood cx no growth so far  seen by Urology, recommend outpatient follow up with private urologist    Hyponatremia -Mild . Resolved.   Na: 134    BPH  Continue Tamsulosin 0.4mg   Finasteride 5mg     CAD  Continue ASA 81mg   Atorvastatin 20mg   Echo ordered    Right Inguinal Hernia  Asymptomatic  Outpatient follow up    Constipation  Bowel RX added    Supportive   DVT prophylaxis: heparin 5000 units q12   Diet: changed to Pureed as per request  Palliative Care consult for goals of care

## 2019-02-09 NOTE — PHYSICAL THERAPY INITIAL EVALUATION ADULT - ADDITIONAL COMMENTS
Pt/son report that pt lives with family in a private house. 7 steps to enter. Pt currently has a bedroom on second level (7 additional steps). (+) rails. Son reports that they will either have pt stay on main level or obtain a chair lift.  Pt ambulates with a rolling walker at home with assist. Owns a straight cane, shower chair, wheelchair, and commode.

## 2019-02-09 NOTE — DISCHARGE NOTE ADULT - SECONDARY DIAGNOSIS.
Coronary artery disease involving native coronary artery of native heart without angina pectoris BPH with urinary obstruction Frequent falls Mixed hyperlipidemia Ureteral stone

## 2019-02-09 NOTE — PROGRESS NOTE ADULT - ASSESSMENT
Mr. Gomez is an 88 y/o male with PMH of CAD, BPH and HLD presented to the ED 2/6 after a fall. Being worked up for a.fib. CT Chest revealed acute vs. subacute T3 compression fracture. Clinically nonfocal, no UMN signs, only with TTP over midthoracic region.   MRI Thoracic shows no significant stenosis or acute or subacute fracture.     PLAN:  - Brace for comfort but not obligated for PT  - Agree with PT for gait stability    No further inpatient Neurosurgical recommendations

## 2019-02-09 NOTE — PROGRESS NOTE ADULT - ATTENDING COMMENTS
NSGY Attg:    see above    patient seen and examined    MRI reviewed — no acute fracture    agree with plan as above  recall prn  f/u prn
Patient has been seen and examined he's currently on 3 tower appears a very pleasant gentleman his family members at bedside is complaining of no acute pain at this point in time. I think this fracture that is noted on his CAT scan may be acute on chronic. Reason I am not offering a TLSO brace at this point in time I think a T3 fracture is quite high to the brace effectively combined with a physical exam that shows no acute neurologic deficits. I would continue to treat this gentleman conservatively at this point in time. We'll follow routinely for postop/fracture care management
Fall   Spine compression fracture  UTI  Deconditioning     MRI spine and may review may start PT  Supportive care.

## 2019-02-09 NOTE — DISCHARGE NOTE ADULT - PLAN OF CARE
detention care . Follow up with pmd in 3-5 day s  Ambulate with assistance at home   Home physical therapy   Regular diet

## 2019-02-09 NOTE — PHYSICAL THERAPY INITIAL EVALUATION ADULT - GENERAL OBSERVATIONS, REHAB EVAL
Pt received lying in bed on 3 tower, (+) cardiac monitor, NAD. Son present. Agreeable to PT evaluation.

## 2019-02-09 NOTE — DISCHARGE NOTE ADULT - MEDICATION SUMMARY - MEDICATIONS TO TAKE
I will START or STAY ON the medications listed below when I get home from the hospital:    finasteride 5 mg oral tablet  -- 1 tab(s) by mouth once a day  -- Indication: For BPH with urinary obstruction    oxyCODONE 5 mg oral tablet  -- 1 tab(s) by mouth 4 times a day, As Needed -Severe Pain (7 - 10) MDD:4  -- Indication: For pain     Tylenol 325 mg oral tablet  -- 2 tab(s) by mouth every 6 hours, As Needed mild to moderate pain   -- Indication: For pain     aspirin 81 mg oral tablet  -- 1 tab(s) by mouth once a day  -- Indication: For CAD (coronary artery disease)    tamsulosin 0.4 mg oral capsule  -- 1 cap(s) by mouth once a day (at bedtime)  -- Indication: For BPH with urinary obstruction    simvastatin 20 mg oral tablet  -- 1 tab(s) by mouth once a day (at bedtime)  -- Indication: For CAD (coronary artery disease)    lidocaine 5% topical film  -- Apply on skin to affected area once a day  12hron/12 hr off   -- Indication: For pain     docusate sodium 100 mg oral capsule  -- 1 cap(s) by mouth 2 times a day, As Needed   -- Indication: For Constipation     bisacodyl 5 mg oral delayed release tablet  -- 1 tab(s) by mouth every 12 hours, As Needed -Constipation   -- Indication: For Constipation     dorzolamide 2% ophthalmic solution  -- 1 drop(s) to each affected eye 2 times a day  -- Indication: For eye condition     saccharomyces boulardii lyo 250 mg oral capsule  -- 1 cap(s) by mouth 2 times a day  -- Indication: For GI protection

## 2019-02-09 NOTE — PROGRESS NOTE ADULT - SUBJECTIVE AND OBJECTIVE BOX
CC: Fall, gait abnormality . Limitation of movement.    HPI:  89 y/o male with PMH of CAD, BPH, HLD came to the ED complaining of fall. Patient said he fell on his side hitting his head too as he was walking out of the bathroom. As per patient's granddaughter at bed side, patient has been falling frequently in the last 3 months. Patient said this is the 7th time. The last time prior to this episode, he suffered fractured ribs. Prior to that, he fell and was in rehab for almost a month. Patient also reported dizziness with standing. Patient reported pain in his chest and back. He has no shortness of breath, palpitation, fever, chills, abdominal pain, no change in bowel/urinary habit, no weakness, tingling, numbness, LOC, syncope. (06 Feb 2019 22:17)    REVIEW OF SYSTEMS:    Patient denied fever, chills, abdominal pain, nausea, vomiting, cough, shortness of breath, chest pain or palpitations    Vital Signs Last 24 Hrs  T(C): 36.9 (09 Feb 2019 10:18), Max: 36.9 (09 Feb 2019 10:18)  T(F): 98.4 (09 Feb 2019 10:18), Max: 98.4 (09 Feb 2019 10:18)  HR: 86 (09 Feb 2019 10:18) (66 - 88)  BP: 140/77 (09 Feb 2019 10:18) (114/64 - 140/77)  BP(mean): --  RR: 18 (09 Feb 2019 10:18) (18 - 18)  SpO2: 100% (09 Feb 2019 10:18) (95% - 100%)I&O's Summary    PHYSICAL EXAM:  GENERAL: NAD, well-groomed  HEENT: PERRL, +EOMI, anicteric, no [place]  NECK: Supple, No JVD   CHEST/LUNG: CTA bilaterally; Normal effort  HEART: S1S2 Normal intensity, no murmurs, gallops or rubs noted  ABDOMEN: Soft, BS Normoactive, NT, ND, no HSM noted  EXTREMITIES:  2+ radial and DP pulses noted, no clubbing, cyanosis, or edema noted, Limitation of movement   SKIN: No rashes or lesions noted  NEURO: A&Ox3, no focal deficits noted, CN II-XII intact  PSYCH: Normal  mood and affect; insight/judgement appropriate  LABS:                        14.1   8.5   )-----------( 190      ( 08 Feb 2019 11:53 )             43.6     02-08    138  |  102  |  14.0  ----------------------------<  101  4.6   |  27.0  |  0.83    Ca    10.9<H>      08 Feb 2019 11:53  Phos  2.2     02-08  Mg     2.2     02-08          RADIOLOGY & ADDITIONAL TESTS:    MEDICATIONS:  MEDICATIONS  (STANDING):  aspirin  chewable 81 milliGRAM(s) Oral daily  atorvastatin 20 milliGRAM(s) Oral at bedtime  brinzolamide 1% Ophthalmic Suspension 1 Drop(s) Both EYES two times a day  docusate sodium 100 milliGRAM(s) Oral two times a day  finasteride 5 milliGRAM(s) Oral daily  heparin  Injectable 5000 Unit(s) SubCutaneous every 12 hours  lidocaine   Patch 1 Patch Transdermal daily  meropenem  IVPB 500 milliGRAM(s) IV Intermittent every 8 hours  meropenem  IVPB      polyethylene glycol 3350 17 Gram(s) Oral daily  saccharomyces boulardii 250 milliGRAM(s) Oral two times a day  tamsulosin 0.4 milliGRAM(s) Oral at bedtime    MEDICATIONS  (PRN):  acetaminophen   Tablet .. 650 milliGRAM(s) Oral every 6 hours PRN Temp greater or equal to 38C (100.4F), Moderate Pain (4 - 6)  lactulose Syrup 10 Gram(s) Oral daily PRN constipation

## 2019-03-12 ENCOUNTER — EMERGENCY (EMERGENCY)
Facility: HOSPITAL | Age: 84
LOS: 1 days | End: 2019-03-12
Attending: EMERGENCY MEDICINE
Payer: COMMERCIAL

## 2019-03-12 VITALS
SYSTOLIC BLOOD PRESSURE: 111 MMHG | WEIGHT: 154.98 LBS | DIASTOLIC BLOOD PRESSURE: 66 MMHG | HEART RATE: 68 BPM | RESPIRATION RATE: 18 BRPM | OXYGEN SATURATION: 98 % | TEMPERATURE: 98 F

## 2019-03-12 VITALS
OXYGEN SATURATION: 97 % | TEMPERATURE: 98 F | DIASTOLIC BLOOD PRESSURE: 65 MMHG | SYSTOLIC BLOOD PRESSURE: 123 MMHG | RESPIRATION RATE: 15 BRPM | HEART RATE: 62 BPM

## 2019-03-12 DIAGNOSIS — Z98.890 OTHER SPECIFIED POSTPROCEDURAL STATES: Chronic | ICD-10-CM

## 2019-03-12 DIAGNOSIS — Z95.5 PRESENCE OF CORONARY ANGIOPLASTY IMPLANT AND GRAFT: Chronic | ICD-10-CM

## 2019-03-12 LAB
ALBUMIN SERPL ELPH-MCNC: 2.7 G/DL — LOW (ref 3.3–5)
ALP SERPL-CCNC: 96 U/L — SIGNIFICANT CHANGE UP (ref 40–120)
ALT FLD-CCNC: 8 U/L — LOW (ref 12–78)
ANION GAP SERPL CALC-SCNC: 5 MMOL/L — SIGNIFICANT CHANGE UP (ref 5–17)
APTT BLD: 28.6 SEC — SIGNIFICANT CHANGE UP (ref 28.5–37)
AST SERPL-CCNC: 18 U/L — SIGNIFICANT CHANGE UP (ref 15–37)
BASOPHILS # BLD AUTO: 0.02 K/UL — SIGNIFICANT CHANGE UP (ref 0–0.2)
BASOPHILS NFR BLD AUTO: 0.3 % — SIGNIFICANT CHANGE UP (ref 0–2)
BILIRUB SERPL-MCNC: 0.8 MG/DL — SIGNIFICANT CHANGE UP (ref 0.2–1.2)
BUN SERPL-MCNC: 14 MG/DL — SIGNIFICANT CHANGE UP (ref 7–23)
CALCIUM SERPL-MCNC: 9.4 MG/DL — SIGNIFICANT CHANGE UP (ref 8.5–10.1)
CHLORIDE SERPL-SCNC: 111 MMOL/L — HIGH (ref 96–108)
CO2 SERPL-SCNC: 28 MMOL/L — SIGNIFICANT CHANGE UP (ref 22–31)
CREAT SERPL-MCNC: 0.86 MG/DL — SIGNIFICANT CHANGE UP (ref 0.5–1.3)
EOSINOPHIL # BLD AUTO: 0.15 K/UL — SIGNIFICANT CHANGE UP (ref 0–0.5)
EOSINOPHIL NFR BLD AUTO: 2.2 % — SIGNIFICANT CHANGE UP (ref 0–6)
GLUCOSE SERPL-MCNC: 96 MG/DL — SIGNIFICANT CHANGE UP (ref 70–99)
HCT VFR BLD CALC: 38.9 % — LOW (ref 39–50)
HGB BLD-MCNC: 12.5 G/DL — LOW (ref 13–17)
IMM GRANULOCYTES NFR BLD AUTO: 0.3 % — SIGNIFICANT CHANGE UP (ref 0–1.5)
INR BLD: 1.18 RATIO — HIGH (ref 0.88–1.16)
LYMPHOCYTES # BLD AUTO: 1.58 K/UL — SIGNIFICANT CHANGE UP (ref 1–3.3)
LYMPHOCYTES # BLD AUTO: 23 % — SIGNIFICANT CHANGE UP (ref 13–44)
MCHC RBC-ENTMCNC: 29.6 PG — SIGNIFICANT CHANGE UP (ref 27–34)
MCHC RBC-ENTMCNC: 32.1 GM/DL — SIGNIFICANT CHANGE UP (ref 32–36)
MCV RBC AUTO: 92.2 FL — SIGNIFICANT CHANGE UP (ref 80–100)
MONOCYTES # BLD AUTO: 0.63 K/UL — SIGNIFICANT CHANGE UP (ref 0–0.9)
MONOCYTES NFR BLD AUTO: 9.2 % — SIGNIFICANT CHANGE UP (ref 2–14)
NEUTROPHILS # BLD AUTO: 4.48 K/UL — SIGNIFICANT CHANGE UP (ref 1.8–7.4)
NEUTROPHILS NFR BLD AUTO: 65 % — SIGNIFICANT CHANGE UP (ref 43–77)
NRBC # BLD: 0 /100 WBCS — SIGNIFICANT CHANGE UP (ref 0–0)
PLATELET # BLD AUTO: 159 K/UL — SIGNIFICANT CHANGE UP (ref 150–400)
POTASSIUM SERPL-MCNC: 4 MMOL/L — SIGNIFICANT CHANGE UP (ref 3.5–5.3)
POTASSIUM SERPL-SCNC: 4 MMOL/L — SIGNIFICANT CHANGE UP (ref 3.5–5.3)
PROT SERPL-MCNC: 6.7 G/DL — SIGNIFICANT CHANGE UP (ref 6–8.3)
PROTHROM AB SERPL-ACNC: 13.5 SEC — HIGH (ref 10–12.9)
RBC # BLD: 4.22 M/UL — SIGNIFICANT CHANGE UP (ref 4.2–5.8)
RBC # FLD: 15.5 % — HIGH (ref 10.3–14.5)
SODIUM SERPL-SCNC: 144 MMOL/L — SIGNIFICANT CHANGE UP (ref 135–145)
WBC # BLD: 6.88 K/UL — SIGNIFICANT CHANGE UP (ref 3.8–10.5)
WBC # FLD AUTO: 6.88 K/UL — SIGNIFICANT CHANGE UP (ref 3.8–10.5)

## 2019-03-12 PROCEDURE — 80053 COMPREHEN METABOLIC PANEL: CPT

## 2019-03-12 PROCEDURE — 99285 EMERGENCY DEPT VISIT HI MDM: CPT

## 2019-03-12 PROCEDURE — 99291 CRITICAL CARE FIRST HOUR: CPT

## 2019-03-12 PROCEDURE — 82962 GLUCOSE BLOOD TEST: CPT

## 2019-03-12 PROCEDURE — 71045 X-RAY EXAM CHEST 1 VIEW: CPT

## 2019-03-12 PROCEDURE — 70450 CT HEAD/BRAIN W/O DYE: CPT | Mod: 26

## 2019-03-12 PROCEDURE — 93010 ELECTROCARDIOGRAM REPORT: CPT

## 2019-03-12 PROCEDURE — 85610 PROTHROMBIN TIME: CPT

## 2019-03-12 PROCEDURE — 71045 X-RAY EXAM CHEST 1 VIEW: CPT | Mod: 26

## 2019-03-12 PROCEDURE — 85027 COMPLETE CBC AUTOMATED: CPT

## 2019-03-12 PROCEDURE — 70450 CT HEAD/BRAIN W/O DYE: CPT

## 2019-03-12 PROCEDURE — 36415 COLL VENOUS BLD VENIPUNCTURE: CPT

## 2019-03-12 PROCEDURE — 93005 ELECTROCARDIOGRAM TRACING: CPT

## 2019-03-12 PROCEDURE — 85730 THROMBOPLASTIN TIME PARTIAL: CPT

## 2019-03-12 NOTE — ED ADULT NURSE NOTE - OBJECTIVE STATEMENT
Assumed care of pt at 1350 upon return from CT.  Pt A&Ox4, NIHSS 0.  Per pt's son, pt was "fine" this morning and he took him to his cardiologist for a f/u appt from last visit 6 months ago.  Cardiology was concerned for possible facial droop and sent to ED for eval.  No facial droop noted upon initial assessment. Pt denies CP, dizziness, shortness of breath, or any other complaints.

## 2019-03-12 NOTE — CONSULT NOTE ADULT - ASSESSMENT
89 year old male with a PMH of CAD s/p two cardiac stents (2014), BPH s/p prostate surgery (2017), UTI, GERD, HLD, presents to ED wtih facial droop.   - No signs of significant ischemia or volume overload.   - Pt refusing to staying for an MRI.   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   - He and the son refused the MR.   - EKG is without ischemic changes.   - Cont statin and asa  - Cont lasix   - BP controlled  - Monitor and replete electrolytes. Keep K>4.0 and Mg>2.0.   - Further cardiac workup will depend on clinical course.   - If signs out AMA will need a neuro outpt eval asap

## 2019-03-12 NOTE — ED PROVIDER NOTE - CARE PROVIDER_API CALL
Natasha Sorenson)  Neurology  [address] [place] [address]  Phone: [phone]  Fax: [phone]  Follow Up Time:

## 2019-03-12 NOTE — ED PROVIDER NOTE - PROGRESS NOTE DETAILS
The patient has expressed the desire to leave against medical advice.  It has been explained to the patient that leaving prior to completion of work up and treatment may result in recurrent or worsening of symptoms, severe permanent disability, pain and suffering, and/or even death.  The patient has demonstrated comprehension and verbalizes understanding of these risks.  The patient has been told that he/she must return to the ER immediately for return of symptoms, worsening symptoms, or any concerning symptoms.  The patient has also been told that he/she may return at any time if he/she wishes to resume care.The patient has been given the opportunity to ask questions.  pt and son stated that pt wanted to go  home bc he felt better and risks of cva and possibly death were explained to both of them.

## 2019-03-12 NOTE — ED PROVIDER NOTE - OBJECTIVE STATEMENT
Referred from [facility] for right facial droop, noted by cardiologist . As per son, pt's lip was deviated to left.  pt has no complaint.  pt was given aspirin 325mg by his cardiologist prior to ED arrival.  no other complaint.

## 2019-03-12 NOTE — ED ADULT NURSE NOTE - CHPI ED NUR SYMPTOMS NEG
no fever/no dizziness/no blurred vision/no weakness/no confusion/no numbness/no vomiting/no nausea/no change in level of consciousness/no loss of consciousness

## 2019-03-12 NOTE — ED ADULT NURSE NOTE - NSIMPLEMENTINTERV_GEN_ALL_ED
Implemented All Universal Safety Interventions:  [place] to call system. Call bell, personal items and telephone within reach. Instruct patient to call for assistance. Room bathroom lighting operational. Non-slip footwear when patient is off stretcher. Physically safe environment: no spills, clutter or unnecessary equipment. Stretcher in lowest position, wheels locked, appropriate side rails in place. Implemented All Fall with Harm Risk Interventions:  [place] to call system. Call bell, personal items and telephone within reach. Instruct patient to call for assistance. Room bathroom lighting operational. Non-slip footwear when patient is off stretcher. Physically safe environment: no spills, clutter or unnecessary equipment. Stretcher in lowest position, wheels locked, appropriate side rails in place. Provide visual cue, wrist band, yellow gown, etc. Monitor gait and stability. Monitor for mental status changes and reorient to person, place, and time. Review medications for side effects contributing to fall risk. Reinforce activity limits and safety measures with patient and family. Provide visual clues: red socks.

## 2019-03-12 NOTE — CONSULT NOTE ADULT - SUBJECTIVE AND OBJECTIVE BOX
CHIEF COMPLAINT: Patient is a 89y old  Male who presents with a chief complaint of CVA    HPI: 89 year old male with a PMH of CAD s/p two cardiac stents (2014), BPH s/p prostate surgery (2017), UTI, GERD, HLD, presents to ED with left facial droop. He went to see his cardiologist today (Dr. MARTINEZ Gary) and he noted that the patient had a left facial weakness and was sent to ED for further evaluation. Of note he had a recent echo at [facility] that showed  a mobile density on the aortic valve leaflet measuring 0.8 cm x 0.5 cm in size suggestive of fibroelastoma.   Pt son feel that there is no new facial defect  and that the defect is related to the lack of him wearing his dentures. The patient denies any motor or sensory weakness. Denies any chest pain, dyspnea, palpitations, PND, orthopnea, near syncope, syncope, lower extremity edema.    EKG: SR with 1st degree avb    REVIEW OF SYSTEMS:   All other review of systems are negative unless indicated above    PAST MEDICAL & SURGICAL HISTORY:  CAD (coronary artery disease)  GERD (gastroesophageal reflux disease)  Hyperlipidemia  Benign prostatic hyperplasia, presence of lower urinary tract symptoms unspecified, unspecified morphology  S/P ureteral stent placement: Left sided  History of prostate surgery  H/O heart artery stent: stent x 2      SOCIAL HISTORY:  No tobacco, ethanol, or drug abuse.    FAMILY HISTORY:  No pertinent family history in first degree relatives    No family history of acute MI or sudden cardiac death.         Allergies    No Known Allergies    Intolerances        Home meds:  Home Medications:  aspirin 81 mg oral tablet: 1 tab(s) orally once a day (2019 10:56)  dorzolamide 2% ophthalmic solution: 1 drop(s) to each affected eye 2 times a day (2019 10:56)  finasteride 5 mg oral tablet: 1 tab(s) orally once a day (2019 10:56)  simvastatin 20 mg oral tablet: 1 tab(s) orally once a day (at bedtime) (2019 10:58)  tamsulosin 0.4 mg oral capsule: 1 cap(s) orally once a day (at bedtime) (2019 10:58)  Tylenol 325 mg oral tablet: 2 tab(s) orally every 6 hours, As Needed mild to moderate pain  (2019 10:58)  lasix 20mg Qday      VITAL SIGNS:   Vital Signs Last 24 Hrs  T(C): 36.6 (12 Mar 2019 13:13), Max: 36.6 (12 Mar 2019 13:13)  T(F): 97.9 (12 Mar 2019 13:13), Max: 97.9 (12 Mar 2019 13:13)  HR: 63 (12 Mar 2019 13:55) (63 - 68)  BP: 116/60 (12 Mar 2019 13:55) (111/66 - 116/60)  BP(mean): --  RR: 16 (12 Mar 2019 13:55) (16 - 18)  SpO2: 98% (12 Mar 2019 13:55) (98% - 98%)    I&O's Summary      On Exam:     Constitutional: NAD, awake and alert, well-developed  HEENT: Moist Mucous Membranes, Anicteric  Pulmonary: Decreased breath sounds b/l. No rales, crackles or wheeze appreciated.   Cardiovascular: Regular, S1 and S2, No murmurs, rubs, gallops or clicks  Gastrointestinal: Bowel Sounds present, soft, nontender.   Lymph: No peripheral edema. No lymphadenopathy.  Skin: No visible rashes or ulcers.  Psych:  Mood & affect appropriate    LABS: All Labs Reviewed:                        12.   6.88  )-----------( 159      ( 12 Mar 2019 13:53 )             38.9     12 Mar 2019 13:53    144    |  111    |  14     ----------------------------<  96     4.0     |  28     |  0.86     Ca    9.4        12 Mar 2019 13:53    TPro  6.7    /  Alb  2.7    /  TBili  0.8    /  DBili  x      /  AST  18     /  ALT  8      /  AlkPhos  96     12 Mar 2019 13:53    PT/INR - ( 12 Mar 2019 13:53 )   PT: 13.5 sec;   INR: 1.18 ratio         PTT - ( 12 Mar 2019 13:53 )  PTT:28.6 sec      Blood Culture:         RADIOLOGY:    < from: TTE Echo Complete w/Doppler (19 @ 08:44) >    EXAM:  ECHO TRANSTHORACIC COMP W DOPP      PROCEDURE DATE:  2019   .      INTERPRETATION:  REPORT:    TRANSTHORACIC ECHOCARDIOGRAM REPORT         Patient Name:   IDRIS DANIEL Patient Location: Inpatient  Medical Rec #:  [id number]    Accession #:      [id number]  Account #:                  Height:           65.7 in 167.0 cm  YOB: 1929    Weight:           154.3 lb 70.01 kg  Patient Age:    89 years    BSA:              1.79 m²  Patient Gender: M           BP:               126/75 mmHg       Date of Exam: 2019 8:44:38 AM  Sonographer:  Vianney Ohara    Procedure:     2D Echo/Doppler/Color Doppler Complete.  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 Study quality was adversely                 affected due to body habitus.         2D AND M-MODE MEASUREMENTS (normal ranges within parentheses):  Left                Normal    Aorta/Left          Normal  Ventricle:              Atrium:  IVSd (2D):    0.93  (0.7-1.1) Aortic Root  3.34   (2.4-3.7)                cm              (2D):        cm  LVPWd (2D):   0.74  (0.7-1.1) Aortic Root  3.50   (2.4-3.7)                cm              (Mmode):     cm  LVIDd (2D):  3.36  (3.4-5.7) AoV Cusp     1.70   (1.5-2.6)                cm              Separation:  cm  LVIDs (2D):   2.33            Left Atrium  4.41   (1.9-4.0)                cm              (2D):        cm  LV FS (2D):   30.7  (>25%)    Left Atrium  4.30   (1.9-4.0)                %               (Mmode):     cm  Relative Wall 0.44  (<0.42)   LA Volume    18.6  Thickness                     Index        ml/m²                                Right Ventricle:                                RVd (2D):    2.59 cm                                TAPSE:           1.94 cm    LV DIASTOLIC FUNCTION:  MV Peak E: 0.69 m/s E/e' Ratio: 10.70  MV Peak A: 1.20 m/s Decel Time: 180 msec  E/A Ratio: 0.57    SPECTRAL DOPPLER ANALYSIS (where applicable):  Mitral Valve:  MV P1/2 Time: 52.20 msec  MV Area, PHT: 4.21 cm²    Aortic Valve: AoV Max Zay: 1.00 m/s AoV Peak P.0 mmHg AoV Mean PG:   3.0 mmHg    LVOT Vmax: 0.90 m/s LVOT VTI: 0.193 m LVOT Diameter: 2.00 cm    AoV Area, Vmax: 2.83 cm² AoV Area, VTI: 3.03 cm² AoV Area, Vmn: 2.55 cm²  Ao VTI: 0.200  Tricuspid Valve and PA/RV Systolic Pressure: TR Max Velocity: 2.25 m/s RA   Pressure: 10 mmHg RVSP/PASP: 30.2 mmHg       PHYSICIAN INTERPRETATION:  Left Ventricle: Endocardial visualization was enhanced with intravenous   echo contrast. The left ventricle was not well visualized. The left   ventricular internal cavity size is normal.  Left ventricular ejection fraction, by visual estimation, is 50 to 55%.   Spectral Doppler shows impaired relaxation pattern of left ventricular   myocardial filling (Grade I diastolic dysfunction). Normal LV filling   pressures. Normal wall motion.  Right Ventricle: Normal right ventricular size and function.  Left Atrium: The left atrium is normal in size.  Right Atrium: The right atrium is normal in size.  Pericardium: There is no evidence of pericardial effusion.  Mitral Valve: Structurally normal mitral valve, with normal leaflet   excursion. Trace mitral valve regurgitation is seen.  Tricuspid Valve: The tricuspid valve is normal. Mild tricuspid   regurgitation is visualized.  Aortic Valve: Sclerotic aortic valve with normal opening. Peak Av   velocity is 1.00 m/s, mean transaortic gradient equals 3.0 mmHg, the   calculated aortic valve area equals 3.03 cm² by the continuity equation   consistent with normally opening aortic valve. There is a mobile density   on the aortic valve leaflet measuring 0.8 cm x 0.5 cm in size suggetive   of fibroelastoma. Other differential is vegetation. Suggest clinical   correlation.  Pulmonic Valve: Structurally normal pulmonic valve, with normal leaflet   excursion.  Aorta: The aortic root is normal in size and structure.  Pulmonary Artery: The main pulmonary artery is normal in size.  Venous: The pulmonary veins appear normal. The inferior vena cava is not   well visualized.       Summary:   1. Endocardial visualization was enhanced with intravenous echo contrast.   2. The left atrium is normal in size.   3. Normal wall motion. Left ventricular ejection fraction, by visual   estimation, is 50 to 55%.   4. The right atrium is normal in size.   5. Normal right ventricular size and function.   6. There is a mobile density on the aortic valve leaflet measuring 0.8   cm x 0.5 cm in size suggetive of fibroelastoma. Other differential is   vegetation. Suggest clinical correlation.   7. Mild tricuspid regurgitation.   8. There is no evidence of pericardial effusion.    [id number] Ferny Morrissey MD, VI, Electronically signed on 2019 at   4:54:25 PM         *** Final ***                  FERNY MORRISSEY M.D., ATTENDING CARDIOLOGY  This document has been electronically signed. 2019  8:44AM            < end of copied text >       < from: Xray Chest 1 View AP/PA (19 @ 14:25) >    EXAM:  XR CHEST AP OR PA 1V                            PROCEDURE DATE:  2019          INTERPRETATION:  Stroke code.    AP chest. Prior dated 2019.    Low lung volumes. No change heart mediastinum. Bibasilar   atelectasis/crowded markings secondary to shallow inspiration. No gross   focal infiltrate or pleural effusion.    Impression: No gross infiltrates.                CAROLYNN FAITH M.D., ATTENDING RADIOLOGIST  This document has been electronically signed. Mar 12 2019  2:31PM                < end of copied text >  < from: CT Brain Stroke Protocol (19 @ 13:38) >    EXAM:  CT BRAIN STROKE PROTOCOL                            PROCEDURE DATE:  2019          INTERPRETATION:  Clinical History: Facial droop.    Noncontrast CT scan of brain is performed with axial images obtained from   skull base to vertex.    Comparison: CT scan 2019    There is cerebral volume loss, without mass effect or midline shift.  Periventricular white matter low attenuation is most compatible with   chronic microvascular ischemic disease.    No acute parenchymal hemorrhage isnoted.     No extra-axial fluid collection is demonstrated.    The visualized paranasal sinuses appear unremarkable.    Impression:    No acute intracranial hemorrhage or mass effect noted.    This finding was discussed with Dr. Bro  by telephone atthe time of   interpretation on 3/12/2019.                            FABIÁN LIN M.D., ATTENDING RADIOLOGIST  This document has been electronically signed. Mar 12 2019  1:46PM                < end of copied text >

## 2019-06-24 NOTE — CONSULT NOTE ADULT - SUBJECTIVE AND OBJECTIVE BOX
HISTORY OF PRESENT ILLNESS:   Mr. Gomez is an 88 y/o male with PMH of CAD, BPH and HLD presented to the ED 2/6 after a fall. Patient reported he fell on his side hitting his head without LOC as he was walking out of the bathroom. Per the documentation his granddaughter reports multiple falls over the previous 3 months with the fall preceding this admission totalling 7 falls. The fall prior to this episode, he suffered fractured ribs. Prior to that, he fell and was in rehab for almost a month. The patient reports he feels there is consistent prodromal symptoms prior to his falls and denies mechanical instability, scissoring gait or unsteadiness/balance changes. The family denies any recent cognitive decline. He denies radicular pain or bowel or bladder dysfunction aberrant from his baseline. He is currently being worked up for a.fib or dysrhythmia. A CT of the chest revealed an age-indeterminate T3 fracture, new since his October 2018 imaging.     PAST MEDICAL & SURGICAL HISTORY:  CAD (coronary artery disease)  GERD (gastroesophageal reflux disease)  Hyperlipidemia  Benign prostatic hyperplasia, presence of lower urinary tract symptoms unspecified, unspecified morphology  S/P ureteral stent placement: Left sided  History of prostate surgery  H/O heart artery stent: stent x 2    FAMILY HISTORY:  None reported    SOCIAL HISTORY:  Ambulates independently    Allergies  No Known Allergies    REVIEW OF SYSTEMS  12 pt ROS otherwise unremarkable as per HPI    MEDICATIONS:  meropenem  IVPB 500 milliGRAM(s) IV Intermittent every 8 hours  meropenem  IVPB      acetaminophen   Tablet .. 650 milliGRAM(s) Oral every 6 hours PRN  aspirin  chewable 81 milliGRAM(s) Oral daily  heparin  Injectable 5000 Unit(s) SubCutaneous every 12 hours  atorvastatin 20 milliGRAM(s) Oral at bedtime  brinzolamide 1% Ophthalmic Suspension 1 Drop(s) Both EYES two times a day  docusate sodium 100 milliGRAM(s) Oral two times a day  finasteride 5 milliGRAM(s) Oral daily  lactulose Syrup 10 Gram(s) Oral daily PRN  lidocaine   Patch 1 Patch Transdermal daily  polyethylene glycol 3350 17 Gram(s) Oral daily  saccharomyces boulardii 250 milliGRAM(s) Oral two times a day  tamsulosin 0.4 milliGRAM(s) Oral at bedtime    Vital Signs Last 24 Hrs  T(C): 36.9 (08 Feb 2019 08:00), Max: 37 (08 Feb 2019 00:06)  T(F): 98.4 (08 Feb 2019 08:00), Max: 98.6 (08 Feb 2019 00:06)  HR: 74 (08 Feb 2019 08:00) (66 - 74)  BP: 126/75 (08 Feb 2019 08:00) (103/57 - 126/75)  RR: 18 (08 Feb 2019 08:00) (18 - 18)  SpO2: 98% (08 Feb 2019 08:00) (98% - 99%)  PHYSICAL EXAM:  GENERAL: NAD, well-groomed, frail  HEAD:  Atraumatic, normocephalic  EYES: +arcus senalis  ENMT: moist mucous membranes, poor dentition, no lesions  NECK: Supple, no JVD  MENTAL STATUS: (patient speaks English but occasionally family assists in native language) Alert and oriented to self, place, naming family and high frequency objects, able to follow commands  CRANIAL NERVES: PERRL; EOMI; no asymmetric facial activation; facial sensation grossly intact to light touch b/l;  tongue midline  MOTOR: strength 5/5 B/L upper and lower extremities - no focal deficits  SPINE: TTP over the midthoracic region  SENSATION: grossly intact to light touch all extremities  MUSCLE STRETCH REFLEXES: no clonus b/l  PLANTAR: downgoing  CHEST/LUNG: Clear to auscultation bilaterally  HEART: +S1/+S2; Regular rate and rhythm  ABDOMEN: Soft, nontender, nondistended  EXTREMITIES:  2+ peripheral pulses, no clubbing, cyanosis, or edema  SKIN: Warm, dry; no rashes or lesions    LABS:             14.1   8.5   )-----------( 190                  43.6     138  |  102  |  14.0  ----------------------------<  101  4.6   |  27.0  |  0.83    Ca    10.9<H>        Phos  2.2       Mg     2.2         RADIOLOGY & ADDITIONAL STUDIES:  CT Chest No Cont (02.06.19 @ 16:08)  IMPRESSION:   New compression fracture involving the superior endplate of T3, either   acute or subacute.
HPI:  88 y/o male with PMH of CAD, BPH, HLD came to the ED complaining of fall. Patient said he fell on his side hitting his head too as he was walking out of the bathroom. As per patient's granddaughter at bed side, patient has been falling frequently in the last 3 months. Patient said this is the 7th time. The last time prior to this episode, he suffered fractured ribs. Prior to that, he fell and was in rehab for almost a month. Patient also reported dizziness with standing. Patient reported pain in his chest and back. He has no shortness of breath, palpitation, fever, chills, abdominal pain, no change in bowel/urinary habit, no weakness, tingling, numbness, LOC, syncope. (2019 22:17)    Urologic consultation called for CT findings.  The patient has a history of BPH and is taking flomax 0.4 mg and finasteride.  He is s/p TURP by Dr. Reese a couple years ago.  The patient reports voiding well without difficulty.  He denies gross hematuria or dysuria.  The patient also has a history of stones.  He is s/p left stent insertion by Dr. Luis F Bundy 2018 for obstructing stone and sepsis.  He reports having follow up next month.  The patient denies flank pain.    PAST MEDICAL & SURGICAL HISTORY:  CAD (coronary artery disease)  GERD (gastroesophageal reflux disease)  Hyperlipidemia  Benign prostatic hyperplasia, presence of lower urinary tract symptoms unspecified, unspecified morphology  S/P ureteral stent placement: Left sided  History of prostate surgery  H/O heart artery stent: stent x 2      REVIEW OF SYSTEMS:    Constitutional: No fever, weight loss or fatigue  Eyes: No eye pain, visual disturbances, or discharge  ENMT:  No difficulty hearing, tinnitus, vertigo; No sinus or throat pain  Respiratory: No cough, wheezing, chills or hemoptysis  Cardiovascular: No chest pain, palpitations, shortness of breath, dizziness or leg swelling  Gastrointestinal: No abdominal or epigastric pain. No nausea, vomiting or hematemesis; No diarrhea or constipation. No melena or hematochezia.  Genitourinary: No dysuria, frequency, hematuria or incontinence  Rectal: No pain, hemorrhoids or incontinence  Neurological: No headaches, memory loss, loss of strength, numbness or tremors  Skin: No itching, burning, rashes or lesions   Lymph Nodes: No enlarged glands  Musculoskeletal: No joint pain or swelling; No muscle, back or extremity pain  Psychiatric: No depression, anxiety, mood swings or difficulty sleeping  Heme/Lymph: No easy bruising or bleeding gums  Allergy and Immunologic: No hives or eczema    MEDICATIONS  (STANDING):  aspirin  chewable 81 milliGRAM(s) Oral daily  atorvastatin 20 milliGRAM(s) Oral at bedtime  cefTRIAXone   IVPB 1 Gram(s) IV Intermittent every 24 hours  dorzolamide 2% Ophthalmic Solution 1 Drop(s) Both EYES <User Schedule>  finasteride 5 milliGRAM(s) Oral daily  saccharomyces boulardii 250 milliGRAM(s) Oral two times a day  tamsulosin 0.4 milliGRAM(s) Oral at bedtime    MEDICATIONS  (PRN):  acetaminophen   Tablet .. 650 milliGRAM(s) Oral every 6 hours PRN Temp greater or equal to 38C (100.4F), Moderate Pain (4 - 6)      Allergies    No Known Allergies    Intolerances        SOCIAL HISTORY:    FAMILY HISTORY:  No pertinent family history in first degree relatives      Vital Signs Last 24 Hrs  T(C): 36.6 (2019 08:11), Max: 36.9 (2019 20:35)  T(F): 97.9 (2019 08:11), Max: 98.5 (2019 20:35)  HR: 72 (2019 08:11) (66 - 94)  BP: 134/89 (2019 08:11) (130/69 - 150/72)  BP(mean): --  RR: 18 (2019 08:11) (17 - 19)  SpO2: 96% (2019 08:11) (96% - 98%)    PHYSICAL EXAM:    General: Well developed; well nourished; in no acute distress  Head: Normocephalic; atraumatic  Respiratory: No tachypnea  Gastrointestinal: Soft non-tender non-distended;   Genitourinary: No costovertebral angle tenderness.  Urinary bladder is clinically not distended  Extremities: Normal range of motion, No edema  Neurological: Alert and oriented x4  Skin: Warm and dry. No acute rash  Psychiatric: Cooperative and appropriate      LABS:                        13.1   8.5   )-----------( 147      ( 2019 13:42 )             39.5     02-06    134<L>  |  100  |  13.0  ----------------------------<  96  4.4   |  24.0  |  1.20    Ca    10.7<H>      2019 13:40  Mg     2.1         TPro  7.6  /  Alb  3.5  /  TBili  0.7  /  DBili  x   /  AST  23  /  ALT  6   /  AlkPhos  119      PT/INR - ( 2019 13:40 )   PT: 14.4 sec;   INR: 1.24 ratio         PTT - ( 2019 13:40 )  PTT:30.2 sec  Urinalysis Basic - ( 2019 18:37 )    Color: Yellow / Appearance: Slightly Turbid / S.015 / pH: x  Gluc: x / Ketone: Negative  / Bili: Negative / Urobili: Negative mg/dL   Blood: x / Protein: 100 mg/dL / Nitrite: Positive   Leuk Esterase: Moderate / RBC: >50 /HPF / WBC >50   Sq Epi: x / Non Sq Epi: Occasional / Bacteria: Many        RADIOLOGY & ADDITIONAL STUDIES:
Pupils equal, round and reactive to light, Extra-ocular movement intact, eyes are clear b/l

## 2019-10-28 ENCOUNTER — EMERGENCY (EMERGENCY)
Facility: HOSPITAL | Age: 84
LOS: 1 days | Discharge: HOME | End: 2019-10-28
Attending: EMERGENCY MEDICINE | Admitting: EMERGENCY MEDICINE
Payer: COMMERCIAL

## 2019-10-28 VITALS
DIASTOLIC BLOOD PRESSURE: 57 MMHG | TEMPERATURE: 99 F | OXYGEN SATURATION: 94 % | SYSTOLIC BLOOD PRESSURE: 90 MMHG | RESPIRATION RATE: 16 BRPM | HEART RATE: 89 BPM

## 2019-10-28 VITALS
WEIGHT: 160.06 LBS | DIASTOLIC BLOOD PRESSURE: 59 MMHG | RESPIRATION RATE: 16 BRPM | HEIGHT: 68 IN | SYSTOLIC BLOOD PRESSURE: 93 MMHG | TEMPERATURE: 100 F | OXYGEN SATURATION: 94 % | HEART RATE: 106 BPM

## 2019-10-28 DIAGNOSIS — Z98.890 OTHER SPECIFIED POSTPROCEDURAL STATES: Chronic | ICD-10-CM

## 2019-10-28 DIAGNOSIS — Z95.5 PRESENCE OF CORONARY ANGIOPLASTY IMPLANT AND GRAFT: Chronic | ICD-10-CM

## 2019-10-28 PROCEDURE — 99284 EMERGENCY DEPT VISIT MOD MDM: CPT | Mod: 25

## 2019-10-28 PROCEDURE — 70450 CT HEAD/BRAIN W/O DYE: CPT | Mod: 26

## 2019-10-28 PROCEDURE — 99284 EMERGENCY DEPT VISIT MOD MDM: CPT

## 2019-10-28 PROCEDURE — 72125 CT NECK SPINE W/O DYE: CPT | Mod: 26

## 2019-10-28 PROCEDURE — 70450 CT HEAD/BRAIN W/O DYE: CPT

## 2019-10-28 PROCEDURE — 72125 CT NECK SPINE W/O DYE: CPT

## 2019-10-28 RX ORDER — DORZOLAMIDE HYDROCHLORIDE 20 MG/ML
1 SOLUTION/ DROPS OPHTHALMIC
Qty: 0 | Refills: 0 | DISCHARGE

## 2019-10-28 NOTE — ED ADULT TRIAGE NOTE - CHIEF COMPLAINT QUOTE
was trying to get up from bed, got dizzy and fell, c/o pain to the head, patient's son told ems that patient has been leaking urine

## 2019-10-28 NOTE — ED PROVIDER NOTE - PATIENT PORTAL LINK FT
You can access the FollowMyHealth Patient Portal offered by [facility] by registering at the following website: [email or web address]. By joining [employer]’s FollowMyHealth portal, you will also be able to view your health information using other applications (apps) compatible with our system.

## 2019-10-28 NOTE — ED ADULT NURSE NOTE - OBJECTIVE STATEMENT
Patient with history of UTI, BPH, hyperlipidemia and CAD BIBA from home with c/o fall.  Per EMS, patient felt dizzy before falling.  Son at bedside states patient got up to use the bathroom without his walker.  Patient recently treated for UTI, completed antibiotics and has follow up on Wednesday with urologist.  No fevers or chills.  Patient is at mental baseline.  Patient c/o neck pain.  Limited ROM secondary to pain.  No medications taken PTA.  No blood thinners.  Patient changed into hospital gown and red socks placed on patient.  Patient boosted and in position of comfort.  Stretcher in lowest position with guard rails up.  Safety maintained.  Son is at the bedside.  Pending CT.

## 2019-10-28 NOTE — ED PROVIDER NOTE - OBJECTIVE STATEMENT
89yo male bib ems s/p fall. pt was trying to get out of bed with his walker and slipped and fell forward, no LOC< pt was not able to get up with sons assistance, pt c/o neck pain, no back pain, no hip pain, no other compalints

## 2019-10-28 NOTE — ED ADULT TRIAGE NOTE - MEANS OF ARRIVAL
"Nutrition services: Day 3 of admit.  Alis Sparks is a 28 y.o. female with admitting DX of DKA and pancreatitis.  Patient with unplanned weight loss noted in nutrition admit screen.  Patient recently admitted and weight as of 6/2/18 was 82.7 kg via stand-up scale; 4/8/18 admit weight was 81.5 kg.  I visited with patient at bedside this morning who reports that when she was last admitted she was not eating well and noted weight loss.  She states that her usual body weight was 185 - 190 pounds.  Patient does admit that since last discharged her appetite has been adequate and she has been eating well.    Assessment:  Height: 165.1 cm (5' 5\")  Weight: 77 kg (169 lb 12.1 oz)  Body mass index is 28.25 kg/m².  Diet/Intake: Diabetic diet; > 50-75% of meals consumed    Evaluation:   1. A1c 6/20: 8.8; Admit glucose was 445; Lantus and SSI per MAR  2. Dehydration from nausea and vomiting from DKA; hyponatremia. Na: 133  3. Weight loss of 8% reported in the last month which is considered severe.  Per chart review weight, since April patient has lost ~ 5% which is considered insignificant. Question accuracy of patients report of weight loss based on chart review.     Recommendations/Plan:  1. Encourage intake of meals.  2. Document intake of all meals/snacks as % taken in ADL's to provide interdisciplinary communication across all shifts.   3. Monitor weight.  4. Nutrition rep will continue to see patient for ongoing meal and snack preferences.     RD is available PRN        "
stretcher

## 2019-10-28 NOTE — ED ADULT NURSE REASSESSMENT NOTE - NS ED NURSE REASSESS COMMENT FT1
ED MD aware of patient's repeat vitals.  No interventions at this time.  Patient is sleeping, NAD.  Stretcher in the lowest position, guard rails up.  Safety maintained.  Son is still at the bedside.  Pending CT results.

## 2019-10-28 NOTE — ED ADULT NURSE NOTE - NSIMPLEMENTINTERV_GEN_ALL_ED
Implemented All Fall Risk Interventions:  [place] to call system. Call bell, personal items and telephone within reach. Instruct patient to call for assistance. Room bathroom lighting operational. Non-slip footwear when patient is off stretcher. Physically safe environment: no spills, clutter or unnecessary equipment. Stretcher in lowest position, wheels locked, appropriate side rails in place. Provide visual cue, wrist band, yellow gown, etc. Monitor gait and stability. Monitor for mental status changes and reorient to person, place, and time. Review medications for side effects contributing to fall risk. Reinforce activity limits and safety measures with patient and family.

## 2019-12-16 ENCOUNTER — LABORATORY RESULT (OUTPATIENT)
Age: 84
End: 2019-12-16

## 2019-12-16 ENCOUNTER — APPOINTMENT (OUTPATIENT)
Dept: UROLOGY | Facility: CLINIC | Age: 84
End: 2019-12-16
Payer: MEDICARE

## 2019-12-16 VITALS
BODY MASS INDEX: 24.43 KG/M2 | HEIGHT: 66 IN | DIASTOLIC BLOOD PRESSURE: 78 MMHG | WEIGHT: 152 LBS | HEART RATE: 60 BPM | SYSTOLIC BLOOD PRESSURE: 134 MMHG | OXYGEN SATURATION: 98 %

## 2019-12-16 DIAGNOSIS — Z78.9 OTHER SPECIFIED HEALTH STATUS: ICD-10-CM

## 2019-12-16 PROCEDURE — 99215 OFFICE O/P EST HI 40 MIN: CPT | Mod: 25

## 2019-12-16 PROCEDURE — 51798 US URINE CAPACITY MEASURE: CPT

## 2019-12-16 NOTE — REVIEW OF SYSTEMS
[Feeling Tired] : feeling tired [Recent Weight Loss (___ Lbs)] : recent [unfilled] ~Ulb weight loss [Eyesight Problems] : eyesight problems [Genital yeast infection] : genital yeast infection [Constipation] : constipation [No erections] : no erections [History of kidney stones] : history of kidney stones [Urine Infection (bladder/kidney)] : bladder/kidney infection [Wake up at night to urinate  How many times?  ___] : wakes up to urinate [unfilled] times during the night [Urine retention] : urine retention [Dizziness] : dizziness [Limb Weakness] : limb weakness [Difficulty Walking] : difficulty walking [Anxiety] : anxiety [Negative] : Heme/Lymph

## 2019-12-16 NOTE — LETTER BODY
[Dear  ___] : Dear  [unfilled], [Consult Letter:] : I had the pleasure of evaluating your patient, [unfilled]. [( Thank you for referring [unfilled] for consultation for _____ )] : Thank you for referring [unfilled] for consultation for [unfilled] [Consult Closing:] : Thank you very much for allowing me to participate in the care of this patient.  If you have any questions, please do not hesitate to contact me. [Please see my note below.] : Please see my note below. [Sincerely,] : Sincerely, [FreeTextEntry3] : Jordan Barahona MD\par  of Urology\par [facility]\par \par Offices:\par [facility] @\par [address], [place] [address]\par and\par [address], [place] [address], [address]\par and\par [address] [address]\par \par TEL: [phone]\par FAX: [phone]

## 2019-12-16 NOTE — PHYSICAL EXAM
[Normal Appearance] : normal appearance [General Appearance - In No Acute Distress] : no acute distress [] : no respiratory distress [FreeTextEntry1] : normal peripheral circulation  [Abdomen Soft] : soft [Abdomen Mass (___ Cm)] : no abdominal mass palpated [Costovertebral Angle Tenderness] : no ~M costovertebral angle tenderness [Abdomen Tenderness] : non-tender [Urethral Meatus] : meatus normal [Scrotum] : the scrotum was normal [Penis Abnormality] : normal uncircumcised penis [Epididymis] : the epididymides were normal [Testes Tenderness] : no tenderness of the testes [Prostate Tenderness] : the prostate was not tender [Testes Mass (___cm)] : there were no testicular masses [No Prostate Nodules] : no prostate nodules [Prostate Size ___ gm] : prostate size [unfilled] gm [No Focal Deficits] : no focal deficits [Normal Station and Gait] : the gait and station were normal for the patient's age [Skin Color & Pigmentation] : normal skin color and pigmentation [Oriented To Time, Place, And Person] : oriented to person, place, and time [No Palpable Adenopathy] : no palpable adenopathy

## 2019-12-16 NOTE — HISTORY OF PRESENT ILLNESS
[FreeTextEntry1] : 89 yo male presents for history of Benign Prostatic Hyperplasia and Kidney stone \par Was following with Dr Reese([facility]) will like to follow here. \par Status post Transurethral vaporization of prostate using Greenlight laser in Nov 2017 and Laser cystolithotripsy and litholapaxy in November 2019. Post op course complicated by Sepsis. \par Taking Flomax and Finasteride. Urinates with reasonable stream 2-3 x during the day and 5-6 x at night(per son). Denies hesitancy, straining and urinary incontinence.\par Denies dysuria, hematuria, lower abdominal or flank pain, fever, chills or rigors. \par \par Patient from my previous practice.

## 2019-12-16 NOTE — ASSESSMENT
[FreeTextEntry1] : Reviewed outside records. \par \par Kidney stone:\par Discussed the management options for non obstructing kidney stones- watch and wait Vs Ureteroscopy Vs Shock wave lithotripsy(if radio-opaque) Vs Percutaneous nephrolithotomy. Risks and benefits of each modality were discussed. Pt not interested in active intervention at this point. \par  \par \par Benign Prostatic Hyperplasia:\par Continue Flomax and Finasteride. \par Will get Urinalysis with reflex Urine culture.\par \par Renal and Bladder Ultrasound before follow up appointment. \par Return to office in 4 weeks or sooner if any issues.

## 2019-12-17 LAB
APPEARANCE: ABNORMAL
BILIRUBIN URINE: NEGATIVE
BLOOD URINE: ABNORMAL
COLOR: YELLOW
GLUCOSE QUALITATIVE U: NEGATIVE
KETONES URINE: NEGATIVE
LEUKOCYTE ESTERASE URINE: ABNORMAL
NITRITE URINE: POSITIVE
PH URINE: 6
PROTEIN URINE: NORMAL
SPECIFIC GRAVITY URINE: 1.02
UROBILINOGEN URINE: NORMAL

## 2019-12-30 ENCOUNTER — MEDICATION RENEWAL (OUTPATIENT)
Age: 84
End: 2019-12-30

## 2019-12-30 ENCOUNTER — MED ADMIN CHARGE (OUTPATIENT)
Age: 84
End: 2019-12-30

## 2020-02-03 ENCOUNTER — APPOINTMENT (OUTPATIENT)
Dept: UROLOGY | Facility: CLINIC | Age: 85
End: 2020-02-03
Payer: MEDICARE

## 2020-02-03 VITALS
HEIGHT: 66 IN | BODY MASS INDEX: 26.52 KG/M2 | WEIGHT: 165 LBS | OXYGEN SATURATION: 96 % | HEART RATE: 71 BPM | DIASTOLIC BLOOD PRESSURE: 69 MMHG | SYSTOLIC BLOOD PRESSURE: 129 MMHG

## 2020-02-03 PROCEDURE — 99214 OFFICE O/P EST MOD 30 MIN: CPT

## 2020-02-03 NOTE — ASSESSMENT
[FreeTextEntry1] : Reviewed outside records. \par \par Benign Prostatic Hyperplasia:\par Bladder Diverticulum:\par ESBL UTI:\par Kidney stone:\par Discussed treatment options. \par Will like to continue medical therapy.\par Discussed incomplete bladder emptying secondary to bladder diverticulum or large non obstructing left kidney stone possible source. Pt asymptomatic. Will continue to observe. \par Asked to drop off urine if gets symptomatic and start Nitrofurantoin. \par \par Return to office in 6 months or sooner if any issues- will do Uroflo and PVR.\par \par \par

## 2020-02-03 NOTE — HISTORY OF PRESENT ILLNESS
[FreeTextEntry1] : 89 yo male presents for follow up.\par Taking Flomax and Finasteride- urinating well. \par After last visit treated for positive Urine culture. \par Had Renal and Bladder Ultrasound. \par Denies dysuria, hematuria, lower abdominal or flank pain, fever, chills or rigors. \par \par Initially seen for history of Benign Prostatic Hyperplasia and Kidney stone.\par Was following with Dr Reese([facility]). Wished to follow here. \par Status post Transurethral vaporization of prostate using Greenlight laser in Nov 2017 and Laser cystolithotripsy and litholapaxy in November 2019. Post op course complicated by Sepsis. \par On Flomax and Finasteride. Reported reasonable stream 2-3 x during the day and 5-6 x at night(per son). Denied hesitancy, straining and urinary incontinence.\par Denied dysuria, hematuria, lower abdominal or flank pain, fever, chills or rigors. \par \par Patient from my previous practice.

## 2020-02-03 NOTE — LETTER BODY
[Dear  ___] : Dear  [unfilled], [Courtesy Letter:] : I had the pleasure of seeing your patient, [unfilled], in my office today. [Please see my note below.] : Please see my note below. [Sincerely,] : Sincerely, [FreeTextEntry3] : Jordan Barahona MD\par  of Urology\par [facility]\par \par Offices:\par [facility] @\par [address], [place] [address]\par and\par [address], [place] [address], [address]\par and\par [address] [address]\par \par TEL: [phone]\par FAX: [phone]

## 2020-08-22 NOTE — H&P ADULT - PROBLEM SELECTOR PLAN 5
1) ESRD on HD  2) Severe AS s/p TAVR  3) Anemia requiring PRBC transfusions  4) HTN- Bp stable    Pt euvolemic, lytes acceptable  No indication for HD today  Resume HD outpt M-F schedule Chronic, stable  -Continue finasteride and tamsulosin

## 2020-11-04 NOTE — PHYSICAL THERAPY INITIAL EVALUATION ADULT - LEVEL OF CONSCIOUSNESS, REHAB EVAL
Pt left voicemail stating he would like a call back from Dr. Griggs regarding his diclofenac 75 mg.  Per pt, it is making him nauseous and he would like recommendations on what other medication he can use.   alert

## 2021-02-05 ENCOUNTER — RX RENEWAL (OUTPATIENT)
Age: 86
End: 2021-02-05

## 2021-03-10 ENCOUNTER — RX RENEWAL (OUTPATIENT)
Age: 86
End: 2021-03-10

## 2021-04-29 ENCOUNTER — INPATIENT (INPATIENT)
Facility: HOSPITAL | Age: 86
LOS: 4 days | Discharge: HOME | DRG: 872 | End: 2021-05-04
Attending: STUDENT IN AN ORGANIZED HEALTH CARE EDUCATION/TRAINING PROGRAM | Admitting: STUDENT IN AN ORGANIZED HEALTH CARE EDUCATION/TRAINING PROGRAM
Payer: COMMERCIAL

## 2021-04-29 ENCOUNTER — TRANSCRIPTION ENCOUNTER (OUTPATIENT)
Age: 86
End: 2021-04-29

## 2021-04-29 VITALS
WEIGHT: 179.9 LBS | TEMPERATURE: 101 F | HEART RATE: 125 BPM | RESPIRATION RATE: 18 BRPM | DIASTOLIC BLOOD PRESSURE: 70 MMHG | SYSTOLIC BLOOD PRESSURE: 113 MMHG | HEIGHT: 68 IN | OXYGEN SATURATION: 95 %

## 2021-04-29 DIAGNOSIS — N40.0 BENIGN PROSTATIC HYPERPLASIA WITHOUT LOWER URINARY TRACT SYMPTOMS: ICD-10-CM

## 2021-04-29 DIAGNOSIS — Z98.890 OTHER SPECIFIED POSTPROCEDURAL STATES: Chronic | ICD-10-CM

## 2021-04-29 DIAGNOSIS — N20.0 CALCULUS OF KIDNEY: ICD-10-CM

## 2021-04-29 DIAGNOSIS — R13.10 DYSPHAGIA, UNSPECIFIED: ICD-10-CM

## 2021-04-29 DIAGNOSIS — I25.10 ATHEROSCLEROTIC HEART DISEASE OF NATIVE CORONARY ARTERY WITHOUT ANGINA PECTORIS: ICD-10-CM

## 2021-04-29 DIAGNOSIS — Z95.5 PRESENCE OF CORONARY ANGIOPLASTY IMPLANT AND GRAFT: Chronic | ICD-10-CM

## 2021-04-29 DIAGNOSIS — Z29.9 ENCOUNTER FOR PROPHYLACTIC MEASURES, UNSPECIFIED: ICD-10-CM

## 2021-04-29 DIAGNOSIS — N39.0 URINARY TRACT INFECTION, SITE NOT SPECIFIED: ICD-10-CM

## 2021-04-29 DIAGNOSIS — A41.9 SEPSIS, UNSPECIFIED ORGANISM: ICD-10-CM

## 2021-04-29 LAB
ALBUMIN SERPL ELPH-MCNC: 3.1 G/DL — LOW (ref 3.3–5)
ALP SERPL-CCNC: 81 U/L — SIGNIFICANT CHANGE UP (ref 40–120)
ALT FLD-CCNC: 8 U/L — LOW (ref 12–78)
ANION GAP SERPL CALC-SCNC: 8 MMOL/L — SIGNIFICANT CHANGE UP (ref 5–17)
APPEARANCE UR: CLEAR — SIGNIFICANT CHANGE UP
APTT BLD: 27.8 SEC — SIGNIFICANT CHANGE UP (ref 27.5–35.5)
AST SERPL-CCNC: 18 U/L — SIGNIFICANT CHANGE UP (ref 15–37)
BACTERIA # UR AUTO: ABNORMAL
BASOPHILS # BLD AUTO: 0.02 K/UL — SIGNIFICANT CHANGE UP (ref 0–0.2)
BASOPHILS NFR BLD AUTO: 0.1 % — SIGNIFICANT CHANGE UP (ref 0–2)
BILIRUB SERPL-MCNC: 1.3 MG/DL — HIGH (ref 0.2–1.2)
BILIRUB UR-MCNC: NEGATIVE — SIGNIFICANT CHANGE UP
BUN SERPL-MCNC: 18 MG/DL — SIGNIFICANT CHANGE UP (ref 7–23)
CALCIUM SERPL-MCNC: 9.4 MG/DL — SIGNIFICANT CHANGE UP (ref 8.5–10.1)
CHLORIDE SERPL-SCNC: 106 MMOL/L — SIGNIFICANT CHANGE UP (ref 96–108)
CK SERPL-CCNC: 26 U/L — SIGNIFICANT CHANGE UP (ref 26–308)
CO2 SERPL-SCNC: 24 MMOL/L — SIGNIFICANT CHANGE UP (ref 22–31)
COLOR SPEC: YELLOW — SIGNIFICANT CHANGE UP
CREAT SERPL-MCNC: 1.2 MG/DL — SIGNIFICANT CHANGE UP (ref 0.5–1.3)
DIFF PNL FLD: ABNORMAL
EOSINOPHIL # BLD AUTO: 0.01 K/UL — SIGNIFICANT CHANGE UP (ref 0–0.5)
EOSINOPHIL NFR BLD AUTO: 0.1 % — SIGNIFICANT CHANGE UP (ref 0–6)
EPI CELLS # UR: SIGNIFICANT CHANGE UP
GLUCOSE SERPL-MCNC: 149 MG/DL — HIGH (ref 70–99)
GLUCOSE UR QL: NEGATIVE — SIGNIFICANT CHANGE UP
HCT VFR BLD CALC: 40 % — SIGNIFICANT CHANGE UP (ref 39–50)
HGB BLD-MCNC: 13.8 G/DL — SIGNIFICANT CHANGE UP (ref 13–17)
IMM GRANULOCYTES NFR BLD AUTO: 0.6 % — SIGNIFICANT CHANGE UP (ref 0–1.5)
INR BLD: 1.17 RATIO — HIGH (ref 0.88–1.16)
KETONES UR-MCNC: NEGATIVE — SIGNIFICANT CHANGE UP
LACTATE SERPL-SCNC: 1.7 MMOL/L — SIGNIFICANT CHANGE UP (ref 0.7–2)
LACTATE SERPL-SCNC: 2.9 MMOL/L — HIGH (ref 0.7–2)
LEUKOCYTE ESTERASE UR-ACNC: ABNORMAL
LYMPHOCYTES # BLD AUTO: 1.2 K/UL — SIGNIFICANT CHANGE UP (ref 1–3.3)
LYMPHOCYTES # BLD AUTO: 6.8 % — LOW (ref 13–44)
MCHC RBC-ENTMCNC: 30.9 PG — SIGNIFICANT CHANGE UP (ref 27–34)
MCHC RBC-ENTMCNC: 34.5 GM/DL — SIGNIFICANT CHANGE UP (ref 32–36)
MCV RBC AUTO: 89.7 FL — SIGNIFICANT CHANGE UP (ref 80–100)
MONOCYTES # BLD AUTO: 1.51 K/UL — HIGH (ref 0–0.9)
MONOCYTES NFR BLD AUTO: 8.5 % — SIGNIFICANT CHANGE UP (ref 2–14)
NEUTROPHILS # BLD AUTO: 14.87 K/UL — HIGH (ref 1.8–7.4)
NEUTROPHILS NFR BLD AUTO: 83.9 % — HIGH (ref 43–77)
NITRITE UR-MCNC: POSITIVE
NRBC # BLD: 0 /100 WBCS — SIGNIFICANT CHANGE UP (ref 0–0)
NT-PROBNP SERPL-SCNC: 226 PG/ML — SIGNIFICANT CHANGE UP (ref 0–450)
PH UR: 6.5 — SIGNIFICANT CHANGE UP (ref 5–8)
PLATELET # BLD AUTO: 176 K/UL — SIGNIFICANT CHANGE UP (ref 150–400)
POTASSIUM SERPL-MCNC: 3.9 MMOL/L — SIGNIFICANT CHANGE UP (ref 3.5–5.3)
POTASSIUM SERPL-SCNC: 3.9 MMOL/L — SIGNIFICANT CHANGE UP (ref 3.5–5.3)
PROT SERPL-MCNC: 7.2 G/DL — SIGNIFICANT CHANGE UP (ref 6–8.3)
PROT UR-MCNC: 30 MG/DL
PROTHROM AB SERPL-ACNC: 13.6 SEC — SIGNIFICANT CHANGE UP (ref 10.6–13.6)
RBC # BLD: 4.46 M/UL — SIGNIFICANT CHANGE UP (ref 4.2–5.8)
RBC # FLD: 14.4 % — SIGNIFICANT CHANGE UP (ref 10.3–14.5)
RBC CASTS # UR COMP ASSIST: ABNORMAL /HPF (ref 0–4)
SARS-COV-2 RNA SPEC QL NAA+PROBE: SIGNIFICANT CHANGE UP
SODIUM SERPL-SCNC: 138 MMOL/L — SIGNIFICANT CHANGE UP (ref 135–145)
SP GR SPEC: 1.01 — SIGNIFICANT CHANGE UP (ref 1.01–1.02)
TROPONIN I SERPL-MCNC: <.015 NG/ML — SIGNIFICANT CHANGE UP (ref 0.01–0.04)
UROBILINOGEN FLD QL: NEGATIVE — SIGNIFICANT CHANGE UP
WBC # BLD: 17.71 K/UL — HIGH (ref 3.8–10.5)
WBC # FLD AUTO: 17.71 K/UL — HIGH (ref 3.8–10.5)
WBC UR QL: ABNORMAL

## 2021-04-29 PROCEDURE — 71045 X-RAY EXAM CHEST 1 VIEW: CPT | Mod: 26

## 2021-04-29 PROCEDURE — 99223 1ST HOSP IP/OBS HIGH 75: CPT | Mod: GC

## 2021-04-29 PROCEDURE — 74176 CT ABD & PELVIS W/O CONTRAST: CPT | Mod: 26

## 2021-04-29 PROCEDURE — 99291 CRITICAL CARE FIRST HOUR: CPT

## 2021-04-29 RX ORDER — DORZOLAMIDE HYDROCHLORIDE 20 MG/ML
1 SOLUTION/ DROPS OPHTHALMIC THREE TIMES A DAY
Refills: 0 | Status: DISCONTINUED | OUTPATIENT
Start: 2021-04-29 | End: 2021-04-30

## 2021-04-29 RX ORDER — DICLOFENAC SODIUM 30 MG/G
0 GEL TOPICAL
Qty: 0 | Refills: 0 | DISCHARGE

## 2021-04-29 RX ORDER — FINASTERIDE 5 MG/1
5 TABLET, FILM COATED ORAL DAILY
Refills: 0 | Status: DISCONTINUED | OUTPATIENT
Start: 2021-04-29 | End: 2021-04-30

## 2021-04-29 RX ORDER — ERTAPENEM SODIUM 1 G/1
1000 INJECTION, POWDER, LYOPHILIZED, FOR SOLUTION INTRAMUSCULAR; INTRAVENOUS EVERY 24 HOURS
Refills: 0 | Status: DISCONTINUED | OUTPATIENT
Start: 2021-04-30 | End: 2021-04-30

## 2021-04-29 RX ORDER — ASPIRIN/CALCIUM CARB/MAGNESIUM 324 MG
81 TABLET ORAL DAILY
Refills: 0 | Status: DISCONTINUED | OUTPATIENT
Start: 2021-04-29 | End: 2021-04-30

## 2021-04-29 RX ORDER — MULTIVIT-MIN/FERROUS GLUCONATE 9 MG/15 ML
1 LIQUID (ML) ORAL DAILY
Refills: 0 | Status: DISCONTINUED | OUTPATIENT
Start: 2021-04-29 | End: 2021-04-30

## 2021-04-29 RX ORDER — ACETAMINOPHEN 500 MG
1 TABLET ORAL
Qty: 0 | Refills: 0 | DISCHARGE

## 2021-04-29 RX ORDER — SODIUM CHLORIDE 9 MG/ML
2100 INJECTION INTRAMUSCULAR; INTRAVENOUS; SUBCUTANEOUS ONCE
Refills: 0 | Status: COMPLETED | OUTPATIENT
Start: 2021-04-29 | End: 2021-04-29

## 2021-04-29 RX ORDER — ERTAPENEM SODIUM 1 G/1
1000 INJECTION, POWDER, LYOPHILIZED, FOR SOLUTION INTRAMUSCULAR; INTRAVENOUS ONCE
Refills: 0 | Status: COMPLETED | OUTPATIENT
Start: 2021-04-29 | End: 2021-04-29

## 2021-04-29 RX ORDER — ENOXAPARIN SODIUM 100 MG/ML
40 INJECTION SUBCUTANEOUS DAILY
Refills: 0 | Status: DISCONTINUED | OUTPATIENT
Start: 2021-04-29 | End: 2021-04-29

## 2021-04-29 RX ORDER — TAMSULOSIN HYDROCHLORIDE 0.4 MG/1
0.4 CAPSULE ORAL AT BEDTIME
Refills: 0 | Status: DISCONTINUED | OUTPATIENT
Start: 2021-04-29 | End: 2021-04-30

## 2021-04-29 RX ORDER — LACTULOSE 10 G/15ML
0 SOLUTION ORAL
Qty: 0 | Refills: 0 | DISCHARGE

## 2021-04-29 RX ORDER — LACTULOSE 10 G/15ML
20 SOLUTION ORAL AT BEDTIME
Refills: 0 | Status: DISCONTINUED | OUTPATIENT
Start: 2021-04-29 | End: 2021-04-30

## 2021-04-29 RX ORDER — BRINZOLAMIDE 10 MG/ML
0 SUSPENSION/ DROPS OPHTHALMIC
Qty: 0 | Refills: 0 | DISCHARGE

## 2021-04-29 RX ORDER — PIPERACILLIN AND TAZOBACTAM 4; .5 G/20ML; G/20ML
3.38 INJECTION, POWDER, LYOPHILIZED, FOR SOLUTION INTRAVENOUS ONCE
Refills: 0 | Status: COMPLETED | OUTPATIENT
Start: 2021-04-29 | End: 2021-04-29

## 2021-04-29 RX ORDER — ATORVASTATIN CALCIUM 80 MG/1
20 TABLET, FILM COATED ORAL AT BEDTIME
Refills: 0 | Status: DISCONTINUED | OUTPATIENT
Start: 2021-04-29 | End: 2021-04-30

## 2021-04-29 RX ORDER — SODIUM CHLORIDE 9 MG/ML
1000 INJECTION, SOLUTION INTRAVENOUS
Refills: 0 | Status: DISCONTINUED | OUTPATIENT
Start: 2021-04-29 | End: 2021-04-30

## 2021-04-29 RX ORDER — SODIUM CHLORIDE 9 MG/ML
1000 INJECTION INTRAMUSCULAR; INTRAVENOUS; SUBCUTANEOUS ONCE
Refills: 0 | Status: COMPLETED | OUTPATIENT
Start: 2021-04-29 | End: 2021-04-29

## 2021-04-29 RX ORDER — SACCHAROMYCES BOULARDII 250 MG
250 POWDER IN PACKET (EA) ORAL
Refills: 0 | Status: DISCONTINUED | OUTPATIENT
Start: 2021-04-29 | End: 2021-04-30

## 2021-04-29 RX ORDER — ONDANSETRON 8 MG/1
4 TABLET, FILM COATED ORAL ONCE
Refills: 0 | Status: COMPLETED | OUTPATIENT
Start: 2021-04-29 | End: 2021-04-29

## 2021-04-29 RX ORDER — ACETAMINOPHEN 500 MG
650 TABLET ORAL ONCE
Refills: 0 | Status: COMPLETED | OUTPATIENT
Start: 2021-04-29 | End: 2021-04-29

## 2021-04-29 RX ADMIN — ERTAPENEM SODIUM 120 MILLIGRAM(S): 1 INJECTION, POWDER, LYOPHILIZED, FOR SOLUTION INTRAMUSCULAR; INTRAVENOUS at 22:15

## 2021-04-29 RX ADMIN — Medication 650 MILLIGRAM(S): at 22:14

## 2021-04-29 RX ADMIN — PIPERACILLIN AND TAZOBACTAM 3.38 GRAM(S): 4; .5 INJECTION, POWDER, LYOPHILIZED, FOR SOLUTION INTRAVENOUS at 21:30

## 2021-04-29 RX ADMIN — ONDANSETRON 4 MILLIGRAM(S): 8 TABLET, FILM COATED ORAL at 20:59

## 2021-04-29 RX ADMIN — Medication 650 MILLIGRAM(S): at 20:57

## 2021-04-29 RX ADMIN — PIPERACILLIN AND TAZOBACTAM 200 GRAM(S): 4; .5 INJECTION, POWDER, LYOPHILIZED, FOR SOLUTION INTRAVENOUS at 20:59

## 2021-04-29 RX ADMIN — SODIUM CHLORIDE 75 MILLILITER(S): 9 INJECTION, SOLUTION INTRAVENOUS at 22:17

## 2021-04-29 RX ADMIN — SODIUM CHLORIDE 2000 MILLILITER(S): 9 INJECTION INTRAMUSCULAR; INTRAVENOUS; SUBCUTANEOUS at 22:15

## 2021-04-29 RX ADMIN — SODIUM CHLORIDE 2100 MILLILITER(S): 9 INJECTION INTRAMUSCULAR; INTRAVENOUS; SUBCUTANEOUS at 22:00

## 2021-04-29 RX ADMIN — SODIUM CHLORIDE 2100 MILLILITER(S): 9 INJECTION INTRAMUSCULAR; INTRAVENOUS; SUBCUTANEOUS at 20:59

## 2021-04-29 NOTE — ED PROVIDER NOTE - CONSTITUTIONAL, MLM
normal... Well appearing, awake, alert, oriented to person, place at baseline and in and in no apparent distress.

## 2021-04-29 NOTE — H&P ADULT - NSICDXPROBLEMPLAN5_GEN_ALL_CORE_FT
IMPROVE VTE Individual Risk Assessment        RISK                                                          Points  [  ] Previous VTE                                                3  [  ] Thrombophilia                                             2  [  ] Lower limb paralysis                                   2        (unable to hold up >15 seconds)    [  ] Current Cancer                                            2         (within 6 months)  [  ] Immobilization > 24 hrs                              1  [  ] ICU/CCU stay > 24 hours                            1  [x  ] Age > 60                                                    1  IMPROVE VTE Score _____1____  Holding DVT px for urgent urological procedure. Chronic   - Eats pureed and soft diet at home   - Consider speech and swallow Chronic   - NPO for now; Eats pureed and soft diet at home   - Consider speech and swallow, but son states no issue with pureed diet

## 2021-04-29 NOTE — ED PROVIDER NOTE - ENMT, MLM
Airway patent, Nasal mucosa clear. Mouth with normal mucosa. Throat has no vesicles, no oropharyngeal exudates and uvula is midline. MM Moist. neck supple.  no meningeal signs.

## 2021-04-29 NOTE — ED PROVIDER NOTE - CARE PLAN
Principal Discharge DX:	Urinary tract infection without hematuria, site unspecified  Secondary Diagnosis:	Febrile illness, acute  Secondary Diagnosis:	Hypotension, unspecified hypotension type

## 2021-04-29 NOTE — H&P ADULT - ATTENDING COMMENTS
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 Suspicious for bacteremia.    admit to telemetry  continue invanz and IV fluids for septic shock 2/2 UTI from obstructing distal left ureteral stone with hydronephrosis  follow up blood and urine cultures. Consult ID Dr. Chiu.  ICU consult if BP does not improve with fluids, may need transient vasopressors  Urology Dr. Valle consulted for emergent OR tonight - d/w him personally  will discuss with ICU regarding post op care  patient is DNR/DNI. Son to bring in paperwork.    Agree with H&P as outlined above, edited where appropriate. 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 Suspicious for bacteremia.    admit to telemetry  continue invanz and IV fluids for severe sepsis/septic shock 2/2 UTI from obstructing distal left ureteral stone with hydronephrosis  follow up blood and urine cultures. Consult ID Dr. Chiu.  ICU consult if BP does not improve with fluids, may need transient vasopressors  Urology Dr. Valle consulted for emergent OR tonight - d/w him personally  will discuss with ICU regarding post op care  patient is DNR/DNI. Son to bring in paperwork.    Agree with H&P as outlined above, edited where appropriate.

## 2021-04-29 NOTE — H&P ADULT - NSICDXPROBLEMPLAN1_GEN_ALL_CORE_FT
Pt presenting with chills, rigors and fevers from home. Prior history of multiresistant ESBL UTI (sensitive to carbapenems and genta) and known history of L sided kidney stone. CT renal hunt on this admission shows Mod left hydronephrosis 2/2 distal L ureteral calculus measuring 12 mm. BP 86/53 T 103.6 . WBC 17.71, C2 1.2, Lactate 2.9, UA with mod LE, positive nitrites, large blood, WBC 11-25   Severe sepsis 2/2 obstructing renal stone   - Admit to [facility]   - S/p 2.5 L NS, SBP low 80s   - STAT 1 L NS   - S/p Zosyn x1 and Ertapenem   - Cont Ertapenem 1g qd  - Cont LR 75 cc/hr   - Tylenol PRN for fevers    - Uro consulted (Dr. Valle) for urgent urological procedure   - Pt is mod risk (RCRI: 10.1%) for low risk procedure. Patient is optimized for urgent urological procedure   - Fu Blood and urince cx Pt presenting with chills, rigors and fevers from  Prior history of multiresistant ESBL UTI (sensitive to carbapenems and genta)   and known history of L sided kidney stone. CT renal hunt on this admission shows Mod left hydronephrosis 2/2 distal L ureteral calculus measuring 12 mm. BP 86/53 T 103.6 . WBC 17.71, C2 1.2, Lactate 2.9, UA with mod LE, positive nitrites, large   blood, WBC 11-25   Severe sepsis 2/2 obstructing renal stone   - Admit to [facility]F   - S/p 2.5 L NS, SBP low 80s   - STAT 1 L NS   - S/p Zosyn x1 and Ertapenem   - Cont Ertapenem 1g qd  - Cont LR 75 cc/hr   - Tylenol PRN for fevers    - NPO   - Uro consulted (Dr. Valle) for urgent urological procedure   - Pt is mod risk (RCRI: 10.1%) for low risk procedure. Patient is optimized for urgent urological procedure   - Fu Blood and urince cx Pt presenting with chills, rigors and fevers from  Prior history of multiresistant ESBL UTI (sensitive to carbapenems)   and known history of L sided kidney stone. CT renal hunt on this admission shows Mod left hydronephrosis 2/2 distal L ureteral calculus measuring 12 mm. BP 86/53 T 103.6 . WBC 17.71, C2 1.2, Lactate 2.9, UA with mod LE, positive nitrites, large   blood, WBC 11-25   Severe sepsis 2/2 obstructing renal stone   - Admit to GMF   - S/p 2.5 L NS, SBP low 80s   - STAT 1 L NS   - S/p Zosyn x1 and Ertapenem   - Cont Ertapenem 1g qd given hx of ESBL  - Cont LR 75 cc/hr   - Tylenol PRN for fevers    - keep NPO except meds for now  - Uro consulted (Dr. Valle) for urgent urological procedure   - Fu Blood and urine cx  - For emergent OR for left ureteral stent placement tonight.  - No absolute contraindication to surgery. May need pressors if blood pressure does not improve with fluids. ICU consult.

## 2021-04-29 NOTE — H&P ADULT - ASSESSMENT
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 Suspicious for bacteremia. 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 Suspicious for bacteremia.

## 2021-04-29 NOTE — H&P ADULT - HISTORY OF PRESENT ILLNESS
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 He had chills and rigors at home. He has presented similarly in the past when he had urinary tract infections. Denies chest pain, palpitations, dyspnea, headache, dizziness, abdominal pain, n/v/d.    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 He had chills and rigors at home. He has presented similarly in the past when he had urinary tract infections. Denies chest pain, palpitations, dyspnea, headache, dizziness, abdominal pain, n/v/d.    In the ED,  Vital Signs T(F) Max: 103.6 HR: 86 - 125 BP: 92/53 RR: 16 SpO2: 95%  Labs significant for: WBC 17.71, C2 1.2, eGFR 53, Lactate 2.9, UA with mod LE, positive nitrites, large blood, WBC 11-25  EKG: Sinus tachy (102), first degree block and non specific T wave inversion in leads II, III and AVF   CXR with low lung volumes with atelectasis, similar to prior study in 2019  CT renal stone hunt:  Moderate left hydrourteronephrosis secondary to a distal left uerteral calculus measuring up to 12 mm  EKG: Sinus Tachycardia at 102bpm 89 y/o M with PMHx CAD s/p two cardiac stents (2014), BPH s/p prostate surgery (2017), Hx of ESBL UTI (sensitive to carbapenems and gentamicin), Hx of left sided kidney stone, GERD, HLD, hx of TIA, hx of 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 He had chills and rigors at home. He has presented similarly in the past when he had urinary tract infections. Denies chest pain, palpitations, dyspnea, headache, dizziness, abdominal pain, n/v/d. He states that he wants to go home and rest. Son at bedside states that he does not understand the gravity of the situation. Son states that he has paperwork stating DNR/DNI at home.    In the ED,  Vital Signs T(F) Max: 103.6 HR: 86 - 125 BP: 92/53 RR: 16 SpO2: 95%  Labs significant for: WBC 17.71, C2 1.2, eGFR 53, Lactate 2.9, UA with mod LE, positive nitrites, large blood, WBC 11-25  EKG: Sinus tachy (102), first degree block and non specific T wave inversion in leads II, III and AVF   CXR with low lung volumes with atelectasis, similar to prior study in 2019  CT renal stone hunt:  Moderate left hydrourteronephrosis secondary to a distal left ureteral calculus measuring up to 12 mm  EKG: Sinus Tachycardia at 102bpm

## 2021-04-29 NOTE — ED ADULT NURSE NOTE - PMH
Benign prostatic hyperplasia, presence of lower urinary tract symptoms unspecified, unspecified morphology    CAD (coronary artery disease)    GERD (gastroesophageal reflux disease)    Hyperlipidemia     Benign prostatic hyperplasia, presence of lower urinary tract symptoms unspecified, unspecified morphology    CAD (coronary artery disease)    GERD (gastroesophageal reflux disease)    Hyperlipidemia    UTI (urinary tract infection)

## 2021-04-29 NOTE — ED PROVIDER NOTE - OBJECTIVE STATEMENT
89 yo M p/w BIB EMS with son for fever, malaise, weakness since last night. Pt with shaking chills tonight at home. pt with similar in past from UTI. Pt fully vaccinated to covid, no recent exposure. No neck / back pain. no v/d. No recent trauma. No recent illness. no agg/allev factors. no other inj or co. NO other inj or co.

## 2021-04-29 NOTE — H&P ADULT - NSHPSOCIALHISTORY_GEN_ALL_CORE
Lives with son   ADLs: independently   Ambulates with cane and walker   Denies smoking, alcohol or illicit drug use   Unsure if he has ever had colonoscopy or PNA shot

## 2021-04-29 NOTE — H&P ADULT - NSHPPHYSICALEXAM_GEN_ALL_CORE
T(C): 39.8 (04-29-21 @ 20:35), Max: 39.8 (04-29-21 @ 20:35)  HR: 86 (04-29-21 @ 21:30) (86 - 125)  BP: 92/53 (04-29-21 @ 21:30) (92/53 - 113/70)  RR: 16 (04-29-21 @ 21:30) (16 - 18)  SpO2: 95% (04-29-21 @ 21:30) (94% - 95%)    General: No apparent distress  Head: normocephalic, atraumatic  Eyes: EOMI, anicteric  ENT: moist mucous membranes, no pharyngeal exudates  Heart: tachycardic, S1, S2, no murmurs  Chest: CTA b/l, no rales, rhonchi, or wheezes  Abd: BS+, soft, NT, ND  Back: no CVA tenderness  Extr: no edema or cyanosis  Neuro: AA&Ox3, no focal weakness, sensation to light touch intact  Psych: normal affect T(C): 39.8 (04-29-21 @ 20:35), Max: 39.8 (04-29-21 @ 20:35)  HR: 86 (04-29-21 @ 21:30) (86 - 125)  BP: 92/53 (04-29-21 @ 21:30) (92/53 - 113/70)  RR: 16 (04-29-21 @ 21:30) (16 - 18)  SpO2: 95% (04-29-21 @ 21:30) (94% - 95%)    General: No apparent distress, frail  Head: normocephalic, atraumatic  Eyes: EOMI, anicteric  ENT: dry mucous membranes  Heart: tachycardic, S1, S2, no murmurs  Chest: CTA b/l, no rales, rhonchi, or wheezes  Abd: BS+, soft, NT, ND  Back: no CVA tenderness  Extr: no edema or cyanosis  Neuro: AA&Ox3, no focal weakness, sensation to light touch intact  Psych: normal affect T(C): 39.8 (04-29-21 @ 20:35), Max: 39.8 (04-29-21 @ 20:35)  HR: 86 (04-29-21 @ 21:30) (86 - 125)  BP: 92/53 (04-29-21 @ 21:30) (92/53 - 113/70)  RR: 16 (04-29-21 @ 21:30) (16 - 18)  SpO2: 95% (04-29-21 @ 21:30) (94% - 95%)    General: No apparent distress, frail  Head: normocephalic, atraumatic  Eyes: EOMI, anicteric  ENT: dry mucous membranes  Heart: tachycardic, S1, S2, no murmurs  Chest: CTA b/l, no rales, rhonchi, or wheezes  Abd: BS+, soft, NT, ND  Back: no CVA tenderness  Extr: no edema or cyanosis  Neuro: AA&Ox2-3, no focal weakness, sensation to light touch intact  Psych: normal affect, agitated trying to get out of bed

## 2021-04-29 NOTE — H&P ADULT - NSICDXPROBLEMPLAN6_GEN_ALL_CORE_FT
IMPROVE VTE Individual Risk Assessment        RISK                                                          Points  [  ] Previous VTE                                                3  [  ] Thrombophilia                                             2  [  ] Lower limb paralysis                                   2        (unable to hold up >15 seconds)    [  ] Current Cancer                                            2         (within 6 months)  [  ] Immobilization > 24 hrs                              1  [  ] ICU/CCU stay > 24 hours                            1  [x  ] Age > 60                                                    1  IMPROVE VTE Score _____1____  Holding DVT px for urgent urological procedure. IMPROVE VTE Individual Risk Assessment        RISK                                                          Points  [  ] Previous VTE                                                3  [  ] Thrombophilia                                             2  [  ] Lower limb paralysis                                   2        (unable to hold up >15 seconds)    [  ] Current Cancer                                            2         (within 6 months)  [  ] Immobilization > 24 hrs                              1  [  ] ICU/CCU stay > 24 hours                            1  [x  ] Age > 60                                                    1  IMPROVE VTE Score _____1____  SCDs for now for urgent urological procedure. start LMWH post-op.

## 2021-04-29 NOTE — H&P ADULT - NSICDXPASTSURGICALHX_GEN_ALL_CORE_FT
PAST SURGICAL HISTORY:  H/O heart artery stent stent x 2    History of prostate surgery     S/P ureteral stent placement Left sided

## 2021-04-29 NOTE — ED ADULT NURSE NOTE - OBJECTIVE STATEMENT
Patient alert and oriented X 2. Brought in by EMS for fever and generalized weakness. Son state patient has history of uti. 325 mg tylenol last given by patient son at 1700. Patient vomiting upon arrival to ED. Denies chest pain, shortness of breath, nausea, vomiting, headache, dizziness and fever. Lungs clears bilaterally. Respirations even and not labored. Abdomen soft non tender. Patient received single dose of Anirudh and Anirudh covid 19 vaccine. Patient alert and oriented X 3. Brought in by EMS for fever and generalized weakness. Son state patient has history of uti. 325 mg tylenol last given by patient son at 1700. Patient vomiting upon arrival to ED. Denies chest pain, shortness of breath, nausea, vomiting, headache, dizziness and fever. Lungs clears bilaterally. Respirations even and not labored. Abdomen soft non tender. Patient received single dose of Anirudh and Anirudh covid 19 vaccine.

## 2021-04-29 NOTE — ED ADULT NURSE NOTE - ABDOMEN
Called to extend pump rental for 1 month. Baby BR better per mom. Denies needs/problems at this time.  
soft/nondistended

## 2021-04-29 NOTE — ED PROVIDER NOTE - PROGRESS NOTE DETAILS
Dw Dr De Leon - states pt has hx ESBL - wants invanz, CT , will see pt to admit. Pt  doing well, no acute changes. Pt doing well, - BP borderline - receiving IVF, abx Tony De Leon re CT findings - DR cohn paged, he will call him via cell

## 2021-04-29 NOTE — ED ADULT NURSE REASSESSMENT NOTE - NS ED NURSE REASSESS COMMENT FT1
Patient blood pressure 81/49. Dr. De Leon at the bedside to evaluate patient. Patient received 2100 ml normal saline bolus. Per Dr. De Leon patient to receive 1000 ml normal saline bolus. Jaime CARTAGENA

## 2021-04-29 NOTE — H&P ADULT - NSICDXPASTMEDICALHX_GEN_ALL_CORE_FT
PAST MEDICAL HISTORY:  Benign prostatic hyperplasia, presence of lower urinary tract symptoms unspecified, unspecified morphology     CAD (coronary artery disease)     GERD (gastroesophageal reflux disease)     Hyperlipidemia     UTI (urinary tract infection)

## 2021-04-29 NOTE — H&P ADULT - NSICDXFAMILYHX_GEN_ALL_CORE_FT
FAMILY HISTORY:  Child  Still living? Yes, Estimated age: Age Unknown  FH: hypertension, Age at diagnosis: Age Unknown

## 2021-04-29 NOTE — H&P ADULT - NSHPREVIEWOFSYSTEMS_GEN_ALL_CORE
General: admits fevers, chills, rigors, malaise and weakness  Eyes: no vision changes  ENT: no hearing changes, nasal congestion, or sore throat  CV: no chest pain or palpitations  Pulm: no SOB, wheezing, cough, or hemoptysis  Abd/GI: no nausea, vomiting, diarrhea, constipation, abd pain  : no dysuria, hematuria, urinary frequency  MSK: no joint pain or myalgias  Neuro: no syncope, dizziness, focal weakness  Psych: no anxiety or depression  Endo: no heat or cold intolerance

## 2021-04-30 ENCOUNTER — TRANSCRIPTION ENCOUNTER (OUTPATIENT)
Age: 86
End: 2021-04-30

## 2021-04-30 LAB
-  ESBL: SIGNIFICANT CHANGE UP
ALBUMIN SERPL ELPH-MCNC: 2.4 G/DL — LOW (ref 3.3–5)
ALP SERPL-CCNC: 60 U/L — SIGNIFICANT CHANGE UP (ref 40–120)
ALT FLD-CCNC: <6 U/L — LOW (ref 12–78)
ANION GAP SERPL CALC-SCNC: 5 MMOL/L — SIGNIFICANT CHANGE UP (ref 5–17)
AST SERPL-CCNC: 15 U/L — SIGNIFICANT CHANGE UP (ref 15–37)
BASOPHILS # BLD AUTO: 0.02 K/UL — SIGNIFICANT CHANGE UP (ref 0–0.2)
BASOPHILS NFR BLD AUTO: 0.1 % — SIGNIFICANT CHANGE UP (ref 0–2)
BILIRUB SERPL-MCNC: 1.3 MG/DL — HIGH (ref 0.2–1.2)
BUN SERPL-MCNC: 16 MG/DL — SIGNIFICANT CHANGE UP (ref 7–23)
CALCIUM SERPL-MCNC: 8.6 MG/DL — SIGNIFICANT CHANGE UP (ref 8.5–10.1)
CHLORIDE SERPL-SCNC: 112 MMOL/L — HIGH (ref 96–108)
CO2 SERPL-SCNC: 25 MMOL/L — SIGNIFICANT CHANGE UP (ref 22–31)
CREAT SERPL-MCNC: 1.1 MG/DL — SIGNIFICANT CHANGE UP (ref 0.5–1.3)
E COLI DNA BLD POS QL NAA+NON-PROBE: SIGNIFICANT CHANGE UP
EOSINOPHIL # BLD AUTO: 0 K/UL — SIGNIFICANT CHANGE UP (ref 0–0.5)
EOSINOPHIL NFR BLD AUTO: 0 % — SIGNIFICANT CHANGE UP (ref 0–6)
GLUCOSE SERPL-MCNC: 121 MG/DL — HIGH (ref 70–99)
GRAM STN FLD: SIGNIFICANT CHANGE UP
GRAM STN FLD: SIGNIFICANT CHANGE UP
HCT VFR BLD CALC: 35.5 % — LOW (ref 39–50)
HGB BLD-MCNC: 11.6 G/DL — LOW (ref 13–17)
IMM GRANULOCYTES NFR BLD AUTO: 0.5 % — SIGNIFICANT CHANGE UP (ref 0–1.5)
LYMPHOCYTES # BLD AUTO: 0.99 K/UL — LOW (ref 1–3.3)
LYMPHOCYTES # BLD AUTO: 6.2 % — LOW (ref 13–44)
MAGNESIUM SERPL-MCNC: 2.1 MG/DL — SIGNIFICANT CHANGE UP (ref 1.6–2.6)
MCHC RBC-ENTMCNC: 30.1 PG — SIGNIFICANT CHANGE UP (ref 27–34)
MCHC RBC-ENTMCNC: 32.7 GM/DL — SIGNIFICANT CHANGE UP (ref 32–36)
MCV RBC AUTO: 92 FL — SIGNIFICANT CHANGE UP (ref 80–100)
METHOD TYPE: SIGNIFICANT CHANGE UP
MONOCYTES # BLD AUTO: 1.43 K/UL — HIGH (ref 0–0.9)
MONOCYTES NFR BLD AUTO: 8.9 % — SIGNIFICANT CHANGE UP (ref 2–14)
NEUTROPHILS # BLD AUTO: 13.47 K/UL — HIGH (ref 1.8–7.4)
NEUTROPHILS NFR BLD AUTO: 84.3 % — HIGH (ref 43–77)
NRBC # BLD: 0 /100 WBCS — SIGNIFICANT CHANGE UP (ref 0–0)
PHOSPHATE SERPL-MCNC: 2.4 MG/DL — LOW (ref 2.5–4.5)
PLATELET # BLD AUTO: 134 K/UL — LOW (ref 150–400)
POTASSIUM SERPL-MCNC: 4.2 MMOL/L — SIGNIFICANT CHANGE UP (ref 3.5–5.3)
POTASSIUM SERPL-SCNC: 4.2 MMOL/L — SIGNIFICANT CHANGE UP (ref 3.5–5.3)
PROCALCITONIN SERPL-MCNC: 0.98 NG/ML — HIGH (ref 0–0.04)
PROT SERPL-MCNC: 5.9 G/DL — LOW (ref 6–8.3)
RBC # BLD: 3.86 M/UL — LOW (ref 4.2–5.8)
RBC # FLD: 14.7 % — HIGH (ref 10.3–14.5)
SODIUM SERPL-SCNC: 142 MMOL/L — SIGNIFICANT CHANGE UP (ref 135–145)
SPECIMEN SOURCE: SIGNIFICANT CHANGE UP
SPECIMEN SOURCE: SIGNIFICANT CHANGE UP
WBC # BLD: 15.99 K/UL — HIGH (ref 3.8–10.5)
WBC # FLD AUTO: 15.99 K/UL — HIGH (ref 3.8–10.5)

## 2021-04-30 PROCEDURE — 99233 SBSQ HOSP IP/OBS HIGH 50: CPT | Mod: GC

## 2021-04-30 PROCEDURE — 93010 ELECTROCARDIOGRAM REPORT: CPT

## 2021-04-30 RX ORDER — SACCHAROMYCES BOULARDII 250 MG
250 POWDER IN PACKET (EA) ORAL
Refills: 0 | Status: DISCONTINUED | OUTPATIENT
Start: 2021-04-30 | End: 2021-05-04

## 2021-04-30 RX ORDER — DORZOLAMIDE HYDROCHLORIDE 20 MG/ML
1 SOLUTION/ DROPS OPHTHALMIC THREE TIMES A DAY
Refills: 0 | Status: DISCONTINUED | OUTPATIENT
Start: 2021-04-30 | End: 2021-05-04

## 2021-04-30 RX ORDER — FENTANYL CITRATE 50 UG/ML
25 INJECTION INTRAVENOUS
Refills: 0 | Status: DISCONTINUED | OUTPATIENT
Start: 2021-04-30 | End: 2021-04-30

## 2021-04-30 RX ORDER — SODIUM CHLORIDE 9 MG/ML
1000 INJECTION, SOLUTION INTRAVENOUS
Refills: 0 | Status: DISCONTINUED | OUTPATIENT
Start: 2021-04-30 | End: 2021-05-04

## 2021-04-30 RX ORDER — NOREPINEPHRINE BITARTRATE/D5W 8 MG/250ML
0.04 PLASTIC BAG, INJECTION (ML) INTRAVENOUS
Qty: 8 | Refills: 0 | Status: DISCONTINUED | OUTPATIENT
Start: 2021-04-30 | End: 2021-04-30

## 2021-04-30 RX ORDER — TAMSULOSIN HYDROCHLORIDE 0.4 MG/1
0.4 CAPSULE ORAL AT BEDTIME
Refills: 0 | Status: DISCONTINUED | OUTPATIENT
Start: 2021-04-30 | End: 2021-05-04

## 2021-04-30 RX ORDER — PANTOPRAZOLE SODIUM 20 MG/1
40 TABLET, DELAYED RELEASE ORAL DAILY
Refills: 0 | Status: DISCONTINUED | OUTPATIENT
Start: 2021-04-30 | End: 2021-05-04

## 2021-04-30 RX ORDER — ATORVASTATIN CALCIUM 80 MG/1
20 TABLET, FILM COATED ORAL AT BEDTIME
Refills: 0 | Status: DISCONTINUED | OUTPATIENT
Start: 2021-04-30 | End: 2021-05-04

## 2021-04-30 RX ORDER — FINASTERIDE 5 MG/1
5 TABLET, FILM COATED ORAL DAILY
Refills: 0 | Status: DISCONTINUED | OUTPATIENT
Start: 2021-04-30 | End: 2021-05-04

## 2021-04-30 RX ORDER — CHLORHEXIDINE GLUCONATE 213 G/1000ML
1 SOLUTION TOPICAL
Refills: 0 | Status: DISCONTINUED | OUTPATIENT
Start: 2021-04-30 | End: 2021-05-01

## 2021-04-30 RX ORDER — ENOXAPARIN SODIUM 100 MG/ML
30 INJECTION SUBCUTANEOUS DAILY
Refills: 0 | Status: DISCONTINUED | OUTPATIENT
Start: 2021-04-30 | End: 2021-05-04

## 2021-04-30 RX ORDER — ACETAMINOPHEN 500 MG
500 TABLET ORAL ONCE
Refills: 0 | Status: DISCONTINUED | OUTPATIENT
Start: 2021-04-30 | End: 2021-04-30

## 2021-04-30 RX ORDER — NOREPINEPHRINE BITARTRATE/D5W 8 MG/250ML
0.05 PLASTIC BAG, INJECTION (ML) INTRAVENOUS
Qty: 8 | Refills: 0 | Status: DISCONTINUED | OUTPATIENT
Start: 2021-04-30 | End: 2021-04-30

## 2021-04-30 RX ORDER — ACETAMINOPHEN 500 MG
650 TABLET ORAL EVERY 6 HOURS
Refills: 0 | Status: DISCONTINUED | OUTPATIENT
Start: 2021-04-30 | End: 2021-05-04

## 2021-04-30 RX ORDER — SODIUM CHLORIDE 9 MG/ML
1000 INJECTION, SOLUTION INTRAVENOUS
Refills: 0 | Status: DISCONTINUED | OUTPATIENT
Start: 2021-04-30 | End: 2021-04-30

## 2021-04-30 RX ORDER — ONDANSETRON 8 MG/1
4 TABLET, FILM COATED ORAL ONCE
Refills: 0 | Status: DISCONTINUED | OUTPATIENT
Start: 2021-04-30 | End: 2021-04-30

## 2021-04-30 RX ORDER — OXYCODONE AND ACETAMINOPHEN 5; 325 MG/1; MG/1
1 TABLET ORAL EVERY 6 HOURS
Refills: 0 | Status: DISCONTINUED | OUTPATIENT
Start: 2021-04-30 | End: 2021-05-04

## 2021-04-30 RX ORDER — MEROPENEM 1 G/30ML
1000 INJECTION INTRAVENOUS EVERY 8 HOURS
Refills: 0 | Status: COMPLETED | OUTPATIENT
Start: 2021-04-30 | End: 2021-05-02

## 2021-04-30 RX ORDER — OXYCODONE AND ACETAMINOPHEN 5; 325 MG/1; MG/1
2 TABLET ORAL EVERY 6 HOURS
Refills: 0 | Status: DISCONTINUED | OUTPATIENT
Start: 2021-04-30 | End: 2021-05-04

## 2021-04-30 RX ORDER — ERTAPENEM SODIUM 1 G/1
1000 INJECTION, POWDER, LYOPHILIZED, FOR SOLUTION INTRAMUSCULAR; INTRAVENOUS EVERY 24 HOURS
Refills: 0 | Status: DISCONTINUED | OUTPATIENT
Start: 2021-04-30 | End: 2021-04-30

## 2021-04-30 RX ADMIN — SODIUM CHLORIDE 75 MILLILITER(S): 9 INJECTION, SOLUTION INTRAVENOUS at 01:36

## 2021-04-30 RX ADMIN — DORZOLAMIDE HYDROCHLORIDE 1 DROP(S): 20 SOLUTION/ DROPS OPHTHALMIC at 12:48

## 2021-04-30 RX ADMIN — TAMSULOSIN HYDROCHLORIDE 0.4 MILLIGRAM(S): 0.4 CAPSULE ORAL at 21:33

## 2021-04-30 RX ADMIN — FINASTERIDE 5 MILLIGRAM(S): 5 TABLET, FILM COATED ORAL at 11:58

## 2021-04-30 RX ADMIN — MEROPENEM 100 MILLIGRAM(S): 1 INJECTION INTRAVENOUS at 03:35

## 2021-04-30 RX ADMIN — ATORVASTATIN CALCIUM 20 MILLIGRAM(S): 80 TABLET, FILM COATED ORAL at 21:33

## 2021-04-30 RX ADMIN — DORZOLAMIDE HYDROCHLORIDE 1 DROP(S): 20 SOLUTION/ DROPS OPHTHALMIC at 21:33

## 2021-04-30 RX ADMIN — Medication 7.65 MICROGRAM(S)/KG/MIN: at 00:23

## 2021-04-30 RX ADMIN — Medication 250 MILLIGRAM(S): at 17:26

## 2021-04-30 RX ADMIN — CHLORHEXIDINE GLUCONATE 1 APPLICATION(S): 213 SOLUTION TOPICAL at 05:36

## 2021-04-30 RX ADMIN — ENOXAPARIN SODIUM 30 MILLIGRAM(S): 100 INJECTION SUBCUTANEOUS at 11:58

## 2021-04-30 RX ADMIN — Medication 62.5 MILLIMOLE(S): at 12:46

## 2021-04-30 RX ADMIN — MEROPENEM 100 MILLIGRAM(S): 1 INJECTION INTRAVENOUS at 11:57

## 2021-04-30 RX ADMIN — MEROPENEM 100 MILLIGRAM(S): 1 INJECTION INTRAVENOUS at 20:11

## 2021-04-30 RX ADMIN — PANTOPRAZOLE SODIUM 40 MILLIGRAM(S): 20 TABLET, DELAYED RELEASE ORAL at 11:58

## 2021-04-30 RX ADMIN — SODIUM CHLORIDE 100 MILLILITER(S): 9 INJECTION, SOLUTION INTRAVENOUS at 03:22

## 2021-04-30 NOTE — SWALLOW BEDSIDE ASSESSMENT ADULT - SLP PERTINENT HISTORY OF CURRENT PROBLEM
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 S/P emergent urethral stent placement 4/39."

## 2021-04-30 NOTE — PROGRESS NOTE ADULT - NSICDXPROBLEMPLAN1_GEN_ALL_CORE_FT
Pt presenting with chills, rigors and fevers from  Prior history of multiresistant ESBL UTI (sensitive to carbapenems)   and known history of L sided kidney stone. CT renal hunt on this admission shows Mod left hydronephrosis 2/2 distal L ureteral calculus measuring 12 mm. BP 86/53 T 103.6 . WBC 17.71, C2 1.2, Lactate 2.9, UA with mod LE, positive nitrites, large   blood, WBC 11-25   Severe sepsis 2/2 obstructing renal stone   - now s/p emergent stent placement by urology with need for pressors in ICU  - off pressors since 3am this morning, BP stable off pressors  - Cont meropenem   - Tylenol PRN for fevers    - continue flomax  - Uro Dr. Valle following  - Fu Blood and urine cx

## 2021-04-30 NOTE — SWALLOW BEDSIDE ASSESSMENT ADULT - SWALLOW EVAL: RECOMMENDED FEEDING/EATING TECHNIQUES
alternate food with liquid/check mouth frequently for oral residue/pocketing/crush medication (when feasible)/hard swallow w/ each bite or sip/maintain upright posture during/after eating for 30 mins/no straws/oral hygiene/position upright (90 degrees)/small sips/bites

## 2021-04-30 NOTE — DISCHARGE NOTE PROVIDER - NSDCCPCAREPLAN_GEN_ALL_CORE_FT
PRINCIPAL DISCHARGE DIAGNOSIS  Diagnosis: Severe sepsis  Assessment and Plan of Treatment: You presented to the hospital with fever and weakness. You were found to be septic secondary to a urinary tract infection from a left distal obstructing ureteral stone. You received an emergent urethral stent with urology and were treated with IV antibiotics. You received a midline with interventional radiology to continue IV antibiotics at home.  - START meropenem 1g every 8 hours for ____ days  - Follow up with urology within 1 week of discharge      SECONDARY DISCHARGE DIAGNOSES  Diagnosis: CAD (coronary atherosclerotic disease)  Assessment and Plan of Treatment: - Continue home aspirin 81 mg daily and atorvastatin 20 mg daily    Diagnosis: Dysphagia  Assessment and Plan of Treatment: You were evaluated for dysphagia by our speech pathologist. They recommended a pureed diet with thin liquids. Please follow this diet at home.    Diagnosis: BPH (benign prostatic hyperplasia)  Assessment and Plan of Treatment: - Continue finasteride 5 mg daily and tamsulosin 0.4 mg daily     PRINCIPAL DISCHARGE DIAGNOSIS  Diagnosis: Severe sepsis  Assessment and Plan of Treatment: You presented to the hospital with fever and weakness. You were found to be septic secondary to a urinary tract infection from a left distal obstructing ureteral stone. You received an emergent urethral stent with urology and were treated with IV antibiotics. You received a midline with interventional radiology to continue IV antibiotics at home.  - START meropenem 1g every 8 hours for 10 days  - Visiting nurses will be come to to your house today for your 11pm dose and will provided teaching.   - Follow up with urology within 1 week of discharge for further management either with urethoscopy/lasertripsy or lithiotripsy of ureteral stone      SECONDARY DISCHARGE DIAGNOSES  Diagnosis: BPH (benign prostatic hyperplasia)  Assessment and Plan of Treatment: - Continue finasteride 5 mg daily and tamsulosin 0.4 mg daily    Diagnosis: CAD (coronary atherosclerotic disease)  Assessment and Plan of Treatment: - Continue home aspirin 81 mg daily and atorvastatin 20 mg daily    Diagnosis: Dysphagia  Assessment and Plan of Treatment: You were evaluated for dysphagia by our speech pathologist. They recommended a pureed diet with thin liquids. Please follow this diet at home.

## 2021-04-30 NOTE — PROVIDER CONTACT NOTE (CRITICAL VALUE NOTIFICATION) - ACTION/TREATMENT ORDERED:
2100 ml normal saline bolus infusing. Repeat lactate after fluids
Repeat blood cultures. No further orders at this time

## 2021-04-30 NOTE — SWALLOW BEDSIDE ASSESSMENT ADULT - COMMENTS
Upon arrival, pt sleeping in bed. Pt was arousable to verbal/tactile cue and able to maintain adequate level of alertness for remainder of session. Pt positioned upright for safe administration of PO trials. Pt on room air. Pt followed simple commands with verbal prompts. Pt's voice was mildly hoarse. Pt denied pain pre and post assessment.  Pt is currently on a clear liquid diet. Dr. Castellano approved administration of all PO textures for purposes of swallow assessment, no gi/surgical objection.    CXR 4/29: "Low lung volumes. Chin obscures portion right apex. Bilateral chronic fibrotic/atelectatic changes similar to prior. No acute infiltrate pleural effusion or other new abnormality."  Pt's WBC is WFL, no fever. Upon arrival, pt sleeping in bed. Pt was arousable to verbal/tactile cue and able to maintain adequate level of alertness for remainder of session. Pt positioned upright for safe administration of PO trials. Pt on room air. Pt followed simple commands with verbal prompts. Pt's voice was mildly hoarse. Pt p/w edentulous mouth; son reported pt has a baseline diet of puree. Pt denied pain pre and post assessment.  Pt is currently on a clear liquid diet. Dr. Castellano approved administration of all PO textures for purposes of swallow assessment, no gi/surgical objection.    CXR 4/29: "Low lung volumes. Chin obscures portion right apex. Bilateral chronic fibrotic/atelectatic changes similar to prior. No acute infiltrate pleural effusion or other new abnormality."  Pt's WBC is WFL, no fever.

## 2021-04-30 NOTE — PROGRESS NOTE ADULT - SUBJECTIVE AND OBJECTIVE BOX
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 He had chills and rigors at home. He has presented similarly in the past when he had urinary tract infections. Denies chest pain, palpitations, dyspnea, headache, dizziness, abdominal pain, n/v/d. He states that he wants to go home and rest. Son at bedside states that he does not understand the gravity of the situation. Son states that he has paperwork stating DNR/DNI at home.    In the ED,  Vital Signs T(F) Max: 103.6 HR: 86 - 125 BP: 92/53 RR: 16 SpO2: 95%  Labs significant for: WBC 17.71, C2 1.2, eGFR 53, Lactate 2.9, UA with mod LE, positive nitrites, large blood, WBC 11-25  EKG: Sinus tachy (102), first degree block and non specific T wave inversion in leads II, III and AVF   CXR with low lung volumes with atelectasis, similar to prior study in 2019  CT renal stone hunt:  Moderate left hydrourteronephrosis secondary to a distal left ureteral calculus measuring up to 12 mm  EKG: Sinus Tachycardia at 102bpm (2021 21:47)      ----  INTERVAL HPI/OVERNIGHT EVENTS: Pt seen and evaluated at the bedside.     ----  PAST MEDICAL & SURGICAL HISTORY:  Benign prostatic hyperplasia, presence of lower urinary tract symptoms unspecified, unspecified morphology    Hyperlipidemia    GERD (gastroesophageal reflux disease)    CAD (coronary artery disease)    UTI (urinary tract infection)    H/O heart artery stent  stent x 2    History of prostate surgery    S/P ureteral stent placement  Left sided        FAMILY HISTORY:  FH: hypertension (Child)        Home Medications:  Acidophilus oral capsule: 1 cap(s) orally once a day (2021 22:14)  aspirin 81 mg oral tablet: 1 tab(s) orally once a day (2021 22:14)  atorvastatin 20 mg oral tablet: 1 tab(s) orally once a day (2021 22:14)  Azopt 1% ophthalmic suspension: 1 drop(s) to each affected eye 2 times a day (2021 22:14)  finasteride 5 mg oral tablet: 1 tab(s) orally once a day (2021 22:14)  lactulose 10 g/15 mL oral syrup: 30 milliliter(s) orally once a day (at bedtime) (2021 22:14)  multivitamin with minerals:  (2021 22:14)  tamsulosin 0.4 mg oral capsule: 1 cap(s) orally 2 times a day (2021 22:14)  Tylenol 325 mg oral tablet: 2 tab(s) orally every 6 hours, As Needed mild to moderate pain  (2021 22:14)      Allergies    No Known Allergies    Intolerances        ----  MEDICATIONS  (STANDING):  atorvastatin 20 milliGRAM(s) Oral at bedtime  chlorhexidine 2% Cloths 1 Application(s) Topical <User Schedule>  dorzolamide 2% Ophthalmic Solution 1 Drop(s) Both EYES three times a day  enoxaparin Injectable 30 milliGRAM(s) SubCutaneous daily  finasteride 5 milliGRAM(s) Oral daily  lactated ringers. 1000 milliLiter(s) (75 mL/Hr) IV Continuous <Continuous>  meropenem  IVPB 1000 milliGRAM(s) IV Intermittent every 8 hours  pantoprazole  Injectable 40 milliGRAM(s) IV Push daily  saccharomyces boulardii 250 milliGRAM(s) Oral two times a day  sodium phosphate IVPB 15 milliMole(s) IV Intermittent once  tamsulosin 0.4 milliGRAM(s) Oral at bedtime    MEDICATIONS  (PRN):  acetaminophen   Tablet .. 650 milliGRAM(s) Oral every 6 hours PRN Mild Pain (1 - 3)      ----  REVIEW OF SYSTEMS:  Constitutional: denies fever, chills  HEENT: denies headache, dizziness, or lightheadedness  Respiratory: denies SOB, cough, or wheezing  Cardiovascular: denies CP, palpitations  Gastrointestinal: denies nausea, vomiting, diarrhea, constipation, abdominal pain, or bloody stools  Genitourinary: denies painful urination, increased frequency, urgency, or bloody urine  Skin/Breast: denies rashes or itching  Musculoskeletal: denies muscle aches, joint swelling, or muscle weakness  Neurologic: denies loss of sensation, numbness, or tingling    ----  PHYSICAL EXAM:  Gen: well appearing, NAD  HEENT: NCAT, PEERLA b/l, EOMI b/l, no conjunctival erythema  Cardio: regular rate and rhythm, +s1s2, no murmurs, rubs, or gallops  Pulm: CTA b/l, no wheezes, rales or rhonchi  Abdomen: soft, nontender, nondistended, +BS x4 quadrants, no guarding  Extremities: no clubbing, cyanosis or edema, +2 pedal pulses  Neuro: AAOx3, 5/5 strength all extremities, sensation intact  Skin: warm and dry      T(C): 37.4 (21 @ 10:17), Max: 39.8 (21 @ 20:35)  HR: 76 (21 @ 10:17) (52 - 125)  BP: 96/60 (21 @ 10:17) (77/48 - 129/60)  RR: 20 (21 @ 10:17) (14 - 41)  SpO2: 95% (21 @ 10:17) (94% - 100%)  Wt(kg): --    ----  I&O's Summary    2021 07:  -  2021 07:00  --------------------------------------------------------  IN: 950.2 mL / OUT: 1650 mL / NET: -699.8 mL    2021 07:  -  2021 11:57  --------------------------------------------------------  IN: 200 mL / OUT: 250 mL / NET: -50 mL        LABS:                        11.6   15.99 )-----------( 134      ( 2021 06:24 )             35.5     04-30    142  |  112<H>  |  16  ----------------------------<  121<H>  4.2   |  25  |  1.10    Ca    8.6      2021 06:24  Phos  2.4     04-30  Mg     2.1     04-30    TPro  5.9<L>  /  Alb  2.4<L>  /  TBili  1.3<H>  /  DBili  x   /  AST  15  /  ALT  <6<L>  /  AlkPhos  60  04-30    PT/INR - ( 2021 20:54 )   PT: 13.6 sec;   INR: 1.17 ratio         PTT - ( 2021 20:54 )  PTT:27.8 sec  Urinalysis Basic - ( 2021 20:54 )    Color: Yellow / Appearance: Clear / S.015 / pH: x  Gluc: x / Ketone: Negative  / Bili: Negative / Urobili: Negative   Blood: x / Protein: 30 mg/dL / Nitrite: Positive   Leuk Esterase: Moderate / RBC: 11-25 /HPF / WBC 11-25   Sq Epi: x / Non Sq Epi: Occasional / Bacteria: TNTC      CAPILLARY BLOOD GLUCOSE                    ----  Personally reviewed:  Vital sign trends: [  ] yes    [  ] no     [  ] n/a  Laboratory results: [  ] yes    [  ] no     [  ] n/a  Radiology results: [  ] yes    [  ] no     [  ] n/a  Culture results: [  ] yes    [  ] no     [  ] n/a  Consultant recommendations: [  ] yes    [  ] no     [  ] n/a         Patient is a 89y old  Male who presents with a chief complaint of sepsis/UTI 2/2 left obstructing ureteral calculus with hydronephrosis (2021 03:00)      FROM ADMISSION H+P:   HPI:  89 y/o M with PMHx CAD s/p two cardiac stents (), BPH s/p prostate surgery (), Hx of ESBL UTI (sensitive to carbapenems and gentamicin), Hx of left sided kidney stone, GERD, HLD, hx of TIA, hx of mobile 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 He had chills and rigors at home. He has presented similarly in the past when he had urinary tract infections. Denies chest pain, palpitations, dyspnea, headache, dizziness, abdominal pain, n/v/d. He states that he wants to go home and rest. Son at bedside states that he does not understand the gravity of the situation. Son states that he has paperwork stating DNR/DNI at home.    In the ED,  Vital Signs T(F) Max: 103.6 HR: 86 - 125 BP: 92/53 RR: 16 SpO2: 95%  Labs significant for: WBC 17.71, C2 1.2, eGFR 53, Lactate 2.9, UA with mod LE, positive nitrites, large blood, WBC 11-25  EKG: Sinus tachy (102), first degree block and non specific T wave inversion in leads II, III and AVF   CXR with low lung volumes with atelectasis, similar to prior study in 2019  CT renal stone hunt:  Moderate left hydrourteronephrosis secondary to a distal left ureteral calculus measuring up to 12 mm  EKG: Sinus Tachycardia at 102bpm (2021 21:47)      ----  INTERVAL HPI/OVERNIGHT EVENTS: Pt seen and evaluated at the bedside. Patient s/p emergent stent placement overnight with transfer to the ICU for pressors. Patient d/c pressors at 3am and BP have been stable off pressors since. Patient states he just feels not well overall and feels tired. Denies any pain, fevers, chills, nausea, vomiting.    ----  PAST MEDICAL & SURGICAL HISTORY:  Benign prostatic hyperplasia, presence of lower urinary tract symptoms unspecified, unspecified morphology    Hyperlipidemia    GERD (gastroesophageal reflux disease)    CAD (coronary artery disease)    UTI (urinary tract infection)    H/O heart artery stent  stent x 2    History of prostate surgery    S/P ureteral stent placement  Left sided        FAMILY HISTORY:  FH: hypertension (Child)        Home Medications:  Acidophilus oral capsule: 1 cap(s) orally once a day (2021 22:14)  aspirin 81 mg oral tablet: 1 tab(s) orally once a day (2021 22:14)  atorvastatin 20 mg oral tablet: 1 tab(s) orally once a day (2021 22:14)  Azopt 1% ophthalmic suspension: 1 drop(s) to each affected eye 2 times a day (2021 22:14)  finasteride 5 mg oral tablet: 1 tab(s) orally once a day (2021 22:14)  lactulose 10 g/15 mL oral syrup: 30 milliliter(s) orally once a day (at bedtime) (2021 22:14)  multivitamin with minerals:  (2021 22:14)  tamsulosin 0.4 mg oral capsule: 1 cap(s) orally 2 times a day (2021 22:14)  Tylenol 325 mg oral tablet: 2 tab(s) orally every 6 hours, As Needed mild to moderate pain  (2021 22:14)      Allergies    No Known Allergies    Intolerances        ----  MEDICATIONS  (STANDING):  atorvastatin 20 milliGRAM(s) Oral at bedtime  chlorhexidine 2% Cloths 1 Application(s) Topical <User Schedule>  dorzolamide 2% Ophthalmic Solution 1 Drop(s) Both EYES three times a day  enoxaparin Injectable 30 milliGRAM(s) SubCutaneous daily  finasteride 5 milliGRAM(s) Oral daily  lactated ringers. 1000 milliLiter(s) (75 mL/Hr) IV Continuous <Continuous>  meropenem  IVPB 1000 milliGRAM(s) IV Intermittent every 8 hours  pantoprazole  Injectable 40 milliGRAM(s) IV Push daily  saccharomyces boulardii 250 milliGRAM(s) Oral two times a day  sodium phosphate IVPB 15 milliMole(s) IV Intermittent once  tamsulosin 0.4 milliGRAM(s) Oral at bedtime    MEDICATIONS  (PRN):  acetaminophen   Tablet .. 650 milliGRAM(s) Oral every 6 hours PRN Mild Pain (1 - 3)      ----  REVIEW OF SYSTEMS:  Constitutional: denies fever, chills  HEENT: denies headache, dizziness, or lightheadedness  Respiratory: denies SOB, cough, or wheezing  Cardiovascular: denies CP, palpitations  Gastrointestinal: denies nausea, vomiting, diarrhea, constipation, abdominal pain, or bloody stools  Genitourinary: denies painful urination, increased frequency, urgency, or bloody urine  Skin/Breast: denies rashes or itching  Musculoskeletal: denies muscle aches, joint swelling, or muscle weakness  Neurologic: denies loss of sensation, numbness, or tingling    ----  PHYSICAL EXAM:  Gen: well appearing, NAD  HEENT: NCAT, PEERLA b/l, EOMI b/l, no conjunctival erythema  Cardio: regular rate and rhythm, +s1s2, no murmurs, rubs, or gallops  Pulm: CTA b/l, no wheezes, rales or rhonchi  Abdomen: soft, nontender, nondistended, +BS x4 quadrants, no guarding  Extremities: no clubbing, cyanosis or edema, +2 pedal pulses  Neuro: AAOx3, 5/5 strength all extremities, sensation intact  Skin: warm and dry      T(C): 37.4 (21 @ 10:17), Max: 39.8 (21 @ 20:35)  HR: 76 (21 @ 10:17) (52 - 125)  BP: 96/60 (21 @ 10:17) (77/48 - 129/60)  RR: 20 (21 @ 10:17) (14 - 41)  SpO2: 95% (21 @ 10:17) (94% - 100%)  Wt(kg): --    ----  I&O's Summary    2021 07:01  -  2021 07:00  --------------------------------------------------------  IN: 950.2 mL / OUT: 1650 mL / NET: -699.8 mL    2021 07:01  -  2021 11:57  --------------------------------------------------------  IN: 200 mL / OUT: 250 mL / NET: -50 mL        LABS:                        11.6   15.99 )-----------( 134      ( 2021 06:24 )             35.5     04-30    142  |  112<H>  |  16  ----------------------------<  121<H>  4.2   |  25  |  1.10    Ca    8.6      2021 06:24  Phos  2.4     04-30  Mg     2.1     04-30    TPro  5.9<L>  /  Alb  2.4<L>  /  TBili  1.3<H>  /  DBili  x   /  AST  15  /  ALT  <6<L>  /  AlkPhos  60  04-30    PT/INR - ( 2021 20:54 )   PT: 13.6 sec;   INR: 1.17 ratio         PTT - ( 2021 20:54 )  PTT:27.8 sec  Urinalysis Basic - ( 2021 20:54 )    Color: Yellow / Appearance: Clear / S.015 / pH: x  Gluc: x / Ketone: Negative  / Bili: Negative / Urobili: Negative   Blood: x / Protein: 30 mg/dL / Nitrite: Positive   Leuk Esterase: Moderate / RBC: 11-25 /HPF / WBC 11-25   Sq Epi: x / Non Sq Epi: Occasional / Bacteria: TNTC      CAPILLARY BLOOD GLUCOSE                    ----  Personally reviewed:  Vital sign trends: [  ] yes    [  ] no     [  ] n/a  Laboratory results: [  ] yes    [  ] no     [  ] n/a  Radiology results: [  ] yes    [  ] no     [  ] n/a  Culture results: [  ] yes    [  ] no     [  ] n/a  Consultant recommendations: [  ] yes    [  ] no     [  ] n/a

## 2021-04-30 NOTE — PROGRESS NOTE ADULT - ATTENDING COMMENTS
Patient admitted for ESBL Bactermia and UTI. Had Nephrolithiasis and S/P Stent placement.  Patient on Meropenam and pending ID Consultation.

## 2021-04-30 NOTE — PROGRESS NOTE ADULT - ASSESSMENT
S/P Cysto-left stent for distal left ureteral obstruction calculus, also has large left renal calculus    Recommend TOV if fluid monitoring not necessary, Creatinine wnl-1.10, has patent post TURP prostatic fossa

## 2021-04-30 NOTE — PROGRESS NOTE ADULT - ASSESSMENT
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 S/P emergent urethral stent placement 4/39

## 2021-04-30 NOTE — DISCHARGE NOTE PROVIDER - NSDCMRMEDTOKEN_GEN_ALL_CORE_FT
Acidophilus oral capsule: 1 cap(s) orally once a day  aspirin 81 mg oral tablet: 1 tab(s) orally once a day  atorvastatin 20 mg oral tablet: 1 tab(s) orally once a day  Azopt 1% ophthalmic suspension: 1 drop(s) to each affected eye 2 times a day  finasteride 5 mg oral tablet: 1 tab(s) orally once a day  lactulose 10 g/15 mL oral syrup: 30 milliliter(s) orally once a day (at bedtime)  multivitamin with minerals:   tamsulosin 0.4 mg oral capsule: 1 cap(s) orally 2 times a day  Tylenol 325 mg oral tablet: 2 tab(s) orally every 6 hours, As Needed mild to moderate pain    Acidophilus oral capsule: 1 cap(s) orally once a day  aspirin 81 mg oral tablet: 1 tab(s) orally once a day  atorvastatin 20 mg oral tablet: 1 tab(s) orally once a day  Azopt 1% ophthalmic suspension: 1 drop(s) to each affected eye 2 times a day  finasteride 5 mg oral tablet: 1 tab(s) orally once a day  lactulose 10 g/15 mL oral syrup: 30 milliliter(s) orally once a day (at bedtime)  meropenem 1000 mg intravenous injection: 1000 milligram(s) intravenously every 8 hours   multivitamin with minerals:   tamsulosin 0.4 mg oral capsule: 1 cap(s) orally 2 times a day  Tylenol 325 mg oral tablet: 2 tab(s) orally every 6 hours, As Needed mild to moderate pain    Acidophilus oral capsule: 1 cap(s) orally once a day  aspirin 81 mg oral tablet: 1 tab(s) orally once a day  atorvastatin 20 mg oral tablet: 1 tab(s) orally once a day  Azopt 1% ophthalmic suspension: 1 drop(s) to each affected eye 2 times a day  finasteride 5 mg oral tablet: 1 tab(s) orally once a day  lactulose 10 g/15 mL oral syrup: 30 milliliter(s) orally once a day (at bedtime)  meropenem 1000 mg intravenous injection: 1000 milligram(s) intravenously every 8 hours   multivitamin with minerals:   normal saline flush 10mL flush pre and post infusion:   tamsulosin 0.4 mg oral capsule: 1 cap(s) orally 2 times a day

## 2021-04-30 NOTE — ED ADULT NURSE REASSESSMENT NOTE - NS ED NURSE REASSESS COMMENT FT1
patient blood pressure after 3100 ml bolus 77/48. Dr. De Leon, Dr. Valle at the bedside to obtain consent for surgery. Lactated ringers bolus given. Levophed started. Patient placed on non rebreather and transported to OR. Delphine CARTAGENA

## 2021-04-30 NOTE — DISCHARGE NOTE PROVIDER - CARE PROVIDERS DIRECT ADDRESSES
[email or web address]

## 2021-04-30 NOTE — SWALLOW BEDSIDE ASSESSMENT ADULT - SWALLOW EVAL: DIAGNOSIS
Pt p/w a mild oral dysphagia when given thin and nectar thick liquids marked by adequate retrieval and containment, timely manipulation and transfer, suspected posterior loss, and adequate clearance post swallow. Functional oral phase when given puree marked by adequate retrieval and containment, timely manipulation and transfer, and adequate clearance post swallow. Pharyngeal dysphagia marked by suspected delayed swallow onset, +laryngeal elevation to palpation, and no overt s/sx of aspiration. Recommend puree (dysphagia 1) with thin liquids via SINGLE/SMALL CUP SIPS +aspiration precautions. Pt's son reported pt tends to be impulsive with PO, recommend 1:1 supervision/assistance during all PO intake to ensure safety during deglutition. Feed only when fully awake/alert, position upright 90 degrees, small bite/sip, alternate bite/sip, pace meal slowly. Discussed with RN and MD.

## 2021-04-30 NOTE — PROGRESS NOTE ADULT - NSICDXPROBLEMPLAN6_GEN_ALL_CORE_FT
IMPROVE VTE Individual Risk Assessment        RISK                                                          Points  [  ] Previous VTE                                                3  [  ] Thrombophilia                                             2  [  ] Lower limb paralysis                                   2        (unable to hold up >15 seconds)    [  ] Current Cancer                                            2         (within 6 months)  [  ] Immobilization > 24 hrs                              1  [  ] ICU/CCU stay > 24 hours                            1  [x  ] Age > 60                                                    1  IMPROVE VTE Score _____1____  SCDs for now for urgent urological procedure. start LMWH post-op.

## 2021-04-30 NOTE — BRIEF OPERATIVE NOTE - OPERATION/FINDINGS
s/p TURP, distal obstructing left ureteral calculus, large left renal calculus, hydronephrotic drip, CHANTAL - flat prostatic bed

## 2021-04-30 NOTE — CHART NOTE - NSCHARTNOTEFT_GEN_A_CORE
Called and updated patient's son Jamel Gary at [phone].  All updates given, all questions answered.

## 2021-04-30 NOTE — CONSULT NOTE ADULT - NSICDXPROBLEMREC1_GEN_ALL_CORE_FT
IV hydration   pan cultured , follow up on culture  ABX with invanz due to hx of esbl  ID consult  trend wbc

## 2021-04-30 NOTE — PROGRESS NOTE ADULT - SUBJECTIVE AND OBJECTIVE BOX
Gu Progress Note:   Much improved s/p emergent Cysto/left stent    PAST MEDICAL & SURGICAL HISTORY:  Benign prostatic hyperplasia, presence of lower urinary tract symptoms unspecified, unspecified morphology    Hyperlipidemia    GERD (gastroesophageal reflux disease)    CAD (coronary artery disease)    UTI (urinary tract infection)    H/O heart artery stent  stent x 2    History of prostate surgery    S/P ureteral stent placement  Left sided          MEDICATIONS  (STANDING):  atorvastatin 20 milliGRAM(s) Oral at bedtime  chlorhexidine 2% Cloths 1 Application(s) Topical <User Schedule>  dorzolamide 2% Ophthalmic Solution 1 Drop(s) Both EYES three times a day  enoxaparin Injectable 30 milliGRAM(s) SubCutaneous daily  finasteride 5 milliGRAM(s) Oral daily  lactated ringers. 1000 milliLiter(s) (75 mL/Hr) IV Continuous <Continuous>  meropenem  IVPB 1000 milliGRAM(s) IV Intermittent every 8 hours  pantoprazole  Injectable 40 milliGRAM(s) IV Push daily  saccharomyces boulardii 250 milliGRAM(s) Oral two times a day  sodium phosphate IVPB 15 milliMole(s) IV Intermittent once  tamsulosin 0.4 milliGRAM(s) Oral at bedtime    MEDICATIONS  (PRN):  acetaminophen   Tablet .. 650 milliGRAM(s) Oral every 6 hours PRN Mild Pain (1 - 3)      Allergies    No Known Allergies    Intolerances            FAMILY HISTORY:  FH: hypertension (Child)        Vital Signs Last 24 Hrs  T(C): 36.9 (2021 12:03), Max: 39.8 (2021 20:35)  T(F): 98.4 (2021 12:03), Max: 103.6 (2021 20:35)  HR: 64 (2021 12:03) (52 - 125)  BP: 95/55 (2021 12:03) (77/48 - 129/60)  BP(mean): 87 (2021 09:00) (66 - 88)  RR: 19 (2021 12:03) (14 - 41)  SpO2: 95% (2021 12:03) (94% - 100%)    PHYSICAL EXAM:    Constitutional: NAD, well-developed    Asymptomatic, AO  Abd: BS+, soft, NT/ND, No CVAT  : Normal  circumcised phallus, patent  meatus, bilateral descended testes, no masses Nj with grossly clear urine  Extremities: No peripheral edema    LABS:                        11.6   15.99 )-----------( 134      ( 2021 06:24 )             35.5         142  |  112<H>  |  16  ----------------------------<  121<H>  4.2   |  25  |  1.10    Ca    8.6      2021 06:24  Phos  2.4       Mg     2.1         TPro  5.9<L>  /  Alb  2.4<L>  /  TBili  1.3<H>  /  DBili  x   /  AST  15  /  ALT  <6<L>  /  AlkPhos  60      PT/INR - ( 2021 20:54 )   PT: 13.6 sec;   INR: 1.17 ratio         PTT - ( 2021 20:54 )  PTT:27.8 sec  Urinalysis Basic - ( 2021 20:54 )    Color: Yellow / Appearance: Clear / S.015 / pH: x  Gluc: x / Ketone: Negative  / Bili: Negative / Urobili: Negative   Blood: x / Protein: 30 mg/dL / Nitrite: Positive   Leuk Esterase: Moderate / RBC: 11-25 /HPF / WBC 11-25   Sq Epi: x / Non Sq Epi: Occasional / Bacteria: TNTC      Urine Culture:   Hemoglobin: 11.6 g/dL ( @ 06:24)  Hematocrit: 35.5 % ( @ 06:24)  Hemoglobin: 13.8 g/dL ( @ 20:54)  Hematocrit: 40.0 % ( @ 20:54)      RADIOLOGY & ADDITIONAL STUDIES:

## 2021-04-30 NOTE — DISCHARGE NOTE PROVIDER - HOSPITAL COURSE
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 He had chills and rigors at home. He has presented similarly in the past when he had urinary tract infections. Denies chest pain, palpitations, dyspnea, headache, dizziness, abdominal pain, n/v/d. He states that he wants to go home and rest. Son at bedside states that he does not understand the gravity of the situation. Son states that he has paperwork stating DNR/DNI at home.    In the ED,  Vital Signs T(F) Max: 103.6 HR: 86 - 125 BP: 92/53 RR: 16 SpO2: 95%  Labs significant for: WBC 17.71, C2 1.2, eGFR 53, Lactate 2.9, UA with mod LE, positive nitrites, large blood, WBC 11-25  EKG: Sinus tachy (102), first degree block and non specific T wave inversion in leads II, III and AVF   CXR with low lung volumes with atelectasis, similar to prior study in 2019  CT renal stone hunt:  Moderate left hydrourteronephrosis secondary to a distal left ureteral calculus measuring up to 12 mm  EKG: Sinus Tachycardia at 102bpm (29 Apr 2021 21:47)      ---  HOSPITAL COURSE:     Patient underwent emergent procedure with urology Cystoscopy with stent placement and Retrograde pyelogram, found to have distal obstructing left ureteral calculus, large left renal calculus, hydronephrotic drip. Patient with hypotension s/p procedure - transferred to ICU for pressure support with pressors. Patient weaned off pressors and transferred to general medicine floor. Patient continued on meropenem for sepsis in setting of septic stone. Patient found to have BCx2 positive for GNR, repeat cultures sent on 4/30. Patient evaluated by S&S for history of dysphagia and was recommended for puree diet and thin liquids      updated 4/30  ---  CONSULTANTS:     ---  TIME SPENT:  I, the attending physician, was physically present for the key portions of the evaluation and management (E/M) service provided. 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 He had chills and rigors at home. He has presented similarly in the past when he had urinary tract infections. Denies chest pain, palpitations, dyspnea, headache, dizziness, abdominal pain, n/v/d. He states that he wants to go home and rest. Son at bedside states that he does not understand the gravity of the situation. Son states that he has paperwork stating DNR/DNI at home.    In the ED,  Vital Signs T(F) Max: 103.6 HR: 86 - 125 BP: 92/53 RR: 16 SpO2: 95%  Labs significant for: WBC 17.71, C2 1.2, eGFR 53, Lactate 2.9, UA with mod LE, positive nitrites, large blood, WBC 11-25  EKG: Sinus tachy (102), first degree block and non specific T wave inversion in leads II, III and AVF   CXR with low lung volumes with atelectasis, similar to prior study in 2019  CT renal stone hunt:  Moderate left hydrourteronephrosis secondary to a distal left ureteral calculus measuring up to 12 mm  EKG: Sinus Tachycardia at 102bpm (29 Apr 2021 21:47)      ---  HOSPITAL COURSE:     Patient underwent emergent procedure with urology Cystoscopy with stent placement and Retrograde pyelogram, found to have distal obstructing left ureteral calculus, large left renal calculus, hydronephrotic drip. Patient with hypotension s/p procedure - transferred to ICU for pressure support with pressors. Patient weaned off pressors and transferred to general medicine floor. Patient continued on meropenem for sepsis in setting of septic stone. Patient found to have BCx2 positive for GNR, repeat cultures sent on 4/30 which were negative. Initial blood culture, urine culture, OR cultures grew ESBL and E.coli, with one OR culture growing pseudomonas. Culture sensitivities showed _____. Patient evaluated by S&S for history of dysphagia and was recommended for puree diet and thin liquids      updated 4/30  ---  CONSULTANTS:     ---  TIME SPENT:  I, the attending physician, was physically present for the key portions of the evaluation and management (E/M) service provided. 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 He had chills and rigors at home. He has presented similarly in the past when he had urinary tract infections. Denies chest pain, palpitations, dyspnea, headache, dizziness, abdominal pain, n/v/d. He states that he wants to go home and rest. Son at bedside states that he does not understand the gravity of the situation. Son states that he has paperwork stating DNR/DNI at home.    In the ED,  Vital Signs T(F) Max: 103.6 HR: 86 - 125 BP: 92/53 RR: 16 SpO2: 95%  Labs significant for: WBC 17.71, C2 1.2, eGFR 53, Lactate 2.9, UA with mod LE, positive nitrites, large blood, WBC 11-25  EKG: Sinus tachy (102), first degree block and non specific T wave inversion in leads II, III and AVF   CXR with low lung volumes with atelectasis, similar to prior study in 2019  CT renal stone hunt:  Moderate left hydrourteronephrosis secondary to a distal left ureteral calculus measuring up to 12 mm  EKG: Sinus Tachycardia at 102bpm (29 Apr 2021 21:47)      ---  HOSPITAL COURSE:     Patient underwent emergent procedure with urology Cystoscopy with stent placement and Retrograde pyelogram, found to have distal obstructing left ureteral calculus, large left renal calculus, hydronephrotic drip. Patient with hypotension s/p procedure - transferred to ICU for pressure support with pressors. Patient weaned off pressors and transferred to general medicine floor. Patient continued on meropenem for sepsis in setting of septic stone. Patient found to have BCx2 positive for GNR, repeat cultures sent on 4/30 which were negative. Initial blood culture, urine culture, OR cultures grew ESBL and E.coli, with one OR culture growing pseudomonas. Culture sensitivities showed sensitivity to meropenem, but inability to switch to a PO option. Patient had midline placed 5/3/21 to complete antibiotic course. Patient evaluated by S&S for history of dysphagia and was recommended for puree diet and thin liquids. PT evaluated the patient and recommended home with home PT. The patient was seen and examined on day of discharge. The patient clinically improved throughout admission and is stable for discharge home with close outpatient follow up.     ---  CONSULTANTS:   Urology - Dr. Valle  ID - Dr. Hollis     ---  TIME SPENT:  I, the attending physician, was physically present for the key portions of the evaluation and management (E/M) service provided. 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 He had chills and rigors at home. He has presented similarly in the past when he had urinary tract infections. Denies chest pain, palpitations, dyspnea, headache, dizziness, abdominal pain, n/v/d. He states that he wants to go home and rest. Son at bedside states that he does not understand the gravity of the situation. Son states that he has paperwork stating DNR/DNI at home.     ---  HOSPITAL COURSE:   Patient underwent emergent procedure with urology Cystoscopy with stent placement and Retrograde pyelogram, found to have distal obstructing left ureteral calculus, large left renal calculus, hydronephrotic drip. Patient with hypotension s/p procedure - transferred to ICU for pressure support with pressors. Patient weaned off pressors and transferred to general medicine floor. Patient continued on meropenem for sepsis in setting of septic stone. Patient found to have BCx2 positive for GNR, repeat cultures sent on 4/30 which were negative. Initial blood culture, urine culture, OR cultures grew ESBL and E.coli, with one OR culture growing pseudomonas. Culture sensitivities showed sensitivity to meropenem, but inability to switch to a PO option. Patient had midline placed 5/3/21 to complete antibiotic course. Patient evaluated by S&S for history of dysphagia and was recommended for puree diet and thin liquids. PT evaluated the patient and recommended home with home PT. The patient was seen and examined on day of discharge. The patient clinically improved throughout admission and is stable for discharge home with close outpatient follow up.     ---  CONSULTANTS:   Urology - Dr. Valle  ID - Dr. Hollis     ---  Pt was seen on day of discharge:  he feels constipated and is becoming agitated regarding restricted ambulatory status. he states that he's normally able to walk on his own and doesn't want any assistance. otherwise, he has no complaints and wants to be discharged.     T(C): 36.9 (05-04-21 @ 05:40), Max: 37.2 (05-03-21 @ 13:29)  HR: 65 (05-04-21 @ 05:40) (64 - 70)  BP: 149/76 (05-04-21 @ 05:40) (126/69 - 149/76)  RR: 18 (05-04-21 @ 05:40) (18 - 18)  SpO2: 93% (05-04-21 @ 05:40) (93% - 94%)    PHYSICAL EXAM:  GENERAL: patient appears slightly agitated and frustrated but nontoxic, no distress  ENMT: oropharynx clear without erythema, moist mucous membranes  NECK: supple, soft  LUNGS: good air entry bilaterally, clear to auscultation, no wheezing or rhonchi appreciated  HEART: S1/S2, regular rate and rhythm, no murmurs noted   GASTROINTESTINAL: abdomen is soft, nontender, nondistended, active bowel sounds  INTEGUMENT: good skin turgor, appropriate for ethnicity   MUSCULOSKELETAL: no clubbing or cyanosis, no obvious deformity  NEUROLOGIC: awake, alert, answers questions appropriately however [place]    ---  TIME SPENT:  I, the attending physician, was physically present for the key portions of the evaluation and management (E/M) service provided. 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 He had chills and rigors at home. He has presented similarly in the past when he had urinary tract infections. Denies chest pain, palpitations, dyspnea, headache, dizziness, abdominal pain, n/v/d. He states that he wants to go home and rest. Son at bedside states that he does not understand the gravity of the situation. Son states that he has paperwork stating DNR/DNI at home.     ---  HOSPITAL COURSE:   Patient underwent emergent procedure with urology Cystoscopy with stent placement and Retrograde pyelogram, found to have distal obstructing left ureteral calculus, large left renal calculus, hydronephrotic drip. Patient with hypotension s/p procedure - transferred to ICU for pressure support with pressors. Patient weaned off pressors and transferred to general medicine floor. Patient continued on meropenem for sepsis in setting of septic stone. Patient found to have BCx2 positive for ESBL E. coli, repeat cultures sent on 4/30 which were negative. Urine culture, OR cultures grew ESBL and E.coli, with one OR culture growing pseudomonas. Culture sensitivities showed sensitivity to meropenem, but inability to switch to a PO option. Patient had midline placed 5/3/21 to complete antibiotic course. Patient evaluated by S&S for history of dysphagia and was recommended for puree diet and thin liquids. PT evaluated the patient and recommended home with home PT. The patient was seen and examined on day of discharge. The patient clinically improved throughout admission and is stable for discharge home with close outpatient follow up.     ---  CONSULTANTS:   Urology - Dr. Valle  ID - Dr. Hollis     ---  Pt was seen on day of discharge:  he feels constipated and is becoming agitated regarding restricted ambulatory status. he states that he's normally able to walk on his own and doesn't want any assistance. otherwise, he has no complaints and wants to be discharged.     Vital Signs (24 Hrs):  T(C): 36.9 (05-04-21 @ 12:12), Max: 37.2 (05-03-21 @ 13:29)  HR: 73 (05-04-21 @ 12:12) (64 - 73)  BP: 129/77 (05-04-21 @ 12:12) (126/69 - 149/76)  RR: 18 (05-04-21 @ 12:12) (18 - 18)  SpO2: 95% (05-04-21 @ 12:12) (93% - 95%      PHYSICAL EXAM:  GENERAL: patient appears slightly agitated and frustrated but nontoxic, no distress  ENMT: oropharynx clear without erythema, moist mucous membranes  NECK: supple, soft  LUNGS: good air entry bilaterally, clear to auscultation, no wheezing or rhonchi appreciated  HEART: S1/S2, regular rate and rhythm, no murmurs noted   GASTROINTESTINAL: abdomen is soft, nontender, nondistended, active bowel sounds  INTEGUMENT: good skin turgor, appropriate for ethnicity   MUSCULOSKELETAL: no clubbing or cyanosis, no obvious deformity  NEUROLOGIC: awake, alert, answers questions appropriately however [place]    ---  TIME SPENT:  I, the attending physician, was physically present for the key portions of the evaluation and management (E/M) service provided. The total amount of time spent reviewing the hospital notes, laboratory values, imaging findings, assessing/counseling the patient, discussing with consultant physicians, social work, nursing staff was 41 minutes

## 2021-04-30 NOTE — DISCHARGE NOTE PROVIDER - CARE PROVIDER_API CALL
Juice Valle)  Urology  [address], [address]  [place] [address]  Phone: [phone]  Fax: [phone]  Follow Up Time:     Angeline Ness  INTERNAL MEDICINE  [address] [place] [id number]  Phone: [phone]  Fax: [phone]  Follow Up Time:

## 2021-05-01 LAB
ANION GAP SERPL CALC-SCNC: 3 MMOL/L — LOW (ref 5–17)
BUN SERPL-MCNC: 13 MG/DL — SIGNIFICANT CHANGE UP (ref 7–23)
CALCIUM SERPL-MCNC: 9.1 MG/DL — SIGNIFICANT CHANGE UP (ref 8.5–10.1)
CHLORIDE SERPL-SCNC: 114 MMOL/L — HIGH (ref 96–108)
CO2 SERPL-SCNC: 27 MMOL/L — SIGNIFICANT CHANGE UP (ref 22–31)
COVID-19 SPIKE DOMAIN AB INTERP: POSITIVE
COVID-19 SPIKE DOMAIN ANTIBODY RESULT: >250 U/ML — HIGH
CREAT SERPL-MCNC: 0.93 MG/DL — SIGNIFICANT CHANGE UP (ref 0.5–1.3)
GLUCOSE SERPL-MCNC: 98 MG/DL — SIGNIFICANT CHANGE UP (ref 70–99)
HCT VFR BLD CALC: 34.2 % — LOW (ref 39–50)
HGB BLD-MCNC: 11.3 G/DL — LOW (ref 13–17)
MAGNESIUM SERPL-MCNC: 2 MG/DL — SIGNIFICANT CHANGE UP (ref 1.6–2.6)
MCHC RBC-ENTMCNC: 30.3 PG — SIGNIFICANT CHANGE UP (ref 27–34)
MCHC RBC-ENTMCNC: 33 GM/DL — SIGNIFICANT CHANGE UP (ref 32–36)
MCV RBC AUTO: 91.7 FL — SIGNIFICANT CHANGE UP (ref 80–100)
NRBC # BLD: 0 /100 WBCS — SIGNIFICANT CHANGE UP (ref 0–0)
PHOSPHATE SERPL-MCNC: 2.3 MG/DL — LOW (ref 2.5–4.5)
PLATELET # BLD AUTO: 133 K/UL — LOW (ref 150–400)
POTASSIUM SERPL-MCNC: 3.8 MMOL/L — SIGNIFICANT CHANGE UP (ref 3.5–5.3)
POTASSIUM SERPL-SCNC: 3.8 MMOL/L — SIGNIFICANT CHANGE UP (ref 3.5–5.3)
RBC # BLD: 3.73 M/UL — LOW (ref 4.2–5.8)
RBC # FLD: 14.9 % — HIGH (ref 10.3–14.5)
SARS-COV-2 IGG+IGM SERPL QL IA: >250 U/ML — HIGH
SARS-COV-2 IGG+IGM SERPL QL IA: POSITIVE
SODIUM SERPL-SCNC: 144 MMOL/L — SIGNIFICANT CHANGE UP (ref 135–145)
WBC # BLD: 10.34 K/UL — SIGNIFICANT CHANGE UP (ref 3.8–10.5)
WBC # FLD AUTO: 10.34 K/UL — SIGNIFICANT CHANGE UP (ref 3.8–10.5)

## 2021-05-01 PROCEDURE — 99233 SBSQ HOSP IP/OBS HIGH 50: CPT | Mod: GC

## 2021-05-01 PROCEDURE — 99223 1ST HOSP IP/OBS HIGH 75: CPT

## 2021-05-01 RX ORDER — POTASSIUM PHOSPHATE, MONOBASIC POTASSIUM PHOSPHATE, DIBASIC 236; 224 MG/ML; MG/ML
30 INJECTION, SOLUTION INTRAVENOUS ONCE
Refills: 0 | Status: COMPLETED | OUTPATIENT
Start: 2021-05-01 | End: 2021-05-01

## 2021-05-01 RX ADMIN — TAMSULOSIN HYDROCHLORIDE 0.4 MILLIGRAM(S): 0.4 CAPSULE ORAL at 21:04

## 2021-05-01 RX ADMIN — Medication 250 MILLIGRAM(S): at 18:36

## 2021-05-01 RX ADMIN — MEROPENEM 100 MILLIGRAM(S): 1 INJECTION INTRAVENOUS at 04:00

## 2021-05-01 RX ADMIN — DORZOLAMIDE HYDROCHLORIDE 1 DROP(S): 20 SOLUTION/ DROPS OPHTHALMIC at 21:04

## 2021-05-01 RX ADMIN — Medication 250 MILLIGRAM(S): at 05:23

## 2021-05-01 RX ADMIN — POTASSIUM PHOSPHATE, MONOBASIC POTASSIUM PHOSPHATE, DIBASIC 83.33 MILLIMOLE(S): 236; 224 INJECTION, SOLUTION INTRAVENOUS at 10:12

## 2021-05-01 RX ADMIN — ATORVASTATIN CALCIUM 20 MILLIGRAM(S): 80 TABLET, FILM COATED ORAL at 21:04

## 2021-05-01 RX ADMIN — MEROPENEM 100 MILLIGRAM(S): 1 INJECTION INTRAVENOUS at 13:11

## 2021-05-01 RX ADMIN — PANTOPRAZOLE SODIUM 40 MILLIGRAM(S): 20 TABLET, DELAYED RELEASE ORAL at 13:13

## 2021-05-01 RX ADMIN — FINASTERIDE 5 MILLIGRAM(S): 5 TABLET, FILM COATED ORAL at 13:12

## 2021-05-01 RX ADMIN — MEROPENEM 100 MILLIGRAM(S): 1 INJECTION INTRAVENOUS at 20:56

## 2021-05-01 RX ADMIN — DORZOLAMIDE HYDROCHLORIDE 1 DROP(S): 20 SOLUTION/ DROPS OPHTHALMIC at 18:36

## 2021-05-01 RX ADMIN — DORZOLAMIDE HYDROCHLORIDE 1 DROP(S): 20 SOLUTION/ DROPS OPHTHALMIC at 05:23

## 2021-05-01 RX ADMIN — ENOXAPARIN SODIUM 30 MILLIGRAM(S): 100 INJECTION SUBCUTANEOUS at 13:11

## 2021-05-01 NOTE — PROGRESS NOTE ADULT - ASSESSMENT
Ureteral stone/sepsis; s/p stent. Clinically improving. Bld/Ucx - ESBL EC. Adeq voiding.      Cont present tx  Adjust antibx per final cx sensitivities  Outpt  f/u w/ Dr Valle

## 2021-05-01 NOTE — CONSULT NOTE ADULT - REASON FOR ADMISSION
sepsis/UTI 2/2 left obstructing ureteral calculus with hydronephrosis
sepsis/UTI 2/2 left obstructing ureteral calculus with hydronephrosis

## 2021-05-01 NOTE — PROGRESS NOTE ADULT - NSICDXPROBLEMPLAN6_GEN_ALL_CORE_FT
Chronic   - NPO for now; Eats pureed and soft diet at home   - pending speech and swallow Chronic   - speech and swallow recommending dysphagia 1 with thin liquids

## 2021-05-01 NOTE — PROGRESS NOTE ADULT - NSICDXPROBLEMPLAN5_GEN_ALL_CORE_FT
IMPROVE VTE Individual Risk Assessment        RISK                                                          Points  [  ] Previous VTE                                                3  [  ] Thrombophilia                                             2  [  ] Lower limb paralysis                                   2        (unable to hold up >15 seconds)    [  ] Current Cancer                                            2         (within 6 months)  [  ] Immobilization > 24 hrs                              1  [  ] ICU/CCU stay > 24 hours                            1  [x  ] Age > 60                                                    1  IMPROVE VTE Score _____1____  SCDs for now for urgent urological procedure. start LMWH post-op. IMPROVE VTE Individual Risk Assessment        RISK                                                          Points  [  ] Previous VTE                                                3  [  ] Thrombophilia                                             2  [  ] Lower limb paralysis                                   2        (unable to hold up >15 seconds)    [  ] Current Cancer                                            2         (within 6 months)  [  ] Immobilization > 24 hrs                              1  [  ] ICU/CCU stay > 24 hours                            1  [x  ] Age > 60                                                    1  IMPROVE VTE Score _____1____  on lovenox

## 2021-05-01 NOTE — PROGRESS NOTE ADULT - SUBJECTIVE AND OBJECTIVE BOX
Patient is a 89y old  Male who presents with a chief complaint of sepsis/UTI 2/2 left obstructing ureteral calculus with hydronephrosis (2021 17:43)      INTERVAL HPI/OVERNIGHT EVENTS: Patient examined at bedside. No overnight events occurred. Patient has no complaints at this time. Denies fevers, chills, headache, lightheadedness, chest pain, dyspnea, abdominal pain, nausea, vomiting, diarrhea, constipation.    MEDICATIONS  (STANDING):  atorvastatin 20 milliGRAM(s) Oral at bedtime  dorzolamide 2% Ophthalmic Solution 1 Drop(s) Both EYES three times a day  enoxaparin Injectable 30 milliGRAM(s) SubCutaneous daily  finasteride 5 milliGRAM(s) Oral daily  lactated ringers. 1000 milliLiter(s) (75 mL/Hr) IV Continuous <Continuous>  meropenem  IVPB 1000 milliGRAM(s) IV Intermittent every 8 hours  pantoprazole  Injectable 40 milliGRAM(s) IV Push daily  saccharomyces boulardii 250 milliGRAM(s) Oral two times a day  tamsulosin 0.4 milliGRAM(s) Oral at bedtime    MEDICATIONS  (PRN):  acetaminophen   Tablet .. 650 milliGRAM(s) Oral every 6 hours PRN Mild Pain (1 - 3)  oxycodone    5 mG/acetaminophen 325 mG 1 Tablet(s) Oral every 6 hours PRN Moderate Pain (4 - 6)  oxycodone    5 mG/acetaminophen 325 mG 2 Tablet(s) Oral every 6 hours PRN Severe Pain (7 - 10)      Allergies    No Known Allergies    Intolerances        REVIEW OF SYSTEMS:  CONSTITUTIONAL: No fever or chills  HEENT:  No headache, no sore throat  RESPIRATORY: No cough, wheezing, or shortness of breath  CARDIOVASCULAR: No chest pain, palpitations  GASTROINTESTINAL: No abd pain, nausea, vomiting, or diarrhea  GENITOURINARY: No dysuria, frequency, or hematuria  NEUROLOGICAL: no focal weakness or dizziness  MUSCULOSKELETAL: no myalgias     Vital Signs Last 24 Hrs  T(C): 36.7 (01 May 2021 04:33), Max: 37.4 (2021 10:17)  T(F): 98.1 (01 May 2021 04:33), Max: 99.3 (2021 10:17)  HR: 61 (01 May 2021 04:33) (60 - 76)  BP: 137/66 (01 May 2021 04:33) (95/55 - 137/66)  BP(mean): 87 (2021 09:00) (75 - 87)  RR: 19 (01 May 2021 04:33) (19 - 28)  SpO2: 94% (01 May 2021 04:33) (92% - 98%)    PHYSICAL EXAM:  GENERAL: NAD  HEENT:  anicteric, moist mucous membranes  CHEST/LUNG:  CTA b/l, no rales, wheezes, or rhonchi  HEART:  RRR, S1, S2  ABDOMEN:  BS+, soft, nontender, nondistended  EXTREMITIES: no edema, cyanosis, or calf tenderness  NERVOUS SYSTEM: answers questions and follows commands appropriately    LABS:    CBC Full  -  ( 2021 06:24 )  WBC Count : 15.99 K/uL  Hemoglobin : 11.6 g/dL  Hematocrit : 35.5 %  Platelet Count - Automated : 134 K/uL  Mean Cell Volume : 92.0 fl  Mean Cell Hemoglobin : 30.1 pg  Mean Cell Hemoglobin Concentration : 32.7 gm/dL  Auto Neutrophil # : 13.47 K/uL  Auto Lymphocyte # : 0.99 K/uL  Auto Monocyte # : 1.43 K/uL  Auto Eosinophil # : 0.00 K/uL  Auto Basophil # : 0.02 K/uL  Auto Neutrophil % : 84.3 %  Auto Lymphocyte % : 6.2 %  Auto Monocyte % : 8.9 %  Auto Eosinophil % : 0.0 %  Auto Basophil % : 0.1 %      Ca    8.6        2021 06:24      PT/INR - ( 2021 20:54 )   PT: 13.6 sec;   INR: 1.17 ratio         PTT - ( 2021 20:54 )  PTT:27.8 sec  Urinalysis Basic - ( 2021 20:54 )    Color: Yellow / Appearance: Clear / S.015 / pH: x  Gluc: x / Ketone: Negative  / Bili: Negative / Urobili: Negative   Blood: x / Protein: 30 mg/dL / Nitrite: Positive   Leuk Esterase: Moderate / RBC: 11-25 /HPF / WBC 11-25   Sq Epi: x / Non Sq Epi: Occasional / Bacteria: TNTC      CAPILLARY BLOOD GLUCOSE            Culture - Blood (collected 21 @ 01:05)  Source: .Blood Blood-Peripheral  Gram Stain (21 @ 14:56):    Growth in aerobic bottle: Gram Negative Rods  Preliminary Report (21 @ 14:57):    Growth in aerobic bottle: Gram Negative Rods    Culture - Blood (collected 21 @ 01:05)  Source: .Blood Blood-Peripheral  Gram Stain (21 @ 14:09):    Growth in anaerobic bottle: Gram Negative Rods  Preliminary Report (21 @ 16:42):    Growth in anaerobic bottle: Gram Negative Rods    MDRO detected in BCID PCR, resistance marker = CTX-M (ESBL)    ***Blood Panel PCR results on this specimen are available    approximately 3 hours after the Gram stain result.***    Gram stain, PCR, and/or culture results may not always    correspond due to difference in methodologies.    ************************************************************    This PCR assay was performed by multiplex PCR. This    Assay tests for 66 bacterial and resistance gene targets.    Please refer to the [facility] [employer] test directory    at [email or web address] for details.  Organism: Blood Culture PCR (21 @ 17:07)  Organism: Blood Culture PCR (21 @ 17:07)      -  ESBL: Detec      -  Escherichia coli: Detec      Method Type: PCR        RADIOLOGY & ADDITIONAL TESTS:    Personally reviewed.     Consultant(s) Notes Reviewed:  [x] YES  [ ] NO     Patient is a 89y old  Male who presents with a chief complaint of sepsis/UTI 2/2 left obstructing ureteral calculus with hydronephrosis (2021 17:43)      INTERVAL HPI/OVERNIGHT EVENTS:  Pt seen and evaluated at the bedside. Patient repeatedly states that he wants to go home; however, unable to recall why he is in the hospital. Does not have capacity. Otherwise, no acute complaints and has passed TOV.    MEDICATIONS  (STANDING):  atorvastatin 20 milliGRAM(s) Oral at bedtime  dorzolamide 2% Ophthalmic Solution 1 Drop(s) Both EYES three times a day  enoxaparin Injectable 30 milliGRAM(s) SubCutaneous daily  finasteride 5 milliGRAM(s) Oral daily  lactated ringers. 1000 milliLiter(s) (75 mL/Hr) IV Continuous <Continuous>  meropenem  IVPB 1000 milliGRAM(s) IV Intermittent every 8 hours  pantoprazole  Injectable 40 milliGRAM(s) IV Push daily  saccharomyces boulardii 250 milliGRAM(s) Oral two times a day  tamsulosin 0.4 milliGRAM(s) Oral at bedtime    MEDICATIONS  (PRN):  acetaminophen   Tablet .. 650 milliGRAM(s) Oral every 6 hours PRN Mild Pain (1 - 3)  oxycodone    5 mG/acetaminophen 325 mG 1 Tablet(s) Oral every 6 hours PRN Moderate Pain (4 - 6)  oxycodone    5 mG/acetaminophen 325 mG 2 Tablet(s) Oral every 6 hours PRN Severe Pain (7 - 10)      Allergies    No Known Allergies    Intolerances        REVIEW OF SYSTEMS:  CONSTITUTIONAL: No fever or chills  HEENT:  No headache, no sore throat  RESPIRATORY: No cough, wheezing, or shortness of breath  CARDIOVASCULAR: No chest pain, palpitations  GASTROINTESTINAL: No abd pain, nausea, vomiting, or diarrhea  GENITOURINARY: No dysuria, frequency, or hematuria  NEUROLOGICAL: no focal weakness or dizziness  MUSCULOSKELETAL: no myalgias     Vital Signs Last 24 Hrs  T(C): 36.7 (01 May 2021 04:33), Max: 37.4 (2021 10:17)  T(F): 98.1 (01 May 2021 04:33), Max: 99.3 (2021 10:17)  HR: 61 (01 May 2021 04:33) (60 - 76)  BP: 137/66 (01 May 2021 04:33) (95/55 - 137/66)  BP(mean): 87 (2021 09:00) (75 - 87)  RR: 19 (01 May 2021 04:33) (19 - 28)  SpO2: 94% (01 May 2021 04:33) (92% - 98%)    PHYSICAL EXAM:  GENERAL: NAD  HEENT:  anicteric, moist mucous membranes  CHEST/LUNG:  CTA b/l, no rales, wheezes, or rhonchi  HEART:  RRR, S1, S2  ABDOMEN:  BS+, soft, nontender, nondistended  EXTREMITIES: no edema, cyanosis, or calf tenderness  NERVOUS SYSTEM: answers questions and follows commands appropriately    LABS:    CBC Full  -  ( 2021 06:24 )  WBC Count : 15.99 K/uL  Hemoglobin : 11.6 g/dL  Hematocrit : 35.5 %  Platelet Count - Automated : 134 K/uL  Mean Cell Volume : 92.0 fl  Mean Cell Hemoglobin : 30.1 pg  Mean Cell Hemoglobin Concentration : 32.7 gm/dL  Auto Neutrophil # : 13.47 K/uL  Auto Lymphocyte # : 0.99 K/uL  Auto Monocyte # : 1.43 K/uL  Auto Eosinophil # : 0.00 K/uL  Auto Basophil # : 0.02 K/uL  Auto Neutrophil % : 84.3 %  Auto Lymphocyte % : 6.2 %  Auto Monocyte % : 8.9 %  Auto Eosinophil % : 0.0 %  Auto Basophil % : 0.1 %      Ca    8.6        2021 06:24      PT/INR - ( 2021 20:54 )   PT: 13.6 sec;   INR: 1.17 ratio         PTT - ( 2021 20:54 )  PTT:27.8 sec  Urinalysis Basic - ( 2021 20:54 )    Color: Yellow / Appearance: Clear / S.015 / pH: x  Gluc: x / Ketone: Negative  / Bili: Negative / Urobili: Negative   Blood: x / Protein: 30 mg/dL / Nitrite: Positive   Leuk Esterase: Moderate / RBC: 11-25 /HPF / WBC 11-25   Sq Epi: x / Non Sq Epi: Occasional / Bacteria: TNTC      CAPILLARY BLOOD GLUCOSE            Culture - Blood (collected 21 @ 01:05)  Source: .Blood Blood-Peripheral  Gram Stain (21 @ 14:56):    Growth in aerobic bottle: Gram Negative Rods  Preliminary Report (21 @ 14:57):    Growth in aerobic bottle: Gram Negative Rods    Culture - Blood (collected 21 @ 01:05)  Source: .Blood Blood-Peripheral  Gram Stain (21 @ 14:09):    Growth in anaerobic bottle: Gram Negative Rods  Preliminary Report (21 @ 16:42):    Growth in anaerobic bottle: Gram Negative Rods    MDRO detected in BCID PCR, resistance marker = CTX-M (ESBL)    ***Blood Panel PCR results on this specimen are available    approximately 3 hours after the Gram stain result.***    Gram stain, PCR, and/or culture results may not always    correspond due to difference in methodologies.    ************************************************************    This PCR assay was performed by multiplex PCR. This    Assay tests for 66 bacterial and resistance gene targets.    Please refer to the [facility] [employer] test directory    at [email or web address] for details.  Organism: Blood Culture PCR (21 @ 17:07)  Organism: Blood Culture PCR (21 @ 17:07)      -  ESBL: Detec      -  Escherichia coli: Detec      Method Type: PCR        RADIOLOGY & ADDITIONAL TESTS:    Personally reviewed.     Consultant(s) Notes Reviewed:  [x] YES  [ ] NO

## 2021-05-01 NOTE — CONSULT NOTE ADULT - ASSESSMENT
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    pt  s/p urgent cystoscopy with stent placement, retrograde pyelogram.   pressor requirement   sepsis- Urosepsis.       Pt has ? advance directives. He is forgetful and dioriented on exam   his HCP ( daughter ) want all measures to be done . spoke to her post op . 
Sepsis/shock  secondary distal left ureteral calculus requiring emergent stent, patient is high risk given underlying co-morbidities, age and present clinical; condition
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   Vital Signs T(F) Max: 103.6 HR: 86 - 125 BP: 92/53 RR: 16 SpO2: 95%  Labs significant for: WBC 17.71, C2 1.2, eGFR 53, Lactate 2.9, UA with mod LE, positive nitrites, large blood, WBC 11-25  CXR with low lung volumes with atelectasis (I personally reviewed)   CT renal stone hunt:  Moderate left hydroureteronephrosis secondary to a distal left ureteral calculus measuring up to 12 mm (I personally reviewed)   Urine culture with ecoli and pseudomonas  Blood culture positive for ESBL Ecoli   ureteral Stent placed and has since defervesced   Currently on meropenem which is appropriate     Sepsis due to UTI   Obstructive uropathy   ESBL Ecoli Bacteremia   Nephrolithiases     Suggest-  ·	Continue Meropenem 1g q8hrs (would not use ertapenem in this case as there is also pseudomonas isolated in urine)   ·	follow all cultures sensitivities to help target therapy  ·	trend WBC and temps   ·	trend creatinine     Amrik Singh DO  Infectious Disease Attending  Pager [phone]  After 5pm/weekends please call [phone] for all inquiries and new consults

## 2021-05-01 NOTE — PROGRESS NOTE ADULT - ASSESSMENT
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 S/P emergent urethral stent placement 4/29, downgraded from ICU on 4/30

## 2021-05-01 NOTE — PROGRESS NOTE ADULT - SUBJECTIVE AND OBJECTIVE BOX
INTERVAL HPI/OVERNIGHT EVENTS:  Nj removed earlier today. Adweq voiding.    MEDICATIONS  (STANDING):  atorvastatin 20 milliGRAM(s) Oral at bedtime  dorzolamide 2% Ophthalmic Solution 1 Drop(s) Both EYES three times a day  enoxaparin Injectable 30 milliGRAM(s) SubCutaneous daily  finasteride 5 milliGRAM(s) Oral daily  lactated ringers. 1000 milliLiter(s) (75 mL/Hr) IV Continuous <Continuous>  meropenem  IVPB 1000 milliGRAM(s) IV Intermittent every 8 hours  pantoprazole  Injectable 40 milliGRAM(s) IV Push daily  saccharomyces boulardii 250 milliGRAM(s) Oral two times a day  tamsulosin 0.4 milliGRAM(s) Oral at bedtime    MEDICATIONS  (PRN):  acetaminophen   Tablet .. 650 milliGRAM(s) Oral every 6 hours PRN Mild Pain (1 - 3)  oxycodone    5 mG/acetaminophen 325 mG 1 Tablet(s) Oral every 6 hours PRN Moderate Pain (4 - 6)  oxycodone    5 mG/acetaminophen 325 mG 2 Tablet(s) Oral every 6 hours PRN Severe Pain (7 - 10)        Vital Signs Last 24 Hrs  T(C): 36.4 (01 May 2021 21:25), Max: 36.7 (01 May 2021 04:33)  T(F): 97.6 (01 May 2021 21:25), Max: 98.1 (01 May 2021 04:33)  HR: 55 (01 May 2021 21:25) (55 - 66)  BP: 114/65 (01 May 2021 21:25) (114/65 - 142/67)  BP(mean): --  RR: 18 (01 May 2021 21:25) (18 - 20)  SpO2: 92% (01 May 2021 21:25) (92% - 97%)    PHYSICAL EXAM:    No CVAT  ABDOMEN: benign    LABS:                        11.3   10.34 )-----------( 133      ( 01 May 2021 07:11 )             34.2     05-01    144  |  114<H>  |  13  ----------------------------<  98  3.8   |  27  |  0.93    Ca    9.1      01 May 2021 07:11  Phos  2.3     05-01  Mg     2.0     05-01    TPro  5.9<L>  /  Alb  2.4<L>  /  TBili  1.3<H>  /  DBili  x   /  AST  15  /  ALT  <6<L>  /  AlkPhos  60  04-30        Urine culture:  04-30 @ 20:48 --   No growth to date.  Urine culture:  04-30 @ 04:51 --   Moderate Escherichia coli  Urine culture:  04-30 @ 01:05 --   Growth in anaerobic bottle: Escherichia coli Susceptibility to follow.  MDRO detected in BCID PCR, resistance marker = CTX-M (ESBL)  ***Blood Panel PCR results on this specimen are available  approximately 3 hours after the Gram stain result.***  Gram stain, PCR, and/or culture results may not always  correspond due to difference in methodologies.  ************************************************************  This PCR assay was performed by multiplex PCR. This  Assay tests for 66 bacterial and resistance gene targets.  Please refer to the [employer] test directory  at [email or web address] for details.    Blood Cultures:  04-30 @ 20:48   No growth to date.  --  --  04-30 @ 04:51   Moderate Escherichia coli  --  --  04-30 @ 01:05   Growth in anaerobic bottle: Escherichia coli Susceptibility to follow.  MDRO detected in BCID PCR, resistance marker = CTX-M (ESBL)  ***Blood Panel PCR results on this specimen are available  approximately 3 hours after the Gram stain result.***  Gram stain, PCR, and/or culture results may not always  correspond due to difference in methodologies.  ************************************************************  This PCR assay was performed by multiplex PCR. This  Assay tests for 66 bacterial and resistance gene targets.  Please refer to the [employer] test directory  at [email or web address] for details.  --  Blood Culture PCR  04-30 @ 01:04   >100,000 CFU/ml Escherichia coli  <10,000 CFU/ml of other organisms  --  --    RADIOLOGY & ADDITIONAL TESTS: INTERVAL HPI/OVERNIGHT EVENTS:  Nj removed earlier today. Adeq voiding.    MEDICATIONS  (STANDING):  atorvastatin 20 milliGRAM(s) Oral at bedtime  dorzolamide 2% Ophthalmic Solution 1 Drop(s) Both EYES three times a day  enoxaparin Injectable 30 milliGRAM(s) SubCutaneous daily  finasteride 5 milliGRAM(s) Oral daily  lactated ringers. 1000 milliLiter(s) (75 mL/Hr) IV Continuous <Continuous>  meropenem  IVPB 1000 milliGRAM(s) IV Intermittent every 8 hours  pantoprazole  Injectable 40 milliGRAM(s) IV Push daily  saccharomyces boulardii 250 milliGRAM(s) Oral two times a day  tamsulosin 0.4 milliGRAM(s) Oral at bedtime    MEDICATIONS  (PRN):  acetaminophen   Tablet .. 650 milliGRAM(s) Oral every 6 hours PRN Mild Pain (1 - 3)  oxycodone    5 mG/acetaminophen 325 mG 1 Tablet(s) Oral every 6 hours PRN Moderate Pain (4 - 6)  oxycodone    5 mG/acetaminophen 325 mG 2 Tablet(s) Oral every 6 hours PRN Severe Pain (7 - 10)        Vital Signs Last 24 Hrs  T(C): 36.4 (01 May 2021 21:25), Max: 36.7 (01 May 2021 04:33)  T(F): 97.6 (01 May 2021 21:25), Max: 98.1 (01 May 2021 04:33)  HR: 55 (01 May 2021 21:25) (55 - 66)  BP: 114/65 (01 May 2021 21:25) (114/65 - 142/67)  BP(mean): --  RR: 18 (01 May 2021 21:25) (18 - 20)  SpO2: 92% (01 May 2021 21:25) (92% - 97%)    PHYSICAL EXAM:    No CVAT  ABDOMEN: benign    LABS:                        11.3   10.34 )-----------( 133      ( 01 May 2021 07:11 )             34.2     05-01    144  |  114<H>  |  13  ----------------------------<  98  3.8   |  27  |  0.93    Ca    9.1      01 May 2021 07:11  Phos  2.3     05-01  Mg     2.0     05-01    TPro  5.9<L>  /  Alb  2.4<L>  /  TBili  1.3<H>  /  DBili  x   /  AST  15  /  ALT  <6<L>  /  AlkPhos  60  04-30        Urine culture:  04-30 @ 20:48 --   No growth to date.  Urine culture:  04-30 @ 04:51 --   Moderate Escherichia coli  Urine culture:  04-30 @ 01:05 --   Growth in anaerobic bottle: Escherichia coli Susceptibility to follow.  MDRO detected in BCID PCR, resistance marker = CTX-M (ESBL)  ***Blood Panel PCR results on this specimen are available  approximately 3 hours after the Gram stain result.***  Gram stain, PCR, and/or culture results may not always  correspond due to difference in methodologies.  ************************************************************  This PCR assay was performed by multiplex PCR. This  Assay tests for 66 bacterial and resistance gene targets.  Please refer to the [employer] test directory  at [email or web address] for details.    Blood Cultures:  04-30 @ 20:48   No growth to date.  --  --  04-30 @ 04:51   Moderate Escherichia coli  --  --  04-30 @ 01:05   Growth in anaerobic bottle: Escherichia coli Susceptibility to follow.  MDRO detected in BCID PCR, resistance marker = CTX-M (ESBL)  ***Blood Panel PCR results on this specimen are available  approximately 3 hours after the Gram stain result.***  Gram stain, PCR, and/or culture results may not always  correspond due to difference in methodologies.  ************************************************************  This PCR assay was performed by multiplex PCR. This  Assay tests for 66 bacterial and resistance gene targets.  Please refer to the [employer] test directory  at [email or web address] for details.  --  Blood Culture PCR  04-30 @ 01:04   >100,000 CFU/ml Escherichia coli  <10,000 CFU/ml of other organisms  --  --    RADIOLOGY & ADDITIONAL TESTS:

## 2021-05-01 NOTE — CONSULT NOTE ADULT - CONSULT REASON
sepsis/UTI 2/2 left obstructing ureteral calculus with hydronephrosis
ESBL Ecoli Bacteremia
sepsis   Urosepsis

## 2021-05-01 NOTE — PROGRESS NOTE ADULT - NSICDXPROBLEMPLAN1_GEN_ALL_CORE_FT
Pt presenting with chills, rigors and fevers from  Prior history of multiresistant ESBL UTI (sensitive to carbapenems)   and known history of L sided kidney stone. CT renal hunt on this admission shows Mod left hydronephrosis 2/2 distal L ureteral calculus measuring 12 mm. BP 86/53 T 103.6 . WBC 17.71, C2 1.2, Lactate 2.9, UA with mod LE, positive nitrites, large   blood, WBC 11-25   Severe sepsis 2/2 obstructing renal stone   - now s/p emergent stent placement by urology with need for pressors in ICU  - off pressors since 3am this morning, BP stable off pressors  - Cont meropenem   - Tylenol PRN for fevers    - continue flomax  - Uro Dr. Valle following  - Fu Blood and urine cx Pt presenting with chills, rigors and fevers from  Prior history of multiresistant ESBL UTI (sensitive to carbapenems)   and known history of L sided kidney stone. CT renal hunt on this admission shows Mod left hydronephrosis 2/2 distal L ureteral calculus measuring 12 mm. BP 86/53 T 103.6 . WBC 17.71, C2 1.2, Lactate 2.9, UA with mod LE, positive nitrites, large   blood, WBC 11-25   Severe sepsis 2/2 obstructing renal stone   - now s/p emergent stent placement on 4/29 by urology with need for pressors in ICU  - BP now stable off pressors  - Cont meropenem   - Tylenol PRN for fevers    - continue flomax, passed TOV  - Uro Dr. Valle following  - blood cx growing ESBL and E. coli, f/u sensitivities  - repeat blood cx, f/u urine cx

## 2021-05-02 LAB
-  AMIKACIN: SIGNIFICANT CHANGE UP
-  AMOXICILLIN/CLAVULANIC ACID: SIGNIFICANT CHANGE UP
-  AMPICILLIN/SULBACTAM: SIGNIFICANT CHANGE UP
-  AMPICILLIN: SIGNIFICANT CHANGE UP
-  AZTREONAM: SIGNIFICANT CHANGE UP
-  CEFAZOLIN: SIGNIFICANT CHANGE UP
-  CEFEPIME: SIGNIFICANT CHANGE UP
-  CEFOXITIN: SIGNIFICANT CHANGE UP
-  CEFTAZIDIME: SIGNIFICANT CHANGE UP
-  CEFTRIAXONE: SIGNIFICANT CHANGE UP
-  CIPROFLOXACIN: SIGNIFICANT CHANGE UP
-  ERTAPENEM: SIGNIFICANT CHANGE UP
-  GENTAMICIN: SIGNIFICANT CHANGE UP
-  IMIPENEM: SIGNIFICANT CHANGE UP
-  LEVOFLOXACIN: SIGNIFICANT CHANGE UP
-  MEROPENEM: SIGNIFICANT CHANGE UP
-  NITROFURANTOIN: SIGNIFICANT CHANGE UP
-  PIPERACILLIN/TAZOBACTAM: SIGNIFICANT CHANGE UP
-  TIGECYCLINE: SIGNIFICANT CHANGE UP
-  TOBRAMYCIN: SIGNIFICANT CHANGE UP
-  TRIMETHOPRIM/SULFAMETHOXAZOLE: SIGNIFICANT CHANGE UP
ALBUMIN SERPL ELPH-MCNC: 2.5 G/DL — LOW (ref 3.3–5)
ALP SERPL-CCNC: 63 U/L — SIGNIFICANT CHANGE UP (ref 40–120)
ALT FLD-CCNC: 10 U/L — LOW (ref 12–78)
ANION GAP SERPL CALC-SCNC: 4 MMOL/L — LOW (ref 5–17)
AST SERPL-CCNC: 24 U/L — SIGNIFICANT CHANGE UP (ref 15–37)
BASOPHILS # BLD AUTO: 0.01 K/UL — SIGNIFICANT CHANGE UP (ref 0–0.2)
BASOPHILS NFR BLD AUTO: 0.1 % — SIGNIFICANT CHANGE UP (ref 0–2)
BILIRUB SERPL-MCNC: 1.1 MG/DL — SIGNIFICANT CHANGE UP (ref 0.2–1.2)
BUN SERPL-MCNC: 12 MG/DL — SIGNIFICANT CHANGE UP (ref 7–23)
CALCIUM SERPL-MCNC: 9.3 MG/DL — SIGNIFICANT CHANGE UP (ref 8.5–10.1)
CHLORIDE SERPL-SCNC: 111 MMOL/L — HIGH (ref 96–108)
CO2 SERPL-SCNC: 28 MMOL/L — SIGNIFICANT CHANGE UP (ref 22–31)
CREAT SERPL-MCNC: 0.78 MG/DL — SIGNIFICANT CHANGE UP (ref 0.5–1.3)
CULTURE RESULTS: SIGNIFICANT CHANGE UP
EOSINOPHIL # BLD AUTO: 0.27 K/UL — SIGNIFICANT CHANGE UP (ref 0–0.5)
EOSINOPHIL NFR BLD AUTO: 3.1 % — SIGNIFICANT CHANGE UP (ref 0–6)
GLUCOSE SERPL-MCNC: 86 MG/DL — SIGNIFICANT CHANGE UP (ref 70–99)
HCT VFR BLD CALC: 34.5 % — LOW (ref 39–50)
HGB BLD-MCNC: 11.3 G/DL — LOW (ref 13–17)
IMM GRANULOCYTES NFR BLD AUTO: 0.3 % — SIGNIFICANT CHANGE UP (ref 0–1.5)
LYMPHOCYTES # BLD AUTO: 1.56 K/UL — SIGNIFICANT CHANGE UP (ref 1–3.3)
LYMPHOCYTES # BLD AUTO: 17.6 % — SIGNIFICANT CHANGE UP (ref 13–44)
MAGNESIUM SERPL-MCNC: 2 MG/DL — SIGNIFICANT CHANGE UP (ref 1.6–2.6)
MCHC RBC-ENTMCNC: 30.4 PG — SIGNIFICANT CHANGE UP (ref 27–34)
MCHC RBC-ENTMCNC: 32.8 GM/DL — SIGNIFICANT CHANGE UP (ref 32–36)
MCV RBC AUTO: 92.7 FL — SIGNIFICANT CHANGE UP (ref 80–100)
METHOD TYPE: SIGNIFICANT CHANGE UP
MONOCYTES # BLD AUTO: 0.9 K/UL — SIGNIFICANT CHANGE UP (ref 0–0.9)
MONOCYTES NFR BLD AUTO: 10.2 % — SIGNIFICANT CHANGE UP (ref 2–14)
NEUTROPHILS # BLD AUTO: 6.08 K/UL — SIGNIFICANT CHANGE UP (ref 1.8–7.4)
NEUTROPHILS NFR BLD AUTO: 68.7 % — SIGNIFICANT CHANGE UP (ref 43–77)
NRBC # BLD: 0 /100 WBCS — SIGNIFICANT CHANGE UP (ref 0–0)
ORGANISM # SPEC MICROSCOPIC CNT: SIGNIFICANT CHANGE UP
PHOSPHATE SERPL-MCNC: 2 MG/DL — LOW (ref 2.5–4.5)
PLATELET # BLD AUTO: 147 K/UL — LOW (ref 150–400)
POTASSIUM SERPL-MCNC: 3.7 MMOL/L — SIGNIFICANT CHANGE UP (ref 3.5–5.3)
POTASSIUM SERPL-SCNC: 3.7 MMOL/L — SIGNIFICANT CHANGE UP (ref 3.5–5.3)
PROT SERPL-MCNC: 6.2 G/DL — SIGNIFICANT CHANGE UP (ref 6–8.3)
RBC # BLD: 3.72 M/UL — LOW (ref 4.2–5.8)
RBC # FLD: 14.8 % — HIGH (ref 10.3–14.5)
SODIUM SERPL-SCNC: 143 MMOL/L — SIGNIFICANT CHANGE UP (ref 135–145)
SPECIMEN SOURCE: SIGNIFICANT CHANGE UP
WBC # BLD: 8.85 K/UL — SIGNIFICANT CHANGE UP (ref 3.8–10.5)
WBC # FLD AUTO: 8.85 K/UL — SIGNIFICANT CHANGE UP (ref 3.8–10.5)

## 2021-05-02 PROCEDURE — 99232 SBSQ HOSP IP/OBS MODERATE 35: CPT

## 2021-05-02 PROCEDURE — 99233 SBSQ HOSP IP/OBS HIGH 50: CPT | Mod: GC

## 2021-05-02 RX ORDER — MULTIVIT-MIN/FERROUS GLUCONATE 9 MG/15 ML
1 LIQUID (ML) ORAL DAILY
Refills: 0 | Status: CANCELLED | OUTPATIENT
Start: 2021-05-02 | End: 2021-05-04

## 2021-05-02 RX ADMIN — ENOXAPARIN SODIUM 30 MILLIGRAM(S): 100 INJECTION SUBCUTANEOUS at 12:56

## 2021-05-02 RX ADMIN — MEROPENEM 100 MILLIGRAM(S): 1 INJECTION INTRAVENOUS at 12:58

## 2021-05-02 RX ADMIN — MEROPENEM 100 MILLIGRAM(S): 1 INJECTION INTRAVENOUS at 05:05

## 2021-05-02 RX ADMIN — DORZOLAMIDE HYDROCHLORIDE 1 DROP(S): 20 SOLUTION/ DROPS OPHTHALMIC at 05:05

## 2021-05-02 RX ADMIN — PANTOPRAZOLE SODIUM 40 MILLIGRAM(S): 20 TABLET, DELAYED RELEASE ORAL at 12:56

## 2021-05-02 RX ADMIN — DORZOLAMIDE HYDROCHLORIDE 1 DROP(S): 20 SOLUTION/ DROPS OPHTHALMIC at 17:10

## 2021-05-02 RX ADMIN — MEROPENEM 100 MILLIGRAM(S): 1 INJECTION INTRAVENOUS at 21:57

## 2021-05-02 RX ADMIN — Medication 250 MILLIGRAM(S): at 05:05

## 2021-05-02 RX ADMIN — Medication 250 MILLIGRAM(S): at 17:11

## 2021-05-02 RX ADMIN — FINASTERIDE 5 MILLIGRAM(S): 5 TABLET, FILM COATED ORAL at 12:56

## 2021-05-02 RX ADMIN — DORZOLAMIDE HYDROCHLORIDE 1 DROP(S): 20 SOLUTION/ DROPS OPHTHALMIC at 21:57

## 2021-05-02 RX ADMIN — ATORVASTATIN CALCIUM 20 MILLIGRAM(S): 80 TABLET, FILM COATED ORAL at 21:57

## 2021-05-02 RX ADMIN — TAMSULOSIN HYDROCHLORIDE 0.4 MILLIGRAM(S): 0.4 CAPSULE ORAL at 21:57

## 2021-05-02 NOTE — DIETITIAN INITIAL EVALUATION ADULT. - SIGNS/SYMPTOMS
as evidenced by abnormal swallow study 4/30-recommendation for dysphagia I puree diet w/ thin liquid

## 2021-05-02 NOTE — PROGRESS NOTE ADULT - ASSESSMENT
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   Vital Signs T(F) Max: 103.6 HR: 86 - 125 BP: 92/53 RR: 16 SpO2: 95%  Labs significant for: WBC 17.71, C2 1.2, eGFR 53, Lactate 2.9, UA with mod LE, positive nitrites, large blood, WBC 11-25  CXR with low lung volumes with atelectasis (I personally reviewed)   CT renal stone hunt:  Moderate left hydroureteronephrosis secondary to a distal left ureteral calculus measuring up to 12 mm (I personally reviewed)   Urine culture with ecoli and pseudomonas  Blood culture positive for ESBL Ecoli   ureteral Stent placed and has since defervesced   Currently on meropenem which is appropriate     5/2:all cultures coming back S to Meropenem, no PO options available, likely will need midline to complete course unless inpatient fo the whole duration    Sepsis due to UTI   Obstructive uropathy   ESBL Ecoli Bacteremia   Nephrolithiases     Suggest-  ·	Continue Meropenem 1g q8hrs    ·	will need meropenem to complete the course   ·	trend WBC and temps   ·	trend creatinine   ·	can place midline with plan to treat for 10-14 days depending on when stent is to come out      Dr. Chiu to assume care tomorrow  message sent to Dr. Sandra Singh DO  Infectious Disease Attending  Pager [phone]  After 5pm/weekends please call [phone] for all inquiries and new consults

## 2021-05-02 NOTE — PROGRESS NOTE ADULT - SUBJECTIVE AND OBJECTIVE BOX
IDRIS DANIEL  MRN-[id number]    Follow Up:  Bacteremia    Interval History: improving on antibiotics, cultures all covered by meropenem, he has no complaints     ROS:    [ ] Unobtainable because:  [X ] All other systems negative    Constitutional: no fever, no chills  Head: no trauma  Eyes: no vision changes, no eye pain  ENT:  no sore throat, no rhinorrhea  Cardiovascular:  no chest pain, no palpitation  Respiratory:  no SOB, no cough  GI:  no abd pain, no vomiting, no diarrhea  urinary: no dysuria, no hematuria, no flank pain  musculoskeletal:  no joint pain, no joint swelling  skin:  no rash  neurology:  no headache, no seizure, no change in mental status  psych: no anxiety, no depression         Allergies  No Known Allergies        ANTIMICROBIALS:  meropenem  IVPB 1000 every 8 hours      OTHER MEDS:  acetaminophen   Tablet .. 650 milliGRAM(s) Oral every 6 hours PRN  atorvastatin 20 milliGRAM(s) Oral at bedtime  dorzolamide 2% Ophthalmic Solution 1 Drop(s) Both EYES three times a day  enoxaparin Injectable 30 milliGRAM(s) SubCutaneous daily  finasteride 5 milliGRAM(s) Oral daily  lactated ringers. 1000 milliLiter(s) IV Continuous <Continuous>  oxycodone    5 mG/acetaminophen 325 mG 1 Tablet(s) Oral every 6 hours PRN  oxycodone    5 mG/acetaminophen 325 mG 2 Tablet(s) Oral every 6 hours PRN  pantoprazole  Injectable 40 milliGRAM(s) IV Push daily  saccharomyces boulardii 250 milliGRAM(s) Oral two times a day  tamsulosin 0.4 milliGRAM(s) Oral at bedtime      Physical Exam:  Vital Signs Last 24 Hrs  T(C): 36.4 (02 May 2021 14:15), Max: 36.7 (02 May 2021 04:43)  T(F): 97.5 (02 May 2021 14:15), Max: 98 (02 May 2021 04:43)  HR: 52 (02 May 2021 14:15) (52 - 58)  BP: 126/64 (02 May 2021 14:15) (114/65 - 149/66)  BP(mean): --  RR: 18 (02 May 2021 14:15) (18 - 18)  SpO2: 95% (02 May 2021 14:15) (92% - 96%)    General:    NAD,  non toxic  Head: atraumatic, normocephalic  Eye: normal sclera and conjunctiva  ENT:    no oral lesions, neck supple  Cardio:     regular S1, S2,  no murmur  Respiratory:    clear b/l,    no wheezing  abd:     soft,   BS +,   no tenderness  :   no CVAT,  no suprapubic tenderness,   no  disla  Musculoskeletal:   no joint swelling,   no edema  vascular: no central lines, +PIV   Skin:    no rash  Neurologic:     no focal deficit  psych: normal affect    WBC Count: 8.85 K/uL (05-02 @ 07:30)  WBC Count: 10.34 K/uL (05-01 @ 07:11)  WBC Count: 15.99 K/uL (04-30 @ 06:24)  WBC Count: 17.71 K/uL (04-29 @ 20:54)                            11.3   8.85  )-----------( 147      ( 02 May 2021 07:30 )             34.5       05-02    143  |  111<H>  |  12  ----------------------------<  86  3.7   |  28  |  0.78    Ca    9.3      02 May 2021 07:30  Phos  2.0     05-02  Mg     2.0     05-02    TPro  6.2  /  Alb  2.5<L>  /  TBili  1.1  /  DBili  x   /  AST  24  /  ALT  10<L>  /  AlkPhos  63  05-02          Creatinine Trend: 0.78<--, 0.93<--, 1.10<--, 1.20<--      MICROBIOLOGY:  v  .Blood Blood-Peripheral  04-30-21   No growth to date.  --  --      OR Collect Bladder (from O.R.)  04-30-21   Moderate Escherichia coli  Moderate Escherichia coli ESBL  Multiple Morphological Strains  --  Escherichia coli  Escherichia coli ESBL      .Blood Blood-Peripheral  04-30-21   Growth in anaerobic bottle: Escherichia coli ESBL  MDRO detected in BCID PCR, resistance marker = CTX-M (ESBL)  ***Blood Panel PCR results on this specimen are available  approximately 3 hours after the Gram stain result.***  Gram stain, PCR, and/or culture results may not always  correspond due to difference in methodologies.  ************************************************************  This PCR assay was performed by multiplex PCR. This  Assay tests for 66 bacterial and resistance gene targets.  Please refer to the [facility] test directory  at [email or web address] for details.  --  Blood Culture PCR  Escherichia coli ESBL      .Urine Clean Catch (Midstream)  04-30-21   >100,000 CFU/ml Escherichia coli  <10,000 CFU/ml of other organisms  --  Escherichia coli    COVID-19 PCR: NotDetec (04-29)      Procalcitonin, Serum: 0.98 (04-30-21 @ 06:24)      RADIOLOGY:  no new images

## 2021-05-02 NOTE — DIETITIAN INITIAL EVALUATION ADULT. - OTHER INFO
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 S/P emergent urethral stent placement 4/29, downgraded from ICU on 4/30.    Pt A+Ox4 during visit. S/p SLP evaluation 4/30 with recommendation for dysphagia I puree diet w/ thin liquids. Pt tolerating well thus far, 100% documented in EMR 5/1. GI wdl. 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 S/P emergent urethral stent placement 4/29, downgraded from ICU on 4/30.    Pt A+Ox4 with son at bedside during visit. S/p SLP evaluation 4/30 with recommendation for dysphagia I puree diet w/ thin liquids. Pt tolerating diet well thus far with good po intake; 100% documented in EMR 5/1. GI wdl. BM 5/1. Pta good appetite/po intake on puree diet. Drinks ensure 2x/day pta (vanilla or strawberry). -170lbs. Stable per family. Admission weight 171lbs. Food preferences/meal alternatives obtained to increase overall meal satisfaction. Encourage consumption of HBV protein sources in diet. No beef, no pork per pt request. Low Phos 2.0 (5/2)- MD made aware for supplementation. Pt/pts son with no further dietary questions/concerns.

## 2021-05-02 NOTE — DIETITIAN INITIAL EVALUATION ADULT. - NUTRITIONGOAL OUTCOME1
Pt to safely tolerate dysphagia diet consuming >75% meals daily. Pt to safely tolerate dysphagia diet consuming >75% meals/supplements daily.

## 2021-05-02 NOTE — PROGRESS NOTE ADULT - NSICDXPROBLEMPLAN6_GEN_ALL_CORE_FT
IMPROVE VTE Individual Risk Assessment        RISK                                                          Points  [  ] Previous VTE                                                3  [  ] Thrombophilia                                             2  [  ] Lower limb paralysis                                   2        (unable to hold up >15 seconds)    [  ] Current Cancer                                            2         (within 6 months)  [  ] Immobilization > 24 hrs                              1  [  ] ICU/CCU stay > 24 hours                            1  [x  ] Age > 60                                                    1  IMPROVE VTE Score _____1____  on lovenox

## 2021-05-02 NOTE — PROGRESS NOTE ADULT - SUBJECTIVE AND OBJECTIVE BOX
Patient is a 89y old  Male who presents with a chief complaint of sepsis/UTI 2/2 left obstructing ureteral calculus with hydronephrosis (01 May 2021 23:30)      INTERVAL HPI/OVERNIGHT EVENTS: No acute overnight events. Pt seen and examined. Pt pleasant with no acute complaints this AM.    MEDICATIONS  (STANDING):  atorvastatin 20 milliGRAM(s) Oral at bedtime  dorzolamide 2% Ophthalmic Solution 1 Drop(s) Both EYES three times a day  enoxaparin Injectable 30 milliGRAM(s) SubCutaneous daily  finasteride 5 milliGRAM(s) Oral daily  lactated ringers. 1000 milliLiter(s) (75 mL/Hr) IV Continuous <Continuous>  meropenem  IVPB 1000 milliGRAM(s) IV Intermittent every 8 hours  pantoprazole  Injectable 40 milliGRAM(s) IV Push daily  saccharomyces boulardii 250 milliGRAM(s) Oral two times a day  tamsulosin 0.4 milliGRAM(s) Oral at bedtime    MEDICATIONS  (PRN):  acetaminophen   Tablet .. 650 milliGRAM(s) Oral every 6 hours PRN Mild Pain (1 - 3)  oxycodone    5 mG/acetaminophen 325 mG 1 Tablet(s) Oral every 6 hours PRN Moderate Pain (4 - 6)  oxycodone    5 mG/acetaminophen 325 mG 2 Tablet(s) Oral every 6 hours PRN Severe Pain (7 - 10)      Allergies    No Known Allergies    Intolerances        REVIEW OF SYSTEMS:  CONSTITUTIONAL: No fever or chills  HEENT:  No headache, no sore throat  RESPIRATORY: No cough, wheezing, or shortness of breath  CARDIOVASCULAR: No chest pain, palpitations, or leg swelling  GASTROINTESTINAL: No abd pain, nausea, vomiting, or diarrhea  GENITOURINARY: No dysuria, frequency, or hematuria  NEUROLOGICAL: no focal weakness or dizziness  MUSCULOSKELETAL: no myalgias     Vital Signs Last 24 Hrs  T(C): 36.8 (02 May 2021 13:09), Max: 36.8 (02 May 2021 13:09)  T(F): 98.2 (02 May 2021 13:09), Max: 98.2 (02 May 2021 13:09)  HR: 86 (02 May 2021 13:09) (55 - 86)  BP: 147/74 (02 May 2021 13:09) (114/65 - 149/66)  BP(mean): --  RR: 18 (02 May 2021 13:09) (18 - 18)  SpO2: 93% (02 May 2021 13:09) (92% - 96%)    PHYSICAL EXAM:  GENERAL: NAD, sitting comfortably in chair eating breakfast  HEENT:  anicteric, moist mucous membranes  CHEST/LUNG:  CTA b/l, no rales, wheezes, or rhonchi  HEART:  RRR, S1, S2  ABDOMEN:  BS+, soft, nontender, nondistended  EXTREMITIES: no edema, cyanosis, or calf tenderness  NERVOUS SYSTEM: answers questions and follows commands appropriately    LABS:                        11.3   8.85  )-----------( 147      ( 02 May 2021 07:30 )             34.5     CBC Full  -  ( 02 May 2021 07:30 )  WBC Count : 8.85 K/uL  Hemoglobin : 11.3 g/dL  Hematocrit : 34.5 %  Platelet Count - Automated : 147 K/uL  Mean Cell Volume : 92.7 fl  Mean Cell Hemoglobin : 30.4 pg  Mean Cell Hemoglobin Concentration : 32.8 gm/dL  Auto Neutrophil # : 6.08 K/uL  Auto Lymphocyte # : 1.56 K/uL  Auto Monocyte # : 0.90 K/uL  Auto Eosinophil # : 0.27 K/uL  Auto Basophil # : 0.01 K/uL  Auto Neutrophil % : 68.7 %  Auto Lymphocyte % : 17.6 %  Auto Monocyte % : 10.2 %  Auto Eosinophil % : 3.1 %  Auto Basophil % : 0.1 %    02 May 2021 07:30    143    |  111    |  12     ----------------------------<  86     3.7     |  28     |  0.78     Ca    9.3        02 May 2021 07:30  Phos  2.0       02 May 2021 07:30  Mg     2.0       02 May 2021 07:30    TPro  6.2    /  Alb  2.5    /  TBili  1.1    /  DBili  x      /  AST  24     /  ALT  10     /  AlkPhos  63     02 May 2021 07:30        CAPILLARY BLOOD GLUCOSE            Culture - Blood (collected 04-30-21 @ 20:48)  Source: .Blood Blood-Peripheral  Preliminary Report (05-01-21 @ 21:01):    No growth to date.    Culture - Blood (collected 04-30-21 @ 20:48)  Source: .Blood Blood-Peripheral  Preliminary Report (05-01-21 @ 21:01):    No growth to date.    Culture - Urine (collected 04-30-21 @ 04:51)  Source: Kidney Kidney  Preliminary Report (05-01-21 @ 15:11):    Moderate Pseudomonas aeruginosa    Rare Escherichia coli  Organism: Escherichia coli  Pseudomonas aeruginosa (05-02-21 @ 12:23)  Organism: Pseudomonas aeruginosa (05-02-21 @ 12:23)      -  Amikacin: S <=16      -  Aztreonam: S <=4      -  Cefepime: S <=2      -  Ceftazidime: S 4      -  Ciprofloxacin: R >2      -  Gentamicin: R >8      -  Imipenem: S <=1      -  Levofloxacin: R >4      -  Meropenem: S <=1      -  Piperacillin/Tazobactam: S <=8      -  Tobramycin: S 4      Method Type: LUIS  Organism: Escherichia coli (05-02-21 @ 12:23)      -  Amikacin: S <=16      -  Amoxicillin/Clavulanic Acid: S <=8/4      -  Ampicillin: R >16 These ampicillin results predict results for amoxicillin      -  Ampicillin/Sulbactam: I 16/8 Enterobacter, Citrobacter, and Serratia may develop resistance during prolonged therapy (3-4 days)      -  Aztreonam: S <=4      -  Cefazolin: I 4 (MIC_CL_COM_ENTERIC_CEFAZU) For uncomplicated UTI with K. pneumoniae, E. coli, or P. mirablis: LUIS <=16 is sensitive and LUIS >=32 is resistant. This also predicts results for oral agents cefaclor, cefdinir, cefpodoxime, cefprozil, cefuroxime axetil, cephalexin and locarbef for uncomplicated UTI. Note that some isolates may be susceptible to these agents while testing resistant to cefazolin.      -  Cefepime: S <=2      -  Cefoxitin: S <=8      -  Ceftriaxone: S <=1 Enterobacter, Citrobacter, and Serratia may develop resistance during prolonged therapy      -  Ciprofloxacin: R >2      -  Ertapenem: S <=0.5      -  Gentamicin: R >8      -  Imipenem: S <=1      -  Levofloxacin: R >4      -  Meropenem: S <=1      -  Piperacillin/Tazobactam: S <=8      -  Tigecycline: S <=2      -  Tobramycin: I 8      -  Trimethoprim/Sulfamethoxazole: S <=0.5/9.5      Method Type: LUIS    Culture - Urine (collected 04-30-21 @ 04:51)  Source: OR Collect Bladder (from O.R.)  Final Report (05-02-21 @ 12:36):    Moderate Escherichia coli    Moderate Escherichia coli ESBL    Multiple Morphological Strains  Organism: Escherichia coli  Escherichia coli ESBL (05-02-21 @ 12:36)  Organism: Escherichia coli ESBL (05-02-21 @ 12:36)      -  Amikacin: S <=16      -  Amoxicillin/Clavulanic Acid: I 16/8      -  Ampicillin: R >16 These ampicillin results predict results for amoxicillin      -  Ampicillin/Sulbactam: R >16/8 Enterobacter, Citrobacter, and Serratia may develop resistance during prolonged therapy (3-4 days)      -  Aztreonam: R >16      -  Cefazolin: R >16 (MIC_CL_COM_ENTERIC_CEFAZU) For uncomplicated UTI with K. pneumoniae, E. coli, or P. mirablis: LUIS <=16 is sensitive and LUIS >=32 is resistant. This also predicts results for oral agents cefaclor, cefdinir, cefpodoxime, cefprozil, cefuroxime axetil, cephalexin and locarbef for uncomplicated UTI. Note that some isolates may be susceptible to these agents while testing resistant to cefazolin.      -  Cefepime: R >16      -  Cefoxitin: R >16      -  Ceftriaxone: R >32 Enterobacter, Citrobacter, and Serratia may develop resistance during prolonged therapy      -  Ciprofloxacin: R >2      -  Ertapenem: S <=0.5      -  Gentamicin: S <=2      -  Imipenem: S <=1      -  Levofloxacin: R >4      -  Meropenem: S <=1      -  Piperacillin/Tazobactam: R 16      -  Tigecycline: S <=2      -  Tobramycin: R >8      -  Trimethoprim/Sulfamethoxazole: R >2/38      Method Type: LUIS  Organism: Escherichia coli (05-02-21 @ 12:36)      -  Amikacin: S <=16      -  Amoxicillin/Clavulanic Acid: I 16/8      -  Ampicillin: R >16 These ampicillin results predict results for amoxicillin      -  Ampicillin/Sulbactam: I 16/8 Enterobacter, Citrobacter, and Serratia may develop resistance during prolonged therapy (3-4 days)      -  Aztreonam: S <=4      -  Cefazolin: R 16 (MIC_CL_COM_ENTERIC_CEFAZU) For uncomplicated UTI with K. pneumoniae, E. coli, or P. mirablis: LUIS <=16 is sensitive and LUIS >=32 is resistant. This also predicts results for oral agents cefaclor, cefdinir, cefpodoxime, cefprozil, cefuroxime axetil, cephalexin and locarbef for uncomplicated UTI. Note that some isolates may be susceptible to these agents while testing resistant to cefazolin.      -  Cefepime: S <=2      -  Cefoxitin: I 16      -  Ceftriaxone: S <=1 Enterobacter, Citrobacter, and Serratia may develop resistance during prolonged therapy      -  Ciprofloxacin: R >2      -  Ertapenem: S <=0.5      -  Gentamicin: S <=2      -  Imipenem: S <=1      -  Levofloxacin: R >4      -  Meropenem: S <=1      -  Piperacillin/Tazobactam: S 16      -  Tigecycline: S <=2      -  Tobramycin: R >8      -  Trimethoprim/Sulfamethoxazole: S <=0.5/9.5      Method Type: LUIS    Culture - Blood (collected 04-30-21 @ 01:05)  Source: .Blood Blood-Peripheral  Gram Stain (04-30-21 @ 14:56):    Growth in aerobic bottle: Gram Negative Rods  Preliminary Report (05-01-21 @ 12:36):    Growth in aerobic bottle: Escherichia coli    See previous culture [id number]    Culture - Blood (collected 04-30-21 @ 01:05)  Source: .Blood Blood-Peripheral  Gram Stain (04-30-21 @ 14:09):    Growth in anaerobic bottle: Gram Negative Rods  Preliminary Report (05-01-21 @ 12:34):    Growth in anaerobic bottle: Escherichia coli Susceptibility to follow.    MDRO detected in BCID PCR, resistance marker = CTX-M (ESBL)    ***Blood Panel PCR results on this specimen are available    approximately 3 hours after the Gram stain result.***    Gram stain, PCR, and/or culture results may not always    correspond due to difference in methodologies.    ************************************************************    This PCR assay was performed by multiplex PCR. This    Assay tests for 66 bacterial and resistance gene targets.    Please refer to the [facility] test directory    at [email or web address] for details.  Organism: Blood Culture PCR (04-30-21 @ 17:07)  Organism: Blood Culture PCR (04-30-21 @ 17:07)      -  ESBL: Detec      -  Escherichia coli: Detec      Method Type: PCR    Culture - Urine (collected 04-30-21 @ 01:04)  Source: .Urine Clean Catch (Midstream)  Preliminary Report (05-01-21 @ 20:01):    >100,000 CFU/ml Escherichia coli    <10,000 CFU/ml of other organisms      Consultant(s) Notes Reviewed:  [x] YES  [ ] NO

## 2021-05-02 NOTE — DIETITIAN INITIAL EVALUATION ADULT. - ADD RECOMMEND
Phosphorus supplementation. MVI with minerals daily. Ensure enlive 8oz po daily. Ensure enlive 8oz po BID (vanilla or strawberry). Phosphorus supplementation. MVI with minerals daily.

## 2021-05-02 NOTE — PROVIDER CONTACT NOTE (CRITICAL VALUE NOTIFICATION) - TEST AND RESULT REPORTED:
blood cultures collected 4/29 Abnormal blood results: growth in anaerobic bottle e-coli, esbl. growth in aerobic bottle e-coli, esbl
Lactate 2.9
Blood cultures anaerobic bottle gram neg rods

## 2021-05-02 NOTE — PROGRESS NOTE ADULT - NSICDXPROBLEMPLAN1_GEN_ALL_CORE_FT
Severe sepsis 2/2 obstructing renal stone   - now s/p emergent stent placement on 4/29 by urology with need for pressors in ICU, now off pressors and BP remains stable.  - Cont meropenem   - Tylenol PRN for fevers    - continue flomax, passed TOV  - Uro Dr. Valle following  - blood cx growing ESBL and E. coli, f/u sensitivities  - repeat blood cx, f/u urine cx  - ID consulted, recs appreciated

## 2021-05-02 NOTE — PROGRESS NOTE ADULT - ASSESSMENT
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 S/P emergent urethral stent placement 4/29, downgraded from ICU on 4/30

## 2021-05-03 LAB
-  AMIKACIN: SIGNIFICANT CHANGE UP
-  AMOXICILLIN/CLAVULANIC ACID: SIGNIFICANT CHANGE UP
-  AMPICILLIN/SULBACTAM: SIGNIFICANT CHANGE UP
-  AMPICILLIN: SIGNIFICANT CHANGE UP
-  AZTREONAM: SIGNIFICANT CHANGE UP
-  CEFAZOLIN: SIGNIFICANT CHANGE UP
-  CEFEPIME: SIGNIFICANT CHANGE UP
-  CEFOXITIN: SIGNIFICANT CHANGE UP
-  CEFTRIAXONE: SIGNIFICANT CHANGE UP
-  CIPROFLOXACIN: SIGNIFICANT CHANGE UP
-  ERTAPENEM: SIGNIFICANT CHANGE UP
-  GENTAMICIN: SIGNIFICANT CHANGE UP
-  IMIPENEM: SIGNIFICANT CHANGE UP
-  LEVOFLOXACIN: SIGNIFICANT CHANGE UP
-  MEROPENEM: SIGNIFICANT CHANGE UP
-  PIPERACILLIN/TAZOBACTAM: SIGNIFICANT CHANGE UP
-  TIGECYCLINE: SIGNIFICANT CHANGE UP
-  TOBRAMYCIN: SIGNIFICANT CHANGE UP
-  TRIMETHOPRIM/SULFAMETHOXAZOLE: SIGNIFICANT CHANGE UP
ANION GAP SERPL CALC-SCNC: 7 MMOL/L — SIGNIFICANT CHANGE UP (ref 5–17)
BUN SERPL-MCNC: 11 MG/DL — SIGNIFICANT CHANGE UP (ref 7–23)
CALCIUM SERPL-MCNC: 9.1 MG/DL — SIGNIFICANT CHANGE UP (ref 8.5–10.1)
CHLORIDE SERPL-SCNC: 109 MMOL/L — HIGH (ref 96–108)
CO2 SERPL-SCNC: 28 MMOL/L — SIGNIFICANT CHANGE UP (ref 22–31)
CREAT SERPL-MCNC: 0.7 MG/DL — SIGNIFICANT CHANGE UP (ref 0.5–1.3)
CULTURE RESULTS: SIGNIFICANT CHANGE UP
GLUCOSE SERPL-MCNC: 88 MG/DL — SIGNIFICANT CHANGE UP (ref 70–99)
HCT VFR BLD CALC: 34.2 % — LOW (ref 39–50)
HGB BLD-MCNC: 11.3 G/DL — LOW (ref 13–17)
MAGNESIUM SERPL-MCNC: 2 MG/DL — SIGNIFICANT CHANGE UP (ref 1.6–2.6)
MCHC RBC-ENTMCNC: 30.3 PG — SIGNIFICANT CHANGE UP (ref 27–34)
MCHC RBC-ENTMCNC: 33 GM/DL — SIGNIFICANT CHANGE UP (ref 32–36)
MCV RBC AUTO: 91.7 FL — SIGNIFICANT CHANGE UP (ref 80–100)
METHOD TYPE: SIGNIFICANT CHANGE UP
NRBC # BLD: 0 /100 WBCS — SIGNIFICANT CHANGE UP (ref 0–0)
ORGANISM # SPEC MICROSCOPIC CNT: SIGNIFICANT CHANGE UP
PHOSPHATE SERPL-MCNC: 1.8 MG/DL — LOW (ref 2.5–4.5)
PLATELET # BLD AUTO: 161 K/UL — SIGNIFICANT CHANGE UP (ref 150–400)
POTASSIUM SERPL-MCNC: 3.6 MMOL/L — SIGNIFICANT CHANGE UP (ref 3.5–5.3)
POTASSIUM SERPL-SCNC: 3.6 MMOL/L — SIGNIFICANT CHANGE UP (ref 3.5–5.3)
RBC # BLD: 3.73 M/UL — LOW (ref 4.2–5.8)
RBC # FLD: 14.6 % — HIGH (ref 10.3–14.5)
SODIUM SERPL-SCNC: 144 MMOL/L — SIGNIFICANT CHANGE UP (ref 135–145)
SPECIMEN SOURCE: SIGNIFICANT CHANGE UP
WBC # BLD: 7.84 K/UL — SIGNIFICANT CHANGE UP (ref 3.8–10.5)
WBC # FLD AUTO: 7.84 K/UL — SIGNIFICANT CHANGE UP (ref 3.8–10.5)

## 2021-05-03 PROCEDURE — 36410 VNPNXR 3YR/> PHY/QHP DX/THER: CPT

## 2021-05-03 PROCEDURE — 99232 SBSQ HOSP IP/OBS MODERATE 35: CPT

## 2021-05-03 PROCEDURE — 99233 SBSQ HOSP IP/OBS HIGH 50: CPT | Mod: GC

## 2021-05-03 PROCEDURE — 76937 US GUIDE VASCULAR ACCESS: CPT | Mod: 26

## 2021-05-03 RX ORDER — MEROPENEM 1 G/30ML
1000 INJECTION INTRAVENOUS EVERY 8 HOURS
Refills: 0 | Status: DISCONTINUED | OUTPATIENT
Start: 2021-05-03 | End: 2021-05-03

## 2021-05-03 RX ORDER — MEROPENEM 1 G/30ML
1000 INJECTION INTRAVENOUS
Qty: 21000 | Refills: 0
Start: 2021-05-03 | End: 2021-05-09

## 2021-05-03 RX ORDER — MEROPENEM 1 G/30ML
1000 INJECTION INTRAVENOUS EVERY 8 HOURS
Refills: 0 | Status: DISCONTINUED | OUTPATIENT
Start: 2021-05-03 | End: 2021-05-04

## 2021-05-03 RX ORDER — ERTAPENEM SODIUM 1 G/1
1 INJECTION, POWDER, LYOPHILIZED, FOR SOLUTION INTRAMUSCULAR; INTRAVENOUS
Qty: 10 | Refills: 0
Start: 2021-05-03 | End: 2021-05-12

## 2021-05-03 RX ORDER — POTASSIUM PHOSPHATE, MONOBASIC POTASSIUM PHOSPHATE, DIBASIC 236; 224 MG/ML; MG/ML
15 INJECTION, SOLUTION INTRAVENOUS ONCE
Refills: 0 | Status: COMPLETED | OUTPATIENT
Start: 2021-05-03 | End: 2021-05-03

## 2021-05-03 RX ORDER — ASPIRIN/CALCIUM CARB/MAGNESIUM 324 MG
81 TABLET ORAL DAILY
Refills: 0 | Status: DISCONTINUED | OUTPATIENT
Start: 2021-05-03 | End: 2021-05-04

## 2021-05-03 RX ADMIN — POTASSIUM PHOSPHATE, MONOBASIC POTASSIUM PHOSPHATE, DIBASIC 62.5 MILLIMOLE(S): 236; 224 INJECTION, SOLUTION INTRAVENOUS at 12:09

## 2021-05-03 RX ADMIN — ATORVASTATIN CALCIUM 20 MILLIGRAM(S): 80 TABLET, FILM COATED ORAL at 21:09

## 2021-05-03 RX ADMIN — MEROPENEM 100 MILLIGRAM(S): 1 INJECTION INTRAVENOUS at 21:09

## 2021-05-03 RX ADMIN — TAMSULOSIN HYDROCHLORIDE 0.4 MILLIGRAM(S): 0.4 CAPSULE ORAL at 21:09

## 2021-05-03 RX ADMIN — MEROPENEM 100 MILLIGRAM(S): 1 INJECTION INTRAVENOUS at 13:54

## 2021-05-03 RX ADMIN — SODIUM CHLORIDE 75 MILLILITER(S): 9 INJECTION, SOLUTION INTRAVENOUS at 06:11

## 2021-05-03 RX ADMIN — Medication 250 MILLIGRAM(S): at 06:11

## 2021-05-03 RX ADMIN — DORZOLAMIDE HYDROCHLORIDE 1 DROP(S): 20 SOLUTION/ DROPS OPHTHALMIC at 13:54

## 2021-05-03 RX ADMIN — Medication 250 MILLIGRAM(S): at 18:23

## 2021-05-03 RX ADMIN — DORZOLAMIDE HYDROCHLORIDE 1 DROP(S): 20 SOLUTION/ DROPS OPHTHALMIC at 06:11

## 2021-05-03 RX ADMIN — PANTOPRAZOLE SODIUM 40 MILLIGRAM(S): 20 TABLET, DELAYED RELEASE ORAL at 12:10

## 2021-05-03 RX ADMIN — ENOXAPARIN SODIUM 30 MILLIGRAM(S): 100 INJECTION SUBCUTANEOUS at 11:35

## 2021-05-03 RX ADMIN — FINASTERIDE 5 MILLIGRAM(S): 5 TABLET, FILM COATED ORAL at 11:37

## 2021-05-03 NOTE — PATIENT PROFILE ADULT - ARE SIGNIFICANT INDICATORS COMPLETE.
Telephone Encounter by Vernell Walton at 06/26/17 03:37 PM     Author:  Vernell Walton Service:  (none) Author Type:  Patient      Filed:  06/26/17 03:40 PM Encounter Date:  6/26/2017 Status:  Signed     :  Vernell Walton (Patient )              JOSE SMITH    Patient Age: 87 year old    ACCT STATUS: COPAY  MESSAGE:[JH1.1T]   Petey with [facility] is calling, states she did a evaluation on patient today. States she will be seeing patient for P.T.  Twice a day for 5 weeks.[JH1.1M] Message confirmed with caller.  Routed to provider's clinical pool.     Next and Last Visit with Provider and Department  Next visit with JENNIFER ADEN is on No match found  Next visit with [facility] is on No match found  Last visit with JENNIFER ADEN was on 11/02/2016 at  1:45 PM in INTERNAL MEDICINE SEQ  Last visit with INTER[facility]IC[facility] was on 12/21/2016 at  3:40 PM in [facility] SEQ     WEIGHT AND HEIGHT: As of 11/02/2016 weight is 162.2 lbs.(73.573 kg).   BMI is 23.67 kg/(m^2) calculated from:     Height 5' 4.5\" (1.638 m) as of 7/22/16     Weight 140 lb (63.504 kg) as of 7/22/16[JH1.1T]      No Known Allergies[JH1.2T]  Current outpatient prescriptions       Medication  Sig Dispense Refill   • gabapentin (NEURONTIN) 100 MG Cap TAKE ONE CAPSULE BY MOUTH THREE TIMES DAILY 90 Cap 0   • Ipratropium-Albuterol 0.5-2.5 (3) MG/3ML SOLN USE 1 VIAL VIA NEBULIZER EVERY 4 HOURS AS NEEDED 1620 mL 0   • sertraline (ZOLOFT) 100 MG tablet Take 2 Tabs by mouth daily. 180 Tab 1   • risperidone (RISPERDAL) 0.5 MG tablet Take 1 Tab by mouth 2 (two) times daily as needed. 90 Tab 2   • quinapril-hydrochlorothiazide (ACCURETIC) 20-12.5 MG per tablet Take 1 Tab by mouth 2 (two) times daily. 180 Tab 1   • atorvastatin (LIPITOR) 40 MG tablet Take 1 Tab by mouth daily. 90 Tab 1   • Vitamin D, Ergocalciferol, 43369 UNITS CAPS Take 1 Cap by mouth once a week. 12 Cap 0    • levothyroxine (SYNTHROID) 25 MCG tablet Take 2 1/2 tablets by mouth daily 75 Tab 2   • carbamazepine (TEGRETOL XR) 100 MG 12 hr tablet Take 1 Tab by mouth 2 (two) times daily. 60 Tab 5   • nystatin (MYCOSTATIN) cream Apply QID until clear. 60 g 2   • albuterol (PROAIR HFA) 108 (90 BASE) MCG/ACT inhaler Inhale 2 Puffs by mouth every 6 (six) hours as needed for Wheezing. 3 Inhaler 3   • Beclomethasone Dipropionate (QVAR) 80 MCG/ACT inhaler Inhale 2 Puffs by mouth 2 (two) times daily. 8.7 g 5   • Calcium + D 600-200 MG-UNIT OR TABS 1 TABLET DAILY        PHARMACY to use:[JH1.1T] n/a[JH1.1M]          Pharmacy preference(s) on file:    [facility] [address] [place]  H[facility]AN MAIL DELIVERY[facility] [address]    CALL BACK INFO:[JH1.1T] Ok to leave response (including medical information) on answering machine[JH1.1M]  ROUTING:[JH1.1T] Patient's physician/staff[JH1.1M]        PCP: Lili Abarca MD         INS: Payor: NO INSURANCE - SELF PAY / Plan: *No Plan* / Product Type: *No Product type* / Note: This is the primary coverage, but no account was found for this location or the patient's primary location.   ADDRESS:  [address]  [facility] [facility] [address][JH1.1T]       Revision History        User Key Date/Time User Provider Type Action    > JH1.2 06/26/17 03:40 PM Vernell Walton Patient  Sign     JH1.1 06/26/17 03:37 PM Vernell Walton Patient      M - Manual, T - Template             No

## 2021-05-03 NOTE — PROGRESS NOTE ADULT - SUBJECTIVE AND OBJECTIVE BOX
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 He had chills and rigors at home. He has presented similarly in the past when he had urinary tract infections. Denies chest pain, palpitations, dyspnea, headache, dizziness, abdominal pain, n/v/d. He states that he wants to go home and rest. Son at bedside states that he does not understand the gravity of the situation. Son states that he has paperwork stating DNR/DNI at home.    In the ED,  Vital Signs T(F) Max: 103.6 HR: 86 - 125 BP: 92/53 RR: 16 SpO2: 95%  Labs significant for: WBC 17.71, C2 1.2, eGFR 53, Lactate 2.9, UA with mod LE, positive nitrites, large blood, WBC 11-25  EKG: Sinus tachy (102), first degree block and non specific T wave inversion in leads II, III and AVF   CXR with low lung volumes with atelectasis, similar to prior study in 2019  CT renal stone hunt:  Moderate left hydrourteronephrosis secondary to a distal left ureteral calculus measuring up to 12 mm  EKG: Sinus Tachycardia at 102bpm (29 Apr 2021 21:47)      ----  INTERVAL HPI/OVERNIGHT EVENTS: Pt seen and evaluated at the bedside. No acute overnight events occurred. Pt [place] but pleasant, eating breakfast. Has no acute complaints this AM, says he "feels great."    ----  PAST MEDICAL & SURGICAL HISTORY:  Benign prostatic hyperplasia, presence of lower urinary tract symptoms unspecified, unspecified morphology    Hyperlipidemia    GERD (gastroesophageal reflux disease)    CAD (coronary artery disease)    UTI (urinary tract infection)    H/O heart artery stent  stent x 2    History of prostate surgery    S/P ureteral stent placement  Left sided        FAMILY HISTORY:  FH: hypertension (Child)        Allergies    No Known Allergies    Intolerances        ----  REVIEW OF SYSTEMS:  CONSTITUTIONAL: denies fever, chills, fatigue, weakness  HEENT: denies blurred vision, sore throat  SKIN: denies new lesions, rash  CARDIOVASCULAR: denies chest pain, chest pressure, palpitations  RESPIRATORY: denies shortness of breath, sputum production  GASTROINTESTINAL: denies nausea, vomiting, diarrhea, abdominal pain  GENITOURINARY: denies dysuria, discharge  NEUROLOGICAL: denies numbness, headache, focal weakness  MUSCULOSKELETAL: denies new joint pain, muscle aches    ----  PHYSICAL EXAM:  GENERAL: patient appears well, no acute distress, appropriately interactive, eating breakfast in bed comfortably   EYES: sclera clear, no exudates  ENMT: [place], oropharynx clear without erythema, moist mucous membranes  NECK: supple, soft  LUNGS: good air entry bilaterally, clear to auscultation, no wheezing or rhonchi appreciated  HEART: S1/S2, regular rate and rhythm, no murmurs noted, no noted edema to b/l LE  GASTROINTESTINAL: abdomen is soft, nontender, nondistended, normoactive bowel sounds  INTEGUMENT: good skin turgor, appropriate for ethnicity, appears well perfused, no jaundice noted  MUSCULOSKELETAL: no clubbing or cyanosis, no obvious deformity  NEUROLOGIC: awake, alert, answers questions appropriately however [place]    T(C): 37.2 (05-03-21 @ 13:29), Max: 37.4 (05-03-21 @ 04:41)  HR: 70 (05-03-21 @ 13:29) (52 - 81)  BP: 126/69 (05-03-21 @ 13:29) (122/76 - 136/63)  RR: 18 (05-03-21 @ 13:29) (17 - 18)  SpO2: 94% (05-03-21 @ 13:29) (94% - 98%)  Wt(kg): --    ----  I&O's Summary    02 May 2021 07:01  -  03 May 2021 07:00  --------------------------------------------------------  IN: 900 mL / OUT: 1450 mL / NET: -550 mL    03 May 2021 07:01  -  03 May 2021 13:54  --------------------------------------------------------  IN: 240 mL / OUT: 0 mL / NET: 240 mL        LABS:                        11.3   7.84  )-----------( 161      ( 03 May 2021 07:05 )             34.2     05-03    144  |  109<H>  |  11  ----------------------------<  88  3.6   |  28  |  0.70    Ca    9.1      03 May 2021 07:05  Phos  1.8     05-03  Mg     2.0     05-03    TPro  6.2  /  Alb  2.5<L>  /  TBili  1.1  /  DBili  x   /  AST  24  /  ALT  10<L>  /  AlkPhos  63  05-02        CAPILLARY BLOOD GLUCOSE          04-30 @ 20:48   No growth to date.  --  --  04-30 @ 04:51   Moderate Escherichia coli  Moderate Escherichia coli ESBL  Multiple Morphological Strains  --  Escherichia coli  04-30 @ 01:05   Growth in anaerobic bottle: Escherichia coli ESBL  MDRO detected in BCID PCR, resistance marker = CTX-M (ESBL)  ***Blood Panel PCR results on this specimen are available  approximately 3 hours after the Gram stain result.***  Gram stain, PCR, and/or culture results may not always  correspond due to difference in methodologies.  ************************************************************  This PCR assay was performed by multiplex PCR. This  Assay tests for 66 bacterial and resistance gene targets.  Please refer to the [facility] test directory  at [email or web address] for details.  --  Blood Culture PCR  04-30 @ 01:04   >100,000 CFU/ml Escherichia coli  <10,000 CFU/ml of other organisms  --  Escherichia coli            ----  Personally reviewed:  Vital sign trends: [ x ] yes    [  ] no     [  ] n/a  Laboratory results: [ x ] yes    [  ] no     [  ] n/a  Radiology results: [  ] yes    [  ] no     [ x ] n/a  Culture results: [x  ] yes    [  ] no     [  ] n/a  Consultant recommendations: [ x ] yes    [  ] no     [  ] n/a         Patient is a 89y old  Male who presents with a chief complaint of sepsis/UTI 2/2 left obstructing ureteral calculus with hydronephrosis (03 May 2021 12:55)      FROM ADMISSION H+P:   HPI:  89 y/o M with PMHx CAD s/p two cardiac stents (2014), BPH s/p prostate surgery (2017), Hx of ESBL UTI (sensitive to carbapenems and gentamicin), Hx of left sided kidney stone, GERD, HLD, hx of TIA, hx of 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 He had chills and rigors at home. He has presented similarly in the past when he had urinary tract infections. Denies chest pain, palpitations, dyspnea, headache, dizziness, abdominal pain, n/v/d. He states that he wants to go home and rest. Son at bedside states that he does not understand the gravity of the situation. Son states that he has paperwork stating DNR/DNI at home.    ----  INTERVAL HPI/OVERNIGHT EVENTS: Pt seen and evaluated at the bedside. No acute overnight events occurred. Pt [place] but pleasant, eating breakfast. Has no acute complaints this AM, says he "feels great." He has no other new complaints and requested that I speak w/ both his son and daughter regarding the tx plan. When I informed him about need for IV abx, he again requested that I speak w/ his family.     ----  PAST MEDICAL & SURGICAL HISTORY:  Benign prostatic hyperplasia, presence of lower urinary tract symptoms unspecified, unspecified morphology    Hyperlipidemia    GERD (gastroesophageal reflux disease)    CAD (coronary artery disease)    UTI (urinary tract infection)    H/O heart artery stent  stent x 2    History of prostate surgery    S/P ureteral stent placement  Left sided        FAMILY HISTORY:  FH: hypertension (Child)        Allergies    No Known Allergies    Intolerances        ----  REVIEW OF SYSTEMS:  CONSTITUTIONAL: denies fever, chills, fatigue, weakness  HEENT: denies blurred vision, sore throat  SKIN: denies new lesions, rash  CARDIOVASCULAR: denies chest pain, chest pressure, palpitations  RESPIRATORY: denies shortness of breath, sputum production  GASTROINTESTINAL: denies nausea, vomiting, diarrhea, abdominal pain  GENITOURINARY: denies dysuria, discharge  NEUROLOGICAL: denies numbness, headache, focal weakness  MUSCULOSKELETAL: denies new joint pain, muscle aches    ----  PHYSICAL EXAM:  GENERAL: patient appears well, no acute distress, appropriately interactive, eating breakfast in bed comfortably   EYES: sclera clear, no exudates  ENMT: [place], oropharynx clear without erythema, moist mucous membranes  NECK: supple, soft  LUNGS: good air entry bilaterally, clear to auscultation, no wheezing or rhonchi appreciated  HEART: S1/S2, regular rate and rhythm, no murmurs noted, no noted edema to b/l LE  GASTROINTESTINAL: abdomen is soft, nontender, nondistended, normoactive bowel sounds  INTEGUMENT: good skin turgor, appropriate for ethnicity, appears well perfused, no jaundice noted  MUSCULOSKELETAL: no clubbing or cyanosis, no obvious deformity  NEUROLOGIC: awake, alert, answers questions appropriately however [place]    T(C): 37.2 (05-03-21 @ 13:29), Max: 37.4 (05-03-21 @ 04:41)  HR: 70 (05-03-21 @ 13:29) (52 - 81)  BP: 126/69 (05-03-21 @ 13:29) (122/76 - 136/63)  RR: 18 (05-03-21 @ 13:29) (17 - 18)  SpO2: 94% (05-03-21 @ 13:29) (94% - 98%)  Wt(kg): --    ----  I&O's Summary    02 May 2021 07:01  -  03 May 2021 07:00  --------------------------------------------------------  IN: 900 mL / OUT: 1450 mL / NET: -550 mL    03 May 2021 07:01  -  03 May 2021 13:54  --------------------------------------------------------  IN: 240 mL / OUT: 0 mL / NET: 240 mL        LABS:                        11.3   7.84  )-----------( 161      ( 03 May 2021 07:05 )             34.2     05-03    144  |  109<H>  |  11  ----------------------------<  88  3.6   |  28  |  0.70    Ca    9.1      03 May 2021 07:05  Phos  1.8     05-03  Mg     2.0     05-03    TPro  6.2  /  Alb  2.5<L>  /  TBili  1.1  /  DBili  x   /  AST  24  /  ALT  10<L>  /  AlkPhos  63  05-02        CAPILLARY BLOOD GLUCOSE          04-30 @ 20:48   No growth to date.  --  --  04-30 @ 04:51   Moderate Escherichia coli  Moderate Escherichia coli ESBL  Multiple Morphological Strains  --  Escherichia coli  04-30 @ 01:05   Growth in anaerobic bottle: Escherichia coli ESBL  MDRO detected in BCID PCR, resistance marker = CTX-M (ESBL)  ***Blood Panel PCR results on this specimen are available  approximately 3 hours after the Gram stain result.***  Gram stain, PCR, and/or culture results may not always  correspond due to difference in methodologies.  ************************************************************  This PCR assay was performed by multiplex PCR. This  Assay tests for 66 bacterial and resistance gene targets.  Please refer to the [facility] test directory  at [email or web address] for details.  --  Blood Culture PCR  04-30 @ 01:04   >100,000 CFU/ml Escherichia coli  <10,000 CFU/ml of other organisms  --  Escherichia coli            ----  Personally reviewed:  Vital sign trends: [ x ] yes    [  ] no     [  ] n/a  Laboratory results: [ x ] yes    [  ] no     [  ] n/a  Radiology results: [  ] yes    [  ] no     [ x ] n/a  Culture results: [x  ] yes    [  ] no     [  ] n/a  Consultant recommendations: [ x ] yes    [  ] no     [  ] n/a

## 2021-05-03 NOTE — PROGRESS NOTE ADULT - NSICDXPROBLEMPLAN6_GEN_ALL_CORE_FT
IMPROVE VTE Individual Risk Assessment        RISK                                                          Points  [  ] Previous VTE                                                3  [  ] Thrombophilia                                             2  [  ] Lower limb paralysis                                   2        (unable to hold up >15 seconds)    [  ] Current Cancer                                            2         (within 6 months)  [  ] Immobilization > 24 hrs                              1  [  ] ICU/CCU stay > 24 hours                            1  [x  ] Age > 60                                                    1  IMPROVE VTE Score _____1____  C/w lovenox

## 2021-05-03 NOTE — PHYSICAL THERAPY INITIAL EVALUATION ADULT - PERTINENT HX OF CURRENT PROBLEM, REHAB EVAL
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 He had chills and rigors at home

## 2021-05-03 NOTE — BRIEF OPERATIVE NOTE - NSICDXBRIEFPROCEDURE_GEN_ALL_CORE_FT
PROCEDURES:  Midline catheter insertion 03-May-2021 15:59:35  Tomas Phan  
PROCEDURES:  Cystoscopy with stent placement 30-Apr-2021 01:04:08  Juice Valle  Retrograde pyelogram 30-Apr-2021 01:04:42  Juice Valle  
The patient is a 55y Male complaining of edema.

## 2021-05-03 NOTE — PROGRESS NOTE ADULT - NSICDXPROBLEMPLAN3_GEN_ALL_CORE_FT
Chronic   - Cont finasteride 5 mg and tamsulosin 0.4 mg qd
Chronic   - NPO for now; Eats pureed and soft diet at home   - pending speech and swallow

## 2021-05-03 NOTE — PROGRESS NOTE ADULT - NSICDXPROBLEMPLAN1_GEN_ALL_CORE_FT
Severe sepsis 2/2 obstructing renal stone   - now s/p emergent stent placement on 4/29 by urology with need for pressors in ICU, now off pressors and BP remains stable.  - All cultures coming back sensitive to Meropenem, no PO options available. Will need midline to complete course unless inpatient fo the whole duration  - Cont meropenem while inpatient, per ID if pt receives midline can switch to ertapenem for easier dosing to complete 14 day course  - IR consulted for midline  - Repeat blood cx negative  - Tylenol PRN for fevers    - Continue flomax  - Uro Dr. Valle following  - ID consulted, recs appreciated Severe sepsis 2/2 obstructing renal stone   - now s/p emergent stent placement on 4/29 by urology with need for pressors in ICU, now off pressors and BP remains stable.  - All cultures coming back sensitive to Meropenem, no PO options available. Will need midline to complete course unless inpatient fo the whole duration  - Cont meropenem to complete 14 day course  - IR consulted for midline  - Repeat blood cx negative  - Tylenol PRN for fevers    - Continue flomax  - Uro Dr. Valle following  - ID consulted, recs appreciated

## 2021-05-03 NOTE — PHYSICAL THERAPY INITIAL EVALUATION ADULT - ADDITIONAL COMMENTS
As per chart, patient lives in private home with family.  Patient has 24/7 care provided by family.  Patient requires A x 1 for all ADLs and ambulation with RW

## 2021-05-03 NOTE — BRIEF OPERATIVE NOTE - NSICDXBRIEFPOSTOP_GEN_ALL_CORE_FT
POST-OP DIAGNOSIS:  UTI (urinary tract infection) 03-May-2021 16:02:45  Tomas Phan  
POST-OP DIAGNOSIS:  Sepsis 30-Apr-2021 01:05:51  Juice Valle  Hydronephrosis 30-Apr-2021 01:06:01  Juice Valle  Calculus, ureter 30-Apr-2021 01:06:09  Juice Valle  Renal stone 30-Apr-2021 01:06:16  Juice Valle P

## 2021-05-03 NOTE — PROGRESS NOTE ADULT - SUBJECTIVE AND OBJECTIVE BOX
[facility]  INFECTIOUS DISEASES   [address] [place] [address]  Tel: [phone]     Fax: [phone]  =======================================================    IDRIS DANIEL [id number]    Follow up: Bacteremia, UTI    Sitting on chair, no new complaint, no fever.   Blood culture and UC positive for ESBL Ecoli.   Allergies:  No Known Allergies    Antibiotics:  Cefazolin      REVIEW OF SYSTEMS:  CONSTITUTIONAL:  No Fever or chills  HEENT:   No diplopia or blurred vision.  No earache, sore throat or runny nose.  CARDIOVASCULAR:  No chest pain or SOB  RESPIRATORY:  No cough, shortness of breath, PND or orthopnea.  GASTROINTESTINAL:  No nausea, vomiting or diarrhea.  GENITOURINARY:  No dysuria, frequency or urgency. No Blood in urine  MUSCULOSKELETAL:  no joint aches, no muscle pain  SKIN:  No change in skin, hair or nails.  NEUROLOGIC:  No paresthesias or weakness.  PSYCHIATRIC:  No disorder of thought or mood.  ENDOCRINE:  No heat or cold intolerance, polyuria or polydipsia.  HEMATOLOGICAL:  No easy bruising or bleeding.      Physical Exam:  ICU Vital Signs Last 24 Hrs  T(C): 37.1 (03 May 2021 08:45), Max: 37.4 (03 May 2021 04:41)  T(F): 98.8 (03 May 2021 08:45), Max: 99.4 (03 May 2021 04:41)  HR: 81 (03 May 2021 08:45) (52 - 81)  BP: 122/76 (03 May 2021 08:45) (122/76 - 136/63)  RR: 17 (03 May 2021 08:45) (17 - 18)  SpO2: 96% (03 May 2021 08:45) (95% - 98%)  GEN: NAD, elderly  HEENT: normocephalic and atraumatic. EOMI. KILO.    NECK: Supple.  No lymphadenopathy   LUNGS: Clear to auscultation.  HEART: Regular rate and rhythm without murmur.  ABDOMEN: Soft, nontender, and nondistended.  Positive bowel sounds.    : No CVA tenderness, no disla   EXTREMITIES: Without any cyanosis, clubbing, rash, lesions or edema.  MSK: no joint swelling  NEUROLOGIC: grossly intact.  PSYCHIATRIC: Appropriate affect .  SKIN: No ulceration or induration present.      Labs:  05-03    144  |  109<H>  |  11  ----------------------------<  88  3.6   |  28  |  0.70    Ca    9.1      03 May 2021 07:05  Phos  1.8     05-03  Mg     2.0     05-03    TPro  6.2  /  Alb  2.5<L>  /  TBili  1.1  /  DBili  x   /  AST  24  /  ALT  10<L>  /  AlkPhos  63  05-02                        11.3   7.84  )-----------( 161      ( 03 May 2021 07:05 )             34.2     LIVER FUNCTIONS - ( 02 May 2021 07:30 )  Alb: 2.5 g/dL / Pro: 6.2 g/dL / ALK PHOS: 63 U/L / ALT: 10 U/L / AST: 24 U/L / GGT: x           RECENT CULTURES:  04-30 @ 20:48 .Blood Blood-Peripheral     No growth to date.    04-30 @ 04:51 OR Collect Bladder (from O.R.) Escherichia coli  Escherichia coli ESBL    Moderate Escherichia coli  Moderate Escherichia coli ESBL  Multiple Morphological Strains    04-30 @ 01:05 .Blood Blood-Peripheral Blood Culture PCR  Escherichia coli ESBL    Growth in anaerobic bottle: Escherichia coli ESBL  MDRO detected in BCID PCR, resistance marker = CTX-M (ESBL)  ***Blood Panel PCR results on this specimen are available  approximately 3 hours after the Gram stain result.***  Gram stain, PCR, and/or culture results may not always  correspond due to difference in methodologies.  ************************************************************  This PCR assay was performed by multiplex PCR. This  Assay tests for 66 bacterial and resistance gene targets.  Please refer to the [facility] test directory  at [email or web address] for details.    Growth in anaerobic bottle: Gram Negative Rods    04-30 @ 01:04 .Urine Clean Catch (Midstream) Escherichia coli    >100,000 CFU/ml Escherichia coli  <10,000 CFU/ml of other organisms    All imaging and data are reviewed.   < from: CT Renal Stone Hunt (04.29.21 @ 22:03) >  IMPRESSION:  Moderate left sided hydroureteronephrosis secondary to 1.2 cm calculus distal left ureter.  Left intrarenal calcification.  Bilateral pulmonary nodular opacities as discussed.      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   Vital Signs T(F) Max: 103.6 HR: 86 - 125 BP: 92/53 RR: 16 SpO2: 95%  Labs significant for: WBC 17.71, C2 1.2, eGFR 53, Lactate 2.9, UA with mod LE, positive nitrites, large blood, WBC 11-25  CXR with low lung volumes with atelectasis (I personally reviewed)   CT renal stone hunt:  Moderate left hydroureteronephrosis secondary to a distal left ureteral calculus measuring up to 12 mm (I personally reviewed)   Urine culture with ecoli and pseudomonas  Blood culture positive for ESBL Ecoli   ureteral Stent placed 4/30 and has since defervesced     5/2:all cultures coming back S to Meropenem, no PO options available, likely will need midline to complete course unless inpatient fo the whole duration    Sepsis due to UTI   Obstructive uropathy   ESBL Ecoli Bacteremia   Nephrolithiases     Suggest-  ·	Continue Meropenem 1g q8hrs    ·	trend WBC and temps   ·	trend creatinine   ·	can place midline with plan to treat for 10-14 days depending   ·	If going home can switch to ertapenem 1gm daily for more convenient dosing    D/W Dr. Jaramillo.    Tyler Chiu MD  Division of infectious Diseases  Cell [phone] between 8am and 6pm  After 6pm and over the weekends please call ID service line at [phone].  [facility]  INFECTIOUS DISEASES   [address] [place] [address]  Tel: [phone]     Fax: [phone]  =======================================================    IDRIS DANIEL [id number]    Follow up: Bacteremia, UTI    Sitting on chair, no new complaint, no fever.   Blood culture and UC positive for ESBL Ecoli.   Allergies:  No Known Allergies    Antibiotics:  Cefazolin      REVIEW OF SYSTEMS:  CONSTITUTIONAL:  No Fever or chills  HEENT:   No diplopia or blurred vision.  No earache, sore throat or runny nose.  CARDIOVASCULAR:  No chest pain or SOB  RESPIRATORY:  No cough, shortness of breath, PND or orthopnea.  GASTROINTESTINAL:  No nausea, vomiting or diarrhea.  GENITOURINARY:  No dysuria, frequency or urgency. No Blood in urine  MUSCULOSKELETAL:  no joint aches, no muscle pain  SKIN:  No change in skin, hair or nails.  NEUROLOGIC:  No paresthesias or weakness.  PSYCHIATRIC:  No disorder of thought or mood.  ENDOCRINE:  No heat or cold intolerance, polyuria or polydipsia.  HEMATOLOGICAL:  No easy bruising or bleeding.      Physical Exam:  ICU Vital Signs Last 24 Hrs  T(C): 37.1 (03 May 2021 08:45), Max: 37.4 (03 May 2021 04:41)  T(F): 98.8 (03 May 2021 08:45), Max: 99.4 (03 May 2021 04:41)  HR: 81 (03 May 2021 08:45) (52 - 81)  BP: 122/76 (03 May 2021 08:45) (122/76 - 136/63)  RR: 17 (03 May 2021 08:45) (17 - 18)  SpO2: 96% (03 May 2021 08:45) (95% - 98%)  GEN: NAD, elderly  HEENT: normocephalic and atraumatic. EOMI. KILO.    NECK: Supple.  No lymphadenopathy   LUNGS: Clear to auscultation.  HEART: Regular rate and rhythm without murmur.  ABDOMEN: Soft, nontender, and nondistended.  Positive bowel sounds.    : No CVA tenderness, no disla   EXTREMITIES: Without any cyanosis, clubbing, rash, lesions or edema.  MSK: no joint swelling  NEUROLOGIC: grossly intact.  PSYCHIATRIC: Appropriate affect .  SKIN: No ulceration or induration present.      Labs:  05-03    144  |  109<H>  |  11  ----------------------------<  88  3.6   |  28  |  0.70    Ca    9.1      03 May 2021 07:05  Phos  1.8     05-03  Mg     2.0     05-03    TPro  6.2  /  Alb  2.5<L>  /  TBili  1.1  /  DBili  x   /  AST  24  /  ALT  10<L>  /  AlkPhos  63  05-02                        11.3   7.84  )-----------( 161      ( 03 May 2021 07:05 )             34.2     LIVER FUNCTIONS - ( 02 May 2021 07:30 )  Alb: 2.5 g/dL / Pro: 6.2 g/dL / ALK PHOS: 63 U/L / ALT: 10 U/L / AST: 24 U/L / GGT: x           RECENT CULTURES:  04-30 @ 20:48 .Blood Blood-Peripheral     No growth to date.    04-30 @ 04:51 OR Collect Bladder (from O.R.) Escherichia coli  Escherichia coli ESBL    Moderate Escherichia coli  Moderate Escherichia coli ESBL  Multiple Morphological Strains    04-30 @ 01:05 .Blood Blood-Peripheral Blood Culture PCR  Escherichia coli ESBL    Growth in anaerobic bottle: Escherichia coli ESBL  MDRO detected in BCID PCR, resistance marker = CTX-M (ESBL)  ***Blood Panel PCR results on this specimen are available  approximately 3 hours after the Gram stain result.***  Gram stain, PCR, and/or culture results may not always  correspond due to difference in methodologies.  ************************************************************  This PCR assay was performed by multiplex PCR. This  Assay tests for 66 bacterial and resistance gene targets.  Please refer to the [email or web address] test directory  at [email or web address] for details.    Growth in anaerobic bottle: Gram Negative Rods    04-30 @ 01:04 .Urine Clean Catch (Midstream) Escherichia coli    >100,000 CFU/ml Escherichia coli  <10,000 CFU/ml of other organisms    All imaging and data are reviewed.   < from: CT Renal Stone Hunt (04.29.21 @ 22:03) >  IMPRESSION:  Moderate left sided hydroureteronephrosis secondary to 1.2 cm calculus distal left ureter.  Left intrarenal calcification.  Bilateral pulmonary nodular opacities as discussed.      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   Vital Signs T(F) Max: 103.6 HR: 86 - 125 BP: 92/53 RR: 16 SpO2: 95%  Labs significant for: WBC 17.71, C2 1.2, eGFR 53, Lactate 2.9, UA with mod LE, positive nitrites, large blood, WBC 11-25  CXR with low lung volumes with atelectasis (I personally reviewed)   CT renal stone hunt:  Moderate left hydroureteronephrosis secondary to a distal left ureteral calculus measuring up to 12 mm (I personally reviewed)   Urine culture with ecoli and pseudomonas  Blood culture positive for ESBL Ecoli   ureteral Stent placed 4/30 and has since defervesced     5/2:all cultures coming back S to Meropenem, no PO options available, likely will need midline to complete course unless inpatient fo the whole duration    Sepsis due to UTI   Obstructive uropathy   ESBL Ecoli Bacteremia   Nephrolithiases     Suggest-  ·	Continue Meropenem 1g q8hrs    ·	trend WBC and temps   ·	trend creatinine   ·	can place midline with plan to treat for 10-14 days depending     D/W Dr. Jaramillo.    Tyler Chiu MD  Division of infectious Diseases  Cell [phone] between 8am and 6pm  After 6pm and over the weekends please call ID service line at [phone].

## 2021-05-03 NOTE — PROGRESS NOTE ADULT - ATTENDING COMMENTS
I personally conducted a physical examination of the patient. I personally gathered the patient's history. I edited the above listed findings which were prepared by the listed resident physician. I personally discussed the plan of care with the patient. The questions and concerns were addressed to the best of my ability. The patient is in agreement with the listed treatment plan.    - the patient was assessed at bedside. he has no new complaints at this time. denies fever/chills. denies rigors. denies CP, palpitations  - examined patient  - a/p   - plan for midline placement today if IR can accommodate. ertapenem x14d total including the doses of merrem here. blood cultures have cleared >48hrs since positive cultures on 4/30/21.   - pt is aware of the dispo plan, anticipate dispo to home with midline either today or tomorrow. I personally conducted a physical examination of the patient. I personally gathered the patient's history. I edited the above listed findings which were prepared by the listed resident physician. I personally discussed the plan of care with the patient. The questions and concerns were addressed to the best of my ability. The patient is in agreement with the listed treatment plan.    - the patient was assessed at bedside. he has no new complaints at this time. denies fever/chills. denies rigors. denies CP, palpitations  - examined patient  - a/p:  - plan for midline placement today if IR can accommodate. blood cultures have cleared >48hrs since positive cultures on 4/30/21.   - polymicrobial infection including esbl e.coli and pseudomonas, provided rx for meropenem.   - pt is aware of the dispo plan, anticipate dispo to home with midline either today or tomorrow.  - I personally spoke w/ the pt's daughter (HCP) - all in agreement w/ plan, they are hesitant to proceed w/ midline because they are concerned about administering IV at the home.

## 2021-05-03 NOTE — PROGRESS NOTE ADULT - PROBLEM SELECTOR PROBLEM 3
BPH (benign prostatic hyperplasia)
BPH (benign prostatic hyperplasia)
Dysphagia
BPH (benign prostatic hyperplasia)

## 2021-05-03 NOTE — PROGRESS NOTE ADULT - NSICDXPROBLEMPLAN4_GEN_ALL_CORE_FT
Hx 2 stents more than 5 years ago.   - Cont ASA 81 mg, Atorvastatin 20 mg
Chronic   - Cont finasteride 5 mg and tamsulosin 0.4 mg qd

## 2021-05-03 NOTE — PROGRESS NOTE ADULT - PROBLEM SELECTOR PROBLEM 4
BPH (benign prostatic hyperplasia)
CAD (coronary atherosclerotic disease)

## 2021-05-04 ENCOUNTER — TRANSCRIPTION ENCOUNTER (OUTPATIENT)
Age: 86
End: 2021-05-04

## 2021-05-04 VITALS
OXYGEN SATURATION: 95 % | TEMPERATURE: 98 F | HEART RATE: 73 BPM | DIASTOLIC BLOOD PRESSURE: 77 MMHG | SYSTOLIC BLOOD PRESSURE: 129 MMHG | RESPIRATION RATE: 18 BRPM

## 2021-05-04 LAB
ANION GAP SERPL CALC-SCNC: 6 MMOL/L — SIGNIFICANT CHANGE UP (ref 5–17)
BUN SERPL-MCNC: 12 MG/DL — SIGNIFICANT CHANGE UP (ref 7–23)
CALCIUM SERPL-MCNC: 9.4 MG/DL — SIGNIFICANT CHANGE UP (ref 8.5–10.1)
CHLORIDE SERPL-SCNC: 109 MMOL/L — HIGH (ref 96–108)
CO2 SERPL-SCNC: 28 MMOL/L — SIGNIFICANT CHANGE UP (ref 22–31)
CREAT SERPL-MCNC: 0.73 MG/DL — SIGNIFICANT CHANGE UP (ref 0.5–1.3)
GLUCOSE SERPL-MCNC: 87 MG/DL — SIGNIFICANT CHANGE UP (ref 70–99)
HCT VFR BLD CALC: 38 % — LOW (ref 39–50)
HGB BLD-MCNC: 12.5 G/DL — LOW (ref 13–17)
MAGNESIUM SERPL-MCNC: 2 MG/DL — SIGNIFICANT CHANGE UP (ref 1.6–2.6)
MCHC RBC-ENTMCNC: 29.9 PG — SIGNIFICANT CHANGE UP (ref 27–34)
MCHC RBC-ENTMCNC: 32.9 GM/DL — SIGNIFICANT CHANGE UP (ref 32–36)
MCV RBC AUTO: 90.9 FL — SIGNIFICANT CHANGE UP (ref 80–100)
NRBC # BLD: 0 /100 WBCS — SIGNIFICANT CHANGE UP (ref 0–0)
PHOSPHATE SERPL-MCNC: 2 MG/DL — LOW (ref 2.5–4.5)
PLATELET # BLD AUTO: 174 K/UL — SIGNIFICANT CHANGE UP (ref 150–400)
POTASSIUM SERPL-MCNC: 3.6 MMOL/L — SIGNIFICANT CHANGE UP (ref 3.5–5.3)
POTASSIUM SERPL-SCNC: 3.6 MMOL/L — SIGNIFICANT CHANGE UP (ref 3.5–5.3)
RBC # BLD: 4.18 M/UL — LOW (ref 4.2–5.8)
RBC # FLD: 14.3 % — SIGNIFICANT CHANGE UP (ref 10.3–14.5)
SODIUM SERPL-SCNC: 143 MMOL/L — SIGNIFICANT CHANGE UP (ref 135–145)
WBC # BLD: 8.33 K/UL — SIGNIFICANT CHANGE UP (ref 3.8–10.5)
WBC # FLD AUTO: 8.33 K/UL — SIGNIFICANT CHANGE UP (ref 3.8–10.5)

## 2021-05-04 PROCEDURE — 85025 COMPLETE CBC W/AUTO DIFF WBC: CPT

## 2021-05-04 PROCEDURE — 80048 BASIC METABOLIC PNL TOTAL CA: CPT

## 2021-05-04 PROCEDURE — 84100 ASSAY OF PHOSPHORUS: CPT

## 2021-05-04 PROCEDURE — 87077 CULTURE AEROBIC IDENTIFY: CPT

## 2021-05-04 PROCEDURE — 36415 COLL VENOUS BLD VENIPUNCTURE: CPT

## 2021-05-04 PROCEDURE — 99291 CRITICAL CARE FIRST HOUR: CPT | Mod: 25

## 2021-05-04 PROCEDURE — 86900 BLOOD TYPING SEROLOGIC ABO: CPT

## 2021-05-04 PROCEDURE — 76937 US GUIDE VASCULAR ACCESS: CPT

## 2021-05-04 PROCEDURE — 93005 ELECTROCARDIOGRAM TRACING: CPT

## 2021-05-04 PROCEDURE — 96375 TX/PRO/DX INJ NEW DRUG ADDON: CPT

## 2021-05-04 PROCEDURE — 84484 ASSAY OF TROPONIN QUANT: CPT

## 2021-05-04 PROCEDURE — 84145 PROCALCITONIN (PCT): CPT

## 2021-05-04 PROCEDURE — 99239 HOSP IP/OBS DSCHRG MGMT >30: CPT | Mod: GC

## 2021-05-04 PROCEDURE — 92610 EVALUATE SWALLOWING FUNCTION: CPT

## 2021-05-04 PROCEDURE — 86769 SARS-COV-2 COVID-19 ANTIBODY: CPT

## 2021-05-04 PROCEDURE — 86901 BLOOD TYPING SEROLOGIC RH(D): CPT

## 2021-05-04 PROCEDURE — C1894: CPT

## 2021-05-04 PROCEDURE — C1769: CPT

## 2021-05-04 PROCEDURE — 74176 CT ABD & PELVIS W/O CONTRAST: CPT

## 2021-05-04 PROCEDURE — 85730 THROMBOPLASTIN TIME PARTIAL: CPT

## 2021-05-04 PROCEDURE — C2625: CPT

## 2021-05-04 PROCEDURE — 85610 PROTHROMBIN TIME: CPT

## 2021-05-04 PROCEDURE — 99232 SBSQ HOSP IP/OBS MODERATE 35: CPT

## 2021-05-04 PROCEDURE — 80053 COMPREHEN METABOLIC PANEL: CPT

## 2021-05-04 PROCEDURE — 96365 THER/PROPH/DIAG IV INF INIT: CPT

## 2021-05-04 PROCEDURE — 71045 X-RAY EXAM CHEST 1 VIEW: CPT

## 2021-05-04 PROCEDURE — 87635 SARS-COV-2 COVID-19 AMP PRB: CPT

## 2021-05-04 PROCEDURE — 81001 URINALYSIS AUTO W/SCOPE: CPT

## 2021-05-04 PROCEDURE — 87086 URINE CULTURE/COLONY COUNT: CPT

## 2021-05-04 PROCEDURE — 97530 THERAPEUTIC ACTIVITIES: CPT

## 2021-05-04 PROCEDURE — 85027 COMPLETE CBC AUTOMATED: CPT

## 2021-05-04 PROCEDURE — 86850 RBC ANTIBODY SCREEN: CPT

## 2021-05-04 PROCEDURE — 97162 PT EVAL MOD COMPLEX 30 MIN: CPT

## 2021-05-04 PROCEDURE — 87150 DNA/RNA AMPLIFIED PROBE: CPT

## 2021-05-04 PROCEDURE — 82550 ASSAY OF CK (CPK): CPT

## 2021-05-04 PROCEDURE — C1758: CPT

## 2021-05-04 PROCEDURE — 87040 BLOOD CULTURE FOR BACTERIA: CPT

## 2021-05-04 PROCEDURE — 97116 GAIT TRAINING THERAPY: CPT

## 2021-05-04 PROCEDURE — 83735 ASSAY OF MAGNESIUM: CPT

## 2021-05-04 PROCEDURE — 83605 ASSAY OF LACTIC ACID: CPT

## 2021-05-04 PROCEDURE — C1751: CPT

## 2021-05-04 PROCEDURE — 76000 FLUOROSCOPY <1 HR PHYS/QHP: CPT

## 2021-05-04 PROCEDURE — 87186 SC STD MICRODIL/AGAR DIL: CPT

## 2021-05-04 PROCEDURE — 83880 ASSAY OF NATRIURETIC PEPTIDE: CPT

## 2021-05-04 RX ORDER — POTASSIUM PHOSPHATE, MONOBASIC POTASSIUM PHOSPHATE, DIBASIC 236; 224 MG/ML; MG/ML
30 INJECTION, SOLUTION INTRAVENOUS ONCE
Refills: 0 | Status: COMPLETED | OUTPATIENT
Start: 2021-05-04 | End: 2021-05-04

## 2021-05-04 RX ORDER — ACETAMINOPHEN 500 MG
2 TABLET ORAL
Qty: 0 | Refills: 0 | DISCHARGE

## 2021-05-04 RX ORDER — SENNA PLUS 8.6 MG/1
2 TABLET ORAL AT BEDTIME
Refills: 0 | Status: DISCONTINUED | OUTPATIENT
Start: 2021-05-04 | End: 2021-05-04

## 2021-05-04 RX ORDER — POLYETHYLENE GLYCOL 3350 17 G/17G
17 POWDER, FOR SOLUTION ORAL DAILY
Refills: 0 | Status: DISCONTINUED | OUTPATIENT
Start: 2021-05-04 | End: 2021-05-04

## 2021-05-04 RX ADMIN — Medication 81 MILLIGRAM(S): at 12:53

## 2021-05-04 RX ADMIN — ENOXAPARIN SODIUM 30 MILLIGRAM(S): 100 INJECTION SUBCUTANEOUS at 12:53

## 2021-05-04 RX ADMIN — POLYETHYLENE GLYCOL 3350 17 GRAM(S): 17 POWDER, FOR SOLUTION ORAL at 08:53

## 2021-05-04 RX ADMIN — PANTOPRAZOLE SODIUM 40 MILLIGRAM(S): 20 TABLET, DELAYED RELEASE ORAL at 12:53

## 2021-05-04 RX ADMIN — POTASSIUM PHOSPHATE, MONOBASIC POTASSIUM PHOSPHATE, DIBASIC 83.33 MILLIMOLE(S): 236; 224 INJECTION, SOLUTION INTRAVENOUS at 08:54

## 2021-05-04 RX ADMIN — FINASTERIDE 5 MILLIGRAM(S): 5 TABLET, FILM COATED ORAL at 12:53

## 2021-05-04 RX ADMIN — MEROPENEM 100 MILLIGRAM(S): 1 INJECTION INTRAVENOUS at 05:48

## 2021-05-04 RX ADMIN — Medication 250 MILLIGRAM(S): at 05:48

## 2021-05-04 RX ADMIN — MEROPENEM 100 MILLIGRAM(S): 1 INJECTION INTRAVENOUS at 13:47

## 2021-05-04 RX ADMIN — DORZOLAMIDE HYDROCHLORIDE 1 DROP(S): 20 SOLUTION/ DROPS OPHTHALMIC at 05:48

## 2021-05-04 RX ADMIN — DORZOLAMIDE HYDROCHLORIDE 1 DROP(S): 20 SOLUTION/ DROPS OPHTHALMIC at 13:48

## 2021-05-04 NOTE — GOALS OF CARE CONVERSATION - ADVANCED CARE PLANNING - CONVERSATION DETAILS
Writer spoke  with dtr/HCP Wade,pt defers to family. Reviewed patient's medical and social history as well as events leading to patient's hospitalization. Writer discussed patient's current diagnosis (UTI,renal colic,septic shock,BPH,HLD,CAD,), medical condition and management, prognosis, and life expectancy. Inquired about patient's wishes regarding extent of medical care to be provided including escalation of medical care into the ICU and use of vasopressor support. In addition, the writer inquired about thoughts regarding cardiopulmnary resuscitation, artificial nutrition and hydration including use of feeding tubes and IVF, antibiotics, and further investigative studies such as blood draws and radiology. Pt defers to family,HCP/dtr Wade showed good insight into medical condition. All questions answered. Writer recommended MOLST.Wade  consented to DNR/DNI  status. MOLST form filled out and placed in chart. (Wade was grieving appropriately) Psychosocial support provided.

## 2021-05-04 NOTE — PROGRESS NOTE ADULT - ASSESSMENT
History  of Septicemia secondary obstructing 12 mm left ureteral calculus s/o p stent, to be discharged on PICC with antibiotics    Requires stone treatment - either with urethoscopy/lasertripsy  or ESWL of ureteral stone,  has larger left renal calculus which can be observed or treated -     patient has seen Dr. mckenzie Reese in 2019 and can return to him or n make appointment with me within one week

## 2021-05-04 NOTE — PROGRESS NOTE ADULT - REASON FOR ADMISSION
sepsis/UTI 2/2 left obstructing ureteral calculus with hydronephrosis

## 2021-05-04 NOTE — PROGRESS NOTE ADULT - SUBJECTIVE AND OBJECTIVE BOX
Gu Progress Note:      stable s/p cysto/stent for hydro/sepsis, will be discharged on IV antibiotics,     Voiding  PAST MEDICAL & SURGICAL HISTORY:  Benign prostatic hyperplasia, presence of lower urinary tract symptoms unspecified, unspecified morphology    Hyperlipidemia    GERD (gastroesophageal reflux disease)    CAD (coronary artery disease)    UTI (urinary tract infection)    H/O heart artery stent  stent x 2    History of prostate surgery    S/P ureteral stent placement  Left sided          MEDICATIONS  (STANDING):  aspirin enteric coated 81 milliGRAM(s) Oral daily  atorvastatin 20 milliGRAM(s) Oral at bedtime  dorzolamide 2% Ophthalmic Solution 1 Drop(s) Both EYES three times a day  enoxaparin Injectable 30 milliGRAM(s) SubCutaneous daily  finasteride 5 milliGRAM(s) Oral daily  lactated ringers. 1000 milliLiter(s) (75 mL/Hr) IV Continuous <Continuous>  meropenem  IVPB 1000 milliGRAM(s) IV Intermittent every 8 hours  pantoprazole  Injectable 40 milliGRAM(s) IV Push daily  polyethylene glycol 3350 17 Gram(s) Oral daily  saccharomyces boulardii 250 milliGRAM(s) Oral two times a day  senna 2 Tablet(s) Oral at bedtime  tamsulosin 0.4 milliGRAM(s) Oral at bedtime    MEDICATIONS  (PRN):  acetaminophen   Tablet .. 650 milliGRAM(s) Oral every 6 hours PRN Mild Pain (1 - 3)  oxycodone    5 mG/acetaminophen 325 mG 1 Tablet(s) Oral every 6 hours PRN Moderate Pain (4 - 6)  oxycodone    5 mG/acetaminophen 325 mG 2 Tablet(s) Oral every 6 hours PRN Severe Pain (7 - 10)      Allergies    No Known Allergies    Intolerances            FAMILY HISTORY:  FH: hypertension (Child)        Vital Signs Last 24 Hrs  T(C): 36.9 (04 May 2021 12:12), Max: 37.2 (03 May 2021 13:29)  T(F): 98.4 (04 May 2021 12:12), Max: 99 (03 May 2021 13:29)  HR: 73 (04 May 2021 12:12) (64 - 73)  BP: 129/77 (04 May 2021 12:12) (126/69 - 149/76)  BP(mean): --  RR: 18 (04 May 2021 12:12) (18 - 18)  SpO2: 95% (04 May 2021 12:12) (93% - 95%)    PHYSICAL EXAM:    Constitutional: NAD, well-developed    Asymptomatic, AO  Abd: BS+, soft, NT/ND, No CVAT  : Normal  circumcised phallus, patent  meatus, bilateral descended testes, no masses   Extremities: No peripheral edema    LABS:                        12.5   8.33  )-----------( 174      ( 04 May 2021 06:45 )             38.0     05-04    143  |  109<H>  |  12  ----------------------------<  87  3.6   |  28  |  0.73    Ca    9.4      04 May 2021 06:45  Phos  2.0     05-04  Mg     2.0     05-04          Urine Culture:   Hemoglobin: 12.5 g/dL (05-04 @ 06:45)  Hematocrit: 38.0 % (05-04 @ 06:45)  Hemoglobin: 11.3 g/dL (05-03 @ 07:05)  Hematocrit: 34.2 % (05-03 @ 07:05)      RADIOLOGY & ADDITIONAL STUDIES:

## 2021-05-04 NOTE — PROGRESS NOTE ADULT - PROVIDER SPECIALTY LIST ADULT
Hospitalist
Infectious Disease
Infectious Disease
Urology
Infectious Disease
Urology
Urology
Hospitalist

## 2021-05-04 NOTE — DISCHARGE NOTE NURSING/CASE MANAGEMENT/SOCIAL WORK - PATIENT PORTAL LINK FT
You can access the FollowMyHealth Patient Portal offered by [facility] by registering at the following website: [email or web address]. By joining [facility]’s FollowMyHealth portal, you will also be able to view your health information using other applications (apps) compatible with our system.

## 2021-05-13 PROBLEM — N39.0 URINARY TRACT INFECTION, SITE NOT SPECIFIED: Chronic | Status: ACTIVE | Noted: 2021-04-29

## 2021-05-14 ENCOUNTER — NON-APPOINTMENT (OUTPATIENT)
Age: 86
End: 2021-05-14

## 2021-05-20 ENCOUNTER — LABORATORY RESULT (OUTPATIENT)
Age: 86
End: 2021-05-20

## 2021-05-20 ENCOUNTER — APPOINTMENT (OUTPATIENT)
Dept: UROLOGY | Facility: CLINIC | Age: 86
End: 2021-05-20
Payer: MEDICARE

## 2021-05-20 VITALS
HEART RATE: 75 BPM | SYSTOLIC BLOOD PRESSURE: 113 MMHG | OXYGEN SATURATION: 98 % | DIASTOLIC BLOOD PRESSURE: 76 MMHG | BODY MASS INDEX: 26.2 KG/M2 | HEIGHT: 66 IN | WEIGHT: 163 LBS

## 2021-05-20 PROCEDURE — 99214 OFFICE O/P EST MOD 30 MIN: CPT

## 2021-05-20 NOTE — ASSESSMENT
[FreeTextEntry1] : Reviewed outside records. \par CT scan(4/30/21):\par 2 cm intrarenal calculus lower pole calyces left kidney.\par Moderate hydroureteronephrosis secondary to a 1.2 cm calculus distal left ureter.\par \par KUB at [facility](5/12/21):\par There is a 1.8 cm calcification overlying the lower pole of the left kidney.\par There are no calculi overlying the course of the ureters. There is a left\par ureteral stent in place. \par \par Urinary tract infection:\par Kidney stone:\par Ureteral stone:\par Will get Urinalysis with reflex Urine culture. \par Discussed the management options: Ureteroscopy Vs Shock wave lithotripsy- if radio-opaque or ultrasound amenable Vs Percutaneous nephrolithotomy. \par Risks and benefits of each modality were discussed. \par Discussed the option of doing Ureteroscopy for ureteral stone and observing kidney stone. \par Patient has history of recurrent urinary tract infections. Discussed large kidney stone could be source.   \par Risks and benefits of each modality were discussed. \par Patient and family will like for Patient to undergo Percutaneous nephrolithotomy for left kidney and ureteral stone. \par Asked to see either Dr Hoenig or Adis. \par @[facility] \par [address]\[place] [address] \par  \par

## 2021-05-20 NOTE — PHYSICAL EXAM
[General Appearance - In No Acute Distress] : no acute distress [Skin Color & Pigmentation] : normal skin color and pigmentation [] : no respiratory distress [FreeTextEntry1] : normal peripheral circulation  [Oriented To Time, Place, And Person] : oriented to person, place, and time

## 2021-05-20 NOTE — HISTORY OF PRESENT ILLNESS
[FreeTextEntry1] : 89 yo male presents for Kidney stone. \par Taking Finasteride- urinating well. Every 2-3 hours or so during the day nocturia 2-5 x. \par Denies dysuria, hematuria, lower abdominal or flank pain, nausea, vomiting, fever, chills or rigors. \par Was admitted at [facility] for sepsis/UTI secondary to left obstructing ureteral calculus with\par hydronephrosis. Status post Cystoscopy and left ureteral stent placement on 5/3/21 by Dr Valle. \par Completed intravenous Antibiotics with removal PICC line. \par \par Has been recommended ESWL. \par \par Initially seen for history of Benign Prostatic Hyperplasia and Kidney stone.\par Was following with Dr Reese([facility]). Wished to follow here. \par Status post Transurethral vaporization of prostate using Greenlight laser in Nov 2017 and Laser cystolithotripsy and litholapaxy in November 2019. Post op course complicated by Sepsis. \par On Flomax and Finasteride. Reported reasonable stream 2-3 x during the day and 5-6 x at night(per son). Denied hesitancy, straining and urinary incontinence.\par Denied dysuria, hematuria, lower abdominal or flank pain, fever, chills or rigors. \par \par Patient from my previous practice.

## 2021-05-20 NOTE — LETTER BODY
[Dear  ___] : Dear  [unfilled], [Courtesy Letter:] : I had the pleasure of seeing your patient, [unfilled], in my office today. [Please see my note below.] : Please see my note below. [Sincerely,] : Sincerely, [FreeTextEntry3] : Jordan Barahona MD\par  of Urology\par [facility] [employer]\par \par Offices:\par [facility] @\par [address], [place] [address]\par and\par [address], [place] [address], [address]\par and\par [address] [address]\par \par TEL: [phone]\par FAX: [phone]

## 2021-05-21 ENCOUNTER — LABORATORY RESULT (OUTPATIENT)
Age: 86
End: 2021-05-21

## 2021-05-21 LAB
APPEARANCE: ABNORMAL
BILIRUBIN URINE: NEGATIVE
BLOOD URINE: ABNORMAL
COLOR: YELLOW
GLUCOSE QUALITATIVE U: NEGATIVE
KETONES URINE: NEGATIVE
LEUKOCYTE ESTERASE URINE: ABNORMAL
NITRITE URINE: NEGATIVE
PH URINE: 6
PROTEIN URINE: ABNORMAL
SPECIFIC GRAVITY URINE: 1.01
UROBILINOGEN URINE: NORMAL

## 2021-05-25 ENCOUNTER — NON-APPOINTMENT (OUTPATIENT)
Age: 86
End: 2021-05-25

## 2021-05-26 ENCOUNTER — APPOINTMENT (OUTPATIENT)
Dept: UROLOGY | Facility: CLINIC | Age: 86
End: 2021-05-26
Payer: MEDICARE

## 2021-05-26 VITALS
SYSTOLIC BLOOD PRESSURE: 111 MMHG | TEMPERATURE: 97.6 F | OXYGEN SATURATION: 97 % | DIASTOLIC BLOOD PRESSURE: 73 MMHG | HEART RATE: 78 BPM

## 2021-05-26 PROCEDURE — 51701 INSERT BLADDER CATHETER: CPT

## 2021-06-01 LAB
APPEARANCE: CLEAR
BACTERIA UR CULT: ABNORMAL
BACTERIA: ABNORMAL
BILIRUBIN URINE: NEGATIVE
BLOOD URINE: ABNORMAL
COLOR: YELLOW
GLUCOSE QUALITATIVE U: NEGATIVE
HYALINE CASTS: 0 /LPF
KETONES URINE: NEGATIVE
LEUKOCYTE ESTERASE URINE: ABNORMAL
MICROSCOPIC-UA: NORMAL
NITRITE URINE: POSITIVE
PH URINE: 6.5
PROTEIN URINE: NORMAL
RED BLOOD CELLS URINE: 13 /HPF
SPECIFIC GRAVITY URINE: 1.01
SQUAMOUS EPITHELIAL CELLS: 3 /HPF
UROBILINOGEN URINE: NORMAL
WHITE BLOOD CELLS URINE: 66 /HPF

## 2021-06-11 ENCOUNTER — RX RENEWAL (OUTPATIENT)
Age: 86
End: 2021-06-11

## 2021-06-18 ENCOUNTER — APPOINTMENT (OUTPATIENT)
Dept: UROLOGY | Facility: CLINIC | Age: 86
End: 2021-06-18
Payer: MEDICARE

## 2021-06-18 PROCEDURE — 99214 OFFICE O/P EST MOD 30 MIN: CPT

## 2021-06-22 NOTE — ASSESSMENT
[FreeTextEntry1] : CT from [facility]- left lower pole 2.7 cm stone, left distal ureteral stone 1.2 cm. Left kidney moderate hydro. \par \par I had long discussion with patient about their stones, and about options, risks, and benefits of all treatments. Main options for definitive stone treatment include ESWL, URS, PCNL. \par \par ESWL success best with smaller, less dense stones, and with short skin to stone distance and favorable location of the stone within the urinary tract, while URS is more successful treatment with multiple stones, more dense stones, or challenging body habitus or stone location. PCNL is best option for larger, more dense and complex stones, and particularly those involving the lower pole. Non-definitive stone treatment options for drainage, using either stents or nephrostomy, also reviewed: these are of lower immediate surgical risks, but incur multiple procedures to manage and may have their own complications and effects on quality of life. Still, nephrostomy or nephroureteral catheter can allow maintenance of urinary system drainage without surgical risks, and management in office with exchanges (avoiding the anesthesia and testing which would be present with bilateral internal stent exchange).\par \par Risks of nontreatment with obstruction can lead to very high rate of renal function loss in stone-bearing kidney over the next months to years.\par \par In this patient's case, I recommend left PCNL\par \par Risks/benefits/success/recovery expectations all reviewed at length, particularly with respect to patient's comorbidities, and inclusive of infection/sepsis, bleeding, need for secondary procedures or secondary stages such as embolization or open surgery, and even risks of death due to acute issues superimposed on comorbidities.\par \par Pt prefers to undergo: left PCNL\par \par Will schedule.\par

## 2021-06-22 NOTE — HISTORY OF PRESENT ILLNESS
[None] : None [FreeTextEntry1] : 89 yr old male presents to establish care for kidney stones. In April 2021, pt admitted for urinary UTI  and Sepsis. Pt treated with IV abx at [facility], discharged with IV Meropenem 1 g q 8. Pt had catheterized urine culture on 5/26/2- positive for Pseudomonas bacteria- Treated Cipro 500 mg BID x 7 days. \par \par Referred by Dr. Barahona for eval/management. [Hematuria - Gross] : no gross hematuria

## 2021-06-22 NOTE — PHYSICAL EXAM
[General Appearance - Well Developed] : well developed [Skin Color & Pigmentation] : normal skin color and pigmentation [Edema] : no peripheral edema [] : no respiratory distress [Oriented To Time, Place, And Person] : oriented to person, place, and time [Normal Station and Gait] : the gait and station were normal for the patient's age [No Focal Deficits] : no focal deficits [Abdomen Soft] : soft [Abdomen Tenderness] : non-tender [Costovertebral Angle Tenderness] : no ~M costovertebral angle tenderness [FreeTextEntry1] : deferred

## 2021-06-28 LAB
APPEARANCE: ABNORMAL
BACTERIA: ABNORMAL
BILIRUBIN URINE: NEGATIVE
BLOOD URINE: ABNORMAL
COLOR: YELLOW
GLUCOSE QUALITATIVE U: NEGATIVE
HYALINE CASTS: 3 /LPF
KETONES URINE: NEGATIVE
LEUKOCYTE ESTERASE URINE: ABNORMAL
MICROSCOPIC-UA: NORMAL
NITRITE URINE: POSITIVE
PH URINE: 6
PROTEIN URINE: ABNORMAL
RED BLOOD CELLS URINE: 48 /HPF
SPECIFIC GRAVITY URINE: 1.01
SQUAMOUS EPITHELIAL CELLS: 0 /HPF
UROBILINOGEN URINE: NORMAL
WHITE BLOOD CELLS URINE: 364 /HPF

## 2021-07-13 LAB — BACTERIA UR CULT: ABNORMAL

## 2021-07-14 ENCOUNTER — OUTPATIENT (OUTPATIENT)
Dept: OUTPATIENT SERVICES | Facility: HOSPITAL | Age: 86
LOS: 1 days | End: 2021-07-14
Payer: COMMERCIAL

## 2021-07-14 VITALS
TEMPERATURE: 98 F | HEIGHT: 68 IN | HEART RATE: 84 BPM | OXYGEN SATURATION: 98 % | RESPIRATION RATE: 16 BRPM | WEIGHT: 158.51 LBS | DIASTOLIC BLOOD PRESSURE: 76 MMHG | SYSTOLIC BLOOD PRESSURE: 124 MMHG

## 2021-07-14 DIAGNOSIS — Z29.9 ENCOUNTER FOR PROPHYLACTIC MEASURES, UNSPECIFIED: ICD-10-CM

## 2021-07-14 DIAGNOSIS — Z95.5 PRESENCE OF CORONARY ANGIOPLASTY IMPLANT AND GRAFT: Chronic | ICD-10-CM

## 2021-07-14 DIAGNOSIS — N20.0 CALCULUS OF KIDNEY: ICD-10-CM

## 2021-07-14 DIAGNOSIS — Z98.890 OTHER SPECIFIED POSTPROCEDURAL STATES: Chronic | ICD-10-CM

## 2021-07-14 DIAGNOSIS — I25.10 ATHEROSCLEROTIC HEART DISEASE OF NATIVE CORONARY ARTERY WITHOUT ANGINA PECTORIS: ICD-10-CM

## 2021-07-14 DIAGNOSIS — Z01.818 ENCOUNTER FOR OTHER PREPROCEDURAL EXAMINATION: ICD-10-CM

## 2021-07-14 DIAGNOSIS — N20.1 CALCULUS OF URETER: ICD-10-CM

## 2021-07-14 LAB
ALBUMIN SERPL ELPH-MCNC: 3.6 G/DL — SIGNIFICANT CHANGE UP (ref 3.3–5)
ALP SERPL-CCNC: 89 U/L — SIGNIFICANT CHANGE UP (ref 40–120)
ALT FLD-CCNC: 6 U/L — LOW (ref 10–45)
ANION GAP SERPL CALC-SCNC: 10 MMOL/L — SIGNIFICANT CHANGE UP (ref 5–17)
AST SERPL-CCNC: 15 U/L — SIGNIFICANT CHANGE UP (ref 10–40)
BILIRUB SERPL-MCNC: 0.8 MG/DL — SIGNIFICANT CHANGE UP (ref 0.2–1.2)
BLD GP AB SCN SERPL QL: NEGATIVE — SIGNIFICANT CHANGE UP
BUN SERPL-MCNC: 14 MG/DL — SIGNIFICANT CHANGE UP (ref 7–23)
CALCIUM SERPL-MCNC: 10.6 MG/DL — HIGH (ref 8.4–10.5)
CHLORIDE SERPL-SCNC: 105 MMOL/L — SIGNIFICANT CHANGE UP (ref 96–108)
CO2 SERPL-SCNC: 25 MMOL/L — SIGNIFICANT CHANGE UP (ref 22–31)
CREAT SERPL-MCNC: 0.93 MG/DL — SIGNIFICANT CHANGE UP (ref 0.5–1.3)
GLUCOSE SERPL-MCNC: 104 MG/DL — HIGH (ref 70–99)
HCT VFR BLD CALC: 41.2 % — SIGNIFICANT CHANGE UP (ref 39–50)
HGB BLD-MCNC: 13.1 G/DL — SIGNIFICANT CHANGE UP (ref 13–17)
MCHC RBC-ENTMCNC: 29.7 PG — SIGNIFICANT CHANGE UP (ref 27–34)
MCHC RBC-ENTMCNC: 31.8 GM/DL — LOW (ref 32–36)
MCV RBC AUTO: 93.4 FL — SIGNIFICANT CHANGE UP (ref 80–100)
NRBC # BLD: 0 /100 WBCS — SIGNIFICANT CHANGE UP (ref 0–0)
PLATELET # BLD AUTO: 168 K/UL — SIGNIFICANT CHANGE UP (ref 150–400)
POTASSIUM SERPL-MCNC: 3.9 MMOL/L — SIGNIFICANT CHANGE UP (ref 3.5–5.3)
POTASSIUM SERPL-SCNC: 3.9 MMOL/L — SIGNIFICANT CHANGE UP (ref 3.5–5.3)
PROT SERPL-MCNC: 6.8 G/DL — SIGNIFICANT CHANGE UP (ref 6–8.3)
RBC # BLD: 4.41 M/UL — SIGNIFICANT CHANGE UP (ref 4.2–5.8)
RBC # FLD: 14.7 % — HIGH (ref 10.3–14.5)
RH IG SCN BLD-IMP: POSITIVE — SIGNIFICANT CHANGE UP
SODIUM SERPL-SCNC: 140 MMOL/L — SIGNIFICANT CHANGE UP (ref 135–145)
WBC # BLD: 7.69 K/UL — SIGNIFICANT CHANGE UP (ref 3.8–10.5)
WBC # FLD AUTO: 7.69 K/UL — SIGNIFICANT CHANGE UP (ref 3.8–10.5)

## 2021-07-14 PROCEDURE — 86900 BLOOD TYPING SEROLOGIC ABO: CPT

## 2021-07-14 PROCEDURE — 87186 SC STD MICRODIL/AGAR DIL: CPT

## 2021-07-14 PROCEDURE — 86850 RBC ANTIBODY SCREEN: CPT

## 2021-07-14 PROCEDURE — 86901 BLOOD TYPING SEROLOGIC RH(D): CPT

## 2021-07-14 PROCEDURE — 85027 COMPLETE CBC AUTOMATED: CPT

## 2021-07-14 PROCEDURE — 87086 URINE CULTURE/COLONY COUNT: CPT

## 2021-07-14 PROCEDURE — G0463: CPT

## 2021-07-14 PROCEDURE — 80053 COMPREHEN METABOLIC PANEL: CPT

## 2021-07-14 RX ORDER — CHLORHEXIDINE GLUCONATE 213 G/1000ML
1 SOLUTION TOPICAL ONCE
Refills: 0 | Status: DISCONTINUED | OUTPATIENT
Start: 2021-07-19 | End: 2021-07-20

## 2021-07-14 RX ORDER — CEFAZOLIN SODIUM 1 G
2000 VIAL (EA) INJECTION ONCE
Refills: 0 | Status: DISCONTINUED | OUTPATIENT
Start: 2021-07-19 | End: 2021-07-20

## 2021-07-14 NOTE — H&P PST ADULT - NSICDXPROBLEM_GEN_ALL_CORE_FT
PROBLEM DIAGNOSES  Problem: Calculus of ureter  Assessment and Plan: scheduled for  cystoscopy left ureteroscopy on 7/19 with Dr. Hoenig    Problem: Pre-op evaluation  Assessment and Plan: Labs - CBC, CMP, T&S and Urine Cx.  Fully vaccinated. PCR not indicated.   Pre op instructions reviewed and given to patient and son.  Take routine am meds DOS with sip of water. Stay on ASA. Instructed to hold and/or avoid other NSAIDs and OTC supplements. Tylenol is ok. Verbalized understanding     Problem: CAD (coronary artery disease)  Assessment and Plan: Seen and cleared by cardiology. Continue ASA. Cardiac meds am of surgery with sip of water. Verbalized understanding.       R/O PROBLEM DIAGNOSES  Problem: R/O Calculus of ureter  Assessment and Plan:      PROBLEM DIAGNOSES  Problem: Calculus of ureter  Assessment and Plan: scheduled for  cystoscopy left ureteroscopy on t percutaneous nephrostolithotomy 7/19 with Dr. Hoenig    Problem: Pre-op evaluation  Assessment and Plan: Labs - CBC, CMP, T&S and Urine Cx.  Fully vaccinated. PCR not indicated.   Pre op instructions reviewed and given to patient and son.  Take routine am meds DOS with sip of water. Stay on ASA. Instructed to hold and/or avoid other NSAIDs and OTC supplements. Tylenol is ok. Verbalized understanding     Problem: CAD (coronary artery disease)  Assessment and Plan: Seen and cleared by cardiology (on chart) Continue ASA. Verbalized understanding.     Problem: Need for prophylactic measure  Assessment and Plan: The Caprini score indicates this patient is at risk for a VTE event (score 3-5).  Most surgical patients in this group would benefit from pharmacologic prophylaxis.  The surgical team will determine the balance between VTE risk and bleeding risk

## 2021-07-14 NOTE — H&P PST ADULT - NSICDXPASTMEDICALHX_GEN_ALL_CORE_FT
PAST MEDICAL HISTORY:  Benign prostatic hyperplasia, presence of lower urinary tract symptoms unspecified, unspecified morphology     CAD (coronary artery disease)     Calculus of kidney     Calculus of ureter     GERD (gastroesophageal reflux disease)     Hyperlipidemia     UTI (urinary tract infection)

## 2021-07-14 NOTE — H&P PST ADULT - ABILITY TO HEAR (WITH HEARING AID OR HEARING APPLIANCE IF NORMALLY USED):
wears HAs/Mildly to Moderately Impaired: difficulty hearing in some environments or speaker may need to increase volume or speak distinctly

## 2021-07-14 NOTE — H&P PST ADULT - HISTORY OF PRESENT ILLNESS
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 He had chills and rigors at home. He has presented similarly in the past when he had urinary tract infections. Denies chest pain, palpitations, dyspnea, headache, dizziness, abdominal pain, n/v/d. He states that he wants to go home and rest. Son at bedside states that he does not understand the gravity of the situation. Son states that he has paperwork stating DNR/DNI at home.    In the ED,  Vital Signs T(F) Max: 103.6 HR: 86 - 125 BP: 92/53 RR: 16 SpO2: 95%  Labs significant for: WBC 17.71, C2 1.2, eGFR 53, Lactate 2.9, UA with mod LE, positive nitrites, large blood, WBC 11-25  EKG: Sinus tachy (102), first degree block and non specific T wave inversion in leads II, III and AVF   CXR with low lung volumes with atelectasis, similar to prior study in 2019  CT renal stone hunt:  Moderate left hydroureteronephrosis secondary to a distal left ureteral calculus measuring up to 12 mm        EKG: Sinus Tachycardia at 102bpm 89 yo male with CAD s/p two cardiac stents (2014), BPH s/p prostate surgery (2017), Hx of ESBL UTI  Hx of left sided kidney stone, GERD, HLD, hx of TIA, presents to [facility] scheduled for a cystoscopy left ureteroscopy left percutaneous nephrostolithotomy on 7/19 with Dr. Hoenig. H/O [facility] ED admission in April. CT renal stone hunt showed Moderate left hydroureteronephrosis secondary to a distal left ureteral calculus measuring up to 12 mm. S/P cystoscopy with stent placement 4/30. Here for preop with his son. S/P abx tx for recent UTI. Today denies abd pain, fever, N/V, hematuria and dysuria.     *** Fully Vaccinated w/ J&J 3/7/2021

## 2021-07-14 NOTE — H&P PST ADULT - NSANTHOSAYNRD_GEN_A_CORE
No. YUVAL screening performed.  STOP BANG Legend: 0-2 = LOW Risk; 3-4 = INTERMEDIATE Risk; 5-8 = HIGH Risk

## 2021-07-14 NOTE — H&P PST ADULT - ASSESSMENT
scheduled for  cystoscopy left ureteroscopy on  with Dr. Hoenig CAPRINI SCORE [CLOT]    AGE RELATED RISK FACTORS                                                       MOBILITY RELATED FACTORS  [ ] Age 41-60 years                                            (1 Point)                  [ ] Bed rest                                                        (1 Point)  [ ] Age: 61-74 years                                           (2 Points)                 [ ] Plaster cast                                                   (2 Points)  [ ] Age= 75 years                                              (3 Points)                 [ ] Bed bound for more than 72 hours                 (2 Points)    DISEASE RELATED RISK FACTORS                                               GENDER SPECIFIC FACTORS  [ ] Edema in the lower extremities                       (1 Point)                  [ ] Pregnancy                                                     (1 Point)  [ ] Varicose veins                                               (1 Point)                  [ ] Post-partum < 6 weeks                                   (1 Point)             [ x] BMI > 25 Kg/m2                                            (1 Point)                  [ ] Hormonal therapy  or oral contraception          (1 Point)                 [ ] Sepsis (in the previous month)                        (1 Point)                  [ ] History of pregnancy complications                 (1 point)  [ ] Pneumonia or serious lung disease                                               [ ] Unexplained or recurrent                     (1 Point)           (in the previous month)                               (1 Point)  [ ] Abnormal pulmonary function test                     (1 Point)                 SURGERY RELATED RISK FACTORS  [ ] Acute myocardial infarction                              (1 Point)                 [ ]  Section                                             (1 Point)  [ ] Congestive heart failure (in the previous month)  (1 Point)               [ ] Minor surgery                                                  (1 Point)   [ ] Inflammatory bowel disease                             (1 Point)                 [ ] Arthroscopic surgery                                        (2 Points)  [ ] Central venous access                                      (2 Points)                [ x] General surgery lasting more than 45 minutes   (2 Points)       [ ] Stroke (in the previous month)                          (5 Points)               [ ] Elective arthroplasty                                         (5 Points)                                                                                                                                               HEMATOLOGY RELATED FACTORS                                                 TRAUMA RELATED RISK FACTORS  [ ] Prior episodes of VTE                                     (3 Points)                [ ] Fracture of the hip, pelvis, or leg                       (5 Points)  [ ] Positive family history for VTE                         (3 Points)                 [ ] Acute spinal cord injury (in the previous month)  (5 Points)  [ ] Prothrombin 55474 A                                     (3 Points)                 [ ] Paralysis  (less than 1 month)                             (5 Points)  [ ] Factor V Leiden                                             (3 Points)                  [ ] Multiple Trauma within 1 month                        (5 Points)  [ ] Lupus anticoagulants                                     (3 Points)                                                           [ ] Anticardiolipin antibodies                               (3 Points)                                                       [ ] High homocysteine in the blood                      (3 Points)                                             [ ] Other congenital or acquired thrombophilia      (3 Points)                                                [ ] Heparin induced thrombocytopenia                  (3 Points)                                          Total Score [     3     ]    Anitai Score 0 - 2:  Low Risk, No VTE Prophylaxis required for most patients, encourage ambulation  Caprini Score 3 - 6:  At Risk, pharmacologic VTE prophylaxis is indicated for most patients (in the absence of a contraindication)  Caprini Score Greater than or = 7:  High Risk, pharmacologic VTE prophylaxis is indicated for most patients (in the absence of a contraindication) scheduled for cystoscopy left ureteroscopy eft percutaneous nephrostolithotomy on  with Dr. Hoenig CAPRINI SCORE [CLOT]    AGE RELATED RISK FACTORS                                                       MOBILITY RELATED FACTORS  [ ] Age 41-60 years                                            (1 Point)                  [ ] Bed rest                                                        (1 Point)  [ ] Age: 61-74 years                                           (2 Points)                 [ ] Plaster cast                                                   (2 Points)  [ ] Age= 75 years                                              (3 Points)                 [ ] Bed bound for more than 72 hours                 (2 Points)    DISEASE RELATED RISK FACTORS                                               GENDER SPECIFIC FACTORS  [ ] Edema in the lower extremities                       (1 Point)                  [ ] Pregnancy                                                     (1 Point)  [ ] Varicose veins                                               (1 Point)                  [ ] Post-partum < 6 weeks                                   (1 Point)             [ x] BMI > 25 Kg/m2                                            (1 Point)                  [ ] Hormonal therapy  or oral contraception          (1 Point)                 [ ] Sepsis (in the previous month)                        (1 Point)                  [ ] History of pregnancy complications                 (1 point)  [ ] Pneumonia or serious lung disease                                               [ ] Unexplained or recurrent                     (1 Point)           (in the previous month)                               (1 Point)  [ ] Abnormal pulmonary function test                     (1 Point)                 SURGERY RELATED RISK FACTORS  [ ] Acute myocardial infarction                              (1 Point)                 [ ]  Section                                             (1 Point)  [ ] Congestive heart failure (in the previous month)  (1 Point)               [ ] Minor surgery                                                  (1 Point)   [ ] Inflammatory bowel disease                             (1 Point)                 [ ] Arthroscopic surgery                                        (2 Points)  [ ] Central venous access                                      (2 Points)                [ x] General surgery lasting more than 45 minutes   (2 Points)       [ ] Stroke (in the previous month)                          (5 Points)               [ ] Elective arthroplasty                                         (5 Points)                                                                                                                                               HEMATOLOGY RELATED FACTORS                                                 TRAUMA RELATED RISK FACTORS  [ ] Prior episodes of VTE                                     (3 Points)                [ ] Fracture of the hip, pelvis, or leg                       (5 Points)  [ ] Positive family history for VTE                         (3 Points)                 [ ] Acute spinal cord injury (in the previous month)  (5 Points)  [ ] Prothrombin 75237 A                                     (3 Points)                 [ ] Paralysis  (less than 1 month)                             (5 Points)  [ ] Factor V Leiden                                             (3 Points)                  [ ] Multiple Trauma within 1 month                        (5 Points)  [ ] Lupus anticoagulants                                     (3 Points)                                                           [ ] Anticardiolipin antibodies                               (3 Points)                                                       [ ] High homocysteine in the blood                      (3 Points)                                             [ ] Other congenital or acquired thrombophilia      (3 Points)                                                [ ] Heparin induced thrombocytopenia                  (3 Points)                                          Total Score [     3     ]    Caprini Score 0 - 2:  Low Risk, No VTE Prophylaxis required for most patients, encourage ambulation  Caprini Score 3 - 6:  At Risk, pharmacologic VTE prophylaxis is indicated for most patients (in the absence of a contraindication)  Caprini Score Greater than or = 7:  High Risk, pharmacologic VTE prophylaxis is indicated for most patients (in the absence of a contraindication) scheduled for cystoscopy left ureteroscopy eft percutaneous nephrostolithotomy on  with Dr. Hoenig CAPRINI SCORE [CLOT]    AGE RELATED RISK FACTORS                                                       MOBILITY RELATED FACTORS  [ ] Age 41-60 years                                            (1 Point)                  [ ] Bed rest                                                        (1 Point)  [ ] Age: 61-74 years                                           (2 Points)                 [ ] Plaster cast                                                   (2 Points)  [x ] Age= 75 years                                              (3 Points)                 [ ] Bed bound for more than 72 hours                 (2 Points)    DISEASE RELATED RISK FACTORS                                               GENDER SPECIFIC FACTORS  [ ] Edema in the lower extremities                       (1 Point)                  [ ] Pregnancy                                                     (1 Point)  [ ] Varicose veins                                               (1 Point)                  [ ] Post-partum < 6 weeks                                   (1 Point)             [ ] BMI > 25 Kg/m2                                            (1 Point)                  [ ] Hormonal therapy  or oral contraception          (1 Point)                 [ ] Sepsis (in the previous month)                        (1 Point)                  [ ] History of pregnancy complications                 (1 point)  [ ] Pneumonia or serious lung disease                                               [ ] Unexplained or recurrent                     (1 Point)           (in the previous month)                               (1 Point)  [ ] Abnormal pulmonary function test                     (1 Point)                 SURGERY RELATED RISK FACTORS  [ ] Acute myocardial infarction                              (1 Point)                 [ ]  Section                                             (1 Point)  [ ] Congestive heart failure (in the previous month)  (1 Point)               [ ] Minor surgery                                                  (1 Point)   [ ] Inflammatory bowel disease                             (1 Point)                 [ ] Arthroscopic surgery                                        (2 Points)  [ ] Central venous access                                      (2 Points)                [ x] General surgery lasting more than 45 minutes   (2 Points)       [ ] Stroke (in the previous month)                          (5 Points)               [ ] Elective arthroplasty                                         (5 Points)                                                                                                                                               HEMATOLOGY RELATED FACTORS                                                 TRAUMA RELATED RISK FACTORS  [ ] Prior episodes of VTE                                     (3 Points)                [ ] Fracture of the hip, pelvis, or leg                       (5 Points)  [ ] Positive family history for VTE                         (3 Points)                 [ ] Acute spinal cord injury (in the previous month)  (5 Points)  [ ] Prothrombin 13224 A                                     (3 Points)                 [ ] Paralysis  (less than 1 month)                             (5 Points)  [ ] Factor V Leiden                                             (3 Points)                  [ ] Multiple Trauma within 1 month                        (5 Points)  [ ] Lupus anticoagulants                                     (3 Points)                                                           [ ] Anticardiolipin antibodies                               (3 Points)                                                       [ ] High homocysteine in the blood                      (3 Points)                                             [ ] Other congenital or acquired thrombophilia      (3 Points)                                                [ ] Heparin induced thrombocytopenia                  (3 Points)                                          Total Score [     5    ]    Caprini Score 0 - 2:  Low Risk, No VTE Prophylaxis required for most patients, encourage ambulation  Caprini Score 3 - 6:  At Risk, pharmacologic VTE prophylaxis is indicated for most patients (in the absence of a contraindication)  Caprini Score Greater than or = 7:  High Risk, pharmacologic VTE prophylaxis is indicated for most patients (in the absence of a contraindication)

## 2021-07-15 PROBLEM — N20.1 CALCULUS OF URETER: Chronic | Status: ACTIVE | Noted: 2021-07-14

## 2021-07-15 PROBLEM — N20.0 CALCULUS OF KIDNEY: Chronic | Status: ACTIVE | Noted: 2021-07-14

## 2021-07-16 ENCOUNTER — APPOINTMENT (OUTPATIENT)
Dept: UROLOGY | Facility: CLINIC | Age: 86
End: 2021-07-16
Payer: MEDICARE

## 2021-07-16 ENCOUNTER — OUTPATIENT (OUTPATIENT)
Dept: OUTPATIENT SERVICES | Facility: HOSPITAL | Age: 86
LOS: 1 days | End: 2021-07-16
Payer: COMMERCIAL

## 2021-07-16 VITALS — SYSTOLIC BLOOD PRESSURE: 107 MMHG | HEART RATE: 92 BPM | DIASTOLIC BLOOD PRESSURE: 70 MMHG

## 2021-07-16 DIAGNOSIS — R35.0 FREQUENCY OF MICTURITION: ICD-10-CM

## 2021-07-16 DIAGNOSIS — Z98.890 OTHER SPECIFIED POSTPROCEDURAL STATES: Chronic | ICD-10-CM

## 2021-07-16 DIAGNOSIS — N20.1 CALCULUS OF URETER: ICD-10-CM

## 2021-07-16 DIAGNOSIS — Z95.5 PRESENCE OF CORONARY ANGIOPLASTY IMPLANT AND GRAFT: Chronic | ICD-10-CM

## 2021-07-16 DIAGNOSIS — N20.0 CALCULUS OF KIDNEY: ICD-10-CM

## 2021-07-16 DIAGNOSIS — N39.0 URINARY TRACT INFECTION, SITE NOT SPECIFIED: ICD-10-CM

## 2021-07-16 PROCEDURE — 99212 OFFICE O/P EST SF 10 MIN: CPT

## 2021-07-16 RX ORDER — AMIKACIN SULFATE 250 MG/ML
500 INJECTION, SOLUTION INTRAMUSCULAR; INTRAVENOUS
Refills: 0 | Status: COMPLETED | OUTPATIENT
Start: 2021-07-16

## 2021-07-16 RX ORDER — AMIKACIN SULFATE 250 MG/ML
500 INJECTION, SOLUTION INTRAMUSCULAR; INTRAVENOUS
Qty: 1 | Refills: 0 | Status: COMPLETED | OUTPATIENT
Start: 2021-07-13 | End: 2021-07-16

## 2021-07-16 RX ADMIN — AMIKACIN SULFATE 0 MG/2ML: 250 INJECTION, SOLUTION INTRAMUSCULAR; INTRAVENOUS at 00:00

## 2021-07-18 ENCOUNTER — TRANSCRIPTION ENCOUNTER (OUTPATIENT)
Age: 86
End: 2021-07-18

## 2021-07-19 ENCOUNTER — APPOINTMENT (OUTPATIENT)
Dept: UROLOGY | Facility: HOSPITAL | Age: 86
End: 2021-07-19

## 2021-07-19 ENCOUNTER — INPATIENT (INPATIENT)
Facility: HOSPITAL | Age: 86
LOS: 7 days | Discharge: NURSING, REHAB OR LONG-TERM CARE | DRG: 659 | End: 2021-07-27
Attending: UROLOGY | Admitting: UROLOGY
Payer: COMMERCIAL

## 2021-07-19 ENCOUNTER — RESULT REVIEW (OUTPATIENT)
Age: 86
End: 2021-07-19

## 2021-07-19 VITALS
OXYGEN SATURATION: 98 % | RESPIRATION RATE: 16 BRPM | SYSTOLIC BLOOD PRESSURE: 99 MMHG | HEIGHT: 68 IN | DIASTOLIC BLOOD PRESSURE: 68 MMHG | HEART RATE: 89 BPM | WEIGHT: 158.51 LBS | TEMPERATURE: 98 F

## 2021-07-19 DIAGNOSIS — Z98.890 OTHER SPECIFIED POSTPROCEDURAL STATES: Chronic | ICD-10-CM

## 2021-07-19 DIAGNOSIS — N20.1 CALCULUS OF URETER: ICD-10-CM

## 2021-07-19 DIAGNOSIS — N20.0 CALCULUS OF KIDNEY: ICD-10-CM

## 2021-07-19 DIAGNOSIS — Z95.5 PRESENCE OF CORONARY ANGIOPLASTY IMPLANT AND GRAFT: Chronic | ICD-10-CM

## 2021-07-19 LAB
ANION GAP SERPL CALC-SCNC: 11 MMOL/L — SIGNIFICANT CHANGE UP (ref 5–17)
BUN SERPL-MCNC: 14 MG/DL — SIGNIFICANT CHANGE UP (ref 7–23)
CALCIUM SERPL-MCNC: 10.1 MG/DL — SIGNIFICANT CHANGE UP (ref 8.4–10.5)
CHLORIDE SERPL-SCNC: 105 MMOL/L — SIGNIFICANT CHANGE UP (ref 96–108)
CO2 SERPL-SCNC: 22 MMOL/L — SIGNIFICANT CHANGE UP (ref 22–31)
CREAT SERPL-MCNC: 0.97 MG/DL — SIGNIFICANT CHANGE UP (ref 0.5–1.3)
GLUCOSE SERPL-MCNC: 141 MG/DL — HIGH (ref 70–99)
HCT VFR BLD CALC: 40.5 % — SIGNIFICANT CHANGE UP (ref 39–50)
HGB BLD-MCNC: 13.1 G/DL — SIGNIFICANT CHANGE UP (ref 13–17)
MCHC RBC-ENTMCNC: 30.3 PG — SIGNIFICANT CHANGE UP (ref 27–34)
MCHC RBC-ENTMCNC: 32.3 GM/DL — SIGNIFICANT CHANGE UP (ref 32–36)
MCV RBC AUTO: 93.5 FL — SIGNIFICANT CHANGE UP (ref 80–100)
NRBC # BLD: 0 /100 WBCS — SIGNIFICANT CHANGE UP (ref 0–0)
PLATELET # BLD AUTO: 140 K/UL — LOW (ref 150–400)
POTASSIUM SERPL-MCNC: 3.9 MMOL/L — SIGNIFICANT CHANGE UP (ref 3.5–5.3)
POTASSIUM SERPL-SCNC: 3.9 MMOL/L — SIGNIFICANT CHANGE UP (ref 3.5–5.3)
RBC # BLD: 4.33 M/UL — SIGNIFICANT CHANGE UP (ref 4.2–5.8)
RBC # FLD: 14.3 % — SIGNIFICANT CHANGE UP (ref 10.3–14.5)
RH IG SCN BLD-IMP: POSITIVE — SIGNIFICANT CHANGE UP
SODIUM SERPL-SCNC: 138 MMOL/L — SIGNIFICANT CHANGE UP (ref 135–145)
WBC # BLD: 7.88 K/UL — SIGNIFICANT CHANGE UP (ref 3.8–10.5)
WBC # FLD AUTO: 7.88 K/UL — SIGNIFICANT CHANGE UP (ref 3.8–10.5)

## 2021-07-19 PROCEDURE — 71045 X-RAY EXAM CHEST 1 VIEW: CPT | Mod: 26

## 2021-07-19 PROCEDURE — 76000 FLUOROSCOPY <1 HR PHYS/QHP: CPT

## 2021-07-19 PROCEDURE — 96372 THER/PROPH/DIAG INJ SC/IM: CPT

## 2021-07-19 PROCEDURE — 88300 SURGICAL PATH GROSS: CPT | Mod: 26

## 2021-07-19 RX ORDER — LIDOCAINE HCL 20 MG/ML
0.2 VIAL (ML) INJECTION ONCE
Refills: 0 | Status: DISCONTINUED | OUTPATIENT
Start: 2021-07-19 | End: 2021-07-19

## 2021-07-19 RX ORDER — ASPIRIN/CALCIUM CARB/MAGNESIUM 324 MG
81 TABLET ORAL DAILY
Refills: 0 | Status: DISCONTINUED | OUTPATIENT
Start: 2021-07-19 | End: 2021-07-20

## 2021-07-19 RX ORDER — IBUPROFEN 200 MG
600 TABLET ORAL EVERY 6 HOURS
Refills: 0 | Status: DISCONTINUED | OUTPATIENT
Start: 2021-07-19 | End: 2021-07-20

## 2021-07-19 RX ORDER — LACTULOSE 10 G/15ML
20 SOLUTION ORAL AT BEDTIME
Refills: 0 | Status: DISCONTINUED | OUTPATIENT
Start: 2021-07-19 | End: 2021-07-27

## 2021-07-19 RX ORDER — TRAMADOL HYDROCHLORIDE 50 MG/1
25 TABLET ORAL EVERY 6 HOURS
Refills: 0 | Status: DISCONTINUED | OUTPATIENT
Start: 2021-07-19 | End: 2021-07-19

## 2021-07-19 RX ORDER — AMIKACIN SULFATE 250 MG/ML
360 INJECTION, SOLUTION INTRAMUSCULAR; INTRAVENOUS EVERY 8 HOURS
Refills: 0 | Status: DISCONTINUED | OUTPATIENT
Start: 2021-07-19 | End: 2021-07-21

## 2021-07-19 RX ORDER — FINASTERIDE 5 MG/1
5 TABLET, FILM COATED ORAL DAILY
Refills: 0 | Status: DISCONTINUED | OUTPATIENT
Start: 2021-07-19 | End: 2021-07-27

## 2021-07-19 RX ORDER — ATORVASTATIN CALCIUM 80 MG/1
20 TABLET, FILM COATED ORAL AT BEDTIME
Refills: 0 | Status: DISCONTINUED | OUTPATIENT
Start: 2021-07-19 | End: 2021-07-27

## 2021-07-19 RX ORDER — SODIUM CHLORIDE 9 MG/ML
3 INJECTION INTRAMUSCULAR; INTRAVENOUS; SUBCUTANEOUS EVERY 8 HOURS
Refills: 0 | Status: DISCONTINUED | OUTPATIENT
Start: 2021-07-19 | End: 2021-07-19

## 2021-07-19 RX ORDER — LACTOBACILLUS ACIDOPHILUS 100MM CELL
1 CAPSULE ORAL DAILY
Refills: 0 | Status: DISCONTINUED | OUTPATIENT
Start: 2021-07-19 | End: 2021-07-27

## 2021-07-19 RX ORDER — FENTANYL CITRATE 50 UG/ML
25 INJECTION INTRAVENOUS ONCE
Refills: 0 | Status: DISCONTINUED | OUTPATIENT
Start: 2021-07-19 | End: 2021-07-20

## 2021-07-19 RX ORDER — TRAMADOL HYDROCHLORIDE 50 MG/1
50 TABLET ORAL EVERY 6 HOURS
Refills: 0 | Status: DISCONTINUED | OUTPATIENT
Start: 2021-07-19 | End: 2021-07-19

## 2021-07-19 RX ORDER — ACETAMINOPHEN 500 MG
650 TABLET ORAL EVERY 6 HOURS
Refills: 0 | Status: DISCONTINUED | OUTPATIENT
Start: 2021-07-19 | End: 2021-07-20

## 2021-07-19 RX ORDER — DORZOLAMIDE HYDROCHLORIDE 20 MG/ML
1 SOLUTION/ DROPS OPHTHALMIC
Refills: 0 | Status: DISCONTINUED | OUTPATIENT
Start: 2021-07-19 | End: 2021-07-27

## 2021-07-19 RX ORDER — MULTIVIT-MIN/FERROUS GLUCONATE 9 MG/15 ML
1 LIQUID (ML) ORAL DAILY
Refills: 0 | Status: DISCONTINUED | OUTPATIENT
Start: 2021-07-19 | End: 2021-07-27

## 2021-07-19 RX ORDER — SODIUM CHLORIDE 9 MG/ML
1000 INJECTION, SOLUTION INTRAVENOUS
Refills: 0 | Status: DISCONTINUED | OUTPATIENT
Start: 2021-07-19 | End: 2021-07-21

## 2021-07-19 RX ORDER — ONDANSETRON 8 MG/1
4 TABLET, FILM COATED ORAL ONCE
Refills: 0 | Status: DISCONTINUED | OUTPATIENT
Start: 2021-07-19 | End: 2021-07-20

## 2021-07-19 RX ORDER — TAMSULOSIN HYDROCHLORIDE 0.4 MG/1
0.4 CAPSULE ORAL AT BEDTIME
Refills: 0 | Status: DISCONTINUED | OUTPATIENT
Start: 2021-07-19 | End: 2021-07-20

## 2021-07-19 RX ORDER — HEPARIN SODIUM 5000 [USP'U]/ML
5000 INJECTION INTRAVENOUS; SUBCUTANEOUS EVERY 8 HOURS
Refills: 0 | Status: DISCONTINUED | OUTPATIENT
Start: 2021-07-19 | End: 2021-07-20

## 2021-07-19 RX ADMIN — HEPARIN SODIUM 5000 UNIT(S): 5000 INJECTION INTRAVENOUS; SUBCUTANEOUS at 21:01

## 2021-07-19 RX ADMIN — ATORVASTATIN CALCIUM 20 MILLIGRAM(S): 80 TABLET, FILM COATED ORAL at 21:00

## 2021-07-19 RX ADMIN — Medication 600 MILLIGRAM(S): at 23:30

## 2021-07-19 RX ADMIN — DORZOLAMIDE HYDROCHLORIDE 1 DROP(S): 20 SOLUTION/ DROPS OPHTHALMIC at 21:42

## 2021-07-19 RX ADMIN — SODIUM CHLORIDE 100 MILLILITER(S): 9 INJECTION, SOLUTION INTRAVENOUS at 19:05

## 2021-07-19 RX ADMIN — Medication 600 MILLIGRAM(S): at 23:02

## 2021-07-19 RX ADMIN — AMIKACIN SULFATE 101.44 MILLIGRAM(S): 250 INJECTION, SOLUTION INTRAMUSCULAR; INTRAVENOUS at 21:42

## 2021-07-19 RX ADMIN — TAMSULOSIN HYDROCHLORIDE 0.4 MILLIGRAM(S): 0.4 CAPSULE ORAL at 21:00

## 2021-07-19 NOTE — PRE-ANESTHESIA EVALUATION ADULT - NSANTHPMHFT_GEN_ALL_CORE
89 yo male with CAD s/p two cardiac stents (2014), BPH s/p prostate surgery (2017), Hx of ESBL UTI  Hx of left sided kidney stone, GERD, HLD, hx of TIA, presents to PST scheduled for a cystoscopy left ureteroscopy left percutaneous nephrostolithotomy on 7/19 with Dr. Hoenig. H/O [facility] ED admission in April. CT renal stone hunt showed Moderate left hydroureteronephrosis secondary to a distal left ureteral calculus measuring up to 12 mm. S/P cystoscopy with stent placement 4/30

## 2021-07-19 NOTE — BRIEF OPERATIVE NOTE - NSICDXBRIEFPROCEDURE_GEN_ALL_CORE_FT
PROCEDURES:  Cystoscopy with percutaneous nephrolithotomy, left 19-Jul-2021 18:09:35  Marbin Lopez  Cystoscopy, ureteroscopy, or pyeloscopy, with use of laser, with stent insertion if indicated 19-Jul-2021 18:09:46  Marbin Lopez

## 2021-07-19 NOTE — PROGRESS NOTE ADULT - SUBJECTIVE AND OBJECTIVE BOX
Post op Check    Pt seen and examined without complaints. Pain is controlled. Denies SOB/CP/N/V. Tolerating PO.    Vital Signs Last 24 Hrs  T(C): 36.2 (19 Jul 2021 17:20), Max: 36.6 (19 Jul 2021 11:22)  T(F): 97.2 (19 Jul 2021 17:20), Max: 97.9 (19 Jul 2021 11:22)  HR: 96 (19 Jul 2021 19:30) (79 - 96)  BP: 113/58 (19 Jul 2021 19:00) (93/56 - 121/60)  BP(mean): 80 (19 Jul 2021 19:00) (69 - 81)  RR: 18 (19 Jul 2021 19:30) (16 - 18)  SpO2: 97% (19 Jul 2021 19:30) (95% - 99%)    I&O's Summary    19 Jul 2021 07:01  -  19 Jul 2021 19:35  --------------------------------------------------------  IN: 100 mL / OUT: 455 mL / NET: -355 mL        Physical Exam  Gen: NAD, A&Ox3  Pulm: No respiratory distress, no subcostal retractions  CV: no JVD  Abd: Soft, NT, ND  Back: L NT with merlot colored output; dressing saturated, changed at bedside, no ecchymosis/hematoma  : +disla, light pink, secured to stat lock                          13.1   7.88  )-----------( 140      ( 19 Jul 2021 17:54 )             40.5       07-19    138  |  105  |  14  ----------------------------<  141<H>  3.9   |  22  |  0.97    Ca    10.1      19 Jul 2021 17:54      CXR: no pneumothorax on preliminary read

## 2021-07-19 NOTE — PROGRESS NOTE ADULT - ASSESSMENT
A/P: 89y Male s/p L PCNL  -DVT prophylaxis/OOB  -Incentive spirometry  -Strict I&O's  -Analgesia and antiemetics as needed  -Puree Diet  -AM labs  -standard 2 day plan  -CT tomorrow  -AM TOV  -continue amikacin

## 2021-07-20 LAB
A1C WITH ESTIMATED AVERAGE GLUCOSE RESULT: 5.8 % — HIGH (ref 4–5.6)
ANION GAP SERPL CALC-SCNC: 10 MMOL/L — SIGNIFICANT CHANGE UP (ref 5–17)
BUN SERPL-MCNC: 14 MG/DL — SIGNIFICANT CHANGE UP (ref 7–23)
CALCIUM SERPL-MCNC: 9.7 MG/DL — SIGNIFICANT CHANGE UP (ref 8.4–10.5)
CHLORIDE SERPL-SCNC: 105 MMOL/L — SIGNIFICANT CHANGE UP (ref 96–108)
CK MB CFR SERPL CALC: 1.4 NG/ML — SIGNIFICANT CHANGE UP (ref 0–6.7)
CK SERPL-CCNC: 26 U/L — LOW (ref 30–200)
CO2 SERPL-SCNC: 22 MMOL/L — SIGNIFICANT CHANGE UP (ref 22–31)
COVID-19 SPIKE DOMAIN AB INTERP: POSITIVE
COVID-19 SPIKE DOMAIN ANTIBODY RESULT: 129 U/ML — HIGH
CREAT SERPL-MCNC: 0.96 MG/DL — SIGNIFICANT CHANGE UP (ref 0.5–1.3)
ESTIMATED AVERAGE GLUCOSE: 120 MG/DL — HIGH (ref 68–114)
GAS PNL BLDV: SIGNIFICANT CHANGE UP
GLUCOSE BLDC GLUCOMTR-MCNC: 118 MG/DL — HIGH (ref 70–99)
GLUCOSE BLDC GLUCOMTR-MCNC: 194 MG/DL — HIGH (ref 70–99)
GLUCOSE SERPL-MCNC: 164 MG/DL — HIGH (ref 70–99)
HCT VFR BLD CALC: 31 % — LOW (ref 39–50)
HCT VFR BLD CALC: 36.4 % — LOW (ref 39–50)
HGB BLD-MCNC: 10 G/DL — LOW (ref 13–17)
HGB BLD-MCNC: 11.7 G/DL — LOW (ref 13–17)
MAGNESIUM SERPL-MCNC: 1.8 MG/DL — SIGNIFICANT CHANGE UP (ref 1.6–2.6)
MCHC RBC-ENTMCNC: 29.9 PG — SIGNIFICANT CHANGE UP (ref 27–34)
MCHC RBC-ENTMCNC: 30 PG — SIGNIFICANT CHANGE UP (ref 27–34)
MCHC RBC-ENTMCNC: 32.1 GM/DL — SIGNIFICANT CHANGE UP (ref 32–36)
MCHC RBC-ENTMCNC: 32.3 GM/DL — SIGNIFICANT CHANGE UP (ref 32–36)
MCV RBC AUTO: 92.5 FL — SIGNIFICANT CHANGE UP (ref 80–100)
MCV RBC AUTO: 93.3 FL — SIGNIFICANT CHANGE UP (ref 80–100)
NRBC # BLD: 0 /100 WBCS — SIGNIFICANT CHANGE UP (ref 0–0)
NRBC # BLD: 0 /100 WBCS — SIGNIFICANT CHANGE UP (ref 0–0)
PHOSPHATE SERPL-MCNC: 1.9 MG/DL — LOW (ref 2.5–4.5)
PLATELET # BLD AUTO: 117 K/UL — LOW (ref 150–400)
PLATELET # BLD AUTO: 135 K/UL — LOW (ref 150–400)
POTASSIUM SERPL-MCNC: 4.3 MMOL/L — SIGNIFICANT CHANGE UP (ref 3.5–5.3)
POTASSIUM SERPL-SCNC: 4.3 MMOL/L — SIGNIFICANT CHANGE UP (ref 3.5–5.3)
PROCALCITONIN SERPL-MCNC: 4.29 NG/ML — HIGH (ref 0.02–0.1)
RBC # BLD: 3.35 M/UL — LOW (ref 4.2–5.8)
RBC # BLD: 3.9 M/UL — LOW (ref 4.2–5.8)
RBC # FLD: 14.5 % — SIGNIFICANT CHANGE UP (ref 10.3–14.5)
RBC # FLD: 14.6 % — HIGH (ref 10.3–14.5)
SARS-COV-2 IGG+IGM SERPL QL IA: 129 U/ML — HIGH
SARS-COV-2 IGG+IGM SERPL QL IA: POSITIVE
SODIUM SERPL-SCNC: 137 MMOL/L — SIGNIFICANT CHANGE UP (ref 135–145)
TROPONIN T, HIGH SENSITIVITY RESULT: 13 NG/L — SIGNIFICANT CHANGE UP (ref 0–51)
WBC # BLD: 18.7 K/UL — HIGH (ref 3.8–10.5)
WBC # BLD: 19.78 K/UL — HIGH (ref 3.8–10.5)
WBC # FLD AUTO: 18.7 K/UL — HIGH (ref 3.8–10.5)
WBC # FLD AUTO: 19.78 K/UL — HIGH (ref 3.8–10.5)

## 2021-07-20 PROCEDURE — 93010 ELECTROCARDIOGRAM REPORT: CPT | Mod: 76

## 2021-07-20 PROCEDURE — 93306 TTE W/DOPPLER COMPLETE: CPT | Mod: 26

## 2021-07-20 PROCEDURE — 99232 SBSQ HOSP IP/OBS MODERATE 35: CPT

## 2021-07-20 RX ORDER — ACETAMINOPHEN 500 MG
650 TABLET ORAL EVERY 6 HOURS
Refills: 0 | Status: DISCONTINUED | OUTPATIENT
Start: 2021-07-20 | End: 2021-07-27

## 2021-07-20 RX ORDER — MAGNESIUM SULFATE 500 MG/ML
2 VIAL (ML) INJECTION ONCE
Refills: 0 | Status: COMPLETED | OUTPATIENT
Start: 2021-07-20 | End: 2021-07-20

## 2021-07-20 RX ORDER — VANCOMYCIN HCL 1 G
1000 VIAL (EA) INTRAVENOUS EVERY 12 HOURS
Refills: 0 | Status: DISCONTINUED | OUTPATIENT
Start: 2021-07-21 | End: 2021-07-21

## 2021-07-20 RX ORDER — PIPERACILLIN AND TAZOBACTAM 4; .5 G/20ML; G/20ML
3.38 INJECTION, POWDER, LYOPHILIZED, FOR SOLUTION INTRAVENOUS EVERY 8 HOURS
Refills: 0 | Status: DISCONTINUED | OUTPATIENT
Start: 2021-07-20 | End: 2021-07-21

## 2021-07-20 RX ORDER — INSULIN LISPRO 100/ML
VIAL (ML) SUBCUTANEOUS
Refills: 0 | Status: DISCONTINUED | OUTPATIENT
Start: 2021-07-20 | End: 2021-07-21

## 2021-07-20 RX ORDER — SODIUM CHLORIDE 9 MG/ML
1000 INJECTION, SOLUTION INTRAVENOUS ONCE
Refills: 0 | Status: COMPLETED | OUTPATIENT
Start: 2021-07-20 | End: 2021-07-20

## 2021-07-20 RX ORDER — CHLORHEXIDINE GLUCONATE 213 G/1000ML
1 SOLUTION TOPICAL
Refills: 0 | Status: DISCONTINUED | OUTPATIENT
Start: 2021-07-20 | End: 2021-07-27

## 2021-07-20 RX ORDER — POLYETHYLENE GLYCOL 3350 17 G/17G
17 POWDER, FOR SOLUTION ORAL DAILY
Refills: 0 | Status: DISCONTINUED | OUTPATIENT
Start: 2021-07-20 | End: 2021-07-27

## 2021-07-20 RX ORDER — SENNA PLUS 8.6 MG/1
2 TABLET ORAL AT BEDTIME
Refills: 0 | Status: DISCONTINUED | OUTPATIENT
Start: 2021-07-20 | End: 2021-07-27

## 2021-07-20 RX ORDER — SODIUM CHLORIDE 9 MG/ML
500 INJECTION, SOLUTION INTRAVENOUS ONCE
Refills: 0 | Status: COMPLETED | OUTPATIENT
Start: 2021-07-20 | End: 2021-07-20

## 2021-07-20 RX ORDER — VANCOMYCIN HCL 1 G
1000 VIAL (EA) INTRAVENOUS EVERY 12 HOURS
Refills: 0 | Status: DISCONTINUED | OUTPATIENT
Start: 2021-07-20 | End: 2021-07-20

## 2021-07-20 RX ORDER — INSULIN LISPRO 100/ML
VIAL (ML) SUBCUTANEOUS AT BEDTIME
Refills: 0 | Status: DISCONTINUED | OUTPATIENT
Start: 2021-07-20 | End: 2021-07-21

## 2021-07-20 RX ORDER — ENOXAPARIN SODIUM 100 MG/ML
40 INJECTION SUBCUTANEOUS EVERY 24 HOURS
Refills: 0 | Status: DISCONTINUED | OUTPATIENT
Start: 2021-07-20 | End: 2021-07-27

## 2021-07-20 RX ORDER — ASPIRIN/CALCIUM CARB/MAGNESIUM 324 MG
81 TABLET ORAL DAILY
Refills: 0 | Status: DISCONTINUED | OUTPATIENT
Start: 2021-07-20 | End: 2021-07-27

## 2021-07-20 RX ORDER — PHENYLEPHRINE HYDROCHLORIDE 10 MG/ML
0.5 INJECTION INTRAVENOUS
Qty: 40 | Refills: 0 | Status: DISCONTINUED | OUTPATIENT
Start: 2021-07-20 | End: 2021-07-21

## 2021-07-20 RX ORDER — PIPERACILLIN AND TAZOBACTAM 4; .5 G/20ML; G/20ML
3.38 INJECTION, POWDER, LYOPHILIZED, FOR SOLUTION INTRAVENOUS ONCE
Refills: 0 | Status: COMPLETED | OUTPATIENT
Start: 2021-07-20 | End: 2021-07-20

## 2021-07-20 RX ADMIN — AMIKACIN SULFATE 101.44 MILLIGRAM(S): 250 INJECTION, SOLUTION INTRAMUSCULAR; INTRAVENOUS at 05:50

## 2021-07-20 RX ADMIN — SODIUM CHLORIDE 3000 MILLILITER(S): 9 INJECTION, SOLUTION INTRAVENOUS at 11:25

## 2021-07-20 RX ADMIN — SODIUM CHLORIDE 1000 MILLILITER(S): 9 INJECTION, SOLUTION INTRAVENOUS at 02:35

## 2021-07-20 RX ADMIN — PHENYLEPHRINE HYDROCHLORIDE 13.5 MICROGRAM(S)/KG/MIN: 10 INJECTION INTRAVENOUS at 02:54

## 2021-07-20 RX ADMIN — ENOXAPARIN SODIUM 40 MILLIGRAM(S): 100 INJECTION SUBCUTANEOUS at 05:15

## 2021-07-20 RX ADMIN — PHENYLEPHRINE HYDROCHLORIDE 13.5 MICROGRAM(S)/KG/MIN: 10 INJECTION INTRAVENOUS at 21:14

## 2021-07-20 RX ADMIN — SODIUM CHLORIDE 100 MILLILITER(S): 9 INJECTION, SOLUTION INTRAVENOUS at 04:36

## 2021-07-20 RX ADMIN — SENNA PLUS 2 TABLET(S): 8.6 TABLET ORAL at 21:14

## 2021-07-20 RX ADMIN — FINASTERIDE 5 MILLIGRAM(S): 5 TABLET, FILM COATED ORAL at 11:04

## 2021-07-20 RX ADMIN — SODIUM CHLORIDE 1000 MILLILITER(S): 9 INJECTION, SOLUTION INTRAVENOUS at 01:10

## 2021-07-20 RX ADMIN — PIPERACILLIN AND TAZOBACTAM 25 GRAM(S): 4; .5 INJECTION, POWDER, LYOPHILIZED, FOR SOLUTION INTRAVENOUS at 18:48

## 2021-07-20 RX ADMIN — Medication 81 MILLIGRAM(S): at 11:04

## 2021-07-20 RX ADMIN — Medication 125 MILLIMOLE(S): at 04:36

## 2021-07-20 RX ADMIN — DORZOLAMIDE HYDROCHLORIDE 1 DROP(S): 20 SOLUTION/ DROPS OPHTHALMIC at 08:21

## 2021-07-20 RX ADMIN — Medication 125 MILLIMOLE(S): at 05:15

## 2021-07-20 RX ADMIN — Medication 1 TABLET(S): at 12:43

## 2021-07-20 RX ADMIN — LACTULOSE 20 GRAM(S): 10 SOLUTION ORAL at 21:14

## 2021-07-20 RX ADMIN — POLYETHYLENE GLYCOL 3350 17 GRAM(S): 17 POWDER, FOR SOLUTION ORAL at 11:15

## 2021-07-20 RX ADMIN — Medication 50 GRAM(S): at 04:36

## 2021-07-20 RX ADMIN — Medication 1 TABLET(S): at 11:03

## 2021-07-20 RX ADMIN — SODIUM CHLORIDE 100 MILLILITER(S): 9 INJECTION, SOLUTION INTRAVENOUS at 21:14

## 2021-07-20 RX ADMIN — AMIKACIN SULFATE 101.44 MILLIGRAM(S): 250 INJECTION, SOLUTION INTRAMUSCULAR; INTRAVENOUS at 21:14

## 2021-07-20 RX ADMIN — AMIKACIN SULFATE 101.44 MILLIGRAM(S): 250 INJECTION, SOLUTION INTRAMUSCULAR; INTRAVENOUS at 16:13

## 2021-07-20 RX ADMIN — DORZOLAMIDE HYDROCHLORIDE 1 DROP(S): 20 SOLUTION/ DROPS OPHTHALMIC at 21:15

## 2021-07-20 RX ADMIN — Medication 1: at 06:44

## 2021-07-20 RX ADMIN — ATORVASTATIN CALCIUM 20 MILLIGRAM(S): 80 TABLET, FILM COATED ORAL at 21:14

## 2021-07-20 RX ADMIN — PIPERACILLIN AND TAZOBACTAM 200 GRAM(S): 4; .5 INJECTION, POWDER, LYOPHILIZED, FOR SOLUTION INTRAVENOUS at 11:25

## 2021-07-20 RX ADMIN — Medication 250 MILLIGRAM(S): at 13:27

## 2021-07-20 RX ADMIN — CHLORHEXIDINE GLUCONATE 1 APPLICATION(S): 213 SOLUTION TOPICAL at 05:16

## 2021-07-20 RX ADMIN — PHENYLEPHRINE HYDROCHLORIDE 13.5 MICROGRAM(S)/KG/MIN: 10 INJECTION INTRAVENOUS at 04:36

## 2021-07-20 NOTE — PROGRESS NOTE ADULT - ASSESSMENT
A/P: 89y Male s/p L PCNL POD1   - wean pressors  - trend lactate  -DVT prophylaxis/OOB  -Incentive spirometry  -Strict I&O's  -Analgesia and antiemetics as needed  -Puree Diet  -AM labs  -CT once stable  - Cont disla catheter  -continue amikacin  - f/u cultures  - SICU care appreciated

## 2021-07-20 NOTE — CONSULT NOTE ADULT - ASSESSMENT
89 y/o male w/ a PMHx of CAD s/p stents, TIA, HLD, GERD, BPH s/p prostate surgery, ESBL E. coli UTI secondary to left kidney stones s/p ureteral stent placement who presentedfor an interval left PCNL complicated post-operatively by hypotension likely secondary to sepsis    PLAN:    Neuro: acute post-op pain  - Multimodal pain control with acetaminophen and tramadol    Resp: no acute issues  - Out of bed to chair, ambulate as tolerated, and incentive spirometry to prevent atelectasis    CV: CAD, hypotension likely secondary to sepsis  - Will wean phenylephrine infusion as tolerated w/ goal MAP > 65 mmHg  - Home ASA for CAD    GI: no acute issues  - Pureed diet as tolerated  - Bowel regimen with senna & Miralax    Renal: CKD stage II, BPH  - Monitor I&Os and electrolytes w/ repletions as necessary  - LR at 100 mL/hr for resuscitation in the setting of sepsis  - Home finasteride and Flomax for BPH  - Will monitor Nj & nephrostomy tube output amount & quality    Heme: no acute issues  - Monitor CBC and coags  - Lovenox for VTE prophylaxis    ID: ESBL E. coli UTI  - Monitor WBC, temperature, and procalcitonin  - Empiric antibiotics with amikacin as per ID  - Will f/u blood and urine cultures    Endo: hyperglycemia  - Monitor glucose w/ low ISS q6hrs; hyperglycemia likely reactive in the setting of sepsis but will send HgbA1C  - Home atorvastatin for HLD    Code Status: Full code    Disposition: Will admit to SICU    Denisse Reese PA-C     [id number] 89 y/o male w/ a PMHx of CAD s/p stents, TIA, HLD, GERD, BPH s/p prostate surgery, ESBL E. coli UTI secondary to left kidney stones s/p ureteral stent placement who presentedfor an interval left PCNL complicated post-operatively by hypotension likely secondary to sepsis    PLAN:    Neuro: acute post-op pain  - Multimodal pain control with acetaminophen and tramadol    Resp: no acute issues  - Out of bed to chair, ambulate as tolerated, and incentive spirometry to prevent atelectasis    CV: CAD, hypotension likely secondary to sepsis  - Will wean phenylephrine infusion as tolerated w/ goal MAP > 65 mmHg  - Home ASA for CAD    GI: no acute issues  - Pureed diet as tolerated  - Bowel regimen with senna & Miralax    Renal: CKD stage II, BPH  - Monitor I&Os and electrolytes w/ repletions as necessary  - LR at 100 mL/hr for resuscitation in the setting of sepsis  - Home finasteride and Flomax for BPH  - Will monitor Nj & nephrostomy tube output amount & quality    Heme: no acute issues  - Monitor CBC and coags  - Lovenox for VTE prophylaxis    ID: ESBL E. coli UTI  - Monitor WBC, temperature, and procalcitonin  - Empiric antibiotics with amikacin as per ID  - Will f/u blood and urine cultures    Endo: hyperglycemia  - Monitor glucose w/ low ISS before meals & at bedtime; hyperglycemia likely reactive in the setting of sepsis but will send HgbA1C  - Home atorvastatin for HLD    Code Status: Full code    [facility]    Deinsse Reese PA-C     [id number]

## 2021-07-20 NOTE — PATIENT PROFILE ADULT - NSPROIMPLANTSMEDDEV_GEN_A_NUR
Quality 111:Pneumonia Vaccination Status For Older Adults: Pneumococcal Vaccination Previously Received
None
Quality 474: Zoster Vaccination Status: Shingrix Vaccination Administered or Previously Received
Quality 110: Preventive Care And Screening: Influenza Immunization: Influenza Immunization Administered during Influenza season
Quality 400a: One-Time Screening For Hepatitis C Virus (Hcv) For All Patients: Patient received one-time screening for HCV infection
Detail Level: Detailed

## 2021-07-20 NOTE — HISTORY OF PRESENT ILLNESS
[FreeTextEntry1] : Preop eval; for amikacin today ahead of OR this monday.\par \par On cipro preop, but with h/o prior pseudomonas and esbl ecoli, for additional rx today.\par \par Doing well.

## 2021-07-20 NOTE — ASSESSMENT
[FreeTextEntry1] : Amikacin 500 mg IM given \par \par Pt doing well. Reviewed surgical risks, expectations.

## 2021-07-20 NOTE — CONSULT NOTE ADULT - ATTENDING COMMENTS
N Multimodal pain management.  P Sat >90 on RA.  C Hypotensive on phenylephrine. POCUS evaluate volume status, if no response, CVC and norepinephrine/vasopressin. In septic shock, surviving sepsis campaign protocol. 500cc bolus check response. Monitor lactate, last 4.2.  G On ppx.  R Cr 0.96. Nephrostomy 30-50cc/h. Net +2L.  H Monitor.  I AF. Leukocytosis. H/o ESBL in urine. Continue amikacin, d/w urology. Procalcitonin 4.29. Broaden spectrum zosyn/vancomycin, h/o ESBL sensitive to amikacin. MRSA swab.  E ISS.    DVT lovenox/SCDs.

## 2021-07-20 NOTE — PROGRESS NOTE ADULT - SUBJECTIVE AND OBJECTIVE BOX
UROLOGY DAILY PROGRESS NOTE:     Subjective: Patient seen and examined at bedside. Transferred to SICU yesterday post op due to hypotension. Currently on pressors.    Denies pain, CP, SOB, fever or chills.       Objective:  Vital signs  T(F): , Max: 97.9 (07-19-21 @ 11:22)  HR: 75 (07-20-21 @ 07:15)  BP: 86/61 (07-20-21 @ 07:15)  SpO2: 94% (07-20-21 @ 07:15)  Wt(kg): --    I&O's Summary    19 Jul 2021 07:01  -  20 Jul 2021 07:00  --------------------------------------------------------  IN: 3939.8 mL / OUT: 1315 mL / NET: 2624.8 mL    I&O's Detail    19 Jul 2021 07:01  -  20 Jul 2021 07:00  --------------------------------------------------------  IN:    IV PiggyBack: 150 mL    IV PiggyBack: 1375 mL    Lactated Ringers: 1100 mL    Lactated Ringers Bolus: 1000 mL    Oral Fluid: 240 mL    Phenylephrine: 74.8 mL  Total IN: 3939.8 mL    OUT:    Indwelling Catheter - Urethral (mL): 805 mL    Nephrostomy Tube (mL): 510 mL  Total OUT: 1315 mL    Total NET: 2624.8 mL          Gen: NAD  Pulm: No respiratory distress, no subcostal retractions  CV: RRR, no JVD  Abd: Soft, NT, ND  Back: NT darining clear  : Nj scant clear urine    Labs:  07-20  19.78 / 36.4  /0.96   07-19  7.88  / 40.5  /0.97                           11.7   19.78 )-----------( 135      ( 20 Jul 2021 01:36 )             36.4     07-20    137  |  105  |  14  ----------------------------<  164<H>  4.3   |  22  |  0.96    Ca    9.7      20 Jul 2021 01:36  Phos  1.9     07-20  Mg     1.8     07-20          Urine Cx:

## 2021-07-21 LAB
-  AMIKACIN: SIGNIFICANT CHANGE UP
-  AMIKACIN: SIGNIFICANT CHANGE UP
-  AMOXICILLIN/CLAVULANIC ACID: SIGNIFICANT CHANGE UP
-  AMPICILLIN/SULBACTAM: SIGNIFICANT CHANGE UP
-  AMPICILLIN: SIGNIFICANT CHANGE UP
-  AZTREONAM: SIGNIFICANT CHANGE UP
-  AZTREONAM: SIGNIFICANT CHANGE UP
-  CEFAZOLIN: SIGNIFICANT CHANGE UP
-  CEFEPIME: SIGNIFICANT CHANGE UP
-  CEFEPIME: SIGNIFICANT CHANGE UP
-  CEFOXITIN: SIGNIFICANT CHANGE UP
-  CEFTAZIDIME: SIGNIFICANT CHANGE UP
-  CEFTRIAXONE: SIGNIFICANT CHANGE UP
-  CIPROFLOXACIN: SIGNIFICANT CHANGE UP
-  CIPROFLOXACIN: SIGNIFICANT CHANGE UP
-  ERTAPENEM: SIGNIFICANT CHANGE UP
-  GENTAMICIN: SIGNIFICANT CHANGE UP
-  GENTAMICIN: SIGNIFICANT CHANGE UP
-  IMIPENEM: SIGNIFICANT CHANGE UP
-  IMIPENEM: SIGNIFICANT CHANGE UP
-  LEVOFLOXACIN: SIGNIFICANT CHANGE UP
-  LEVOFLOXACIN: SIGNIFICANT CHANGE UP
-  MEROPENEM: SIGNIFICANT CHANGE UP
-  MEROPENEM: SIGNIFICANT CHANGE UP
-  PIPERACILLIN/TAZOBACTAM: SIGNIFICANT CHANGE UP
-  PIPERACILLIN/TAZOBACTAM: SIGNIFICANT CHANGE UP
-  TIGECYCLINE: SIGNIFICANT CHANGE UP
-  TOBRAMYCIN: SIGNIFICANT CHANGE UP
-  TOBRAMYCIN: SIGNIFICANT CHANGE UP
-  TRIMETHOPRIM/SULFAMETHOXAZOLE: SIGNIFICANT CHANGE UP
AMIKACIN TROUGH SERPL-MCNC: 15.6 UG/ML — CRITICAL HIGH
ANION GAP SERPL CALC-SCNC: 10 MMOL/L — SIGNIFICANT CHANGE UP (ref 5–17)
ANION GAP SERPL CALC-SCNC: 7 MMOL/L — SIGNIFICANT CHANGE UP (ref 5–17)
BUN SERPL-MCNC: 11 MG/DL — SIGNIFICANT CHANGE UP (ref 7–23)
BUN SERPL-MCNC: 14 MG/DL — SIGNIFICANT CHANGE UP (ref 7–23)
CALCIUM SERPL-MCNC: 9.2 MG/DL — SIGNIFICANT CHANGE UP (ref 8.4–10.5)
CALCIUM SERPL-MCNC: 9.5 MG/DL — SIGNIFICANT CHANGE UP (ref 8.4–10.5)
CHLORIDE SERPL-SCNC: 105 MMOL/L — SIGNIFICANT CHANGE UP (ref 96–108)
CHLORIDE SERPL-SCNC: 110 MMOL/L — HIGH (ref 96–108)
CO2 SERPL-SCNC: 22 MMOL/L — SIGNIFICANT CHANGE UP (ref 22–31)
CO2 SERPL-SCNC: 22 MMOL/L — SIGNIFICANT CHANGE UP (ref 22–31)
CREAT SERPL-MCNC: 1.02 MG/DL — SIGNIFICANT CHANGE UP (ref 0.5–1.3)
CREAT SERPL-MCNC: 1.22 MG/DL — SIGNIFICANT CHANGE UP (ref 0.5–1.3)
CULTURE RESULTS: NO GROWTH — SIGNIFICANT CHANGE UP
CULTURE RESULTS: SIGNIFICANT CHANGE UP
CULTURE RESULTS: SIGNIFICANT CHANGE UP
GAS PNL BLDV: SIGNIFICANT CHANGE UP
GLUCOSE BLDC GLUCOMTR-MCNC: 112 MG/DL — HIGH (ref 70–99)
GLUCOSE SERPL-MCNC: 115 MG/DL — HIGH (ref 70–99)
GLUCOSE SERPL-MCNC: 134 MG/DL — HIGH (ref 70–99)
HCT VFR BLD CALC: 31.4 % — LOW (ref 39–50)
HCT VFR BLD CALC: 32.8 % — LOW (ref 39–50)
HGB BLD-MCNC: 10.2 G/DL — LOW (ref 13–17)
HGB BLD-MCNC: 10.8 G/DL — LOW (ref 13–17)
MAGNESIUM SERPL-MCNC: 2 MG/DL — SIGNIFICANT CHANGE UP (ref 1.6–2.6)
MAGNESIUM SERPL-MCNC: 2 MG/DL — SIGNIFICANT CHANGE UP (ref 1.6–2.6)
MCHC RBC-ENTMCNC: 30 PG — SIGNIFICANT CHANGE UP (ref 27–34)
MCHC RBC-ENTMCNC: 30.2 PG — SIGNIFICANT CHANGE UP (ref 27–34)
MCHC RBC-ENTMCNC: 32.5 GM/DL — SIGNIFICANT CHANGE UP (ref 32–36)
MCHC RBC-ENTMCNC: 32.9 GM/DL — SIGNIFICANT CHANGE UP (ref 32–36)
MCV RBC AUTO: 91.6 FL — SIGNIFICANT CHANGE UP (ref 80–100)
MCV RBC AUTO: 92.4 FL — SIGNIFICANT CHANGE UP (ref 80–100)
METHOD TYPE: SIGNIFICANT CHANGE UP
METHOD TYPE: SIGNIFICANT CHANGE UP
MRSA PCR RESULT.: SIGNIFICANT CHANGE UP
NRBC # BLD: 0 /100 WBCS — SIGNIFICANT CHANGE UP (ref 0–0)
NRBC # BLD: 0 /100 WBCS — SIGNIFICANT CHANGE UP (ref 0–0)
ORGANISM # SPEC MICROSCOPIC CNT: SIGNIFICANT CHANGE UP
PHOSPHATE SERPL-MCNC: 2.4 MG/DL — LOW (ref 2.5–4.5)
PHOSPHATE SERPL-MCNC: 2.7 MG/DL — SIGNIFICANT CHANGE UP (ref 2.5–4.5)
PLATELET # BLD AUTO: 106 K/UL — LOW (ref 150–400)
PLATELET # BLD AUTO: 111 K/UL — LOW (ref 150–400)
POTASSIUM SERPL-MCNC: 3.6 MMOL/L — SIGNIFICANT CHANGE UP (ref 3.5–5.3)
POTASSIUM SERPL-MCNC: 4.2 MMOL/L — SIGNIFICANT CHANGE UP (ref 3.5–5.3)
POTASSIUM SERPL-SCNC: 3.6 MMOL/L — SIGNIFICANT CHANGE UP (ref 3.5–5.3)
POTASSIUM SERPL-SCNC: 4.2 MMOL/L — SIGNIFICANT CHANGE UP (ref 3.5–5.3)
PROCALCITONIN SERPL-MCNC: 3.93 NG/ML — HIGH (ref 0.02–0.1)
RBC # BLD: 3.4 M/UL — LOW (ref 4.2–5.8)
RBC # BLD: 3.58 M/UL — LOW (ref 4.2–5.8)
RBC # FLD: 14.5 % — SIGNIFICANT CHANGE UP (ref 10.3–14.5)
RBC # FLD: 14.7 % — HIGH (ref 10.3–14.5)
S AUREUS DNA NOSE QL NAA+PROBE: SIGNIFICANT CHANGE UP
SODIUM SERPL-SCNC: 137 MMOL/L — SIGNIFICANT CHANGE UP (ref 135–145)
SODIUM SERPL-SCNC: 139 MMOL/L — SIGNIFICANT CHANGE UP (ref 135–145)
SPECIMEN SOURCE: SIGNIFICANT CHANGE UP
WBC # BLD: 13.8 K/UL — HIGH (ref 3.8–10.5)
WBC # BLD: 14.76 K/UL — HIGH (ref 3.8–10.5)
WBC # FLD AUTO: 13.8 K/UL — HIGH (ref 3.8–10.5)
WBC # FLD AUTO: 14.76 K/UL — HIGH (ref 3.8–10.5)

## 2021-07-21 PROCEDURE — 99222 1ST HOSP IP/OBS MODERATE 55: CPT

## 2021-07-21 PROCEDURE — 71045 X-RAY EXAM CHEST 1 VIEW: CPT | Mod: 26

## 2021-07-21 PROCEDURE — 99291 CRITICAL CARE FIRST HOUR: CPT

## 2021-07-21 RX ORDER — POTASSIUM CHLORIDE 20 MEQ
10 PACKET (EA) ORAL
Refills: 0 | Status: COMPLETED | OUTPATIENT
Start: 2021-07-21 | End: 2021-07-22

## 2021-07-21 RX ORDER — POTASSIUM PHOSPHATE, MONOBASIC POTASSIUM PHOSPHATE, DIBASIC 236; 224 MG/ML; MG/ML
30 INJECTION, SOLUTION INTRAVENOUS ONCE
Refills: 0 | Status: COMPLETED | OUTPATIENT
Start: 2021-07-21 | End: 2021-07-22

## 2021-07-21 RX ORDER — LANOLIN ALCOHOL/MO/W.PET/CERES
5 CREAM (GRAM) TOPICAL AT BEDTIME
Refills: 0 | Status: DISCONTINUED | OUTPATIENT
Start: 2021-07-21 | End: 2021-07-27

## 2021-07-21 RX ORDER — IPRATROPIUM/ALBUTEROL SULFATE 18-103MCG
3 AEROSOL WITH ADAPTER (GRAM) INHALATION EVERY 6 HOURS
Refills: 0 | Status: DISCONTINUED | OUTPATIENT
Start: 2021-07-21 | End: 2021-07-27

## 2021-07-21 RX ORDER — MEROPENEM 1 G/30ML
1000 INJECTION INTRAVENOUS EVERY 12 HOURS
Refills: 0 | Status: DISCONTINUED | OUTPATIENT
Start: 2021-07-21 | End: 2021-07-27

## 2021-07-21 RX ORDER — HALOPERIDOL DECANOATE 100 MG/ML
2.5 INJECTION INTRAMUSCULAR ONCE
Refills: 0 | Status: COMPLETED | OUTPATIENT
Start: 2021-07-21 | End: 2021-07-21

## 2021-07-21 RX ORDER — ACETAMINOPHEN 500 MG
1000 TABLET ORAL ONCE
Refills: 0 | Status: COMPLETED | OUTPATIENT
Start: 2021-07-21 | End: 2021-07-21

## 2021-07-21 RX ADMIN — Medication 1 TABLET(S): at 12:24

## 2021-07-21 RX ADMIN — DORZOLAMIDE HYDROCHLORIDE 1 DROP(S): 20 SOLUTION/ DROPS OPHTHALMIC at 11:26

## 2021-07-21 RX ADMIN — FINASTERIDE 5 MILLIGRAM(S): 5 TABLET, FILM COATED ORAL at 12:25

## 2021-07-21 RX ADMIN — Medication 400 MILLIGRAM(S): at 07:15

## 2021-07-21 RX ADMIN — LACTULOSE 20 GRAM(S): 10 SOLUTION ORAL at 22:32

## 2021-07-21 RX ADMIN — SENNA PLUS 2 TABLET(S): 8.6 TABLET ORAL at 22:32

## 2021-07-21 RX ADMIN — PIPERACILLIN AND TAZOBACTAM 25 GRAM(S): 4; .5 INJECTION, POWDER, LYOPHILIZED, FOR SOLUTION INTRAVENOUS at 11:40

## 2021-07-21 RX ADMIN — ATORVASTATIN CALCIUM 20 MILLIGRAM(S): 80 TABLET, FILM COATED ORAL at 22:32

## 2021-07-21 RX ADMIN — MEROPENEM 100 MILLIGRAM(S): 1 INJECTION INTRAVENOUS at 17:50

## 2021-07-21 RX ADMIN — Medication 125 MILLIMOLE(S): at 06:01

## 2021-07-21 RX ADMIN — PIPERACILLIN AND TAZOBACTAM 25 GRAM(S): 4; .5 INJECTION, POWDER, LYOPHILIZED, FOR SOLUTION INTRAVENOUS at 02:16

## 2021-07-21 RX ADMIN — HALOPERIDOL DECANOATE 2.5 MILLIGRAM(S): 100 INJECTION INTRAMUSCULAR at 01:15

## 2021-07-21 RX ADMIN — Medication 5 MILLIGRAM(S): at 22:32

## 2021-07-21 RX ADMIN — Medication 3 MILLILITER(S): at 23:41

## 2021-07-21 RX ADMIN — Medication 250 MILLIGRAM(S): at 00:10

## 2021-07-21 RX ADMIN — Medication 1000 MILLIGRAM(S): at 11:39

## 2021-07-21 RX ADMIN — CHLORHEXIDINE GLUCONATE 1 APPLICATION(S): 213 SOLUTION TOPICAL at 05:59

## 2021-07-21 RX ADMIN — ENOXAPARIN SODIUM 40 MILLIGRAM(S): 100 INJECTION SUBCUTANEOUS at 05:59

## 2021-07-21 RX ADMIN — POLYETHYLENE GLYCOL 3350 17 GRAM(S): 17 POWDER, FOR SOLUTION ORAL at 14:09

## 2021-07-21 RX ADMIN — DORZOLAMIDE HYDROCHLORIDE 1 DROP(S): 20 SOLUTION/ DROPS OPHTHALMIC at 20:30

## 2021-07-21 RX ADMIN — Medication 1 TABLET(S): at 12:25

## 2021-07-21 RX ADMIN — Medication 81 MILLIGRAM(S): at 12:25

## 2021-07-21 NOTE — CONSULT NOTE ADULT - SUBJECTIVE AND OBJECTIVE BOX
HISTORY:  89 y/o male w/ a PMHx of CAD s/p stents (2014), TIA, HLD, GERD, BPH s/p prostate surgery (2017), ESBL E. coli UTI secondary to left kidney stones s/p ureteral stent placement (4/30/21) who presented on 7/19 for an interval left PCNL. Case was uneventful but post-operatively in the PACU, patient became hypotensive. He was given 2 L of crystalloid but remained hypotensive so he was subsequently started on a phenylephrine infusion. SICU consulted for hemodynamic monitoring. Patient currently denies headache, dizziness, weakness, fevers, chills, shortness of breath, chest pain, abdominal pain, or nausea/vomiting.    PAST MEDICAL HISTORY:  - Benign prostatic hyperplasia, presence of lower urinary tract symptoms unspecified, unspecified morphology  - Hyperlipidemia  - GERD (gastroesophageal reflux disease)  - CAD (coronary artery disease)  - UTI (urinary tract infection)  - TIA (transient ischemic attack)  - Calculus of kidney  - Calculus of ureter    PAST SURGICAL HISTORY:  - H/O heart artery stent  - History of prostate surgery  - S/P ureteral stent placement    HOME MEDICATIONS:  - Acidophilus oral capsule: 1 cap(s) orally once a day  - aspirin 81 mg oral tablet: 1 tab(s) orally once a day  - atorvastatin 20 mg oral tablet: 1 tab(s) orally once a day  - Azopt 1% ophthalmic suspension: 1 drop(s) to each affected eye 2 times a day  - finasteride 5 mg oral tablet: 1 tab(s) orally once a day  - lactulose 10 g/15 mL oral syrup: 30 milliliter(s) orally once a day (at bedtime)  - multivitamin with minerals  - tamsulosin 0.4 mg oral capsule: 1 cap(s) orally 2 times a day    ALLERGIES: No Known Allergies    FAMILY HISTORY: FH: hypertension (Child)    SOCIAL HISTORY: Denies EtOH, tobacco, or illicit substance use    REVIEW OF SYSTEMS: 10 point ROS reviewed and negative except as noted in HPI    VITAL SIGNS:  ICU Vital Signs Last 24 Hrs  T(C): 36 (20 Jul 2021 03:30), Max: 36.6 (19 Jul 2021 11:22)  T(F): 96.8 (20 Jul 2021 03:30), Max: 97.9 (19 Jul 2021 11:22)  HR: 80 (20 Jul 2021 03:30) (65 - 101)  BP: 127/55 (20 Jul 2021 03:30) (63/38 - 127/55)  BP(mean): 73 (20 Jul 2021 03:30) (46 - 81)  RR: 20 (20 Jul 2021 03:30) (15 - 20)  SpO2: 98% (20 Jul 2021 03:30) (95% - 99%)    PHYSICAL EXAMINATION:  General - well-nourished, no acute distress  Neuro - awake, alert, oriented x4, no acute focal deficits  HEENT - normocephalic, PERRL, moist mucous membranes  Lungs - clear to auscultation bilaterally  Heart - regular rate and rhythm, S1S2  Abdomen - soft, nontender, nondistended   - Nj to gravity w/ very lightly pink-tinged urine, left nephrostomy tube to gravity w/ pink-tinged urine  Extremities - all four extremities are warm & pink with 2+ pulses, strength 5/5, sensation intact    LABS:                          11.7   19.78 )-----------( 135      ( 20 Jul 2021 01:36 )             36.4     137  |  105  |  14  ----------------------------<  164<H>  4.3   |  22  |  0.96    Ca    9.7      20 Jul 2021 01:36  Phos  1.9  Mg     1.8    VBG - ( 20 Jul 2021 03:50 )  pH: 7.34  /  pCO2: 43    /  pO2: <20   / HCO3: 22    / Base Excess: -3.0  /  SaO2: 21   /    Lactate: 4.9      RECENT CULTURES: Blood cultures collected and sent    CAPILLARY BLOOD GLUCOSE: 164 mg/dL (07-20-21 @ 01:36)
Patient is a 89y old  Male who presents with a chief complaint of Per chart: 89 y/o male w/ a PMHx of CAD s/p stents, TIA, HLD, GERD, BPH s/p prostate surgery, ESBL E. coli UTI secondary to left kidney stones s/p ureteral stent placement who presentedfor an interval left PCNL complicated post-operatively by hypotension likely secondary to sepsis." (21 Jul 2021 07:11)      HPI:  89 y/o male w/ a PMHx of CAD s/p stents (2014), TIA, HLD, GERD, BPH s/p prostate surgery (2017), ESBL E. coli UTI secondary to left kidney stones s/p ureteral stent placement (4/30/21) who presented on 7/19 for an interval left PCNL. Case was uneventful but post-operatively in the PACU, patient became hypotensive. He was given 2 L of crystalloid but remained hypotensive so he was subsequently started on a phenylephrine infusion. SICU consulted for hemodynamic monitoring. Patient currently denies headache, dizziness, weakness, fevers, chills, shortness of breath, chest pain, abdominal pain, or nausea/vomiting.  Above reviewed:   pt complains of lt sided flank pain, otherwise denies any other complains.      PAST MEDICAL & SURGICAL HISTORY:  Benign prostatic hyperplasia, presence of lower urinary tract symptoms unspecified, unspecified morphology    Hyperlipidemia    GERD (gastroesophageal reflux disease)    CAD (coronary artery disease)    UTI (urinary tract infection)    Calculus of kidney    Calculus of ureter    H/O heart artery stent  stent x 2    History of prostate surgery    S/P ureteral stent placement  Left sided        REVIEW OF SYSTEMS    General: + Fevers    Skin: No rash  	  Ophthalmologic: Denies any discharge, redness.  	  ENT: No nasal congestion or throat pain.     Respiratory and Thorax: No cough, sputum. Denies shortness of breath.  	  Cardiovascular: No chest pain,     Gastrointestinal: No nausea, abdominal pain or diarrhea.    Genitourinary: per hpi    Musculoskeletal: No joint swelling    Neurological: No extremity weakness.    Psychiatric: No hallucinations	    Extremities: No swelling     Endocrine: No polyuria or polydipsia     Allergic/Immunologic: No hives       Social history:  , lives with son, no smoking       FAMILY HISTORY:  FH: hypertension (Child)      Allergies  No Known Allergies      Antimicrobials:  meropenem  IVPB 1000 milliGRAM(s) IV Intermittent every 12 hours        Vital Signs Last 24 Hrs  T(C): 36.8 (21 Jul 2021 11:00), Max: 38.6 (21 Jul 2021 07:00)  T(F): 98.2 (21 Jul 2021 11:00), Max: 101.5 (21 Jul 2021 07:00)  HR: 85 (21 Jul 2021 14:00) (67 - 117)  BP: 146/62 (21 Jul 2021 14:00) (88/52 - 146/62)  BP(mean): 89 (21 Jul 2021 14:00) (63 - 95)  RR: 32 (21 Jul 2021 14:00) (16 - 38)  SpO2: 94% (21 Jul 2021 14:00) (90% - 97%)        PHYSICAL EXAM: Patient in no acute distress.    Constitutional: Comfortable. Awake and alert    Eyes: No discharge or conjunctival injection    ENT: No thrush. No pharyngeal exudate    Neck: Supple,     Respiratory: + air entry bilaterally.    Cardiovascular: S1 S2 wnl,     Gastrointestinal: Soft BS(+) no tenderness, non distended.    Genitourinary: lt sided perc nephrostomy, + disla.     Extremities: No edema.    Vascular: peripheral pulses felt    Neurological: No new gross focal deficits.    Skin: No rash     Musculoskeletal: No joint swelling.    Psychiatric: Affect normal.                              10.2   14.76 )-----------( 106      ( 21 Jul 2021 05:11 )             31.4       07-21    137  |  105  |  14  ----------------------------<  115<H>  4.2   |  22  |  1.22    Ca    9.2      21 Jul 2021 05:11  Phos  2.7     07-21  Mg     2.0     07-21        Culture - Urine (collected 20 Jul 2021 09:28)  Source: .Urine Nephrostomy - Left  Final Report (21 Jul 2021 15:57):    No growth    Culture - Urine (collected 20 Jul 2021 09:28)  Source: Clean Catch Clean Catch (Midstream)  Final Report (21 Jul 2021 15:59):    <10,000 CFU/mL Normal Urogenital Anjelica    Culture - Blood (collected 20 Jul 2021 06:35)  Source: .Blood Blood-Venous  Preliminary Report (21 Jul 2021 07:02):    No growth to date.    Culture - Blood (collected 20 Jul 2021 06:35)  Source: .Blood Blood-Peripheral  Preliminary Report (21 Jul 2021 07:02):    No growth to date.    Culture - Urine (collected 20 Jul 2021 01:50)  Source: Kidney Kidney  Preliminary Report (20 Jul 2021 23:03):    Numerous Pseudomonas aeruginosa    Few Escherichia coli    Culture - Other (collected 20 Jul 2021 01:37)  Source: Ear left kidney stone culture  Preliminary Report (21 Jul 2021 09:35):    Few Escherichia coli    Rare Pseudomonas aeruginosa          Radiology: Imaging reviewed and visualized personally [ x]  < from: Xray Chest 1 View- PORTABLE-Urgent (Xray Chest 1 View- PORTABLE-Urgent .) (07.21.21 @ 08:29) >  IMPRESSION:    Poor inspiratory effort. The heart is normal in size. Bibasilar platelike atelectasis. No pleural effusion. No pneumothorax. No acute bony pathology could be identified.

## 2021-07-21 NOTE — PROGRESS NOTE ADULT - ASSESSMENT
89 y/o male w/ a PMHx of CAD s/p stents, TIA, HLD, GERD, BPH s/p prostate surgery, ESBL E. coli UTI secondary to left kidney stones s/p ureteral stent placement who presentedfor an interval left PCNL complicated post-operatively by hypotension likely secondary to sepsis    PLAN:    Neuro: acute post-op pain  - Multimodal pain control with acetaminophen and tramadol    Resp: no acute issues  - Out of bed to chair, ambulate as tolerated, and incentive spirometry to prevent atelectasis    CV: CAD, hypotension likely secondary to sepsis  - Will wean phenylephrine infusion as tolerated w/ goal MAP > 65 mmHg  - Home ASA for CAD    GI: no acute issues  - Pureed diet as tolerated  - Bowel regimen with senna & Miralax    Renal: CKD stage II, BPH  - Monitor I&Os and electrolytes w/ repletions as necessary  - LR at 100 mL/hr for resuscitation in the setting of sepsis  - Home finasteride and Flomax for BPH  - Will monitor Nj & nephrostomy tube output amount & quality    Heme: no acute issues  - Monitor CBC and coags  - Lovenox for VTE prophylaxis  - Home ASA    ID: ESBL E. coli UTI  - Monitor WBC, temperature, and procalcitonin  - Abx: amikacin, vanc, zosyn  - Will f/u blood and urine cultures    Endo: hyperglycemia  - Monitor glucose w/ low ISS before meals & at bedtime; hyperglycemia likely reactive in the setting of sepsis but will send HgbA1C  - Home atorvastatin for HLD      Disposition: SICU  Code Status: Full code

## 2021-07-21 NOTE — PROGRESS NOTE ADULT - SUBJECTIVE AND OBJECTIVE BOX
Urology PA Progress Note    Subjective:  Patient seen and examined at bedside. Off butch gtt. Required haldol overnight. Lactate elevated, given 500cc bolus. fever spike this AM rectal temp 101.5    Objective:  Vital signs  T(F): , Max: 101.5 (07-21-21 @ 07:00)  HR: 117 (07-21-21 @ 07:00)  BP: 129/69 (07-21-21 @ 07:00)  SpO2: 95% (07-21-21 @ 07:00)  Wt(kg): --    Output     07-20 @ 07:01  -  07-21 @ 07:00  --------------------------------------------------------  IN: 4185.4 mL / OUT: 1355 mL / NET: 2830.4 mL    NT- 265  Nj 190    Physical Exam:  Gen: NAD. Awake and alert  Pulm: nonlabored  Abd: soft, NT/ND  : L NT in place cherry red. Nj in place with clear output    Labs:  07-21  14.76 / 31.4  /1.22   07-20  18.70 / 31.0  /x                              10.2   14.76 )-----------( 106      ( 21 Jul 2021 05:11 )             31.4     07-21    137  |  105  |  14  ----------------------------<  115<H>  4.2   |  22  |  1.22    Ca    9.2      21 Jul 2021 05:11  Phos  2.7     07-21  Mg     2.0     07-21    Cx: .Blood Blood-Peripheral  07-20 @ 06:35   No growth to date.  --  --      Kidney Kidney  07-20 @ 01:50   Numerous Pseudomonas aeruginosa  Few Escherichia coli  --  --      .Urine Clean Catch (Midstream)  07-14 @ 12:34   >100,000 CFU/ml Escherichia coli  --  Escherichia coli      .Blood Blood-Peripheral  04-30 @ 20:48   No Growth Final  --  --      OR Collect Bladder (from O.R.)  04-30 @ 04:51   Moderate Escherichia coli  Moderate Escherichia coli ESBL  Multiple Morphological Strains  --  Escherichia coli  Escherichia coli ESBL      .Blood Blood-Peripheral  04-30 @ 01:05   Growth in anaerobic bottle: Escherichia coli ESBL  MDRO detected in BCID PCR, resistance marker = CTX-M (ESBL)  ***Blood Panel PCR results on this specimen are available  approximately 3 hours after the Gram stain result.***  Gram stain, PCR, and/or culture results may not always  correspond due to difference in methodologies.  ************************************************************  This PCR assay was performed by multiplex PCR. This  Assay tests for 66 bacterial and resistance gene targets.  Please refer to the [facility] test directory  at [email or web address] for details.  --  Blood Culture PCR  Escherichia coli ESBL      .Urine Clean Catch (Midstream)  04-30 @ 01:04   >100,000 CFU/ml Escherichia coli  <10,000 CFU/ml of other organisms  --  Escherichia coli         Urology PA Progress Note    Subjective:  Patient seen and examined at bedside. Off butch gtt. Required haldol overnight. Lactate elevated, given 500cc bolus. fever spike this AM rectal temp 101.5    Objective:  Vital signs  T(F): , Max: 101.5 (07-21-21 @ 07:00)  HR: 117 (07-21-21 @ 07:00)  BP: 129/69 (07-21-21 @ 07:00)  SpO2: 95% (07-21-21 @ 07:00)  Wt(kg): --    Output     07-20 @ 07:01  -  07-21 @ 07:00  --------------------------------------------------------  IN: 4185.4 mL / OUT: 1355 mL / NET: 2830.4 mL    NT- 265  Nj 190    Physical Exam:  Gen: NAD. Awake and alert  Pulm: nonlabored  Abd: soft, NT/ND  : L NT in place cherry red. Nj in place with clear output    Labs:  07-21  14.76 / 31.4  /1.22   07-20  18.70 / 31.0  /x                              10.2   14.76 )-----------( 106      ( 21 Jul 2021 05:11 )             31.4     07-21    137  |  105  |  14  ----------------------------<  115<H>  4.2   |  22  |  1.22    Ca    9.2      21 Jul 2021 05:11  Phos  2.7     07-21  Mg     2.0     07-21    Cx: .Blood Blood-Peripheral  07-20 @ 06:35   No growth to date.  --  --      Kidney Kidney  07-20 @ 01:50   Numerous Pseudomonas aeruginosa  Few Escherichia coli  --  --      .Urine Clean Catch (Midstream)  07-14 @ 12:34   >100,000 CFU/ml Escherichia coli  --  Escherichia coli      .Blood Blood-Peripheral  04-30 @ 20:48   No Growth Final  --  --      OR Collect Bladder (from O.R.)  04-30 @ 04:51   Moderate Escherichia coli  Moderate Escherichia coli ESBL  Multiple Morphological Strains  --  Escherichia coli  Escherichia coli ESBL      .Blood Blood-Peripheral  04-30 @ 01:05   Growth in anaerobic bottle: Escherichia coli ESBL  MDRO detected in BCID PCR, resistance marker = CTX-M (ESBL)  ***Blood Panel PCR results on this specimen are available  approximately 3 hours after the Gram stain result.***  Gram stain, PCR, and/or culture results may not always  correspond due to difference in methodologies.  ************************************************************  This PCR assay was performed by multiplex PCR. This  Assay tests for 66 bacterial and resistance gene targets.  Please refer to the [facility] test directory  at [email or web address] for details.  --  Blood Culture PCR  Escherichia coli ESBL      .Urine Clean Catch (Midstream)  04-30 @ 01:04   >100,000 CFU/ml Escherichia coli  <10,000 CFU/ml of other organisms  --  Escherichia coli

## 2021-07-21 NOTE — DIETITIAN INITIAL EVALUATION ADULT. - PERTINENT MEDS FT
Called [facility] for PA for outpatient surgery. Spoke to rep named Eris Luna and gave cpt codes 13941 and [id number]. No PA required. Confirmation # [id number]. MEDICATIONS  (STANDING):  acetaminophen  IVPB .. 1000 milliGRAM(s) IV Intermittent once  aspirin enteric coated 81 milliGRAM(s) Oral daily  atorvastatin 20 milliGRAM(s) Oral at bedtime  chlorhexidine 2% Cloths 1 Application(s) Topical <User Schedule>  dorzolamide 2% Ophthalmic Solution 1 Drop(s) Both EYES <User Schedule>  enoxaparin Injectable 40 milliGRAM(s) SubCutaneous every 24 hours  finasteride 5 milliGRAM(s) Oral daily  insulin lispro (ADMELOG) corrective regimen sliding scale   SubCutaneous three times a day before meals  insulin lispro (ADMELOG) corrective regimen sliding scale   SubCutaneous at bedtime  lactated ringers. 1000 milliLiter(s) (100 mL/Hr) IV Continuous <Continuous>  lactobacillus acidophilus 1 Tablet(s) Oral daily  lactulose Syrup 20 Gram(s) Oral at bedtime  multivitamin/minerals 1 Tablet(s) Oral daily  piperacillin/tazobactam IVPB.. 3.375 Gram(s) IV Intermittent every 8 hours  polyethylene glycol 3350 17 Gram(s) Oral daily  senna 2 Tablet(s) Oral at bedtime  vancomycin  IVPB 1000 milliGRAM(s) IV Intermittent every 12 hours

## 2021-07-21 NOTE — DIETITIAN INITIAL EVALUATION ADULT. - ORAL NUTRITION SUPPLEMENTS
Mighty Shake supplement (200 calories, 6 gm protein) added to meal trays. Add 2 servings Ensure Enlive (350kcal, 20gm protein per 8oz serving) daily; continue daily multivitamin and probiotic

## 2021-07-21 NOTE — CONSULT NOTE ADULT - ASSESSMENT
89 y/o male w/ a PMHx of CAD s/p stents (2014), TIA, HLD, GERD, BPH s/p prostate surgery (2017), ESBL E. coli UTI secondary to left kidney stones s/p ureteral stent placement (4/30/21) who presented on 7/19 for an interval left PCNL. Case was uneventful but post-operatively in the PACU, patient became hypotensive.     Overall septic shock, hypotension, lactic acidosis, leucocytosis, fever, thrombocytopenia, pyelonephritis post procedure.   Pseudomonas infection, positive cx finding.      Plan:   recommended to switch zosyn to sam as pt colonized with ESBL organism recently   follow up final sensitivities of urine cx   trend CBC for leucocytosis and thrombocytopenia.   repeat blood cx    Plan discussed with SICU MACIEJ Stevenson  Pager: [phone]. If no response or past 5 pm call [phone].

## 2021-07-21 NOTE — DIETITIAN INITIAL EVALUATION ADULT. - ORAL INTAKE PTA/DIET HISTORY
Diet History: Per 5/2/21 RD evaluation, pt reported good po intake PTA (no beef, no pork), typically drinking 2 Ensure daily.  Per April 2021 admit, pt s/p SLP evaluation 4/30 with recommendation for dysphagia I puree diet with thin liquids.  Allergies: NKFA  Home Nutrition Supplements: multivitamin, acidophilus Diet History: Per family, pt eats a puree diet, with 1-2 Ensure daily if po intake is poor. Pt eats no beef or pork. Family endorses very minimal intake of fruit and vegetables, very high intake of chicken, milk, Ensure, juice and sweets.  Per April 2021 admit, pt s/p SLP evaluation 4/30 with recommendation for dysphagia I puree diet with thin liquids.  Allergies: NKFA  Home Nutrition Supplements: multivitamin, acidophilus. Per family, pt takes additional vit C supplements

## 2021-07-21 NOTE — PROVIDER CONTACT NOTE (OTHER) - ASSESSMENT
Pt became increasingly restless and agitated in the chair, making multiple attempts to get up unassisted. Pt ultimately returned to bed to ensure safety. Upon return to bed, rectal temp noted to be 101.5F and HR ~ 115bpm. All other VSS and pt denies any discomfort at this time.

## 2021-07-21 NOTE — DIETITIAN INITIAL EVALUATION ADULT. - DIET TYPE
Pt reports L ear discomfort for a few weeks.  He was seen in  on 7/4 and placed on ear drops for possible inner ear infection. He had no relief from the drops and actually feels a bit worse.  He reports pain around the ear that radiated down into his neck.  Pain is mild - moderate.  He also has been feeling, \" headachy\".  He has used Dudley Atkinson on the neck with no relief.  He denies any fever, chills, ear drainage, dizziness, sore throat, swollen neck glands, or other symptoms.  Pt was advised to be seen in URI .  He verbalized understanding.   Reason for Disposition  • Diabetes mellitus or weak immune system (e.g., HIV positive, cancer chemo, splenectomy, organ transplant, chronic steroids)    Protocols used: EARACHE-A-AH    
Wife is calling for patient.  He has been diagnosed with Leukemia.  He is having head discomfort around ear and head.  Went to Urgent care for ear pain, received ear drops.  Please call.  
supplement (specify)

## 2021-07-21 NOTE — DIETITIAN INITIAL EVALUATION ADULT. - REASON FOR ADMISSION
Per chart: 89 y/o male w/ a PMHx of CAD s/p stents, TIA, HLD, GERD, BPH s/p prostate surgery, ESBL E. coli UTI secondary to left kidney stones s/p ureteral stent placement who presentedfor an interval left PCNL complicated post-operatively by hypotension likely secondary to sepsis."

## 2021-07-21 NOTE — DIETITIAN INITIAL EVALUATION ADULT. - PERTINENT LABORATORY DATA
07-21 @ 05:11: Sodium 137, Potassium 4.2, Calcium 9.2, Magnesium 2.0, Phosphorus 2.7, BUN 14, Creatinine 1.22, Glucose 115<H>  Hemoglobin : 10.2 g/dL  Hematocrit : 31.4 %    A1C with Estimated Average Glucose Result: 5.8 % (07-20-21 @ 13:26)    POCT Blood Glucose.: 112 mg/dL (07-21-21 @ 05:57)  POCT Blood Glucose.: 118 mg/dL (07-20-21 @ 21:12)

## 2021-07-21 NOTE — PROGRESS NOTE ADULT - ASSESSMENT
A/P: A/P: 89 year old male s/p L PCNL POD#2 c/b sepsis requiring ICU admission. Off pressors. Urine culture growing pseudomonas and E.Coli    - AM labs  - c/w Vanc/Zosyn  - Puree diet  - f/u blood and urine cultures  - Encourage Incentive spirometry  - Strict I&O's  - c/w disla catheter & NT  - DVT ppx  - SICU care appreciated

## 2021-07-21 NOTE — PROGRESS NOTE ADULT - SUBJECTIVE AND OBJECTIVE BOX
HPI:  89 y/o male w/ a PMHx of CAD s/p stents (2014), TIA, HLD, GERD, BPH s/p prostate surgery (2017), ESBL E. coli UTI secondary to left kidney stones s/p ureteral stent placement (4/30/21) who presented on 7/19 for an interval left PCNL. Case was uneventful but post-operatively in the PACU, patient became hypotensive. He was given 2 L of crystalloid but remained hypotensive so he was subsequently started on a phenylephrine infusion. SICU consulted for hemodynamic monitoring.    24 HOUR EVENTS:  - POCUS dry, lactate elevated -- given 500cc LR bolus  - Flomax held for hypotension  - Vancomycin and zosyn added     SUBJECTIVE/ROS:  Patient seen at bedside this AM. Reports feeling well, without complaints. Pain is well-controlled. Denies headache, shortness of breath, chest pain, dizziness.     PHYSICAL EXAM:    NEURO  Exam: awake, alert, oriented  Meds: acetaminophen   Tablet .. 650 milliGRAM(s) Oral every 6 hours PRN Mild Pain (1 - 3)  traMADol 25 milliGRAM(s) Oral every 6 hours PRN Moderate Pain (4 - 6)  traMADol 50 milliGRAM(s) Oral every 6 hours PRN Severe Pain (7 - 10)  [x] Adequacy of sedation and pain control has been assessed and adjusted    RESPIRATORY  RR: 23 (07-21-21 @ 00:00) (10 - 48)  SpO2: 95% (07-21-21 @ 00:00) (88% - 99%)  Exam: no increased WOB on RA  Mechanical Ventilation: none  Meds: none    CARDIOVASCULAR  HR: 73 (07-21-21 @ 00:00) (52 - 98)  BP: 101/58 (07-21-21 @ 00:00) (63/38 - 133/63)  BP(mean): 76 (07-21-21 @ 00:00) (46 - 93)  VBG - ( 20 Jul 2021 15:45 )  pH: 7.36  /  pCO2: 46    /  pO2: 25    / HCO3: 25    / Base Excess: 0.2   /  SaO2: 39     Lactate: 2.4    Exam: regular rate and rhythm noted on cardiac monitor  Cardiac Rhythm: normal sinus rhythm  Perfusion     [x]Adequate   [ ]Inadequate  Mentation   [x]Normal       [ ]Reduced  Extremities  [x]Warm         [ ]Cool  Volume Status [ ]Hypervolemic [x]Euvolemic [ ]Hypovolemic  Meds: phenylephrine    Infusion 0.5 MICROgram(s)/kG/Min IV Continuous <Continuous>    GI/NUTRITION  Exam: soft, nontender, nondistended  Meds: lactulose Syrup 20 Gram(s) Oral at bedtime  polyethylene glycol 3350 17 Gram(s) Oral daily  senna 2 Tablet(s) Oral at bedtime    GENITOURINARY  I&O's Detail    07-19 @ 07:01 - 07-20 @ 07:00  --------------------------------------------------------  IN:    IV PiggyBack: 150 mL    IV PiggyBack: 1375 mL    Lactated Ringers: 1100 mL    Lactated Ringers Bolus: 1000 mL    Oral Fluid: 240 mL    Phenylephrine: 74.8 mL  Total IN: 3939.8 mL    OUT:    Indwelling Catheter - Urethral (mL): 805 mL    Nephrostomy Tube (mL): 510 mL  Total OUT: 1315 mL    Total NET: 2624.8 mL      07-20 @ 07:01 - 07-21 @ 01:11  --------------------------------------------------------  IN:    IV PiggyBack: 725 mL    IV PiggyBack: 100 mL    Lactated Ringers: 1700 mL    Lactated Ringers Bolus: 500 mL    Oral Fluid: 150 mL    Phenylephrine: 160.4 mL  Total IN: 3335.4 mL    OUT:    Indwelling Catheter - Urethral (mL): 555 mL    Nephrostomy Tube (mL): 410 mL  Total OUT: 965 mL    Total NET: 2370.4 mL    Labs: 07-20    137  |  105  |  14  ----------------------------<  164<H>  4.3   |  22  |  0.96    Ca    9.7      20 Jul 2021 01:36  Phos  1.9     07-20  Mg     1.8     07-20    [x] Nj catheter, indication: perioperative s/p uro Sx  Meds: lactated ringers. 1000 milliLiter(s) IV Continuous <Continuous>  multivitamin/minerals 1 Tablet(s) Oral daily      HEMATOLOGIC  Meds: aspirin enteric coated 81 milliGRAM(s) Oral daily  enoxaparin Injectable 40 milliGRAM(s) SubCutaneous every 24 hours    Labs:             10.0   18.70 )-----------( 117      ( 20 Jul 2021 15:48 )             31.0     Transfusion     [ ] PRBC   [ ] Platelets   [ ] FFP   [ ] Cryoprecipitate      INFECTIOUS DISEASES  Labs:   WBC Count: 18.70 K/uL (07-20 @ 15:48)  WBC Count: 19.78 K/uL (07-20 @ 01:36)    Recent Cultures:  RECENT CULTURES:  Specimen Source: Kidney Kidney  Date/Time: 07-20 @ 01:50  Culture Results:   Numerous Pseudomonas aeruginosa  Few Escherichia coli  Gram Stain: --  Organism: --    Meds: amiKACIN  IVPB 360 milliGRAM(s) IV Intermittent every 8 hours  piperacillin/tazobactam IVPB.. 3.375 Gram(s) IV Intermittent every 8 hours  vancomycin  IVPB 1000 milliGRAM(s) IV Intermittent every 12 hours      ENDOCRINE  POCT Blood Glucose.: 118 mg/dL (20 Jul 2021 21:12)  POCT Blood Glucose.: 194 mg/dL (20 Jul 2021 06:37)    Meds: atorvastatin 20 milliGRAM(s) Oral at bedtime  finasteride 5 milliGRAM(s) Oral daily  insulin lispro (ADMELOG) corrective regimen sliding scale   SubCutaneous three times a day before meals  insulin lispro (ADMELOG) corrective regimen sliding scale   SubCutaneous at bedtime        ACCESS DEVICES:  [2] Peripheral IV  [ ] Central Venous Line	[ ] R	[ ] L	[ ] IJ	[ ] Fem	[ ] SC	Placed:   [ ] Arterial Line		[ ] R	[ ] L	[ ] Fem	[ ] Rad	[ ] Ax	Placed:   [ ] PICC:					[ ] Mediport  [x] Urinary Catheter, Date Placed: 7/19  [x] Necessity of urinary, arterial, and venous catheters discussed      OTHER MEDICATIONS:  chlorhexidine 2% Cloths 1 Application(s) Topical <User Schedule>  dorzolamide 2% Ophthalmic Solution 1 Drop(s) Both EYES <User Schedule>  lactobacillus acidophilus 1 Tablet(s) Oral daily        IMAGING:

## 2021-07-21 NOTE — DIETITIAN INITIAL EVALUATION ADULT. - OTHER INFO
GASTROINTESTINAL:  Last BM:   Bowel Regimen: Miralax, senna, Lactulose    NUTRITION STATUS:  - Hyperglycemia well controlled with low SSI tid  - Lactated ringers at 100 mL/hr for resuscitation in the setting of sepsis  - CKDII  - Supplementation: multivitamin, Lactobacillus acidophilus     WEIGHT HISTORY:   - Stable wt per past RD assessments: 71.8kg (2/21/17), 75.2kg (10/18/18), 77.5kg (4/30/21)  - Per May 2021 RD assessment, pt reported stable -170 pounds  - Current wt 75kg/165 pounds is within 3 year weight range GASTROINTESTINAL:  Last BM: none noted  Bowel Regimen: Miralax, senna, Lactulose    NUTRITION STATUS:  - Hyperglycemia well controlled with low SSI tid  - Lactated ringers at 100 mL/hr for resuscitation in the setting of sepsis  - CKDII  - Supplementation: multivitamin, Lactobacillus acidophilus     WEIGHT HISTORY:   - Stable wt per past RD assessments: 71.8kg (2/21/17), 75.2kg (10/18/18), 77.5kg (4/30/21)  - Per May 2021 RD assessment, pt reported stable -170 pounds  - Current wt 75kg/165 pounds is within 3 year weight range, however family reports gradual weight loss    EDUCATION: Family requested education and handouts on nutrition therapy for kidney stones. Recommendations reviewed in detail, handouts provided.

## 2021-07-21 NOTE — DIETITIAN INITIAL EVALUATION ADULT. - REASON INDICATOR FOR ASSESSMENT
Nutrition Assessment warranted for length of stay on SICU  Information obtained from: medical record, communication with team. Nutrition Assessment warranted for length of stay on SICU  Information obtained from: family at bedside, medical record, communication with team. Pt A&O x 1-2, confused.

## 2021-07-21 NOTE — PROVIDER CONTACT NOTE (OTHER) - ACTION/TREATMENT ORDERED:
1g IV Tylenol and blood cultures x2 sets ordered. Cooling measures implemented. Will continue to monitor.

## 2021-07-22 LAB
-  AMIKACIN: SIGNIFICANT CHANGE UP
-  AZTREONAM: SIGNIFICANT CHANGE UP
-  CEFEPIME: SIGNIFICANT CHANGE UP
-  CEFTAZIDIME: SIGNIFICANT CHANGE UP
-  CIPROFLOXACIN: SIGNIFICANT CHANGE UP
-  GENTAMICIN: SIGNIFICANT CHANGE UP
-  LEVOFLOXACIN: SIGNIFICANT CHANGE UP
-  MEROPENEM: SIGNIFICANT CHANGE UP
-  PIPERACILLIN/TAZOBACTAM: SIGNIFICANT CHANGE UP
-  TOBRAMYCIN: SIGNIFICANT CHANGE UP
AMIKACIN TROUGH SERPL-MCNC: 2.5 UG/ML — SIGNIFICANT CHANGE UP
METHOD TYPE: SIGNIFICANT CHANGE UP

## 2021-07-22 PROCEDURE — 99231 SBSQ HOSP IP/OBS SF/LOW 25: CPT

## 2021-07-22 PROCEDURE — 99232 SBSQ HOSP IP/OBS MODERATE 35: CPT

## 2021-07-22 PROCEDURE — 74176 CT ABD & PELVIS W/O CONTRAST: CPT | Mod: 26

## 2021-07-22 PROCEDURE — 71045 X-RAY EXAM CHEST 1 VIEW: CPT | Mod: 26

## 2021-07-22 RX ADMIN — DORZOLAMIDE HYDROCHLORIDE 1 DROP(S): 20 SOLUTION/ DROPS OPHTHALMIC at 22:26

## 2021-07-22 RX ADMIN — Medication 100 MILLIEQUIVALENT(S): at 03:43

## 2021-07-22 RX ADMIN — POLYETHYLENE GLYCOL 3350 17 GRAM(S): 17 POWDER, FOR SOLUTION ORAL at 14:24

## 2021-07-22 RX ADMIN — Medication 3 MILLILITER(S): at 17:29

## 2021-07-22 RX ADMIN — Medication 100 MILLIEQUIVALENT(S): at 01:58

## 2021-07-22 RX ADMIN — POTASSIUM PHOSPHATE, MONOBASIC POTASSIUM PHOSPHATE, DIBASIC 83.33 MILLIMOLE(S): 236; 224 INJECTION, SOLUTION INTRAVENOUS at 05:08

## 2021-07-22 RX ADMIN — Medication 5 MILLIGRAM(S): at 21:58

## 2021-07-22 RX ADMIN — Medication 1 TABLET(S): at 12:11

## 2021-07-22 RX ADMIN — Medication 100 MILLIEQUIVALENT(S): at 00:12

## 2021-07-22 RX ADMIN — Medication 81 MILLIGRAM(S): at 12:12

## 2021-07-22 RX ADMIN — ENOXAPARIN SODIUM 40 MILLIGRAM(S): 100 INJECTION SUBCUTANEOUS at 05:08

## 2021-07-22 RX ADMIN — MEROPENEM 100 MILLIGRAM(S): 1 INJECTION INTRAVENOUS at 17:29

## 2021-07-22 RX ADMIN — SENNA PLUS 2 TABLET(S): 8.6 TABLET ORAL at 21:58

## 2021-07-22 RX ADMIN — ATORVASTATIN CALCIUM 20 MILLIGRAM(S): 80 TABLET, FILM COATED ORAL at 21:58

## 2021-07-22 RX ADMIN — Medication 3 MILLILITER(S): at 05:19

## 2021-07-22 RX ADMIN — CHLORHEXIDINE GLUCONATE 1 APPLICATION(S): 213 SOLUTION TOPICAL at 05:08

## 2021-07-22 RX ADMIN — Medication 1 TABLET(S): at 12:12

## 2021-07-22 RX ADMIN — LACTULOSE 20 GRAM(S): 10 SOLUTION ORAL at 21:57

## 2021-07-22 RX ADMIN — DORZOLAMIDE HYDROCHLORIDE 1 DROP(S): 20 SOLUTION/ DROPS OPHTHALMIC at 08:56

## 2021-07-22 RX ADMIN — Medication 3 MILLILITER(S): at 12:52

## 2021-07-22 RX ADMIN — FINASTERIDE 5 MILLIGRAM(S): 5 TABLET, FILM COATED ORAL at 12:11

## 2021-07-22 RX ADMIN — MEROPENEM 100 MILLIGRAM(S): 1 INJECTION INTRAVENOUS at 05:08

## 2021-07-22 NOTE — PROGRESS NOTE ADULT - ASSESSMENT
89 y/o male w/ a PMHx of CAD s/p stents, TIA, HLD, GERD, BPH s/p prostate surgery, ESBL E. coli UTI secondary to left kidney stones s/p ureteral stent placement who presentedfor an interval left PCNL complicated post-operatively by hypotension likely secondary to sepsis    PLAN:    Neuro: acute post-op pain  - Multimodal pain control with acetaminophen and tramadol    Resp: no acute issues  - Out of bed to chair, ambulate as tolerated, and incentive spirometry to prevent atelectasis    CV: CAD, hypotension likely secondary to sepsis  - Will wean phenylephrine infusion as tolerated w/ goal MAP > 65 mmHg  - Home ASA for CAD    GI: no acute issues  - Pureed diet as tolerated  - Bowel regimen with senna & Miralax    Renal: CKD stage II, BPH  - Monitor I&Os and electrolytes w/ repletions as necessary  - Home finasteride and Flomax for BPH  - Will monitor Nj & nephrostomy tube output amount & quality    Heme: no acute issues  - Monitor CBC and coags  - Lovenox for VTE prophylaxis  - Home ASA    ID: ESBL E. coli UTI -- s/p amikacin, vanc, zosyn  - Monitor WBC, temperature, and procalcitonin  - Abx: meropenem  - Will f/u blood and urine cultures    Endo: hyperglycemia  - Monitor glucose w/ low ISS before meals & at bedtime; hyperglycemia likely reactive in the setting of sepsis but will send HgbA1C  - Home atorvastatin for HLD      Disposition: SICU  Code Status: Full code

## 2021-07-22 NOTE — PROGRESS NOTE ADULT - SUBJECTIVE AND OBJECTIVE BOX
HPI:  89 y/o male w/ a PMHx of CAD s/p stents (2014), TIA, HLD, GERD, BPH s/p prostate surgery (2017), ESBL E. coli UTI secondary to left kidney stones s/p ureteral stent placement (4/30/21) who presented on 7/19 for an interval left PCNL. Case was uneventful but post-operatively in the PACU, patient became hypotensive. He was given 2 L of crystalloid but remained hypotensive so he was subsequently started on a phenylephrine infusion. SICU consulted for hemodynamic monitoring.    24 HOUR EVENTS:  - Amikacin, vancomycin, and zosyn d/c'd -- transitioned to meropenem   - Started on melatonin for nighttime restlessness  - Fever to Tmax 101.5 yesterday, CXR WNL repeat BCx sent, started on chest PT and duonebs  - IVL'd    SUBJECTIVE/ROS:  Patient seen at bedside this AM. Reports feeling well, without complaints. Pain is well-controlled. Denies headache, shortness of breath, chest pain, dizziness.     PHYSICAL EXAM:    NEURO  Exam: awake, alert, oriented  Meds: acetaminophen   Tablet .. 650 milliGRAM(s) Oral every 6 hours PRN Mild Pain (1 - 3)  melatonin 5 milliGRAM(s) Oral at bedtime  traMADol 25 milliGRAM(s) Oral every 6 hours PRN Moderate Pain (4 - 6)  traMADol 50 milliGRAM(s) Oral every 6 hours PRN Severe Pain (7 - 10)  [x] Adequacy of sedation and pain control has been assessed and adjusted    RESPIRATORY  RR: 19 (07-22-21 @ 01:00) (16 - 38)  SpO2: 95% (07-22-21 @ 01:00) (84% - 98%)  Exam: no increased WOB on RA  Mechanical Ventilation: none  Meds: albuterol/ipratropium for Nebulization 3 milliLiter(s) Nebulizer every 6 hours    CARDIOVASCULAR  HR: 94 (07-22-21 @ 01:00) (74 - 117)  BP: 138/63 (07-22-21 @ 01:00) (91/52 - 161/74)  BP(mean): 91 (07-22-21 @ 01:00) (67 - 117)  VBG - ( 21 Jul 2021 04:55 )  pH: 7.38  /  pCO2: 45    /  pO2: 22    / HCO3: 26    / Base Excess: 1.1   /  SaO2: 33     Lactate: 1.5    Exam: regular rate and rhythm noted on cardiac monitor  Cardiac Rhythm: normal sinus rhythm  Perfusion     [x]Adequate   [ ]Inadequate  Mentation   [x]Normal       [ ]Reduced  Extremities  [x]Warm         [ ]Cool  Volume Status [ ]Hypervolemic [x]Euvolemic [ ]Hypovolemic  Meds: none    GI/NUTRITION  Exam: soft, nontender, nondistended  Meds: lactulose Syrup 20 Gram(s) Oral at bedtime  polyethylene glycol 3350 17 Gram(s) Oral daily  senna 2 Tablet(s) Oral at bedtime    GENITOURINARY  I&O's Detail    07-20 @ 07:01 - 07-21 @ 07:00  --------------------------------------------------------  IN:    IV PiggyBack: 250 mL    IV PiggyBack: 725 mL    IV PiggyBack: 200 mL    Lactated Ringers: 2200 mL    Lactated Ringers Bolus: 500 mL    Oral Fluid: 150 mL    Phenylephrine: 160.4 mL  Total IN: 4185.4 mL    OUT:    Indwelling Catheter - Urethral (mL): 765 mL    Nephrostomy Tube (mL): 590 mL  Total OUT: 1355 mL    Total NET: 2830.4 mL    07-21 @ 07:01 - 07-22 @ 02:09  --------------------------------------------------------  IN:    IV PiggyBack: 100 mL    IV PiggyBack: 200 mL    Lactated Ringers: 1600 mL    Oral Fluid: 250 mL  Total IN: 2150 mL    OUT:    Indwelling Catheter - Urethral (mL): 2350 mL    Nephrostomy Tube (mL): 1500 mL  Total OUT: 3850 mL    Total NET: -1700 mL    Labs:     139  |  110<H>  |  11  ----------------------------<  134<H>  3.6   |  22  |  1.02    Ca    9.5      21 Jul 2021 23:18  Phos  2.4     07-21  Mg     2.0     07-21    [x] Nj catheter, indication  Meds: multivitamin/minerals 1 Tablet(s) Oral daily  potassium chloride  10 mEq/100 mL IVPB 10 milliEquivalent(s) IV Intermittent every 1 hour  potassium phosphate IVPB 30 milliMole(s) IV Intermittent once    HEMATOLOGIC  Meds: aspirin enteric coated 81 milliGRAM(s) Oral daily  enoxaparin Injectable 40 milliGRAM(s) SubCutaneous every 24 hours    Labs:              10.8   13.80 )-----------( 111      ( 21 Jul 2021 23:18 )             32.8   Transfusion     [ ] PRBC   [ ] Platelets   [ ] FFP   [ ] Cryoprecipitate      INFECTIOUS DISEASES  Labs:   WBC Count: 13.80 K/uL (07-21 @ 23:18)  WBC Count: 14.76 K/uL (07-21 @ 05:11)    Recent Cultures:  Specimen Source: Clean Catch Clean Catch (Midstream)  Date/Time: 07-20 @ 09:28  Culture Results:   <10,000 CFU/mL Normal Urogenital Anjelica  Gram Stain: --  Organism: --  Specimen Source: .Blood Blood-Peripheral  Date/Time: 07-20 @ 06:35  Culture Results:   No growth to date.  Gram Stain: --  Organism: --  Specimen Source: Kidney Kidney  Date/Time: 07-20 @ 01:50  Culture Results:   Numerous Pseudomonas aeruginosa  Few Escherichia coli ESBL  Gram Stain: --  Organism: Pseudomonas aeruginosa  Escherichia coli ESBL  Specimen Source: Ear left kidney stone culture  Date/Time: 07-20 @ 01:37  Culture Results:   Few Escherichia coli  Rare Pseudomonas aeruginosa  Gram Stain: --  Organism: --    Meds: meropenem  IVPB 1000 milliGRAM(s) IV Intermittent every 12 hours      ENDOCRINE  POCT Blood Glucose.: 112 mg/dL (21 Jul 2021 05:57)    Meds: atorvastatin 20 milliGRAM(s) Oral at bedtime  finasteride 5 milliGRAM(s) Oral daily        ACCESS DEVICES:  [2] Peripheral IV  [ ] Central Venous Line	[ ] R	[ ] L	[ ] IJ	[ ] Fem	[ ] SC	Placed:   [ ] Arterial Line		[ ] R	[ ] L	[ ] Fem	[ ] Rad	[ ] Ax	Placed:   [ ] PICC:					[ ] Mediport  [x] Urinary Catheter, Date Placed: 7/19  [x] Necessity of urinary, arterial, and venous catheters discussed      OTHER MEDICATIONS:  chlorhexidine 2% Cloths 1 Application(s) Topical <User Schedule>  dorzolamide 2% Ophthalmic Solution 1 Drop(s) Both EYES <User Schedule>  lactobacillus acidophilus 1 Tablet(s) Oral daily

## 2021-07-22 NOTE — PROGRESS NOTE ADULT - ASSESSMENT
A/P: A/P: 89 year old male s/p L PCNL POD#3 c/b hypotension requiring ICU admission. Off pressors. Urine culture growing pseudomonas and E.Coli    - continue meropenem  - Puree diet  - f/u all blood and urine cultures  - CT scan today to assess residual stone burden  - Incentive spirometry  - Strict I&O's  - DVT ppx  - supportive care by ICU A/P: A/P: 89 year old male s/p L PCNL POD#3 c/b hypotension requiring ICU admission. Off pressors. Urine culture growing pseudomonas and E.Coli    - continue meropenem  - Puree diet  - f/u all blood and urine cultures  - CT Abd/Pelvis noncon today to assess residual stone burden  - Incentive spirometry  - Strict I&O's  - DVT ppx  - supportive care by ICU

## 2021-07-22 NOTE — PROGRESS NOTE ADULT - ASSESSMENT
89 y/o male w/ a PMHx of CAD s/p stents (2014), TIA, HLD, GERD, BPH s/p prostate surgery (2017), ESBL E. coli UTI secondary to left kidney stones s/p ureteral stent placement (4/30/21) who presented on 7/19 for an interval left PCNL. Case was uneventful but post-operatively in the PACU, patient became hypotensive.     Overall septic shock, hypotension, lactic acidosis, leucocytosis, fever, thrombocytopenia, pyelonephritis post procedure.   Pseudomonas infection, positive cx finding.  resolved shock, leucocytosis trending down.     Plan:   c/w sam based on cx results   will need 10 days of therapy   trend CBC for leucocytosis and thrombocytopenia.   repeat blood cx, NTD     Plan discussed with SICU MACIEJ Stevenson  Pager: [phone]. If no response or past 5 pm call [phone].

## 2021-07-22 NOTE — PROGRESS NOTE ADULT - SUBJECTIVE AND OBJECTIVE BOX
The patient was seen and examined at bedside.  No overnight events, except that the patient was tugging on his disla catheter.  He was instructed not to do so.    T(C): 36.7 (07-22-21 @ 03:00), Max: 38.6 (07-21-21 @ 07:00)  HR: 99 (07-22-21 @ 06:00) (72 - 117)  BP: 138/67 (07-22-21 @ 06:00) (91/52 - 161/74)  RR: 23 (07-22-21 @ 06:00) (16 - 38)  SpO2: 96% (07-22-21 @ 06:00) (84% - 98%)  Wt(kg): --    Physical Exam:    General: NAD, A+Ox3  Abdomen: soft, non-tender, non-distended  Back: dressing clean/dry/intact; +minimal ecchymosis, no swelling      07-20 @ 07:01  -  07-21 @ 07:00  --------------------------------------------------------  IN: 4185.4 mL / OUT: 1355 mL / NET: 2830.4 mL    07-21 @ 07:01  -  07-22 @ 06:46  --------------------------------------------------------  IN: 2599.9 mL / OUT: 5200 mL / NET: -2600.1 mL      Disla - 1450cc clear    L NT - 1300cc blood tinged                            10.8   13.80 )-----------( 111               32.8     139  |  110  |  11  ----------------------------<  134  3.6   |  22  |  1.02    Ca    9.5  Phos  2.4  Mg     2.0

## 2021-07-22 NOTE — PROGRESS NOTE ADULT - SUBJECTIVE AND OBJECTIVE BOX
89yPatient is a 89y old  Male who presents with a chief complaint of Per chart: 89 y/o male w/ a PMHx of CAD s/p stents, TIA, HLD, GERD, BPH s/p prostate surgery, ESBL E. coli UTI secondary to left kidney stones s/p ureteral stent placement who presentedfor an interval left PCNL complicated post-operatively by hypotension likely secondary to sepsis." (21 Jul 2021 07:11)      Interval history:  Afebrile, feeling well, pain in lt flank improved.       Allergies:   No Known Allergies      Antimicrobials:  meropenem  IVPB 1000 milliGRAM(s) IV Intermittent every 12 hours      REVIEW OF SYSTEMS:  No chest pain  No SOB  No abdominal pain  No rash.       Vital Signs Last 24 Hrs  T(C): 36.4 (07-22-21 @ 16:28), Max: 37.3 (07-22-21 @ 16:00)  T(F): 97.5 (07-22-21 @ 16:28), Max: 99.1 (07-22-21 @ 16:00)  HR: 86 (07-22-21 @ 16:28) (71 - 110)  BP: 117/68 (07-22-21 @ 16:28) (112/56 - 161/74)  BP(mean): 84 (07-22-21 @ 15:00) (80 - 118)  RR: 18 (07-22-21 @ 16:28) (18 - 40)  SpO2: 97% (07-22-21 @ 16:28) (84% - 100%)      PHYSICAL EXAM:  Patient in no acute distress. Alert, awake.   Cardiovascular: S1S2 normal, tachy irregular   Lungs: + air entry B/L lung fields.  Gastrointestinal: soft, nontender, nondistended.  Extremities: no edema.  IV sites not inflamed.   + perc nephrostomy, + disla                             10.8   13.80 )-----------( 111      ( 21 Jul 2021 23:18 )             32.8   07-21    139  |  110<H>  |  11  ----------------------------<  134<H>  3.6   |  22  |  1.02    Ca    9.5      21 Jul 2021 23:18  Phos  2.4     07-21  Mg     2.0     07-21        Culture - Blood (collected 21 Jul 2021 09:23)  Source: .Blood Blood-Peripheral  Preliminary Report (22 Jul 2021 10:01):    No growth to date.    Culture - Blood (collected 21 Jul 2021 09:23)  Source: .Blood Blood-Peripheral  Preliminary Report (22 Jul 2021 10:01):    No growth to date.    Culture - Urine (collected 20 Jul 2021 09:28)  Source: .Urine Nephrostomy - Left  Final Report (21 Jul 2021 15:57):    No growth    Culture - Urine (collected 20 Jul 2021 09:28)  Source: Clean Catch Clean Catch (Midstream)  Final Report (21 Jul 2021 15:59):    <10,000 CFU/mL Normal Urogenital Anjelica    Culture - Blood (collected 20 Jul 2021 06:35)  Source: .Blood Blood-Venous  Preliminary Report (21 Jul 2021 07:02):    No growth to date.    Culture - Blood (collected 20 Jul 2021 06:35)  Source: .Blood Blood-Peripheral  Preliminary Report (21 Jul 2021 07:02):    No growth to date.    Culture - Urine (collected 20 Jul 2021 01:50)  Source: Kidney Kidney  Final Report (21 Jul 2021 17:48):    Numerous Pseudomonas aeruginosa    Few Escherichia coli ESBL  Organism: Pseudomonas aeruginosa  Escherichia coli ESBL (21 Jul 2021 17:48)  Organism: Escherichia coli ESBL (21 Jul 2021 17:48)  Organism: Pseudomonas aeruginosa (21 Jul 2021 17:48)    Culture - Other (collected 20 Jul 2021 01:37)  Source: Ear left kidney stone culture  Preliminary Report (22 Jul 2021 08:18):    Few Escherichia coli Susceptibility to follow.    Rare Pseudomonas aeruginosa  Organism: Pseudomonas aeruginosa (22 Jul 2021 08:14)  Organism: Pseudomonas aeruginosa (22 Jul 2021 08:14)        Radiology:  < from: CT Abdomen and Pelvis No Cont (07.22.21 @ 11:50) >  IMPRESSION:    Limited noncontrast study.    Left nephroureteral tube terminates in the distal left ureter. Few very tiny residual calculi identified measuring 0.3 cm image 63 series 3, 0.3 cm image 66 series 3, and 0.2 cm, image 72 series 3. No stones are noted along the left ureter. Asymmetric left-sided perinephric and periureteral strandingmay be postprocedural in nature.    Limited evaluation of the lower thorax due to motion. Small pleural effusions and intralobular septal wall thickening, likely reflective of pulmonary edema. Compressive atelectasis and patchy airspace opacificationof the lung bases could reflect atelectasis or infection. Lateral hiatal hernia.

## 2021-07-23 LAB
-  AMIKACIN: SIGNIFICANT CHANGE UP
-  AMOXICILLIN/CLAVULANIC ACID: SIGNIFICANT CHANGE UP
-  AMPICILLIN/SULBACTAM: SIGNIFICANT CHANGE UP
-  AMPICILLIN: SIGNIFICANT CHANGE UP
-  AZTREONAM: SIGNIFICANT CHANGE UP
-  CEFAZOLIN: SIGNIFICANT CHANGE UP
-  CEFEPIME: SIGNIFICANT CHANGE UP
-  CEFOXITIN: SIGNIFICANT CHANGE UP
-  CEFTRIAXONE: SIGNIFICANT CHANGE UP
-  CIPROFLOXACIN: SIGNIFICANT CHANGE UP
-  ERTAPENEM: SIGNIFICANT CHANGE UP
-  GENTAMICIN: SIGNIFICANT CHANGE UP
-  IMIPENEM: SIGNIFICANT CHANGE UP
-  LEVOFLOXACIN: SIGNIFICANT CHANGE UP
-  MEROPENEM: SIGNIFICANT CHANGE UP
-  PIPERACILLIN/TAZOBACTAM: SIGNIFICANT CHANGE UP
-  TOBRAMYCIN: SIGNIFICANT CHANGE UP
-  TRIMETHOPRIM/SULFAMETHOXAZOLE: SIGNIFICANT CHANGE UP
AMIKACIN TROUGH SERPL-MCNC: <0.8 UG/ML — SIGNIFICANT CHANGE UP
ANION GAP SERPL CALC-SCNC: 12 MMOL/L — SIGNIFICANT CHANGE UP (ref 5–17)
BUN SERPL-MCNC: 10 MG/DL — SIGNIFICANT CHANGE UP (ref 7–23)
CALCIUM SERPL-MCNC: 9.8 MG/DL — SIGNIFICANT CHANGE UP (ref 8.4–10.5)
CHLORIDE SERPL-SCNC: 107 MMOL/L — SIGNIFICANT CHANGE UP (ref 96–108)
CO2 SERPL-SCNC: 21 MMOL/L — LOW (ref 22–31)
CREAT SERPL-MCNC: 0.81 MG/DL — SIGNIFICANT CHANGE UP (ref 0.5–1.3)
CULTURE RESULTS: SIGNIFICANT CHANGE UP
GLUCOSE SERPL-MCNC: 116 MG/DL — HIGH (ref 70–99)
HCT VFR BLD CALC: 33.2 % — LOW (ref 39–50)
HGB BLD-MCNC: 10.9 G/DL — LOW (ref 13–17)
MAGNESIUM SERPL-MCNC: 1.9 MG/DL — SIGNIFICANT CHANGE UP (ref 1.6–2.6)
MCHC RBC-ENTMCNC: 30 PG — SIGNIFICANT CHANGE UP (ref 27–34)
MCHC RBC-ENTMCNC: 32.8 GM/DL — SIGNIFICANT CHANGE UP (ref 32–36)
MCV RBC AUTO: 91.5 FL — SIGNIFICANT CHANGE UP (ref 80–100)
METHOD TYPE: SIGNIFICANT CHANGE UP
NRBC # BLD: 0 /100 WBCS — SIGNIFICANT CHANGE UP (ref 0–0)
ORGANISM # SPEC MICROSCOPIC CNT: SIGNIFICANT CHANGE UP
PHOSPHATE SERPL-MCNC: 2 MG/DL — LOW (ref 2.5–4.5)
PLATELET # BLD AUTO: 121 K/UL — LOW (ref 150–400)
POTASSIUM SERPL-MCNC: 3.7 MMOL/L — SIGNIFICANT CHANGE UP (ref 3.5–5.3)
POTASSIUM SERPL-SCNC: 3.7 MMOL/L — SIGNIFICANT CHANGE UP (ref 3.5–5.3)
PROCALCITONIN SERPL-MCNC: 1.1 NG/ML — HIGH (ref 0.02–0.1)
RBC # BLD: 3.63 M/UL — LOW (ref 4.2–5.8)
RBC # FLD: 14.4 % — SIGNIFICANT CHANGE UP (ref 10.3–14.5)
SODIUM SERPL-SCNC: 140 MMOL/L — SIGNIFICANT CHANGE UP (ref 135–145)
SPECIMEN SOURCE: SIGNIFICANT CHANGE UP
WBC # BLD: 11.25 K/UL — HIGH (ref 3.8–10.5)
WBC # FLD AUTO: 11.25 K/UL — HIGH (ref 3.8–10.5)

## 2021-07-23 PROCEDURE — 71045 X-RAY EXAM CHEST 1 VIEW: CPT | Mod: 26

## 2021-07-23 PROCEDURE — 99232 SBSQ HOSP IP/OBS MODERATE 35: CPT

## 2021-07-23 RX ORDER — MEROPENEM 1 G/30ML
1000 INJECTION INTRAVENOUS
Qty: 27000 | Refills: 0
Start: 2021-07-23 | End: 2021-07-31

## 2021-07-23 RX ORDER — MEROPENEM 1 G/30ML
1000 INJECTION INTRAVENOUS
Qty: 0 | Refills: 0 | DISCHARGE
Start: 2021-07-23

## 2021-07-23 RX ADMIN — Medication 1 TABLET(S): at 12:30

## 2021-07-23 RX ADMIN — Medication 3 MILLILITER(S): at 12:30

## 2021-07-23 RX ADMIN — Medication 1 TABLET(S): at 12:29

## 2021-07-23 RX ADMIN — Medication 3 MILLILITER(S): at 01:07

## 2021-07-23 RX ADMIN — MEROPENEM 100 MILLIGRAM(S): 1 INJECTION INTRAVENOUS at 05:57

## 2021-07-23 RX ADMIN — ATORVASTATIN CALCIUM 20 MILLIGRAM(S): 80 TABLET, FILM COATED ORAL at 22:15

## 2021-07-23 RX ADMIN — SENNA PLUS 2 TABLET(S): 8.6 TABLET ORAL at 22:15

## 2021-07-23 RX ADMIN — Medication 81 MILLIGRAM(S): at 12:30

## 2021-07-23 RX ADMIN — FINASTERIDE 5 MILLIGRAM(S): 5 TABLET, FILM COATED ORAL at 12:30

## 2021-07-23 RX ADMIN — Medication 5 MILLIGRAM(S): at 22:15

## 2021-07-23 RX ADMIN — POLYETHYLENE GLYCOL 3350 17 GRAM(S): 17 POWDER, FOR SOLUTION ORAL at 12:30

## 2021-07-23 RX ADMIN — LACTULOSE 20 GRAM(S): 10 SOLUTION ORAL at 22:14

## 2021-07-23 RX ADMIN — Medication 3 MILLILITER(S): at 17:41

## 2021-07-23 RX ADMIN — ENOXAPARIN SODIUM 40 MILLIGRAM(S): 100 INJECTION SUBCUTANEOUS at 05:57

## 2021-07-23 RX ADMIN — MEROPENEM 100 MILLIGRAM(S): 1 INJECTION INTRAVENOUS at 17:41

## 2021-07-23 RX ADMIN — Medication 3 MILLILITER(S): at 05:57

## 2021-07-23 RX ADMIN — CHLORHEXIDINE GLUCONATE 1 APPLICATION(S): 213 SOLUTION TOPICAL at 08:14

## 2021-07-23 RX ADMIN — DORZOLAMIDE HYDROCHLORIDE 1 DROP(S): 20 SOLUTION/ DROPS OPHTHALMIC at 08:14

## 2021-07-23 RX ADMIN — DORZOLAMIDE HYDROCHLORIDE 1 DROP(S): 20 SOLUTION/ DROPS OPHTHALMIC at 20:00

## 2021-07-23 NOTE — PROGRESS NOTE ADULT - ASSESSMENT
A/P: A/P: 89 year old male s/p L PCNL POD#4 c/b hypotension requiring ICU admission. Off pressors. Urine culture growing pseudomonas and E.Coli, now stable on floor    - continue meropenem  - Puree diet  - f/u all blood and urine cultures  - NT removed at bedside  - Incentive spirometry  - DVT ppx  - f/u ID plan  - PT  - discharge planning

## 2021-07-23 NOTE — PROGRESS NOTE ADULT - SUBJECTIVE AND OBJECTIVE BOX
UROLOGY DAILY PROGRESS NOTE:     Subjective: Patient seen and examined at bedside. No overnight events.       Objective:  Vital signs  T(F): , Max: 99.1 (07-22-21 @ 16:00)  HR: 84 (07-23-21 @ 05:35)  BP: 134/75 (07-23-21 @ 05:35)  SpO2: 96% (07-23-21 @ 05:35)  Wt(kg): --    I&O's Summary    22 Jul 2021 07:01  -  23 Jul 2021 07:00  --------------------------------------------------------  IN: 1416.6 mL / OUT: 3050 mL / NET: -1633.4 mL    I&O's Detail    22 Jul 2021 07:01  -  23 Jul 2021 07:00  --------------------------------------------------------  IN:    IV PiggyBack: 166.6 mL    IV PiggyBack: 50 mL    Oral Fluid: 1200 mL  Total IN: 1416.6 mL    OUT:    Indwelling Catheter - Urethral (mL): 550 mL    Nephrostomy Tube (mL): 1950 mL    Voided (mL): 550 mL  Total OUT: 3050 mL    Total NET: -1633.4 mL          Gen: NAD  Pulm: No respiratory distress, no subcostal retractions  CV: RRR, no JVD  Abd: Soft, NT, ND  Back: NT draining clear urine    Labs:  07-21  13.80 / 32.8  /1.02                           10.8   13.80 )-----------( 111      ( 21 Jul 2021 23:18 )             32.8     07-21    139  |  110<H>  |  11  ----------------------------<  134<H>  3.6   |  22  |  1.02    Ca    9.5      21 Jul 2021 23:18  Phos  2.4     07-21  Mg     2.0     07-21          Urine Cx:  Culture - Urine (07.20.21 @ 09:28)    Specimen Source: .Urine Nephrostomy - Left    Culture Results:   No growth    Culture - Blood (07.21.21 @ 09:23)    Specimen Source: .Blood Blood-Peripheral    Culture Results:   No growth to date.    Culture - Urine (07.20.21 @ 01:50)    -  Tigecycline: S <=2    -  Tobramycin: R >8    -  Tobramycin: S <=2    -  Trimethoprim/Sulfamethoxazole: R >2/38    -  Amikacin: S <=16    -  Amikacin: S <=16    -  Amoxicillin/Clavulanic Acid: I 16/8    -  Ampicillin: R >16 These ampicillin results predict results for amoxicillin    -  Ampicillin/Sulbactam: R >16/8 Enterobacter, Citrobacter, and Serratia may develop resistance during prolonged therapy (3-4 days)    -  Aztreonam: R >16    -  Aztreonam: S <=4    -  Cefazolin: R >16 (MIC_CL_COM_ENTERIC_CEFAZU) For uncomplicated UTI with K. pneumoniae, E. coli, or P. mirablis: LUIS <=16 is sensitive and LUIS >=32 is resistant. This also predicts results for oral agents cefaclor, cefdinir, cefpodoxime, cefprozil, cefuroxime axetil, cephalexin and locarbef for uncomplicated UTI. Note that some isolates may be susceptible to these agents while testing resistant to cefazolin.    -  Cefepime: R >16    -  Cefepime: S 8    -  Cefoxitin: I 16    -  Ceftazidime: S 4    -  Ceftriaxone: R >32 Enterobacter, Citrobacter, and Serratia may develop resistance during prolonged therapy    -  Ciprofloxacin: R >2    -  Ciprofloxacin: S 0.5    -  Ertapenem: S <=0.5    -  Gentamicin: S <=2    -  Gentamicin: S 4    -  Imipenem: S <=1    -  Imipenem: S <=1    -  Levofloxacin: R >4    -  Levofloxacin: S 1    -  Meropenem: S <=1    -  Meropenem: S <=1    -  Piperacillin/Tazobactam: R 16    -  Piperacillin/Tazobactam: S <=8    Specimen Source: Kidney Kidney    Culture Results:   Numerous Pseudomonas aeruginosa  Few Escherichia coli ESBL    Organism Identification: Pseudomonas aeruginosa  Escherichia coli ESBL    Organism: Pseudomonas aeruginosa    Organism: Escherichia coli ESBL    Method Type: LUIS    Method Type: LUIS    < from: CT Abdomen and Pelvis No Cont (07.22.21 @ 11:50) >    IMPRESSION:    Limited noncontrast study.    Left nephroureteral tube terminates in the distal left ureter. Few very tiny residual calculi identified measuring 0.3 cm image 63 series 3, 0.3 cm image 66 series 3, and 0.2 cm, image 72 series 3. No stones are noted along the left ureter. Asymmetric left-sided perinephric and periureteral strandingmay be postprocedural in nature.    Limited evaluation of the lower thorax due to motion. Small pleural effusions and intralobular septal wall thickening, likely reflective of pulmonary edema. Compressive atelectasis and patchy airspace opacificationof the lung bases could reflect atelectasis or infection. Lateral hiatal hernia.    < end of copied text >   UROLOGY DAILY PROGRESS NOTE:     Subjective: Patient seen and examined at bedside. No overnight events.       Objective:  Vital signs  T(F): , Max: 99.1 (07-22-21 @ 16:00)  HR: 84 (07-23-21 @ 05:35)  BP: 134/75 (07-23-21 @ 05:35)  SpO2: 96% (07-23-21 @ 05:35)  Wt(kg): --    I&O's Summary    22 Jul 2021 07:01  -  23 Jul 2021 07:00  --------------------------------------------------------  IN: 1416.6 mL / OUT: 3050 mL / NET: -1633.4 mL    I&O's Detail    22 Jul 2021 07:01  -  23 Jul 2021 07:00  --------------------------------------------------------  IN:    IV PiggyBack: 166.6 mL    IV PiggyBack: 50 mL    Oral Fluid: 1200 mL  Total IN: 1416.6 mL    OUT:    Indwelling Catheter - Urethral (mL): 550 mL    Nephrostomy Tube (mL): 1950 mL    Voided (mL): 550 mL  Total OUT: 3050 mL    Total NET: -1633.4 mL          Gen: NAD  Pulm: No respiratory distress, no subcostal retractions  CV: RRR, no JVD  Abd: Soft, NT, ND  Back: NT draining clear urine  Resolving subcutaneous hematoma left flank    Labs:  07-21  13.80 / 32.8  /1.02                           10.8   13.80 )-----------( 111      ( 21 Jul 2021 23:18 )             32.8     07-21    139  |  110<H>  |  11  ----------------------------<  134<H>  3.6   |  22  |  1.02    Ca    9.5      21 Jul 2021 23:18  Phos  2.4     07-21  Mg     2.0     07-21          Urine Cx:  Culture - Urine (07.20.21 @ 09:28)    Specimen Source: .Urine Nephrostomy - Left    Culture Results:   No growth    Culture - Blood (07.21.21 @ 09:23)    Specimen Source: .Blood Blood-Peripheral    Culture Results:   No growth to date.    Culture - Urine (07.20.21 @ 01:50)    -  Tigecycline: S <=2    -  Tobramycin: R >8    -  Tobramycin: S <=2    -  Trimethoprim/Sulfamethoxazole: R >2/38    -  Amikacin: S <=16    -  Amikacin: S <=16    -  Amoxicillin/Clavulanic Acid: I 16/8    -  Ampicillin: R >16 These ampicillin results predict results for amoxicillin    -  Ampicillin/Sulbactam: R >16/8 Enterobacter, Citrobacter, and Serratia may develop resistance during prolonged therapy (3-4 days)    -  Aztreonam: R >16    -  Aztreonam: S <=4    -  Cefazolin: R >16 (MIC_CL_COM_[email or web address]) For uncomplicated UTI with K. pneumoniae, E. coli, or P. mirablis: LUIS <=16 is sensitive and LUIS >=32 is resistant. This also predicts results for oral agents cefaclor, cefdinir, cefpodoxime, cefprozil, cefuroxime axetil, cephalexin and locarbef for uncomplicated UTI. Note that some isolates may be susceptible to these agents while testing resistant to cefazolin.    -  Cefepime: R >16    -  Cefepime: S 8    -  Cefoxitin: I 16    -  Ceftazidime: S 4    -  Ceftriaxone: R >32 Enterobacter, Citrobacter, and Serratia may develop resistance during prolonged therapy    -  Ciprofloxacin: R >2    -  Ciprofloxacin: S 0.5    -  Ertapenem: S <=0.5    -  Gentamicin: S <=2    -  Gentamicin: S 4    -  Imipenem: S <=1    -  Imipenem: S <=1    -  Levofloxacin: R >4    -  Levofloxacin: S 1    -  Meropenem: S <=1    -  Meropenem: S <=1    -  Piperacillin/Tazobactam: R 16    -  Piperacillin/Tazobactam: S <=8    Specimen Source: Kidney Kidney    Culture Results:   Numerous Pseudomonas aeruginosa  Few Escherichia coli ESBL    Organism Identification: Pseudomonas aeruginosa  Escherichia coli ESBL    Organism: Pseudomonas aeruginosa    Organism: Escherichia coli ESBL    Method Type: LUIS    Method Type: LUIS    < from: CT Abdomen and Pelvis No Cont (07.22.21 @ 11:50) >    IMPRESSION:    Limited noncontrast study.    Left nephroureteral tube terminates in the distal left ureter. Few very tiny residual calculi identified measuring 0.3 cm image 63 series 3, 0.3 cm image 66 series 3, and 0.2 cm, image 72 series 3. No stones are noted along the left ureter. Asymmetric left-sided perinephric and periureteral strandingmay be postprocedural in nature.    Limited evaluation of the lower thorax due to motion. Small pleural effusions and intralobular septal wall thickening, likely reflective of pulmonary edema. Compressive atelectasis and patchy airspace opacificationof the lung bases could reflect atelectasis or infection. Lateral hiatal hernia.    < end of copied text >

## 2021-07-23 NOTE — PHYSICAL THERAPY INITIAL EVALUATION ADULT - ADDITIONAL COMMENTS
Pt unable to provide accurate social history, pt's son at bedside. As per son, pt lives with him and daughter. Pt's son is home throughout the day to assist patient. Son reports 2 flights of 8 steps that pt must negotiate. PTA, pt was ambulatory with a rolling walker.

## 2021-07-23 NOTE — PROGRESS NOTE ADULT - ASSESSMENT
89 y/o male w/ a PMHx of CAD s/p stents (2014), TIA, HLD, GERD, BPH s/p prostate surgery (2017), ESBL E. coli UTI secondary to left kidney stones s/p ureteral stent placement (4/30/21) who presented on 7/19 for an interval left PCNL. Case was uneventful but post-operatively in the PACU, patient became hypotensive.     Overall septic shock, hypotension, lactic acidosis, leucocytosis, fever, thrombocytopenia, pyelonephritis post procedure.   Pseudomonas infection, positive cx finding.  resolved shock and sepsis, leucocytosis trending down.     Plan:   c/w sam based on cx results   will need 10 days of therapy until 7/30/21.   trend CBC for leucocytosis and thrombocytopenia, improving   repeat blood cx, NTD   will need midline       Plan discussed with Urology     Trina Stevenson  Pager: [phone]. If no response or past 5 pm call [phone].

## 2021-07-23 NOTE — PHYSICAL THERAPY INITIAL EVALUATION ADULT - PLANNED THERAPY INTERVENTIONS, PT EVAL
1. GOAL: Pt will be able to negotiate 16 steps +HR independently with reciprocal pattern in 3 weeks./bed mobility training/gait training/strengthening/transfer training

## 2021-07-23 NOTE — PROGRESS NOTE ADULT - SUBJECTIVE AND OBJECTIVE BOX
89yPatient is a 89y old  Male who presents with a chief complaint of Per chart: 89 y/o male w/ a PMHx of CAD s/p stents, TIA, HLD, GERD, BPH s/p prostate surgery, ESBL E. coli UTI secondary to left kidney stones s/p ureteral stent placement who presentedfor an interval left PCNL complicated post-operatively by hypotension likely secondary to sepsis." (21 Jul 2021 07:11)      Interval history:  Afebrile, denies pain.       Allergies:   No Known Allergies      Antimicrobials:  meropenem  IVPB 1000 milliGRAM(s) IV Intermittent every 12 hours      REVIEW OF SYSTEMS:  No chest pain   No SOB  No N/V  No rash.       Vital Signs Last 24 Hrs  T(C): 36.9 (07-23-21 @ 13:12), Max: 37.3 (07-22-21 @ 16:00)  T(F): 98.5 (07-23-21 @ 13:12), Max: 99.1 (07-22-21 @ 16:00)  HR: 87 (07-23-21 @ 13:12) (69 - 96)  BP: 124/77 (07-23-21 @ 13:12) (111/70 - 134/75)  BP(mean): 84 (07-22-21 @ 15:00) (84 - 84)  RR: 18 (07-23-21 @ 13:12) (18 - 22)  SpO2: 97% (07-23-21 @ 13:12) (94% - 98%)      PHYSICAL EXAM:  Patient in no acute distress. Alert, awake. sitting in chair.   Cardiovascular: S1S2 normal, tachy irregular   Lungs: + air entry B/L lung fields.  Gastrointestinal: soft, nontender, nondistended.  Extremities: no edema.  IV sites not inflamed.   + perc nephrostomy,                            10.9   11.25 )-----------( 121      ( 23 Jul 2021 07:13 )             33.2   07-23    140  |  107  |  10  ----------------------------<  116<H>  3.7   |  21<L>  |  0.81    Ca    9.8      23 Jul 2021 07:07  Phos  2.0     07-23  Mg     1.9     07-23      Culture - Blood (collected 21 Jul 2021 09:23)  Source: .Blood Blood-Peripheral  Preliminary Report (22 Jul 2021 10:01):    No growth to date.    Culture - Blood (collected 21 Jul 2021 09:23)  Source: .Blood Blood-Peripheral  Preliminary Report (22 Jul 2021 10:01):    No growth to date.      Radiology:  < from: Xray Chest 1 View- PORTABLE-Routine (Xray Chest 1 View- PORTABLE-Routine in AM.) (07.22.21 @ 06:52) >  IMPRESSION:    Poor inspiratory effort. The heart is normal in size. Left lower lobe pneumonia. Platelike atelectasis right lower lobe. No pleural effusion. No pneumothorax.      < from: CT Abdomen and Pelvis No Cont (07.22.21 @ 11:50) >  IMPRESSION:    Limited noncontrast study.    Left nephroureteral tube terminates in the distal left ureter. Few very tiny residual calculi identified measuring 0.3 cm image 63 series 3, 0.3 cm image 66 series 3, and 0.2 cm, image 72 series 3. No stones are noted along the left ureter. Asymmetric left-sided perinephric and periureteral strandingmay be postprocedural in nature.    Limited evaluation of the lower thorax due to motion. Small pleural effusions and intralobular septal wall thickening, likely reflective of pulmonary edema. Compressive atelectasis and patchy airspace opacificationof the lung bases could reflect atelectasis or infection. Lateral hiatal hernia.

## 2021-07-24 LAB — NIDUS STONE QN: SIGNIFICANT CHANGE UP

## 2021-07-24 RX ADMIN — MEROPENEM 100 MILLIGRAM(S): 1 INJECTION INTRAVENOUS at 06:14

## 2021-07-24 RX ADMIN — Medication 1 TABLET(S): at 12:19

## 2021-07-24 RX ADMIN — FINASTERIDE 5 MILLIGRAM(S): 5 TABLET, FILM COATED ORAL at 12:19

## 2021-07-24 RX ADMIN — CHLORHEXIDINE GLUCONATE 1 APPLICATION(S): 213 SOLUTION TOPICAL at 08:35

## 2021-07-24 RX ADMIN — Medication 5 MILLIGRAM(S): at 21:24

## 2021-07-24 RX ADMIN — POLYETHYLENE GLYCOL 3350 17 GRAM(S): 17 POWDER, FOR SOLUTION ORAL at 12:20

## 2021-07-24 RX ADMIN — ENOXAPARIN SODIUM 40 MILLIGRAM(S): 100 INJECTION SUBCUTANEOUS at 05:43

## 2021-07-24 RX ADMIN — Medication 3 MILLILITER(S): at 00:03

## 2021-07-24 RX ADMIN — DORZOLAMIDE HYDROCHLORIDE 1 DROP(S): 20 SOLUTION/ DROPS OPHTHALMIC at 08:35

## 2021-07-24 RX ADMIN — LACTULOSE 20 GRAM(S): 10 SOLUTION ORAL at 21:24

## 2021-07-24 RX ADMIN — ATORVASTATIN CALCIUM 20 MILLIGRAM(S): 80 TABLET, FILM COATED ORAL at 21:24

## 2021-07-24 RX ADMIN — Medication 1 TABLET(S): at 12:20

## 2021-07-24 RX ADMIN — MEROPENEM 100 MILLIGRAM(S): 1 INJECTION INTRAVENOUS at 17:44

## 2021-07-24 RX ADMIN — DORZOLAMIDE HYDROCHLORIDE 1 DROP(S): 20 SOLUTION/ DROPS OPHTHALMIC at 20:51

## 2021-07-24 RX ADMIN — Medication 3 MILLILITER(S): at 05:44

## 2021-07-24 RX ADMIN — Medication 3 MILLILITER(S): at 12:20

## 2021-07-24 RX ADMIN — Medication 3 MILLILITER(S): at 17:44

## 2021-07-24 RX ADMIN — Medication 81 MILLIGRAM(S): at 12:19

## 2021-07-24 NOTE — PROGRESS NOTE ADULT - ASSESSMENT
A/P: 89 year old male s/p L PCNL POD#5 c/b hypotension requiring ICU admission. Off pressors. Urine culture growing pseudomonas and E.Coli, now stable on floor. Midline placed yesterday. Pt with L flank hematoma    - continue meropenem  - Puree diet  - f/u all blood cultures  - monitor back  - Incentive spirometry  - DVT ppx  - will need meropenem until 7/30 per ID  - appreciate ID recs  - PT   - discharge planning

## 2021-07-24 NOTE — PROGRESS NOTE ADULT - SUBJECTIVE AND OBJECTIVE BOX
Subjective: Pt seen and examined. No overnight events. Offers no complaints. Denies chest pain, shortness of breath, N/V, acute pain.     Objective    Vital signs  T(F): , Max: 98.5 (07-23-21 @ 13:12)  HR: 64 (07-24-21 @ 06:27)  BP: 129/73 (07-24-21 @ 06:27)  SpO2: 96% (07-24-21 @ 06:27)  Wt(kg): --    Output     OUT:    Nephrostomy Tube (mL): 325 mL    Voided (mL): 800 mL  Total OUT: 1125 mL    Total NET: -1125 mL    Gen: No acute distress  Resp: no respiratory distress  Abd: soft, nontender, nondistended  Back: moderate size L flank ecchymosis with small palpable hematoma. L sapna urostomy bag with bloody urine. incision appears C/D/I.   : voiding clear yellow urine    Labs      07-23 @ 07:13    WBC 11.25 / Hct 33.2  / SCr --       07-23 @ 07:07    WBC --    / Hct --    / SCr 0.81       Blood Cx: Culture - Blood (07.21.21 @ 09:23)    Specimen Source: .Blood Blood-Peripheral    Culture Results:   No growth to date.

## 2021-07-25 LAB
CULTURE RESULTS: SIGNIFICANT CHANGE UP
CULTURE RESULTS: SIGNIFICANT CHANGE UP
HCT VFR BLD CALC: 34.4 % — LOW (ref 39–50)
HGB BLD-MCNC: 11.5 G/DL — LOW (ref 13–17)
MCHC RBC-ENTMCNC: 30.2 PG — SIGNIFICANT CHANGE UP (ref 27–34)
MCHC RBC-ENTMCNC: 33.4 GM/DL — SIGNIFICANT CHANGE UP (ref 32–36)
MCV RBC AUTO: 90.3 FL — SIGNIFICANT CHANGE UP (ref 80–100)
NRBC # BLD: 0 /100 WBCS — SIGNIFICANT CHANGE UP (ref 0–0)
PLATELET # BLD AUTO: 160 K/UL — SIGNIFICANT CHANGE UP (ref 150–400)
RBC # BLD: 3.81 M/UL — LOW (ref 4.2–5.8)
RBC # FLD: 14.2 % — SIGNIFICANT CHANGE UP (ref 10.3–14.5)
SARS-COV-2 RNA SPEC QL NAA+PROBE: SIGNIFICANT CHANGE UP
SPECIMEN SOURCE: SIGNIFICANT CHANGE UP
SPECIMEN SOURCE: SIGNIFICANT CHANGE UP
WBC # BLD: 10.53 K/UL — HIGH (ref 3.8–10.5)
WBC # FLD AUTO: 10.53 K/UL — HIGH (ref 3.8–10.5)

## 2021-07-25 PROCEDURE — 99231 SBSQ HOSP IP/OBS SF/LOW 25: CPT

## 2021-07-25 RX ORDER — SODIUM CHLORIDE 9 MG/ML
1000 INJECTION, SOLUTION INTRAVENOUS
Refills: 0 | Status: DISCONTINUED | OUTPATIENT
Start: 2021-07-25 | End: 2021-07-25

## 2021-07-25 RX ADMIN — Medication 81 MILLIGRAM(S): at 11:25

## 2021-07-25 RX ADMIN — SODIUM CHLORIDE 75 MILLILITER(S): 9 INJECTION, SOLUTION INTRAVENOUS at 14:41

## 2021-07-25 RX ADMIN — MEROPENEM 100 MILLIGRAM(S): 1 INJECTION INTRAVENOUS at 05:21

## 2021-07-25 RX ADMIN — ENOXAPARIN SODIUM 40 MILLIGRAM(S): 100 INJECTION SUBCUTANEOUS at 05:23

## 2021-07-25 RX ADMIN — FINASTERIDE 5 MILLIGRAM(S): 5 TABLET, FILM COATED ORAL at 11:25

## 2021-07-25 RX ADMIN — SENNA PLUS 2 TABLET(S): 8.6 TABLET ORAL at 21:39

## 2021-07-25 RX ADMIN — LACTULOSE 20 GRAM(S): 10 SOLUTION ORAL at 21:37

## 2021-07-25 RX ADMIN — POLYETHYLENE GLYCOL 3350 17 GRAM(S): 17 POWDER, FOR SOLUTION ORAL at 11:25

## 2021-07-25 RX ADMIN — DORZOLAMIDE HYDROCHLORIDE 1 DROP(S): 20 SOLUTION/ DROPS OPHTHALMIC at 08:57

## 2021-07-25 RX ADMIN — CHLORHEXIDINE GLUCONATE 1 APPLICATION(S): 213 SOLUTION TOPICAL at 08:57

## 2021-07-25 RX ADMIN — Medication 3 MILLILITER(S): at 11:25

## 2021-07-25 RX ADMIN — DORZOLAMIDE HYDROCHLORIDE 1 DROP(S): 20 SOLUTION/ DROPS OPHTHALMIC at 20:28

## 2021-07-25 RX ADMIN — Medication 3 MILLILITER(S): at 01:01

## 2021-07-25 RX ADMIN — Medication 3 MILLILITER(S): at 17:33

## 2021-07-25 RX ADMIN — Medication 1 TABLET(S): at 11:26

## 2021-07-25 RX ADMIN — ATORVASTATIN CALCIUM 20 MILLIGRAM(S): 80 TABLET, FILM COATED ORAL at 21:37

## 2021-07-25 RX ADMIN — Medication 5 MILLIGRAM(S): at 21:39

## 2021-07-25 RX ADMIN — MEROPENEM 100 MILLIGRAM(S): 1 INJECTION INTRAVENOUS at 17:32

## 2021-07-25 RX ADMIN — Medication 3 MILLILITER(S): at 05:24

## 2021-07-25 RX ADMIN — Medication 1 TABLET(S): at 11:25

## 2021-07-25 NOTE — CHART NOTE - NSCHARTNOTEFT_GEN_A_CORE
Called by RN for patient not responding well to 1L bolus. Started second bolus.   BP 60s-70s systolic.    SICU called for hemodynamic management.    Discussed with Dr. Alfonso and Dr. Hoenig aware of plan.
Called by RN for pt with gross hematuria. Earlier today, pt was voiding clear yellow urine. Pt seen and examined at the bedside with Dr. Orellana. Offers no complaints. Feels well. Denies chest pain, shortness of breath, acute pain, difficulty voiding or other acute complaint. Pt not drinking a lot.     Vital Signs Last 24 Hrs  T(C): 36.9 (25 Jul 2021 14:27), Max: 37.2 (24 Jul 2021 22:13)  T(F): 98.4 (25 Jul 2021 14:27), Max: 98.9 (24 Jul 2021 22:13)  HR: 96 (25 Jul 2021 14:27) (82 - 96)  BP: 99/62 (25 Jul 2021 14:27) (99/62 - 130/75)  RR: 16 (25 Jul 2021 14:27) (16 - 18)  SpO2: 94% (25 Jul 2021 14:27) (93% - 97%)    General: NAD, Lying in bed comfortably  Resp: No respiratory distress or accessory muscle use. no supplemental O2  Abd: Soft, NT/ND, no rebound/guarding  Back:  L flank ecchymosis with small palpable L hematoma stable from this AM. NT site C/D/I. minimal sanguineous drainage in back pouch   : [place] catheter secured with dark tea colored urine. No clots.    A/P: Pt now with dark tea colored urine.  - CBC to establish baseline for H/H.  - Continue to monitor back and color  - Check PVR/Bladder Scan to ensure emptying  - Gentle IV hydration  - Will continue to monitor    Will discuss with Dr. Hoenig
Patient seen and examined at bedside for hypotension. Patient trending around 90s-100s systolic post-operatively.  BP noted to be 74/46, on repeat 80/50, HR 80s-90s.   Patient denies any acute complaints. Denies headache, dizziness, cp, palpitations.    ICU Vital Signs Last 24 Hrs  T(C): 36.5 (19 Jul 2021 22:00), Max: 36.6 (19 Jul 2021 11:22)  T(F): 97.7 (19 Jul 2021 22:00), Max: 97.9 (19 Jul 2021 11:22)  HR: 87 (20 Jul 2021 01:00) (79 - 101)  BP: 80/48 (20 Jul 2021 01:00) (80/48 - 121/60)  BP(mean): 59 (20 Jul 2021 01:00) (59 - 81)  RR: 15 (20 Jul 2021 01:00) (15 - 18)  SpO2: 97% (20 Jul 2021 01:00) (95% - 99%)    General: NAD, Lying in bed comfortably, mentating well  Neuro: A+Ox3, no focal deficits, hard of hearing  Resp: No respiratory distress or accessory muscle use. no supplemental O2  Abd: Soft, NT/ND, no rebound/guarding  Back: L NT draining translucent cherry, dressing c/d/i, no ecchymosis/hematoma  : +disla, clear yellow  Musculoskeletal: All 4 extremities moving spontaneously, no limitations.    A/P: 89M s/p L PCNL found to be hypotensive  -will give 1L bolus LR  -will check cbc/bmp  -patient asymptomatic, mentating well  -will continue to monitor closely  -discussed with Dr. Alfonso

## 2021-07-25 NOTE — PROGRESS NOTE ADULT - SUBJECTIVE AND OBJECTIVE BOX
Subjective:  Pt seen and examined at the bedside.  No overnight events.  Reports  Denies    Objective    Vital signs  T(F): , Max: 98.9 (07-24-21 @ 22:13)  HR: 83 (07-25-21 @ 06:27)  BP: 130/75 (07-25-21 @ 06:27)  SpO2: 95% (07-25-21 @ 06:27)    Output   OUT:    Incontinent per Condom Catheter (mL): 1550 mL    Voided (mL): 575 mL  Total OUT: 2125 mL    Total NET: -2125 mL      Gen: No acute distress  Resp: no respiratory distress  Abd:  Back:  :    Labs      07-23 @ 07:13    WBC 11.25 / Hct 33.2  / SCr --         Urine Cx: Culture - Urine (07.20.21 @ 09:28)    Specimen Source: .Urine Nephrostomy - Left    Culture Results:   No growth    Culture - Urine (07.20.21 @ 09:28)    Specimen Source: Clean Catch Clean Catch (Midstream)    Culture Results:   <10,000 CFU/mL Normal Urogenital Anjelica    Culture - Urine (07.20.21 @ 01:50)    -  Tigecycline: S <=2    -  Tobramycin: R >8    -  Tobramycin: S <=2    -  Trimethoprim/Sulfamethoxazole: R >2/38    -  Amikacin: S <=16    -  Amikacin: S <=16    -  Amoxicillin/Clavulanic Acid: I 16/8    -  Ampicillin: R >16 These ampicillin results predict results for amoxicillin    -  Ampicillin/Sulbactam: R >16/8 Enterobacter, Citrobacter, and Serratia may develop resistance during prolonged therapy (3-4 days)    -  Aztreonam: R >16    -  Aztreonam: S <=4    -  Cefazolin: R >16 (MIC_CL_COM_ENTERIC_CEFAZU) For uncomplicated UTI with K. pneumoniae, E. coli, or P. mirablis: LUIS <=16 is sensitive and LUIS >=32 is resistant. This also predicts results for oral agents cefaclor, cefdinir, cefpodoxime, cefprozil, cefuroxime axetil, cephalexin and locarbef for uncomplicated UTI. Note that some isolates may be susceptible to these agents while testing resistant to cefazolin.    -  Cefepime: R >16    -  Cefepime: S 8    -  Cefoxitin: I 16    -  Ceftazidime: S 4    -  Ceftriaxone: R >32 Enterobacter, Citrobacter, and Serratia may develop resistance during prolonged therapy    -  Ciprofloxacin: R >2    -  Ciprofloxacin: S 0.5    -  Ertapenem: S <=0.5    -  Gentamicin: S <=2    -  Gentamicin: S 4    -  Imipenem: S <=1    -  Imipenem: S <=1    -  Levofloxacin: R >4    -  Levofloxacin: S 1    -  Meropenem: S <=1    -  Meropenem: S <=1    -  Piperacillin/Tazobactam: R 16    -  Piperacillin/Tazobactam: S <=8    Specimen Source: Kidney Kidney    Culture Results:   Numerous Pseudomonas aeruginosa  Few Escherichia coli ESBL    Organism Identification: Pseudomonas aeruginosa  Escherichia coli ESBL    Organism: Pseudomonas aeruginosa    Organism: Escherichia coli ESBL    Method Type: LUIS    Method Type: LUIS    Culture - Other (07.20.21 @ 01:37)    -  Aztreonam: R >16    -  Aztreonam: S <=4    -  Cefazolin: R >16 Enterobacter, Citrobacter, and Serratia may develop resistance during prolonged therapy (3-4 days)    -  Ampicillin: R >16 These ampicillin results predict results for amoxicillin    -  Ampicillin/Sulbactam: R >16/8 Enterobacter, Citrobacter, and Serratia may develop resistance during prolonged therapy (3-4 days)    -  Amikacin: S <=16    -  Amikacin: S <=16    -  Amoxicillin/Clavulanic Acid: I 16/8    -  Levofloxacin: R >4    -  Levofloxacin: R >4    -  Meropenem: S <=1    -  Meropenem: S <=1    -  Gentamicin: S <=2    -  Gentamicin: R >8    -  Imipenem: S <=1    -  Ciprofloxacin: R >2    -  Ciprofloxacin: R >2    -  Ertapenem: S <=0.5    -  Ceftazidime: S <=1    -  Ceftriaxone: R >32 Enterobacter, Citrobacter, and Serratia may develop resistance during prolonged therapy    -  Cefepime: R >16    -  Cefepime: S <=2    -  Cefoxitin: I 16    -  Tobramycin: I 8    -  Tobramycin: R >8    -  Trimethoprim/Sulfamethoxazole: R >2/38    -  Piperacillin/Tazobactam: R <=8    -  Piperacillin/Tazobactam: S <=8    Specimen Source: Ear left kidney stone culture    Culture Results:   Few Escherichia coli ESBL  Rare Pseudomonas aeruginosa    Organism Identification: Escherichia coli ESBL  Pseudomonas aeruginosa    Organism: Escherichia coli ESBL    Organism: Pseudomonas aeruginosa    Method Type: LUIS    Method Type: LUIS        Blood Cx: Culture - Blood (07.21.21 @ 09:23)    Specimen Source: .Blood Blood-Peripheral    Culture Results:   No growth to date.    Culture - Blood (07.21.21 @ 09:23)    Specimen Source: .Blood Blood-Peripheral    Culture Results:   No growth to date.      Imaging Subjective:  Pt seen and examined at the bedside.  No overnight events.  Reports feeling well.   Denies fever/chills, N/V, abd pain or other acute complaints.     Objective    Vital signs  T(F): , Max: 98.9 (07-24-21 @ 22:13)  HR: 83 (07-25-21 @ 06:27)  BP: 130/75 (07-25-21 @ 06:27)  SpO2: 95% (07-25-21 @ 06:27)    Output   OUT:    Incontinent per Condom Catheter (mL): 1550 mL    Voided (mL): 575 mL  Total OUT: 2125 mL    Total NET: -2125 mL      Gen: No acute distress  Resp: no respiratory distress  Abd: soft, nontender, nondistended  Back: L flank ecchymosis with small palpable L hematoma stable from yesterday. NT site C/D/I. minimal sanguineous drainage in back pouch   : [place] catheter secured with clear yellow urine    Labs    COVID swab pending    Urine Cx: Culture - Urine (07.20.21 @ 09:28)    Specimen Source: .Urine Nephrostomy - Left    Culture Results:   No growth    Culture - Urine (07.20.21 @ 09:28)    Specimen Source: Clean Catch Clean Catch (Midstream)    Culture Results:   <10,000 CFU/mL Normal Urogenital Anjelica    Culture - Urine (07.20.21 @ 01:50)    -  Tigecycline: S <=2    -  Tobramycin: R >8    -  Tobramycin: S <=2    -  Trimethoprim/Sulfamethoxazole: R >2/38    -  Amikacin: S <=16    -  Amikacin: S <=16    -  Amoxicillin/Clavulanic Acid: I 16/8    -  Ampicillin: R >16 These ampicillin results predict results for amoxicillin    -  Ampicillin/Sulbactam: R >16/8 Enterobacter, Citrobacter, and Serratia may develop resistance during prolonged therapy (3-4 days)    -  Aztreonam: R >16    -  Aztreonam: S <=4    -  Cefazolin: R >16 (MIC_CL_COM_ENTERIC_CEFAZU) For uncomplicated UTI with K. pneumoniae, E. coli, or P. mirablis: LUIS <=16 is sensitive and LUIS >=32 is resistant. This also predicts results for oral agents cefaclor, cefdinir, cefpodoxime, cefprozil, cefuroxime axetil, cephalexin and locarbef for uncomplicated UTI. Note that some isolates may be susceptible to these agents while testing resistant to cefazolin.    -  Cefepime: R >16    -  Cefepime: S 8    -  Cefoxitin: I 16    -  Ceftazidime: S 4    -  Ceftriaxone: R >32 Enterobacter, Citrobacter, and Serratia may develop resistance during prolonged therapy    -  Ciprofloxacin: R >2    -  Ciprofloxacin: S 0.5    -  Ertapenem: S <=0.5    -  Gentamicin: S <=2    -  Gentamicin: S 4    -  Imipenem: S <=1    -  Imipenem: S <=1    -  Levofloxacin: R >4    -  Levofloxacin: S 1    -  Meropenem: S <=1    -  Meropenem: S <=1    -  Piperacillin/Tazobactam: R 16    -  Piperacillin/Tazobactam: S <=8    Specimen Source: Kidney Kidney    Culture Results:   Numerous Pseudomonas aeruginosa  Few Escherichia coli ESBL    Organism Identification: Pseudomonas aeruginosa  Escherichia coli ESBL    Organism: Pseudomonas aeruginosa    Organism: Escherichia coli ESBL    Method Type: LUIS    Method Type: LUIS    Culture - Other (07.20.21 @ 01:37)    -  Aztreonam: R >16    -  Aztreonam: S <=4    -  Cefazolin: R >16 Enterobacter, Citrobacter, and Serratia may develop resistance during prolonged therapy (3-4 days)    -  Ampicillin: R >16 These ampicillin results predict results for amoxicillin    -  Ampicillin/Sulbactam: R >16/8 Enterobacter, Citrobacter, and Serratia may develop resistance during prolonged therapy (3-4 days)    -  Amikacin: S <=16    -  Amikacin: S <=16    -  Amoxicillin/Clavulanic Acid: I 16/8    -  Levofloxacin: R >4    -  Levofloxacin: R >4    -  Meropenem: S <=1    -  Meropenem: S <=1    -  Gentamicin: S <=2    -  Gentamicin: R >8    -  Imipenem: S <=1    -  Ciprofloxacin: R >2    -  Ciprofloxacin: R >2    -  Ertapenem: S <=0.5    -  Ceftazidime: S <=1    -  Ceftriaxone: R >32 Enterobacter, Citrobacter, and Serratia may develop resistance during prolonged therapy    -  Cefepime: R >16    -  Cefepime: S <=2    -  Cefoxitin: I 16    -  Tobramycin: I 8    -  Tobramycin: R >8    -  Trimethoprim/Sulfamethoxazole: R >2/38    -  Piperacillin/Tazobactam: R <=8    -  Piperacillin/Tazobactam: S <=8    Specimen Source: Ear left kidney stone culture    Culture Results:   Few Escherichia coli ESBL  Rare Pseudomonas aeruginosa    Organism Identification: Escherichia coli ESBL  Pseudomonas aeruginosa    Organism: Escherichia coli ESBL    Organism: Pseudomonas aeruginosa    Method Type: LUIS    Method Type: LUIS        Blood Cx: Culture - Blood (07.21.21 @ 09:23)    Specimen Source: .Blood Blood-Peripheral    Culture Results:   No growth to date.    Culture - Blood (07.21.21 @ 09:23)    Specimen Source: .Blood Blood-Peripheral    Culture Results:   No growth to date.      Imaging

## 2021-07-25 NOTE — PROGRESS NOTE ADULT - ASSESSMENT
A/P: 89 year old male s/p L PCNL POD#5 c/b hypotension requiring ICU admission. Off pressors. Urine culture growing pseudomonas and E.Coli, now stable on floor. Midline placed 7/23. Pt with L flank hematoma/ecchymosis    - COVID swab today in preparation for discharge tomorrow  - continue meropenem  - Puree diet  - f/u all blood cultures  - monitor back  - Incentive spirometry  - DVT ppx  - will need meropenem until 7/30 per ID  - appreciate ID recs  - discharge planning to rehab 7/26 A/P: 89 year old male s/p L PCNL POD#5 c/b hypotension requiring ICU admission. Off pressors. Urine culture growing pseudomonas and E.Coli, now stable on floor. Midline placed 7/23. Pt with L flank hematoma/ecchymosis    - f/u COVID swab results  - continue meropenem  - Puree diet  - f/u final results on all blood cultures  - monitor back  - Incentive spirometry  - DVT ppx  - will need meropenem until 7/30 per ID  - appreciate ID recs  - discharge planning to rehab 7/26

## 2021-07-26 ENCOUNTER — TRANSCRIPTION ENCOUNTER (OUTPATIENT)
Age: 86
End: 2021-07-26

## 2021-07-26 LAB
CULTURE RESULTS: SIGNIFICANT CHANGE UP
CULTURE RESULTS: SIGNIFICANT CHANGE UP
NIDUS STONE QN: SIGNIFICANT CHANGE UP
SPECIMEN SOURCE: SIGNIFICANT CHANGE UP
SPECIMEN SOURCE: SIGNIFICANT CHANGE UP

## 2021-07-26 PROCEDURE — 99232 SBSQ HOSP IP/OBS MODERATE 35: CPT

## 2021-07-26 RX ADMIN — Medication 81 MILLIGRAM(S): at 11:12

## 2021-07-26 RX ADMIN — Medication 5 MILLIGRAM(S): at 22:36

## 2021-07-26 RX ADMIN — POLYETHYLENE GLYCOL 3350 17 GRAM(S): 17 POWDER, FOR SOLUTION ORAL at 11:13

## 2021-07-26 RX ADMIN — FINASTERIDE 5 MILLIGRAM(S): 5 TABLET, FILM COATED ORAL at 11:12

## 2021-07-26 RX ADMIN — Medication 3 MILLILITER(S): at 23:53

## 2021-07-26 RX ADMIN — ATORVASTATIN CALCIUM 20 MILLIGRAM(S): 80 TABLET, FILM COATED ORAL at 22:39

## 2021-07-26 RX ADMIN — Medication 1 TABLET(S): at 11:12

## 2021-07-26 RX ADMIN — MEROPENEM 100 MILLIGRAM(S): 1 INJECTION INTRAVENOUS at 17:11

## 2021-07-26 RX ADMIN — CHLORHEXIDINE GLUCONATE 1 APPLICATION(S): 213 SOLUTION TOPICAL at 07:07

## 2021-07-26 RX ADMIN — ENOXAPARIN SODIUM 40 MILLIGRAM(S): 100 INJECTION SUBCUTANEOUS at 06:05

## 2021-07-26 RX ADMIN — Medication 3 MILLILITER(S): at 11:12

## 2021-07-26 RX ADMIN — DORZOLAMIDE HYDROCHLORIDE 1 DROP(S): 20 SOLUTION/ DROPS OPHTHALMIC at 07:07

## 2021-07-26 RX ADMIN — SENNA PLUS 2 TABLET(S): 8.6 TABLET ORAL at 22:36

## 2021-07-26 RX ADMIN — LACTULOSE 20 GRAM(S): 10 SOLUTION ORAL at 22:36

## 2021-07-26 RX ADMIN — DORZOLAMIDE HYDROCHLORIDE 1 DROP(S): 20 SOLUTION/ DROPS OPHTHALMIC at 20:42

## 2021-07-26 RX ADMIN — MEROPENEM 100 MILLIGRAM(S): 1 INJECTION INTRAVENOUS at 06:05

## 2021-07-26 RX ADMIN — Medication 3 MILLILITER(S): at 17:11

## 2021-07-26 RX ADMIN — Medication 3 MILLILITER(S): at 06:04

## 2021-07-26 NOTE — DISCHARGE NOTE PROVIDER - HOSPITAL COURSE
89 y/o male s/p L PCNL 7/19. Pt with post op hypotension secondary to sepsis. pt with stay in SICU, requiring pressors POD#1-POD#3. Infectious Disease consultation obtained.  Had positive UCx growing e. coli and pseudomonas. Blood cultures remained negative. Pt treated with IV abx  Pressors eventually weaned and patient able to be transferred to regular floor.  Left PCN removed and patient also passed TOV.  Now stable for discharge on meropenem until 7/30

## 2021-07-26 NOTE — DISCHARGE NOTE PROVIDER - NSDCMRMEDTOKEN_GEN_ALL_CORE_FT
Acidophilus oral capsule: 1 cap(s) orally once a day  aspirin 81 mg oral tablet: 1 tab(s) orally once a day  atorvastatin 20 mg oral tablet: 1 tab(s) orally once a day  Azopt 1% ophthalmic suspension: 1 drop(s) to each affected eye 2 times a day  finasteride 5 mg oral tablet: 1 tab(s) orally once a day  lactulose 10 g/15 mL oral syrup: 30 milliliter(s) orally once a day (at bedtime)  meropenem 1000 mg intravenous injection: 1000 milligram(s) intravenous every 12 hours  multivitamin with minerals:   tamsulosin 0.4 mg oral capsule: 1 cap(s) orally 2 times a day

## 2021-07-26 NOTE — DISCHARGE NOTE PROVIDER - PROVIDER TOKENS
PROVIDER:[TOKEN:[[id number]],FOLLOWUP:[2 weeks]],PROVIDER:[TOKEN:[[id number]],FOLLOWUP:[1 week]]

## 2021-07-26 NOTE — PROGRESS NOTE ADULT - SUBJECTIVE AND OBJECTIVE BOX
The patient was seen and examined at bedside.  No current complaints, no acute events overnight.    T(C): 37.1 (07-26-21 @ 05:28), Max: 37.3 (07-25-21 @ 21:27)  HR: 70 (07-26-21 @ 05:28) (70 - 100)  BP: 134/70 (07-26-21 @ 05:28) (99/62 - 134/70)  RR: 18 (07-26-21 @ 05:28) (16 - 18)  SpO2: 98% (07-26-21 @ 05:28) (94% - 98%)  Wt(kg): --    Physical Exam:    General: NAD  Abdomen: soft, non-tender, non-distended  Back: dressing clean/dry/intact, no swelling      07-25 @ 07:01  -  07-26 @ 07:00  --------------------------------------------------------  IN: 820 mL / OUT: 1150 mL / NET: -330 mL      void - 550cc    COVID swab - negative

## 2021-07-26 NOTE — PROGRESS NOTE ADULT - SUBJECTIVE AND OBJECTIVE BOX
89y old  Male who presents with a chief complaint of left nephrolithiasis (26 Jul 2021 11:39)      Interval history:  Afebrile, denies any abdominal pain.       Allergies:   No Known Allergies      Antimicrobials:  meropenem  IVPB 1000 milliGRAM(s) IV Intermittent every 12 hours      REVIEW OF SYSTEMS:  No chest pain   No SOB  No rash.       Vital Signs Last 24 Hrs  T(C): 36.7 (07-26-21 @ 13:41), Max: 37.3 (07-25-21 @ 21:27)  T(F): 98 (07-26-21 @ 13:41), Max: 99.2 (07-25-21 @ 21:27)  HR: 90 (07-26-21 @ 13:41) (70 - 100)  BP: 113/73 (07-26-21 @ 13:41) (105/70 - 134/70)  BP(mean): --  RR: 18 (07-26-21 @ 13:41) (18 - 18)  SpO2: 96% (07-26-21 @ 13:41) (94% - 98%)      PHYSICAL EXAM:  Patient in no acute distress. Alert, awake.  Cardiovascular: S1S2 normal,   Lungs: + air entry B/L lung fields.  Gastrointestinal: soft, nontender, nondistended.  Extremities: no edema.  + MIDLINE   + perc nephrostomy,                          11.5   10.53 )-----------( 160      ( 25 Jul 2021 15:15 )             34.4

## 2021-07-26 NOTE — PROGRESS NOTE ADULT - ASSESSMENT
A/P: 89 year old male s/p L PCNL POD#6 c/b hypotension requiring ICU admission. Off pressors. Urine culture growing pseudomonas and E.Coli, now stable on floor. Midline placed 7/23. Pt with L flank hematoma/ecchymosis    - continue meropenem  - Puree diet  - f/u final results on all blood cultures  - will need meropenem thru 7/30 per ID  - f/u ID  - Incentive spirometry  - DVT ppx  - discharge planning to rehab today

## 2021-07-26 NOTE — DISCHARGE NOTE PROVIDER - CARE PROVIDERS DIRECT ADDRESSES
[email or web address]

## 2021-07-26 NOTE — DISCHARGE NOTE PROVIDER - CARE PROVIDER_API CALL
Trina Burk)  Infectious Disease; Internal Medicine  [address] [place] [address]  Phone: [phone]  Fax: [phone]  Follow Up Time: 2 weeks    Hoenig, David M (MD)  Urology  [facility] [place]- Dept. of Urology, [address] [place] [address]  Phone: [phone]  Fax: [phone]  Follow Up Time: 1 week

## 2021-07-26 NOTE — DISCHARGE NOTE PROVIDER - NSDCCPCAREPLAN_GEN_ALL_CORE_FT
PRINCIPAL DISCHARGE DIAGNOSIS  Diagnosis: Left nephrolithiasis  Assessment and Plan of Treatment:       SECONDARY DISCHARGE DIAGNOSES  Diagnosis: Sepsis due to Pseudomonas  Assessment and Plan of Treatment:

## 2021-07-26 NOTE — PROGRESS NOTE ADULT - ASSESSMENT
89 y/o male w/ a PMHx of CAD s/p stents (2014), TIA, HLD, GERD, BPH s/p prostate surgery (2017), ESBL E. coli UTI secondary to left kidney stones s/p ureteral stent placement (4/30/21) who presented on 7/19 for an interval left PCNL. Case was uneventful but post-operatively in the PACU, patient became hypotensive.     Overall septic shock, hypotension, lactic acidosis, leucocytosis, fever, thrombocytopenia, pyelonephritis post procedure.   Pseudomonas infection, positive cx finding.  resolved shock and sepsis, leucocytosis trending down.   clinically improving       Plan:   c/w sam based on cx results   will need 10 days of therapy until 7/30/21.   trend CBC for leucocytosis and thrombocytopenia, improving   repeat blood cx, NTD   s/p midline       Will sign off, please call with questions.     Trina Stevenson  Pager: [phone]. If no response or past 5 pm call [phone].

## 2021-07-27 ENCOUNTER — TRANSCRIPTION ENCOUNTER (OUTPATIENT)
Age: 86
End: 2021-07-27

## 2021-07-27 VITALS
OXYGEN SATURATION: 97 % | DIASTOLIC BLOOD PRESSURE: 76 MMHG | TEMPERATURE: 98 F | HEART RATE: 73 BPM | RESPIRATION RATE: 18 BRPM | SYSTOLIC BLOOD PRESSURE: 137 MMHG

## 2021-07-27 PROCEDURE — 97116 GAIT TRAINING THERAPY: CPT

## 2021-07-27 PROCEDURE — 87640 STAPH A DNA AMP PROBE: CPT

## 2021-07-27 PROCEDURE — 82435 ASSAY OF BLOOD CHLORIDE: CPT

## 2021-07-27 PROCEDURE — 84484 ASSAY OF TROPONIN QUANT: CPT

## 2021-07-27 PROCEDURE — 80150 ASSAY OF AMIKACIN: CPT

## 2021-07-27 PROCEDURE — C9399: CPT

## 2021-07-27 PROCEDURE — 82947 ASSAY GLUCOSE BLOOD QUANT: CPT

## 2021-07-27 PROCEDURE — 85027 COMPLETE CBC AUTOMATED: CPT

## 2021-07-27 PROCEDURE — 94640 AIRWAY INHALATION TREATMENT: CPT

## 2021-07-27 PROCEDURE — C1889: CPT

## 2021-07-27 PROCEDURE — 82803 BLOOD GASES ANY COMBINATION: CPT

## 2021-07-27 PROCEDURE — 82330 ASSAY OF CALCIUM: CPT

## 2021-07-27 PROCEDURE — 83735 ASSAY OF MAGNESIUM: CPT

## 2021-07-27 PROCEDURE — U0003: CPT

## 2021-07-27 PROCEDURE — 87070 CULTURE OTHR SPECIMN AEROBIC: CPT

## 2021-07-27 PROCEDURE — 36569 INSJ PICC 5 YR+ W/O IMAGING: CPT

## 2021-07-27 PROCEDURE — 84145 PROCALCITONIN (PCT): CPT

## 2021-07-27 PROCEDURE — C1751: CPT

## 2021-07-27 PROCEDURE — 85018 HEMOGLOBIN: CPT

## 2021-07-27 PROCEDURE — 97161 PT EVAL LOW COMPLEX 20 MIN: CPT

## 2021-07-27 PROCEDURE — 82962 GLUCOSE BLOOD TEST: CPT

## 2021-07-27 PROCEDURE — C1726: CPT

## 2021-07-27 PROCEDURE — 87186 SC STD MICRODIL/AGAR DIL: CPT

## 2021-07-27 PROCEDURE — 71045 X-RAY EXAM CHEST 1 VIEW: CPT

## 2021-07-27 PROCEDURE — 86769 SARS-COV-2 COVID-19 ANTIBODY: CPT

## 2021-07-27 PROCEDURE — 84132 ASSAY OF SERUM POTASSIUM: CPT

## 2021-07-27 PROCEDURE — U0005: CPT

## 2021-07-27 PROCEDURE — 83036 HEMOGLOBIN GLYCOSYLATED A1C: CPT

## 2021-07-27 PROCEDURE — C1758: CPT

## 2021-07-27 PROCEDURE — 88300 SURGICAL PATH GROSS: CPT

## 2021-07-27 PROCEDURE — C8929: CPT

## 2021-07-27 PROCEDURE — 83605 ASSAY OF LACTIC ACID: CPT

## 2021-07-27 PROCEDURE — 85014 HEMATOCRIT: CPT

## 2021-07-27 PROCEDURE — 84295 ASSAY OF SERUM SODIUM: CPT

## 2021-07-27 PROCEDURE — 82550 ASSAY OF CK (CPK): CPT

## 2021-07-27 PROCEDURE — 84100 ASSAY OF PHOSPHORUS: CPT

## 2021-07-27 PROCEDURE — 74176 CT ABD & PELVIS W/O CONTRAST: CPT

## 2021-07-27 PROCEDURE — 82553 CREATINE MB FRACTION: CPT

## 2021-07-27 PROCEDURE — 93005 ELECTROCARDIOGRAM TRACING: CPT

## 2021-07-27 PROCEDURE — C1887: CPT

## 2021-07-27 PROCEDURE — 87086 URINE CULTURE/COLONY COUNT: CPT

## 2021-07-27 PROCEDURE — 87077 CULTURE AEROBIC IDENTIFY: CPT

## 2021-07-27 PROCEDURE — 87641 MR-STAPH DNA AMP PROBE: CPT

## 2021-07-27 PROCEDURE — 97110 THERAPEUTIC EXERCISES: CPT

## 2021-07-27 PROCEDURE — 82365 CALCULUS SPECTROSCOPY: CPT

## 2021-07-27 PROCEDURE — C1769: CPT

## 2021-07-27 PROCEDURE — 80048 BASIC METABOLIC PNL TOTAL CA: CPT

## 2021-07-27 PROCEDURE — 87040 BLOOD CULTURE FOR BACTERIA: CPT

## 2021-07-27 RX ADMIN — POLYETHYLENE GLYCOL 3350 17 GRAM(S): 17 POWDER, FOR SOLUTION ORAL at 14:34

## 2021-07-27 RX ADMIN — Medication 1 TABLET(S): at 14:33

## 2021-07-27 RX ADMIN — Medication 3 MILLILITER(S): at 14:32

## 2021-07-27 RX ADMIN — CHLORHEXIDINE GLUCONATE 1 APPLICATION(S): 213 SOLUTION TOPICAL at 09:28

## 2021-07-27 RX ADMIN — DORZOLAMIDE HYDROCHLORIDE 1 DROP(S): 20 SOLUTION/ DROPS OPHTHALMIC at 09:29

## 2021-07-27 RX ADMIN — MEROPENEM 100 MILLIGRAM(S): 1 INJECTION INTRAVENOUS at 06:05

## 2021-07-27 RX ADMIN — FINASTERIDE 5 MILLIGRAM(S): 5 TABLET, FILM COATED ORAL at 14:34

## 2021-07-27 RX ADMIN — ENOXAPARIN SODIUM 40 MILLIGRAM(S): 100 INJECTION SUBCUTANEOUS at 06:05

## 2021-07-27 RX ADMIN — Medication 81 MILLIGRAM(S): at 14:33

## 2021-07-27 RX ADMIN — Medication 3 MILLILITER(S): at 06:05

## 2021-07-27 NOTE — PROGRESS NOTE ADULT - PROVIDER SPECIALTY LIST ADULT
Infectious Disease
Infectious Disease
SICU
Urology
Infectious Disease
SICU
Urology

## 2021-07-27 NOTE — PROGRESS NOTE ADULT - ASSESSMENT
A/P: 89 year old male s/p L PCNL POD#7 c/b hypotension requiring ICU admission. Off pressors. Urine culture growing pseudomonas and E.Coli, now stable on floor. Midline placed 7/23. Pt with L flank hematoma/ecchymosis    - continue meropenem  - discharge planning to rehab today

## 2021-07-27 NOTE — PROGRESS NOTE ADULT - SUBJECTIVE AND OBJECTIVE BOX
UROLOGY PROGRESS NOTE:     Subjective: Patient seen and examined at bedside. No events overnight      Objective:  Vital signs  T(F): , Max: 99.2 (07-26-21 @ 22:04)  HR: 75 (07-27-21 @ 05:31)  BP: 124/75 (07-27-21 @ 05:31)  SpO2: 95% (07-27-21 @ 05:31)  Wt(kg): --    Output     I&O's Detail    26 Jul 2021 07:01  -  27 Jul 2021 07:00  --------------------------------------------------------  IN:    Oral Fluid: 420 mL  Total IN: 420 mL    OUT:    Incontinent per Condom Catheter (mL): 900 mL  Total OUT: 900 mL    Total NET: -480 mL          Physical Exam:  Gen: no acute distress  Back: No CVAT b/l, L PCN site healing well, +L flank ecchymosis  Abd: soft NT/ ND  : wnl    Labs:                        11.5   10.53 )-----------( 160      ( 25 Jul 2021 15:15 )             34.4                 Urine Cx:

## 2021-07-27 NOTE — PROGRESS NOTE ADULT - ASSESSMENT
A/P: 89 year old male s/p L PCNL POD#7c/b hypotension requiring ICU admission. Off pressors. Urine culture growing pseudomonas and E.Coli, now stable on floor. Midline placed 7/23. Pt with L flank hematoma/ecchymosis    - continue meropenem  - Puree diet  - f/u final results on all blood cultures  - will need meropenem thru 7/30 per ID  - f/u ID  - Incentive spirometry  - DVT ppx  - discharge planning to rehab today

## 2021-07-27 NOTE — PROGRESS NOTE ADULT - SUBJECTIVE AND OBJECTIVE BOX
UROLOGY DAILY PROGRESS NOTE:     Subjective: Patient seen and examined at bedside. No acute events overnight  Pt awaiting ELIANE       Objective:  Vital signs  T(F): , Max: 99.2 (07-26-21 @ 22:04)  HR: 75 (07-27-21 @ 05:31)  BP: 124/75 (07-27-21 @ 05:31)  SpO2: 95% (07-27-21 @ 05:31)  Wt(kg): --    Output     I&O's Detail    26 Jul 2021 07:01  -  27 Jul 2021 07:00  --------------------------------------------------------  IN:    Oral Fluid: 420 mL  Total IN: 420 mL    OUT:    Incontinent per Condom Catheter (mL): 900 mL  Total OUT: 900 mL    Total NET: -480 mL          Physical Exam:  Gen: NAD  Abd: soft NTN  Back: incision site C/D/I flank ecchymosis unchanged mildly tender    : condom cath incontinent     Labs:  07-25  10.53 / 34.4  /x                              11.5   10.53 )-----------( 160      ( 25 Jul 2021 15:15 )             34.4         LABS/RADIOLOGY RESULTS:                          11.5   10.53 )-----------( 160      ( 25 Jul 2021 15:15 )             34.4       Blood Cultures                Urine Cx:

## 2021-07-27 NOTE — PROGRESS NOTE ADULT - ATTENDING COMMENTS
N Haldol last night for agitation with good response. AAOx1, per family h/o dementia. Melatonin qhs. Multimodal pain management.  P Monitor. CXR no acute changes, atelectasis. Some sputum, yellowish. On vancomycin and Zosyn. CXR in am. IS over 1.0L.  C Off pressors since last night. Echo EF 65%, nl function. Resumed home ASA. Monitor.  G Pureed diet. Bowel regimen.   R PCN on left, urine 1145. Nj with urine 65. Cr 1.22 increased, likely prerenal, trend. Net +1.8L.   I ESBL historical on amikacin. Fever 38.6 C. MRSA swab pending. Zosyn, vancomycin and amikacin. D/c vancomycin. Pseudomonas and E coli on stone culture. Sepsis post nephrostomy. ID consult.  E Monitor glycemia.    Has life threatening condition requiring SICU evaluation and management.
Pt seen and examined  early postoperative sepsis well resolved  f/u CT no stones  agree with plan above for abx, rehab
Pt seen and examined  mentating well  resp wnl  urine clear via disla and nephrostomy  sepsis post surgery, despite preop abx and broad spectrum  await cultures  appreciate excellent sicu care, will follow on pressors and wean as needed
Pt seen and examined  complicated stone and with 2 prior organisms, pseudomonas and esbl e coli.  initial sepsis requiring pressors, immediately post procedure, now off pressors and BP stable  Likely not sleeping well at age 89 in ICU as primary cause agitation- currently sleeping  Fever noted  Cont IV Abx  plan to d/c to floor when able and to ensure optimal sleep patterns  will plan on CT scan for eval any residual stone once stable
Pt seen and examined  doing well; resting comfortably  plan as above
Pt seen and examined  urine remains clear  no further fever  resp wnl  CT scan reviewed- no residual stone seen. NU cath in place  Plan transfer to floor, removal of disla when able for TOV  plan possible removal of nephrostomy in next 1-2 days
Pt seen and examined  agree with above findings  CT reviewed- d/c nephrostomy.  Voiding well.  D/c planning with respect to abx and potential for rehab
Multimodal pain management.  On RA, sat 90.  Off pressors.  On ASA/statin.  Regular diet.  CT per primary team, evaluate stones.  UOP 3L disla, 2L nephrostomy. Net -2700. Cr 1.02.

## 2021-07-27 NOTE — PROGRESS NOTE ADULT - NSICDXPILOT_GEN_ALL_CORE
[place]

## 2021-08-04 LAB — SURGICAL PATHOLOGY STUDY: SIGNIFICANT CHANGE UP

## 2021-08-05 ENCOUNTER — APPOINTMENT (OUTPATIENT)
Dept: UROLOGY | Facility: CLINIC | Age: 86
End: 2021-08-05

## 2021-08-11 ENCOUNTER — APPOINTMENT (OUTPATIENT)
Dept: UROLOGY | Facility: CLINIC | Age: 86
End: 2021-08-11
Payer: MEDICARE

## 2021-08-11 DIAGNOSIS — E83.50 UNSPECIFIED DISORDER OF CALCIUM METABOLISM: ICD-10-CM

## 2021-08-11 DIAGNOSIS — N20.0 CALCULUS OF KIDNEY: ICD-10-CM

## 2021-08-11 PROCEDURE — 99214 OFFICE O/P EST MOD 30 MIN: CPT | Mod: 24

## 2021-08-11 RX ORDER — CIPROFLOXACIN HYDROCHLORIDE 500 MG/1
500 TABLET, FILM COATED ORAL
Qty: 14 | Refills: 0 | Status: COMPLETED | COMMUNITY
Start: 2021-06-01 | End: 2021-08-11

## 2021-08-11 RX ORDER — CIPROFLOXACIN HYDROCHLORIDE 500 MG/1
500 TABLET, FILM COATED ORAL
Qty: 14 | Refills: 0 | Status: COMPLETED | COMMUNITY
Start: 2021-06-25 | End: 2021-08-11

## 2021-08-11 RX ORDER — NITROFURANTOIN (MONOHYDRATE/MACROCRYSTALS) 25; 75 MG/1; MG/1
100 CAPSULE ORAL TWICE DAILY
Qty: 20 | Refills: 0 | Status: COMPLETED | COMMUNITY
Start: 2021-06-28 | End: 2021-08-11

## 2021-08-11 RX ORDER — NITROFURANTOIN (MONOHYDRATE/MACROCRYSTALS) 25; 75 MG/1; MG/1
100 CAPSULE ORAL TWICE DAILY
Qty: 14 | Refills: 1 | Status: COMPLETED | COMMUNITY
Start: 2019-12-20 | End: 2021-08-11

## 2021-08-11 RX ORDER — FOSFOMYCIN TROMETHAMINE 3 G/1
3 POWDER ORAL DAILY
Qty: 3 | Refills: 0 | Status: COMPLETED | COMMUNITY
Start: 2019-12-20 | End: 2021-08-11

## 2021-08-12 ENCOUNTER — APPOINTMENT (OUTPATIENT)
Dept: INFECTIOUS DISEASE | Facility: CLINIC | Age: 86
End: 2021-08-12

## 2021-08-14 ENCOUNTER — NON-APPOINTMENT (OUTPATIENT)
Age: 86
End: 2021-08-14

## 2021-08-14 NOTE — ASSESSMENT
[FreeTextEntry1] : Diet modification reviewed at length- increasing fluids (primarily water), citrus is good, and decreasing/moderating salt, animal flesh protein, oxalate containing foods, and moderation of calcium intake (1000 mg/day is USRDA).\par \par I reviewed with the patient the risks of metabolic stone disease given their underlying risk parameters (all of which include large stones, multiple stones, bilateral stones, family history, and young age), and the indications for 24 hour urine metabolic assessment.\par \par We also discussed benefits of regular exercise and weight loss as independent risk reducers for stones.\par \par plan \par 1) 24 hr urine x 1\par 2) RTC in 2 months with Renal US

## 2021-08-14 NOTE — HISTORY OF PRESENT ILLNESS
[None] : no symptoms [FreeTextEntry1] : 89 yr old male presents to follow up with kidney stones. Pt returns for metabolic evaluation and prevention of future stone disease following recent surgery for stone. At issue today is review of the stone analysis, risk factors for future stone episodes and establishing whether they have pure dietary, metabolic, or mixed causes for their stone risks which is unrelated to the surgical management of their stone disease.\par \par s/p left URS and left PCNL on 07/2021\par \par stone- 80% Calcium phosphate (apatite)\par 10% Calcium oxalate monohydrate\par 10% Calcium oxalate dihydrate

## 2021-08-14 NOTE — PHYSICAL EXAM
[General Appearance - Well Developed] : well developed [General Appearance - Well Nourished] : well nourished [Normal Appearance] : normal appearance [Well Groomed] : well groomed [General Appearance - In No Acute Distress] : no acute distress [Abdomen Soft] : soft [Abdomen Tenderness] : non-tender [Costovertebral Angle Tenderness] : no ~M costovertebral angle tenderness [Edema] : no peripheral edema [Respiration, Rhythm And Depth] : normal respiratory rhythm and effort [Exaggerated Use Of Accessory Muscles For Inspiration] : no accessory muscle use [Oriented To Time, Place, And Person] : oriented to person, place, and time [Affect] : the affect was normal [Mood] : the mood was normal [Not Anxious] : not anxious [Normal Station and Gait] : the gait and station were normal for the patient's age [No Focal Deficits] : no focal deficits [Urinary Bladder Findings] : the bladder was normal on palpation [] : no rash [FreeTextEntry1] : well healed left PCNL incision- residual suture (removed today)

## 2021-08-23 ENCOUNTER — EMERGENCY (EMERGENCY)
Facility: HOSPITAL | Age: 86
LOS: 1 days | Discharge: HOME | End: 2021-08-23
Attending: STUDENT IN AN ORGANIZED HEALTH CARE EDUCATION/TRAINING PROGRAM
Payer: COMMERCIAL

## 2021-08-23 ENCOUNTER — APPOINTMENT (OUTPATIENT)
Dept: INFECTIOUS DISEASE | Facility: CLINIC | Age: 86
End: 2021-08-23
Payer: MEDICARE

## 2021-08-23 VITALS
WEIGHT: 152 LBS | BODY MASS INDEX: 24.43 KG/M2 | TEMPERATURE: 97.9 F | DIASTOLIC BLOOD PRESSURE: 56 MMHG | OXYGEN SATURATION: 96 % | HEART RATE: 109 BPM | SYSTOLIC BLOOD PRESSURE: 86 MMHG | HEIGHT: 66 IN

## 2021-08-23 VITALS
DIASTOLIC BLOOD PRESSURE: 88 MMHG | SYSTOLIC BLOOD PRESSURE: 117 MMHG | HEART RATE: 92 BPM | RESPIRATION RATE: 18 BRPM | OXYGEN SATURATION: 99 % | TEMPERATURE: 98 F

## 2021-08-23 VITALS
HEART RATE: 100 BPM | OXYGEN SATURATION: 98 % | WEIGHT: 154.1 LBS | DIASTOLIC BLOOD PRESSURE: 59 MMHG | HEIGHT: 70 IN | RESPIRATION RATE: 18 BRPM | SYSTOLIC BLOOD PRESSURE: 91 MMHG | TEMPERATURE: 98 F

## 2021-08-23 DIAGNOSIS — Z98.890 OTHER SPECIFIED POSTPROCEDURAL STATES: Chronic | ICD-10-CM

## 2021-08-23 DIAGNOSIS — Z95.5 PRESENCE OF CORONARY ANGIOPLASTY IMPLANT AND GRAFT: Chronic | ICD-10-CM

## 2021-08-23 LAB
ALBUMIN SERPL ELPH-MCNC: 3.2 G/DL — LOW (ref 3.3–5)
ALP SERPL-CCNC: 93 U/L — SIGNIFICANT CHANGE UP (ref 40–120)
ALT FLD-CCNC: 6 U/L — LOW (ref 10–45)
ANION GAP SERPL CALC-SCNC: 12 MMOL/L — SIGNIFICANT CHANGE UP (ref 5–17)
APPEARANCE UR: CLEAR — SIGNIFICANT CHANGE UP
AST SERPL-CCNC: 51 U/L — HIGH (ref 10–40)
BACTERIA UR CULT: ABNORMAL
BASE EXCESS BLDV CALC-SCNC: 0.5 MMOL/L — SIGNIFICANT CHANGE UP (ref -2–2)
BASOPHILS # BLD AUTO: 0.04 K/UL — SIGNIFICANT CHANGE UP (ref 0–0.2)
BASOPHILS NFR BLD AUTO: 0.4 % — SIGNIFICANT CHANGE UP (ref 0–2)
BILIRUB SERPL-MCNC: 0.5 MG/DL — SIGNIFICANT CHANGE UP (ref 0.2–1.2)
BILIRUB UR-MCNC: NEGATIVE — SIGNIFICANT CHANGE UP
BUN SERPL-MCNC: 15 MG/DL — SIGNIFICANT CHANGE UP (ref 7–23)
CA-I SERPL-SCNC: 1.32 MMOL/L — SIGNIFICANT CHANGE UP (ref 1.15–1.33)
CALCIUM SERPL-MCNC: 10.5 MG/DL — SIGNIFICANT CHANGE UP (ref 8.4–10.5)
CHLORIDE BLDV-SCNC: 103 MMOL/L — SIGNIFICANT CHANGE UP (ref 96–108)
CHLORIDE SERPL-SCNC: 103 MMOL/L — SIGNIFICANT CHANGE UP (ref 96–108)
CO2 BLDV-SCNC: 28 MMOL/L — HIGH (ref 22–26)
CO2 SERPL-SCNC: 21 MMOL/L — LOW (ref 22–31)
COLOR SPEC: YELLOW — SIGNIFICANT CHANGE UP
CREAT SERPL-MCNC: 0.82 MG/DL — SIGNIFICANT CHANGE UP (ref 0.5–1.3)
DIFF PNL FLD: NEGATIVE — SIGNIFICANT CHANGE UP
EOSINOPHIL # BLD AUTO: 0.13 K/UL — SIGNIFICANT CHANGE UP (ref 0–0.5)
EOSINOPHIL NFR BLD AUTO: 1.2 % — SIGNIFICANT CHANGE UP (ref 0–6)
GAS PNL BLDV: 132 MMOL/L — LOW (ref 136–145)
GAS PNL BLDV: SIGNIFICANT CHANGE UP
GLUCOSE BLDV-MCNC: 97 MG/DL — SIGNIFICANT CHANGE UP (ref 70–99)
GLUCOSE SERPL-MCNC: 99 MG/DL — SIGNIFICANT CHANGE UP (ref 70–99)
GLUCOSE UR QL: NEGATIVE — SIGNIFICANT CHANGE UP
HCO3 BLDV-SCNC: 27 MMOL/L — SIGNIFICANT CHANGE UP (ref 22–29)
HCT VFR BLD CALC: 39.7 % — SIGNIFICANT CHANGE UP (ref 39–50)
HCT VFR BLDA CALC: 41 % — SIGNIFICANT CHANGE UP (ref 39–51)
HGB BLD CALC-MCNC: 13.5 G/DL — SIGNIFICANT CHANGE UP (ref 12.6–17.4)
HGB BLD-MCNC: 12.7 G/DL — LOW (ref 13–17)
IMM GRANULOCYTES NFR BLD AUTO: 0.3 % — SIGNIFICANT CHANGE UP (ref 0–1.5)
KETONES UR-MCNC: NEGATIVE — SIGNIFICANT CHANGE UP
LACTATE BLDV-MCNC: 2.3 MMOL/L — HIGH (ref 0.7–2)
LEUKOCYTE ESTERASE UR-ACNC: NEGATIVE — SIGNIFICANT CHANGE UP
LYMPHOCYTES # BLD AUTO: 1.69 K/UL — SIGNIFICANT CHANGE UP (ref 1–3.3)
LYMPHOCYTES # BLD AUTO: 15.2 % — SIGNIFICANT CHANGE UP (ref 13–44)
MCHC RBC-ENTMCNC: 28.9 PG — SIGNIFICANT CHANGE UP (ref 27–34)
MCHC RBC-ENTMCNC: 32 GM/DL — SIGNIFICANT CHANGE UP (ref 32–36)
MCV RBC AUTO: 90.4 FL — SIGNIFICANT CHANGE UP (ref 80–100)
MONOCYTES # BLD AUTO: 0.72 K/UL — SIGNIFICANT CHANGE UP (ref 0–0.9)
MONOCYTES NFR BLD AUTO: 6.5 % — SIGNIFICANT CHANGE UP (ref 2–14)
NEUTROPHILS # BLD AUTO: 8.5 K/UL — HIGH (ref 1.8–7.4)
NEUTROPHILS NFR BLD AUTO: 76.4 % — SIGNIFICANT CHANGE UP (ref 43–77)
NITRITE UR-MCNC: NEGATIVE — SIGNIFICANT CHANGE UP
NRBC # BLD: 0 /100 WBCS — SIGNIFICANT CHANGE UP (ref 0–0)
PCO2 BLDV: 50 MMHG — SIGNIFICANT CHANGE UP (ref 42–55)
PH BLDV: 7.34 — SIGNIFICANT CHANGE UP (ref 7.32–7.43)
PH UR: 6.5 — SIGNIFICANT CHANGE UP (ref 5–8)
PLATELET # BLD AUTO: 215 K/UL — SIGNIFICANT CHANGE UP (ref 150–400)
PO2 BLDV: 33 MMHG — SIGNIFICANT CHANGE UP (ref 25–45)
POTASSIUM BLDV-SCNC: 7.9 MMOL/L — CRITICAL HIGH (ref 3.5–5.1)
POTASSIUM SERPL-MCNC: 4 MMOL/L — SIGNIFICANT CHANGE UP (ref 3.5–5.3)
POTASSIUM SERPL-MCNC: 6.1 MMOL/L — HIGH (ref 3.5–5.3)
POTASSIUM SERPL-SCNC: 4 MMOL/L — SIGNIFICANT CHANGE UP (ref 3.5–5.3)
POTASSIUM SERPL-SCNC: 6.1 MMOL/L — HIGH (ref 3.5–5.3)
PROT SERPL-MCNC: 7.4 G/DL — SIGNIFICANT CHANGE UP (ref 6–8.3)
PROT UR-MCNC: NEGATIVE — SIGNIFICANT CHANGE UP
RAPID RVP RESULT: SIGNIFICANT CHANGE UP
RBC # BLD: 4.39 M/UL — SIGNIFICANT CHANGE UP (ref 4.2–5.8)
RBC # FLD: 14 % — SIGNIFICANT CHANGE UP (ref 10.3–14.5)
SAO2 % BLDV: 58.3 % — LOW (ref 67–88)
SARS-COV-2 RNA SPEC QL NAA+PROBE: SIGNIFICANT CHANGE UP
SODIUM SERPL-SCNC: 136 MMOL/L — SIGNIFICANT CHANGE UP (ref 135–145)
SP GR SPEC: 1.02 — SIGNIFICANT CHANGE UP (ref 1.01–1.02)
UROBILINOGEN FLD QL: NEGATIVE — SIGNIFICANT CHANGE UP
WBC # BLD: 11.11 K/UL — HIGH (ref 3.8–10.5)
WBC # FLD AUTO: 11.11 K/UL — HIGH (ref 3.8–10.5)

## 2021-08-23 PROCEDURE — 81003 URINALYSIS AUTO W/O SCOPE: CPT

## 2021-08-23 PROCEDURE — 93010 ELECTROCARDIOGRAM REPORT: CPT

## 2021-08-23 PROCEDURE — 82947 ASSAY GLUCOSE BLOOD QUANT: CPT

## 2021-08-23 PROCEDURE — 87040 BLOOD CULTURE FOR BACTERIA: CPT

## 2021-08-23 PROCEDURE — 99284 EMERGENCY DEPT VISIT MOD MDM: CPT | Mod: 25

## 2021-08-23 PROCEDURE — 82330 ASSAY OF CALCIUM: CPT

## 2021-08-23 PROCEDURE — 83605 ASSAY OF LACTIC ACID: CPT

## 2021-08-23 PROCEDURE — 85018 HEMOGLOBIN: CPT

## 2021-08-23 PROCEDURE — 84295 ASSAY OF SERUM SODIUM: CPT

## 2021-08-23 PROCEDURE — 71045 X-RAY EXAM CHEST 1 VIEW: CPT

## 2021-08-23 PROCEDURE — 99214 OFFICE O/P EST MOD 30 MIN: CPT

## 2021-08-23 PROCEDURE — 85014 HEMATOCRIT: CPT

## 2021-08-23 PROCEDURE — 0225U NFCT DS DNA&RNA 21 SARSCOV2: CPT

## 2021-08-23 PROCEDURE — 84132 ASSAY OF SERUM POTASSIUM: CPT

## 2021-08-23 PROCEDURE — 80053 COMPREHEN METABOLIC PANEL: CPT

## 2021-08-23 PROCEDURE — 82803 BLOOD GASES ANY COMBINATION: CPT

## 2021-08-23 PROCEDURE — 93005 ELECTROCARDIOGRAM TRACING: CPT

## 2021-08-23 PROCEDURE — 71045 X-RAY EXAM CHEST 1 VIEW: CPT | Mod: 26

## 2021-08-23 PROCEDURE — 85025 COMPLETE CBC W/AUTO DIFF WBC: CPT

## 2021-08-23 PROCEDURE — 99285 EMERGENCY DEPT VISIT HI MDM: CPT

## 2021-08-23 PROCEDURE — 87086 URINE CULTURE/COLONY COUNT: CPT

## 2021-08-23 PROCEDURE — 82435 ASSAY OF BLOOD CHLORIDE: CPT

## 2021-08-23 RX ORDER — SODIUM CHLORIDE 9 MG/ML
1000 INJECTION INTRAMUSCULAR; INTRAVENOUS; SUBCUTANEOUS ONCE
Refills: 0 | Status: COMPLETED | OUTPATIENT
Start: 2021-08-23 | End: 2021-08-23

## 2021-08-23 RX ADMIN — SODIUM CHLORIDE 1000 MILLILITER(S): 9 INJECTION INTRAMUSCULAR; INTRAVENOUS; SUBCUTANEOUS at 15:30

## 2021-08-23 NOTE — ED PROVIDER NOTE - NSFOLLOWUPINSTRUCTIONS_ED_ALL_ED_FT
Activities as tolerated. Please encourage good oral and fluid intake.     Please see your primary care doctor within 24-48 hours for further management of your symptoms.  Please follow up with your urologist in one week for further evaluation of your symptoms.    Please seek emergent medical management if you have any worsening signs or symptoms.

## 2021-08-23 NOTE — ED PROVIDER NOTE - CLINICAL SUMMARY MEDICAL DECISION MAKING FREE TEXT BOX
89 yo M PMHx of TIA, CAD s/p 2 stents (2014), HLD, GERD, BPH s/p surgery 2017, ESBL E. coli UTI 2/2 L nephrolithiasis s/p ureteral stent placement (4/30/21), and L PCNL on 7/19 c/b E coli and pseudomonas induced septic shock w ICU stay and d/c on 7/27 sent in by outpt ID office for hypotension. Pt is currently on day 7 of 10 of nitrofurantoin followed by 6mo of methenamine per outpx ID. Pt's BP improved from 91/59 to 108/74 w 1L NS bolus. Concern for infectious process, specifically UTI/pyelonephritis given recent hx. Will order CBC, CMP, VBG, UCx, UA, COVID PCR, BCx, CXR, and ECG. 89 yo M PMHx of TIA, CAD s/p 2 stents (2014), HLD, GERD, BPH s/p surgery 2017, ESBL E. coli UTI 2/2 L nephrolithiasis s/p ureteral stent placement (4/30/21), and L PCNL on 7/19 c/b E coli and pseudomonas induced septic shock w ICU stay and d/c on 7/27 sent in by outpt ID office for hypotension. Pt is currently on day 7 of 10 of nitrofurantoin followed by 6mo of methenamine per outpx ID. Pt's BP improved from 91/59 to 108/74 w 1L NS bolus. Concern for infectious process, specifically UTI/pyelonephritis given recent hx, v dehydration given only tea today. Will order CBC, CMP, VBG, UCx, UA, COVID PCR, BCx, CXR, and ECG.

## 2021-08-23 NOTE — ED PROVIDER NOTE - ATTENDING CONTRIBUTION TO CARE
I, Noah Vargas, performed a history and physical exam of the patient and discussed their management with the resident and/or advanced care provider. I reviewed the resident and/or advanced care provider's note and agree with the documented findings and plan of care. I was present and available for all procedures.    89 yo M PMHx of TIA, CAD s/p 2 stents (2014), HLD, GERD, BPH s/p surgery 2017, ESBL E. coli UTI 2/2 L nephrolithiasis s/p ureteral stent placement (4/30/21), and L PCNL on 7/19 c/b E coli and pseudomonas induced septic shock w ICU stay and d/c on 7/27 sent in by outpt ID office for hypotension. Daughter at bedside. During routine follow up today, pt was found to be hypotensive to 91/59 at outpt ID office. Pt is currently on day 7 of 10 of nitrofurantoin followed by 6mo of methenamine. Pt notably had new onset dyspnea on exertion today that resolves with rest. Pt skipped breakfast this am, only had tea pta this am. now Denies recent trauma, fevers, chills, headache, dizziness, nausea, vomiting, dysuria, freq, hematuria, diarrhea, constipation, chest pain, shortness of breath, cough. .Pt and daughter are eager to go home and daughter states pt is irritable as he doesn't want to be here. despite previous documentation patient denies any hi si [facility] does endorse he asked if we were trying to kill him but keeping him in the hospital again but I was able to answer his questions and address his concerns about being in the ed and plan Per daughter, pt is fluent is english though is hard of hearing; she often will ask questions to him in [place] or [place]. Urologist Dr. Hoenig    Well appearing and in NAD, head normal appearing atraumatic, trachea midline, no respiratory distress, lungs cta bilaterally, rrr no murmurs, soft NT ND abdomen no cva ttp, prev site of nephrostomy c/d/i , no visible extremity deformities, Alert and oriented, non focal neuro exam, skin warm and dry, normal affect and mood    well appearing now bp at my time of eval 120/70 no tachycardia, well appearing likely hypotension 2/2 poor po intake no fever rectally no other infectious signs sx, will eval with labs bc urine uc, if all remains stable daughter and patient requesting dc with close pmd/ id/ uro Follow up discussed with her our concerns and given strict return precautions

## 2021-08-23 NOTE — ED PROVIDER NOTE - PROGRESS NOTE DETAILS
vivek attending- patient remains stable bp improved discussed with daughter and patient results thus far pending repeat lab works Genna Verdin PGY4  potassium and repeat lactate stable, will dc with uro and pmd f/u. VSS, patient and family understand risk and return precautions. pettet- patient very well appearing discussed with him and daughter rpt vs wnl, bp stable throughout ed visit will dc with pmd Follow up strict return precautions

## 2021-08-23 NOTE — ED PROVIDER NOTE - NS ED ROS FT
Gen: No fever, normal appetite  Eyes: No eye irritation or discharge  ENT: No ear pain, sore throat  Resp: No cough; dyspnea on exertion  Cardiovascular: No chest pain or palpitation  Gastroenteric: No nausea/vomiting, no abdominal pain  :  No dysuria or hematuria  MS: No joint pain  Skin: No rashes  Neuro: No headache; no abnormal movements  Remainder negative, except as per the HPI

## 2021-08-23 NOTE — ED PROVIDER NOTE - OBJECTIVE STATEMENT
89 yo M PMHx of TIA, CAD s/p 2 stents (2014), HLD, GERD, BPH s/p surgery 2017, ESBL E. coli UTI 2/2 L nephrolithiasis s/p ureteral stent placement (4/30/21), and L PCNL on 7/19 c/b E coli and pseudomonas induced septic shock w ICU stay and d/c on 7/27 sent in by outpt ID office for hypotension. Daughter at bedside. During routine follow up today, pt was found to be hypotensive to 91/59 at outpt ID office. Pt is currently on day 7 of 10 of nitrofurantoin followed by 6mo of methenamine. Pt notably had new onset dyspnea on exertion today that resolves with rest. No dysuria, hematuria, CP, HA, fevers, chills, N/V, abd pain, or cough. Pt wears a diaper making it difficult for daughter to assess urinary frequency. Pt and daughter are eager to go home and daughter states pt is irritable as he doesn't want to be here. Pt asked myself and staff if we "want to kill him."     Per daughter, pt is fluent is english though is hard of hearing; she often will ask questions to him in [place] or [place].  ID Dr. Polk 89 yo M PMHx of TIA, CAD s/p 2 stents (2014), HLD, GERD, BPH s/p surgery 2017, ESBL E. coli UTI 2/2 L nephrolithiasis s/p ureteral stent placement (4/30/21), and L PCNL on 7/19 c/b E coli and pseudomonas induced septic shock w ICU stay and d/c on 7/27 sent in by outpt ID office for hypotension. Daughter at bedside. During routine follow up today, pt was found to be hypotensive to 91/59 at outpt ID office. Pt is currently on day 7 of 10 of nitrofurantoin followed by 6mo of methenamine. Pt notably had new onset dyspnea on exertion today that resolves with rest. No dysuria, hematuria, CP, HA, fevers, chills, N/V, abd pain, or cough. Pt wears a diaper making it difficult for daughter to assess urinary frequency. Pt and daughter are eager to go home and daughter states pt is irritable as he doesn't want to be here. Pt asked myself and staff if we "want to kill him."     Per daughter, pt is fluent is english though is hard of hearing; she often will ask questions to him in [place] or [place].  Urologist Dr. Hoenig 89 yo M PMHx of TIA, CAD s/p 2 stents (2014), HLD, GERD, BPH s/p surgery 2017, ESBL E. coli UTI 2/2 L nephrolithiasis s/p ureteral stent placement (4/30/21), and L PCNL on 7/19 c/b E coli and pseudomonas induced septic shock w ICU stay and d/c on 7/27 sent in by outpt ID office for hypotension. Daughter at bedside. During routine follow up today, pt was found to be hypotensive to 91/59 at outpt ID office. Pt is currently on day 7 of 10 of nitrofurantoin followed by 6mo of methenamine. Pt notably had new onset dyspnea on exertion today that resolves with rest. Pt skipped breakfast this am, only had tea prior to admission. No dysuria, hematuria, CP, HA, fevers, chills, N/V, abd pain, or cough. Pt wears a diaper making it difficult for daughter to assess urinary frequency. Pt and daughter are eager to go home and daughter states pt is irritable as he doesn't want to be here. Pt asked myself and staff if we "want to kill him."     Per daughter, pt is fluent is english though is hard of hearing; she often will ask questions to him in [place] or [place].  Urologist Dr. Hoenig

## 2021-08-23 NOTE — ED ADULT NURSE NOTE - OBJECTIVE STATEMENT
89 yo male PMH HLD, CAD s/p stents on ASA, BPH, UTI, PSH ureteral stent in July presents to ED from doctors office for hypotension. Per family at bedside pt was at routine follow up and noted to have BP in 70s for 2 readings, sent to ED for evaluation. Currently finishing (has 3 more days) of macrobid. Abd soft, nondistended, nontender, negative CVA tenderness, afebrile rectally. Sepsis order set initiated per MD order. Denies CP, SOB, n/v/d, fevers, chills, abdominal pain, urinary symptoms, weakness, fatigue, numbness, tingling in upper and lower extremities, HA, blurry vision. VSS updated on plan of care.

## 2021-08-23 NOTE — ED ADULT NURSE REASSESSMENT NOTE - NS ED NURSE REASSESS COMMENT FT1
Hari RN break coverage [id number]: Repeat vital signs performed. Cooper RN send repeat vbg and potassium to lab. Awaiting results. Pt aware. Will continue to monitor.

## 2021-08-23 NOTE — ED PROVIDER NOTE - PATIENT PORTAL LINK FT
You can access the FollowMyHealth Patient Portal offered by [facility] by registering at the following website: [email or web address]. By joining [facility] FollowMyHealth portal, you will also be able to view your health information using other applications (apps) compatible with our system.

## 2021-08-23 NOTE — ED PROVIDER NOTE - PHYSICAL EXAMINATION
General: Well developed; NAD  Eyes: EOMI; conjunctiva and sclera clear  HEENT: NCAT, no nasal discharge  Neck: Supple, full ROM  Respiratory: No chest wall deformity, CTABL, no wheezes, rales, or rhonchi  Cardiovascular: RRR, +S1/S2, no murmurs, rubs, or gallops. 2+ upper and lower pulses b/l  Abdominal: Normal BS, soft, non-tender, non-distended, no rebound or guarding  Back: No CVA tenderness b/l  Extremities: Full ROM, no cyanosis, clubbing or pitting edema   Neurological: CN II-XII grossly intact, full ROM x4, sensation grossly intact  Skin: No rashes or lesions General: Well developed; NAD  Eyes: EOMI; conjunctiva and sclera clear  HEENT: NCAT, no nasal discharge  Neck: Supple, full ROM  Respiratory: No chest wall deformity, CTABL, no wheezes, rales, or rhonchi  Cardiovascular: RRR, +S1/S2, no murmurs, rubs, or gallops. 2+ upper and lower pulses b/l  Abdominal: Normal BS, soft, non-tender, non-distended, no rebound or guarding  Back: No CVA tenderness b/l  Extremities: Full ROM, no cyanosis, clubbing or pitting edema   Neurological: CN II-XII grossly intact, full ROM x4, sensation grossly intact  Skin: No rashes or lesions  Psych: irritable

## 2021-08-23 NOTE — ED ADULT TRIAGE NOTE - CHIEF COMPLAINT QUOTE
Was sent in by Infectious disease dr for hypotension in the office today. Pt was being evaluated for UTI  BP 91/59 , C/o generalized weakness with increased MILLS

## 2021-08-24 LAB
CULTURE RESULTS: NO GROWTH — SIGNIFICANT CHANGE UP
SPECIMEN SOURCE: SIGNIFICANT CHANGE UP

## 2021-08-24 NOTE — DISCHARGE NOTE ADULT - MEDICATION SUMMARY - MEDICATIONS TO TAKE
CHW Note:    Type of contact:  Phone    Reason for contact: Housing     Action taken:  CHW contacted patient regarding housing. CHW explained to patient she is having a hard time locating housing due to the amount and location . Patient advised CHW he was busy and asked her to call him back at another time,    Patient next steps:N/A    CHW next steps: CHW will follow up in 2-3 days will other housing resources.  
I will START or STAY ON the medications listed below when I get home from the hospital:    finasteride  -- 5 milligram(s) by mouth once a day  -- Indication: For BPH with obstruction/lower urinary tract symptoms    aspirin 81 mg oral tablet  -- 1 tab(s) by mouth once a day  -- Indication: For prevention    tamsulosin 0.4 mg oral capsule  -- 1 cap(s) by mouth once a day (at bedtime)  -- Indication: For BPH with obstruction/lower urinary tract symptoms    Lexapro 10 mg oral tablet  -- 1 tab(s) by mouth once a day  -- Indication: For mood disorder    atorvastatin 20 mg oral tablet  -- 1 tab(s) by mouth once a day (at bedtime)  -- Indication: For Hld    cefpodoxime 200 mg oral tablet  -- 1 tab(s) by mouth every 12 hours for 6 days  -- Indication: For uti    brinzolamide 1% ophthalmic suspension  -- 1 drop(s) to each affected eye 2 times a day  -- Indication: For Glaucoma    lactobacillus acidophilus oral capsule  -- 1 cap(s) by mouth 3 times a day  -- Indication: For prevention of antibiotic associated diarrhea    Multiple Vitamins oral tablet  -- 1 tab(s) by mouth once a day  -- Indication: For Supplement

## 2021-08-25 NOTE — ASSESSMENT
[FreeTextEntry1] : 89 y/o male w/ a PMHx of CAD s/p stents (2014), TIA, HLD, GERD, BPH s/p prostate surgery (2017), ESBL E. coli UTI secondary to left kidney stones s/p ureteral stent placement (4/30/21) who presented on 7/19 for an interval left PCNL, post-operatively became hypotensive found to have pyelonephritis post procedure with ESBL E coli and Pseudomonas.\par Treated with Meropenem to finish therapy 7/30/21. \par Pt followed up with Urology recently, had urine cx performed which grew ESBL E coli, was prescribed macrobid for 10 days which he just finished.\par Son and daughter with the pt, they are concerned about dark urine with strong odor but admit to him barely drinking much water. \par Given pt with subcentimeter residual stones, likely colonized. He is asymptomatic at this time, just finished 10 days of therapy with macrobid.\par Discussed with son and daughter given he is already colonized with resistant bacteria, unless symptomatic or planned for urologic procedure to avoid treating colonization. \par Would hold off on fosfomycin therapy \par Can initiate methenamine.\par Discussed in detail again symptoms to look out for, which could be indicative of real urine infection. \par Advised to improve hydration. \par   \par Pt found to have low BP, manual performed by me around 74/42. \par Advised to go to ER for evaluation. \par

## 2021-08-25 NOTE — PHYSICAL EXAM
[General Appearance - Alert] : alert [General Appearance - In No Acute Distress] : in no acute distress [Sclera] : the sclera and conjunctiva were normal [Auscultation Breath Sounds / Voice Sounds] : lungs were clear to auscultation bilaterally [Heart Sounds] : normal S1 and S2 [FreeTextEntry1] : slightly tachy [Edema] : there was no peripheral edema [Abdomen Soft] : soft [Abdomen Tenderness] : non-tender [] : no rash [Affect] : the affect was normal

## 2021-08-25 NOTE — HISTORY OF PRESENT ILLNESS
[FreeTextEntry1] : 89 y/o male w/ a PMHx of CAD s/p stents (2014), TIA, HLD, GERD, BPH s/p prostate surgery (2017), ESBL E. coli UTI secondary to left kidney stones s/p ureteral stent placement (4/30/21) who presented on 7/19 for an interval left PCNL, post-operatively became hypotensive found to have pyelonephritis post procedure with ESBL E coli and Pseudomonas.\par Treated with Meropenem to finish therapy 7/30/21. \par Son and daughter with the pt, report he is doing ok, they do have hard time hydrating him. He denies any abdominal pain, no N/V, no dysuria or blood in the urine. \par Pt followed up with Urology recently, had urine cx performed which grew ESBL E coli, was prescribed macrobid for 10 days which he just finished.\par They are concerned about dark urine with strong odor but admit to him barely drinking much water. \par \par

## 2021-08-25 NOTE — REVIEW OF SYSTEMS
[Fever] : no fever [Chills] : no chills [Chest Pain] : no chest pain [Shortness Of Breath] : no shortness of breath [Cough] : no cough [Abdominal Pain] : no abdominal pain [Diarrhea] : no diarrhea [Dysuria] : no dysuria [Skin Lesions] : no skin lesions [Anxiety] : no anxiety

## 2021-09-10 NOTE — ED PROVIDER NOTE - RELIEVING FACTORS
na Taltz Counseling: I discussed with the patient the risks of ixekizumab including but not limited to immunosuppression, serious infections, worsening of inflammatory bowel disease and drug reactions.  The patient understands that monitoring is required including a PPD at baseline and must alert us or the primary physician if symptoms of infection or other concerning signs are noted.

## 2021-10-16 NOTE — ED PROVIDER NOTE - PROGRESS NOTE DETAILS
Neonatology Consult        Ms. Kina Sherman is a 24 year old       mother at 33w2d with PPROM.  She has been extensively counseled by MFM and neonatology.   Maternal DEBORAH: 2021     Recent Labs   Lab 21  0920 21  0948 21  1202   HIV Antigen/ Antibody Combo Screen  --  Nonreactive  --    Neisseria gonorrhoeae by Nucleic Acid Amplification Negative  --   --    Chlamydia trachomatis by Nucleic Acid Amplification Negative  --   --    Rubella Antibody, IgG  --   --  398.8       No results for input(s): CULT, SDES in the last 8765 hours.    No results found for: GBS    Medications:  Medications Prior to Admission   Medication Sig Dispense Refill   • insulin aspart (NovoLOG FlexPen) 100 UNIT/ML pen-injector Inject 14 units before breakfast, 16 units before lunch, 16 units before dinner. Use Novolog  Pen. Prime 2 units before each dose. 30 mL 1   • insulin detemir (Levemir FlexTouch) 100 UNIT/ML pen-injector Inject 24 Units into the skin nightly. Use Levemir flexpen. Prime 2 units before each dose. 15 mL 5   • blood glucose (OneTouch Verio) test strip Test blood sugar 4 times daily. Diagnosis: Type 2 dm. Meter: One Touc 150 strip 5   • Lancets (OneTouch Delica Plus Klmzcq12M) Misc 1 each 4 times daily. 100 each 5   • Insulin Pen Needle (Pen Needles) 32G X 4 MM Misc 1 each 4 times daily. 200 each 5   • aspirin 81 MG EC tablet Take 1 tablet by mouth daily. 60 tablet 6   • Prenatal Vit-Fe Fumarate-FA (Prenatal 19) Chew Tab Chew 1 tablet by mouth daily. 30 tablet 11   • famotidine (Pepcid) 20 MG tablet Take 1 tablet by mouth 2 times daily as needed (heartburn). 60 tablet 3       Current Facility-Administered Medications   Medication   • aluminum-magnesium hydroxide-simethicone  (MAALOX) 200-200-20 MG/5ML suspension 30 mL   • PRENATAL vitamin & mineral w/ folic acid 1 mg tablet 1 tablet   • acetaminophen (TYLENOL) tablet 650 mg   • calcium carbonate (TUMS) chewable tablet 500 mg   • famotidine (PEPCID) tablet 20 mg   • sodium chloride 0.9 % flush bag 25 mL   • sodium chloride (PF) 0.9 % injection 2 mL   • insulin glargine (LANTUS) injection 24 Units   • SODIUM CHLORIDE 0.9 % IV SOLN Pyxis Override   • ampicillin (OMNIPEN) 2,000 mg in sodium chloride 0.9 % 100 mL IVPB    Followed by   • [START ON 10/18/2021] amoxicillin (AMOXIL) capsule 500 mg   • aspirin (ECOTRIN) enteric coated tablet 81 mg   • insulin lispro (ADMELOG, HumaLOG) injection 16 Units   • dextrose 50 % injection 25 g   • dextrose 50 % injection 12.5 g   • glucagon (GLUCAGEN) injection 1 mg   • dextrose (GLUTOSE) 40 % gel 15 g   • dextrose (GLUTOSE) 40 % gel 30 g   • insulin lispro (ADMELOG,HumaLOG) - Correction Dose   • ONDANSETRON HCL 4 MG/2ML IJ SOLN Pyxis Override   • ondansetron (ZOFRAN) injection 4 mg      Steroids: Betamethasone x1 dose 10/16    I spoke to Ms. Kina Sherman, along with mother was alone in L&D. Our discussion revolved around the medical management as well as concerns for the best interests of the infant. By systems my discussion included:    Resp:  Immediate delivery room resuscitation including the description of the team present at delivery and their role in stabilizing the baby.  The possible need for management on CPAP/BiPAP and the need of oxygen was discussed; The role of Surfactant in RDS and the need for exogenous Surfactant administration (if indicated) was discussed. Intubation if clinically indicated will be performed. Apnea of Prematurity/management was discussed.  CV:  The possible need for blood pressure support with Fluids and with medications; the need for PICC lines/ Umbilical lines was discussed. Management of  Bradycardia discussed.  GI/FEN:  Depending on GA the need  for TPN, timing of starting feeds, including the potential need for NG feedings, the expected course for feeding tolerance, the benefits of breastfeeding and the availability of donor breast milk and management of hypoglycemia was discussed.   Heme: Even though it is rarely indicated at this GA, I discussed the possible need for blood transfusions, as well as use/contribution of directed donor blood.  ID:  I discussed the likelihood of infection in premature infants. If indicated, infant might need surveillance of blood for infection and prophylactic antibiotic administration until infectious etiologies are ruled out.  Neuro: If infant is < / = 1500 grams, then a ROP evaluation will be performed; and Audiology evaluations also will be performed. Management in an isolette/ thermoregulation was discussed.   Discharge: Discharge criteria was discussed with mother. I also discussed the overall outcomes for a 33 weeker.     I reassured them that the NICU team was available to answer further questions should they arise.  Thank you for allowing me to participate in the care of this high-risk mother.  Please contact NICU with any further questions.      Total time spent in consultation, which includes review of the chart, discussion with obstetricians and  team, and face-to-face consultation at bedside lasted approximately 20 mins.    Ramón Rizvi MD  10/16/2021 3:44 PM     Ortho called and  called and will see patient

## 2021-10-27 ENCOUNTER — APPOINTMENT (OUTPATIENT)
Dept: UROLOGY | Facility: CLINIC | Age: 86
End: 2021-10-27
Payer: MEDICARE

## 2021-10-27 ENCOUNTER — OUTPATIENT (OUTPATIENT)
Dept: OUTPATIENT SERVICES | Facility: HOSPITAL | Age: 86
LOS: 1 days | End: 2021-10-27
Payer: COMMERCIAL

## 2021-10-27 DIAGNOSIS — R35.0 FREQUENCY OF MICTURITION: ICD-10-CM

## 2021-10-27 DIAGNOSIS — Z98.890 OTHER SPECIFIED POSTPROCEDURAL STATES: Chronic | ICD-10-CM

## 2021-10-27 DIAGNOSIS — Z95.5 PRESENCE OF CORONARY ANGIOPLASTY IMPLANT AND GRAFT: Chronic | ICD-10-CM

## 2021-10-27 PROCEDURE — 76775 US EXAM ABDO BACK WALL LIM: CPT | Mod: 26

## 2021-10-27 PROCEDURE — 99214 OFFICE O/P EST MOD 30 MIN: CPT | Mod: 25

## 2021-10-27 PROCEDURE — 76775 US EXAM ABDO BACK WALL LIM: CPT

## 2021-10-27 NOTE — ASSESSMENT
[FreeTextEntry1] : Renal US reviewed with pt and son: no definite stone seen either side. linear left lower pole echogenic focus without shadowing or twinkle. no hydro. small lower pole left renal cyst appears simple. no solid renal mass.\par \par UTI prevention - Hiprex 1 G and Vitamin C 500 mg twice a day. Continue medication after discussion.\par \par Diet modification reviewed at length- increasing fluids (primarily water), citrus is good, and decreasing/moderating salt, animal flesh protein, oxalate containing foods, and moderation of calcium intake (1000 mg/day is USRDA).\par \par Given age will hold off on metabolic assess with 24 hour urine.\par \par plan \par 1) RTC in 6 months in Renal US \par \par

## 2021-10-27 NOTE — HISTORY OF PRESENT ILLNESS
[None] : no symptoms [FreeTextEntry1] : 89 yr old male presents to follow up with kidney stones. \par \par s/p left URS and left PCNL on 07/19/2021\par \par stone- 80% Calcium phosphate (apatite)\par 10% Calcium oxalate monohydrate\par 10% Calcium oxalate dihydrate \par \par Pt saw ID on 08/23/21- never took Monurol as per their recommendations. Pt had low BP at the visit- went to ED for eval- no UTI at the time. \par \par Now on methenamine/vit C. Doing well; no new complaints.

## 2021-11-15 DIAGNOSIS — N39.0 URINARY TRACT INFECTION, SITE NOT SPECIFIED: ICD-10-CM

## 2021-11-15 DIAGNOSIS — Z87.442 PERSONAL HISTORY OF URINARY CALCULI: ICD-10-CM

## 2021-11-15 DIAGNOSIS — N20.0 CALCULUS OF KIDNEY: ICD-10-CM

## 2021-11-26 NOTE — ED ADULT NURSE NOTE - PYSCHOSOCIAL ASSESSMENT
Group Topic: BH Check-in/Symptom Rating    Date: 11/26/2021  Start Time: 0930  End Time: 1030  Facilitators: Tate Mitchell CNA    Focus: morning group  Number in attendance: 6    Method: Group  Attendance: offered did not attend       WDL

## 2022-03-01 NOTE — PATIENT PROFILE ADULT - NSPROGENSOURCEINFO_GEN_A_NUR
Personalized Preventive Plan for Tess Urbina - 3/1/2022  Medicare offers a range of preventive health benefits. Some of the tests and screenings are paid in full while other may be subject to a deductible, co-insurance, and/or copay. Some of these benefits include a comprehensive review of your medical history including lifestyle, illnesses that may run in your family, and various assessments and screenings as appropriate. After reviewing your medical record and screening and assessments performed today your provider may have ordered immunizations, labs, imaging, and/or referrals for you. A list of these orders (if applicable) as well as your Preventive Care list are included within your After Visit Summary for your review. Other Preventive Recommendations:    · A preventive eye exam performed by an eye specialist is recommended every 1-2 years to screen for glaucoma; cataracts, macular degeneration, and other eye disorders. · A preventive dental visit is recommended every 6 months. · Try to get at least 150 minutes of exercise per week or 10,000 steps per day on a pedometer . · Order or download the FREE \"Exercise & Physical Activity: Your Everyday Guide\" from [employer]. Call 0-135.732.7668 or search [employer] online. · You need 6164-5842 mg of calcium and 1683-5346 IU of vitamin D per day. It is possible to meet your calcium requirement with diet alone, but a vitamin D supplement is usually necessary to meet this goal.  · When exposed to the sun, use a sunscreen that protects against both UVA and UVB radiation with an SPF of 30 or greater. Reapply every 2 to 3 hours or after sweating, drying off with a towel, or swimming. · Always wear a seat belt when traveling in a car. Always wear a helmet when riding a bicycle or motorcycle.
patient/family

## 2022-03-13 NOTE — STROKE CODE NOTE - NIH STROKE SCALE: TOTAL
Dc instructions verbalized by pt, all questions answered at this time. No distress upon dc  
Pt is about 11 weeks pregnant    
0

## 2022-04-09 NOTE — ED PROVIDER NOTE - SECONDARY DIAGNOSIS.
Benign prostatic hyperplasia, presence of lower urinary tract symptoms unspecified, unspecified morphology
Attending Attestation (For Attendings USE Only)...

## 2022-04-15 NOTE — ED ADULT NURSE NOTE - NS ED NURSE DISCH DISPOSITION
-- DO NOT REPLY / DO NOT REPLY ALL --  -- Message is from the [facility]--    General Patient Message      Reason for Call: Patient was given the wrong the prescription. Patient was given a prescription that has another patients name on it.    Caller Information       Type Contact Phone    04/15/2022 04:14 PM CDT Phone (Incoming) [employer] [phone]          Alternative phone number: none        Did the caller agree that this message can wait until the office reopens in the morning? YES - The Message Can Wait      Send a message to the provider’s clinical support pool.     Turnaround time given to caller:   \"This message will be sent to [state Provider's name]. The clinical team will fulfill your request as soon as they review your message when the office opens tomorrow.\"         Left without being seen (saw a nurse but never saw a physician or midlevel provider)

## 2022-04-27 ENCOUNTER — APPOINTMENT (OUTPATIENT)
Dept: UROLOGY | Facility: CLINIC | Age: 87
End: 2022-04-27
Payer: MEDICARE

## 2022-04-27 ENCOUNTER — OUTPATIENT (OUTPATIENT)
Dept: OUTPATIENT SERVICES | Facility: HOSPITAL | Age: 87
LOS: 1 days | End: 2022-04-27
Payer: COMMERCIAL

## 2022-04-27 DIAGNOSIS — Z98.890 OTHER SPECIFIED POSTPROCEDURAL STATES: Chronic | ICD-10-CM

## 2022-04-27 DIAGNOSIS — Z95.5 PRESENCE OF CORONARY ANGIOPLASTY IMPLANT AND GRAFT: Chronic | ICD-10-CM

## 2022-04-27 DIAGNOSIS — R35.0 FREQUENCY OF MICTURITION: ICD-10-CM

## 2022-04-27 PROCEDURE — 99213 OFFICE O/P EST LOW 20 MIN: CPT | Mod: 25

## 2022-04-27 PROCEDURE — 76775 US EXAM ABDO BACK WALL LIM: CPT

## 2022-04-27 PROCEDURE — 76775 US EXAM ABDO BACK WALL LIM: CPT | Mod: 26

## 2022-04-27 RX ORDER — DONEPEZIL HYDROCHLORIDE 10 MG/1
10 TABLET ORAL
Refills: 0 | Status: ACTIVE | COMMUNITY
Start: 2022-04-27

## 2022-04-28 NOTE — ASSESSMENT
[FreeTextEntry1] : Renal US reviewed with pt and son: no definite stone seen either side. linear left lower pole echogenic focus without shadowing or twinkle. no hydro. small lower pole left renal cyst appears simple. no solid renal mass.\par \par UTI prevention - Hiprex 1 G and Vitamin C 500 mg twice a day. Continue medication after discussion.\par \par Diet modification reviewed at length- increasing fluids (primarily water), citrus is good, and decreasing/moderating salt, animal flesh protein, oxalate containing foods, and moderation of calcium intake (1000 mg/day is USRDA).\par \par Given age will hold off on metabolic assess with 24 hour urine.\par voiding well at this time.\par doing well\par \par plan \par 1) RTC in 6 months in Renal US \par \par

## 2022-04-28 NOTE — HISTORY OF PRESENT ILLNESS
[None] : no symptoms [FreeTextEntry1] : 89 yr old male presents to follow up with h/o kidney stones, uti (with MDR organism)\par s/p left URS and left PCNL on 07/19/2021\par \par stone- 80% Calcium phosphate (apatite)\par 10% Calcium oxalate monohydrate\par 10% Calcium oxalate dihydrate \par \par Pt saw ID on 08/23/21- never took Monurol as per their recommendations. Pt had low BP at the visit- went to ED for eval- no UTI at the time. \par \par Now on methenamine/vit C. Doing well; no new complaints.

## 2022-05-04 DIAGNOSIS — N13.8 OTHER OBSTRUCTIVE AND REFLUX UROPATHY: ICD-10-CM

## 2022-05-04 DIAGNOSIS — N20.0 CALCULUS OF KIDNEY: ICD-10-CM

## 2022-05-04 DIAGNOSIS — N40.1 BENIGN PROSTATIC HYPERPLASIA WITH LOWER URINARY TRACT SYMPTOMS: ICD-10-CM

## 2022-06-21 NOTE — DISCHARGE NOTE ADULT - HOSPITAL COURSE
89 y/o male with PMH of CAD, BPH, HLD came to the ED complaining of fall. -Possible presyncope- rule out arrythmia     Frequent fall   CT head: no acute intracranial hemorrhage or infarct   PT evaluation - Home with home PT  Fall precaution     Compression fracture   Spine MRI showing prominent kyphosis.  Degenerative changes. No spinal stenosis.   Neurosurgery consult recommend brace, PT  Pain control     UTI with ureteral stent and instrumentation - UA positive despite last week on PO antibiotics   UA +ve    seen by Urology, recommend outpatient follow up with private urologist    BPH  Continue Tamsulosin 0.4mg   Finasteride 5mg     CAD  Continue ASA 81mg   Atorvastatin 20mg     Right Inguinal Hernia  Asymptomatic  Outpatient follow up    Constipation  Bowel RX added home

## 2022-07-06 ENCOUNTER — EMERGENCY (EMERGENCY)
Facility: HOSPITAL | Age: 87
LOS: 1 days | Discharge: HOME | End: 2022-07-06
Attending: EMERGENCY MEDICINE | Admitting: EMERGENCY MEDICINE
Payer: COMMERCIAL

## 2022-07-06 VITALS
SYSTOLIC BLOOD PRESSURE: 121 MMHG | HEART RATE: 70 BPM | OXYGEN SATURATION: 95 % | DIASTOLIC BLOOD PRESSURE: 79 MMHG | RESPIRATION RATE: 15 BRPM | TEMPERATURE: 98 F

## 2022-07-06 VITALS
HEIGHT: 70 IN | HEART RATE: 70 BPM | TEMPERATURE: 97 F | WEIGHT: 184.97 LBS | DIASTOLIC BLOOD PRESSURE: 68 MMHG | SYSTOLIC BLOOD PRESSURE: 118 MMHG | RESPIRATION RATE: 16 BRPM | OXYGEN SATURATION: 97 %

## 2022-07-06 DIAGNOSIS — Z98.890 OTHER SPECIFIED POSTPROCEDURAL STATES: Chronic | ICD-10-CM

## 2022-07-06 DIAGNOSIS — Z95.5 PRESENCE OF CORONARY ANGIOPLASTY IMPLANT AND GRAFT: Chronic | ICD-10-CM

## 2022-07-06 PROCEDURE — 72125 CT NECK SPINE W/O DYE: CPT | Mod: 26,MG

## 2022-07-06 PROCEDURE — 99284 EMERGENCY DEPT VISIT MOD MDM: CPT | Mod: 25

## 2022-07-06 PROCEDURE — G1004: CPT

## 2022-07-06 PROCEDURE — 70450 CT HEAD/BRAIN W/O DYE: CPT | Mod: 26,MG

## 2022-07-06 PROCEDURE — 93005 ELECTROCARDIOGRAM TRACING: CPT

## 2022-07-06 PROCEDURE — 72125 CT NECK SPINE W/O DYE: CPT | Mod: MG

## 2022-07-06 PROCEDURE — 99285 EMERGENCY DEPT VISIT HI MDM: CPT

## 2022-07-06 PROCEDURE — 72170 X-RAY EXAM OF PELVIS: CPT | Mod: 26

## 2022-07-06 PROCEDURE — 70450 CT HEAD/BRAIN W/O DYE: CPT | Mod: MG

## 2022-07-06 PROCEDURE — 71250 CT THORAX DX C-: CPT | Mod: ME

## 2022-07-06 PROCEDURE — 93010 ELECTROCARDIOGRAM REPORT: CPT

## 2022-07-06 PROCEDURE — 72170 X-RAY EXAM OF PELVIS: CPT

## 2022-07-06 PROCEDURE — 71250 CT THORAX DX C-: CPT | Mod: 26,ME

## 2022-07-06 RX ORDER — LIDOCAINE 4 G/100G
1 CREAM TOPICAL ONCE
Refills: 0 | Status: COMPLETED | OUTPATIENT
Start: 2022-07-06 | End: 2022-07-06

## 2022-07-06 RX ADMIN — LIDOCAINE 1 PATCH: 4 CREAM TOPICAL at 15:11

## 2022-07-06 NOTE — ED PROVIDER NOTE - PHYSICAL EXAMINATION
The patient is a 81y Female complaining of shortness of breath. Constitutional: Awake, Alert, non-toxic. NAD. Well appearing, well nourished.   HEAD: Normocephalic, atraumatic.   EYES: PERRL, EOM intact, conjunctiva and sclera are clear bilaterally. No raccoon eyes.   ENT: TM's and canals normal, no paige sign. No rhinorrhea, normal pharynx, patent, no tonsillar exudate or enlargement, mucous membranes pink/moist, no erythema, no drooling or stridor.   NECK: Supple, non-tender  BACK: No midline or paraspinal TTP of cervical/thoracic/lumbar spine, FROM. No ecchymosis or hematomas.   CARDIOVASCULAR: Normal S1, S2; regular rate and rhythm. +ttp to left chest wall, no crepitus or overlying skin changes   RESPIRATORY: Normal respiratory effort; breath sounds CTAB, no wheezes, rhonchi, or rales. Speaking in full sentences.   ABDOMEN: Soft; non-tender, non-distended.   EXTREMITIES: Full passive and active ROM in all extremities; non-tender to palpation; distal pulses palpable and symmetric, no edema, no crepitus or step off  SKIN: Warm, dry; good skin turgor, no apparent lesions or rashes, no ecchymosis, brisk capillary refill.  NEURO: A&O x2 (self, place). Sensory and motor functions are grossly intact. Speech is normal. Appearance and judgement seem appropriate for gender and age. No neurological deficits. Neurovascular sensation intact motor function 5/5 of upper and lower extremities, CN II-XII grossly intact,

## 2022-07-06 NOTE — ED ADULT NURSE NOTE - CHIEF COMPLAINT QUOTE
Patient is a 89yo male complaining of a mechanical fall Patient denies loss of consciousness or use of blood thinning medication. Patient complaining of left side chest wall tenderness. Patient has history of dementia

## 2022-07-06 NOTE — ED PROVIDER NOTE - CLINICAL SUMMARY MEDICAL DECISION MAKING FREE TEXT BOX
89 year old male w/ PMHx BPH, dementia, HLD, CAD, GERD presents to the ED s/p fall complaining of left chest wall pain. Plan for CT Head/C Spine, CT Chest, EKG. 89 year old male w/ PMHx BPH, dementia, HLD, CAD, GERD presents to the ED s/p fall complaining of left chest wall pain. Plan for CT Head/C Spine, CT Chest, EKG..  MD:  89-year-old male with a history of dementia, hypertension, CAD, presents to ED complaining of left-sided anterior chest wall pain.  Patient reportedly had a mechanical fall yesterday, and hit chest on the ground.  Patient complains of mild left-sided neck pain.  No acute headache.  No other acute complaints of trauma.  No acute dyspnea.  No other history available.  Patient with a history of dementia.  Unknown vaccination history for COVID.  Exam: MMM.  Mild tenderness to left anterior lower chest wall.  No crepitus.  CTA BL.  No W/W/R.  No signs of truncal trauma.  F ROM bilateral extremities x4.  No acute tenderness to extremities x4.  No other acute findings on exam.  89-year-old male with mechanical fall.  Check CT head and neck.  Check CT chest.  No acute injury to abdomen.

## 2022-07-06 NOTE — ED PROVIDER NOTE - PROGRESS NOTE DETAILS
Pt son at bedside   Reviewed results of imaging, no acute pathology   pt ambulatory in ED w/ steady gait   Will continue w/ tylenol, lidocaine patch for pain   Son will drive pt home

## 2022-07-06 NOTE — ED PROVIDER NOTE - OBJECTIVE STATEMENT
89 year old male w/ PMHx BPH, dementia, HLD, CAD, GERD presents to the ED s/p fall complaining of left chest wall pain. Pt w/ history of dementia, recalls falling but can not give details about the fall. Spoke w/ patient's son on the phone ([phone]), lives at home w/ patient. Son states on Tuesday 7/5/2022, he walked out of the patient's bedroom for brief minute and heard patient fall on floor. Son walked in and found patient laying on his side, awake and alert. He was able to help him back onto his feet. Son states today patient started to complain of left chest wall pain, tender to touch. Pt otherwise denies any other complaints.   Pt is on daily ASA, no other anticoagulation

## 2022-07-06 NOTE — ED ADULT TRIAGE NOTE - CHIEF COMPLAINT QUOTE
Patient is a 89yo male complaining of a mechanical fall Patient denies loss of consciousness or use of blood thinning medication. Patient complaining of left side chest wall tenderness. Patient has history of dementia

## 2022-07-06 NOTE — ED PROVIDER NOTE - PATIENT PORTAL LINK FT
You can access the FollowMyHealth Patient Portal offered by [facility] by registering at the following website: [email or web address]. By joining [employer]’s FollowMyHealth portal, you will also be able to view your health information using other applications (apps) compatible with our system.

## 2022-07-06 NOTE — ED ADULT NURSE NOTE - OBJECTIVE STATEMENT
Presents to ER S/P fall.  Pt has dementia and does not speak english, will call family for story. Presents to ER S/P fall.  Pt has dementia and does not speak english, will call family for story.  1258- spoke to family who states pt fell this past Tuesday.  Now c/o of left sided rib/chest discomfort.  STAT EKG performed, family stating they are coming up to hospital now to be with pt. Presents to ER S/P fall.  Pt has dementia and does not speak english, will call family for story.  1258- spoke to family who states pt fell this past Tuesday.  Now c/o of left sided rib/chest discomfort.  STAT EKG performed, family stating they are coming up to hospital now to be with pt.  No visible injury/bruising noted to anterior chest wall.

## 2022-07-06 NOTE — ED PROVIDER NOTE - NSFOLLOWUPINSTRUCTIONS_ED_ALL_ED_FT
FOLLOW UP IN ONE WEEK WITH YOUR PRIMARY CARE DOCTOR     Fall Prevention in the Home, Adult      Falls can cause injuries and can affect people from all age groups. There are many simple things that you can do to make your home safe and to help prevent falls. Ask for help when making these changes, if needed.      What actions can I take to prevent falls?    General instructions     •Use good lighting in all rooms. Replace any light bulbs that burn out.      •Turn on lights if it is dark. Use night-lights.      •Place frequently used items in easy-to-reach places. Lower the shelves around your home if necessary.      •Set up furniture so that there are clear paths around it. Avoid moving your furniture around.      •Remove throw rugs and other tripping hazards from the floor.      •Avoid walking on wet floors.      •Fix any uneven floor surfaces.      •Add color or contrast paint or tape to grab bars and handrails in your home. Place contrasting color strips on the first and last steps of stairways.      •When you use a stepladder, make sure that it is completely opened and that the sides are firmly locked. Have someone hold the ladder while you are using it. Do not climb a closed stepladder.      •Be aware of any and all pets.        What can I do in the bathroom?                   •Keep the floor dry. Immediately clean up any water that spills onto the floor.      •Remove soap buildup in the tub or shower on a regular basis.      •Use non-skid mats or decals on the floor of the tub or shower.      •Attach bath mats securely with double-sided, non-slip rug tape.      •If you need to sit down while you are in the shower, use a plastic, non-slip stool.      •Install grab bars by the toilet and in the tub and shower. Do not use towel bars as grab bars.      What can I do in the bedroom?     •Make sure that a bedside light is easy to reach.      • Do not use oversized bedding that drapes onto the floor.      •Have a firm chair that has side arms to use for getting dressed.      What can I do in the kitchen?     •Clean up any spills right away.      •If you need to reach for something above you, use a sturdy step stool that has a grab bar.      •Keep electrical cables out of the way.      • Do not use floor polish or wax that makes floors slippery. If you must use wax, make sure that it is non-skid floor wax.      What can I do in the stairways?     • Do not leave any items on the stairs.      •Make sure that you have a light switch at the top of the stairs and the bottom of the stairs. Have them installed if you do not have them.      •Make sure that there are handrails on both sides of the stairs. Fix handrails that are broken or loose. Make sure that handrails are as long as the stairways.      •Install non-slip stair treads on all stairs in your home.      •Avoid having throw rugs at the top or bottom of stairways, or secure the rugs with carpet tape to prevent them from moving.      •Choose a carpet design that does not hide the edge of steps on the stairway.      •Check any carpeting to make sure that it is firmly attached to the stairs. Fix any carpet that is loose or worn.      What can I do on the outside of my home?     •Use bright outdoor lighting.      •Regularly repair the edges of walkways and driveways and fix any cracks.      •Remove high doorway thresholds.      •Trim any shrubbery on the main path into your home.      •Regularly check that handrails are securely fastened and in good repair. Both sides of any steps should have handrails.      •Install guardrails along the edges of any raised decks or porches.      •Clear walkways of debris and clutter, including tools and rocks.      •Have leaves, snow, and ice cleared regularly.      •Use sand or salt on walkways during winter months.      •In the garage, clean up any spills right away, including grease or oil spills.      What other actions can I take?     •Wear closed-toe shoes that fit well and support your feet. Wear shoes that have rubber soles or low heels.      •Use mobility aids as needed, such as canes, walkers, scooters, and crutches.      •Review your medicines with your health care provider. Some medicines can cause dizziness or changes in blood pressure, which increase your risk of falling.      Talk with your health care provider about other ways that you can decrease your risk of falls. This may include working with a physical therapist or  to improve your strength, balance, and endurance.      Where to find more information    •Centers for Disease Control and Prevention, [employer]: https://www.cdc.gov      •[employer]: https://qc8movv.alexei.nih.gov        Contact a health care provider if:    •You are afraid of falling at home.      •You feel weak, drowsy, or dizzy at home.      •You fall at home.        Summary    •There are many simple things that you can do to make your home safe and to help prevent falls.      •Ways to make your home safe include removing tripping hazards and installing grab bars in the bathroom.      •Ask for help when making these changes in your home.      This information is not intended to replace advice given to you by your health care provider. Make sure you discuss any questions you have with your health care provider.      Chest Wall Pain      Chest wall pain is pain in or around the bones and muscles of your chest. Sometimes, an injury causes this pain. Excessive coughing or overuse of arm and chest muscles may also cause chest wall pain. Sometimes, the cause may not be known. This pain may take several weeks or longer to get better.      Follow these instructions at home:      Managing pain, stiffness, and swelling   A bag of ice on a towel on the skin. •If directed, put ice on the painful area:  •Put ice in a plastic bag.      •Place a towel between your skin and the bag.      •Leave the ice on for 20 minutes, 2–3 times per day.        Activity     •Rest as told by your health care provider.      •Avoid activities that cause pain. These include any activities that use your chest muscles or your abdominal and side muscles to lift heavy items. Ask your health care provider what activities are safe for you.        General instructions   A do not smoke cigarettes sign.   •Take over-the-counter and prescription medicines only as told by your health care provider.      • Do not use any products that contain nicotine or tobacco, such as cigarettes, e-cigarettes, and chewing tobacco. These can delay healing after injury. If you need help quitting, ask your health care provider.      •Keep all follow-up visits as told by your health care provider. This is important.        Contact a health care provider if:    •You have a fever.      •Your chest pain becomes worse.      •You have new symptoms.        Get help right away if:    •You have nausea or vomiting.      •You feel sweaty or light-headed.      •You have a cough with mucus from your lungs (sputum) or you cough up blood.      •You develop shortness of breath.      These symptoms may represent a serious problem that is an emergency. Do not wait to see if the symptoms will go away. Get medical help right away. Call your local emergency services (911 in the [place] Do not drive yourself to the hospital.       Summary    •Chest wall pain is pain in or around the bones and muscles of your chest.      •Depending on the cause, it may be treated with ice, rest, medicines, and avoiding activities that cause pain.      •Contact a health care provider if you have a fever, worsening chest pain, or new symptoms.      •Get help right away if you feel light-headed or you develop shortness of breath. These symptoms may be an emergency.      This information is not intended to replace advice given to you by your health care provider. Make sure you discuss any questions you have with your health care provider.

## 2022-08-29 NOTE — ED ADULT NURSE NOTE - GASTROINTESTINAL WDL
Apolonia Esqueda CNM  to Me          8/29/22 11:35 AM  Yes please.     This is correct- colposcopy in 2nd trimester     Thank you      Abdomen soft, nontender, nondistended, bowel sounds present in all 4 quadrants.

## 2022-11-02 ENCOUNTER — APPOINTMENT (OUTPATIENT)
Dept: UROLOGY | Facility: CLINIC | Age: 87
End: 2022-11-02

## 2022-11-02 ENCOUNTER — OUTPATIENT (OUTPATIENT)
Dept: OUTPATIENT SERVICES | Facility: HOSPITAL | Age: 87
LOS: 1 days | End: 2022-11-02
Payer: COMMERCIAL

## 2022-11-02 DIAGNOSIS — Z98.890 OTHER SPECIFIED POSTPROCEDURAL STATES: Chronic | ICD-10-CM

## 2022-11-02 DIAGNOSIS — R35.0 FREQUENCY OF MICTURITION: ICD-10-CM

## 2022-11-02 DIAGNOSIS — Z95.5 PRESENCE OF CORONARY ANGIOPLASTY IMPLANT AND GRAFT: Chronic | ICD-10-CM

## 2022-11-02 PROCEDURE — 99213 OFFICE O/P EST LOW 20 MIN: CPT | Mod: 25

## 2022-11-02 PROCEDURE — 76775 US EXAM ABDO BACK WALL LIM: CPT | Mod: 26

## 2022-11-02 PROCEDURE — 76775 US EXAM ABDO BACK WALL LIM: CPT

## 2022-11-02 NOTE — ASSESSMENT
[FreeTextEntry1] : Renal US reviewed: right upper pole stone ~ 1.1 cm with shadowing- thin, only 4 mm wide approx (confirmed on lowest films of a chest CT scan 7/2022). Left side with ~ 5 hyperechoic, mid, poorly visualized (but also confirmed on CT). No hydro, no solid renal mass\par \par UTI prevention - Hiprex 1 G and Vitamin C 500 mg twice a day. Continue medication after discussion.\par \par Diet modification reviewed at length- increasing fluids (primarily water), citrus is good, and decreasing/moderating salt, animal flesh protein, oxalate containing foods, and moderation of calcium intake (1000 mg/day is USRDA).\par \par Given age will hold off on metabolic assess with 24 hour urine.\par voiding well at this time.\par doing well\par \par plan \par 1) RTC in 6 months in Renal US \par \par

## 2022-11-02 NOTE — HISTORY OF PRESENT ILLNESS
[FreeTextEntry1] : Now 89 yr old male presents to follow up with h/o kidney stones, uti (with MDR organism)\par s/p left URS and left PCNL on 07/19/2021\par \par stone- 80% Calcium phosphate (apatite)\par 10% Calcium oxalate monohydrate\par 10% Calcium oxalate dihydrate \par \par Pt saw ID on 08/23/21- never took Monurol as per their recommendations. Pt had low BP at the visit- went to ED for eval- no UTI at the time. \par \par Now on methenamine/vit C. Doing well; no new complaints.\par Doing well, though not drinking sig fluids [None] : no symptoms

## 2022-11-09 DIAGNOSIS — N32.3 DIVERTICULUM OF BLADDER: ICD-10-CM

## 2022-11-09 DIAGNOSIS — N40.1 BENIGN PROSTATIC HYPERPLASIA WITH LOWER URINARY TRACT SYMPTOMS: ICD-10-CM

## 2022-11-09 DIAGNOSIS — N20.0 CALCULUS OF KIDNEY: ICD-10-CM

## 2022-11-09 DIAGNOSIS — N13.8 OTHER OBSTRUCTIVE AND REFLUX UROPATHY: ICD-10-CM

## 2022-11-24 ENCOUNTER — INPATIENT (INPATIENT)
Facility: HOSPITAL | Age: 87
LOS: 5 days | Discharge: HOME | DRG: 689 | End: 2022-11-30
Attending: FAMILY MEDICINE | Admitting: STUDENT IN AN ORGANIZED HEALTH CARE EDUCATION/TRAINING PROGRAM
Payer: COMMERCIAL

## 2022-11-24 VITALS
SYSTOLIC BLOOD PRESSURE: 118 MMHG | HEIGHT: 67 IN | TEMPERATURE: 101 F | HEART RATE: 122 BPM | DIASTOLIC BLOOD PRESSURE: 78 MMHG | WEIGHT: 160.06 LBS | OXYGEN SATURATION: 98 % | RESPIRATION RATE: 20 BRPM

## 2022-11-24 DIAGNOSIS — Z98.890 OTHER SPECIFIED POSTPROCEDURAL STATES: Chronic | ICD-10-CM

## 2022-11-24 DIAGNOSIS — Z95.5 PRESENCE OF CORONARY ANGIOPLASTY IMPLANT AND GRAFT: Chronic | ICD-10-CM

## 2022-11-24 LAB
ALBUMIN SERPL ELPH-MCNC: 3.1 G/DL — LOW (ref 3.3–5)
ALP SERPL-CCNC: 100 U/L — SIGNIFICANT CHANGE UP (ref 40–120)
ALT FLD-CCNC: 12 U/L — SIGNIFICANT CHANGE UP (ref 12–78)
ANION GAP SERPL CALC-SCNC: 6 MMOL/L — SIGNIFICANT CHANGE UP (ref 5–17)
APPEARANCE UR: ABNORMAL
APTT BLD: 30.7 SEC — SIGNIFICANT CHANGE UP (ref 27.5–35.5)
AST SERPL-CCNC: 20 U/L — SIGNIFICANT CHANGE UP (ref 15–37)
BASOPHILS # BLD AUTO: 0.03 K/UL — SIGNIFICANT CHANGE UP (ref 0–0.2)
BASOPHILS NFR BLD AUTO: 0.4 % — SIGNIFICANT CHANGE UP (ref 0–2)
BILIRUB SERPL-MCNC: 1 MG/DL — SIGNIFICANT CHANGE UP (ref 0.2–1.2)
BILIRUB UR-MCNC: NEGATIVE — SIGNIFICANT CHANGE UP
BUN SERPL-MCNC: 15 MG/DL — SIGNIFICANT CHANGE UP (ref 7–23)
CALCIUM SERPL-MCNC: 9.7 MG/DL — SIGNIFICANT CHANGE UP (ref 8.5–10.1)
CHLORIDE SERPL-SCNC: 110 MMOL/L — HIGH (ref 96–108)
CO2 SERPL-SCNC: 26 MMOL/L — SIGNIFICANT CHANGE UP (ref 22–31)
COLOR SPEC: YELLOW — SIGNIFICANT CHANGE UP
CREAT SERPL-MCNC: 1 MG/DL — SIGNIFICANT CHANGE UP (ref 0.5–1.3)
DIFF PNL FLD: ABNORMAL
EGFR: 70 ML/MIN/1.73M2 — SIGNIFICANT CHANGE UP
EOSINOPHIL # BLD AUTO: 0.05 K/UL — SIGNIFICANT CHANGE UP (ref 0–0.5)
EOSINOPHIL NFR BLD AUTO: 0.6 % — SIGNIFICANT CHANGE UP (ref 0–6)
FLUAV AG NPH QL: SIGNIFICANT CHANGE UP
FLUBV AG NPH QL: SIGNIFICANT CHANGE UP
GLUCOSE SERPL-MCNC: 102 MG/DL — HIGH (ref 70–99)
GLUCOSE UR QL: NEGATIVE — SIGNIFICANT CHANGE UP
HCT VFR BLD CALC: 44.9 % — SIGNIFICANT CHANGE UP (ref 39–50)
HGB BLD-MCNC: 14.6 G/DL — SIGNIFICANT CHANGE UP (ref 13–17)
IMM GRANULOCYTES NFR BLD AUTO: 0.4 % — SIGNIFICANT CHANGE UP (ref 0–0.9)
INR BLD: 1.33 RATIO — HIGH (ref 0.88–1.16)
KETONES UR-MCNC: NEGATIVE — SIGNIFICANT CHANGE UP
LACTATE SERPL-SCNC: 1.4 MMOL/L — SIGNIFICANT CHANGE UP (ref 0.7–2)
LEUKOCYTE ESTERASE UR-ACNC: ABNORMAL
LYMPHOCYTES # BLD AUTO: 1.13 K/UL — SIGNIFICANT CHANGE UP (ref 1–3.3)
LYMPHOCYTES # BLD AUTO: 13.7 % — SIGNIFICANT CHANGE UP (ref 13–44)
MCHC RBC-ENTMCNC: 30.9 PG — SIGNIFICANT CHANGE UP (ref 27–34)
MCHC RBC-ENTMCNC: 32.5 GM/DL — SIGNIFICANT CHANGE UP (ref 32–36)
MCV RBC AUTO: 94.9 FL — SIGNIFICANT CHANGE UP (ref 80–100)
MONOCYTES # BLD AUTO: 0.86 K/UL — SIGNIFICANT CHANGE UP (ref 0–0.9)
MONOCYTES NFR BLD AUTO: 10.4 % — SIGNIFICANT CHANGE UP (ref 2–14)
NEUTROPHILS # BLD AUTO: 6.13 K/UL — SIGNIFICANT CHANGE UP (ref 1.8–7.4)
NEUTROPHILS NFR BLD AUTO: 74.5 % — SIGNIFICANT CHANGE UP (ref 43–77)
NITRITE UR-MCNC: POSITIVE
NRBC # BLD: 0 /100 WBCS — SIGNIFICANT CHANGE UP (ref 0–0)
PH UR: 7 — SIGNIFICANT CHANGE UP (ref 5–8)
PLATELET # BLD AUTO: 120 K/UL — LOW (ref 150–400)
POTASSIUM SERPL-MCNC: 4.2 MMOL/L — SIGNIFICANT CHANGE UP (ref 3.5–5.3)
POTASSIUM SERPL-SCNC: 4.2 MMOL/L — SIGNIFICANT CHANGE UP (ref 3.5–5.3)
PROT SERPL-MCNC: 7.4 G/DL — SIGNIFICANT CHANGE UP (ref 6–8.3)
PROT UR-MCNC: NEGATIVE — SIGNIFICANT CHANGE UP
PROTHROM AB SERPL-ACNC: 15.6 SEC — HIGH (ref 10.5–13.4)
RBC # BLD: 4.73 M/UL — SIGNIFICANT CHANGE UP (ref 4.2–5.8)
RBC # FLD: 13.8 % — SIGNIFICANT CHANGE UP (ref 10.3–14.5)
RSV RNA NPH QL NAA+NON-PROBE: SIGNIFICANT CHANGE UP
SARS-COV-2 RNA SPEC QL NAA+PROBE: DETECTED
SODIUM SERPL-SCNC: 142 MMOL/L — SIGNIFICANT CHANGE UP (ref 135–145)
SP GR SPEC: 1.01 — SIGNIFICANT CHANGE UP (ref 1.01–1.02)
UROBILINOGEN FLD QL: NEGATIVE — SIGNIFICANT CHANGE UP
WBC # BLD: 8.23 K/UL — SIGNIFICANT CHANGE UP (ref 3.8–10.5)
WBC # FLD AUTO: 8.23 K/UL — SIGNIFICANT CHANGE UP (ref 3.8–10.5)

## 2022-11-24 PROCEDURE — 74176 CT ABD & PELVIS W/O CONTRAST: CPT | Mod: 26,MG

## 2022-11-24 PROCEDURE — 99285 EMERGENCY DEPT VISIT HI MDM: CPT

## 2022-11-24 PROCEDURE — 93010 ELECTROCARDIOGRAM REPORT: CPT

## 2022-11-24 PROCEDURE — G1004: CPT

## 2022-11-24 PROCEDURE — 71045 X-RAY EXAM CHEST 1 VIEW: CPT | Mod: 26

## 2022-11-24 PROCEDURE — 70450 CT HEAD/BRAIN W/O DYE: CPT | Mod: 26,ME

## 2022-11-24 PROCEDURE — 71250 CT THORAX DX C-: CPT | Mod: 26,MG

## 2022-11-24 RX ORDER — SODIUM CHLORIDE 9 MG/ML
2000 INJECTION INTRAMUSCULAR; INTRAVENOUS; SUBCUTANEOUS ONCE
Refills: 0 | Status: COMPLETED | OUTPATIENT
Start: 2022-11-24 | End: 2022-11-24

## 2022-11-24 RX ORDER — CEFTRIAXONE 500 MG/1
1000 INJECTION, POWDER, FOR SOLUTION INTRAMUSCULAR; INTRAVENOUS ONCE
Refills: 0 | Status: COMPLETED | OUTPATIENT
Start: 2022-11-24 | End: 2022-11-24

## 2022-11-24 RX ORDER — DORZOLAMIDE HYDROCHLORIDE 20 MG/ML
1 SOLUTION/ DROPS OPHTHALMIC THREE TIMES A DAY
Refills: 0 | Status: DISCONTINUED | OUTPATIENT
Start: 2022-11-24 | End: 2022-11-30

## 2022-11-24 RX ORDER — LACTULOSE 10 G/15ML
10 SOLUTION ORAL AT BEDTIME
Refills: 0 | Status: DISCONTINUED | OUTPATIENT
Start: 2022-11-24 | End: 2022-11-30

## 2022-11-24 RX ORDER — ASPIRIN/CALCIUM CARB/MAGNESIUM 324 MG
81 TABLET ORAL DAILY
Refills: 0 | Status: DISCONTINUED | OUTPATIENT
Start: 2022-11-24 | End: 2022-11-30

## 2022-11-24 RX ORDER — FINASTERIDE 5 MG/1
5 TABLET, FILM COATED ORAL DAILY
Refills: 0 | Status: DISCONTINUED | OUTPATIENT
Start: 2022-11-24 | End: 2022-11-30

## 2022-11-24 RX ORDER — DONEPEZIL HYDROCHLORIDE 10 MG/1
10 TABLET, FILM COATED ORAL AT BEDTIME
Refills: 0 | Status: DISCONTINUED | OUTPATIENT
Start: 2022-11-24 | End: 2022-11-25

## 2022-11-24 RX ORDER — MEMANTINE HYDROCHLORIDE 10 MG/1
5 TABLET ORAL
Refills: 0 | Status: DISCONTINUED | OUTPATIENT
Start: 2022-11-24 | End: 2022-11-30

## 2022-11-24 RX ORDER — ACETAMINOPHEN 500 MG
1000 TABLET ORAL ONCE
Refills: 0 | Status: COMPLETED | OUTPATIENT
Start: 2022-11-24 | End: 2022-11-24

## 2022-11-24 RX ORDER — ATORVASTATIN CALCIUM 80 MG/1
20 TABLET, FILM COATED ORAL DAILY
Refills: 0 | Status: DISCONTINUED | OUTPATIENT
Start: 2022-11-24 | End: 2022-11-30

## 2022-11-24 RX ORDER — TAMSULOSIN HYDROCHLORIDE 0.4 MG/1
0.4 CAPSULE ORAL DAILY
Refills: 0 | Status: DISCONTINUED | OUTPATIENT
Start: 2022-11-24 | End: 2022-11-30

## 2022-11-24 RX ADMIN — Medication 400 MILLIGRAM(S): at 21:27

## 2022-11-24 RX ADMIN — SODIUM CHLORIDE 2000 MILLILITER(S): 9 INJECTION INTRAMUSCULAR; INTRAVENOUS; SUBCUTANEOUS at 21:27

## 2022-11-24 RX ADMIN — CEFTRIAXONE 1000 MILLIGRAM(S): 500 INJECTION, POWDER, FOR SOLUTION INTRAMUSCULAR; INTRAVENOUS at 22:38

## 2022-11-24 RX ADMIN — SODIUM CHLORIDE 2000 MILLILITER(S): 9 INJECTION INTRAMUSCULAR; INTRAVENOUS; SUBCUTANEOUS at 23:22

## 2022-11-24 RX ADMIN — Medication 1000 MILLIGRAM(S): at 21:42

## 2022-11-24 RX ADMIN — CEFTRIAXONE 100 MILLIGRAM(S): 500 INJECTION, POWDER, FOR SOLUTION INTRAMUSCULAR; INTRAVENOUS at 22:06

## 2022-11-24 NOTE — H&P ADULT - PROBLEM SELECTOR PLAN 2
Denies any URI symptoms. Covid vaccined 1  x J&J and 2 x Pfizer Booster. Currently satting well on RA. Positive on admission. CT Lung w/ Multiple bilateral pulmonary nodules noted. The largest measures 6 mm. Multiple nodules  - Supplement oxygen PRN   - Tylenol PRN for fever   - Supportive therapy for now pending ID recs  - Daughter made aware of CT findings and to f/u outpt    - Infectious disease (Dr. Chiu) consulted, F/U Recs  - Of note, patient takes fosfomycin and methenamine, will hold since started Rocephin Denies any URI symptoms. Covid vaccined 1  x J&J and 2 x Pfizer Booster. Currently satting well on RA. Positive on admission. CT Lung w/ Multiple bilateral pulmonary nodules noted. The largest measures 6 mm. Multiple nodules  - Supplement oxygen PRN   - Tylenol PRN for fever   - Supportive therapy for now pending ID recs  - Daughter made aware of CT findings and to f/u outpt    - Infectious disease (Dr. Chiu) consulted, F/U Recs

## 2022-11-24 NOTE — H&P ADULT - TIME BILLING
Pt seen and examined at bedside. Routine labs, imaging, and vitals were assessed and evaluated. Counseled patient and family on need for medical admission and continued treatment.

## 2022-11-24 NOTE — H&P ADULT - PROBLEM SELECTOR PLAN 4
Moderate to large hiatal hernia. Cholelithiasis. Indeterminate left adrenal gland nodule for which further correlation can be obtained with adrenal protocol MRI or CT.  - Daughter made aware of abnormal CT findings and to f/u outpt

## 2022-11-24 NOTE — ED ADULT TRIAGE NOTE - CHIEF COMPLAINT QUOTE
Pt is disoriented,  generalized weakness, tremors since 4 p.m., constipated 2 days and family doesn't think hes urinating properly.

## 2022-11-24 NOTE — H&P ADULT - NSHPSOCIALHISTORY_GEN_ALL_CORE
Tobacco Usage:  denies   Alcohol Usage: denies   Substance Use:  denies   Lives with: Son   ADLs: Fully dependent and ambulates w/ walker

## 2022-11-24 NOTE — H&P ADULT - NSHPREVIEWOFSYSTEMS_GEN_ALL_CORE
CONSTITUTIONAL: + fever, + chills, +fatigue,+ weakness  HEENT: denies blurred vision, sore throat  SKIN: denies new lesions, rash  CARDIOVASCULAR: denies chest pain, chest pressure, palpitations  RESPIRATORY: denies shortness of breath, sputum production  GASTROINTESTINAL: denies nausea, vomiting, diarrhea, abdominal pain  GENITOURINARY: denies dysuria, discharge, +urinary frequency   NEUROLOGICAL: denies numbness, headache, focal weakness  MUSCULOSKELETAL: denies new joint pain, muscle aches  HEMATOLOGIC: denies gross bleeding, bruising  LYMPHATICS: denies enlarged lymph nodes, extremity swelling  PSYCHIATRIC: denies recent changes in anxiety, depression  ENDOCRINOLOGIC: denies sweating, cold or heat intolerance

## 2022-11-24 NOTE — H&P ADULT - NSHPPHYSICALEXAM_GEN_ALL_CORE
VITALS:   T(C): 38.3 (11-24-22 @ 20:04), Max: 38.3 (11-24-22 @ 20:04)  HR: 122 (11-24-22 @ 20:04) (122 - 122)  BP: 118/78 (11-24-22 @ 20:04) (118/78 - 118/78)  RR: 20 (11-24-22 @ 20:04) (20 - 20)  SpO2: 98% (11-24-22 @ 20:04) (98% - 98%)    GENERAL: Elderly male, NAD, lying in bed comfortably  HEAD:  Atraumatic, Normocephalic  EYES: EOMI, PERRLA, conjunctiva and sclera clear  ENT: Moist mucous membranes  NECK: Supple, No JVD  CHEST/LUNG: Decreased breath sounds on R; No rales, rhonchi, wheezing, or rubs. Unlabored respirations  HEART: Regular rate and rhythm; No murmurs, rubs, or gallops  ABDOMEN: Suprapubic tenderness, Soft, nontender, nondistended  EXTREMITIES:  2+ Peripheral Pulses, brisk capillary refill. No clubbing, cyanosis, or edema  SKIN: No rashes or lesions

## 2022-11-24 NOTE — H&P ADULT - ATTENDING COMMENTS
Patient seen and examined at bedside with resident Physician. Agree with assessment and plan. In summary pt is a 89M PMHx CAD s/p stents, TIA, HLD, GERD, BPH s/p surg, recurrent ESBL UTI, chronic kidney stones with left sided stent, and dementia admitted with cystitis. Will cont with IV Ceftriaxone and adjust as per blood and urine culture results. Follow up with ID consult. Follow up PT evaluation for discharge planning.

## 2022-11-24 NOTE — ED ADULT NURSE NOTE - OBJECTIVE STATEMENT
Brought in by ambulance accompanied by daughter who states patient was very weak/ unable to ambulate- normally walks with a walker, shaky, has decreased urine output and febrile started this afternoon. Patient h/o dementia and kidney stone. Noted blanchable redness on sacral area.

## 2022-11-24 NOTE — H&P ADULT - ASSESSMENT
89 y/o male w/ a PMHx of CAD s/p stents (2014), TIA, HLD, GERD, BPH s/p prostate surgery (2017), ESBL E. coli UTI secondary to left kidney stones s/p ureteral stent placement (4/30/21), Dementia presents to the ED w/ fever, chills, and urinary frequency. Admit for Cystitis.

## 2022-11-24 NOTE — ED PROVIDER NOTE - OBJECTIVE STATEMENT
89-year-old male with history of dementia and kidney stones presents to the ED with his daughter for generalized weakness, shaking, disorientation and difficulty ambulating since 3/4 PM today.  As per daughter, patient was feeling well earlier in the day and at his baseline.  Daughter states he has been more confused for the past few hours.  Patient has been really shaky and unable to stand, unable to walk.  Patient with decreased urine output as well.  As per daughter patient with history of kidney stones.  Patient limited historian due to dementia.  No fall or trauma.    pmd: Dr. Angeline Ness, urology: Dr. Hoening

## 2022-11-24 NOTE — ED ADULT NURSE REASSESSMENT NOTE - NS ED NURSE REASSESS COMMENT FT1
Bladder scanned as per Dr. Gaytan noted 139ml. Dr. Gaytan made aware. Straight catheter to obtain urine done tolerated by patient. Urine sent to lab.

## 2022-11-24 NOTE — H&P ADULT - PROBLEM SELECTOR PLAN 1
Presents to the ED w/ increased urgency, decreased flow, history of ESBL E. coli UTI secondary to left kidney stones s/p ureteral stent placement (4/30/21). Temp of 101. Labs unremarkable. CT abdomen pelvis w/ Urinary bladder wall thickening with trabeculation and multiple diverticula. Sequelae of chronic outlet obstruction from prostate enlargement. Superimposed cystitis Additional hyperdense foci seen along the posterior bladder wall may represent bladder wall calcifications versus layering bladder stones. UA w/ positive nitrate, moderate LE, many bacteria   - Continue Rocephin   - Tylenol PRN for fever and pain   - F/U BCx x 2 and UCx  - Infectious disease (Dr. Chiu) consulted, F/U Recs Presents to the ED w/ increased urgency, decreased flow, history of ESBL E. coli UTI secondary to left kidney stones s/p ureteral stent placement (4/30/21). Temp of 101. Labs unremarkable. CT abdomen pelvis w/ Urinary bladder wall thickening with trabeculation and multiple diverticula. Sequelae of chronic outlet obstruction from prostate enlargement. Superimposed cystitis Additional hyperdense foci seen along the posterior bladder wall may represent bladder wall calcifications versus layering bladder stones. UA w/ positive nitrate, moderate LE, many bacteria   - Continue Rocephin   - Tylenol PRN for fever and pain   - F/U BCx x 2 and UCx  - Infectious disease (Dr. Chiu) consulted, F/U Recs  - Of note, patient takes fosfomycin and methenamine, will hold since started Rocephin

## 2022-11-24 NOTE — H&P ADULT - HISTORY OF PRESENT ILLNESS
89 y/o male w/ a PMHx of CAD s/p stents (2014), TIA, HLD, GERD, BPH s/p prostate surgery (2017), ESBL E. coli UTI secondary to left kidney stones s/p ureteral stent placement (4/30/21), Dementia presents to the ED w/ -----    In the ED,   Vitals: T: 101, HR: 122, BP: 118/78, RR: 20, O2: 98% on RA  EKG:   Significant Labs: PTT: 15.6, INR: 1.33, UA: Nitrate positive, LE Moderate, Small Blood, Many bacteria, Covid positive   Imaging:   CT Chest + Abdomen/Pelvis: Urinary bladder wall thickening with trabeculation and multiple diverticula. This appearance is likely related to sequelae of chronic outlet obstruction from prostate enlargement. Superimposed cystitis should be evaluated for clinically, with correlation with urinalysis. Additional hyperdense foci seen along the posterior bladder wall may represent bladder wall calcifications versus layering bladder stones. Moderate to large hiatal hernia. Cholelithiasis.Indeterminate left adrenal gland nodule for which further correlation can be obtained with adrenal protocol MRI or CT. Multiple bilateral pulmonary nodules noted. The largest measures 6 mm. Multiple nodules, with largest measuring greater than 6 mm, in a low or high risk patient should be followed with CT at 3-6 months, then consider CT at 18-24 months. -- ?Reference: Guidelines for Management of Incidental Pulmonary Nodules Detected on CT Images: From the Fleischner Society 2017?  CT Brain: No acute intracranial pathology. Atrophy and white matter changes.   Chest x-ray: L lung opacity (wet read)   Interventions: Rocephin 1g IVPB x1, 2L Normal Saline Bolus x1, Ofirmev 1g x1        89 y/o male w/ a PMHx of CAD s/p stents (2014), TIA, HLD, GERD, BPH s/p prostate surgery (2017), ESBL E. coli UTI secondary to left kidney stones s/p ureteral stent placement (4/30/21), Dementia presents to the ED w/ -----    In the ED,   Vitals: T: 101, HR: 122, BP: 118/78, RR: 20, O2: 98% on RA  EKG:   Significant Labs: PTT: 15.6, INR: 1.33, UA: Nitrate positive, LE Moderate, Small Blood, Many bacteria, Covid positive   Imaging:   CT Chest + Abdomen/Pelvis: Urinary bladder wall thickening with trabeculation and multiple diverticula. This appearance is likely related to sequelae of chronic outlet obstruction from prostate enlargement. Superimposed cystitis should be evaluated for clinically, with correlation with urinalysis. Additional hyperdense foci seen along the posterior bladder wall may represent bladder wall calcifications versus layering bladder stones. Moderate to large hiatal hernia. Cholelithiasis.Indeterminate left adrenal gland nodule for which further correlation can be obtained with adrenal protocol MRI or CT. Multiple bilateral pulmonary nodules noted. The largest measures 6 mm. Multiple nodules, with largest measuring greater than 6 mm, in a low or high risk patient should be followed with CT at 3-6 months, then consider CT at 18-24 months. -- ?Reference: Guidelines for Management of Incidental Pulmonary Nodules Detected on CT Images: From the Fleischner Society 2017?  CT Brain: No acute intracranial pathology. Atrophy and white matter changes.   Chest x-ray: L lung opacities (wet read)   Interventions: Rocephin 1g IVPB x1, 2L Normal Saline Bolus x1, Ofirmev 1g x1        89 y/o male w/ a PMHx of CAD s/p stents (2014), TIA, HLD, GERD, BPH s/p prostate surgery (2017), ESBL E. coli UTI secondary to left kidney stones s/p ureteral stent placement (4/30/21), Dementia presents to the ED w/ fever, chills, and urinary frequency. History obtained from daughter at bedside. States that this afternoon patient appeared unwell. He was with decreased strength, chills, and decreased appetite. She has states that he had difficulty ambulating and was more confused than normal. He denies any respiratory symptoms such as cough, or SOB. Also denies CP.     In the ED,   Vitals: T: 101, HR: 122, BP: 118/78, RR: 20, O2: 98% on RA  EKG: Sinus Tachycardia w/ 1st degree AV block    Significant Labs: PTT: 15.6, INR: 1.33, UA: Nitrate positive, LE Moderate, Small Blood, Many bacteria, Covid positive   Imaging:   CT Chest + Abdomen/Pelvis: Urinary bladder wall thickening with trabeculation and multiple diverticula. This appearance is likely related to sequelae of chronic outlet obstruction from prostate enlargement. Superimposed cystitis should be evaluated for clinically, with correlation with urinalysis. Additional hyperdense foci seen along the posterior bladder wall may represent bladder wall calcifications versus layering bladder stones. Moderate to large hiatal hernia. Cholelithiasis.Indeterminate left adrenal gland nodule for which further correlation can be obtained with adrenal protocol MRI or CT. Multiple bilateral pulmonary nodules noted. The largest measures 6 mm. Multiple nodules, with largest measuring greater than 6 mm, in a low or high risk patient should be followed with CT at 3-6 months, then consider CT at 18-24 months. -- ?Reference: Guidelines for Management of Incidental Pulmonary Nodules Detected on CT Images: From the Fleischner Society 2017?  CT Brain: No acute intracranial pathology. Atrophy and white matter changes.   Chest x-ray: L lung opacities (wet read)   Interventions: Rocephin 1g IVPB x1, 2L Normal Saline Bolus x1, Ofirmev 1g x1        89 y/o male w/ a PMHx of CAD s/p stents (2014), TIA, HLD, GERD, BPH s/p prostate surgery (2017), ESBL E. coli UTI secondary to left kidney stones s/p ureteral stent placement (4/30/21), Dementia presents to the ED w/ fever, chills, and urinary frequency. History obtained from daughter at bedside. States that this afternoon patient appeared unwell. He was with decreased strength, chills, and decreased appetite. She has states that he had difficulty ambulating (baseline ambulates w/ walker) and was more confused than normal. In terms of his urinary symptoms, he had increased urgency w/ decreased flow. He denies any respiratory symptoms such as cough, or SOB. Also denies CP.     In the ED,   Vitals: T: 101, HR: 122, BP: 118/78, RR: 20, O2: 98% on RA  EKG: Sinus Tachycardia w/ 1st degree AV block    Significant Labs: PTT: 15.6, INR: 1.33, UA: Nitrate positive, LE Moderate, Small Blood, Many bacteria, Covid positive   Imaging:   CT Chest + Abdomen/Pelvis: Urinary bladder wall thickening with trabeculation and multiple diverticula. This appearance is likely related to sequelae of chronic outlet obstruction from prostate enlargement. Superimposed cystitis should be evaluated for clinically, with correlation with urinalysis. Additional hyperdense foci seen along the posterior bladder wall may represent bladder wall calcifications versus layering bladder stones. Moderate to large hiatal hernia. Cholelithiasis.Indeterminate left adrenal gland nodule for which further correlation can be obtained with adrenal protocol MRI or CT. Multiple bilateral pulmonary nodules noted. The largest measures 6 mm. Multiple nodules, with largest measuring greater than 6 mm, in a low or high risk patient should be followed with CT at 3-6 months, then consider CT at 18-24 months. -- ?Reference: Guidelines for Management of Incidental Pulmonary Nodules Detected on CT Images: From the Fleischner Society 2017?  CT Brain: No acute intracranial pathology. Atrophy and white matter changes.   Chest x-ray: L lung opacities (wet read)   Interventions: Rocephin 1g IVPB x1, 2L Normal Saline Bolus x1, Ofirmev 1g x1

## 2022-11-24 NOTE — ED PROVIDER NOTE - CLINICAL SUMMARY MEDICAL DECISION MAKING FREE TEXT BOX
89-year-old male with history of dementia, kidney stones presents with generalized weakness and slight disorientation since this afternoon.  Patient reportedly was at his normal baseline earlier in the day.  Now with increased confusion over past few hours.  Patient is now increasingly weak unable to stand.  No focal weakness or numbness.  No other acute injury or complaints.  No known fever recent fall or trauma.  No other history available.  Patient previously vaccinated for COVID.  Exam: MM Moist. neck supple. no meningeal signs. Abd soft NT, no hsm.. no cvat. nl resp effort. no w/r/r. no acc muscle use. no c/c/e. nonfocal neuro exam.  Acute Weakness, ams today . check labs, xr, IVF, abx, TBA  pt found - acute covid infxn

## 2022-11-25 ENCOUNTER — TRANSCRIPTION ENCOUNTER (OUTPATIENT)
Age: 87
End: 2022-11-25

## 2022-11-25 DIAGNOSIS — R93.89 ABNORMAL FINDINGS ON DIAGNOSTIC IMAGING OF OTHER SPECIFIED BODY STRUCTURES: ICD-10-CM

## 2022-11-25 DIAGNOSIS — Z29.9 ENCOUNTER FOR PROPHYLACTIC MEASURES, UNSPECIFIED: ICD-10-CM

## 2022-11-25 DIAGNOSIS — U07.1 COVID-19: ICD-10-CM

## 2022-11-25 DIAGNOSIS — F03.90 UNSPECIFIED DEMENTIA WITHOUT BEHAVIORAL DISTURBANCE: ICD-10-CM

## 2022-11-25 DIAGNOSIS — R00.1 BRADYCARDIA, UNSPECIFIED: ICD-10-CM

## 2022-11-25 DIAGNOSIS — I25.10 ATHEROSCLEROTIC HEART DISEASE OF NATIVE CORONARY ARTERY WITHOUT ANGINA PECTORIS: ICD-10-CM

## 2022-11-25 DIAGNOSIS — N40.0 BENIGN PROSTATIC HYPERPLASIA WITHOUT LOWER URINARY TRACT SYMPTOMS: ICD-10-CM

## 2022-11-25 DIAGNOSIS — N30.00 ACUTE CYSTITIS WITHOUT HEMATURIA: ICD-10-CM

## 2022-11-25 DIAGNOSIS — R53.1 WEAKNESS: ICD-10-CM

## 2022-11-25 LAB
ALBUMIN SERPL ELPH-MCNC: 2.5 G/DL — LOW (ref 3.3–5)
ALP SERPL-CCNC: 71 U/L — SIGNIFICANT CHANGE UP (ref 40–120)
ALT FLD-CCNC: 9 U/L — LOW (ref 12–78)
ANION GAP SERPL CALC-SCNC: 4 MMOL/L — LOW (ref 5–17)
AST SERPL-CCNC: 17 U/L — SIGNIFICANT CHANGE UP (ref 15–37)
BASOPHILS # BLD AUTO: 0.03 K/UL — SIGNIFICANT CHANGE UP (ref 0–0.2)
BASOPHILS NFR BLD AUTO: 0.5 % — SIGNIFICANT CHANGE UP (ref 0–2)
BILIRUB SERPL-MCNC: 1 MG/DL — SIGNIFICANT CHANGE UP (ref 0.2–1.2)
BUN SERPL-MCNC: 13 MG/DL — SIGNIFICANT CHANGE UP (ref 7–23)
CALCIUM SERPL-MCNC: 9.2 MG/DL — SIGNIFICANT CHANGE UP (ref 8.5–10.1)
CHLORIDE SERPL-SCNC: 116 MMOL/L — HIGH (ref 96–108)
CO2 SERPL-SCNC: 24 MMOL/L — SIGNIFICANT CHANGE UP (ref 22–31)
CREAT SERPL-MCNC: 0.94 MG/DL — SIGNIFICANT CHANGE UP (ref 0.5–1.3)
EGFR: 76 ML/MIN/1.73M2 — SIGNIFICANT CHANGE UP
EOSINOPHIL # BLD AUTO: 0.05 K/UL — SIGNIFICANT CHANGE UP (ref 0–0.5)
EOSINOPHIL NFR BLD AUTO: 0.8 % — SIGNIFICANT CHANGE UP (ref 0–6)
GLUCOSE SERPL-MCNC: 91 MG/DL — SIGNIFICANT CHANGE UP (ref 70–99)
HCT VFR BLD CALC: 38.6 % — LOW (ref 39–50)
HGB BLD-MCNC: 12.4 G/DL — LOW (ref 13–17)
IMM GRANULOCYTES NFR BLD AUTO: 0.2 % — SIGNIFICANT CHANGE UP (ref 0–0.9)
LYMPHOCYTES # BLD AUTO: 1.38 K/UL — SIGNIFICANT CHANGE UP (ref 1–3.3)
LYMPHOCYTES # BLD AUTO: 20.8 % — SIGNIFICANT CHANGE UP (ref 13–44)
MCHC RBC-ENTMCNC: 31.1 PG — SIGNIFICANT CHANGE UP (ref 27–34)
MCHC RBC-ENTMCNC: 32.1 GM/DL — SIGNIFICANT CHANGE UP (ref 32–36)
MCV RBC AUTO: 96.7 FL — SIGNIFICANT CHANGE UP (ref 80–100)
MONOCYTES # BLD AUTO: 0.9 K/UL — SIGNIFICANT CHANGE UP (ref 0–0.9)
MONOCYTES NFR BLD AUTO: 13.5 % — SIGNIFICANT CHANGE UP (ref 2–14)
NEUTROPHILS # BLD AUTO: 4.28 K/UL — SIGNIFICANT CHANGE UP (ref 1.8–7.4)
NEUTROPHILS NFR BLD AUTO: 64.2 % — SIGNIFICANT CHANGE UP (ref 43–77)
NRBC # BLD: 0 /100 WBCS — SIGNIFICANT CHANGE UP (ref 0–0)
PLATELET # BLD AUTO: 91 K/UL — LOW (ref 150–400)
POTASSIUM SERPL-MCNC: 4.1 MMOL/L — SIGNIFICANT CHANGE UP (ref 3.5–5.3)
POTASSIUM SERPL-SCNC: 4.1 MMOL/L — SIGNIFICANT CHANGE UP (ref 3.5–5.3)
PROT SERPL-MCNC: 5.7 G/DL — LOW (ref 6–8.3)
RAPID RVP RESULT: DETECTED
RBC # BLD: 3.99 M/UL — LOW (ref 4.2–5.8)
RBC # FLD: 14 % — SIGNIFICANT CHANGE UP (ref 10.3–14.5)
SARS-COV-2 RNA SPEC QL NAA+PROBE: DETECTED
SODIUM SERPL-SCNC: 144 MMOL/L — SIGNIFICANT CHANGE UP (ref 135–145)
WBC # BLD: 6.65 K/UL — SIGNIFICANT CHANGE UP (ref 3.8–10.5)
WBC # FLD AUTO: 6.65 K/UL — SIGNIFICANT CHANGE UP (ref 3.8–10.5)

## 2022-11-25 PROCEDURE — 99223 1ST HOSP IP/OBS HIGH 75: CPT

## 2022-11-25 PROCEDURE — 99233 SBSQ HOSP IP/OBS HIGH 50: CPT | Mod: GC

## 2022-11-25 PROCEDURE — 99497 ADVNCD CARE PLAN 30 MIN: CPT | Mod: 25

## 2022-11-25 PROCEDURE — 93010 ELECTROCARDIOGRAM REPORT: CPT | Mod: 76

## 2022-11-25 RX ORDER — MEROPENEM 1 G/30ML
1000 INJECTION INTRAVENOUS EVERY 8 HOURS
Refills: 0 | Status: DISCONTINUED | OUTPATIENT
Start: 2022-11-25 | End: 2022-11-27

## 2022-11-25 RX ORDER — MEROPENEM 1 G/30ML
INJECTION INTRAVENOUS
Refills: 0 | Status: DISCONTINUED | OUTPATIENT
Start: 2022-11-25 | End: 2022-11-27

## 2022-11-25 RX ORDER — ACETAMINOPHEN 500 MG
650 TABLET ORAL EVERY 6 HOURS
Refills: 0 | Status: DISCONTINUED | OUTPATIENT
Start: 2022-11-25 | End: 2022-11-30

## 2022-11-25 RX ORDER — CEFTRIAXONE 500 MG/1
1000 INJECTION, POWDER, FOR SOLUTION INTRAMUSCULAR; INTRAVENOUS EVERY 24 HOURS
Refills: 0 | Status: DISCONTINUED | OUTPATIENT
Start: 2022-11-25 | End: 2022-11-25

## 2022-11-25 RX ORDER — MEROPENEM 1 G/30ML
1000 INJECTION INTRAVENOUS ONCE
Refills: 0 | Status: COMPLETED | OUTPATIENT
Start: 2022-11-25 | End: 2022-11-25

## 2022-11-25 RX ORDER — ENOXAPARIN SODIUM 100 MG/ML
40 INJECTION SUBCUTANEOUS EVERY 24 HOURS
Refills: 0 | Status: DISCONTINUED | OUTPATIENT
Start: 2022-11-25 | End: 2022-11-30

## 2022-11-25 RX ADMIN — ATORVASTATIN CALCIUM 20 MILLIGRAM(S): 80 TABLET, FILM COATED ORAL at 12:21

## 2022-11-25 RX ADMIN — Medication 81 MILLIGRAM(S): at 12:21

## 2022-11-25 RX ADMIN — DORZOLAMIDE HYDROCHLORIDE 1 DROP(S): 20 SOLUTION/ DROPS OPHTHALMIC at 22:24

## 2022-11-25 RX ADMIN — DORZOLAMIDE HYDROCHLORIDE 1 DROP(S): 20 SOLUTION/ DROPS OPHTHALMIC at 06:56

## 2022-11-25 RX ADMIN — MEMANTINE HYDROCHLORIDE 5 MILLIGRAM(S): 10 TABLET ORAL at 17:34

## 2022-11-25 RX ADMIN — ENOXAPARIN SODIUM 40 MILLIGRAM(S): 100 INJECTION SUBCUTANEOUS at 06:56

## 2022-11-25 RX ADMIN — Medication 1000 MILLIGRAM(S): at 00:04

## 2022-11-25 RX ADMIN — TAMSULOSIN HYDROCHLORIDE 0.4 MILLIGRAM(S): 0.4 CAPSULE ORAL at 12:21

## 2022-11-25 RX ADMIN — FINASTERIDE 5 MILLIGRAM(S): 5 TABLET, FILM COATED ORAL at 12:21

## 2022-11-25 RX ADMIN — LACTULOSE 10 GRAM(S): 10 SOLUTION ORAL at 22:24

## 2022-11-25 RX ADMIN — DORZOLAMIDE HYDROCHLORIDE 1 DROP(S): 20 SOLUTION/ DROPS OPHTHALMIC at 17:28

## 2022-11-25 RX ADMIN — MEMANTINE HYDROCHLORIDE 5 MILLIGRAM(S): 10 TABLET ORAL at 06:56

## 2022-11-25 RX ADMIN — MEROPENEM 100 MILLIGRAM(S): 1 INJECTION INTRAVENOUS at 12:22

## 2022-11-25 RX ADMIN — MEROPENEM 100 MILLIGRAM(S): 1 INJECTION INTRAVENOUS at 22:24

## 2022-11-25 NOTE — PROGRESS NOTE ADULT - SUBJECTIVE AND OBJECTIVE BOX
Patient is a 89y old  Male who presents with a chief complaint of Cystitis (2022 11:21)      INTERVAL HPI/OVERNIGHT EVENTS: Pt was seen and examined at bedside. No acute overnight events however when being transferred to floors pt HR was in 30s although patient said he felt fine. He was placed on remote tele and HR was found to be in 40s.  Pt denies headache, dizziness, lightheadedness, fever, chills, body aches, CP, SOB, palpitations, abdominal pain, n/v, numbness/tingling.  No other complaints at this time.     MEDICATIONS  (STANDING):  aspirin enteric coated 81 milliGRAM(s) Oral daily  atorvastatin 20 milliGRAM(s) Oral daily  dorzolamide 2% Ophthalmic Solution 1 Drop(s) Both EYES three times a day  enoxaparin Injectable 40 milliGRAM(s) SubCutaneous every 24 hours  finasteride 5 milliGRAM(s) Oral daily  lactulose Syrup 10 Gram(s) Oral at bedtime  memantine 5 milliGRAM(s) Oral two times a day  meropenem  IVPB      meropenem  IVPB 1000 milliGRAM(s) IV Intermittent every 8 hours  tamsulosin 0.4 milliGRAM(s) Oral daily    MEDICATIONS  (PRN):  acetaminophen     Tablet .. 650 milliGRAM(s) Oral every 6 hours PRN Temp greater or equal to 38C (100.4F), Mild Pain (1 - 3)      Allergies    No Known Allergies    Intolerances        REVIEW OF SYSTEMS:  CONSTITUTIONAL: No fever or chills  HEENT:  No headache, no sore throat  RESPIRATORY: No cough, wheezing, or shortness of breath  CARDIOVASCULAR: No chest pain, palpitations  GASTROINTESTINAL: No abd pain, nausea, vomiting, or diarrhea  GENITOURINARY: No dysuria, frequency, or hematuria  NEUROLOGICAL: no focal weakness or dizziness  MUSCULOSKELETAL: no myalgias     Vital Signs Last 24 Hrs  T(C): 36.9 (2022 11:30), Max: 38.3 (2022 20:04)  T(F): 98.5 (2022 11:30), Max: 101 (2022 20:04)  HR: 36 (2022 11:30) (36 - 122)  BP: 103/48 (2022 11:30) (100/79 - 118/78)  BP(mean): --  RR: 18 (2022 11:30) (18 - 20)  SpO2: 98% (2022 11:30) (95% - 98%)    Parameters below as of 2022 11:30  Patient On (Oxygen Delivery Method): room air        PHYSICAL EXAM:  GENERAL: NAD, alert  HEENT:  anicteric, moist mucous membranes  CHEST/LUNG:  CTA b/l, no rales, wheezes, or rhonchi  HEART:  RRR, S1, S2  ABDOMEN:  BS+, soft, nontender, nondistended  EXTREMITIES: no edema, cyanosis, or calf tenderness  NERVOUS SYSTEM: A&Ox1 (person), answers questions and follows commands appropriately    LABS:                        12.4   6.65  )-----------( 91       ( 2022 06:27 )             38.6     CBC Full  -  ( 2022 06:27 )  WBC Count : 6.65 K/uL  Hemoglobin : 12.4 g/dL  Hematocrit : 38.6 %  Platelet Count - Automated : 91 K/uL  Mean Cell Volume : 96.7 fl  Mean Cell Hemoglobin : 31.1 pg  Mean Cell Hemoglobin Concentration : 32.1 gm/dL  Auto Neutrophil # : 4.28 K/uL  Auto Lymphocyte # : 1.38 K/uL  Auto Monocyte # : 0.90 K/uL  Auto Eosinophil # : 0.05 K/uL  Auto Basophil # : 0.03 K/uL  Auto Neutrophil % : 64.2 %  Auto Lymphocyte % : 20.8 %  Auto Monocyte % : 13.5 %  Auto Eosinophil % : 0.8 %  Auto Basophil % : 0.5 %    2022 06:27    144    |  116    |  13     ----------------------------<  91     4.1     |  24     |  0.94     Ca    9.2        2022 06:27    TPro  5.7    /  Alb  2.5    /  TBili  1.0    /  DBili  x      /  AST  17     /  ALT  9      /  AlkPhos  71     2022 06:27    PT/INR - ( 2022 20:40 )   PT: 15.6 sec;   INR: 1.33 ratio         PTT - ( 2022 20:40 )  PTT:30.7 sec  Urinalysis Basic - ( 2022 21:50 )    Color: Yellow / Appearance: Slightly Turbid / S.010 / pH: x  Gluc: x / Ketone: Negative  / Bili: Negative / Urobili: Negative   Blood: x / Protein: Negative / Nitrite: Positive   Leuk Esterase: Moderate / RBC: 0-2 /HPF / WBC 3-5   Sq Epi: x / Non Sq Epi: Occasional / Bacteria: Many      CAPILLARY BLOOD GLUCOSE              RADIOLOGY & ADDITIONAL TESTS:   EKG 22: Marked sinus bradycardia with 1st degree AV block Nonspecific T wave abnormality Abnormal ECG When compared with ECG of 2022 08:44, (Unconfirmed) Sinus rhythm has replaced Atrial fibrillation        Consultant(s) Notes Reviewed:  [x] YES  [ ] NO     Patient is a 89y old  Male who presents with a chief complaint of fever, increased urinary frequency.      INTERVAL HPI/OVERNIGHT EVENTS: Pt was seen and examined at bedside. No acute overnight events, however, when being transferred to floors pt's HR was in the 30s although patient said he felt fine with no dizziness, weakness, SOB, CP. He was placed on remote tele and HR was found to be in the 40s. EKG revealed 2:1 AVB, cardio consulted. Pt denies headache, dizziness, lightheadedness, fever, chills, body aches, CP, SOB, palpitations, abdominal pain, n/v, numbness/tingling.    MEDICATIONS  (STANDING):  aspirin enteric coated 81 milliGRAM(s) Oral daily  atorvastatin 20 milliGRAM(s) Oral daily  dorzolamide 2% Ophthalmic Solution 1 Drop(s) Both EYES three times a day  enoxaparin Injectable 40 milliGRAM(s) SubCutaneous every 24 hours  finasteride 5 milliGRAM(s) Oral daily  lactulose Syrup 10 Gram(s) Oral at bedtime  memantine 5 milliGRAM(s) Oral two times a day  meropenem  IVPB      meropenem  IVPB 1000 milliGRAM(s) IV Intermittent every 8 hours  tamsulosin 0.4 milliGRAM(s) Oral daily    MEDICATIONS  (PRN):  acetaminophen     Tablet .. 650 milliGRAM(s) Oral every 6 hours PRN Temp greater or equal to 38C (100.4F), Mild Pain (1 - 3)      Allergies    No Known Allergies    Intolerances        REVIEW OF SYSTEMS:  CONSTITUTIONAL: No fever or chills  HEENT:  No headache, no sore throat  RESPIRATORY: No cough, wheezing, or shortness of breath  CARDIOVASCULAR: No chest pain, palpitations  GASTROINTESTINAL: No abd pain, nausea, vomiting, or diarrhea  GENITOURINARY: No dysuria, frequency, or hematuria  NEUROLOGICAL: no focal weakness or dizziness  MUSCULOSKELETAL: no myalgias     Vital Signs Last 24 Hrs  T(C): 36.9 (2022 11:30), Max: 38.3 (2022 20:04)  T(F): 98.5 (2022 11:30), Max: 101 (2022 20:04)  HR: 36 (2022 11:30) (36 - 122)  BP: 103/48 (2022 11:30) (100/79 - 118/78)  BP(mean): --  RR: 18 (2022 11:30) (18 - 20)  SpO2: 98% (2022 11:30) (95% - 98%)    Parameters below as of 2022 11:30  Patient On (Oxygen Delivery Method): room air        PHYSICAL EXAM:  GENERAL: NAD, alert  HEENT:  anicteric, moist mucous membranes  CHEST/LUNG:  CTA b/l, no rales, wheezes, or rhonchi  HEART:  bradycardic to 44bpm, S1, S2  ABDOMEN:  BS+, soft, nontender, nondistended  EXTREMITIES: no edema, cyanosis, or calf tenderness  NERVOUS SYSTEM: A&Ox1 (person), answers questions and follows commands appropriately    LABS:                        12.4   6.65  )-----------( 91       ( 2022 06:27 )             38.6     CBC Full  -  ( 2022 06:27 )  WBC Count : 6.65 K/uL  Hemoglobin : 12.4 g/dL  Hematocrit : 38.6 %  Platelet Count - Automated : 91 K/uL  Mean Cell Volume : 96.7 fl  Mean Cell Hemoglobin : 31.1 pg  Mean Cell Hemoglobin Concentration : 32.1 gm/dL  Auto Neutrophil # : 4.28 K/uL  Auto Lymphocyte # : 1.38 K/uL  Auto Monocyte # : 0.90 K/uL  Auto Eosinophil # : 0.05 K/uL  Auto Basophil # : 0.03 K/uL  Auto Neutrophil % : 64.2 %  Auto Lymphocyte % : 20.8 %  Auto Monocyte % : 13.5 %  Auto Eosinophil % : 0.8 %  Auto Basophil % : 0.5 %    2022 06:27    144    |  116    |  13     ----------------------------<  91     4.1     |  24     |  0.94     Ca    9.2        2022 06:27    TPro  5.7    /  Alb  2.5    /  TBili  1.0    /  DBili  x      /  AST  17     /  ALT  9      /  AlkPhos  71     2022 06:27    PT/INR - ( 2022 20:40 )   PT: 15.6 sec;   INR: 1.33 ratio         PTT - ( 2022 20:40 )  PTT:30.7 sec  Urinalysis Basic - ( 2022 21:50 )    Color: Yellow / Appearance: Slightly Turbid / S.010 / pH: x  Gluc: x / Ketone: Negative  / Bili: Negative / Urobili: Negative   Blood: x / Protein: Negative / Nitrite: Positive   Leuk Esterase: Moderate / RBC: 0-2 /HPF / WBC 3-5   Sq Epi: x / Non Sq Epi: Occasional / Bacteria: Many      CAPILLARY BLOOD GLUCOSE              RADIOLOGY & ADDITIONAL TESTS:   EKG 22: Marked sinus bradycardia with 1st degree AV block Nonspecific T wave abnormality Abnormal ECG When compared with ECG of 2022 08:44, (Unconfirmed) Sinus rhythm has replaced Atrial fibrillation        Consultant(s) Notes Reviewed:  [x] YES  [ ] NO

## 2022-11-25 NOTE — CONSULT NOTE ADULT - SUBJECTIVE AND OBJECTIVE BOX
[facility] - Rhonda oGod, Tony, Joselo, Karen, Peter; Office Number: [phone]    Initial Consult Note  CHIEF COMPLAINT: Patient is a 89y old  Male who presents with a chief complaint of Cystitis (25 Nov 2022 10:18)    HPI: 89M PMH of CAD s/p stents (2014), TIA, HLD, GERD, BPH s/p prostate surgery (2017), ESBL, E. coli UTI secondary to left kidney stones s/p ureteral stent placement (4/30/21), Dementia p/w fever, chills, and urinary frequency with more confusion than normal with increased urgency w/ decreased flow.  Initial EKG with Sinus Tachycardia w/ 1st degree AV block  UA: Nitrate positive, LE Moderate, Small Blood, Many bacteria, and was also found to be covid positive.   Cardiology called for bradycardia and possible 2:1 HB.     Allergies  No Known Allergies      PAST MEDICAL & SURGICAL HISTORY:  Dementia  Kidney stones  Stented coronary artery    MEDICATIONS  (STANDING):  aspirin enteric coated 81 milliGRAM(s) Oral daily  atorvastatin 20 milliGRAM(s) Oral daily  dorzolamide 2% Ophthalmic Solution 1 Drop(s) Both EYES three times a day  enoxaparin Injectable 40 milliGRAM(s) SubCutaneous every 24 hours  finasteride 5 milliGRAM(s) Oral daily  lactulose Syrup 10 Gram(s) Oral at bedtime  memantine 5 milliGRAM(s) Oral two times a day  meropenem  IVPB      meropenem  IVPB 1000 milliGRAM(s) IV Intermittent once  meropenem  IVPB 1000 milliGRAM(s) IV Intermittent every 8 hours  tamsulosin 0.4 milliGRAM(s) Oral daily    MEDICATIONS  (PRN):  acetaminophen     Tablet .. 650 milliGRAM(s) Oral every 6 hours PRN Temp greater or equal to 38C (100.4F), Mild Pain (1 - 3)    FAMILY HISTORY:     No family history of acute MI or sudden cardiac death.    SOCIAL HISTORY: No active tobacco, ethanol, or drug abuse.    REVIEW OF SYSTEMS   All other review of systems is negative unless indicated above.    VITAL SIGNS:   Vital Signs Last 24 Hrs  T(C): 36.9 (25 Nov 2022 09:04), Max: 38.3 (24 Nov 2022 20:04)  T(F): 98.5 (25 Nov 2022 09:04), Max: 101 (24 Nov 2022 20:04)  HR: 39 (25 Nov 2022 09:04) (39 - 122)  BP: 104/50 (25 Nov 2022 09:04) (100/79 - 118/78)  BP(mean): --  RR: 18 (25 Nov 2022 09:04) (18 - 20)  SpO2: 96% (25 Nov 2022 09:04) (95% - 98%)    Parameters below as of 25 Nov 2022 09:04  Patient On (Oxygen Delivery Method): room air        Physical Exam:  Constitutional: NAD, awake and alert, Well developed   HEENT: Moist Mucous Membranes, Anicteric  Pulmonary: Non-labored, breath sounds are clear bilaterally, No wheezing, rales or rhonchi  Cardiovascular: Regular, S1 and S2, No murmurs, No rubs, gallops or clicks  Gastrointestinal: Bowel Sounds present, soft, nontender.   Lymph: No peripheral edema. No lymphadenopathy.  Skin: No visible rashes or ulcers.  Psych:  confused     I&O's Summary      LABS: All Labs Reviewed:                        12.4   6.65  )-----------( 91       ( 25 Nov 2022 06:27 )             38.6                         14.6   8.23  )-----------( 120      ( 24 Nov 2022 20:40 )             44.9     25 Nov 2022 06:27    144    |  116    |  13     ----------------------------<  91     4.1     |  24     |  0.94   24 Nov 2022 20:40    142    |  110    |  15     ----------------------------<  102    4.2     |  26     |  1.00     Ca    9.2        25 Nov 2022 06:27  Ca    9.7        24 Nov 2022 20:40    TPro  5.7    /  Alb  2.5    /  TBili  1.0    /  DBili  x      /  AST  17     /  ALT  9      /  AlkPhos  71     25 Nov 2022 06:27  TPro  7.4    /  Alb  3.1    /  TBili  1.0    /  DBili  x      /  AST  20     /  ALT  12     /  AlkPhos  100    24 Nov 2022 20:40    PT/INR - ( 24 Nov 2022 20:40 )   PT: 15.6 sec;   INR: 1.33 ratio         PTT - ( 24 Nov 2022 20:40 )  PTT:30.7 sec  Blood Culture:      [facility] - Rhonda Good, Tony, Joselo, Karen, Peter; Office Number: [phone]    Initial Consult Note  CHIEF COMPLAINT: Patient is a 89y old  Male who presents with a chief complaint of Cystitis (25 Nov 2022 10:18)    HPI: 89M PMH of CAD s/p stents (2014), TIA, HLD, GERD, BPH s/p prostate surgery (2017), ESBL, E. coli UTI secondary to left kidney stones s/p ureteral stent placement (4/30/21), Dementia p/w fever, chills, and urinary frequency with more confusion than normal with increased urgency w/ decreased flow. UA: Nitrate positive, LE Moderate, Small Blood, Many bacteria, and was also found to be covid positive. Patient admitted with UTI and COVID.   Cardiology called for bradycardia and possible heart block.  Initial EKG with Sinus Tachycardia w/ 1st degree AV block .  Patient’s initial EKG in the ER was sinus tachycardia and the documented vital signs in the ER was between 55 to 80 per minute . Upon transfer to the tele patient’s heart rate was noted to be in 30s and 40s. Started telemetry monitoring which showed sinus rhythm with Mobitz type 1. Patient was seen as an examined, patient awake, response to verbal command, confused, poor historian. Reports he is in a hotel, no other information could not be obtained. He appears to be comfortable in the room air. Blood pressure is stable. Does not appear to have any respiratory discomfort.    Allergies  No Known Allergies    PAST MEDICAL & SURGICAL HISTORY:  Dementia  Kidney stones  Stented coronary artery    MEDICATIONS  (STANDING):  aspirin enteric coated 81 milliGRAM(s) Oral daily  atorvastatin 20 milliGRAM(s) Oral daily  dorzolamide 2% Ophthalmic Solution 1 Drop(s) Both EYES three times a day  enoxaparin Injectable 40 milliGRAM(s) SubCutaneous every 24 hours  finasteride 5 milliGRAM(s) Oral daily  lactulose Syrup 10 Gram(s) Oral at bedtime  memantine 5 milliGRAM(s) Oral two times a day  meropenem  IVPB      meropenem  IVPB 1000 milliGRAM(s) IV Intermittent once  meropenem  IVPB 1000 milliGRAM(s) IV Intermittent every 8 hours  tamsulosin 0.4 milliGRAM(s) Oral daily    MEDICATIONS  (PRN):  acetaminophen     Tablet .. 650 milliGRAM(s) Oral every 6 hours PRN Temp greater or equal to 38C (100.4F), Mild Pain (1 - 3)    FAMILY HISTORY:  No family history of acute MI or sudden cardiac death.    SOCIAL HISTORY: No active tobacco, ethanol, or drug abuse.    REVIEW OF SYSTEMS   All other review of systems is negative unless indicated above.    VITAL SIGNS:   Vital Signs Last 24 Hrs  T(C): 36.9 (25 Nov 2022 09:04), Max: 38.3 (24 Nov 2022 20:04)  T(F): 98.5 (25 Nov 2022 09:04), Max: 101 (24 Nov 2022 20:04)  HR: 39 (25 Nov 2022 09:04) (39 - 122)  BP: 104/50 (25 Nov 2022 09:04) (100/79 - 118/78)  BP(mean): --  RR: 18 (25 Nov 2022 09:04) (18 - 20)  SpO2: 96% (25 Nov 2022 09:04) (95% - 98%)    Parameters below as of 25 Nov 2022 09:04  Patient On (Oxygen Delivery Method): room air    Physical Exam:  Constitutional: NAD, awake and alert, Frail   HEENT: Moist Mucous Membranes, Anicteric  Pulmonary: Non-labored, breath sounds are clear bilaterally, No wheezing, rales or rhonchi  Cardiovascular: Regular, S1 and S2, No murmurs, No rubs, gallops or clicks  Gastrointestinal: Bowel Sounds present, soft, nontender.   Lymph: No peripheral edema. No lymphadenopathy.  Skin: No visible rashes or ulcers.  Psych:  confused     I&O's Summary      LABS: All Labs Reviewed:                        12.4   6.65  )-----------( 91       ( 25 Nov 2022 06:27 )             38.6                         14.6   8.23  )-----------( 120      ( 24 Nov 2022 20:40 )             44.9     25 Nov 2022 06:27    144    |  116    |  13     ----------------------------<  91     4.1     |  24     |  0.94   24 Nov 2022 20:40    142    |  110    |  15     ----------------------------<  102    4.2     |  26     |  1.00     Ca    9.2        25 Nov 2022 06:27  Ca    9.7        24 Nov 2022 20:40    TPro  5.7    /  Alb  2.5    /  TBili  1.0    /  DBili  x      /  AST  17     /  ALT  9      /  AlkPhos  71     25 Nov 2022 06:27  TPro  7.4    /  Alb  3.1    /  TBili  1.0    /  DBili  x      /  AST  20     /  ALT  12     /  AlkPhos  100    24 Nov 2022 20:40    PT/INR - ( 24 Nov 2022 20:40 )   PT: 15.6 sec;   INR: 1.33 ratio         PTT - ( 24 Nov 2022 20:40 )  PTT:30.7 sec  Blood Culture:

## 2022-11-25 NOTE — DISCHARGE NOTE PROVIDER - NSDCMRMEDTOKEN_GEN_ALL_CORE_FT
Aspir 81 oral delayed release tablet: 1 tab(s) orally once a day  atorvastatin 20 mg oral tablet: 1 tab(s) orally once a day  Azopt 1% ophthalmic suspension: 1 dose(s) to each affected eye once a day  diclofenac 1% topical gel:   donepezil 10 mg oral tablet: 1 tab(s) orally once a day (at bedtime)  finasteride 5 mg oral tablet: 1 tab(s) orally once a day  fosfomycin 3 g oral powder for reconstitution:   lactulose 10 g/15 mL oral syrup: 15 milliliter(s) orally once a day (at bedtime)  memantine 5 mg oral tablet: 1 tab(s) orally 2 times a day  methenamine hippurate 1 g oral tablet: 1 gram(s) orally once a day  multivitamin:   tamsulosin 0.4 mg oral capsule: 1 cap(s) orally once a day   Aspir 81 oral delayed release tablet: 1 tab(s) orally once a day  atorvastatin 20 mg oral tablet: 1 tab(s) orally once a day  Azopt 1% ophthalmic suspension: 1 dose(s) to each affected eye once a day  diclofenac 1% topical gel:   dorzolamide 2% ophthalmic solution: 1 drop(s) to each affected eye 3 times a day  finasteride 5 mg oral tablet: 1 tab(s) orally once a day  fosfomycin 3 g oral powder for reconstitution:   lactulose 10 g/15 mL oral syrup: 15 milliliter(s) orally once a day (at bedtime)  memantine 5 mg oral tablet: 1 tab(s) orally 2 times a day  methenamine hippurate 1 g oral tablet: 1 gram(s) orally once a day  multivitamin:   tamsulosin 0.4 mg oral capsule: 1 cap(s) orally once a day   Aspir 81 oral delayed release tablet: 1 tab(s) orally once a day  atorvastatin 20 mg oral tablet: 1 tab(s) orally once a day  Azopt 1% ophthalmic suspension: 1 dose(s) to each affected eye once a day  dorzolamide 2% ophthalmic solution: 1 drop(s) to each affected eye 3 times a day  finasteride 5 mg oral tablet: 1 tab(s) orally once a day  fosfomycin 3 g oral powder for reconstitution:   memantine 5 mg oral tablet: 1 tab(s) orally 2 times a day  methenamine hippurate 1 g oral tablet: 1 gram(s) orally once a day  multivitamin:   tamsulosin 0.4 mg oral capsule: 1 cap(s) orally once a day

## 2022-11-25 NOTE — PROGRESS NOTE ADULT - PROBLEM SELECTOR PLAN 8
Acute confusion likely 2/2 to acute illness   - Continue home Memantine   - Donepezil discontinued in setting of bradycardia reported acute confusion likely due to acute illness - acute metabolic encephalopathy due to UTI -- now improving  - Continue home Memantine   - Donepezil discontinued in setting of bradycardia

## 2022-11-25 NOTE — DISCHARGE NOTE PROVIDER - CARE PROVIDER_API CALL
Angeline Ness  INTERNAL MEDICINE  [address] [place] [address]  Phone: [phone]  Fax: [phone]  Follow Up Time: 1 week   Angeline Ness  INTERNAL MEDICINE  [address] [place] [address]  Phone: [phone]  Fax: [phone]  Follow Up Time: 1 week    Hoenig, David M (MD)  Urology  [facility]- Dept. of Urology, [address] [place] [address]  Phone: [phone]  Fax: [phone]  Follow Up Time: 1 week

## 2022-11-25 NOTE — DISCHARGE NOTE PROVIDER - NSDCFUADDAPPT_GEN_ALL_CORE_FT
follow /up  out pt -  you have adrenal and  A CT chest showed Lung w/ Multiple bilateral pulmonary nodules noted. The largest measures 6 mm. Multiple nodules./ fu up out pt ct chest further work up. dr burns office in 1wk /pcp.

## 2022-11-25 NOTE — CONSULT NOTE ADULT - SUBJECTIVE AND OBJECTIVE BOX
Date/Time Patient Seen:  		  Referring MD:   Data Reviewed	       Patient is a 89y old  Male who presents with a chief complaint of Cystitis (25 Nov 2022 10:18)      Subjective/HPI  ct reviewed  ID eval noted  H and P reviewed  ER provider note reviewed  imaging reviewed  labs reviewed  covid pos     89 y/o male w/ a PMHx of CAD s/p stents (2014), TIA, HLD, GERD, BPH s/p prostate surgery (2017), ESBL E. coli UTI secondary to left kidney stones s/p ureteral stent placement (4/30/21), Dementia presents to the ED w/ fever, chills, and urinary frequency. History obtained from daughter at bedside. States that this afternoon patient appeared unwell. He was with decreased strength, chills, and decreased appetite. She has states that he had difficulty ambulating (baseline ambulates w/ walker) and was more confused than normal. In terms of his urinary symptoms, he had increased urgency w/ decreased flow. He denies any respiratory symptoms such as cough, or SOB. Also denies CP.   PAST MEDICAL & SURGICAL HISTORY:  Dementia    Kidney stones    Stented coronary artery          Medication list         MEDICATIONS  (STANDING):  aspirin enteric coated 81 milliGRAM(s) Oral daily  atorvastatin 20 milliGRAM(s) Oral daily  dorzolamide 2% Ophthalmic Solution 1 Drop(s) Both EYES three times a day  enoxaparin Injectable 40 milliGRAM(s) SubCutaneous every 24 hours  finasteride 5 milliGRAM(s) Oral daily  lactulose Syrup 10 Gram(s) Oral at bedtime  memantine 5 milliGRAM(s) Oral two times a day  meropenem  IVPB      meropenem  IVPB 1000 milliGRAM(s) IV Intermittent once  meropenem  IVPB 1000 milliGRAM(s) IV Intermittent every 8 hours  tamsulosin 0.4 milliGRAM(s) Oral daily    MEDICATIONS  (PRN):  acetaminophen     Tablet .. 650 milliGRAM(s) Oral every 6 hours PRN Temp greater or equal to 38C (100.4F), Mild Pain (1 - 3)         Vitals log        ICU Vital Signs Last 24 Hrs  T(C): 36.9 (25 Nov 2022 09:04), Max: 38.3 (24 Nov 2022 20:04)  T(F): 98.5 (25 Nov 2022 09:04), Max: 101 (24 Nov 2022 20:04)  HR: 39 (25 Nov 2022 09:04) (39 - 122)  BP: 104/50 (25 Nov 2022 09:04) (100/79 - 118/78)  BP(mean): --  ABP: --  ABP(mean): --  RR: 18 (25 Nov 2022 09:04) (18 - 20)  SpO2: 96% (25 Nov 2022 09:04) (95% - 98%)    O2 Parameters below as of 25 Nov 2022 09:04  Patient On (Oxygen Delivery Method): room air      PAST MEDICAL HISTORY:  Dementia     Kidney stones.     PAST SURGICAL HISTORY:  Stented coronary artery.     Social History:  · Substance use	No  · Social History (marital status, living situation, occupation, and sexual history)	Tobacco Usage:  denies   Alcohol Usage: denies   Substance Use:  denies   Lives with: Son   ADLs: Fully dependent and ambulates w/ walker     Tobacco Screening:  · Core Measure Site	Yes  · Has the patient used tobacco in the past 30 days?	No    Risk Assessment:    Present on Admission:  Deep Venous Thrombosis	no  Pulmonary Embolus	no     HIV Screening:  · In accordance with [place] State law, we offer every patient who comes to our ED an HIV test. Would you like to be tested today?	Previously Declined (within the last year)             Input and Output:  I&O's Detail      Lab Data                        12.4   6.65  )-----------( 91       ( 25 Nov 2022 06:27 )             38.6     11-25    144  |  116<H>  |  13  ----------------------------<  91  4.1   |  24  |  0.94    Ca    9.2      25 Nov 2022 06:27    TPro  5.7<L>  /  Alb  2.5<L>  /  TBili  1.0  /  DBili  x   /  AST  17  /  ALT  9<L>  /  AlkPhos  71  11-25            Review of Systems	  frail  weak  fever  chills      Objective     Physical Examination  heart s1s2  lung dec BS  head nc  frail  weak        Pertinent Lab findings & Imaging      Nj:  NO   Adequate UO     I&O's Detail           Discussed with:     Cultures:	        Radiology        ACC: [id number] EXAM:  CT ABDOMEN AND PELVIS                        ACC: [id number] EXAM:  CT CHEST                          PROCEDURE DATE:  11/24/2022          INTERPRETATION:  CLINICAL INFORMATION: Fever, generalized weakness,   shaking and disorientation.    COMPARISON: None.    CONTRAST/COMPLICATIONS:  IV Contrast: NONE  0 cc administered   0 cc discarded  Oral Contrast: NONE  Complications: None reported at time of study completion    PROCEDURE:  CT of the Chest, Abdomen and Pelvis was performed.  Sagittal and coronal reformats were performed.    FINDINGS:  CHEST:  LUNGS AND LARGE AIRWAYS: The central tracheobronchial tree is patent.   There is calcified granuloma of the right upper lobe. There are scattered   bilateral pulmonary nodules which range in size from 2 mm to 6 mm. Mild   peripheral fibrotic changes are appreciated. Dependent atelectasis is   seen posteriorly.  PLEURA: No pleural effusion.  VESSELS: Coronary, aortic and branch vessel calcifications are   appreciated. The aorta and pulmonary artery are of normal caliber..  HEART: Heart size is normal. No pericardial effusion. Valvular   calcifications are noted.  MEDIASTINUM AND QUETA: There is an increased number of nonenlarged   mediastinal lymph nodes, nonspecific. There is a moderate to large hiatal   hernia.  CHEST WALL AND LOWER NECK: Within normal limits.    ABDOMEN AND PELVIS:  LIVER: There is calcified granuloma of the right hepatic lobe..  BILE DUCTS: Normal caliber.  GALLBLADDER: 2.7 cm laminated gallstone appreciated..  SPLEEN: Within normal limits.  PANCREAS: Within normal limits.  ADRENALS: 1 cm indeterminate left adrenal gland nodule..  KIDNEYS/URETERS: 7 mm nonobstructing calculus of the right upper pole   kidney. Multiple left-sided renal calculi measuring up to 3 mm, without   associated obstruction. Linear calcification seen along the posterior   lower renal hilum may be vascular in nature. Left lower pole renal cyst   is noted.    BLADDER: Thick-walled trabeculated urinary bladder with multiple   diverticula noted. Punctate hyperdense foci seen along the posterior   bladder wall and diverticula may represent wall calcification or small   bladder stones..  REPRODUCTIVE ORGANS: Asymmetric calcification of the prostate is   appreciated, uncertain significance. The prostate is prominent.    BOWEL: There is moderate to large hiatal hernia. No bowel obstruction.   Appendix is normal.  PERITONEUM: No ascites.  VESSELS: Atherosclerotic changes.  RETROPERITONEUM/LYMPH NODES: No lymphadenopathy.  ABDOMINAL WALL: Right fat-containing inguinal hernia is noted.  BONES: Degenerative changes. There is generalized osteopenia.    IMPRESSION:  Urinary bladder wall thickening with trabeculation and multiple   diverticula. 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 Superimposed cystitis   should be evaluated for clinically, with correlation with urinalysis.   Additional hyperdense foci seen along the posterior bladder wall may   represent bladder wall calcifications versus layering bladder stones.    Moderate to large hiatal hernia.    Cholelithiasis.    Indeterminate left adrenal gland nodule for which further correlation can   be obtained with adrenal protocol MRI or CT.    Multiple bilateral pulmonary nodules noted. The largest measures 6 mm.   Multiple nodules, with largest measuring greater than 6 mm, in a low or   high risk patient should be followed with CT at 3-6 months, then consider   CT at 18-24 months. -- ?Reference: Guidelines for Management of   Incidental Pulmonary Nodules Detected on CT Images: From the Fleischner   Society 2017?        --- End of Report ---             CHIN DIEHL M.D., Attending Radiologist  This document has been electronically signed. Nov 24 2022  9:45PM

## 2022-11-25 NOTE — PATIENT PROFILE ADULT - MONEY FOR FOOD
Please drink plenty of fluids.  Please get plenty of rest.  Please return here or go to the Emergency Department for any concerns or worsening of condition.  If you were prescribed a narcotic medication, do not drive or operate heavy equipment or machinery while taking these medications.  If you were not prescribed an anti-inflammatory medication, and if you do not have any history of stomach/intestinal ulcers, or kidney disease, or are not taking a blood thinner such as Coumadin, Plavix, Pradaxa, Eloquis, or Xaralta for example, it is OK to take over the counter Ibuprofen or Advil or Motrin or Aleve as directed.  Do not take these medications on an empty stomach.  Rest, ice, compression and elevation to the affected joint or limb as needed.    If you were given a sling, wear it for comfort until follow up as arranged.  If you were given or placed in a splint, wear it until your follow up visit or recheck.  If you  smoke, please stop smoking.       Please follow up with your primary care doctor or specialist as needed.    Maria Ines Michaels, [facility]  [phone]     Orthopaedics [facility]    Dr. Deo Gillette  [address] [place] [address].  [phone]  [phone]      Orthopaedics - [place]    Dr. Zain Garza  [address] [place] [address].  [place] [id number] [phone]      Hand Contusion  You have a contusion. This is also called a bruise. There is swelling and some bleeding under the skin, but no broken bones. This injury generally takes a few days to a few weeks to heal.  During that time, the bruise will typically change in color from reddish, to purple-blue, to greenish-yellow, then to yellow-brown.  Home care  · Elevate the hand to reduce pain and swelling. As much as possible, sit or lie down with the hand raised about the level of your heart. This is especially important during the first 48 hours.  · Ice the hand to help reduce pain and swelling. Wrap a cold source (ice pack or ice cubes in a plastic bag) in a thin  towel. Apply to the bruised area for 20 minutes every 1 to 2 hours the first day. Continue this 3 to 4 times a day until the pain and swelling goes away.  · Unless another medicine was prescribed, you can take acetaminophen, ibuprofen, or naproxen to control pain. (If you have chronic liver or kidney disease or ever had a stomach ulcer or gastrointestinal bleeding, talk with your doctor before using these medicines.)  Follow up  Follow up with your healthcare provider or our staff as advised. Call if you are not improving within 1 to 2 weeks.  When to seek medical advice   Call your healthcare provider right away if you have any of the following:  · Increased pain or swelling  · Arm becomes cold, blue, numb or tingly  · Signs of infection: Warmth, drainage, or increased redness or pain around the bruise  · Inability to move the injured hand   · Frequent bruising for unknown reasons  Date Last Reviewed: 2/1/2017  © 0476-8265 [employer], [email or web address]. [address] [place] [address], [place] [address]. All rights reserved. This information is not intended as a substitute for professional medical care. Always follow your healthcare professional's instructions.         no

## 2022-11-25 NOTE — CONSULT NOTE ADULT - ASSESSMENT
DOCUMENTATION IN PROGRESS     89 y/o male w/ a PMHx of CAD s/p stents (2014), TIA, HLD, GERD, BPH s/p prostate surgery (2017), ESBL E. coli UTI secondary to left kidney stones s/p ureteral stent placement (4/30/21), Dementia presents to the ED w/ fever, chills, and urinary frequency. Admit for Cystitis.     - H/o CAD s/p stents (2014)  - Continue statin and ASA.    - BP stable and controlled     - COVID and UTI management per primary   - Monitor and replete lytes, keep K>4, Mg>2.  - Will continue to follow.    Pablo Kee, MS FNP, AGAP  Nurse Practitioner- Cardiology   Spectra #[phone]     89 y/o male w/ a PMH of CAD s/p stents (2014), TIA, HLD, GERD, BPH s/p prostate surgery (2017), ESBL E. coli UTI secondary to left kidney stones s/p ureteral stent placement (4/30/21), Dementia p/w fever, chills, and urinary frequency. Admit for Cystitis and COVID. Cardiology called for bradycardia and possible heart block.      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   - Upon transfer to the tele patient’s heart rate was noted to be in 30s and 40s.   - Telemetry monitoring which showed sinus rhythm with predominantly Mobitz  type 1 and some Type 2  - Patient appears hemodynamically stable.   - Patient prior EKG from 07/2022 was noted to have SR with 1HB with 2 blocked PACs in 12 lead EKG. It is likely that he has conduction disease for some time.   - It appears that he is on Namenda which can cause bradycardia. Would d/c if possible to see if bradycardia is medication induced.   - Would continue telemetry  - Patient remains asymptomatic and hemodynamically stable.   - Avoid negative chronotropic medications.  - Obtain routine echocardiogram to assess for structural heart disease     - H/o CAD s/p stents (2014)  - Continue statin and ASA.    - BP stable and controlled     - COVID and UTI management per primary   - Monitor and replete lytes, keep K>4, Mg>2.  - Will continue to follow.    Pablo Kee, MS FNP, AGAP  Nurse Practitioner- Cardiology   Spectra #[phone]     89 y/o male w/ a PMH of CAD s/p stents (2014), TIA, HLD, GERD, BPH s/p prostate surgery (2017), ESBL E. coli UTI secondary to left kidney stones s/p ureteral stent placement (4/30/21), Dementia p/w fever, chills, and urinary frequency. Admit for Cystitis and COVID. Cardiology called for bradycardia and possible heart block.      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   - Upon transfer to the tele patient’s heart rate was noted to be in 30s and 40s.   - Telemetry monitoring which showed sinus rhythm with predominantly Mobitz  type 1, with some 2:1 av block  - Patient appears hemodynamically stable.   - Patient prior EKG from 07/2022 was noted to have SR with 1HB with 2 blocked PACs in 12 lead EKG. It is likely that he has conduction disease for some time.   - It appears that he is on Namenda which can cause bradycardia. Would d/c if possible to see if bradycardia is medication induced.   - Would continue telemetry  - Patient remains asymptomatic and hemodynamically stable.   - Avoid negative chronotropic medications.  - Obtain routine echocardiogram to assess for structural heart disease     - H/o CAD s/p stents (2014)  - Continue statin and ASA.    - BP stable and controlled     - COVID and UTI management per primary   - Monitor and replete lytes, keep K>4, Mg>2.  - Will continue to follow.    Pablo Kee, MS FNP, [facility]  Nurse Practitioner- Cardiology   Spectra #[phone]

## 2022-11-25 NOTE — PROGRESS NOTE ADULT - PROBLEM SELECTOR PLAN 4
Likely 2/2 to acute infectious   - PT Eval   - Fall precautions Likely 2/2 acute infection   - PT Eval   - Fall precautions

## 2022-11-25 NOTE — PROGRESS NOTE ADULT - ASSESSMENT
89 y/o male w/ a PMHx of CAD s/p stents (2014), TIA, HLD, GERD, BPH s/p prostate surgery (2017), ESBL E. coli UTI secondary to left kidney stones s/p ureteral stent placement (4/30/21), Dementia presents to the ED w/ fever, chills, and urinary frequency. Admit for Cystitis.  89yo M w/ PMHx of CAD s/p stents (2014), TIA, HLD, GERD, BPH s/p prostate surgery (2017), ESBL E. 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

## 2022-11-25 NOTE — PROGRESS NOTE ADULT - PROBLEM SELECTOR PLAN 5
Moderate to large hiatal hernia. Cholelithiasis. Indeterminate left adrenal gland nodule for which further correlation can be obtained with adrenal protocol MRI or CT.  - F/u outpatient

## 2022-11-25 NOTE — CONSULT NOTE ADULT - NS ATTEND AMEND GEN_ALL_CORE FT
long history of first deg avb and has had melvina before in this setting  noted with 2:1 avb on ekg, prompted by slower hr on vs and tele  all looks mobitz 1, and has been asx  should remain off nodals and other meds which may slow the rate, such as aricept, now dc'd  hopefully no need for ppm, hr has rebounded from the lows  will follow closely

## 2022-11-25 NOTE — CONSULT NOTE ADULT - TIME BILLING
Thank you for your consult.   Please call us with any concerns or questions.   We will continue to follow.     Ras Thakkar MD   Division of Infectious Diseases  [facility]   Cell [phone] between 8am and 6pm   After 6pm and weekends please call ID service at [phone].

## 2022-11-25 NOTE — PROGRESS NOTE ADULT - PROBLEM SELECTOR PLAN 1
Presented to the ED w/ increased urgency, decreased flow, history of ESBL E. coli UTI secondary to left kidney stones s/p ureteral stent placement (4/30/21). Temp of 101.  CT abdomen pelvis w/ Urinary bladder wall thickening with trabeculation and multiple diverticula. Sequelae of chronic outlet obstruction from prostate enlargement. Superimposed cystitis Additional hyperdense foci seen along the posterior bladder wall may represent bladder wall calcifications versus layering bladder stones.   UA w/ positive nitrate, moderate LE, many bacteria   - s/p rocephin x1   - Started on meropenem by ID for ESBL/pseudomonas coverage  - Tylenol PRN for fever and pain   - F/U BCx x 2 and UCx  - Infectious disease (Dr. Thakkar) following - Presented to the ED w/ increased urgency, decreased flow, history of ESBL E. coli UTI secondary to left kidney stones s/p ureteral stent placement (4/30/21). Temp of 101.  - CT abdomen pelvis w/ Urinary bladder wall thickening with trabeculation and multiple diverticula. Sequelae of chronic outlet obstruction from prostate enlargement. Superimposed cystitis Additional hyperdense foci seen along the posterior bladder wall may represent bladder wall calcifications versus layering bladder stones.   UA w/ positive nitrate, moderate LE, many bacteria   - s/p rocephin x1 in ER  - Started on meropenem by ID for ESBL/pseudomonas coverage pending culture data  - Tylenol PRN for fever and pain   - F/U BCx x 2 and UCx  - Infectious disease (Dr. DALILA Thakkar), recs appreciated

## 2022-11-25 NOTE — CONSULT NOTE ADULT - ASSESSMENT
89 y/o male w/ a PMHx of CAD s/p stents (2014), TIA, HLD, GERD, BPH s/p prostate surgery (2017), ESBL E. coli UTI secondary to left kidney stones s/p ureteral stent placement (4/30/21), Dementia presents to the ED w/ fever, chills, and urinary frequency.    pulm nodules  hiatal hernia   source of infection eval in progress  HLD  BPH  GERD  nephrolithiasis   CAD - stents - PCI-   Dementia    cardio eval - AF eval - hx of cad - htn hld - tia -   cvs rx regimen  BP control  pulm nodules - sub cm - bronchiectasis - aspiration is a concern in pt with Dementia and Hiatal hernia - CT chest reviewed -   HOB elev  asp prec  on pureed diet  Hiatal hernia - GERD - consider PPI or H2 blocker - antacid - At Risk for Aspiration  covid positive - isolation precs - dvt P - acap prn - cough rx regimen prn - consideration for Remdesivir - will defer to ID -   assist with needs - ADL  GOC discussion

## 2022-11-25 NOTE — PROGRESS NOTE ADULT - PROBLEM SELECTOR PLAN 3
Denies any URI symptoms. Covid vaccined 1  x J&J and 2 x Pfizer Booster. Currently satting well on RA. Positive on admission. CT Lung w/ Multiple bilateral pulmonary nodules noted. The largest measures 6 mm. Multiple nodules  - Supplement oxygen PRN   - Tylenol PRN for fever   - Supportive therapy, Per ID would not benefit from Remdesivir and also Decadron at this time  - Infectious disease (Dr. Thakkar) following Denies any URI symptoms. Covid vaccined 1  x J&J and 2 x Pfizer Booster. Currently satting well on RA. Positive on admission. CT Lung w/ Multiple bilateral pulmonary nodules noted. The largest measures 6 mm. Multiple nodules  - Supplement oxygen PRN   - Tylenol PRN for fever   - Supportive therapy, Per ID would not benefit from Remdesivir and also Decadron at this time  - Infectious disease (Dr. Thakkar) following  - Pulmonology (Dr. Hinkle) following, aspiration precautions, pureed diet - pt tested positive for COVID in the ER  - Denies any URI symptoms. COVID vaccinated 1  x J&J and 2 x Pfizer Booster. Currently satting well on RA. Positive on admission. CT Lung w/ Multiple bilateral pulmonary nodules noted. The largest measures 6 mm. Multiple nodules.  - Supplement oxygen PRN   - Tylenol PRN for fever   - Supportive therapy, Per ID would not benefit from Remdesivir and also Decadron at this time  - Infectious disease (Dr. DALILA Tahkkar), recs appreciated  - Pulmonology (Dr. Hinkle) following, aspiration precautions, pureed diet

## 2022-11-25 NOTE — CONSULT NOTE ADULT - SUBJECTIVE AND OBJECTIVE BOX
[facility]  INFECTIOUS DISEASES   [address] [place] [address]  Tel: [phone]     Fax: [phone]  ======================================================  MD Ariane Tillman Kaushal, MD Cho, Michelle, MD   ======================================================    Assessment/Recommendations    89-year-old male with past medical history of coronary artery disease, TIA, dyslipidemia, GERD, prostatic hyperplasia s/p prostatic surgery in  with multiple history of UTIs (mostly from ESBL E. coli but also from Pseudomonas aeruginosa), s/p ureteral stent placement for kidney stones, dementia presented with fever chills altered mental status being admitted for  acute metabolic encephalopathy with febrile illness most likely secondary to COVID-19 viral illness but also to rule out bacteremia     blood and urine culture sent on : Pending   CT abdomen pelvis: Noted: Trabecular changes appears to be chronic changes due to prostatic hyperplasia, urinalysis failed to reveal any pyuria with no clinical findings suggestive of acute UTI  But   since patient presented with fever chills, tachycardia, altered mental status with history of ESBL E. coli/Pseudomonas, have discontinued Rocephin and would treat empirically with meropenem 1 g 3 times daily for next 24 to 48 hours and if Cx remained neg and patient clinically doing well, Would D/c Meropenem     Minimal sore throat at present, denied any further fever, chills, CT chest and CXR without obvious pneumonic process and also saturating > 92% on RA with no resp distress; would not benefit from Remdesivir and also Decadron   Supportive mx for fever, sore throat   IS  PT         Records, reports from primary team, nursing, consultant reviewed  Plan explained to patient   Case discussed with Primary team   _________________________________________________-  Patient is a 89y old  Male who presents with a chief complaint of Cystitis (2022 22:41)    HPI:  89 y/o male w/ a PMHx of CAD s/p stents (), TIA, HLD, GERD, BPH s/p prostate surgery (2017), ESBL E. coli UTI secondary to left kidney stones s/p ureteral stent placement (21), Dementia presents to the ED w/ fever, chills, and urinary frequency. History obtained from daughter at bedside. States that this afternoon patient appeared unwell. He was with decreased strength, chills, and decreased appetite. She has states that he had difficulty ambulating (baseline ambulates w/ walker) and was more confused than normal. In terms of his urinary symptoms, he had increased urgency w/ decreased flow. He denies any respiratory symptoms such as cough, or SOB. Also denies CP.     In the ED,   Vitals: T: 101, HR: 122, BP: 118/78, RR: 20, O2: 98% on RA  EKG: Sinus Tachycardia w/ 1st degree AV block    Significant Labs: PTT: 15.6, INR: 1.33, UA: Nitrate positive, LE Moderate, Small Blood, Many bacteria, Covid positive   Imaging:   CT Chest + Abdomen/Pelvis: Urinary bladder wall thickening with trabeculation and multiple diverticula. This appearance is likely related to sequelae of chronic outlet obstruction from prostate enlargement. Superimposed cystitis should be evaluated for clinically, with correlation with urinalysis. Additional hyperdense foci seen along the posterior bladder wall may represent bladder wall calcifications versus layering bladder stones. Moderate to large hiatal hernia. Cholelithiasis.Indeterminate left adrenal gland nodule for which further correlation can be obtained with adrenal protocol MRI or CT. Multiple bilateral pulmonary nodules noted. The largest measures 6 mm. Multiple nodules, with largest measuring greater than 6 mm, in a low or high risk patient should be followed with CT at 3-6 months, then consider CT at 18-24 months. -- ?Reference: Guidelines for Management of Incidental Pulmonary Nodules Detected on CT Images: From the Fleischner Society 2017?  CT Brain: No acute intracranial pathology. Atrophy and white matter changes.   Chest x-ray: L lung opacities (wet read)   Interventions: Rocephin 1g IVPB x1, 2L Normal Saline Bolus x1, Ofirmev 1g x1      infectious disease called for further evaluation and management  Upon inquiry, patient who is extremely hard of hearing, denied any further fever or chills, nausea vomiting, abdominal pain, suprapubic or flank pain, denied any dysuria or hematuria, did mention that he has some sore throat but does not bother him significantly.  Patient has history of ESBL E. coli UTI in  may, July, 2021, also has history of Pseudomonas aeruginosa UTI in 2021    PAST MEDICAL & SURGICAL HISTORY:  Dementia  Kidney stones  Stented coronary artery    FAMILY HISTORY: not pertinent to presenting illness    Social History:  Tobacco Usage:  denies   Alcohol Usage: denies   Substance Use:  denies   Lives with: Son   ADLs: Fully dependent and ambulates w/ walker (2022 22:41)      Recent Ill Contacts:	 denied  Recent Travel History:	 denied  Recent Animal/Insect Exposure/Tick Bites: denied      REVIEW OF SYSTEMS  11 systems reviewed, no other symptoms except as above      Allergies  No Known Allergies    Antibiotics Course:  meropenem  IVPB          Other Medications:  acetaminophen     Tablet .. 650 milliGRAM(s) Oral every 6 hours PRN  aspirin enteric coated 81 milliGRAM(s) Oral daily  atorvastatin 20 milliGRAM(s) Oral daily  dorzolamide 2% Ophthalmic Solution 1 Drop(s) Both EYES three times a day  enoxaparin Injectable 40 milliGRAM(s) SubCutaneous every 24 hours  finasteride 5 milliGRAM(s) Oral daily  lactulose Syrup 10 Gram(s) Oral at bedtime  memantine 5 milliGRAM(s) Oral two times a day  tamsulosin 0.4 milliGRAM(s) Oral daily        ____________________________________________  Physical Examination:    ICU Vital Signs Last 24 Hrs  T(C): 36.9 (2022 09:04), Max: 38.3 (2022 20:04)  T(F): 98.5 (2022 09:04), Max: 101 (2022 20:04)  HR: 39 (2022 09:04) (39 - 122)  BP: 104/50 (2022 09:04) (100/79 - 118/78)  RR: 18 (2022 09:04) (18 - 20)  SpO2: 96% (2022 09:04) (95% - 98%)    O2 Parameters below as of 2022 09:04  Patient On (Oxygen Delivery Method): room air      General: No acute distress while sitting in bed, C[place]il DeHe  Neuro: AAO*3, No obvious acute motor deficit  HEENT: Pupils equal, reactive to light, Oral mucosa moist  PULM: Clear to auscultation bilaterally, no significant adventitious breath sounds, Except minimally decreased at the bases   CVS: Regular rhythm and controlled rate, 1/6 systolic murmur  ABD: Soft, nondistended, nontender, normoactive bowel sounds, no CVA tenderness  EXT: No b/l LE edema, nontender with pedal pulse palpable   SKIN: Warm and well perfused, no acute rashes   NO obvious palpable lymphadenopathy     _______________________________________________________    Lab Results:                        12.4   6.65  )-----------( 91       ( 2022 06:27 )             38.6     11-25    144  |  116<H>  |  13  ----------------------------<  91  4.1   |  24  |  0.94    Ca    9.2      2022 06:27    TPro  5.7<L>  /  Alb  2.5<L>  /  TBili  1.0  /  DBili  x   /  AST  17  /  ALT  9<L>  /  AlkPhos  71  11-25    LIVER FUNCTIONS - ( 2022 06:27 )  Alb: 2.5 g/dL / Pro: 5.7 g/dL / ALK PHOS: 71 U/L / ALT: 9 U/L / AST: 17 U/L / GGT: x           PT/INR - ( 2022 20:40 )   PT: 15.6 sec;   INR: 1.33 ratio         PTT - ( 2022 20:40 )  PTT:30.7 sec  Urinalysis Basic - ( 2022 21:50 )    Color: Yellow / Appearance: Slightly Turbid / S.010 / pH: x  Gluc: x / Ketone: Negative  / Bili: Negative / Urobili: Negative   Blood: x / Protein: Negative / Nitrite: Positive   Leuk Esterase: Moderate / RBC: 0-2 /HPF / WBC 3-5   Sq Epi: x / Non Sq Epi: Occasional / Bacteria: Many        MICROBIOLOGY/PATHOLOGY/ RADIOLOGY: Reviewed

## 2022-11-25 NOTE — PROGRESS NOTE ADULT - PROBLEM SELECTOR PLAN 9
- DVT PPx w/ Lovenox   - Fall precautions - DVT PPx w/ Lovenox  - Fall precautions - DVT PPx w/ Lovenox  - Fall precautions    Updated patient's daughter about current treatment plan.

## 2022-11-25 NOTE — DISCHARGE NOTE PROVIDER - HOSPITAL COURSE
HPI:  89 y/o male w/ a PMHx of CAD s/p stents (2014), TIA, HLD, GERD, BPH s/p prostate surgery (2017), ESBL E. coli UTI secondary to left kidney stones s/p ureteral stent placement (4/30/21), Dementia presents to the ED w/ fever, chills, and urinary frequency. History obtained from daughter at bedside. States that this afternoon patient appeared unwell. He was with decreased strength, chills, and decreased appetite. She has states that he had difficulty ambulating (baseline ambulates w/ walker) and was more confused than normal. In terms of his urinary symptoms, he had increased urgency w/ decreased flow. He denies any respiratory symptoms such as cough, or SOB. Also denies CP.     In the ED,   Vitals: T: 101, HR: 122, BP: 118/78, RR: 20, O2: 98% on RA  EKG: Sinus Tachycardia w/ 1st degree AV block    Significant Labs: PTT: 15.6, INR: 1.33, UA: Nitrate positive, LE Moderate, Small Blood, Many bacteria, Covid positive   Imaging:   CT Chest + Abdomen/Pelvis: Urinary bladder wall thickening with trabeculation and multiple diverticula. This appearance is likely related to sequelae of chronic outlet obstruction from prostate enlargement. Superimposed cystitis should be evaluated for clinically, with correlation with urinalysis. Additional hyperdense foci seen along the posterior bladder wall may represent bladder wall calcifications versus layering bladder stones. Moderate to large hiatal hernia. Cholelithiasis.Indeterminate left adrenal gland nodule for which further correlation can be obtained with adrenal protocol MRI or CT. Multiple bilateral pulmonary nodules noted. The largest measures 6 mm. Multiple nodules, with largest measuring greater than 6 mm, in a low or high risk patient should be followed with CT at 3-6 months, then consider CT at 18-24 months. -- ?Reference: Guidelines for Management of Incidental Pulmonary Nodules Detected on CT Images: From the Fleischner Society 2017?  CT Brain: No acute intracranial pathology. Atrophy and white matter changes.   Chest x-ray: L lung opacities (wet read)   Interventions: Rocephin 1g IVPB x1, 2L Normal Saline Bolus x1, Ofirmev 1g x1        (24 Nov 2022 22:41)      ---  HOSPITAL COURSE:   Patient admitted for cystitis and urinary tract infection. ID was consulted and recommended meropenem for 7 days pending urince/blood cx. Urine cx ____ and blood cx ____ .      Patient's clinical condition improved throughout hospital course and patient is stable for discharge *** with close outpatient follow up.     ---  CONSULTANTS:     ---  Physical Exam on the day of discharge:    ---  TIME SPENT:  I, the attending physician, was physically present for the key portions of the evaluation and management (E/M) service provided. The total amount of time spent reviewing the hospital notes, laboratory values, imaging findings, assessing/counseling the patient, discussing with consultant physicians, social work, nursing staff was -- minutes    ---  Primary care provider was made aware of plan for discharge:      [  ] NO     [  ] YES HPI:  89 y/o male w/ a PMHx of CAD s/p stents (2014), TIA, HLD, GERD, BPH s/p prostate surgery (2017), ESBL E. coli UTI secondary to left kidney stones s/p ureteral stent placement (4/30/21), Dementia presents to the ED w/ fever, chills, and urinary frequency. History obtained from daughter at bedside. States that this afternoon patient appeared unwell. He was with decreased strength, chills, and decreased appetite. She has states that he had difficulty ambulating (baseline ambulates w/ walker) and was more confused than normal. In terms of his urinary symptoms, he had increased urgency w/ decreased flow. He denies any respiratory symptoms such as cough, or SOB. Also denies CP.     In the ED,   Vitals: T: 101, HR: 122, BP: 118/78, RR: 20, O2: 98% on RA  EKG: Sinus Tachycardia w/ 1st degree AV block    Significant Labs: PTT: 15.6, INR: 1.33, UA: Nitrate positive, LE Moderate, Small Blood, Many bacteria, Covid positive   Imaging:   CT Chest + Abdomen/Pelvis: Urinary bladder wall thickening with trabeculation and multiple diverticula. This appearance is likely related to sequelae of chronic outlet obstruction from prostate enlargement. Superimposed cystitis should be evaluated for clinically, with correlation with urinalysis. Additional hyperdense foci seen along the posterior bladder wall may represent bladder wall calcifications versus layering bladder stones. Moderate to large hiatal hernia. Cholelithiasis.Indeterminate left adrenal gland nodule for which further correlation can be obtained with adrenal protocol MRI or CT. Multiple bilateral pulmonary nodules noted. The largest measures 6 mm. Multiple nodules, with largest measuring greater than 6 mm, in a low or high risk patient should be followed with CT at 3-6 months, then consider CT at 18-24 months. -- ?Reference: Guidelines for Management of Incidental Pulmonary Nodules Detected on CT Images: From the Fleischner Society 2017?  CT Brain: No acute intracranial pathology. Atrophy and white matter changes.   Chest x-ray: L lung opacities (wet read)   Interventions: Rocephin 1g IVPB x1, 2L Normal Saline Bolus x1, Ofirmev 1g x1        (24 Nov 2022 22:41)      ---  HOSPITAL COURSE:   Patient admitted for cystitis and urinary tract infection. ID was consulted and recommended meropenem for 7 days pending urine/blood cx.   Urine cx _____ and blood cx showed no growth to date. Meropenem was continued.   A CT Abd/Pelvis was done showed Urinary bladder wall thickening with trabeculation and multiple diverticula. Sequelae of chronic outlet obstruction from prostate enlargement. Superimposed cystitis Additional hyperdense foci seen along the posterior bladder wall may represent bladder wall calcifications versus layering bladder stones.   Patient was found to have bradycardia with HR in 30's and 40's on 11/25/22 when transferred to [facility]. Cardiology was consulted. Had Mobitz type 1 and short episodes of 2:1 HB till 5pm 11/25 with rates in 40s and none afterwards. Patient was continued on telemetry monitoring and it showed SR 70's-80's with rates up to 110-120s nonsustained. No further 2HB was noted.  Patient's donepezil was discontinued given bradycardia as may be contributing.   Patient was also found to be COVID positive. Per ID, patient would not benefit from remdesivir or decadron. He satted well on RA throughout hospital course.  A CT chest showed Lung w/ Multiple bilateral pulmonary nodules noted. The largest measures 6 mm. Multiple nodules.  CT chest also showed an incidental finding of moderate to large hiatal hernia, cholelithiasis and Left adrenal gland nodule. Recommend following up with outpatient provider.     Pulmonology was consulted and recommended patient remain on aspiration precautions and continue pureed diet.   PT was consulted for patient's weakness likely 2/2 infection and recommended patient be discharged to home with no PT needs.  Patient's clinical condition improved throughout hospital course and patient is stable for discharge to home with close outpatient follow up.   On day of discharge patient denied shortness of breath, chest pain, nausea, vomiting, diarrhea.    ---  CONSULTANTS:   Infectious Disease: Dr. Ras Thakkar  Cardiology: Dr. Ellis ([facility])  Pulmonology: Dr. Tia Hinkle      ---  REVIEW OF SYSTEMS:  CONSTITUTIONAL: No fever or chills  HEENT:  No headache, no sore throat  RESPIRATORY: No cough, wheezing, or shortness of breath  CARDIOVASCULAR: No chest pain, palpitations  GASTROINTESTINAL: No abd pain, nausea, vomiting, or diarrhea  GENITOURINARY: No dysuria, frequency, or hematuria  NEUROLOGICAL: no focal weakness or dizziness  MUSCULOSKELETAL: no myalgias       Physical Exam on the day of discharge:  GENERAL: NAD, pleasant   HEENT:  anicteric, moist mucous membranes  CHEST/LUNG:  CTA b/l, no rales, wheezes, or rhonchi, in no respiratory distress, on RA   HEART:  RRR, S1, S2  ABDOMEN:  BS+, soft, nontender, nondistended  EXTREMITIES: no edema, cyanosis, or calf tenderness  NERVOUS SYSTEM: answers questions and follows commands appropriately at times       ---  TIME SPENT:  I, the attending physician, was physically present for the key portions of the evaluation and management (E/M) service provided. The total amount of time spent reviewing the hospital notes, laboratory values, imaging findings, assessing/counseling the patient, discussing with consultant physicians, social work, nursing staff was -- minutes    ---  Primary care provider was made aware of plan for discharge:      [  ] NO     [  ] YES HPI:  89 y/o male w/ a PMHx of CAD s/p stents (2014), TIA, HLD, GERD, BPH s/p prostate surgery (2017), ESBL E. coli UTI secondary to left kidney stones s/p ureteral stent placement (4/30/21), Dementia presents to the ED w/ fever, chills, and urinary frequency. History obtained from daughter at bedside. States that this afternoon patient appeared unwell. He was with decreased strength, chills, and decreased appetite. She has states that he had difficulty ambulating (baseline ambulates w/ walker) and was more confused than normal. In terms of his urinary symptoms, he had increased urgency w/ decreased flow. He denies any respiratory symptoms such as cough, or SOB. Also denies CP.     In the ED,   Vitals: T: 101, HR: 122, BP: 118/78, RR: 20, O2: 98% on RA  EKG: Sinus Tachycardia w/ 1st degree AV block    Significant Labs: PTT: 15.6, INR: 1.33, UA: Nitrate positive, LE Moderate, Small Blood, Many bacteria, Covid positive   Imaging:   CT Chest + Abdomen/Pelvis: Urinary bladder wall thickening with trabeculation and multiple diverticula. This appearance is likely related to sequelae of chronic outlet obstruction from prostate enlargement. Superimposed cystitis should be evaluated for clinically, with correlation with urinalysis. Additional hyperdense foci seen along the posterior bladder wall may represent bladder wall calcifications versus layering bladder stones. Moderate to large hiatal hernia. Cholelithiasis.Indeterminate left adrenal gland nodule for which further correlation can be obtained with adrenal protocol MRI or CT. Multiple bilateral pulmonary nodules noted. The largest measures 6 mm. Multiple nodules, with largest measuring greater than 6 mm, in a low or high risk patient should be followed with CT at 3-6 months, then consider CT at 18-24 months. -- ?Reference: Guidelines for Management of Incidental Pulmonary Nodules Detected on CT Images: From the Fleischner Society 2017?  CT Brain: No acute intracranial pathology. Atrophy and white matter changes.   Chest x-ray: L lung opacities (wet read)   Interventions: Rocephin 1g IVPB x1, 2L Normal Saline Bolus x1, Ofirmev 1g x1        (24 Nov 2022 22:41)      ---  HOSPITAL COURSE:   Patient admitted for urinary tract infection. ID was consulted and recommended meropenem for 7 days pending urine/blood cx.   Urine cx grew >100,000 E.Coli and blood cx showed no growth to date. Meropenem was continued.   A CT Abd/Pelvis was done showed Urinary bladder wall thickening with trabeculation and multiple diverticula. Sequelae of chronic outlet obstruction from prostate enlargement. Superimposed cystitis Additional hyperdense foci seen along the posterior bladder wall may represent bladder wall calcifications versus layering bladder stones.   Patient was found to have bradycardia with HR in 30's and 40's on 11/25/22 when transferred to [facility]. Cardiology was consulted. Had Mobitz type 1 and short episodes of 2:1 HB till 5pm 11/25 with rates in 40s and none afterwards. Patient was continued on telemetry monitoring and it showed SR 70's-80's with rates up to 110-120s nonsustained. No further 2HB was noted.  Patient's donepezil was discontinued given bradycardia as may be contributing.   Patient was also found to be COVID positive. Per ID, patient would not benefit from remdesivir or decadron. He satted well on RA throughout hospital course.  A CT chest showed Lung w/ Multiple bilateral pulmonary nodules noted. The largest measures 6 mm. Multiple nodules.  CT chest also showed an incidental finding of moderate to large hiatal hernia, cholelithiasis and Left adrenal gland nodule. Recommend following up with outpatient provider.     Pulmonology was consulted and recommended patient remain on aspiration precautions and continue pureed diet.   PT was consulted for patient's weakness likely 2/2 infection and recommended patient be discharged to home with no PT needs.  Patient's clinical condition improved throughout hospital course and patient is stable for discharge to home with close outpatient follow up.   On day of discharge patient denied shortness of breath, chest pain, nausea, vomiting, diarrhea.    ---  CONSULTANTS:   Infectious Disease: Dr. Ras Thakkar  Cardiology: Dr. Ellis ([facility])  Pulmonology: Dr. Tai Hinkle      ---  REVIEW OF SYSTEMS:  CONSTITUTIONAL: No fever or chills  HEENT:  No headache, no sore throat  RESPIRATORY: No cough, wheezing, or shortness of breath  CARDIOVASCULAR: No chest pain, palpitations  GASTROINTESTINAL: No abd pain, nausea, vomiting, or diarrhea  GENITOURINARY: No dysuria, frequency, or hematuria  NEUROLOGICAL: no focal weakness or dizziness  MUSCULOSKELETAL: no myalgias       Physical Exam on the day of discharge:  GENERAL: NAD, pleasant   HEENT:  anicteric, moist mucous membranes  CHEST/LUNG:  CTA b/l, no rales, wheezes, or rhonchi, in no respiratory distress, on RA   HEART:  RRR, S1, S2  ABDOMEN:  BS+, soft, nontender, nondistended  EXTREMITIES: no edema, cyanosis, or calf tenderness  NERVOUS SYSTEM: answers questions and follows commands appropriately at times       ---  TIME SPENT:  I, the attending physician, was physically present for the key portions of the evaluation and management (E/M) service provided. The total amount of time spent reviewing the hospital notes, laboratory values, imaging findings, assessing/counseling the patient, discussing with consultant physicians, social work, nursing staff was -- minutes    ---  Primary care provider was made aware of plan for discharge:      [  ] NO     [  ] YES HPI:  89 y/o male w/ a PMHx of CAD s/p stents (2014), TIA, HLD, GERD, BPH s/p prostate surgery (2017), ESBL E. coli UTI secondary to left kidney stones s/p ureteral stent placement (4/30/21), Dementia presents to the ED w/ fever, chills, and urinary frequency. History obtained from daughter at bedside. States that this afternoon patient appeared unwell. He was with decreased strength, chills, and decreased appetite. She has states that he had difficulty ambulating (baseline ambulates w/ walker) and was more confused than normal. In terms of his urinary symptoms, he had increased urgency w/ decreased flow. He denies any respiratory symptoms such as cough, or SOB. Also denies CP.     In the ED,   Vitals: T: 101, HR: 122, BP: 118/78, RR: 20, O2: 98% on RA  EKG: Sinus Tachycardia w/ 1st degree AV block    Significant Labs: PTT: 15.6, INR: 1.33, UA: Nitrate positive, LE Moderate, Small Blood, Many bacteria, Covid positive   Imaging:   CT Chest + Abdomen/Pelvis: Urinary bladder wall thickening with trabeculation and multiple diverticula. This appearance is likely related to sequelae of chronic outlet obstruction from prostate enlargement. Superimposed cystitis should be evaluated for clinically, with correlation with urinalysis. Additional hyperdense foci seen along the posterior bladder wall may represent bladder wall calcifications versus layering bladder stones. Moderate to large hiatal hernia. Cholelithiasis.Indeterminate left adrenal gland nodule for which further correlation can be obtained with adrenal protocol MRI or CT. Multiple bilateral pulmonary nodules noted. The largest measures 6 mm. Multiple nodules, with largest measuring greater than 6 mm, in a low or high risk patient should be followed with CT at 3-6 months, then consider CT at 18-24 months. -- ?Reference: Guidelines for Management of Incidental Pulmonary Nodules Detected on CT Images: From the Fleischner Society 2017?  CT Brain: No acute intracranial pathology. Atrophy and white matter changes.   Chest x-ray: L lung opacities (wet read)   Interventions: Rocephin 1g IVPB x1, 2L Normal Saline Bolus x1, Ofirmev 1g x1        (24 Nov 2022 22:41)      ---  HOSPITAL COURSE:   Patient admitted for urinary tract infection. ID was consulted and recommended meropenem for 7 days pending urine/blood cx.   Urine cx grew >100,000 E.Coli and blood cx showed no growth to date. Meropenem was continued.   A CT Abd/Pelvis was done showed Urinary bladder wall thickening with trabeculation and multiple diverticula. Sequelae of chronic outlet obstruction from prostate enlargement. Superimposed cystitis Additional hyperdense foci seen along the posterior bladder wall may represent bladder wall calcifications versus layering bladder stones.   Patient was found to have bradycardia with HR in 30's and 40's on 11/25/22 when transferred to [facility]. Cardiology was consulted. Had Mobitz type 1 and short episodes of 2:1 HB till 5pm 11/25 with rates in 40s and none afterwards. Patient was continued on telemetry monitoring and it showed SR 70's-80's with rates up to 110-120s nonsustained. No further 2HB was noted.  Patient's donepezil was discontinued given bradycardia as may be contributing.   Patient was also found to be COVID positive. Per ID, patient would not benefit from remdesivir or decadron. He satted well on RA throughout hospital course.  A CT chest showed Lung w/ Multiple bilateral pulmonary nodules noted. The largest measures 6 mm. Multiple nodules.  CT chest also showed an incidental finding of moderate to large hiatal hernia, cholelithiasis and Left adrenal gland nodule. Recommend following up with outpatient provider.     Pulmonology was consulted and recommended patient remain on aspiration precautions and continue pureed diet.   PT was consulted for patient's weakness likely 2/2 infection and recommended patient be discharged to home with no PT needs.  Patient's clinical condition improved throughout hospital course and patient is stable for discharge to home with close outpatient follow up.   On day of discharge patient denied shortness of breath, chest pain, nausea, vomiting, diarrhea.    ---  CONSULTANTS:   Infectious Disease: Dr. Ras Thakkar  Cardiology: Dr. Ellis ([facility])  Pulmonology: Dr. Tai Hinkle      ---  REVIEW OF SYSTEMS:  CONSTITUTIONAL: No fever or chills  HEENT:  No headache, no sore throat  RESPIRATORY: mild cough, wheezing, or shortness of breath  CARDIOVASCULAR: No chest pain, palpitations  GASTROINTESTINAL: No abd pain, nausea, vomiting, or diarrhea  GENITOURINARY: No dysuria, frequency, or hematuria  NEUROLOGICAL: no focal weakness or dizziness  MUSCULOSKELETAL: no myalgias       Vital Signs Last 24 Hrs  T(C): 36.6 (30 Nov 2022 11:47), Max: 37.4 (29 Nov 2022 19:49)  T(F): 97.8 (30 Nov 2022 11:47), Max: 99.3 (29 Nov 2022 19:49)  HR: 84 (30 Nov 2022 11:47) (63 - 95)  BP: 119/75 (30 Nov 2022 11:47) (119/75 - 124/71)  BP(mean): --  RR: 18 (30 Nov 2022 11:47) (16 - 18)  SpO2: 98% (30 Nov 2022 11:47) (92% - 98%)    Parameters below as of 30 Nov 2022 11:47  Patient On (Oxygen Delivery Method): room air        Physical Exam on the day of discharge:  GENERAL: NAD, pleasant   HEENT:  anicteric, moist mucous membranes  CHEST/LUNG:  CTA b/l, no rales, wheezes, or rhonchi, in no respiratory distress, on RA   HEART:  RRR, S1, S2  ABDOMEN:  BS+, soft, nontender, nondistended  EXTREMITIES: no edema, cyanosis, or calf tenderness  NERVOUS SYSTEM: answers questions and follows commands appropriately at times       ---  TIME SPENT:  I, the attending physician, was physically present for the key portions of the evaluation and management (E/M) service provided. The total amount of time spent reviewing the hospital notes, laboratory values, imaging findings, assessing/counseling the patient, discussing with consultant physicians, social work, nursing staff was -- minutes    ---  Primary care provider was made aware of plan for discharge:      [  ] NO     [  ] YES HPI:  89 y/o male w/ a PMHx of CAD s/p stents (2014), TIA, HLD, GERD, BPH s/p prostate surgery (2017), ESBL E. coli UTI secondary to left kidney stones s/p ureteral stent placement (4/30/21), Dementia presents to the ED w/ fever, chills, and urinary frequency. History obtained from daughter at bedside. States that this afternoon patient appeared unwell. He was with decreased strength, chills, and decreased appetite. She has states that he had difficulty ambulating (baseline ambulates w/ walker) and was more confused than normal. In terms of his urinary symptoms, he had increased urgency w/ decreased flow. He denies any respiratory symptoms such as cough, or SOB. Also denies CP.     In the ED,   Vitals: T: 101, HR: 122, BP: 118/78, RR: 20, O2: 98% on RA  EKG: Sinus Tachycardia w/ 1st degree AV block    Significant Labs: PTT: 15.6, INR: 1.33, UA: Nitrate positive, LE Moderate, Small Blood, Many bacteria, Covid positive   Imaging:   CT Chest + Abdomen/Pelvis: Urinary bladder wall thickening with trabeculation and multiple diverticula. This appearance is likely related to sequelae of chronic outlet obstruction from prostate enlargement. Superimposed cystitis should be evaluated for clinically, with correlation with urinalysis. Additional hyperdense foci seen along the posterior bladder wall may represent bladder wall calcifications versus layering bladder stones. Moderate to large hiatal hernia. Cholelithiasis.Indeterminate left adrenal gland nodule for which further correlation can be obtained with adrenal protocol MRI or CT. Multiple bilateral pulmonary nodules noted. The largest measures 6 mm. Multiple nodules, with largest measuring greater than 6 mm, in a low or high risk patient should be followed with CT at 3-6 months, then consider CT at 18-24 months. -- ?Reference: Guidelines for Management of Incidental Pulmonary Nodules Detected on CT Images: From the Fleischner Society 2017?  CT Brain: No acute intracranial pathology. Atrophy and white matter changes.   Chest x-ray: L lung opacities (wet read)   Interventions: Rocephin 1g IVPB x1, 2L Normal Saline Bolus x1, Ofirmev 1g x1        (24 Nov 2022 22:41)      ---  HOSPITAL COURSE:   Patient admitted for urinary tract infection. ID was consulted and recommended meropenem for 7 days pending urine/blood cx.   Urine cx grew >100,000 E.Coli and blood cx showed no growth to date. Meropenem was continued.   A CT Abd/Pelvis was done showed Urinary bladder wall thickening with trabeculation and multiple diverticula. Sequelae of chronic outlet obstruction from prostate enlargement. Superimposed cystitis Additional hyperdense foci seen along the posterior bladder wall may represent bladder wall calcifications versus layering bladder stones.   Patient was found to have bradycardia with HR in 30's and 40's on 11/25/22 when transferred to [facility]. Cardiology was consulted. Had Mobitz type 1 and short episodes of 2:1 HB till 5pm 11/25 with rates in 40s and none afterwards. Patient was continued on telemetry monitoring and it showed SR 70's-80's with rates up to 110-120s nonsustained. No further 2HB was noted.  Patient's donepezil was discontinued given bradycardia as may be contributing it . echo-  Normal left ventricular internal dimensions and systolic function,   estimated LVEF of 60%.  Grossly normal RV size and systolic function.  Calcified trileaflet aortic valve with grossly normal opening, without AI.  Mild MR and TR.  No significant pericardial effusion.    Patient was also found to be COVID positive. Per ID, patient would not benefit from remdesivir or decadron. He satted well on RA throughout hospital course.  A CT chest showed Lung w/ Multiple bilateral pulmonary nodules noted. The largest measures 6 mm. Multiple nodules.  CT chest also showed an incidental finding of moderate to large hiatal hernia, cholelithiasis and Left adrenal gland nodule. Recommend following up with outpatient provider.     Pulmonology was consulted and recommended patient remain on aspiration precautions and continue pureed diet.   PT was consulted for patient's weakness likely 2/2 infection and recommended patient be discharged to home with no PT needs.  Patient's clinical condition improved throughout hospital course and patient is stable for discharge to home with close outpatient follow up.   On day of discharge patient denied shortness of breath, chest pain, nausea, vomiting, diarrhea.  pcp dr coles notified dc plan .  physical day of dc 11/30/22  CONSULTANTS:   Infectious Disease: Dr. Ras Thakkar  Cardiology: Dr. Ellis ([facility])  Pulmonology: Dr. Tai Hinkle        Vital Signs Last 24 Hrs  T(C): 36.6 (30 Nov 2022 11:47), Max: 37.4 (29 Nov 2022 19:49)  T(F): 97.8 (30 Nov 2022 11:47), Max: 99.3 (29 Nov 2022 19:49)  HR: 84 (30 Nov 2022 11:47) (63 - 95)  BP: 119/75 (30 Nov 2022 11:47) (119/75 - 124/71)  BP(mean): --  RR: 18 (30 Nov 2022 11:47) (16 - 18)  SpO2: 98% (30 Nov 2022 11:47) (92% - 98%)    Parameters below as of 30 Nov 2022 11:47  Patient On (Oxygen Delivery Method): room air        Physical Exam on the day of discharge:  GENERAL: NAD, pleasant   HEENT:  anicteric, moist mucous membranes  CHEST/LUNG:  CTA b/l, no rales, wheezes, or rhonchi,  no respiratory distress, on RA   HEART:  RRR, S1, S2  ABDOMEN:  BS+, soft, nontender, nondistended  EXTREMITIES: no edema, cyanosis, or calf tenderness  NERVOUS SYSTEM: answers questions and follows commands appropriately at times   gu intact     TIME SPENT:  I, the attending physician, was physically present for the key portions of the evaluation and management (E/M) service provided. The total amount of time spent reviewing the hospital notes, laboratory values, imaging findings, assessing/counseling the patient, discussing with consultant physicians, social work, nursing staff was 40-- minutes

## 2022-11-25 NOTE — DISCHARGE NOTE PROVIDER - PROVIDER TOKENS
PROVIDER:[TOKEN:[[id number]],FOLLOWUP:[1 week]] PROVIDER:[TOKEN:[[id number]],FOLLOWUP:[1 week]],PROVIDER:[TOKEN:[[id number]],FOLLOWUP:[1 week]]

## 2022-11-25 NOTE — PROGRESS NOTE ADULT - PROBLEM SELECTOR PLAN 2
Pt HR 30s when transferred to floors  - Placed on remote tele  - STAT EKG x2, most recent showing Marked sinus bradycardia with 1st degree AV block Nonspecific T wave abnormality Abnormal ECG When compared with ECG of 25-NOV-2022 08:44, (Unconfirmed) Sinus rhythm has replaced Atrial fibrillation  - Donepezil discontinued given bradycardia  - Cardiology (Dr. Ellis) consulted, f/u recs - Pt's HR in the 30s when transferred to floors  - Placed on remote tele -- HR largely in the 40s ; asymptomatic   - STAT EKG showed 2:1 AVB on top of pt's 1st degree AVB   - Donepezil discontinued given bradycardia as may be contributing  - Cardiology (Dr. Ellis) consulted, recs appreciated  - per discussion with cardio, pt's 2nd degree AV block was Wenkebach when he was no longer in 2:1 conduction and pt has known significant conduction disease  - pt is asymptomatic and not recommended for pacemaker at this time - monitor closely

## 2022-11-25 NOTE — PHYSICAL THERAPY INITIAL EVALUATION ADULT - PERTINENT HX OF CURRENT PROBLEM, REHAB EVAL
89 y/o male w/ a PMHx of CAD s/p stents (2014), TIA, HLD, GERD, BPH s/p prostate surgery (2017), ESBL E. coli UTI secondary to left kidney stones s/p ureteral stent placement (4/30/21), Dementia presents to the ED w/ fever, chills, and urinary frequency. History obtained from daughter at bedside. States that this afternoon patient appeared unwell. He was with decreased strength, chills, and decreased appetite. She has states that he had difficulty ambulating (baseline ambulates w/ walker) and was more confused than normal. In terms of his urinary symptoms, he had increased urgency w/ decreased flow. He denies any respiratory symptoms such as cough, or SOB. Also denies CP.

## 2022-11-25 NOTE — DISCHARGE NOTE PROVIDER - CARE PROVIDERS DIRECT ADDRESSES
[email or web address]

## 2022-11-25 NOTE — PATIENT PROFILE ADULT - FALL HARM RISK - RISK INTERVENTIONS

## 2022-11-25 NOTE — DISCHARGE NOTE PROVIDER - NSDCCPCAREPLAN_GEN_ALL_CORE_FT
PRINCIPAL DISCHARGE DIAGNOSIS  Diagnosis: Acute UTI  Assessment and Plan of Treatment: You were found to have an infection in your urine.  You were treated with IV antibiotics.        SECONDARY DISCHARGE DIAGNOSES  Diagnosis: Bradycardia  Assessment and Plan of Treatment: Your heart rate dropped to the 30s during your hospital stay.  You were placed on remote tele monitoring so we could better evaluate your heart rhythm  Cardiology was consulted and recommended ________.    Diagnosis: Imaging abnormalities  Assessment and Plan of Treatment: Moderate to large hiatal hernia, cholelithias and Left adrenal gland nodule  was seen on CT  Please follow up these findings with your outpatient provider.    Diagnosis: 2019 novel coronavirus disease (COVID-19)  Assessment and Plan of Treatment: ID was consulted. You were asymtompatic and recieved supportive care.  No indication for treatment ***    Diagnosis: Acute UTI  Assessment and Plan of Treatment: You were found to have an infection in your urine.  You were treated with IV antibiotics.       PRINCIPAL DISCHARGE DIAGNOSIS  Diagnosis: Acute UTI  Assessment and Plan of Treatment: You were found to have an infection in your urine.  You were treated with IV antibiotics and seen by the infectious disease doctor.  There were no signs of infection in your blood and your urine grew *** (finalcx)  Please take ****.  Please follow up with your PCP within 1 week      SECONDARY DISCHARGE DIAGNOSES  Diagnosis: Bradycardia  Assessment and Plan of Treatment: Your heart rate dropped to the 30s-40s during your hospital stay.  You were placed on remote tele monitoring so we could better evaluate your heart rhythm.   Your EKG showed 1st degree AV block.  Cardiology was consulted and monitored your HR and rhythm. Your home donepezil was discontinued because it may have been contributing to your slow heart rate.   Your HR remained did not go back down throughout your hospital stay.   Please follow up with your cardiologist.    Diagnosis: Imaging abnormalities  Assessment and Plan of Treatment: Moderate to large hiatal hernia, cholelithias and Left adrenal gland nodule  was seen on CT  Please follow up these findings with your outpatient provider.    Diagnosis: 2019 novel coronavirus disease (COVID-19)  Assessment and Plan of Treatment: ID was consulted. You were asymtompatic and recieved supportive care.  There was not indication for treatment given your clinical respiratory status.  You had normal oxygen saturation on room air with no increased work of breathing.  Please follow up with your PCP.     PRINCIPAL DISCHARGE DIAGNOSIS  Diagnosis: Acute UTI  Assessment and Plan of Treatment: You were found to have an infection in your urine.  You were treated with IV antibiotics and seen by the infectious disease doctor.  There were no signs of infection in your blood and your urine grew E. Coli bacteria.  Please take cefpodoxime 200mg orally twice daily for ___ days. (total 3 days)  Please follow up with your PCP within 1 week      SECONDARY DISCHARGE DIAGNOSES  Diagnosis: Bradycardia  Assessment and Plan of Treatment: Your heart rate dropped to the 30s-40s during your hospital stay.  You were placed on remote tele monitoring so we could better evaluate your heart rhythm.   Your EKG showed 1st degree AV block.  Cardiology was consulted and monitored your HR and rhythm. Your home donepezil was discontinued because it may have been contributing to your slow heart rate.   Your HR remained did not go back down throughout your hospital stay.   Please follow up with your cardiologist.    Diagnosis: Imaging abnormalities  Assessment and Plan of Treatment: Moderate to large hiatal hernia, cholelithias and Left adrenal gland nodule  was seen on CT  Please follow up these findings with your outpatient provider.    Diagnosis: 2019 novel coronavirus disease (COVID-19)  Assessment and Plan of Treatment: ID was consulted. You were asymtompatic and recieved supportive care.  There was not indication for treatment given your clinical respiratory status.  You had normal oxygen saturation on room air with no increased work of breathing.  Please follow up with your PCP.     PRINCIPAL DISCHARGE DIAGNOSIS  Diagnosis: Acute UTI  Assessment and Plan of Treatment: You were found to have an infection in your urine.  You were treated with IV antibiotics and seen by the infectious disease doctor.  You were then changed to a 3 day course of cefpodoxime 200mg orally twice daily which you completed the day before your discharge.  There were no signs of infection in your blood and your urine grew E. Coli bacteria.  You also have a chronic disla catheter, please follow up with your urologist.   Please follow up with your PCP within 1 week      SECONDARY DISCHARGE DIAGNOSES  Diagnosis: Bradycardia  Assessment and Plan of Treatment: Your heart rate dropped to the 30s-40s during your hospital stay.  You were placed on remote tele monitoring so we could better evaluate your heart rhythm.   Your EKG showed 1st degree AV block. Echocardiogram study was normal.   Cardiology was consulted and monitored your HR and rhythm. Your home donepezil was discontinued because it may have been contributing to your slow heart rate.   Your HR remained did not go back down throughout your hospital stay.   Please follow up with your cardiologist.    Diagnosis: Imaging abnormalities  Assessment and Plan of Treatment: Moderate to large hiatal hernia, cholelithias and Left adrenal gland nodule  was seen on CT  Please follow up these findings with your outpatient provider.    Diagnosis: 2019 novel coronavirus disease (COVID-19)  Assessment and Plan of Treatment: ID was consulted. You were asymtompatic and recieved supportive care.  There was not indication for treatment given your clinical respiratory status.  You had normal oxygen saturation on room air with no increased work of breathing.  Please follow up with your PCP.     PRINCIPAL DISCHARGE DIAGNOSIS  Diagnosis: Acute UTI  Assessment and Plan of Treatment: You were found to have an infection in your urine.  You were treated with IV antibiotics and seen by the infectious disease doctor.  You were then changed to a 3 day course of cefpodoxime 200mg orally twice daily which you completed the day before your discharge.  There were no signs of infection in your blood and your urine grew E. Coli bacteria.  You also have a chronic disla catheter, please follow up with your urologist.   Please follow up with your PCP within 1 week      SECONDARY DISCHARGE DIAGNOSES  Diagnosis: Imaging abnormalities  Assessment and Plan of Treatment: Moderate to large hiatal hernia, cholelithias and Left adrenal gland nodule  was seen on CT,Multiple bilateral pulmonary nodules noted. The largest measures 6 mm.   Multiple nodules, with largest measuring greater than 6 mm, in a low or   high risk patient should be followed with CT at 3-6 months  Please follow up these findings with your outpatient provider.    Diagnosis: Bradycardia  Assessment and Plan of Treatment: Your heart rate dropped to the 30s-40s during your hospital stay.  You were placed on remote tele monitoring so we could better evaluate your heart rhythm.   Your EKG showed 1st degree AV block. Echocardiogram study was normal.   Cardiology was consulted and monitored your HR and rhythm. Your home donepezil was discontinued because it may have been contributing to your slow heart rate.   Your HR remained did not go back down throughout your hospital stay.   Please follow up with your cardiologist.    Diagnosis: 2019 novel coronavirus disease (COVID-19)  Assessment and Plan of Treatment: ID was consulted. You were asymtompatic and recieved supportive care.  There was not indication for treatment given your clinical respiratory status.  You had normal oxygen saturation on room air with no increased work of breathing.  Please follow up with your PCP.

## 2022-11-26 LAB
ALBUMIN SERPL ELPH-MCNC: 2.8 G/DL — LOW (ref 3.3–5)
ALP SERPL-CCNC: 76 U/L — SIGNIFICANT CHANGE UP (ref 40–120)
ALT FLD-CCNC: 11 U/L — LOW (ref 12–78)
ANION GAP SERPL CALC-SCNC: 8 MMOL/L — SIGNIFICANT CHANGE UP (ref 5–17)
AST SERPL-CCNC: 30 U/L — SIGNIFICANT CHANGE UP (ref 15–37)
BASOPHILS # BLD AUTO: 0.02 K/UL — SIGNIFICANT CHANGE UP (ref 0–0.2)
BASOPHILS NFR BLD AUTO: 0.3 % — SIGNIFICANT CHANGE UP (ref 0–2)
BILIRUB SERPL-MCNC: 1 MG/DL — SIGNIFICANT CHANGE UP (ref 0.2–1.2)
BUN SERPL-MCNC: 15 MG/DL — SIGNIFICANT CHANGE UP (ref 7–23)
CALCIUM SERPL-MCNC: 9.5 MG/DL — SIGNIFICANT CHANGE UP (ref 8.5–10.1)
CHLORIDE SERPL-SCNC: 109 MMOL/L — HIGH (ref 96–108)
CO2 SERPL-SCNC: 24 MMOL/L — SIGNIFICANT CHANGE UP (ref 22–31)
CREAT SERPL-MCNC: 0.81 MG/DL — SIGNIFICANT CHANGE UP (ref 0.5–1.3)
EGFR: 82 ML/MIN/1.73M2 — SIGNIFICANT CHANGE UP
EOSINOPHIL # BLD AUTO: 0.06 K/UL — SIGNIFICANT CHANGE UP (ref 0–0.5)
EOSINOPHIL NFR BLD AUTO: 0.8 % — SIGNIFICANT CHANGE UP (ref 0–6)
GLUCOSE SERPL-MCNC: 84 MG/DL — SIGNIFICANT CHANGE UP (ref 70–99)
HCT VFR BLD CALC: 40.1 % — SIGNIFICANT CHANGE UP (ref 39–50)
HGB BLD-MCNC: 13.1 G/DL — SIGNIFICANT CHANGE UP (ref 13–17)
IMM GRANULOCYTES NFR BLD AUTO: 0.4 % — SIGNIFICANT CHANGE UP (ref 0–0.9)
LYMPHOCYTES # BLD AUTO: 1.28 K/UL — SIGNIFICANT CHANGE UP (ref 1–3.3)
LYMPHOCYTES # BLD AUTO: 16.9 % — SIGNIFICANT CHANGE UP (ref 13–44)
MAGNESIUM SERPL-MCNC: 2 MG/DL — SIGNIFICANT CHANGE UP (ref 1.6–2.6)
MCHC RBC-ENTMCNC: 30.7 PG — SIGNIFICANT CHANGE UP (ref 27–34)
MCHC RBC-ENTMCNC: 32.7 GM/DL — SIGNIFICANT CHANGE UP (ref 32–36)
MCV RBC AUTO: 93.9 FL — SIGNIFICANT CHANGE UP (ref 80–100)
MONOCYTES # BLD AUTO: 0.97 K/UL — HIGH (ref 0–0.9)
MONOCYTES NFR BLD AUTO: 12.8 % — SIGNIFICANT CHANGE UP (ref 2–14)
NEUTROPHILS # BLD AUTO: 5.2 K/UL — SIGNIFICANT CHANGE UP (ref 1.8–7.4)
NEUTROPHILS NFR BLD AUTO: 68.8 % — SIGNIFICANT CHANGE UP (ref 43–77)
NRBC # BLD: 0 /100 WBCS — SIGNIFICANT CHANGE UP (ref 0–0)
PHOSPHATE SERPL-MCNC: 2.1 MG/DL — LOW (ref 2.5–4.5)
PLATELET # BLD AUTO: 91 K/UL — LOW (ref 150–400)
POTASSIUM SERPL-MCNC: 3.7 MMOL/L — SIGNIFICANT CHANGE UP (ref 3.5–5.3)
POTASSIUM SERPL-SCNC: 3.7 MMOL/L — SIGNIFICANT CHANGE UP (ref 3.5–5.3)
PROT SERPL-MCNC: 6.3 G/DL — SIGNIFICANT CHANGE UP (ref 6–8.3)
RBC # BLD: 4.27 M/UL — SIGNIFICANT CHANGE UP (ref 4.2–5.8)
RBC # FLD: 13.9 % — SIGNIFICANT CHANGE UP (ref 10.3–14.5)
SODIUM SERPL-SCNC: 141 MMOL/L — SIGNIFICANT CHANGE UP (ref 135–145)
WBC # BLD: 7.56 K/UL — SIGNIFICANT CHANGE UP (ref 3.8–10.5)
WBC # FLD AUTO: 7.56 K/UL — SIGNIFICANT CHANGE UP (ref 3.8–10.5)

## 2022-11-26 PROCEDURE — 99232 SBSQ HOSP IP/OBS MODERATE 35: CPT

## 2022-11-26 PROCEDURE — 99233 SBSQ HOSP IP/OBS HIGH 50: CPT | Mod: GC

## 2022-11-26 PROCEDURE — 99233 SBSQ HOSP IP/OBS HIGH 50: CPT

## 2022-11-26 RX ORDER — POTASSIUM PHOSPHATE, MONOBASIC POTASSIUM PHOSPHATE, DIBASIC 236; 224 MG/ML; MG/ML
30 INJECTION, SOLUTION INTRAVENOUS ONCE
Refills: 0 | Status: COMPLETED | OUTPATIENT
Start: 2022-11-26 | End: 2022-11-26

## 2022-11-26 RX ORDER — LANOLIN ALCOHOL/MO/W.PET/CERES
5 CREAM (GRAM) TOPICAL AT BEDTIME
Refills: 0 | Status: COMPLETED | OUTPATIENT
Start: 2022-11-26 | End: 2022-11-26

## 2022-11-26 RX ADMIN — MEROPENEM 100 MILLIGRAM(S): 1 INJECTION INTRAVENOUS at 05:56

## 2022-11-26 RX ADMIN — DORZOLAMIDE HYDROCHLORIDE 1 DROP(S): 20 SOLUTION/ DROPS OPHTHALMIC at 13:07

## 2022-11-26 RX ADMIN — MEMANTINE HYDROCHLORIDE 5 MILLIGRAM(S): 10 TABLET ORAL at 17:01

## 2022-11-26 RX ADMIN — Medication 81 MILLIGRAM(S): at 13:06

## 2022-11-26 RX ADMIN — MEMANTINE HYDROCHLORIDE 5 MILLIGRAM(S): 10 TABLET ORAL at 05:56

## 2022-11-26 RX ADMIN — POTASSIUM PHOSPHATE, MONOBASIC POTASSIUM PHOSPHATE, DIBASIC 83.33 MILLIMOLE(S): 236; 224 INJECTION, SOLUTION INTRAVENOUS at 10:36

## 2022-11-26 RX ADMIN — MEROPENEM 100 MILLIGRAM(S): 1 INJECTION INTRAVENOUS at 17:00

## 2022-11-26 RX ADMIN — ATORVASTATIN CALCIUM 20 MILLIGRAM(S): 80 TABLET, FILM COATED ORAL at 13:06

## 2022-11-26 RX ADMIN — DORZOLAMIDE HYDROCHLORIDE 1 DROP(S): 20 SOLUTION/ DROPS OPHTHALMIC at 05:57

## 2022-11-26 RX ADMIN — FINASTERIDE 5 MILLIGRAM(S): 5 TABLET, FILM COATED ORAL at 13:06

## 2022-11-26 RX ADMIN — TAMSULOSIN HYDROCHLORIDE 0.4 MILLIGRAM(S): 0.4 CAPSULE ORAL at 13:06

## 2022-11-26 RX ADMIN — Medication 5 MILLIGRAM(S): at 01:46

## 2022-11-26 RX ADMIN — LACTULOSE 10 GRAM(S): 10 SOLUTION ORAL at 22:28

## 2022-11-26 RX ADMIN — DORZOLAMIDE HYDROCHLORIDE 1 DROP(S): 20 SOLUTION/ DROPS OPHTHALMIC at 22:28

## 2022-11-26 RX ADMIN — ENOXAPARIN SODIUM 40 MILLIGRAM(S): 100 INJECTION SUBCUTANEOUS at 05:56

## 2022-11-26 NOTE — PROGRESS NOTE ADULT - ASSESSMENT
89 y/o male w/ a PMHx of CAD s/p stents (2014), TIA, HLD, GERD, BPH s/p prostate surgery (2017), ESBL E. coli UTI secondary to left kidney stones s/p ureteral stent placement (4/30/21), Dementia presents to the ED w/ fever, chills, and urinary frequency.    pulm nodules  hiatal hernia   source of infection eval in progress  HLD  BPH  GERD  nephrolithiasis   CAD - stents - PCI-   Dementia    on RA  vs noted  labs reviewed  on BEA  ID follow up - Cx in progress -   covid confirmed - isolation in progress    cardio eval - AF eval - hx of cad - htn hld - tia -   cvs rx regimen  BP control  pulm nodules - sub cm - bronchiectasis - aspiration is a concern in pt with Dementia and Hiatal hernia - CT chest reviewed -   HOB elev  asp prec  on pureed diet  Hiatal hernia - GERD - consider PPI or H2 blocker - antacid - At Risk for Aspiration  covid positive - isolation precs - dvt P - acap prn - cough rx regimen prn - consideration for Remdesivir - will defer to ID -   assist with needs - ADL  GOC discussion

## 2022-11-26 NOTE — PROGRESS NOTE ADULT - PROBLEM SELECTOR PLAN 9
- DVT PPx w/ Lovenox  - Fall precautions    Updated patient's daughter about current treatment plan. - DVT PPx w/ Lovenox  - Fall precautions    #DISPO  - PT - home, no PT needs

## 2022-11-26 NOTE — PROGRESS NOTE ADULT - PROBLEM SELECTOR PLAN 1
- Presented to the ED w/ increased urgency, decreased flow, history of ESBL E. coli UTI secondary to left kidney stones s/p ureteral stent placement (4/30/21). Temp of 101.  - CT abdomen pelvis w/ Urinary bladder wall thickening with trabeculation and multiple diverticula. Sequelae of chronic outlet obstruction from prostate enlargement. Superimposed cystitis Additional hyperdense foci seen along the posterior bladder wall may represent bladder wall calcifications versus layering bladder stones.   UA w/ positive nitrate, moderate LE, many bacteria   - Started on meropenem by ID for ESBL/pseudomonas coverage pending culture data, f/u UCx   - Tylenol PRN for fever and pain   - Monitor for fever and leukocytosis   - F/U BCx x 2   - Infectious disease (Dr. DALILA Thakkar) following, recs appreciated - Presented to the ED w/ increased urgency, decreased flow, history of ESBL E. coli UTI secondary to left kidney stones s/p ureteral stent placement (4/30/21). Temp of 101.  - CT abdomen pelvis w/ Urinary bladder wall thickening with trabeculation and multiple diverticula. Sequelae of chronic outlet obstruction from prostate enlargement. Superimposed cystitis Additional hyperdense foci seen along the posterior bladder wall may represent bladder wall calcifications versus layering bladder stones.   UA w/ positive nitrate, moderate LE, many bacteria   - Started on meropenem by ID for ESBL/pseudomonas coverage pending culture data, f/u UCx   - Tylenol PRN for fever and pain   - Monitor for fever and leukocytosis   - BCx x 2 11/24 NGTD   - Infectious disease (Dr. DALILA Thakkar) following, recs appreciated - Presented to the ED w/ increased urgency, decreased flow, history of ESBL E. coli UTI secondary to left kidney stones s/p ureteral stent placement (4/30/21). Temp of 101.  - CT abdomen pelvis w/ Urinary bladder wall thickening with trabeculation and multiple diverticula. Sequelae of chronic outlet obstruction from prostate enlargement. Superimposed cystitis Additional hyperdense foci seen along the posterior bladder wall may represent bladder wall calcifications versus layering bladder stones.   UA w/ positive nitrate, moderate LE, many bacteria   - Started on meropenem by ID for ESBL/pseudomonas coverage pending culture data, f/u UCx   - Tylenol PRN for fever and pain   - Monitor for fever and leukocytosis   - BCx x 2 11/24 NGTD   - Infectious disease (Dr. DALILA Thakkar group) following, recs appreciated

## 2022-11-26 NOTE — PROGRESS NOTE ADULT - SUBJECTIVE AND OBJECTIVE BOX
[facility]  INFECTIOUS DISEASES - [address], [place] [address]  Tel: [phone]     Fax: [phone]  =======================================================    GAGANDEEP DANIELAR [id number]    Follow up: No fevers. seen earlier today. patient denies any pain or SOB.    Allergies:  No Known Allergies      Antibiotics:  acetaminophen     Tablet .. 650 milliGRAM(s) Oral every 6 hours PRN  aspirin enteric coated 81 milliGRAM(s) Oral daily  atorvastatin 20 milliGRAM(s) Oral daily  dorzolamide 2% Ophthalmic Solution 1 Drop(s) Both EYES three times a day  enoxaparin Injectable 40 milliGRAM(s) SubCutaneous every 24 hours  finasteride 5 milliGRAM(s) Oral daily  lactulose Syrup 10 Gram(s) Oral at bedtime  memantine 5 milliGRAM(s) Oral two times a day  meropenem  IVPB      meropenem  IVPB 1000 milliGRAM(s) IV Intermittent every 8 hours  tamsulosin 0.4 milliGRAM(s) Oral daily       REVIEW OF SYSTEMS:       Physical Exam:  ICU Vital Signs Last 24 Hrs  T(C): 37.1 (26 Nov 2022 21:53), Max: 37.5 (26 Nov 2022 04:46)  T(F): 98.7 (26 Nov 2022 21:53), Max: 99.5 (26 Nov 2022 04:46)  HR: 76 (26 Nov 2022 21:53) (73 - 96)  BP: 130/75 (26 Nov 2022 21:53) (127/70 - 130/75)  BP(mean): --  ABP: --  ABP(mean): --  RR: 17 (26 Nov 2022 21:53) (17 - 18)  SpO2: 99% (26 Nov 2022 21:53) (97% - 99%)    O2 Parameters below as of 26 Nov 2022 21:53  Patient On (Oxygen Delivery Method): room air           GEN: NAD  HEENT: normocephalic and atraumatic.   NECK: Supple.    LUNGS: Normal respiratory effort  HEART: Regular rate and rhythm   ABDOMEN: Soft, nontender, and nondistended.    EXTREMITIES: No leg edema.  NEUROLOGIC: Answers some simple questions  PSYCHIATRIC: Appropriate affect .      Labs:  11-26    141  |  109<H>  |  15  ----------------------------<  84  3.7   |  24  |  0.81    Ca    9.5      26 Nov 2022 07:43  Phos  2.1     11-26  Mg     2.0     11-26    TPro  6.3  /  Alb  2.8<L>  /  TBili  1.0  /  DBili  x   /  AST  30  /  ALT  11<L>  /  AlkPhos  76  11-26                          13.1   7.56  )-----------( 91       ( 26 Nov 2022 07:43 )             40.1         LIVER FUNCTIONS - ( 26 Nov 2022 07:43 )  Alb: 2.8 g/dL / Pro: 6.3 g/dL / ALK PHOS: 76 U/L / ALT: 11 U/L / AST: 30 U/L / GGT: x             RECENT CULTURES:  11-25 @ 11:00          Detected  11-24 @ 21:50 Clean Catch Clean Catch (Midstream)     >100,000 CFU/ml Escherichia coli        11-24 @ 20:50 .Blood Blood-Peripheral     No growth to date.        11-24 @ 20:40 .Blood Blood-Peripheral     No growth to date.              All imaging and data are reviewed.

## 2022-11-26 NOTE — PROGRESS NOTE ADULT - PROBLEM SELECTOR PLAN 6
- Continue home Finasteride and Tamsulosin PMHx of CAD s/p stents (2014)  - Continue home statin and ASA

## 2022-11-26 NOTE — PROGRESS NOTE ADULT - PROBLEM SELECTOR PLAN 5
Moderate to large hiatal hernia. Cholelithiasis. Indeterminate left adrenal gland nodule for which further correlation can be obtained with adrenal protocol MRI or CT.  - F/u outpatient - Continue home Finasteride and Tamsulosin

## 2022-11-26 NOTE — PROGRESS NOTE ADULT - SUBJECTIVE AND OBJECTIVE BOX
Patient is a 89y old  Male who presents with a chief complaint of Cystitis (2022 06:58)  ***CHARTING IN PROGRESS***     INTERVAL HPI/OVERNIGHT EVENTS: Pt was seen and examined at bedside. No acute overnight events. Pt states that he feels fine.   Pt denies headache, dizziness, lightheadedness, fever, chills, body aches, CP, SOB, palpitations, abdominal pain, n/v, numbness/tingling.  No other complaints at this time.     MEDICATIONS  (STANDING):  aspirin enteric coated 81 milliGRAM(s) Oral daily  atorvastatin 20 milliGRAM(s) Oral daily  dorzolamide 2% Ophthalmic Solution 1 Drop(s) Both EYES three times a day  enoxaparin Injectable 40 milliGRAM(s) SubCutaneous every 24 hours  finasteride 5 milliGRAM(s) Oral daily  lactulose Syrup 10 Gram(s) Oral at bedtime  memantine 5 milliGRAM(s) Oral two times a day  meropenem  IVPB      meropenem  IVPB 1000 milliGRAM(s) IV Intermittent every 8 hours  potassium phosphate IVPB 30 milliMole(s) IV Intermittent once  tamsulosin 0.4 milliGRAM(s) Oral daily    MEDICATIONS  (PRN):  acetaminophen     Tablet .. 650 milliGRAM(s) Oral every 6 hours PRN Temp greater or equal to 38C (100.4F), Mild Pain (1 - 3)      Allergies    No Known Allergies    Intolerances        REVIEW OF SYSTEMS:  CONSTITUTIONAL: No fever or chills  HEENT:  No headache, no sore throat  RESPIRATORY: No cough, wheezing, or shortness of breath  CARDIOVASCULAR: No chest pain, palpitations  GASTROINTESTINAL: No abd pain, nausea, vomiting, or diarrhea  GENITOURINARY: No dysuria, frequency, or hematuria  NEUROLOGICAL: no focal weakness or dizziness  MUSCULOSKELETAL: no myalgias     Vital Signs Last 24 Hrs  T(C): 37.5 (2022 04:46), Max: 37.5 (2022 04:46)  T(F): 99.5 (2022 04:46), Max: 99.5 (2022 04:46)  HR: 73 (2022 04:46) (36 - 73)  BP: 127/70 (2022 04:46) (92/53 - 127/70)  BP(mean): --  RR: 18 (2022 04:46) (18 - 18)  SpO2: 99% (2022 04:46) (96% - 99%)    Parameters below as of 2022 04:46  Patient On (Oxygen Delivery Method): room air        PHYSICAL EXAM:  GENERAL: NAD, pleasant   HEENT:  anicteric, moist mucous membranes  CHEST/LUNG:  CTA b/l, no rales, wheezes, or rhonchi, in no respiratory distress, on RA   HEART:  RRR, S1, S2  ABDOMEN:  BS+, soft, nontender, nondistended  EXTREMITIES: no edema, cyanosis, or calf tenderness  NERVOUS SYSTEM: answers questions and follows commands appropriately    LABS:                        13.1   7.56  )-----------( 91       ( 2022 07:43 )             40.1     CBC Full  -  ( 2022 07:43 )  WBC Count : 7.56 K/uL  Hemoglobin : 13.1 g/dL  Hematocrit : 40.1 %  Platelet Count - Automated : 91 K/uL  Mean Cell Volume : 93.9 fl  Mean Cell Hemoglobin : 30.7 pg  Mean Cell Hemoglobin Concentration : 32.7 gm/dL  Auto Neutrophil # : 5.20 K/uL  Auto Lymphocyte # : 1.28 K/uL  Auto Monocyte # : 0.97 K/uL  Auto Eosinophil # : 0.06 K/uL  Auto Basophil # : 0.02 K/uL  Auto Neutrophil % : 68.8 %  Auto Lymphocyte % : 16.9 %  Auto Monocyte % : 12.8 %  Auto Eosinophil % : 0.8 %  Auto Basophil % : 0.3 %    2022 07:43    141    |  109    |  15     ----------------------------<  84     3.7     |  24     |  0.81     Ca    9.5        2022 07:43  Phos  2.1       2022 07:43  Mg     2.0       2022 07:43    TPro  6.3    /  Alb  2.8    /  TBili  1.0    /  DBili  x      /  AST  30     /  ALT  11     /  AlkPhos  76     2022 07:43    PT/INR - ( 2022 20:40 )   PT: 15.6 sec;   INR: 1.33 ratio         PTT - ( 2022 20:40 )  PTT:30.7 sec  Urinalysis Basic - ( 2022 21:50 )    Color: Yellow / Appearance: Slightly Turbid / S.010 / pH: x  Gluc: x / Ketone: Negative  / Bili: Negative / Urobili: Negative   Blood: x / Protein: Negative / Nitrite: Positive   Leuk Esterase: Moderate / RBC: 0-2 /HPF / WBC 3-5   Sq Epi: x / Non Sq Epi: Occasional / Bacteria: Many      CAPILLARY BLOOD GLUCOSE            Culture - Blood (collected 22 @ 20:50)  Source: .Blood Blood-Peripheral  Preliminary Report (22 @ 01:02):    No growth to date.    Culture - Blood (collected 22 @ 20:40)  Source: .Blood Blood-Peripheral  Preliminary Report (22 @ 01:02):    No growth to date.        RADIOLOGY & ADDITIONAL TESTS:    Personally reviewed.     Consultant(s) Notes Reviewed:  [x] YES  [ ] NO     Patient is a 89y old  Male who presents with a chief complaint of Cystitis (2022 06:58)    INTERVAL HPI/OVERNIGHT EVENTS: Pt was seen and examined at bedside. No acute overnight events. Pt states that he feels fine.   Pt denies headache, dizziness, lightheadedness, fever, chills, body aches, CP, SOB, palpitations, abdominal pain, n/v, numbness/tingling.  No other complaints at this time.     MEDICATIONS  (STANDING):  aspirin enteric coated 81 milliGRAM(s) Oral daily  atorvastatin 20 milliGRAM(s) Oral daily  dorzolamide 2% Ophthalmic Solution 1 Drop(s) Both EYES three times a day  enoxaparin Injectable 40 milliGRAM(s) SubCutaneous every 24 hours  finasteride 5 milliGRAM(s) Oral daily  lactulose Syrup 10 Gram(s) Oral at bedtime  memantine 5 milliGRAM(s) Oral two times a day  meropenem  IVPB      meropenem  IVPB 1000 milliGRAM(s) IV Intermittent every 8 hours  potassium phosphate IVPB 30 milliMole(s) IV Intermittent once  tamsulosin 0.4 milliGRAM(s) Oral daily    MEDICATIONS  (PRN):  acetaminophen     Tablet .. 650 milliGRAM(s) Oral every 6 hours PRN Temp greater or equal to 38C (100.4F), Mild Pain (1 - 3)      Allergies    No Known Allergies    Intolerances        REVIEW OF SYSTEMS:  CONSTITUTIONAL: No fever or chills  HEENT:  No headache, no sore throat  RESPIRATORY: No cough, wheezing, or shortness of breath  CARDIOVASCULAR: No chest pain, palpitations  GASTROINTESTINAL: No abd pain, nausea, vomiting, or diarrhea  GENITOURINARY: No dysuria, frequency, or hematuria  NEUROLOGICAL: no focal weakness or dizziness  MUSCULOSKELETAL: no myalgias     Vital Signs Last 24 Hrs  T(C): 37.5 (2022 04:46), Max: 37.5 (2022 04:46)  T(F): 99.5 (2022 04:46), Max: 99.5 (2022 04:46)  HR: 73 (2022 04:46) (36 - 73)  BP: 127/70 (2022 04:46) (92/53 - 127/70)  BP(mean): --  RR: 18 (2022 04:46) (18 - 18)  SpO2: 99% (2022 04:46) (96% - 99%)    Parameters below as of 2022 04:46  Patient On (Oxygen Delivery Method): room air        PHYSICAL EXAM:  GENERAL: NAD, pleasant   HEENT:  anicteric, moist mucous membranes  CHEST/LUNG:  CTA b/l, no rales, wheezes, or rhonchi, in no respiratory distress, on RA   HEART:  RRR, S1, S2  ABDOMEN:  BS+, soft, nontender, nondistended  EXTREMITIES: no edema, cyanosis, or calf tenderness  NERVOUS SYSTEM: answers questions and follows commands appropriately at times     LABS:                        13.1   7.56  )-----------( 91       ( 2022 07:43 )             40.1     CBC Full  -  ( 2022 07:43 )  WBC Count : 7.56 K/uL  Hemoglobin : 13.1 g/dL  Hematocrit : 40.1 %  Platelet Count - Automated : 91 K/uL  Mean Cell Volume : 93.9 fl  Mean Cell Hemoglobin : 30.7 pg  Mean Cell Hemoglobin Concentration : 32.7 gm/dL  Auto Neutrophil # : 5.20 K/uL  Auto Lymphocyte # : 1.28 K/uL  Auto Monocyte # : 0.97 K/uL  Auto Eosinophil # : 0.06 K/uL  Auto Basophil # : 0.02 K/uL  Auto Neutrophil % : 68.8 %  Auto Lymphocyte % : 16.9 %  Auto Monocyte % : 12.8 %  Auto Eosinophil % : 0.8 %  Auto Basophil % : 0.3 %    2022 07:43    141    |  109    |  15     ----------------------------<  84     3.7     |  24     |  0.81     Ca    9.5        2022 07:43  Phos  2.1       2022 07:43  Mg     2.0       2022 07:43    TPro  6.3    /  Alb  2.8    /  TBili  1.0    /  DBili  x      /  AST  30     /  ALT  11     /  AlkPhos  76     2022 07:43    PT/INR - ( 2022 20:40 )   PT: 15.6 sec;   INR: 1.33 ratio         PTT - ( 2022 20:40 )  PTT:30.7 sec  Urinalysis Basic - ( 2022 21:50 )    Color: Yellow / Appearance: Slightly Turbid / S.010 / pH: x  Gluc: x / Ketone: Negative  / Bili: Negative / Urobili: Negative   Blood: x / Protein: Negative / Nitrite: Positive   Leuk Esterase: Moderate / RBC: 0-2 /HPF / WBC 3-5   Sq Epi: x / Non Sq Epi: Occasional / Bacteria: Many      CAPILLARY BLOOD GLUCOSE            Culture - Blood (collected 22 @ 20:50)  Source: .Blood Blood-Peripheral  Preliminary Report (22 @ 01:02):    No growth to date.    Culture - Blood (collected 22 @ 20:40)  Source: .Blood Blood-Peripheral  Preliminary Report (22 @ 01:02):    No growth to date.        RADIOLOGY & ADDITIONAL TESTS:    Personally reviewed.     Consultant(s) Notes Reviewed:  [x] YES  [ ] NO     Patient is a 89y old  Male who presents with a chief complaint of fever, increased urinary frequency.      INTERVAL HPI/OVERNIGHT EVENTS: Pt was seen and examined at bedside. No acute overnight events. Pt states that he feels "alright."   Pt denies lightheadedness, fever, chills, CP, SOB, palpitations, abdominal pain, n/v, numbness/tingling.    MEDICATIONS  (STANDING):  aspirin enteric coated 81 milliGRAM(s) Oral daily  atorvastatin 20 milliGRAM(s) Oral daily  dorzolamide 2% Ophthalmic Solution 1 Drop(s) Both EYES three times a day  enoxaparin Injectable 40 milliGRAM(s) SubCutaneous every 24 hours  finasteride 5 milliGRAM(s) Oral daily  lactulose Syrup 10 Gram(s) Oral at bedtime  memantine 5 milliGRAM(s) Oral two times a day  meropenem  IVPB      meropenem  IVPB 1000 milliGRAM(s) IV Intermittent every 8 hours  potassium phosphate IVPB 30 milliMole(s) IV Intermittent once  tamsulosin 0.4 milliGRAM(s) Oral daily    MEDICATIONS  (PRN):  acetaminophen     Tablet .. 650 milliGRAM(s) Oral every 6 hours PRN Temp greater or equal to 38C (100.4F), Mild Pain (1 - 3)      Allergies    No Known Allergies    Intolerances        REVIEW OF SYSTEMS:  CONSTITUTIONAL: No fever or chills  HEENT:  No headache, no sore throat  RESPIRATORY: No cough, wheezing, or shortness of breath  CARDIOVASCULAR: No chest pain, palpitations  GASTROINTESTINAL: No abd pain, nausea, vomiting, or diarrhea  GENITOURINARY: No dysuria, frequency, or hematuria  NEUROLOGICAL: no focal weakness or dizziness  MUSCULOSKELETAL: no myalgias     Vital Signs Last 24 Hrs  T(C): 37.5 (2022 04:46), Max: 37.5 (2022 04:46)  T(F): 99.5 (2022 04:46), Max: 99.5 (2022 04:46)  HR: 73 (2022 04:46) (36 - 73)  BP: 127/70 (2022 04:46) (92/53 - 127/70)  BP(mean): --  RR: 18 (2022 04:46) (18 - 18)  SpO2: 99% (2022 04:46) (96% - 99%)    Parameters below as of 2022 04:46  Patient On (Oxygen Delivery Method): room air        PHYSICAL EXAM:  GENERAL: NAD, pleasant   HEENT:  anicteric, moist mucous membranes  CHEST/LUNG:  CTA b/l, no rales, wheezes, or rhonchi, in no respiratory distress, on RA   HEART:  RRR, S1, S2  ABDOMEN:  BS+, soft, nontender, nondistended  EXTREMITIES: no edema, cyanosis, or calf tenderness  NERVOUS SYSTEM: answers questions and follows commands appropriately at times     LABS:                        13.1   7.56  )-----------( 91       ( 2022 07:43 )             40.1     CBC Full  -  ( 2022 07:43 )  WBC Count : 7.56 K/uL  Hemoglobin : 13.1 g/dL  Hematocrit : 40.1 %  Platelet Count - Automated : 91 K/uL  Mean Cell Volume : 93.9 fl  Mean Cell Hemoglobin : 30.7 pg  Mean Cell Hemoglobin Concentration : 32.7 gm/dL  Auto Neutrophil # : 5.20 K/uL  Auto Lymphocyte # : 1.28 K/uL  Auto Monocyte # : 0.97 K/uL  Auto Eosinophil # : 0.06 K/uL  Auto Basophil # : 0.02 K/uL  Auto Neutrophil % : 68.8 %  Auto Lymphocyte % : 16.9 %  Auto Monocyte % : 12.8 %  Auto Eosinophil % : 0.8 %  Auto Basophil % : 0.3 %    2022 07:43    141    |  109    |  15     ----------------------------<  84     3.7     |  24     |  0.81     Ca    9.5        2022 07:43  Phos  2.1       2022 07:43  Mg     2.0       2022 07:43    TPro  6.3    /  Alb  2.8    /  TBili  1.0    /  DBili  x      /  AST  30     /  ALT  11     /  AlkPhos  76     2022 07:43    PT/INR - ( 2022 20:40 )   PT: 15.6 sec;   INR: 1.33 ratio         PTT - ( 2022 20:40 )  PTT:30.7 sec  Urinalysis Basic - ( 2022 21:50 )    Color: Yellow / Appearance: Slightly Turbid / S.010 / pH: x  Gluc: x / Ketone: Negative  / Bili: Negative / Urobili: Negative   Blood: x / Protein: Negative / Nitrite: Positive   Leuk Esterase: Moderate / RBC: 0-2 /HPF / WBC 3-5   Sq Epi: x / Non Sq Epi: Occasional / Bacteria: Many      CAPILLARY BLOOD GLUCOSE            Culture - Blood (collected 22 @ 20:50)  Source: .Blood Blood-Peripheral  Preliminary Report (22 @ 01:02):    No growth to date.    Culture - Blood (collected 22 @ 20:40)  Source: .Blood Blood-Peripheral  Preliminary Report (22 @ 01:02):    No growth to date.        RADIOLOGY & ADDITIONAL TESTS:    Personally reviewed.     Consultant(s) Notes Reviewed:  [x] YES  [ ] NO

## 2022-11-26 NOTE — PROGRESS NOTE ADULT - PROBLEM SELECTOR PLAN 3
- pt tested positive for COVID in the ER  - Denies any URI symptoms. COVID vaccinated 1  x J&J and 2 x Pfizer Booster. Currently satting well on RA. Positive on admission. CT Lung w/ Multiple bilateral pulmonary nodules noted. The largest measures 6 mm. Multiple nodules.  - Supplement oxygen PRN   - Tylenol PRN for fever   - Supportive therapy, Per ID would not benefit from Remdesivir and also Decadron at this time  - Infectious disease (Dr. DALILA Thakkar), recs appreciated  - Pulmonology (Dr. Hinkle) following, aspiration precautions, pureed diet - pt tested positive for COVID in the ER on admission  - Denies any URI symptoms. COVID vaccinated 1  x J&J and 2 x Pfizer Booster. Currently satting well on RA. CT Lung w/ Multiple bilateral pulmonary nodules noted. The largest measures 6 mm. Multiple nodules.  - Supplement oxygen PRN   - Tylenol PRN for fever   - Supportive therapy, Per ID would not benefit from Remdesivir or Decadron at this time  - Infectious disease (Dr. DALILA Thakkar) following, recs appreciated  - Pulmonology (Dr. Hinkle) following, aspiration precautions, pureed diet - pt tested positive for COVID in the ER on admission  - Denies any URI symptoms. COVID vaccinated 1  x J&J and 2 x Pfizer Booster. Currently satting well on RA. CT Lung w/ Multiple bilateral pulmonary nodules noted. The largest measures 6 mm. Multiple nodules.  - Supplement oxygen PRN   - Tylenol PRN for fever   - Supportive therapy, Per ID would not benefit from Remdesivir or Decadron at this time  - Infectious disease (Dr. DALILA Thakkar group) following, recs appreciated  - Pulmonology (Dr. Hinkle) following, aspiration precautions, pureed diet

## 2022-11-26 NOTE — PROGRESS NOTE ADULT - PROBLEM SELECTOR PLAN 8
reported acute confusion likely due to acute illness - acute metabolic encephalopathy due to UTI -- now improving  - Continue home Memantine   - Donepezil discontinued in setting of bradycardia Moderate to large hiatal hernia. Cholelithiasis. Indeterminate left adrenal gland nodule for which further correlation can be obtained with adrenal protocol MRI or CT.  - F/u outpatient

## 2022-11-26 NOTE — PROGRESS NOTE ADULT - PROBLEM SELECTOR PLAN 7
PMHx of CAD s/p stents (2014)  - Continue home statin and ASA reported acute confusion likely due to acute illness - acute metabolic encephalopathy due to UTI -- now improving  - Continue home Memantine   - Donepezil discontinued in setting of bradycardia

## 2022-11-26 NOTE — PROGRESS NOTE ADULT - SUBJECTIVE AND OBJECTIVE BOX
[facility] -- Rhonda Good, Joselo Jimenez Savella, Goodger: Office # [phone]    Follow Up:  Hear block, Bradycardia     Subjective/Observations: Patient seen and examined. Patient awake, alert, OOB to chair. No complaints of chest pain, dyspnea, palpitations or dizziness. No signs of orthopnea or PND. Tolerating room air.     REVIEW OF SYSTEMS: All other review of systems are negative unless indicated above    PAST MEDICAL & SURGICAL HISTORY:  Dementia  Kidney stones  Stented coronary artery    MEDICATIONS  (STANDING):  aspirin enteric coated 81 milliGRAM(s) Oral daily  atorvastatin 20 milliGRAM(s) Oral daily  dorzolamide 2% Ophthalmic Solution 1 Drop(s) Both EYES three times a day  enoxaparin Injectable 40 milliGRAM(s) SubCutaneous every 24 hours  finasteride 5 milliGRAM(s) Oral daily  lactulose Syrup 10 Gram(s) Oral at bedtime  memantine 5 milliGRAM(s) Oral two times a day  meropenem  IVPB      meropenem  IVPB 1000 milliGRAM(s) IV Intermittent every 8 hours  potassium phosphate IVPB 30 milliMole(s) IV Intermittent once  tamsulosin 0.4 milliGRAM(s) Oral daily    MEDICATIONS  (PRN):  acetaminophen     Tablet .. 650 milliGRAM(s) Oral every 6 hours PRN Temp greater or equal to 38C (100.4F), Mild Pain (1 - 3)    Allergies  No Known Allergies    Vital Signs Last 24 Hrs  T(C): 37.5 (26 Nov 2022 04:46), Max: 37.5 (26 Nov 2022 04:46)  T(F): 99.5 (26 Nov 2022 04:46), Max: 99.5 (26 Nov 2022 04:46)  HR: 73 (26 Nov 2022 04:46) (36 - 73)  BP: 127/70 (26 Nov 2022 04:46) (92/53 - 127/70)  BP(mean): --  RR: 18 (26 Nov 2022 04:46) (18 - 18)  SpO2: 99% (26 Nov 2022 04:46) (96% - 99%)    Parameters below as of 26 Nov 2022 04:46  Patient On (Oxygen Delivery Method): room air      I&O's Summary    25 Nov 2022 07:01 - 26 Nov 2022 07:00  --------------------------------------------------------  IN: 0 mL / OUT: 101 mL / NET: -101 mL    TELE: SR 70-80s, Had Mobitz type 1 and shory episodes of 2:1 HB till 5 pm with rates in 40s and none afterwards   PHYSICAL EXAM:  Constitutional: NAD, awake and alert, Frail   HEENT: Moist Mucous Membranes, Anicteric  Pulmonary: Non-labored, breath sounds are clear bilaterally, No wheezing, rales or rhonchi  Cardiovascular: Regular, S1 and S2, No murmurs, No rubs, gallops or clicks  Gastrointestinal:  soft, nontender, nondistended   Lymph: No peripheral edema. No lymphadenopathy.   Skin: No visible rashes or ulcers.  Psych:  Mood & affect appropriate      LABS: All Labs Reviewed:                        13.1   7.56  )-----------( 91       ( 26 Nov 2022 07:43 )             40.1                         12.4   6.65  )-----------( 91       ( 25 Nov 2022 06:27 )             38.6                         14.6   8.23  )-----------( 120      ( 24 Nov 2022 20:40 )             44.9     26 Nov 2022 07:43    141    |  109    |  15     ----------------------------<  84     3.7     |  24     |  0.81   25 Nov 2022 06:27    144    |  116    |  13     ----------------------------<  91     4.1     |  24     |  0.94   24 Nov 2022 20:40    142    |  110    |  15     ----------------------------<  102    4.2     |  26     |  1.00     Ca    9.5        26 Nov 2022 07:43  Ca    9.2        25 Nov 2022 06:27  Ca    9.7        24 Nov 2022 20:40  Phos  2.1       26 Nov 2022 07:43  Mg     2.0       26 Nov 2022 07:43    TPro  6.3    /  Alb  2.8    /  TBili  1.0    /  DBili  x      /  AST  30     /  ALT  11     /  AlkPhos  76     26 Nov 2022 07:43  TPro  5.7    /  Alb  2.5    /  TBili  1.0    /  DBili  x      /  AST  17     /  ALT  9      /  AlkPhos  71     25 Nov 2022 06:27  TPro  7.4    /  Alb  3.1    /  TBili  1.0    /  DBili  x      /  AST  20     /  ALT  12     /  AlkPhos  100    24 Nov 2022 20:40   LIVER FUNCTIONS - ( 26 Nov 2022 07:43 )  Alb: 2.8 g/dL / Pro: 6.3 g/dL / ALK PHOS: 76 U/L / ALT: 11 U/L / AST: 30 U/L / GGT: x           PT/INR - ( 24 Nov 2022 20:40 )   PT: 15.6 sec;   INR: 1.33 ratio         PTT - ( 24 Nov 2022 20:40 )  PTT:30.7 secLactate, Blood: 1.4 mmol/L (11-24-22 @ 20:40)    12 Lead ECG:   Ventricular Rate 35 BPM    Atrial Rate 35 BPM    P-R Interval 286 ms    QRS Duration 80 ms    Q-T Interval 426 ms    QTC Calculation(Bazett) 325 ms    P Axis 60 degrees    R Axis 5 degrees    T Axis 30 degrees    Diagnosis Line Marked sinus bradycardia with 1st degree AV block  2:1 avb  Nonspecific T wave abnormality  Abnormal ECG    Confirmed by Billy Ellis MD ([id number]) on 11/25/2022 4:41:34 PM (11-25-22 @ 10:01)

## 2022-11-26 NOTE — PROGRESS NOTE ADULT - ASSESSMENT
89 y/o male w/ a PMH of CAD s/p stents (2014), TIA, HLD, GERD, BPH s/p prostate surgery (2017), ESBL E. coli UTI secondary to left kidney stones s/p ureteral stent placement (4/30/21), Dementia p/w fever, chills, and urinary frequency. Admit for Cystitis and COVID. Cardiology called for bradycardia and possible heart block.      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   - Upon transfer to the tele on 11/25, patient’s heart rate was noted to be in 30s and 40s. Had Mobitz type 1 and short episodes of 2:1 HB till 5 pm with rates in 40s and none afterwards  - Telemetry monitoring now showed SR 70-80s  - Patient prior EKG from 07/2022 was noted to have SR with 1HB with 2 blocked PACs in 12 lead EKG. It is likely that he has conduction disease for some time.   - It appears that he is on Namenda which can cause bradycardia. Would d/c if possible to see if bradycardia is medication induced.   - Avoid negative chronotropic medications.  - Obtain routine echocardiogram to assess for structural heart disease     - H/o CAD s/p stents (2014)  - Continue statin and ASA.    - BP stable and controlled     - COVID and UTI management per primary   - Monitor and replete lytes, keep K>4, Mg>2.  - Will continue to follow.    Pablo Kee, MS FNP, [facility]  Nurse Practitioner- Cardiology   Spectra #[phone]

## 2022-11-26 NOTE — PROGRESS NOTE ADULT - ASSESSMENT
89yo M w/ PMHx of CAD s/p stents (2014), TIA, HLD, GERD, BPH s/p prostate surgery (2017), ESBL E. 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

## 2022-11-26 NOTE — PROGRESS NOTE ADULT - PROBLEM SELECTOR PLAN 2
- Pt's HR in the 30s when transferred to floors on 11/25, asymtomatic  - STAT EKG showed 2:1 AVB on top of pt's 1st degree AVB   - Donepezil discontinued given bradycardia as may be contributing  - Monitor on remote tele -- HR now stable   - Cardiology (Dr. Ellis) consulted, recs appreciated  - per discussion with cardio, pt's 2nd degree AV block was Wenkebach when he was no longer in 2:1 conduction and pt has known significant conduction disease  - pt is asymptomatic and not recommended for pacemaker at this time - monitor closely - Pt's HR in the 30s when transferred to floors on 11/25, asymptomatic  - STAT EKG showed 2:1 AVB on top of pt's 1st degree AVB   - Donepezil discontinued given bradycardia as may be contributing  - Monitor on remote tele -- HR now stable   - Cardiology (Dr. Ellis) following, recs appreciated  - per discussion with cardio, pt's 2nd degree AV block was Wenckebach when he was no longer in 2:1 conduction and pt has known significant conduction disease  - pt is asymptomatic and not recommended for pacemaker at this time - monitor closely - Pt's HR in the 30s when transferred to floors on 11/25, asymptomatic  - STAT EKG showed 2:1 AVN block along with pt's previously noted 1st degree AVB   - Donepezil discontinued given bradycardia as may be contributing  - Monitor on remote tele -- HR stable so far today with lows in the 60s  - Cardiology (Dr. 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

## 2022-11-26 NOTE — PROGRESS NOTE ADULT - ASSESSMENT
89yo M w/ PMHx of CAD s/p stents (2014), TIA, HLD, GERD, BPH s/p prostate surgery (2017), ESBL E. coli UTI secondary to left kidney stones s/p ureteral stent placement (4/30/21), Dementia, who presents to the ED w/ fever, chills, and urinary frequency, admitted with COVID and possible UTI. Urine culture growing E coli, possibly ESBL. Blood cultures currently no growth.    -continue meropenem  -follow cultures to completion    Holley Loya MD  Division of infectious Diseases  Cell [phone] between 8am and 6pm  After 6pm and over the weekends please call ID service line at [phone].

## 2022-11-26 NOTE — PROGRESS NOTE ADULT - PROBLEM SELECTOR PLAN 4
Likely 2/2 acute infection   - PT Eval   - Fall precautions Likely 2/2 acute infection   - PT Eval - home, no PT needs   - Fall precautions

## 2022-11-27 LAB
-  AMIKACIN: SIGNIFICANT CHANGE UP
-  AMOXICILLIN/CLAVULANIC ACID: SIGNIFICANT CHANGE UP
-  AMPICILLIN/SULBACTAM: SIGNIFICANT CHANGE UP
-  AMPICILLIN: SIGNIFICANT CHANGE UP
-  AZTREONAM: SIGNIFICANT CHANGE UP
-  CEFAZOLIN: SIGNIFICANT CHANGE UP
-  CEFEPIME: SIGNIFICANT CHANGE UP
-  CEFOXITIN: SIGNIFICANT CHANGE UP
-  CEFTRIAXONE: SIGNIFICANT CHANGE UP
-  CIPROFLOXACIN: SIGNIFICANT CHANGE UP
-  ERTAPENEM: SIGNIFICANT CHANGE UP
-  GENTAMICIN: SIGNIFICANT CHANGE UP
-  IMIPENEM: SIGNIFICANT CHANGE UP
-  LEVOFLOXACIN: SIGNIFICANT CHANGE UP
-  MEROPENEM: SIGNIFICANT CHANGE UP
-  NITROFURANTOIN: SIGNIFICANT CHANGE UP
-  PIPERACILLIN/TAZOBACTAM: SIGNIFICANT CHANGE UP
-  TOBRAMYCIN: SIGNIFICANT CHANGE UP
-  TRIMETHOPRIM/SULFAMETHOXAZOLE: SIGNIFICANT CHANGE UP
ALBUMIN SERPL ELPH-MCNC: 2.6 G/DL — LOW (ref 3.3–5)
ALP SERPL-CCNC: 63 U/L — SIGNIFICANT CHANGE UP (ref 40–120)
ALT FLD-CCNC: 11 U/L — LOW (ref 12–78)
ANION GAP SERPL CALC-SCNC: 6 MMOL/L — SIGNIFICANT CHANGE UP (ref 5–17)
AST SERPL-CCNC: 32 U/L — SIGNIFICANT CHANGE UP (ref 15–37)
BASOPHILS # BLD AUTO: 0.02 K/UL — SIGNIFICANT CHANGE UP (ref 0–0.2)
BASOPHILS NFR BLD AUTO: 0.3 % — SIGNIFICANT CHANGE UP (ref 0–2)
BILIRUB SERPL-MCNC: 0.9 MG/DL — SIGNIFICANT CHANGE UP (ref 0.2–1.2)
BUN SERPL-MCNC: 14 MG/DL — SIGNIFICANT CHANGE UP (ref 7–23)
CALCIUM SERPL-MCNC: 9.4 MG/DL — SIGNIFICANT CHANGE UP (ref 8.5–10.1)
CHLORIDE SERPL-SCNC: 110 MMOL/L — HIGH (ref 96–108)
CO2 SERPL-SCNC: 28 MMOL/L — SIGNIFICANT CHANGE UP (ref 22–31)
CREAT SERPL-MCNC: 0.73 MG/DL — SIGNIFICANT CHANGE UP (ref 0.5–1.3)
CULTURE RESULTS: SIGNIFICANT CHANGE UP
EGFR: 85 ML/MIN/1.73M2 — SIGNIFICANT CHANGE UP
EOSINOPHIL # BLD AUTO: 0.17 K/UL — SIGNIFICANT CHANGE UP (ref 0–0.5)
EOSINOPHIL NFR BLD AUTO: 2.7 % — SIGNIFICANT CHANGE UP (ref 0–6)
GLUCOSE SERPL-MCNC: 93 MG/DL — SIGNIFICANT CHANGE UP (ref 70–99)
HCT VFR BLD CALC: 37.8 % — LOW (ref 39–50)
HGB BLD-MCNC: 12.6 G/DL — LOW (ref 13–17)
IMM GRANULOCYTES NFR BLD AUTO: 0.2 % — SIGNIFICANT CHANGE UP (ref 0–0.9)
LYMPHOCYTES # BLD AUTO: 1.33 K/UL — SIGNIFICANT CHANGE UP (ref 1–3.3)
LYMPHOCYTES # BLD AUTO: 21 % — SIGNIFICANT CHANGE UP (ref 13–44)
MAGNESIUM SERPL-MCNC: 2.2 MG/DL — SIGNIFICANT CHANGE UP (ref 1.6–2.6)
MCHC RBC-ENTMCNC: 31.3 PG — SIGNIFICANT CHANGE UP (ref 27–34)
MCHC RBC-ENTMCNC: 33.3 GM/DL — SIGNIFICANT CHANGE UP (ref 32–36)
MCV RBC AUTO: 94 FL — SIGNIFICANT CHANGE UP (ref 80–100)
METHOD TYPE: SIGNIFICANT CHANGE UP
MONOCYTES # BLD AUTO: 0.79 K/UL — SIGNIFICANT CHANGE UP (ref 0–0.9)
MONOCYTES NFR BLD AUTO: 12.5 % — SIGNIFICANT CHANGE UP (ref 2–14)
NEUTROPHILS # BLD AUTO: 4 K/UL — SIGNIFICANT CHANGE UP (ref 1.8–7.4)
NEUTROPHILS NFR BLD AUTO: 63.3 % — SIGNIFICANT CHANGE UP (ref 43–77)
NRBC # BLD: 0 /100 WBCS — SIGNIFICANT CHANGE UP (ref 0–0)
ORGANISM # SPEC MICROSCOPIC CNT: SIGNIFICANT CHANGE UP
ORGANISM # SPEC MICROSCOPIC CNT: SIGNIFICANT CHANGE UP
PHOSPHATE SERPL-MCNC: 2.6 MG/DL — SIGNIFICANT CHANGE UP (ref 2.5–4.5)
PLATELET # BLD AUTO: 102 K/UL — LOW (ref 150–400)
POTASSIUM SERPL-MCNC: 3.6 MMOL/L — SIGNIFICANT CHANGE UP (ref 3.5–5.3)
POTASSIUM SERPL-SCNC: 3.6 MMOL/L — SIGNIFICANT CHANGE UP (ref 3.5–5.3)
PROT SERPL-MCNC: 5.9 G/DL — LOW (ref 6–8.3)
RBC # BLD: 4.02 M/UL — LOW (ref 4.2–5.8)
RBC # FLD: 13.7 % — SIGNIFICANT CHANGE UP (ref 10.3–14.5)
SODIUM SERPL-SCNC: 144 MMOL/L — SIGNIFICANT CHANGE UP (ref 135–145)
SPECIMEN SOURCE: SIGNIFICANT CHANGE UP
WBC # BLD: 6.32 K/UL — SIGNIFICANT CHANGE UP (ref 3.8–10.5)
WBC # FLD AUTO: 6.32 K/UL — SIGNIFICANT CHANGE UP (ref 3.8–10.5)

## 2022-11-27 PROCEDURE — 99232 SBSQ HOSP IP/OBS MODERATE 35: CPT

## 2022-11-27 PROCEDURE — 99233 SBSQ HOSP IP/OBS HIGH 50: CPT | Mod: GC

## 2022-11-27 RX ORDER — DORZOLAMIDE HYDROCHLORIDE 20 MG/ML
1 SOLUTION/ DROPS OPHTHALMIC
Qty: 0 | Refills: 0 | DISCHARGE
Start: 2022-11-27

## 2022-11-27 RX ORDER — TAMSULOSIN HYDROCHLORIDE 0.4 MG/1
1 CAPSULE ORAL
Refills: 0 | DISCHARGE
Start: 2022-11-27

## 2022-11-27 RX ORDER — POTASSIUM CHLORIDE 20 MEQ
40 PACKET (EA) ORAL ONCE
Refills: 0 | Status: COMPLETED | OUTPATIENT
Start: 2022-11-27 | End: 2022-11-27

## 2022-11-27 RX ORDER — CEFPODOXIME PROXETIL 100 MG
200 TABLET ORAL EVERY 12 HOURS
Refills: 0 | Status: COMPLETED | OUTPATIENT
Start: 2022-11-27 | End: 2022-11-30

## 2022-11-27 RX ADMIN — MEROPENEM 100 MILLIGRAM(S): 1 INJECTION INTRAVENOUS at 01:12

## 2022-11-27 RX ADMIN — Medication 40 MILLIEQUIVALENT(S): at 11:27

## 2022-11-27 RX ADMIN — ATORVASTATIN CALCIUM 20 MILLIGRAM(S): 80 TABLET, FILM COATED ORAL at 11:09

## 2022-11-27 RX ADMIN — LACTULOSE 10 GRAM(S): 10 SOLUTION ORAL at 21:13

## 2022-11-27 RX ADMIN — DORZOLAMIDE HYDROCHLORIDE 1 DROP(S): 20 SOLUTION/ DROPS OPHTHALMIC at 13:10

## 2022-11-27 RX ADMIN — MEMANTINE HYDROCHLORIDE 5 MILLIGRAM(S): 10 TABLET ORAL at 05:09

## 2022-11-27 RX ADMIN — DORZOLAMIDE HYDROCHLORIDE 1 DROP(S): 20 SOLUTION/ DROPS OPHTHALMIC at 21:14

## 2022-11-27 RX ADMIN — Medication 81 MILLIGRAM(S): at 11:11

## 2022-11-27 RX ADMIN — ENOXAPARIN SODIUM 40 MILLIGRAM(S): 100 INJECTION SUBCUTANEOUS at 05:10

## 2022-11-27 RX ADMIN — MEROPENEM 100 MILLIGRAM(S): 1 INJECTION INTRAVENOUS at 09:49

## 2022-11-27 RX ADMIN — FINASTERIDE 5 MILLIGRAM(S): 5 TABLET, FILM COATED ORAL at 11:09

## 2022-11-27 RX ADMIN — DORZOLAMIDE HYDROCHLORIDE 1 DROP(S): 20 SOLUTION/ DROPS OPHTHALMIC at 05:10

## 2022-11-27 RX ADMIN — MEMANTINE HYDROCHLORIDE 5 MILLIGRAM(S): 10 TABLET ORAL at 17:49

## 2022-11-27 RX ADMIN — Medication 200 MILLIGRAM(S): at 17:49

## 2022-11-27 RX ADMIN — TAMSULOSIN HYDROCHLORIDE 0.4 MILLIGRAM(S): 0.4 CAPSULE ORAL at 11:09

## 2022-11-27 NOTE — PROGRESS NOTE ADULT - PROBLEM SELECTOR PLAN 7
reported acute confusion likely due to acute illness - acute metabolic encephalopathy due to UTI -- now improving  - Continue home Memantine   - Donepezil discontinued in setting of bradycardia

## 2022-11-27 NOTE — PROGRESS NOTE ADULT - PROBLEM SELECTOR PLAN 2
- Pt's HR in the 30s when transferred to floors on 11/25, asymptomatic  - STAT EKG showed 2:1 AVN block along with pt's previously noted 1st degree AVB   - Donepezil discontinued given bradycardia as may be contributing  - Monitor on remote tele -- HR stable so far today with lows in the 60s  - Cardiology (Dr. 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

## 2022-11-27 NOTE — PROGRESS NOTE ADULT - PROBLEM SELECTOR PLAN 1
- Presented to the ED w/ increased urgency, decreased flow, history of ESBL E. coli UTI secondary to left kidney stones s/p ureteral stent placement (4/30/21). Temp of 101.  - CT abdomen pelvis w/ Urinary bladder wall thickening with trabeculation and multiple diverticula. Sequelae of chronic outlet obstruction from prostate enlargement. Superimposed cystitis Additional hyperdense foci seen along the posterior bladder wall may represent bladder wall calcifications versus layering bladder stones.   UA w/ positive nitrate, moderate LE, many bacteria   - Started on meropenem (now discontinued) by ID for ESBL/pseudomonas coverage pending culture data  - Urine cultures growing >100,000 E.Coli  - Start cefpodoxime 200mg po bid for 3 days  - Tylenol PRN for fever and pain   - Monitor for fever and leukocytosis   - BCx x 2 11/24 NGTD   - Infectious disease (Dr. Loya group) following, recs appreciated - Presented to the ED w/ increased urgency, decreased flow, history of ESBL E. coli UTI secondary to left kidney stones s/p ureteral stent placement (4/30/21). Temp of 101.  - CT abdomen pelvis w/ Urinary bladder wall thickening with trabeculation and multiple diverticula. Sequelae of chronic outlet obstruction from prostate enlargement. Superimposed cystitis Additional hyperdense foci seen along the posterior bladder wall may represent bladder wall calcifications versus layering bladder stones.   UA w/ positive nitrate, moderate LE, many bacteria   - given meropenem empirically by ID for possibility of ESBL-producing bacteria or pseudomonas  - Urine cultures growing >100,000 E.Coli sensitive to cephalosporins  - d/c meropenem and start cefpodoxime 200mg po BID for 3 days more days per discussion with ID  - Tylenol PRN for fever and pain   - Monitor for fever and leukocytosis   - BCx x 2 11/24 NGTD   - Infectious disease (Dr. Loya group) following, recs appreciated

## 2022-11-27 NOTE — PROGRESS NOTE ADULT - ASSESSMENT
89yo M w/ PMHx of CAD s/p stents (2014), TIA, HLD, GERD, BPH s/p prostate surgery (2017), ESBL E. 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

## 2022-11-27 NOTE — PROGRESS NOTE ADULT - SUBJECTIVE AND OBJECTIVE BOX
[facility] -- Rhonda Good, Joselo Jimenez Savella, Goodger: Office # [phone]    Follow Up:  Hear block, Bradycardia     Subjective/Observations: Patient seen and examined. Patient awake, alert, resting in bed. No complaints of chest pain, dyspnea, palpitations or dizziness. No signs of orthopnea or PND. Tolerating room air.     REVIEW OF SYSTEMS: All other review of systems are negative unless indicated above    PAST MEDICAL & SURGICAL HISTORY:  Dementia      Kidney stones      Stented coronary artery    MEDICATIONS  (STANDING):  aspirin enteric coated 81 milliGRAM(s) Oral daily  atorvastatin 20 milliGRAM(s) Oral daily  dorzolamide 2% Ophthalmic Solution 1 Drop(s) Both EYES three times a day  enoxaparin Injectable 40 milliGRAM(s) SubCutaneous every 24 hours  finasteride 5 milliGRAM(s) Oral daily  lactulose Syrup 10 Gram(s) Oral at bedtime  memantine 5 milliGRAM(s) Oral two times a day  meropenem  IVPB      meropenem  IVPB 1000 milliGRAM(s) IV Intermittent every 8 hours  potassium chloride   Powder 40 milliEquivalent(s) Oral once  tamsulosin 0.4 milliGRAM(s) Oral daily    MEDICATIONS  (PRN):  acetaminophen     Tablet .. 650 milliGRAM(s) Oral every 6 hours PRN Temp greater or equal to 38C (100.4F), Mild Pain (1 - 3)    Allergies    No Known Allergies    Vital Signs Last 24 Hrs  T(C): 36.9 (27 Nov 2022 04:30), Max: 37.1 (26 Nov 2022 21:53)  T(F): 98.4 (27 Nov 2022 04:30), Max: 98.7 (26 Nov 2022 21:53)  HR: 68 (27 Nov 2022 04:30) (68 - 96)  BP: 143/64 (27 Nov 2022 04:30) (127/70 - 143/64)  BP(mean): --  RR: 17 (27 Nov 2022 04:30) (17 - 17)  SpO2: 97% (27 Nov 2022 04:30) (97% - 99%)    Parameters below as of 27 Nov 2022 04:30  Patient On (Oxygen Delivery Method): room air      I&O's Summary      TELE: SR 70-80s, 1HB, 110-120s x 4 episodes, self resolving    PHYSICAL EXAM:  Constitutional: NAD, awake and alert, Frail   HEENT: Moist Mucous Membranes, Anicteric  Pulmonary: Non-labored, breath sounds are clear bilaterally, No wheezing, rales or rhonchi  Cardiovascular: Regular, S1 and S2, No murmurs, No rubs, gallops or clicks  Gastrointestinal:  soft, nontender, nondistended   Lymph: No peripheral edema. No lymphadenopathy.   Skin: No visible rashes or ulcers.  Psych:  Mood & affect appropriate    LABS: All Labs Reviewed:                        12.6   6.32  )-----------( 102      ( 27 Nov 2022 07:10 )             37.8                         13.1   7.56  )-----------( 91       ( 26 Nov 2022 07:43 )             40.1                         12.4   6.65  )-----------( 91       ( 25 Nov 2022 06:27 )             38.6     27 Nov 2022 07:10    144    |  110    |  14     ----------------------------<  93     3.6     |  28     |  0.73   26 Nov 2022 07:43    141    |  109    |  15     ----------------------------<  84     3.7     |  24     |  0.81   25 Nov 2022 06:27    144    |  116    |  13     ----------------------------<  91     4.1     |  24     |  0.94     Ca    9.4        27 Nov 2022 07:10  Ca    9.5        26 Nov 2022 07:43  Ca    9.2        25 Nov 2022 06:27  Phos  2.6       27 Nov 2022 07:10  Phos  2.1       26 Nov 2022 07:43  Mg     2.2       27 Nov 2022 07:10  Mg     2.0       26 Nov 2022 07:43    TPro  5.9    /  Alb  2.6    /  TBili  0.9    /  DBili  x      /  AST  32     /  ALT  11     /  AlkPhos  63     27 Nov 2022 07:10  TPro  6.3    /  Alb  2.8    /  TBili  1.0    /  DBili  x      /  AST  30     /  ALT  11     /  AlkPhos  76     26 Nov 2022 07:43  TPro  5.7    /  Alb  2.5    /  TBili  1.0    /  DBili  x      /  AST  17     /  ALT  9      /  AlkPhos  71     25 Nov 2022 06:27   LIVER FUNCTIONS - ( 27 Nov 2022 07:10 )  Alb: 2.6 g/dL / Pro: 5.9 g/dL / ALK PHOS: 63 U/L / ALT: 11 U/L / AST: 32 U/L / GGT: x           Lactate, Blood: 1.4 mmol/L (11-24-22 @ 20:40)    12 Lead ECG:   Ventricular Rate 35 BPM    Atrial Rate 35 BPM    P-R Interval 286 ms    QRS Duration 80 ms    Q-T Interval 426 ms    QTC Calculation(Bazett) 325 ms    P Axis 60 degrees    R Axis 5 degrees    T Axis 30 degrees    Diagnosis Line Marked sinus bradycardia with 1st degree AV block  2:1 avb  Nonspecific T wave abnormality  Abnormal ECG    Confirmed by Billy Ellis MD ([id number]) on 11/25/2022 4:41:34 PM (11-25-22 @ 10:01)

## 2022-11-27 NOTE — PROGRESS NOTE ADULT - SUBJECTIVE AND OBJECTIVE BOX
Date/Time Patient Seen:  		  Referring MD:   Data Reviewed	       Patient is a 89y old  Male who presents with a chief complaint of Cystitis (26 Nov 2022 23:16)      Subjective/HPI     PAST MEDICAL & SURGICAL HISTORY:  Dementia    Kidney stones    Stented coronary artery          Medication list         MEDICATIONS  (STANDING):  aspirin enteric coated 81 milliGRAM(s) Oral daily  atorvastatin 20 milliGRAM(s) Oral daily  dorzolamide 2% Ophthalmic Solution 1 Drop(s) Both EYES three times a day  enoxaparin Injectable 40 milliGRAM(s) SubCutaneous every 24 hours  finasteride 5 milliGRAM(s) Oral daily  lactulose Syrup 10 Gram(s) Oral at bedtime  memantine 5 milliGRAM(s) Oral two times a day  meropenem  IVPB      meropenem  IVPB 1000 milliGRAM(s) IV Intermittent every 8 hours  tamsulosin 0.4 milliGRAM(s) Oral daily    MEDICATIONS  (PRN):  acetaminophen     Tablet .. 650 milliGRAM(s) Oral every 6 hours PRN Temp greater or equal to 38C (100.4F), Mild Pain (1 - 3)         Vitals log        ICU Vital Signs Last 24 Hrs  T(C): 36.9 (27 Nov 2022 04:30), Max: 37.1 (26 Nov 2022 21:53)  T(F): 98.4 (27 Nov 2022 04:30), Max: 98.7 (26 Nov 2022 21:53)  HR: 68 (27 Nov 2022 04:30) (68 - 96)  BP: 143/64 (27 Nov 2022 04:30) (127/70 - 143/64)  BP(mean): --  ABP: --  ABP(mean): --  RR: 17 (27 Nov 2022 04:30) (17 - 17)  SpO2: 97% (27 Nov 2022 04:30) (97% - 99%)    O2 Parameters below as of 27 Nov 2022 04:30  Patient On (Oxygen Delivery Method): room air                 Input and Output:  I&O's Detail    25 Nov 2022 07:01  -  26 Nov 2022 07:00  --------------------------------------------------------  IN:  Total IN: 0 mL    OUT:    Stool (mL): 1 mL    Voided (mL): 100 mL  Total OUT: 101 mL    Total NET: -101 mL          Lab Data                        13.1   7.56  )-----------( 91       ( 26 Nov 2022 07:43 )             40.1     11-26    141  |  109<H>  |  15  ----------------------------<  84  3.7   |  24  |  0.81    Ca    9.5      26 Nov 2022 07:43  Phos  2.1     11-26  Mg     2.0     11-26    TPro  6.3  /  Alb  2.8<L>  /  TBili  1.0  /  DBili  x   /  AST  30  /  ALT  11<L>  /  AlkPhos  76  11-26            Review of Systems	      Objective     Physical Examination    heart s1s2  lung dec BS  head nc      Pertinent Lab findings & Imaging      Ondina:  NO   Adequate UO     I&O's Detail    25 Nov 2022 07:01  -  26 Nov 2022 07:00  --------------------------------------------------------  IN:  Total IN: 0 mL    OUT:    Stool (mL): 1 mL    Voided (mL): 100 mL  Total OUT: 101 mL    Total NET: -101 mL               Discussed with:     Cultures:	        Radiology

## 2022-11-27 NOTE — PROGRESS NOTE ADULT - PROBLEM SELECTOR PLAN 3
- pt tested positive for COVID in the ER on admission  - Denies any URI symptoms. COVID vaccinated 1  x J&J and 2 x Pfizer Booster. Currently satting well on RA. CT Lung w/ Multiple bilateral pulmonary nodules noted. The largest measures 6 mm. Multiple nodules.  - Supplement oxygen PRN   - Tylenol PRN for fever   - Supportive therapy, Per ID would not benefit from Remdesivir or Decadron at this time  - Infectious disease (Dr. Loya group) following, recs appreciated  - Pulmonology (Dr. Hinkle) following, aspiration precautions, pureed diet - pt tested positive for COVID in the ER on admission  - Denies any URI symptoms. COVID vaccinated 1  x J&J and 2 x Pfizer Booster. Currently satting well on RA. CT Lung w/ Multiple bilateral pulmonary nodules noted. The largest measures 6 mm. Multiple nodules.  - Supplement oxygen PRN   - Tylenol PRN for fever   - Supportive therapy, Per ID would not benefit from Remdesivir or Decadron at this time  - Infectious disease (Dr. Loya group) following, recs appreciated  - Pulmonology (Dr. Hinkle) following, aspiration precautions

## 2022-11-27 NOTE — PROGRESS NOTE ADULT - SUBJECTIVE AND OBJECTIVE BOX
Patient is a 89y old  Male who presents with a chief complaint of Cystitis (27 Nov 2022 09:45)      INTERVAL HPI/OVERNIGHT EVENTS: Pt was seen and examined at bedside. No acute overnight events. Pt states that he feels okay.   Pt denies headache, dizziness, lightheadedness, fever, chills, body aches, CP, SOB, palpitations, abdominal pain, n/v, numbness/tingling.  No other complaints at this time.     MEDICATIONS  (STANDING):  aspirin enteric coated 81 milliGRAM(s) Oral daily  atorvastatin 20 milliGRAM(s) Oral daily  cefpodoxime 200 milliGRAM(s) Oral every 12 hours  dorzolamide 2% Ophthalmic Solution 1 Drop(s) Both EYES three times a day  enoxaparin Injectable 40 milliGRAM(s) SubCutaneous every 24 hours  finasteride 5 milliGRAM(s) Oral daily  lactulose Syrup 10 Gram(s) Oral at bedtime  memantine 5 milliGRAM(s) Oral two times a day  tamsulosin 0.4 milliGRAM(s) Oral daily    MEDICATIONS  (PRN):  acetaminophen     Tablet .. 650 milliGRAM(s) Oral every 6 hours PRN Temp greater or equal to 38C (100.4F), Mild Pain (1 - 3)      Allergies    No Known Allergies    Intolerances        REVIEW OF SYSTEMS:  CONSTITUTIONAL: No fever or chills  HEENT:  No headache, no sore throat  RESPIRATORY: No cough, wheezing, or shortness of breath  CARDIOVASCULAR: No chest pain, palpitations  GASTROINTESTINAL: No abd pain, nausea, vomiting, or diarrhea  GENITOURINARY: No dysuria, frequency, or hematuria  NEUROLOGICAL: no focal weakness or dizziness  MUSCULOSKELETAL: no myalgias     Vital Signs Last 24 Hrs  T(C): 36.9 (27 Nov 2022 04:30), Max: 37.1 (26 Nov 2022 21:53)  T(F): 98.4 (27 Nov 2022 04:30), Max: 98.7 (26 Nov 2022 21:53)  HR: 68 (27 Nov 2022 04:30) (68 - 76)  BP: 143/64 (27 Nov 2022 04:30) (130/75 - 143/64)  BP(mean): --  RR: 17 (27 Nov 2022 04:30) (17 - 17)  SpO2: 97% (27 Nov 2022 04:30) (97% - 99%)    Parameters below as of 27 Nov 2022 04:30  Patient On (Oxygen Delivery Method): room air        PHYSICAL EXAM:  GENERAL: NAD, pleasant   HEENT:  anicteric, moist mucous membranes  CHEST/LUNG:  CTA b/l, no rales, wheezes, or rhonchi, in no respiratory distress, on RA   HEART:  RRR, S1, S2  ABDOMEN:  BS+, soft, nontender, nondistended  EXTREMITIES: no edema, cyanosis, or calf tenderness  NERVOUS SYSTEM: answers questions and follows commands appropriately at times     LABS:                        12.6   6.32  )-----------( 102      ( 27 Nov 2022 07:10 )             37.8     CBC Full  -  ( 27 Nov 2022 07:10 )  WBC Count : 6.32 K/uL  Hemoglobin : 12.6 g/dL  Hematocrit : 37.8 %  Platelet Count - Automated : 102 K/uL  Mean Cell Volume : 94.0 fl  Mean Cell Hemoglobin : 31.3 pg  Mean Cell Hemoglobin Concentration : 33.3 gm/dL  Auto Neutrophil # : 4.00 K/uL  Auto Lymphocyte # : 1.33 K/uL  Auto Monocyte # : 0.79 K/uL  Auto Eosinophil # : 0.17 K/uL  Auto Basophil # : 0.02 K/uL  Auto Neutrophil % : 63.3 %  Auto Lymphocyte % : 21.0 %  Auto Monocyte % : 12.5 %  Auto Eosinophil % : 2.7 %  Auto Basophil % : 0.3 %    27 Nov 2022 07:10    144    |  110    |  14     ----------------------------<  93     3.6     |  28     |  0.73     Ca    9.4        27 Nov 2022 07:10  Phos  2.6       27 Nov 2022 07:10  Mg     2.2       27 Nov 2022 07:10    TPro  5.9    /  Alb  2.6    /  TBili  0.9    /  DBili  x      /  AST  32     /  ALT  11     /  AlkPhos  63     27 Nov 2022 07:10        CAPILLARY BLOOD GLUCOSE            Culture - Urine (collected 11-24-22 @ 21:50)  Source: Clean Catch Clean Catch (Midstream)  Final Report (11-27-22 @ 14:39):    >100,000 CFU/ml Escherichia coli  Organism: Escherichia coli (11-27-22 @ 14:39)  Organism: Escherichia coli (11-27-22 @ 14:39)      -  Amikacin: S <=16      -  Amoxicillin/Clavulanic Acid: S <=8/4      -  Ampicillin: S <=8 These ampicillin results predict results for amoxicillin      -  Ampicillin/Sulbactam: S <=4/2 Enterobacter, Klebsiella aerogenes, Citrobacter, and Serratia may develop resistance during prolonged therapy (3-4 days)      -  Aztreonam: S <=4      -  Cefazolin: S 16 For uncomplicated UTI with K. pneumoniae, E. coli, or P. mirablis: LUIS <=16 is sensitive and LUIS >=32 is resistant. This also predicts results for oral agents cefaclor, cefdinir, cefpodoxime, cefprozil, cefuroxime axetil, cephalexin and locarbeffor uncomplicated UTI. Note that some isolates may be susceptible to these agents while testing resistant to cefazolin.      -  Cefepime: S <=2      -  Cefoxitin: S <=8      -  Ceftriaxone: S <=1 Enterobacter, Klebsiella aerogenes, Citrobacter, and Serratia may develop resistance during prolonged therapy      -  Ciprofloxacin: R >2      -  Ertapenem: S <=0.5      -  Gentamicin: S <=2      -  Imipenem: S <=1      -  Levofloxacin: R >4      -  Meropenem: S <=1      -  Nitrofurantoin: S <=32 Should not be used to treat pyelonephritis      -  Piperacillin/Tazobactam: S <=8      -  Tobramycin: S <=2      -  Trimethoprim/Sulfamethoxazole: S <=0.5/9.5      Method Type: LUIS    Culture - Blood (collected 11-24-22 @ 20:50)  Source: .Blood Blood-Peripheral  Preliminary Report (11-26-22 @ 01:02):    No growth to date.    Culture - Blood (collected 11-24-22 @ 20:40)  Source: .Blood Blood-Peripheral  Preliminary Report (11-26-22 @ 01:02):    No growth to date.        RADIOLOGY & ADDITIONAL TESTS: Urince Cx growing >100,000 EColi    Personally reviewed.     Consultant(s) Notes Reviewed:  [x] YES  [ ] NO     Patient is a 89y old  Male who presents with a chief complaint of fever, increased urinary frequency.      INTERVAL HPI/OVERNIGHT EVENTS: Pt was seen and examined at bedside. No acute overnight events. Pt states that he feels good.   Pt denies headache, dizziness, lightheadedness, fever, chills, body aches, CP, SOB, palpitations, abdominal pain.    MEDICATIONS  (STANDING):  aspirin enteric coated 81 milliGRAM(s) Oral daily  atorvastatin 20 milliGRAM(s) Oral daily  cefpodoxime 200 milliGRAM(s) Oral every 12 hours  dorzolamide 2% Ophthalmic Solution 1 Drop(s) Both EYES three times a day  enoxaparin Injectable 40 milliGRAM(s) SubCutaneous every 24 hours  finasteride 5 milliGRAM(s) Oral daily  lactulose Syrup 10 Gram(s) Oral at bedtime  memantine 5 milliGRAM(s) Oral two times a day  tamsulosin 0.4 milliGRAM(s) Oral daily    MEDICATIONS  (PRN):  acetaminophen     Tablet .. 650 milliGRAM(s) Oral every 6 hours PRN Temp greater or equal to 38C (100.4F), Mild Pain (1 - 3)      Allergies    No Known Allergies    Intolerances        REVIEW OF SYSTEMS:  CONSTITUTIONAL: No fever or chills  HEENT:  No headache, no sore throat  RESPIRATORY: No cough, wheezing, or shortness of breath  CARDIOVASCULAR: No chest pain, palpitations  GASTROINTESTINAL: No abd pain, nausea, vomiting, or diarrhea  GENITOURINARY: No dysuria, frequency, or hematuria  NEUROLOGICAL: no focal weakness or dizziness  MUSCULOSKELETAL: no myalgias     Vital Signs Last 24 Hrs  T(C): 36.9 (27 Nov 2022 04:30), Max: 37.1 (26 Nov 2022 21:53)  T(F): 98.4 (27 Nov 2022 04:30), Max: 98.7 (26 Nov 2022 21:53)  HR: 68 (27 Nov 2022 04:30) (68 - 76)  BP: 143/64 (27 Nov 2022 04:30) (130/75 - 143/64)  BP(mean): --  RR: 17 (27 Nov 2022 04:30) (17 - 17)  SpO2: 97% (27 Nov 2022 04:30) (97% - 99%)    Parameters below as of 27 Nov 2022 04:30  Patient On (Oxygen Delivery Method): room air        PHYSICAL EXAM:  GENERAL: NAD, pleasant   HEENT:  anicteric, moist mucous membranes  CHEST/LUNG:  CTA b/l, no rales, wheezes, or rhonchi, in no respiratory distress, on RA   HEART:  RRR, S1, S2  ABDOMEN:  BS+, soft, nontender, nondistended  EXTREMITIES: no edema, cyanosis, or calf tenderness  NERVOUS SYSTEM: answers questions and follows commands appropriately at times     LABS:                        12.6   6.32  )-----------( 102      ( 27 Nov 2022 07:10 )             37.8     CBC Full  -  ( 27 Nov 2022 07:10 )  WBC Count : 6.32 K/uL  Hemoglobin : 12.6 g/dL  Hematocrit : 37.8 %  Platelet Count - Automated : 102 K/uL  Mean Cell Volume : 94.0 fl  Mean Cell Hemoglobin : 31.3 pg  Mean Cell Hemoglobin Concentration : 33.3 gm/dL  Auto Neutrophil # : 4.00 K/uL  Auto Lymphocyte # : 1.33 K/uL  Auto Monocyte # : 0.79 K/uL  Auto Eosinophil # : 0.17 K/uL  Auto Basophil # : 0.02 K/uL  Auto Neutrophil % : 63.3 %  Auto Lymphocyte % : 21.0 %  Auto Monocyte % : 12.5 %  Auto Eosinophil % : 2.7 %  Auto Basophil % : 0.3 %    27 Nov 2022 07:10    144    |  110    |  14     ----------------------------<  93     3.6     |  28     |  0.73     Ca    9.4        27 Nov 2022 07:10  Phos  2.6       27 Nov 2022 07:10  Mg     2.2       27 Nov 2022 07:10    TPro  5.9    /  Alb  2.6    /  TBili  0.9    /  DBili  x      /  AST  32     /  ALT  11     /  AlkPhos  63     27 Nov 2022 07:10        CAPILLARY BLOOD GLUCOSE            Culture - Urine (collected 11-24-22 @ 21:50)  Source: Clean Catch Clean Catch (Midstream)  Final Report (11-27-22 @ 14:39):    >100,000 CFU/ml Escherichia coli  Organism: Escherichia coli (11-27-22 @ 14:39)  Organism: Escherichia coli (11-27-22 @ 14:39)      -  Amikacin: S <=16      -  Amoxicillin/Clavulanic Acid: S <=8/4      -  Ampicillin: S <=8 These ampicillin results predict results for amoxicillin      -  Ampicillin/Sulbactam: S <=4/2 Enterobacter, Klebsiella aerogenes, Citrobacter, and Serratia may develop resistance during prolonged therapy (3-4 days)      -  Aztreonam: S <=4      -  Cefazolin: S 16 For uncomplicated UTI with K. pneumoniae, E. coli, or P. mirablis: LUIS <=16 is sensitive and LUIS >=32 is resistant. This also predicts results for oral agents cefaclor, cefdinir, cefpodoxime, cefprozil, cefuroxime axetil, cephalexin and locarbeffor uncomplicated UTI. Note that some isolates may be susceptible to these agents while testing resistant to cefazolin.      -  Cefepime: S <=2      -  Cefoxitin: S <=8      -  Ceftriaxone: S <=1 Enterobacter, Klebsiella aerogenes, Citrobacter, and Serratia may develop resistance during prolonged therapy      -  Ciprofloxacin: R >2      -  Ertapenem: S <=0.5      -  Gentamicin: S <=2      -  Imipenem: S <=1      -  Levofloxacin: R >4      -  Meropenem: S <=1      -  Nitrofurantoin: S <=32 Should not be used to treat pyelonephritis      -  Piperacillin/Tazobactam: S <=8      -  Tobramycin: S <=2      -  Trimethoprim/Sulfamethoxazole: S <=0.5/9.5      Method Type: LUIS    Culture - Blood (collected 11-24-22 @ 20:50)  Source: .Blood Blood-Peripheral  Preliminary Report (11-26-22 @ 01:02):    No growth to date.    Culture - Blood (collected 11-24-22 @ 20:40)  Source: .Blood Blood-Peripheral  Preliminary Report (11-26-22 @ 01:02):    No growth to date.        RADIOLOGY & ADDITIONAL TESTS: Urince Cx growing >100,000 EColi    Personally reviewed.     Consultant(s) Notes Reviewed:  [x] YES  [ ] NO

## 2022-11-27 NOTE — PROGRESS NOTE ADULT - PROBLEM SELECTOR PLAN 9
- DVT PPx w/ Lovenox  - Fall precautions    #DISPO  - PT - home, no PT needs  - Patient son and daughter currently sick with COVID and unable to take care of patient at home today  - Will discuss with  tomorrow to make appropriate dispo plan      Patient's daughter updated by Dr. Navarro over phone - DVT PPx w/ Lovenox  - Fall precautions    #DISP:  - PT - home, no PT needs  - Patient son and daughter currently sick with COVID and stated they are unable to take care of patient at home today due to their significant weakness.  - Will discuss with  tomorrow to help facilitate discharge plan.

## 2022-11-27 NOTE — PROGRESS NOTE ADULT - ASSESSMENT
89 y/o male w/ a PMH of CAD s/p stents (2014), TIA, HLD, GERD, BPH s/p prostate surgery (2017), ESBL E. coli UTI secondary to left kidney stones s/p ureteral stent placement (4/30/21), Dementia p/w fever, chills, and urinary frequency. Admit for Cystitis and COVID. Cardiology called for bradycardia and possible heart block.      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   - Upon transfer to the tele on 11/25, patient’s heart rate was noted to be in 30s and 40s. Had Mobitz type 1 and short episodes of 2:1 HB till 5 pm 11/25 with rates in 40s and none afterwards  - Telemetry monitoring now showed SR 70-80s with rates up to 110-120s nonsustained. No further 2HB noted   - Patient prior EKG from 07/2022 was noted to have SR with 1HB with 2 blocked PACs in 12 lead EKG. It is likely that he has conduction disease for some time.   - Obtain routine echocardiogram to assess for structural heart disease     - H/o CAD s/p stents (2014)  - Continue statin and ASA.    - BP stable and controlled     - COVID and UTI management per primary   - Monitor and replete lytes, keep K>4, Mg>2.  - Will continue to follow.    Pablo Kee, MS FNP, [facility]  Nurse Practitioner- Cardiology   Spectra #[phone]

## 2022-11-27 NOTE — PROGRESS NOTE ADULT - ASSESSMENT
89 y/o male w/ a PMHx of CAD s/p stents (2014), TIA, HLD, GERD, BPH s/p prostate surgery (2017), ESBL E. coli UTI secondary to left kidney stones s/p ureteral stent placement (4/30/21), Dementia presents to the ED w/ fever, chills, and urinary frequency.    pulm nodules  hiatal hernia   source of infection eval in progress  HLD  BPH  GERD  nephrolithiasis   CAD - stents - PCI-   Dementia      labs reviewed  on BEA  ID follow up - Cx in progress -   covid confirmed - isolation in progress    cardio eval - AF eval - hx of cad - htn hld - tia -   cvs rx regimen  BP control  pulm nodules - sub cm - bronchiectasis - aspiration is a concern in pt with Dementia and Hiatal hernia - CT chest reviewed -   HOB elev  asp prec  on pureed diet  Hiatal hernia - GERD - consider PPI or H2 blocker - antacid - At Risk for Aspiration  covid positive - isolation precs - dvt P - acap prn - cough rx regimen prn - consideration for Remdesivir - will defer to ID -   assist with needs - ADL  GOC discussion

## 2022-11-28 LAB
ALBUMIN SERPL ELPH-MCNC: 2.6 G/DL — LOW (ref 3.3–5)
ALP SERPL-CCNC: 67 U/L — SIGNIFICANT CHANGE UP (ref 40–120)
ALT FLD-CCNC: 13 U/L — SIGNIFICANT CHANGE UP (ref 12–78)
ANION GAP SERPL CALC-SCNC: 6 MMOL/L — SIGNIFICANT CHANGE UP (ref 5–17)
AST SERPL-CCNC: 33 U/L — SIGNIFICANT CHANGE UP (ref 15–37)
BASOPHILS # BLD AUTO: 0.02 K/UL — SIGNIFICANT CHANGE UP (ref 0–0.2)
BASOPHILS NFR BLD AUTO: 0.4 % — SIGNIFICANT CHANGE UP (ref 0–2)
BILIRUB SERPL-MCNC: 0.8 MG/DL — SIGNIFICANT CHANGE UP (ref 0.2–1.2)
BUN SERPL-MCNC: 15 MG/DL — SIGNIFICANT CHANGE UP (ref 7–23)
CALCIUM SERPL-MCNC: 9.4 MG/DL — SIGNIFICANT CHANGE UP (ref 8.5–10.1)
CHLORIDE SERPL-SCNC: 111 MMOL/L — HIGH (ref 96–108)
CO2 SERPL-SCNC: 26 MMOL/L — SIGNIFICANT CHANGE UP (ref 22–31)
CREAT SERPL-MCNC: 0.86 MG/DL — SIGNIFICANT CHANGE UP (ref 0.5–1.3)
EGFR: 81 ML/MIN/1.73M2 — SIGNIFICANT CHANGE UP
EOSINOPHIL # BLD AUTO: 0.3 K/UL — SIGNIFICANT CHANGE UP (ref 0–0.5)
EOSINOPHIL NFR BLD AUTO: 6.2 % — HIGH (ref 0–6)
GLUCOSE SERPL-MCNC: 93 MG/DL — SIGNIFICANT CHANGE UP (ref 70–99)
HCT VFR BLD CALC: 40.5 % — SIGNIFICANT CHANGE UP (ref 39–50)
HGB BLD-MCNC: 13.2 G/DL — SIGNIFICANT CHANGE UP (ref 13–17)
IMM GRANULOCYTES NFR BLD AUTO: 0 % — SIGNIFICANT CHANGE UP (ref 0–0.9)
LYMPHOCYTES # BLD AUTO: 1.28 K/UL — SIGNIFICANT CHANGE UP (ref 1–3.3)
LYMPHOCYTES # BLD AUTO: 26.3 % — SIGNIFICANT CHANGE UP (ref 13–44)
MAGNESIUM SERPL-MCNC: 2.4 MG/DL — SIGNIFICANT CHANGE UP (ref 1.6–2.6)
MCHC RBC-ENTMCNC: 30.8 PG — SIGNIFICANT CHANGE UP (ref 27–34)
MCHC RBC-ENTMCNC: 32.6 GM/DL — SIGNIFICANT CHANGE UP (ref 32–36)
MCV RBC AUTO: 94.6 FL — SIGNIFICANT CHANGE UP (ref 80–100)
MONOCYTES # BLD AUTO: 0.67 K/UL — SIGNIFICANT CHANGE UP (ref 0–0.9)
MONOCYTES NFR BLD AUTO: 13.8 % — SIGNIFICANT CHANGE UP (ref 2–14)
NEUTROPHILS # BLD AUTO: 2.59 K/UL — SIGNIFICANT CHANGE UP (ref 1.8–7.4)
NEUTROPHILS NFR BLD AUTO: 53.3 % — SIGNIFICANT CHANGE UP (ref 43–77)
NRBC # BLD: 0 /100 WBCS — SIGNIFICANT CHANGE UP (ref 0–0)
PHOSPHATE SERPL-MCNC: 2 MG/DL — LOW (ref 2.5–4.5)
PLATELET # BLD AUTO: 110 K/UL — LOW (ref 150–400)
POTASSIUM SERPL-MCNC: 4.1 MMOL/L — SIGNIFICANT CHANGE UP (ref 3.5–5.3)
POTASSIUM SERPL-SCNC: 4.1 MMOL/L — SIGNIFICANT CHANGE UP (ref 3.5–5.3)
PROT SERPL-MCNC: 6.2 G/DL — SIGNIFICANT CHANGE UP (ref 6–8.3)
RBC # BLD: 4.28 M/UL — SIGNIFICANT CHANGE UP (ref 4.2–5.8)
RBC # FLD: 13.7 % — SIGNIFICANT CHANGE UP (ref 10.3–14.5)
SODIUM SERPL-SCNC: 143 MMOL/L — SIGNIFICANT CHANGE UP (ref 135–145)
WBC # BLD: 4.86 K/UL — SIGNIFICANT CHANGE UP (ref 3.8–10.5)
WBC # FLD AUTO: 4.86 K/UL — SIGNIFICANT CHANGE UP (ref 3.8–10.5)

## 2022-11-28 PROCEDURE — 99232 SBSQ HOSP IP/OBS MODERATE 35: CPT

## 2022-11-28 PROCEDURE — 93306 TTE W/DOPPLER COMPLETE: CPT | Mod: 26

## 2022-11-28 PROCEDURE — 99233 SBSQ HOSP IP/OBS HIGH 50: CPT

## 2022-11-28 PROCEDURE — 99233 SBSQ HOSP IP/OBS HIGH 50: CPT | Mod: GC

## 2022-11-28 RX ORDER — POTASSIUM PHOSPHATE, MONOBASIC POTASSIUM PHOSPHATE, DIBASIC 236; 224 MG/ML; MG/ML
15 INJECTION, SOLUTION INTRAVENOUS ONCE
Refills: 0 | Status: COMPLETED | OUTPATIENT
Start: 2022-11-28 | End: 2022-11-28

## 2022-11-28 RX ORDER — SODIUM,POTASSIUM PHOSPHATES 278-250MG
1 POWDER IN PACKET (EA) ORAL ONCE
Refills: 0 | Status: COMPLETED | OUTPATIENT
Start: 2022-11-28 | End: 2022-11-28

## 2022-11-28 RX ADMIN — Medication 1 PACKET(S): at 11:13

## 2022-11-28 RX ADMIN — FINASTERIDE 5 MILLIGRAM(S): 5 TABLET, FILM COATED ORAL at 11:03

## 2022-11-28 RX ADMIN — ATORVASTATIN CALCIUM 20 MILLIGRAM(S): 80 TABLET, FILM COATED ORAL at 11:03

## 2022-11-28 RX ADMIN — TAMSULOSIN HYDROCHLORIDE 0.4 MILLIGRAM(S): 0.4 CAPSULE ORAL at 11:03

## 2022-11-28 RX ADMIN — LACTULOSE 10 GRAM(S): 10 SOLUTION ORAL at 21:16

## 2022-11-28 RX ADMIN — MEMANTINE HYDROCHLORIDE 5 MILLIGRAM(S): 10 TABLET ORAL at 18:02

## 2022-11-28 RX ADMIN — ENOXAPARIN SODIUM 40 MILLIGRAM(S): 100 INJECTION SUBCUTANEOUS at 05:32

## 2022-11-28 RX ADMIN — Medication 200 MILLIGRAM(S): at 18:02

## 2022-11-28 RX ADMIN — POTASSIUM PHOSPHATE, MONOBASIC POTASSIUM PHOSPHATE, DIBASIC 62.5 MILLIMOLE(S): 236; 224 INJECTION, SOLUTION INTRAVENOUS at 11:03

## 2022-11-28 RX ADMIN — Medication 81 MILLIGRAM(S): at 11:03

## 2022-11-28 RX ADMIN — DORZOLAMIDE HYDROCHLORIDE 1 DROP(S): 20 SOLUTION/ DROPS OPHTHALMIC at 21:16

## 2022-11-28 RX ADMIN — DORZOLAMIDE HYDROCHLORIDE 1 DROP(S): 20 SOLUTION/ DROPS OPHTHALMIC at 13:56

## 2022-11-28 RX ADMIN — Medication 200 MILLIGRAM(S): at 05:31

## 2022-11-28 RX ADMIN — DORZOLAMIDE HYDROCHLORIDE 1 DROP(S): 20 SOLUTION/ DROPS OPHTHALMIC at 05:32

## 2022-11-28 RX ADMIN — MEMANTINE HYDROCHLORIDE 5 MILLIGRAM(S): 10 TABLET ORAL at 05:32

## 2022-11-28 NOTE — PROGRESS NOTE ADULT - SUBJECTIVE AND OBJECTIVE BOX
[facility]  INFECTIOUS DISEASES   [address] [place] [address]  Tel: [phone]     Fax: [phone]  ======================================================  MD Ariane Tillman Kaushal, MD Cho, Michelle, MD   ======================================================    Assessment/Recommendations:   89-year-old male with past medical history of coronary artery disease, TIA, dyslipidemia, GERD, prostatic hyperplasia s/p prostatic surgery in 2017 with multiple history of UTIs (mostly from ESBL E. coli but also from Pseudomonas aeruginosa), s/p ureteral stent placement for kidney stones, dementia presented with fever chills altered mental status being admitted for  acute metabolic encephalopathy with febrile illness most likely secondary to COVID-19 viral illness with possible E. Coli UTI, ruled out bacteremia     blood  culture sent on November 24: NTD  Urine Cx: 11.24: E. Coli, not ESBL   CT abdomen pelvis: Noted: Trabecular changes appears to be chronic changes due to prostatic hyperplasia  Since patient has had h/o E. Coli UTI, with AMS, rigors on presentation with urine cx + for E. Coli (despite no pyuria) would treat for short course of Abx; S/p CTX 11.24-11.25, S/p Meropenem from 11.25 through  11.27 and now on Cefpodoxime 200 BID PO; Can discharge on Cefpodoxime for total 7 days Abx (Last date of Abx: 11.30.22)   Recommend Probiotics on d/c     Remained > 95% on RA through hospital course without respiratory distress, and no other s/s of COVID-19 pneumonia, supportive mx only for now   IS  PT        Plan explained to patient   D/w Primary team     ________________________________    Last 24 hours:   Overall feels better  Denied any fever, chills, N/V/Abd pain  Denied any hematuria, dysuria, frequency   Normal mentation   Denied any rigors      Further ROS:  All systems reviewed. No other complaints besides mentioned above     ______________________________  Allergies    No Known Allergies    ANTIBIOTICS/RELEVANT:  antimicrobials  cefpodoxime 200 milliGRAM(s) Oral every 12 hours    immunologic:    OTHER:  acetaminophen     Tablet .. 650 milliGRAM(s) Oral every 6 hours PRN  aspirin enteric coated 81 milliGRAM(s) Oral daily  atorvastatin 20 milliGRAM(s) Oral daily  dorzolamide 2% Ophthalmic Solution 1 Drop(s) Both EYES three times a day  enoxaparin Injectable 40 milliGRAM(s) SubCutaneous every 24 hours  finasteride 5 milliGRAM(s) Oral daily  lactulose Syrup 10 Gram(s) Oral at bedtime  memantine 5 milliGRAM(s) Oral two times a day  potassium phosphate IVPB 15 milliMole(s) IV Intermittent once  tamsulosin 0.4 milliGRAM(s) Oral daily      _________________    PHYSICAL EXAM:    Objective:  Vital Signs Last 24 Hrs  T(C): 36.9 (28 Nov 2022 04:15), Max: 36.9 (28 Nov 2022 04:15)  T(F): 98.5 (28 Nov 2022 04:15), Max: 98.5 (28 Nov 2022 04:15)  HR: 65 (28 Nov 2022 04:15) (58 - 65)  BP: 124/71 (28 Nov 2022 04:15) (124/64 - 124/71)  RR: 17 (28 Nov 2022 04:15) (17 - 17)  SpO2: 97% (28 Nov 2022 04:15) (97% - 97%)  Parameters below as of 28 Nov 2022 04:15  Patient On (Oxygen Delivery Method): room air  General: No acute distress while sitting in bed, [place]  Neuro: AAO*3, No obvious acute motor deficit  HEENT: Pupils equal, reactive to light, Oral mucosa moist  PULM: Clear to auscultation bilaterally, no significant adventitious breath sounds, Except minimally decreased at the bases   CVS: Regular rhythm and controlled rate, 1/6 systolic murmur  ABD: Soft, nondistended, nontender, normoactive bowel sounds, no CVA tenderness  EXT: No b/l LE edema, nontender with pedal pulse palpable   SKIN: Warm and well perfused, no acute rashes   NO obvious palpable lymphadenopathy   ______________  LABS, MICROBIOLOGY, RADIOLOGY & ADDITIONAL STUDIES: Reviewed

## 2022-11-28 NOTE — PROGRESS NOTE ADULT - PROBLEM SELECTOR PLAN 2
- Pt's HR in the 30s when transferred to the unit from the ER on 11/25, asymptomatic  - STAT EKG showed 2:1 AVN block along with pt's previously noted 1st degree AVB   - Donepezil discontinued given bradycardia as may be contributing  - Monitor on remote tele -- HR stable for past three days with lows in the 50s  - Brief episode of tachycardia up to 106 overnight (11/27-11/28)  - Cardiology (Dr. Ellis group) following, recs appreciated  - Per discussion with cardio, pt's 2nd degree AV block was [place]enckebach when he was no longer in 2:1 conduction and pt has known significant conduction disease  - Pt is asymptomatic and not recommended for pacemaker at this time - monitor closely  - TTE ordered, f/u results

## 2022-11-28 NOTE — PROGRESS NOTE ADULT - PROBLEM SELECTOR PLAN 1
- Presented to the ED w/ increased urgency, decreased flow, history of ESBL E. coli UTI secondary to left kidney stones s/p ureteral stent placement (4/30/21). Temp of 101.  - CT abdomen pelvis w/ Urinary bladder wall thickening with trabeculation and multiple diverticula. Sequelae of chronic outlet obstruction from prostate enlargement. Superimposed cystitis Additional hyperdense foci seen along the posterior bladder wall may represent bladder wall calcifications versus layering bladder stones.   UA w/ positive nitrate, moderate LE, many bacteria   - given meropenem empirically by ID for possibility of ESBL-producing bacteria or pseudomonas  - Urine cultures growing >100,000 E.Coli sensitive to cephalosporins  - d/c meropenem and start cefpodoxime 200mg po BID for 3 days more days per discussion with ID  - Tylenol PRN for fever and pain   - Monitor for fever and leukocytosis   - BCx x 2 11/24 NGTD   - Infectious disease (Dr. Loya group) following, recs appreciated - Presented to the ED w/ increased urgency, decreased flow, history of ESBL E. coli UTI secondary to left kidney stones s/p ureteral stent placement (4/30/21). Temp of 101.  - CT abdomen pelvis w/ Urinary bladder wall thickening with trabeculation and multiple diverticula. Sequelae of chronic outlet obstruction from prostate enlargement. Superimposed cystitis Additional hyperdense foci seen along the posterior bladder wall may represent bladder wall calcifications versus layering bladder stones.   UA w/ positive nitrate, moderate LE, many bacteria   - given meropenem empirically by ID for possibility of ESBL-producing bacteria or pseudomonas  - Urine cultures growing >100,000 E.Coli sensitive to cephalosporins  - on 11/27, d/c meropenem and started cefpodoxime 200mg po BID for 3 days more days per discussion with ID  - Tylenol PRN for fever and pain   - Monitor for fever and leukocytosis   - BCx x 2 11/24 NGTD   - Infectious disease (Dr. Loya group) following, recs appreciated

## 2022-11-28 NOTE — PROGRESS NOTE ADULT - ASSESSMENT
89 y/o male w/ a PMH of CAD s/p stents (2014), TIA, HLD, GERD, BPH s/p prostate surgery (2017), ESBL E. coli UTI secondary to left kidney stones s/p ureteral stent placement (4/30/21), Dementia p/w fever, chills, and urinary frequency. Admit for Cystitis and COVID. Cardiology called for bradycardia and possible heart block.      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   - Mobitz type 1 and short episodes of 2:1 HB noted, 11/25, with rates in 40s   - No further arrhythmias noted.  Would continue to monitor on tele for now.  Can D/c in am, 11/29, if maintains NSR  - Patient prior EKG from 07/2022 was noted to have SR with 1HB with 2 blocked PACs in 12 lead EKG. It is likely that he has conduction disease for some time.   - Follow up echocardiogram to assess for structural heart disease   - Avoid AVN blockers    - H/o CAD s/p stents (2014)  - No anginal complaints  - Continue statin and ASA.    - BP stable and controlled     - COVID and UTI management per primary     - Monitor and replete lytes, keep K>4, Mg>2.  - Will continue to follow.    Marnie Keller DNP, NP-C  Cardiology   Spectra #[phone]

## 2022-11-28 NOTE — PROGRESS NOTE ADULT - SUBJECTIVE AND OBJECTIVE BOX
Patient is a 89y old  Male who presents with a chief complaint of Cystitis (28 Nov 2022 10:17)      INTERVAL HPI/OVERNIGHT EVENTS: Pt was seen and examined at bedside. No acute overnight events. Pt states that he feels alright. He gets confused at time but that may be likely to dementia.  Pt denies headache, dizziness, lightheadedness, fever, chills, body aches, CP, SOB, palpitations, abdominal pain, n/v, numbness/tingling.  No other complaints at this time.     MEDICATIONS  (STANDING):  aspirin enteric coated 81 milliGRAM(s) Oral daily  atorvastatin 20 milliGRAM(s) Oral daily  cefpodoxime 200 milliGRAM(s) Oral every 12 hours  dorzolamide 2% Ophthalmic Solution 1 Drop(s) Both EYES three times a day  enoxaparin Injectable 40 milliGRAM(s) SubCutaneous every 24 hours  finasteride 5 milliGRAM(s) Oral daily  lactulose Syrup 10 Gram(s) Oral at bedtime  memantine 5 milliGRAM(s) Oral two times a day  tamsulosin 0.4 milliGRAM(s) Oral daily    MEDICATIONS  (PRN):  acetaminophen     Tablet .. 650 milliGRAM(s) Oral every 6 hours PRN Temp greater or equal to 38C (100.4F), Mild Pain (1 - 3)      Allergies    No Known Allergies    Intolerances        REVIEW OF SYSTEMS:  CONSTITUTIONAL: No fever or chills  HEENT:  No headache, no sore throat  RESPIRATORY: No cough, wheezing, or shortness of breath  CARDIOVASCULAR: No chest pain, palpitations  GASTROINTESTINAL: No abd pain, nausea, vomiting, or diarrhea  GENITOURINARY: No dysuria, frequency, or hematuria  NEUROLOGICAL: no focal weakness or dizziness  MUSCULOSKELETAL: no myalgias     Vital Signs Last 24 Hrs  T(C): 36.8 (28 Nov 2022 12:07), Max: 36.9 (28 Nov 2022 04:15)  T(F): 98.3 (28 Nov 2022 12:07), Max: 98.5 (28 Nov 2022 04:15)  HR: 65 (28 Nov 2022 12:07) (58 - 65)  BP: 117/77 (28 Nov 2022 12:07) (117/77 - 124/71)  BP(mean): --  RR: 17 (28 Nov 2022 12:07) (17 - 17)  SpO2: 96% (28 Nov 2022 12:07) (96% - 97%)    Parameters below as of 28 Nov 2022 12:07  Patient On (Oxygen Delivery Method): room air        PHYSICAL EXAM:  GENERAL: NAD, pleasant, confused at times  HEENT:  anicteric, moist mucous membranes  CHEST/LUNG:  CTA b/l, no rales, wheezes, or rhonchi, in no respiratory distress, on RA   HEART:  RRR, S1, S2  ABDOMEN:  BS+, soft, nontender, nondistended  EXTREMITIES: no edema, cyanosis, or calf tenderness  NERVOUS SYSTEM: answers questions and follows commands appropriately at times   LABS:                        13.2   4.86  )-----------( 110      ( 28 Nov 2022 06:05 )             40.5     CBC Full  -  ( 28 Nov 2022 06:05 )  WBC Count : 4.86 K/uL  Hemoglobin : 13.2 g/dL  Hematocrit : 40.5 %  Platelet Count - Automated : 110 K/uL  Mean Cell Volume : 94.6 fl  Mean Cell Hemoglobin : 30.8 pg  Mean Cell Hemoglobin Concentration : 32.6 gm/dL  Auto Neutrophil # : 2.59 K/uL  Auto Lymphocyte # : 1.28 K/uL  Auto Monocyte # : 0.67 K/uL  Auto Eosinophil # : 0.30 K/uL  Auto Basophil # : 0.02 K/uL  Auto Neutrophil % : 53.3 %  Auto Lymphocyte % : 26.3 %  Auto Monocyte % : 13.8 %  Auto Eosinophil % : 6.2 %  Auto Basophil % : 0.4 %    28 Nov 2022 06:05    143    |  111    |  15     ----------------------------<  93     4.1     |  26     |  0.86     Ca    9.4        28 Nov 2022 06:05  Phos  2.0       28 Nov 2022 06:05  Mg     2.4       28 Nov 2022 06:05    TPro  6.2    /  Alb  2.6    /  TBili  0.8    /  DBili  x      /  AST  33     /  ALT  13     /  AlkPhos  67     28 Nov 2022 06:05        CAPILLARY BLOOD GLUCOSE            Culture - Urine (collected 11-24-22 @ 21:50)  Source: Clean Catch Clean Catch (Midstream)  Final Report (11-27-22 @ 14:39):    >100,000 CFU/ml Escherichia coli  Organism: Escherichia coli (11-27-22 @ 14:39)  Organism: Escherichia coli (11-27-22 @ 14:39)      -  Amikacin: S <=16      -  Amoxicillin/Clavulanic Acid: S <=8/4      -  Ampicillin: S <=8 These ampicillin results predict results for amoxicillin      -  Ampicillin/Sulbactam: S <=4/2 Enterobacter, Klebsiella aerogenes, Citrobacter, and Serratia may develop resistance during prolonged therapy (3-4 days)      -  Aztreonam: S <=4      -  Cefazolin: S 16 For uncomplicated UTI with K. pneumoniae, E. coli, or P. mirablis: LUIS <=16 is sensitive and LUIS >=32 is resistant. This also predicts results for oral agents cefaclor, cefdinir, cefpodoxime, cefprozil, cefuroxime axetil, cephalexin and locarbeffor uncomplicated UTI. Note that some isolates may be susceptible to these agents while testing resistant to cefazolin.      -  Cefepime: S <=2      -  Cefoxitin: S <=8      -  Ceftriaxone: S <=1 Enterobacter, Klebsiella aerogenes, Citrobacter, and Serratia may develop resistance during prolonged therapy      -  Ciprofloxacin: R >2      -  Ertapenem: S <=0.5      -  Gentamicin: S <=2      -  Imipenem: S <=1      -  Levofloxacin: R >4      -  Meropenem: S <=1      -  Nitrofurantoin: S <=32 Should not be used to treat pyelonephritis      -  Piperacillin/Tazobactam: S <=8      -  Tobramycin: S <=2      -  Trimethoprim/Sulfamethoxazole: S <=0.5/9.5      Method Type: LUIS    Culture - Blood (collected 11-24-22 @ 20:50)  Source: .Blood Blood-Peripheral  Preliminary Report (11-26-22 @ 01:02):    No growth to date.    Culture - Blood (collected 11-24-22 @ 20:40)  Source: .Blood Blood-Peripheral  Preliminary Report (11-26-22 @ 01:02):    No growth to date.        RADIOLOGY & ADDITIONAL TESTS: no new tests or imaging    Personally reviewed.     Consultant(s) Notes Reviewed:  [x] YES  [ ] NO       Patient is a 89y old  Male who presents with a chief complaint of fever, increased urinary frequency.      INTERVAL HPI/OVERNIGHT EVENTS: Pt was seen and examined at bedside. No acute overnight events. Pt states that he feels "alright." Pt gets confused at times, but that is likely close to baseline mild dementia.  Pt denies headache, lightheadedness, fever, chills, body aches, CP, SOB, palpitations, abdominal pain, n/v.    MEDICATIONS  (STANDING):  aspirin enteric coated 81 milliGRAM(s) Oral daily  atorvastatin 20 milliGRAM(s) Oral daily  cefpodoxime 200 milliGRAM(s) Oral every 12 hours  dorzolamide 2% Ophthalmic Solution 1 Drop(s) Both EYES three times a day  enoxaparin Injectable 40 milliGRAM(s) SubCutaneous every 24 hours  finasteride 5 milliGRAM(s) Oral daily  lactulose Syrup 10 Gram(s) Oral at bedtime  memantine 5 milliGRAM(s) Oral two times a day  tamsulosin 0.4 milliGRAM(s) Oral daily    MEDICATIONS  (PRN):  acetaminophen     Tablet .. 650 milliGRAM(s) Oral every 6 hours PRN Temp greater or equal to 38C (100.4F), Mild Pain (1 - 3)      Allergies    No Known Allergies    Intolerances        REVIEW OF SYSTEMS:  CONSTITUTIONAL: No fever or chills  HEENT:  No headache, no sore throat  RESPIRATORY: No cough, wheezing, or shortness of breath  CARDIOVASCULAR: No chest pain, palpitations  GASTROINTESTINAL: No abd pain, nausea, vomiting, or diarrhea  GENITOURINARY: No dysuria, frequency, or hematuria  NEUROLOGICAL: no focal weakness or dizziness  MUSCULOSKELETAL: no myalgias     Vital Signs Last 24 Hrs  T(C): 36.8 (28 Nov 2022 12:07), Max: 36.9 (28 Nov 2022 04:15)  T(F): 98.3 (28 Nov 2022 12:07), Max: 98.5 (28 Nov 2022 04:15)  HR: 65 (28 Nov 2022 12:07) (58 - 65)  BP: 117/77 (28 Nov 2022 12:07) (117/77 - 124/71)  BP(mean): --  RR: 17 (28 Nov 2022 12:07) (17 - 17)  SpO2: 96% (28 Nov 2022 12:07) (96% - 97%)    Parameters below as of 28 Nov 2022 12:07  Patient On (Oxygen Delivery Method): room air        PHYSICAL EXAM:  GENERAL: NAD, pleasant, confused at times  HEENT:  anicteric, moist mucous membranes  CHEST/LUNG:  CTA b/l, no rales, wheezes, or rhonchi, in no respiratory distress, on RA   HEART:  RRR, S1, S2  ABDOMEN:  BS+, soft, nontender, nondistended  EXTREMITIES: no edema, cyanosis, or calf tenderness  NERVOUS SYSTEM: answers simple questions and follows simple commands appropriately      LABS:                        13.2   4.86  )-----------( 110      ( 28 Nov 2022 06:05 )             40.5     CBC Full  -  ( 28 Nov 2022 06:05 )  WBC Count : 4.86 K/uL  Hemoglobin : 13.2 g/dL  Hematocrit : 40.5 %  Platelet Count - Automated : 110 K/uL  Mean Cell Volume : 94.6 fl  Mean Cell Hemoglobin : 30.8 pg  Mean Cell Hemoglobin Concentration : 32.6 gm/dL  Auto Neutrophil # : 2.59 K/uL  Auto Lymphocyte # : 1.28 K/uL  Auto Monocyte # : 0.67 K/uL  Auto Eosinophil # : 0.30 K/uL  Auto Basophil # : 0.02 K/uL  Auto Neutrophil % : 53.3 %  Auto Lymphocyte % : 26.3 %  Auto Monocyte % : 13.8 %  Auto Eosinophil % : 6.2 %  Auto Basophil % : 0.4 %    28 Nov 2022 06:05    143    |  111    |  15     ----------------------------<  93     4.1     |  26     |  0.86     Ca    9.4        28 Nov 2022 06:05  Phos  2.0       28 Nov 2022 06:05  Mg     2.4       28 Nov 2022 06:05    TPro  6.2    /  Alb  2.6    /  TBili  0.8    /  DBili  x      /  AST  33     /  ALT  13     /  AlkPhos  67     28 Nov 2022 06:05        CAPILLARY BLOOD GLUCOSE            Culture - Urine (collected 11-24-22 @ 21:50)  Source: Clean Catch Clean Catch (Midstream)  Final Report (11-27-22 @ 14:39):    >100,000 CFU/ml Escherichia coli  Organism: Escherichia coli (11-27-22 @ 14:39)  Organism: Escherichia coli (11-27-22 @ 14:39)      -  Amikacin: S <=16      -  Amoxicillin/Clavulanic Acid: S <=8/4      -  Ampicillin: S <=8 These ampicillin results predict results for amoxicillin      -  Ampicillin/Sulbactam: S <=4/2 Enterobacter, Klebsiella aerogenes, Citrobacter, and Serratia may develop resistance during prolonged therapy (3-4 days)      -  Aztreonam: S <=4      -  Cefazolin: S 16 For uncomplicated UTI with K. pneumoniae, E. coli, or P. mirablis: LUIS <=16 is sensitive and LUIS >=32 is resistant. This also predicts results for oral agents cefaclor, cefdinir, cefpodoxime, cefprozil, cefuroxime axetil, cephalexin and locarbeffor uncomplicated UTI. Note that some isolates may be susceptible to these agents while testing resistant to cefazolin.      -  Cefepime: S <=2      -  Cefoxitin: S <=8      -  Ceftriaxone: S <=1 Enterobacter, Klebsiella aerogenes, Citrobacter, and Serratia may develop resistance during prolonged therapy      -  Ciprofloxacin: R >2      -  Ertapenem: S <=0.5      -  Gentamicin: S <=2      -  Imipenem: S <=1      -  Levofloxacin: R >4      -  Meropenem: S <=1      -  Nitrofurantoin: S <=32 Should not be used to treat pyelonephritis      -  Piperacillin/Tazobactam: S <=8      -  Tobramycin: S <=2      -  Trimethoprim/Sulfamethoxazole: S <=0.5/9.5      Method Type: LUSI    Culture - Blood (collected 11-24-22 @ 20:50)  Source: .Blood Blood-Peripheral  Preliminary Report (11-26-22 @ 01:02):    No growth to date.    Culture - Blood (collected 11-24-22 @ 20:40)  Source: .Blood Blood-Peripheral  Preliminary Report (11-26-22 @ 01:02):    No growth to date.        RADIOLOGY & ADDITIONAL TESTS: no new tests or imaging    Personally reviewed.     Consultant(s) Notes Reviewed:  [x] YES  [ ] NO

## 2022-11-28 NOTE — PROGRESS NOTE ADULT - PROBLEM SELECTOR PLAN 3
- pt tested positive for COVID in the ER on admission  - Denies any URI symptoms. COVID vaccinated 1  x J&J and 2 x Pfizer Booster. Currently satting well on RA. CT Lung w/ Multiple bilateral pulmonary nodules noted. The largest measures 6 mm. Multiple nodules.  - Supplement oxygen PRN   - Tylenol PRN for fever   - Supportive therapy, Per ID would not benefit from Remdesivir or Decadron at this time  - Infectious disease (Dr. Loya group) following, recs appreciated  - Pulmonology (Dr. Hinkle) following, aspiration precautions

## 2022-11-28 NOTE — PROGRESS NOTE ADULT - PROBLEM SELECTOR PLAN 4
Likely 2/2 acute infection   - PT Eval - home, no PT needs   - Fall precautions Likely 2/2 acute infection   - PT Eval - home, no PT needs   - Fall precautions    #electrolyte abnormalities  - hypophosphatemia - Phos 2.0 (11/28)  - KPhos IVPB  - F/u AM CMP

## 2022-11-28 NOTE — PROGRESS NOTE ADULT - ASSESSMENT
89 y/o male w/ a PMHx of CAD s/p stents (2014), TIA, HLD, GERD, BPH s/p prostate surgery (2017), ESBL E. coli UTI secondary to left kidney stones s/p ureteral stent placement (4/30/21), Dementia presents to the ED w/ fever, chills, and urinary frequency.    pulm nodules  hiatal hernia   source of infection eval in progress  HLD  BPH  GERD  nephrolithiasis   CAD - stents - PCI-   Dementia      on PO VANTIN  vs noted  labs reviewed    cardio eval - AF eval - hx of cad - htn hld - tia -   [facility] rx regimen  BP control  pulm nodules - sub cm - bronchiectasis - aspiration is a concern in pt with Dementia and Hiatal hernia - CT chest reviewed -   HOB elev  asp prec  on pureed diet  Hiatal hernia - GERD - consider PPI or H2 blocker - antacid - At Risk for Aspiration  covid positive - isolation precs - dvt P - acap prn - cough rx regimen prn - consideration for Remdesivir - will defer to ID -   assist with needs - ADL  GOC discussion

## 2022-11-28 NOTE — PROGRESS NOTE ADULT - SUBJECTIVE AND OBJECTIVE BOX
[facility] -- Tony Ellis Patel, [place], [place]  Office # [phone]    Follow Up:  Bradycardia and Mobitz 1    Subjective/Observations: Awake and alert.  Comfortable on RA.  Denies any form of respiratory or cardiac discomfort.  No tele events    REVIEW OF SYSTEMS: All other review of systems is negative unless indicated above  PAST MEDICAL & SURGICAL HISTORY:  Dementia  Kidney stones  Stented coronary artery    MEDICATIONS  (STANDING):  aspirin enteric coated 81 milliGRAM(s) Oral daily  atorvastatin 20 milliGRAM(s) Oral daily  cefpodoxime 200 milliGRAM(s) Oral every 12 hours  dorzolamide 2% Ophthalmic Solution 1 Drop(s) Both EYES three times a day  enoxaparin Injectable 40 milliGRAM(s) SubCutaneous every 24 hours  finasteride 5 milliGRAM(s) Oral daily  lactulose Syrup 10 Gram(s) Oral at bedtime  memantine 5 milliGRAM(s) Oral two times a day  tamsulosin 0.4 milliGRAM(s) Oral daily    MEDICATIONS  (PRN):  acetaminophen     Tablet .. 650 milliGRAM(s) Oral every 6 hours PRN Temp greater or equal to 38C (100.4F), Mild Pain (1 - 3)    Allergies    No Known Allergies    Intolerances    Vital Signs Last 24 HrsT(C): 36.8 (28 Nov 2022 12:07), Max: 36.9 (28 Nov 2022 04:15)  T(F): 98.3 (28 Nov 2022 12:07), Max: 98.5 (28 Nov 2022 04:15)  HR: 65 (28 Nov 2022 12:07) (58 - 65)  BP: 117/77 (28 Nov 2022 12:07) (117/77 - 124/71)  BP(mean): --  RR: 17 (28 Nov 2022 12:07) (17 - 17)  SpO2: 96% (28 Nov 2022 12:07) (96% - 97%)    Parameters below as of 28 Nov 2022 12:07  Patient On (Oxygen Delivery Method): room air  I&O's Summary      PHYSICAL EXAM:  TELE: NSR  Constitutional: NAD, awake and alert, well-developed  HEENT: Moist Mucous Membranes, Anicteric. [place]  Pulmonary: Non-labored, breath sounds diminished bilaterally, No wheezing, rales + fine rhonchi  Cardiovascular: Regular, S1 and S2, No murmurs, rubs, gallops or clicks  Gastrointestinal: Bowel Sounds present, soft, nontender.   Lymph: No peripheral edema. No lymphadenopathy.  Skin: No visible rashes or ulcers.  Psych:  Mood & affect appropriate  LABS: All Labs Reviewed:                        13.2   4.86  )-----------( 110      ( 28 Nov 2022 06:05 )             40.5                         12.6   6.32  )-----------( 102      ( 27 Nov 2022 07:10 )             37.8                         13.1   7.56  )-----------( 91       ( 26 Nov 2022 07:43 )             40.1     28 Nov 2022 06:05    143    |  111    |  15     ----------------------------<  93     4.1     |  26     |  0.86   27 Nov 2022 07:10    144    |  110    |  14     ----------------------------<  93     3.6     |  28     |  0.73   26 Nov 2022 07:43    141    |  109    |  15     ----------------------------<  84     3.7     |  24     |  0.81     Ca    9.4        28 Nov 2022 06:05  Ca    9.4        27 Nov 2022 07:10  Ca    9.5        26 Nov 2022 07:43  Phos  2.0       28 Nov 2022 06:05  Phos  2.6       27 Nov 2022 07:10  Phos  2.1       26 Nov 2022 07:43  Mg     2.4       28 Nov 2022 06:05  Mg     2.2       27 Nov 2022 07:10  Mg     2.0       26 Nov 2022 07:43    TPro  6.2    /  Alb  2.6    /  TBili  0.8    /  DBili  x      /  AST  33     /  ALT  13     /  AlkPhos  67     28 Nov 2022 06:05  TPro  5.9    /  Alb  2.6    /  TBili  0.9    /  DBili  x      /  AST  32     /  ALT  11     /  AlkPhos  63     27 Nov 2022 07:10  TPro  6.3    /  Alb  2.8    /  TBili  1.0    /  DBili  x      /  AST  30     /  ALT  11     /  AlkPhos  76     26 Nov 2022 07:43    TTE pending      ACC: [id number] EXAM:  CT ABDOMEN AND PELVIS                        ACC: [id number] EXAM:  CT CHEST                          PROCEDURE DATE:  11/24/2022          INTERPRETATION:  CLINICAL INFORMATION: Fever, generalized weakness,   shaking and disorientation.    COMPARISON: None.    CONTRAST/COMPLICATIONS:  IV Contrast: NONE  0 cc administered   0 cc discarded  Oral Contrast: NONE  Complications: None reported at time of study completion    PROCEDURE:  CT of the Chest, Abdomen and Pelvis was performed.  Sagittal and coronal reformats were performed.    FINDINGS:  CHEST:  LUNGS AND LARGE AIRWAYS: The central tracheobronchial tree is patent.   There is calcified granuloma of the right upper lobe. There are scattered   bilateral pulmonary nodules which range in size from 2 mm to 6 mm. Mild   peripheral fibrotic changes are appreciated. Dependent atelectasis is   seen posteriorly.  PLEURA: No pleural effusion.  VESSELS: Coronary, aortic and branch vessel calcifications are   appreciated. The aorta and pulmonary artery are of normal caliber..  HEART: Heart size is normal. No pericardial effusion. Valvular   calcifications are noted.  MEDIASTINUM AND QUETA: There is an increased number of nonenlarged   mediastinal lymph nodes, nonspecific. There is a moderate to large hiatal   hernia.  CHEST WALL AND LOWER NECK: Within normal limits.    ABDOMEN AND PELVIS:  LIVER: There is calcified granuloma of the right hepatic lobe..  BILE DUCTS: Normal caliber.  GALLBLADDER: 2.7 cm laminated gallstone appreciated..  SPLEEN: Within normal limits.  PANCREAS: Within normal limits.  ADRENALS: 1 cm indeterminate left adrenal gland nodule..  KIDNEYS/URETERS: 7 mm nonobstructing calculus of the right upper pole   kidney. Multiple left-sided renal calculi measuring up to 3 mm, without   associated obstruction. Linear calcification seen along the posterior   lower renal hilum may be vascular in nature. Left lower pole renal cyst   is noted.    BLADDER: Thick-walled trabeculated urinary bladder with multiple   diverticula noted. Punctate hyperdense foci seen along the posterior   bladder wall and diverticula may represent wall calcification or small   bladder stones..  REPRODUCTIVE ORGANS: Asymmetric calcification of the prostate is   appreciated, uncertain significance. The prostate is prominent.    BOWEL: There is moderate to large hiatal hernia. No bowel obstruction.   Appendix is normal.  PERITONEUM: No ascites.  VESSELS: Atherosclerotic changes.  RETROPERITONEUM/LYMPH NODES: No lymphadenopathy.  ABDOMINAL WALL: Right fat-containing inguinal hernia is noted.  BONES: Degenerative changes. There is generalized osteopenia.    IMPRESSION:  Urinary bladder wall thickening with trabeculation and multiple   diverticula. This appearance is likelyrelated to sequelae of chronic   outlet obstruction from prostate enlargement. Superimposed cystitis   should be evaluated for clinically, with correlation with urinalysis.   Additional hyperdense foci seen along the posterior bladder wall may   represent bladder wall calcifications versus layering bladder stones.    Moderate to large hiatal hernia.    Cholelithiasis.    Indeterminate left adrenal gland nodule for which further correlation can   be obtained with adrenal protocol MRI or CT.    Multiple bilateral pulmonary nodules noted. The largest measures 6 mm.   Multiple nodules, with largest measuring greater than 6 mm, in a low or   high risk patient should be followed with CT at 3-6 months, then consider   CT at 18-24 months. -- ?Reference: Guidelines for Management of   Incidental Pulmonary Nodules Detected on CT Images: From the Fleischner   Society 2017?    -- End of Report ---     CHIN DIEHL M.D., Attending Radiologist  This document has been electronically signed.Nov 24 2022  9:45PM    Ventricular Rate 35 BPM    Atrial Rate 35 BPM    P-R Interval 286 ms    QRS Duration 80 ms    Q-T Interval 426 ms    QTC Calculation(Bazett) 325 ms    P Axis 60 degrees    R Axis 5 degrees    T Axis 30 degrees    Diagnosis Line Marked sinus bradycardia with 1st degree AV block  2:1 avb  Nonspecific T wave abnormality  Abnormal ECG    Confirmed by Billy Ellis MD ([id number]) on 11/25/2022 4:41:34 PM

## 2022-11-28 NOTE — PROGRESS NOTE ADULT - PROBLEM SELECTOR PLAN 9
- DVT PPx w/ Lovenox  - Fall precautions    #DISP:  - PT - home, no PT needs  - Patient son and daughter currently sick with COVID and stated they are unable to take care of patient at home today due to their significant weakness.  - Will likely have to keep patient inpatient until caretaker is ready to take patient home.

## 2022-11-28 NOTE — PROGRESS NOTE ADULT - SUBJECTIVE AND OBJECTIVE BOX
Date/Time Patient Seen:  		  Referring MD:   Data Reviewed	       Patient is a 89y old  Male who presents with a chief complaint of acute UTI, COVID (27 Nov 2022 14:57)      Subjective/HPI     PAST MEDICAL & SURGICAL HISTORY:  Dementia    Kidney stones    Stented coronary artery          Medication list         MEDICATIONS  (STANDING):  aspirin enteric coated 81 milliGRAM(s) Oral daily  atorvastatin 20 milliGRAM(s) Oral daily  cefpodoxime 200 milliGRAM(s) Oral every 12 hours  dorzolamide 2% Ophthalmic Solution 1 Drop(s) Both EYES three times a day  enoxaparin Injectable 40 milliGRAM(s) SubCutaneous every 24 hours  finasteride 5 milliGRAM(s) Oral daily  lactulose Syrup 10 Gram(s) Oral at bedtime  memantine 5 milliGRAM(s) Oral two times a day  tamsulosin 0.4 milliGRAM(s) Oral daily    MEDICATIONS  (PRN):  acetaminophen     Tablet .. 650 milliGRAM(s) Oral every 6 hours PRN Temp greater or equal to 38C (100.4F), Mild Pain (1 - 3)         Vitals log        ICU Vital Signs Last 24 Hrs  T(C): 36.9 (28 Nov 2022 04:15), Max: 36.9 (28 Nov 2022 04:15)  T(F): 98.5 (28 Nov 2022 04:15), Max: 98.5 (28 Nov 2022 04:15)  HR: 65 (28 Nov 2022 04:15) (58 - 65)  BP: 124/71 (28 Nov 2022 04:15) (124/64 - 124/71)  BP(mean): --  ABP: --  ABP(mean): --  RR: 17 (28 Nov 2022 04:15) (17 - 17)  SpO2: 97% (28 Nov 2022 04:15) (97% - 97%)    O2 Parameters below as of 28 Nov 2022 04:15  Patient On (Oxygen Delivery Method): room air                 Input and Output:  I&O's Detail      Lab Data                        12.6   6.32  )-----------( 102      ( 27 Nov 2022 07:10 )             37.8     11-27    144  |  110<H>  |  14  ----------------------------<  93  3.6   |  28  |  0.73    Ca    9.4      27 Nov 2022 07:10  Phos  2.6     11-27  Mg     2.2     11-27    TPro  5.9<L>  /  Alb  2.6<L>  /  TBili  0.9  /  DBili  x   /  AST  32  /  ALT  11<L>  /  AlkPhos  63  11-27            Review of Systems	      Objective     Physical Examination    heart s1s2  lung dec BS  head nc      Pertinent Lab findings & Imaging      Nj:  NO   Adequate UO     I&O's Detail           Discussed with:     Cultures:	        Radiology

## 2022-11-29 LAB
ALBUMIN SERPL ELPH-MCNC: 2.7 G/DL — LOW (ref 3.3–5)
ALP SERPL-CCNC: 74 U/L — SIGNIFICANT CHANGE UP (ref 40–120)
ALT FLD-CCNC: 14 U/L — SIGNIFICANT CHANGE UP (ref 12–78)
ANION GAP SERPL CALC-SCNC: 3 MMOL/L — LOW (ref 5–17)
AST SERPL-CCNC: 39 U/L — HIGH (ref 15–37)
BASOPHILS # BLD AUTO: 0.01 K/UL — SIGNIFICANT CHANGE UP (ref 0–0.2)
BASOPHILS NFR BLD AUTO: 0.2 % — SIGNIFICANT CHANGE UP (ref 0–2)
BILIRUB SERPL-MCNC: 0.9 MG/DL — SIGNIFICANT CHANGE UP (ref 0.2–1.2)
BUN SERPL-MCNC: 15 MG/DL — SIGNIFICANT CHANGE UP (ref 7–23)
CALCIUM SERPL-MCNC: 9.6 MG/DL — SIGNIFICANT CHANGE UP (ref 8.5–10.1)
CHLORIDE SERPL-SCNC: 112 MMOL/L — HIGH (ref 96–108)
CO2 SERPL-SCNC: 28 MMOL/L — SIGNIFICANT CHANGE UP (ref 22–31)
CREAT SERPL-MCNC: 0.81 MG/DL — SIGNIFICANT CHANGE UP (ref 0.5–1.3)
EGFR: 82 ML/MIN/1.73M2 — SIGNIFICANT CHANGE UP
EOSINOPHIL # BLD AUTO: 0.23 K/UL — SIGNIFICANT CHANGE UP (ref 0–0.5)
EOSINOPHIL NFR BLD AUTO: 4.5 % — SIGNIFICANT CHANGE UP (ref 0–6)
GLUCOSE SERPL-MCNC: 92 MG/DL — SIGNIFICANT CHANGE UP (ref 70–99)
HCT VFR BLD CALC: 42.3 % — SIGNIFICANT CHANGE UP (ref 39–50)
HGB BLD-MCNC: 13.9 G/DL — SIGNIFICANT CHANGE UP (ref 13–17)
IMM GRANULOCYTES NFR BLD AUTO: 0.2 % — SIGNIFICANT CHANGE UP (ref 0–0.9)
LYMPHOCYTES # BLD AUTO: 1.35 K/UL — SIGNIFICANT CHANGE UP (ref 1–3.3)
LYMPHOCYTES # BLD AUTO: 26.6 % — SIGNIFICANT CHANGE UP (ref 13–44)
MCHC RBC-ENTMCNC: 30.8 PG — SIGNIFICANT CHANGE UP (ref 27–34)
MCHC RBC-ENTMCNC: 32.9 GM/DL — SIGNIFICANT CHANGE UP (ref 32–36)
MCV RBC AUTO: 93.8 FL — SIGNIFICANT CHANGE UP (ref 80–100)
MONOCYTES # BLD AUTO: 0.58 K/UL — SIGNIFICANT CHANGE UP (ref 0–0.9)
MONOCYTES NFR BLD AUTO: 11.4 % — SIGNIFICANT CHANGE UP (ref 2–14)
NEUTROPHILS # BLD AUTO: 2.89 K/UL — SIGNIFICANT CHANGE UP (ref 1.8–7.4)
NEUTROPHILS NFR BLD AUTO: 57.1 % — SIGNIFICANT CHANGE UP (ref 43–77)
NRBC # BLD: 0 /100 WBCS — SIGNIFICANT CHANGE UP (ref 0–0)
PHOSPHATE SERPL-MCNC: 3 MG/DL — SIGNIFICANT CHANGE UP (ref 2.5–4.5)
PLATELET # BLD AUTO: 127 K/UL — LOW (ref 150–400)
POTASSIUM SERPL-MCNC: 5 MMOL/L — SIGNIFICANT CHANGE UP (ref 3.5–5.3)
POTASSIUM SERPL-SCNC: 5 MMOL/L — SIGNIFICANT CHANGE UP (ref 3.5–5.3)
PROT SERPL-MCNC: 6.7 G/DL — SIGNIFICANT CHANGE UP (ref 6–8.3)
RBC # BLD: 4.51 M/UL — SIGNIFICANT CHANGE UP (ref 4.2–5.8)
RBC # FLD: 13.4 % — SIGNIFICANT CHANGE UP (ref 10.3–14.5)
SODIUM SERPL-SCNC: 143 MMOL/L — SIGNIFICANT CHANGE UP (ref 135–145)
WBC # BLD: 5.07 K/UL — SIGNIFICANT CHANGE UP (ref 3.8–10.5)
WBC # FLD AUTO: 5.07 K/UL — SIGNIFICANT CHANGE UP (ref 3.8–10.5)

## 2022-11-29 PROCEDURE — 99233 SBSQ HOSP IP/OBS HIGH 50: CPT | Mod: GC

## 2022-11-29 PROCEDURE — 99232 SBSQ HOSP IP/OBS MODERATE 35: CPT

## 2022-11-29 RX ADMIN — ATORVASTATIN CALCIUM 20 MILLIGRAM(S): 80 TABLET, FILM COATED ORAL at 11:23

## 2022-11-29 RX ADMIN — TAMSULOSIN HYDROCHLORIDE 0.4 MILLIGRAM(S): 0.4 CAPSULE ORAL at 11:24

## 2022-11-29 RX ADMIN — DORZOLAMIDE HYDROCHLORIDE 1 DROP(S): 20 SOLUTION/ DROPS OPHTHALMIC at 13:30

## 2022-11-29 RX ADMIN — MEMANTINE HYDROCHLORIDE 5 MILLIGRAM(S): 10 TABLET ORAL at 17:03

## 2022-11-29 RX ADMIN — LACTULOSE 10 GRAM(S): 10 SOLUTION ORAL at 21:20

## 2022-11-29 RX ADMIN — ENOXAPARIN SODIUM 40 MILLIGRAM(S): 100 INJECTION SUBCUTANEOUS at 05:10

## 2022-11-29 RX ADMIN — Medication 81 MILLIGRAM(S): at 11:23

## 2022-11-29 RX ADMIN — Medication 200 MILLIGRAM(S): at 17:03

## 2022-11-29 RX ADMIN — MEMANTINE HYDROCHLORIDE 5 MILLIGRAM(S): 10 TABLET ORAL at 05:10

## 2022-11-29 RX ADMIN — DORZOLAMIDE HYDROCHLORIDE 1 DROP(S): 20 SOLUTION/ DROPS OPHTHALMIC at 21:20

## 2022-11-29 RX ADMIN — Medication 200 MILLIGRAM(S): at 05:10

## 2022-11-29 RX ADMIN — DORZOLAMIDE HYDROCHLORIDE 1 DROP(S): 20 SOLUTION/ DROPS OPHTHALMIC at 05:11

## 2022-11-29 RX ADMIN — FINASTERIDE 5 MILLIGRAM(S): 5 TABLET, FILM COATED ORAL at 11:23

## 2022-11-29 NOTE — DIETITIAN INITIAL EVALUATION ADULT - ADD RECOMMEND
1. recommend ensure plus high protein BID and ensure pudding daily 2. recommend appetite stimulant is medically appropriate 3. recommend MVI

## 2022-11-29 NOTE — DIETITIAN INITIAL EVALUATION ADULT - PERTINENT LABORATORY DATA
11-29    143  |  112<H>  |  15  ----------------------------<  92  5.0   |  28  |  0.81    Ca    9.6      29 Nov 2022 06:09  Phos  3.0     11-29  Mg     2.4     11-28    TPro  6.7  /  Alb  2.7<L>  /  TBili  0.9  /  DBili  x   /  AST  39<H>  /  ALT  14  /  AlkPhos  74  11-29

## 2022-11-29 NOTE — PROGRESS NOTE ADULT - ASSESSMENT
89 y/o male w/ a PMH of CAD s/p stents (2014), TIA, HLD, GERD, BPH s/p prostate surgery (2017), ESBL E. coli UTI secondary to left kidney stones s/p ureteral stent placement (4/30/21), Dementia p/w fever, chills, and urinary frequency. Admit for Cystitis and COVID. Cardiology called for bradycardia and possible heart block.      2nd degree heart block   - Initial EKG in ER and flow sheets with SR and ST. EKG with ST 1st degree AV block    - Mobitz type 1 and short episodes of 2:1 HB noted on tele on 11/25, with rates in 40s   - No further arrhythmias noted, continue to maintain  NSR, no need for any PPM. Can d/c tele   - Please obtain transthoracic echocardiogram to assess ventricular function, wall motion and valvular abnormalities.     - H/o CAD s/p stents (2014)  - No anginal complaints  - Continue statin and ASA.    - BP stable and controlled     - COVID and UTI management per primary   - Monitor and replete lytes, keep K>4, Mg>2.  - Will continue to follow.    Pablo Kee, MS FNP, AGAP  Nurse Practitioner- Cardiology   Spectra # [id number] /[phone]

## 2022-11-29 NOTE — DIETITIAN INITIAL EVALUATION ADULT - OTHER INFO
Reason for Admission: Cystitis  History of Present Illness:   89 y/o male w/ a PMHx of CAD s/p stents (2014), TIA, HLD, GERD, BPH s/p prostate surgery (2017), ESBL E. coli UTI secondary to left kidney stones s/p ureteral stent placement (4/30/21), Dementia presents to the ED w/ fever, chills, and urinary frequency. History obtained from daughter at bedside. States that this afternoon patient appeared unwell. He was with decreased strength, chills, and decreased appetite.  weight bed scale 148.8# usual weight 158-160# 4 months ago  DNR/DNI  11/28 fecal incontinence   patient does not eat vegetables or beef chicken taken mostly per family . discussed ensure for patient adding greek yogurt, soups and puree banana to meals

## 2022-11-29 NOTE — PROGRESS NOTE ADULT - PROBLEM SELECTOR PLAN 4
Likely 2/2 acute infection   - PT Eval - home, no PT needs   - Fall precautions    #electrolyte abnormalities  - hypophosphatemia - Phos 2.0 (11/28) --> 3.0 (11/29) resolved  - S/P KPhos IVPB (11/28)  - continue to monitor  - F/u AM CMP

## 2022-11-29 NOTE — PROGRESS NOTE ADULT - ASSESSMENT
89 y/o male w/ a PMHx of CAD s/p stents (2014), TIA, HLD, GERD, BPH s/p prostate surgery (2017), ESBL E. coli UTI secondary to left kidney stones s/p ureteral stent placement (4/30/21), Dementia presents to the ED w/ fever, chills, and urinary frequency.    pulm nodules  hiatal hernia   source of infection eval in progress  HLD  BPH  GERD  nephrolithiasis   CAD - stents - PCI-   Dementia      on PO VANTIN  vs noted  labs reviewed    cardio eval - AF eval - hx of cad - htn hld - tia -   cvs rx regimen  BP control  pulm nodules - sub cm - bronchiectasis - aspiration is a concern in pt with Dementia and Hiatal hernia - CT chest reviewed -   HOB elev  asp prec  on pureed diet  Hiatal hernia - GERD - consider PPI or H2 blocker - antacid - At Risk for Aspiration  covid positive - isolation precs - dvt P - acap prn - cough rx regimen prn - consideration for Remdesivir - will defer to ID -   assist with needs - ADL  GOC discussion

## 2022-11-29 NOTE — DIETITIAN INITIAL EVALUATION ADULT - SIGNS/SYMPTOMS
as evidenced by inadequate oral intake and weight loss 7.5%  as evidenced by puree diet followed PTA

## 2022-11-29 NOTE — PROGRESS NOTE ADULT - SUBJECTIVE AND OBJECTIVE BOX
Patient is a 89y old  Male who presents with a chief complaint of Cystitis (29 Nov 2022 11:22)      INTERVAL HPI/OVERNIGHT EVENTS: Pt was seen and examined at bedside. No acute overnight events. Pt states that he feels fine. He was up and feeding himself breakfast.   Pt gets confused at times, but that is likely close to baseline mild dementia.  Pt denies headache, lightheadedness, fever, chills, body aches, CP, SOB, palpitations, abdominal pain, n/v.    MEDICATIONS  (STANDING):  aspirin enteric coated 81 milliGRAM(s) Oral daily  atorvastatin 20 milliGRAM(s) Oral daily  cefpodoxime 200 milliGRAM(s) Oral every 12 hours  dorzolamide 2% Ophthalmic Solution 1 Drop(s) Both EYES three times a day  enoxaparin Injectable 40 milliGRAM(s) SubCutaneous every 24 hours  finasteride 5 milliGRAM(s) Oral daily  lactulose Syrup 10 Gram(s) Oral at bedtime  memantine 5 milliGRAM(s) Oral two times a day  tamsulosin 0.4 milliGRAM(s) Oral daily    MEDICATIONS  (PRN):  acetaminophen     Tablet .. 650 milliGRAM(s) Oral every 6 hours PRN Temp greater or equal to 38C (100.4F), Mild Pain (1 - 3)      Allergies    No Known Allergies    Intolerances        REVIEW OF SYSTEMS:  CONSTITUTIONAL: No fever or chills  HEENT:  No headache, no sore throat  RESPIRATORY: No cough, wheezing, or shortness of breath  CARDIOVASCULAR: No chest pain, palpitations  GASTROINTESTINAL: No abd pain, nausea, vomiting, or diarrhea  GENITOURINARY: No dysuria, frequency, or hematuria  NEUROLOGICAL: no focal weakness or dizziness  MUSCULOSKELETAL: no myalgias     Vital Signs Last 24 Hrs  T(C): 37.2 (29 Nov 2022 11:59), Max: 37.2 (28 Nov 2022 20:35)  T(F): 99 (29 Nov 2022 11:59), Max: 99 (29 Nov 2022 11:59)  HR: 82 (29 Nov 2022 11:59) (78 - 82)  BP: 118/71 (29 Nov 2022 11:59) (118/71 - 149/76)  BP(mean): --  RR: 17 (29 Nov 2022 11:59) (17 - 18)  SpO2: 98% (29 Nov 2022 11:59) (98% - 99%)    Parameters below as of 29 Nov 2022 11:59  Patient On (Oxygen Delivery Method): room air        PHYSICAL EXAM:  GENERAL: NAD, pleasant, confused at times  HEENT:  anicteric, moist mucous membranes  CHEST/LUNG:  CTA b/l, no rales, wheezes, or rhonchi, in no respiratory distress, on RA   HEART:  RRR, S1, S2  ABDOMEN:  BS+, soft, nontender, nondistended  EXTREMITIES: no edema, cyanosis, or calf tenderness  NERVOUS SYSTEM: answers simple questions and follows simple commands appropriately      LABS:                        13.9   5.07  )-----------( 127      ( 29 Nov 2022 06:09 )             42.3     CBC Full  -  ( 29 Nov 2022 06:09 )  WBC Count : 5.07 K/uL  Hemoglobin : 13.9 g/dL  Hematocrit : 42.3 %  Platelet Count - Automated : 127 K/uL  Mean Cell Volume : 93.8 fl  Mean Cell Hemoglobin : 30.8 pg  Mean Cell Hemoglobin Concentration : 32.9 gm/dL  Auto Neutrophil # : 2.89 K/uL  Auto Lymphocyte # : 1.35 K/uL  Auto Monocyte # : 0.58 K/uL  Auto Eosinophil # : 0.23 K/uL  Auto Basophil # : 0.01 K/uL  Auto Neutrophil % : 57.1 %  Auto Lymphocyte % : 26.6 %  Auto Monocyte % : 11.4 %  Auto Eosinophil % : 4.5 %  Auto Basophil % : 0.2 %    29 Nov 2022 06:09    143    |  112    |  15     ----------------------------<  92     5.0     |  28     |  0.81     Ca    9.6        29 Nov 2022 06:09  Phos  3.0       29 Nov 2022 06:09    TPro  6.7    /  Alb  2.7    /  TBili  0.9    /  DBili  x      /  AST  39     /  ALT  14     /  AlkPhos  74     29 Nov 2022 06:09        CAPILLARY BLOOD GLUCOSE            Culture - Urine (collected 11-24-22 @ 21:50)  Source: Clean Catch Clean Catch (Midstream)  Final Report (11-27-22 @ 14:39):    >100,000 CFU/ml Escherichia coli  Organism: Escherichia coli (11-27-22 @ 14:39)  Organism: Escherichia coli (11-27-22 @ 14:39)      -  Amikacin: S <=16      -  Amoxicillin/Clavulanic Acid: S <=8/4      -  Ampicillin: S <=8 These ampicillin results predict results for amoxicillin      -  Ampicillin/Sulbactam: S <=4/2 Enterobacter, Klebsiella aerogenes, Citrobacter, and Serratia may develop resistance during prolonged therapy (3-4 days)      -  Aztreonam: S <=4      -  Cefazolin: S 16 For uncomplicated UTI with K. pneumoniae, E. coli, or P. mirablis: LUIS <=16 is sensitive and LUIS >=32 is resistant. This also predicts results for oral agents cefaclor, cefdinir, cefpodoxime, cefprozil, cefuroxime axetil, cephalexin and locarbeffor uncomplicated UTI. Note that some isolates may be susceptible to these agents while testing resistant to cefazolin.      -  Cefepime: S <=2      -  Cefoxitin: S <=8      -  Ceftriaxone: S <=1 Enterobacter, Klebsiella aerogenes, Citrobacter, and Serratia may develop resistance during prolonged therapy      -  Ciprofloxacin: R >2      -  Ertapenem: S <=0.5      -  Gentamicin: S <=2      -  Imipenem: S <=1      -  Levofloxacin: R >4      -  Meropenem: S <=1      -  Nitrofurantoin: S <=32 Should not be used to treat pyelonephritis      -  Piperacillin/Tazobactam: S <=8      -  Tobramycin: S <=2      -  Trimethoprim/Sulfamethoxazole: S <=0.5/9.5      Method Type: LUIS    Culture - Blood (collected 11-24-22 @ 20:50)  Source: .Blood Blood-Peripheral  Preliminary Report (11-26-22 @ 01:02):    No growth to date.    Culture - Blood (collected 11-24-22 @ 20:40)  Source: .Blood Blood-Peripheral  Preliminary Report (11-26-22 @ 01:02):    No growth to date.        RADIOLOGY & ADDITIONAL TESTS: no new tests/ imaging, TTE pending    Personally reviewed.     Consultant(s) Notes Reviewed:  [x] YES  [ ] NO       Patient is a 89y old  Male who presents with a chief complaint of fever, increased urinary frequency.      INTERVAL HPI/OVERNIGHT EVENTS: Pt was seen and examined at bedside. No acute overnight events. Pt states that he feels "good." He was up and feeding himself breakfast.   Pt denies headache, lightheadedness, fever, chills, body aches, CP, SOB, palpitations, abdominal pain, n/v.    MEDICATIONS  (STANDING):  aspirin enteric coated 81 milliGRAM(s) Oral daily  atorvastatin 20 milliGRAM(s) Oral daily  cefpodoxime 200 milliGRAM(s) Oral every 12 hours  dorzolamide 2% Ophthalmic Solution 1 Drop(s) Both EYES three times a day  enoxaparin Injectable 40 milliGRAM(s) SubCutaneous every 24 hours  finasteride 5 milliGRAM(s) Oral daily  lactulose Syrup 10 Gram(s) Oral at bedtime  memantine 5 milliGRAM(s) Oral two times a day  tamsulosin 0.4 milliGRAM(s) Oral daily    MEDICATIONS  (PRN):  acetaminophen     Tablet .. 650 milliGRAM(s) Oral every 6 hours PRN Temp greater or equal to 38C (100.4F), Mild Pain (1 - 3)      Allergies    No Known Allergies    Intolerances        REVIEW OF SYSTEMS:  CONSTITUTIONAL: No fever or chills  HEENT:  No headache, no sore throat  RESPIRATORY: No cough, wheezing, or shortness of breath  CARDIOVASCULAR: No chest pain, palpitations  GASTROINTESTINAL: No abd pain, nausea, vomiting, or diarrhea  GENITOURINARY: No dysuria, frequency, or hematuria  NEUROLOGICAL: no focal weakness or dizziness  MUSCULOSKELETAL: no myalgias     Vital Signs Last 24 Hrs  T(C): 37.2 (29 Nov 2022 11:59), Max: 37.2 (28 Nov 2022 20:35)  T(F): 99 (29 Nov 2022 11:59), Max: 99 (29 Nov 2022 11:59)  HR: 82 (29 Nov 2022 11:59) (78 - 82)  BP: 118/71 (29 Nov 2022 11:59) (118/71 - 149/76)  BP(mean): --  RR: 17 (29 Nov 2022 11:59) (17 - 18)  SpO2: 98% (29 Nov 2022 11:59) (98% - 99%)    Parameters below as of 29 Nov 2022 11:59  Patient On (Oxygen Delivery Method): room air        PHYSICAL EXAM:  GENERAL: NAD, pleasant, confused at times  HEENT:  anicteric, moist mucous membranes  CHEST/LUNG:  CTA b/l, no rales, wheezes, or rhonchi, in no respiratory distress, on RA   HEART:  RRR, S1, S2  ABDOMEN:  BS+, soft, nontender, nondistended  EXTREMITIES: no edema, cyanosis, or calf tenderness  NERVOUS SYSTEM: answers simple questions and follows simple commands appropriately      LABS:                        13.9   5.07  )-----------( 127      ( 29 Nov 2022 06:09 )             42.3     CBC Full  -  ( 29 Nov 2022 06:09 )  WBC Count : 5.07 K/uL  Hemoglobin : 13.9 g/dL  Hematocrit : 42.3 %  Platelet Count - Automated : 127 K/uL  Mean Cell Volume : 93.8 fl  Mean Cell Hemoglobin : 30.8 pg  Mean Cell Hemoglobin Concentration : 32.9 gm/dL  Auto Neutrophil # : 2.89 K/uL  Auto Lymphocyte # : 1.35 K/uL  Auto Monocyte # : 0.58 K/uL  Auto Eosinophil # : 0.23 K/uL  Auto Basophil # : 0.01 K/uL  Auto Neutrophil % : 57.1 %  Auto Lymphocyte % : 26.6 %  Auto Monocyte % : 11.4 %  Auto Eosinophil % : 4.5 %  Auto Basophil % : 0.2 %    29 Nov 2022 06:09    143    |  112    |  15     ----------------------------<  92     5.0     |  28     |  0.81     Ca    9.6        29 Nov 2022 06:09  Phos  3.0       29 Nov 2022 06:09    TPro  6.7    /  Alb  2.7    /  TBili  0.9    /  DBili  x      /  AST  39     /  ALT  14     /  AlkPhos  74     29 Nov 2022 06:09        CAPILLARY BLOOD GLUCOSE            Culture - Urine (collected 11-24-22 @ 21:50)  Source: Clean Catch Clean Catch (Midstream)  Final Report (11-27-22 @ 14:39):    >100,000 CFU/ml Escherichia coli  Organism: Escherichia coli (11-27-22 @ 14:39)  Organism: Escherichia coli (11-27-22 @ 14:39)      -  Amikacin: S <=16      -  Amoxicillin/Clavulanic Acid: S <=8/4      -  Ampicillin: S <=8 These ampicillin results predict results for amoxicillin      -  Ampicillin/Sulbactam: S <=4/2 Enterobacter, Klebsiella aerogenes, Citrobacter, and Serratia may develop resistance during prolonged therapy (3-4 days)      -  Aztreonam: S <=4      -  Cefazolin: S 16 For uncomplicated UTI with K. pneumoniae, E. coli, or P. mirablis: LIUS <=16 is sensitive and LUIS >=32 is resistant. This also predicts results for oral agents cefaclor, cefdinir, cefpodoxime, cefprozil, cefuroxime axetil, cephalexin and locarbeffor uncomplicated UTI. Note that some isolates may be susceptible to these agents while testing resistant to cefazolin.      -  Cefepime: S <=2      -  Cefoxitin: S <=8      -  Ceftriaxone: S <=1 Enterobacter, Klebsiella aerogenes, Citrobacter, and Serratia may develop resistance during prolonged therapy      -  Ciprofloxacin: R >2      -  Ertapenem: S <=0.5      -  Gentamicin: S <=2      -  Imipenem: S <=1      -  Levofloxacin: R >4      -  Meropenem: S <=1      -  Nitrofurantoin: S <=32 Should not be used to treat pyelonephritis      -  Piperacillin/Tazobactam: S <=8      -  Tobramycin: S <=2      -  Trimethoprim/Sulfamethoxazole: S <=0.5/9.5      Method Type: LUIS    Culture - Blood (collected 11-24-22 @ 20:50)  Source: .Blood Blood-Peripheral  Preliminary Report (11-26-22 @ 01:02):    No growth to date.    Culture - Blood (collected 11-24-22 @ 20:40)  Source: .Blood Blood-Peripheral  Preliminary Report (11-26-22 @ 01:02):    No growth to date.        RADIOLOGY & ADDITIONAL TESTS: no new tests/ imaging, TTE pending    Personally reviewed.     Consultant(s) Notes Reviewed:  [x] YES  [ ] NO

## 2022-11-29 NOTE — DIETITIAN INITIAL EVALUATION ADULT - ORAL INTAKE PTA/DIET HISTORY
patient with dementia seen sleeping in bed . RD spoke extensively to daughter on telephone.  patient over last 4 months eating less. weight loss noted . patient taking puree foods at home.  has taken ensure in past but after awhile family states he refuses to take.  no food allergies  family familiar with puree diet from home no diet education at this time

## 2022-11-29 NOTE — PROGRESS NOTE ADULT - PROBLEM SELECTOR PLAN 9
- DVT PPx w/ Lovenox  - Fall precautions    #DISP:  - PT - home, no PT needs  - Patient son and daughter currently sick with COVID and stated they are unable to take care of patient at home due to their significant weakness.  - Will likely have to keep patient inpatient until caretaker is ready to take patient home.

## 2022-11-29 NOTE — PROGRESS NOTE ADULT - SUBJECTIVE AND OBJECTIVE BOX
[facility] -- Rhonda Good Pannella, Patel, Savella, Goodger: Office # [phone]    Follow Up:  Bradycardia and Mobitz 1 and 2    Subjective/Observations: Patient awake, alert, resting in bed. Denies chest pain, palpitations and dizziness. Denies any difficulty breathing. No orthopnea and PND. Tolerating room air.     REVIEW OF SYSTEMS: All other review of systems are negative unless indicated above    PAST MEDICAL & SURGICAL HISTORY:  Dementia  Kidney stones  Stented coronary artery    MEDICATIONS  (STANDING):  aspirin enteric coated 81 milliGRAM(s) Oral daily  atorvastatin 20 milliGRAM(s) Oral daily  cefpodoxime 200 milliGRAM(s) Oral every 12 hours  dorzolamide 2% Ophthalmic Solution 1 Drop(s) Both EYES three times a day  enoxaparin Injectable 40 milliGRAM(s) SubCutaneous every 24 hours  finasteride 5 milliGRAM(s) Oral daily  lactulose Syrup 10 Gram(s) Oral at bedtime  memantine 5 milliGRAM(s) Oral two times a day  tamsulosin 0.4 milliGRAM(s) Oral daily    MEDICATIONS  (PRN):  acetaminophen     Tablet .. 650 milliGRAM(s) Oral every 6 hours PRN Temp greater or equal to 38C (100.4F), Mild Pain (1 - 3)    Allergies  No Known Allergies      Vital Signs Last 24 Hrs  T(C): 36.4 (29 Nov 2022 04:53), Max: 37.2 (28 Nov 2022 20:35)  T(F): 97.6 (29 Nov 2022 04:53), Max: 98.9 (28 Nov 2022 20:35)  HR: 81 (29 Nov 2022 04:53) (65 - 81)  BP: 133/85 (29 Nov 2022 04:53) (117/77 - 149/76)  BP(mean): --  RR: 18 (29 Nov 2022 04:53) (17 - 18)  SpO2: 98% (29 Nov 2022 04:53) (96% - 99%)    Parameters below as of 29 Nov 2022 04:53  Patient On (Oxygen Delivery Method): room air      I&O's Summary    28 Nov 2022 07:01  -  29 Nov 2022 07:00  --------------------------------------------------------  IN: 540 mL / OUT: 0 mL / NET: 540 mL      TELE: SR 60-80s 1Hb, rates up to 90-100s at times   PHYSICAL EXAM:  Constitutional: NAD, awake and alert, Frail   HEENT: Moist Mucous Membranes, Anicteric  Pulmonary: Non-labored, breath sounds are clear bilaterally, No wheezing, rales or rhonchi  Cardiovascular: Regular, S1 and S2, No murmurs, No rubs, gallops or clicks  Gastrointestinal:  soft, nontender, nondistended   Lymph: No peripheral edema. No lymphadenopathy.   Skin: No visible rashes or ulcers.  Psych:  Mood & affect appropriate      LABS: All Labs Reviewed:                        13.9   5.07  )-----------( 127      ( 29 Nov 2022 06:09 )             42.3                         13.2   4.86  )-----------( 110      ( 28 Nov 2022 06:05 )             40.5                         12.6   6.32  )-----------( 102      ( 27 Nov 2022 07:10 )             37.8     29 Nov 2022 06:09    143    |  112    |  15     ----------------------------<  92     5.0     |  28     |  0.81   28 Nov 2022 06:05    143    |  111    |  15     ----------------------------<  93     4.1     |  26     |  0.86   27 Nov 2022 07:10    144    |  110    |  14     ----------------------------<  93     3.6     |  28     |  0.73     Ca    9.6        29 Nov 2022 06:09  Ca    9.4        28 Nov 2022 06:05  Ca    9.4        27 Nov 2022 07:10  Phos  3.0       29 Nov 2022 06:09  Phos  2.0       28 Nov 2022 06:05  Phos  2.6       27 Nov 2022 07:10  Mg     2.4       28 Nov 2022 06:05  Mg     2.2       27 Nov 2022 07:10    TPro  6.7    /  Alb  2.7    /  TBili  0.9    /  DBili  x      /  AST  39     /  ALT  14     /  AlkPhos  74     29 Nov 2022 06:09  TPro  6.2    /  Alb  2.6    /  TBili  0.8    /  DBili  x      /  AST  33     /  ALT  13     /  AlkPhos  67     28 Nov 2022 06:05  TPro  5.9    /  Alb  2.6    /  TBili  0.9    /  DBili  x      /  AST  32     /  ALT  11     /  AlkPhos  63     27 Nov 2022 07:10   LIVER FUNCTIONS - ( 29 Nov 2022 06:09 )  Alb: 2.7 g/dL / Pro: 6.7 g/dL / ALK PHOS: 74 U/L / ALT: 14 U/L / AST: 39 U/L / GGT: x             12 Lead ECG:   Ventricular Rate 35 BPM    Atrial Rate 35 BPM    P-R Interval 286 ms    QRS Duration 80 ms    Q-T Interval 426 ms    QTC Calculation(Bazett) 325 ms    P Axis 60 degrees    R Axis 5 degrees    T Axis 30 degrees    Diagnosis Line Marked sinus bradycardia with 1st degree AV block  2:1 avb  Nonspecific T wave abnormality  Abnormal ECG    Confirmed by Billy Ellis MD ([id number]) on 11/25/2022 4:41:34 PM (11-25-22 @ 10:01)

## 2022-11-29 NOTE — PROGRESS NOTE ADULT - PROBLEM SELECTOR PLAN 1
- Presented to the ED w/ increased urgency, decreased flow, history of ESBL E. coli UTI secondary to left kidney stones s/p ureteral stent placement (4/30/21). Temp of 101.  - CT abdomen pelvis w/ Urinary bladder wall thickening with trabeculation and multiple diverticula. Sequelae of chronic outlet obstruction from prostate enlargement. Superimposed cystitis Additional hyperdense foci seen along the posterior bladder wall may represent bladder wall calcifications versus layering bladder stones.   UA w/ positive nitrate, moderate LE, many bacteria   - given meropenem empirically by ID for possibility of ESBL-producing bacteria or pseudomonas  - Urine cultures growing >100,000 E.Coli sensitive to cephalosporins  - on 11/27, d/c meropenem and started cefpodoxime 200mg po BID for 3 days per discussion with ID  - Last day cefpodoxime today (11/29)  - Tylenol PRN for fever and pain   - Monitor for fever and leukocytosis   - BCx x 2 11/24 NGTD   - Infectious disease (Dr. Loya group) following, recs appreciated - Presented to the ED w/ increased urgency, decreased flow, history of ESBL E. coli UTI secondary to left kidney stones s/p ureteral stent placement (4/30/21). Temp of 101.  - CT abdomen pelvis w/ Urinary bladder wall thickening with trabeculation and multiple diverticula. Sequelae of chronic outlet obstruction from prostate enlargement. Superimposed cystitis Additional hyperdense foci seen along the posterior bladder wall may represent bladder wall calcifications versus layering bladder stones.   UA w/ positive nitrate, moderate LE, many bacteria   - given meropenem empirically by ID for possibility of ESBL-producing bacteria or pseudomonas  - Urine cultures growing >100,000 E.Coli sensitive to cephalosporins  - on 11/27, d/c meropenem and started cefpodoxime 200mg po BID for 3 days per discussion with ID  - Last dose of cefpodoxime tomorrow (11/30)  - Tylenol PRN for fever and pain   - Monitor for fever and leukocytosis   - BCx x 2 11/24 NGTD   - Infectious disease (Dr. Loya group) following, recs appreciated

## 2022-11-29 NOTE — PROGRESS NOTE ADULT - TIME BILLING
Plan d/w patient and primary team   We will sign off for now.   Please call us with any concerns or questions.     aRs Thakkar MD   Division of Infectious Diseases  [facility]   Cell [phone] between 8am and 6pm   After 6pm and weekends please call ID service at [phone].
Pt seen + examined. Case discussed with resident. Agree with assessment and plan above (edited by me in detail):  Time spent: 37min. 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    89yo M w/ PMHx of CAD s/p stents (2014), TIA, HLD, GERD, BPH s/p prostate surgery (2017), ESBL E. 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      - Presented to the ED w/ increased urgency, decreased flow, history of ESBL E. coli UTI secondary to left kidney stones s/p ureteral stent placement (4/30/21). Temp of 101.  - CT abdomen pelvis w/ Urinary bladder wall thickening with trabeculation and multiple diverticula. Sequelae of chronic outlet obstruction from prostate enlargement. Superimposed cystitis Additional hyperdense foci seen along the posterior bladder wall may represent bladder wall calcifications versus layering bladder stones.   UA w/ positive nitrate, moderate LE, many bacteria   - given meropenem empirically by ID for possibility of ESBL-producing bacteria or pseudomonas  - Urine cultures growing >100,000 E.Coli sensitive to cephalosporins  - on 11/27, d/c meropenem and started cefpodoxime 200mg po BID for 3 days per discussion with ID  - Last dose of cefpodoxime tomorrow (11/30)  - Tylenol PRN for fever and pain   - Monitor for fever and leukocytosis   - BCx x 2 11/24 NGTD   - Infectious disease (Dr. Loya group) following, recs appreciated    - Pt's HR in the 30s when transferred to the unit from the ER on 11/25, asymptomatic  - STAT EKG showed 2:1 AVN block along with pt's previously noted 1st degree AVB   - Donepezil discontinued given bradycardia as may be contributing  - Was placed on remote tele -- HR stable for past four days with lows in the 50s  - Brief episode of tachycardia up to 106 overnight (11/27-11/28), no new events reported  - Cardiology (Dr. Ellis group) following, recs appreciated  - Per discussion with cardio, pt's 2nd degree AV block was Wenckebach when he was no longer in 2:1 conduction and pt has known significant conduction disease  - Pt is asymptomatic and not recommended for pacemaker at this time - monitor closely  - TTE ordered, f/u results    - pt tested positive for COVID in the ER on admission  - Denies any URI symptoms. COVID vaccinated 1  x J&J and 2 x Pfizer Booster. Currently satting well on RA. CT Lung w/ Multiple bilateral pulmonary nodules noted. The largest measures 6 mm. Multiple nodules.  - Supplement oxygen PRN   - Tylenol PRN for fever   - Supportive therapy, Per ID would not benefit from Remdesivir or Decadron at this time  - Infectious disease (Dr. Loya group) following, recs appreciated  - Pulmonology (Dr. Hinkle) following, aspiration precautions    Disp:   - pt is stable for discharge, but family could not take him home  - Patient's son (CDPAP aide) and daughter are currently sick with COVID and stated they are unable to take care of patient at home today due to their significant weakness.  - Will likely have to keep patient inpatient until caretakers are able to resume his care at home, per discussion with CM  - Spoke to pt's daughter today and she said their COVID is starting to improve and she believes tomorrow they will be strong enough to take the pt back home -- f/up with CM
Pt seen + examined. Case discussed with resident. Agree with assessment and plan above (edited by me in detail):  Time spent: 37min. 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    89yo M w/ PMHx of CAD s/p stents (2014), TIA, HLD, GERD, BPH s/p prostate surgery (2017), ESBL E. 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      - Presented to the ED w/ increased urgency, decreased flow, history of ESBL E. coli UTI secondary to left kidney stones s/p ureteral stent placement (4/30/21). Temp of 101.  - CT abdomen pelvis w/ Urinary bladder wall thickening with trabeculation and multiple diverticula. Sequelae of chronic outlet obstruction from prostate enlargement. Superimposed cystitis Additional hyperdense foci seen along the posterior bladder wall may represent bladder wall calcifications versus layering bladder stones.   UA w/ positive nitrate, moderate LE, many bacteria   - given meropenem empirically by ID for possibility of ESBL-producing bacteria or pseudomonas  - Urine cultures growing >100,000 E.Coli sensitive to cephalosporins  - d/c meropenem and start cefpodoxime 200mg po BID for 3 days more days per discussion with ID  - Tylenol PRN for fever and pain   - Monitor for fever and leukocytosis   - BCx x 2 11/24 NGTD   - Infectious disease (Dr. Loya group) following, recs appreciated    - Pt's HR in the 30s when transferred to the unit from the ER on 11/25, asymptomatic  - STAT EKG showed 2:1 AVN block along with pt's previously noted 1st degree AVB   - Donepezil discontinued given bradycardia as may be contributing  - Monitor on remote tele -- HR stable for past two days with lows in the 60s  - Cardiology (Dr. Ellis group) following, recs appreciated  - per discussion with cardio, pt's 2nd degree AV block was Wenckebach when he was no longer in 2:1 conduction and pt has known significant conduction disease  - pt is asymptomatic and not recommended for pacemaker at this time - monitor closely    - pt tested positive for COVID in the ER on admission  - Denies any URI symptoms. COVID vaccinated 1  x J&J and 2 x Pfizer Booster. Currently satting well on RA. CT Lung w/ Multiple bilateral pulmonary nodules noted. The largest measures 6 mm. Multiple nodules.  - Supplement oxygen PRN   - Tylenol PRN for fever   - Supportive therapy, Per ID would not benefit from Remdesivir or Decadron at this time  - Infectious disease (Dr. Loya group) following, recs appreciated  - Pulmonology (Dr. Hinkle) following, aspiration precautions
Pt seen + examined. Case discussed with resident. Agree with assessment and plan above (edited by me in detail):  Time spent: 37min. 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    89yo M w/ PMHx of CAD s/p stents (2014), TIA, HLD, GERD, BPH s/p prostate surgery (2017), ESBL E. 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      - Presented to the ED w/ increased urgency, decreased flow, history of ESBL E. coli UTI secondary to left kidney stones s/p ureteral stent placement (4/30/21). Temp of 101.  - CT abdomen pelvis w/ Urinary bladder wall thickening with trabeculation and multiple diverticula. Sequelae of chronic outlet obstruction from prostate enlargement. Superimposed cystitis Additional hyperdense foci seen along the posterior bladder wall may represent bladder wall calcifications versus layering bladder stones.   UA w/ positive nitrate, moderate LE, many bacteria   - given meropenem empirically by ID for possibility of ESBL-producing bacteria or pseudomonas  - Urine cultures growing >100,000 E.Coli sensitive to cephalosporins  - on 11/27, d/c meropenem and started cefpodoxime 200mg po BID for 3 days more days per discussion with ID  - Tylenol PRN for fever and pain   - Monitor for fever and leukocytosis   - BCx x 2 11/24 NGTD   - Infectious disease (Dr. Loya group) following, recs appreciated    - Pt's HR in the 30s when transferred to the unit from the ER on 11/25, asymptomatic  - STAT EKG showed 2:1 AVN block along with pt's previously noted 1st degree AVB   - Donepezil discontinued given bradycardia as may be contributing  - Monitor on remote tele -- HR stable for past three days with lows in the mid-50s  - Brief episode of sinus tachycardia up to 106 overnight (11/27-11/28)  - Cardiology (Dr. Ellis group) following, recs appreciated  - Per discussion with cardio, pt's 2nd degree AV block was Wenckebach when he was no longer in 2:1 conduction and pt has known significant conduction disease  - Pt is asymptomatic and not recommended for pacemaker at this time - monitor closely  - TTE ordered, f/u results    - pt tested positive for COVID in the ER on admission  - Denies any URI symptoms. COVID vaccinated 1  x J&J and 2 x Pfizer Booster. Currently satting well on RA. CT Lung w/ Multiple bilateral pulmonary nodules noted. The largest measures 6 mm. Multiple nodules.  - Supplement oxygen PRN   - Tylenol PRN for fever   - Supportive therapy, Per ID would not benefit from Remdesivir or Decadron at this time  - Infectious disease (Dr. Loya group) following, recs appreciated  - Pulmonology (Dr. Hinkle) following, aspiration precautions    Disp:   - pt is stable for discharge, but family cannot take him home  - Patient's son (CDPAP aide) and daughter are currently sick with COVID and stated they are unable to take care of patient at home today due to their significant weakness.  - Will likely have to keep patient inpatient until caretakers are able to resume his care at home, per discussion with CM
Pt seen + examined. Case discussed with resident. Agree with assessment and plan above (edited by me in detail):  Time spent: 40min. 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    89yo M w/ PMHx of CAD s/p stents (2014), TIA, HLD, GERD, BPH s/p prostate surgery (2017), ESBL E. 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    - Presented to the ED w/ increased urgency, decreased flow, history of ESBL E. coli UTI secondary to left kidney stones s/p ureteral stent placement (4/30/21). Temp of 101.  - CT abdomen pelvis w/ Urinary bladder wall thickening with trabeculation and multiple diverticula. Sequelae of chronic outlet obstruction from prostate enlargement. Superimposed cystitis Additional hyperdense foci seen along the posterior bladder wall may represent bladder wall calcifications versus layering bladder stones.   UA w/ positive nitrate, moderate LE, many bacteria   - s/p rocephin x1 in ER  - Started on meropenem by ID for ESBL/pseudomonas coverage pending culture data  - Tylenol PRN for fever and pain   - F/U BCx x 2 and UCx  - Infectious disease (Dr. DALILA Thakkar), recs appreciated    - Pt's HR in the 30s when transferred to floors  - Placed on remote tele -- HR largely in the 40s ; asymptomatic   - STAT EKG showed 2:1 AVB on top of pt's 1st degree AVB   - Donepezil discontinued given bradycardia as may be contributing  - Cardiology (Dr. Ellis) consulted, recs appreciated  - per discussion with cardio, pt's 2nd degree AV block was Wenkebach when he was no longer in 2:1 conduction and pt has known significant conduction disease  - pt is asymptomatic and not recommended for pacemaker at this time - monitor closely    - pt tested positive for COVID in the ER  - Denies any URI symptoms. COVID vaccinated 1  x J&J and 2 x Pfizer Booster. Currently satting well on RA. Positive on admission. CT Lung w/ Multiple bilateral pulmonary nodules noted. The largest measures 6 mm. Multiple nodules.  - Supplement oxygen PRN   - Tylenol PRN for fever   - Supportive therapy, Per ID would not benefit from Remdesivir and also Decadron at this time  - Infectious disease (Dr. DALILA Thakkar), recs appreciated  - Pulmonology (Dr. Hinkle) following, aspiration precautions, pureed diet
Pt seen + examined. Case discussed with resident. Agree with assessment and plan above (edited by me in detail):  Time spent: 37min. 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    89yo M w/ PMHx of CAD s/p stents (2014), TIA, HLD, GERD, BPH s/p prostate surgery (2017), ESBL E. 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      - Presented to the ED w/ increased urgency, decreased flow, history of ESBL E. coli UTI secondary to left kidney stones s/p ureteral stent placement (4/30/21). Temp of 101.  - CT abdomen pelvis w/ Urinary bladder wall thickening with trabeculation and multiple diverticula. Sequelae of chronic outlet obstruction from prostate enlargement. Superimposed cystitis Additional hyperdense foci seen along the posterior bladder wall may represent bladder wall calcifications versus layering bladder stones.   UA w/ positive nitrate, moderate LE, many bacteria   - Started on meropenem by ID for ESBL/pseudomonas coverage pending culture data, f/u UCx   - Tylenol PRN for fever and pain   - Monitor for fever and leukocytosis   - BCx x 2 11/24 NGTD   - Infectious disease (Dr. DALILA Thakkar group) following, recs appreciated    - Pt's HR in the 30s when transferred to floors on 11/25, asymptomatic  - STAT EKG showed 2:1 AVN block along with pt's previously noted 1st degree AVB   - Donepezil discontinued given bradycardia as may be contributing  - Monitor on remote tele -- HR stable so far today with lows in the 60s  - Cardiology (Dr. Ellis group) following, recs appreciated  - per discussion with cardio, pt's 2nd degree AV block was Wenckebach when he was no longer in 2:1 conduction and pt has known significant conduction disease  - pt is asymptomatic and not recommended for pacemaker at this time - monitor closely    - pt tested positive for COVID in the ER on admission  - Denies any URI symptoms. COVID vaccinated 1  x J&J and 2 x Pfizer Booster. Currently satting well on RA. CT Lung w/ Multiple bilateral pulmonary nodules noted. The largest measures 6 mm. Multiple nodules.  - Supplement oxygen PRN   - Tylenol PRN for fever   - Supportive therapy, Per ID would not benefit from Remdesivir or Decadron at this time  - Infectious disease (Dr. DALILA Thakkar group) following, recs appreciated  - Pulmonology (Dr. Hinkle) following, aspiration precautions, pureed diet

## 2022-11-29 NOTE — PROGRESS NOTE ADULT - SUBJECTIVE AND OBJECTIVE BOX
Date/Time Patient Seen:  		  Referring MD:   Data Reviewed	       Patient is a 89y old  Male who presents with a chief complaint of Cystitis (28 Nov 2022 14:07)      Subjective/HPI     PAST MEDICAL & SURGICAL HISTORY:  Dementia    Kidney stones    Stented coronary artery          Medication list         MEDICATIONS  (STANDING):  aspirin enteric coated 81 milliGRAM(s) Oral daily  atorvastatin 20 milliGRAM(s) Oral daily  cefpodoxime 200 milliGRAM(s) Oral every 12 hours  dorzolamide 2% Ophthalmic Solution 1 Drop(s) Both EYES three times a day  enoxaparin Injectable 40 milliGRAM(s) SubCutaneous every 24 hours  finasteride 5 milliGRAM(s) Oral daily  lactulose Syrup 10 Gram(s) Oral at bedtime  memantine 5 milliGRAM(s) Oral two times a day  tamsulosin 0.4 milliGRAM(s) Oral daily    MEDICATIONS  (PRN):  acetaminophen     Tablet .. 650 milliGRAM(s) Oral every 6 hours PRN Temp greater or equal to 38C (100.4F), Mild Pain (1 - 3)         Vitals log        ICU Vital Signs Last 24 Hrs  T(C): 36.4 (29 Nov 2022 04:53), Max: 37.2 (28 Nov 2022 20:35)  T(F): 97.6 (29 Nov 2022 04:53), Max: 98.9 (28 Nov 2022 20:35)  HR: 81 (29 Nov 2022 04:53) (65 - 81)  BP: 133/85 (29 Nov 2022 04:53) (117/77 - 149/76)  BP(mean): --  ABP: --  ABP(mean): --  RR: 18 (29 Nov 2022 04:53) (17 - 18)  SpO2: 98% (29 Nov 2022 04:53) (96% - 99%)    O2 Parameters below as of 29 Nov 2022 04:53  Patient On (Oxygen Delivery Method): room air                 Input and Output:  I&O's Detail    28 Nov 2022 07:01  -  29 Nov 2022 06:24  --------------------------------------------------------  IN:    Oral Fluid: 540 mL  Total IN: 540 mL    OUT:  Total OUT: 0 mL    Total NET: 540 mL          Lab Data                        13.2   4.86  )-----------( 110      ( 28 Nov 2022 06:05 )             40.5     11-28    143  |  111<H>  |  15  ----------------------------<  93  4.1   |  26  |  0.86    Ca    9.4      28 Nov 2022 06:05  Phos  2.0     11-28  Mg     2.4     11-28    TPro  6.2  /  Alb  2.6<L>  /  TBili  0.8  /  DBili  x   /  AST  33  /  ALT  13  /  AlkPhos  67  11-28            Review of Systems	      Objective     Physical Examination    heart s1s2  lung dec BS  head nc      Pertinent Lab findings & Imaging      Ondina:  NO   Adequate UO     I&O's Detail    28 Nov 2022 07:01  -  29 Nov 2022 06:24  --------------------------------------------------------  IN:    Oral Fluid: 540 mL  Total IN: 540 mL    OUT:  Total OUT: 0 mL    Total NET: 540 mL               Discussed with:     Cultures:	        Radiology

## 2022-11-29 NOTE — PROGRESS NOTE ADULT - PROBLEM SELECTOR PLAN 2
- Pt's HR in the 30s when transferred to the unit from the ER on 11/25, asymptomatic  - STAT EKG showed 2:1 AVN block along with pt's previously noted 1st degree AVB   - Donepezil discontinued given bradycardia as may be contributing  - Was placed on remote tele -- HR stable for past four days with lows in the 50s  - Brief episode of tachycardia up to 106 overnight (11/27-11/28), no new events reported  - Dc remote tele (11/29)  - Cardiology (Dr. Ellis group) following, recs appreciated  - Per discussion with cardio, pt's 2nd degree AV block was Wenckebach when he was no longer in 2:1 conduction and pt has known significant conduction disease  - Pt is asymptomatic and not recommended for pacemaker at this time - monitor closely  - TTE ordered, f/u results - Pt's HR in the 30s when transferred to the unit from the ER on 11/25, asymptomatic  - STAT EKG showed 2:1 AVN block along with pt's previously noted 1st degree AVB   - Donepezil discontinued given bradycardia as may be contributing  - Was placed on remote tele -- HR stable for past four days with lows in the 50s  - Brief episode of tachycardia up to 106 overnight (11/27-11/28), no new events reported  - Cardiology (Dr. Ellis group) following, recs appreciated  - Per discussion with cardio, pt's 2nd degree AV block was Wenckebach when he was no longer in 2:1 conduction and pt has known significant conduction disease  - Pt is asymptomatic and not recommended for pacemaker at this time - monitor closely  - TTE ordered, f/u results

## 2022-11-29 NOTE — DIETITIAN INITIAL EVALUATION ADULT - PERTINENT MEDS FT
MEDICATIONS  (STANDING):  aspirin enteric coated 81 milliGRAM(s) Oral daily  atorvastatin 20 milliGRAM(s) Oral daily  cefpodoxime 200 milliGRAM(s) Oral every 12 hours  dorzolamide 2% Ophthalmic Solution 1 Drop(s) Both EYES three times a day  enoxaparin Injectable 40 milliGRAM(s) SubCutaneous every 24 hours  finasteride 5 milliGRAM(s) Oral daily  lactulose Syrup 10 Gram(s) Oral at bedtime  memantine 5 milliGRAM(s) Oral two times a day  tamsulosin 0.4 milliGRAM(s) Oral daily    MEDICATIONS  (PRN):  acetaminophen     Tablet .. 650 milliGRAM(s) Oral every 6 hours PRN Temp greater or equal to 38C (100.4F), Mild Pain (1 - 3)

## 2022-11-30 ENCOUNTER — TRANSCRIPTION ENCOUNTER (OUTPATIENT)
Age: 87
End: 2022-11-30

## 2022-11-30 VITALS
HEART RATE: 84 BPM | TEMPERATURE: 98 F | DIASTOLIC BLOOD PRESSURE: 75 MMHG | RESPIRATION RATE: 18 BRPM | OXYGEN SATURATION: 98 % | SYSTOLIC BLOOD PRESSURE: 119 MMHG

## 2022-11-30 LAB
ALBUMIN SERPL ELPH-MCNC: 2.9 G/DL — LOW (ref 3.3–5)
ALP SERPL-CCNC: 81 U/L — SIGNIFICANT CHANGE UP (ref 40–120)
ALT FLD-CCNC: 16 U/L — SIGNIFICANT CHANGE UP (ref 12–78)
ANION GAP SERPL CALC-SCNC: 7 MMOL/L — SIGNIFICANT CHANGE UP (ref 5–17)
AST SERPL-CCNC: 33 U/L — SIGNIFICANT CHANGE UP (ref 15–37)
BASOPHILS # BLD AUTO: 0.02 K/UL — SIGNIFICANT CHANGE UP (ref 0–0.2)
BASOPHILS NFR BLD AUTO: 0.3 % — SIGNIFICANT CHANGE UP (ref 0–2)
BILIRUB SERPL-MCNC: 1 MG/DL — SIGNIFICANT CHANGE UP (ref 0.2–1.2)
BUN SERPL-MCNC: 14 MG/DL — SIGNIFICANT CHANGE UP (ref 7–23)
CALCIUM SERPL-MCNC: 9.9 MG/DL — SIGNIFICANT CHANGE UP (ref 8.5–10.1)
CHLORIDE SERPL-SCNC: 107 MMOL/L — SIGNIFICANT CHANGE UP (ref 96–108)
CO2 SERPL-SCNC: 27 MMOL/L — SIGNIFICANT CHANGE UP (ref 22–31)
CREAT SERPL-MCNC: 0.86 MG/DL — SIGNIFICANT CHANGE UP (ref 0.5–1.3)
CULTURE RESULTS: SIGNIFICANT CHANGE UP
CULTURE RESULTS: SIGNIFICANT CHANGE UP
EGFR: 81 ML/MIN/1.73M2 — SIGNIFICANT CHANGE UP
EOSINOPHIL # BLD AUTO: 0.29 K/UL — SIGNIFICANT CHANGE UP (ref 0–0.5)
EOSINOPHIL NFR BLD AUTO: 4.4 % — SIGNIFICANT CHANGE UP (ref 0–6)
GLUCOSE SERPL-MCNC: 94 MG/DL — SIGNIFICANT CHANGE UP (ref 70–99)
HCT VFR BLD CALC: 43.1 % — SIGNIFICANT CHANGE UP (ref 39–50)
HGB BLD-MCNC: 14.1 G/DL — SIGNIFICANT CHANGE UP (ref 13–17)
IMM GRANULOCYTES NFR BLD AUTO: 0.3 % — SIGNIFICANT CHANGE UP (ref 0–0.9)
LYMPHOCYTES # BLD AUTO: 2.14 K/UL — SIGNIFICANT CHANGE UP (ref 1–3.3)
LYMPHOCYTES # BLD AUTO: 32.3 % — SIGNIFICANT CHANGE UP (ref 13–44)
MCHC RBC-ENTMCNC: 30.7 PG — SIGNIFICANT CHANGE UP (ref 27–34)
MCHC RBC-ENTMCNC: 32.7 GM/DL — SIGNIFICANT CHANGE UP (ref 32–36)
MCV RBC AUTO: 93.7 FL — SIGNIFICANT CHANGE UP (ref 80–100)
MONOCYTES # BLD AUTO: 0.57 K/UL — SIGNIFICANT CHANGE UP (ref 0–0.9)
MONOCYTES NFR BLD AUTO: 8.6 % — SIGNIFICANT CHANGE UP (ref 2–14)
NEUTROPHILS # BLD AUTO: 3.58 K/UL — SIGNIFICANT CHANGE UP (ref 1.8–7.4)
NEUTROPHILS NFR BLD AUTO: 54.1 % — SIGNIFICANT CHANGE UP (ref 43–77)
NRBC # BLD: 0 /100 WBCS — SIGNIFICANT CHANGE UP (ref 0–0)
PLATELET # BLD AUTO: 134 K/UL — LOW (ref 150–400)
POTASSIUM SERPL-MCNC: 3.1 MMOL/L — LOW (ref 3.5–5.3)
POTASSIUM SERPL-SCNC: 3.1 MMOL/L — LOW (ref 3.5–5.3)
PROT SERPL-MCNC: 6.7 G/DL — SIGNIFICANT CHANGE UP (ref 6–8.3)
RBC # BLD: 4.6 M/UL — SIGNIFICANT CHANGE UP (ref 4.2–5.8)
RBC # FLD: 13.4 % — SIGNIFICANT CHANGE UP (ref 10.3–14.5)
SODIUM SERPL-SCNC: 141 MMOL/L — SIGNIFICANT CHANGE UP (ref 135–145)
SPECIMEN SOURCE: SIGNIFICANT CHANGE UP
SPECIMEN SOURCE: SIGNIFICANT CHANGE UP
WBC # BLD: 6.62 K/UL — SIGNIFICANT CHANGE UP (ref 3.8–10.5)
WBC # FLD AUTO: 6.62 K/UL — SIGNIFICANT CHANGE UP (ref 3.8–10.5)

## 2022-11-30 PROCEDURE — 36415 COLL VENOUS BLD VENIPUNCTURE: CPT

## 2022-11-30 PROCEDURE — 87637 SARSCOV2&INF A&B&RSV AMP PRB: CPT

## 2022-11-30 PROCEDURE — 97163 PT EVAL HIGH COMPLEX 45 MIN: CPT

## 2022-11-30 PROCEDURE — 96365 THER/PROPH/DIAG IV INF INIT: CPT

## 2022-11-30 PROCEDURE — 71250 CT THORAX DX C-: CPT | Mod: MG

## 2022-11-30 PROCEDURE — 84100 ASSAY OF PHOSPHORUS: CPT

## 2022-11-30 PROCEDURE — 83735 ASSAY OF MAGNESIUM: CPT

## 2022-11-30 PROCEDURE — 74176 CT ABD & PELVIS W/O CONTRAST: CPT | Mod: MG

## 2022-11-30 PROCEDURE — 70450 CT HEAD/BRAIN W/O DYE: CPT | Mod: ME

## 2022-11-30 PROCEDURE — 0225U NFCT DS DNA&RNA 21 SARSCOV2: CPT

## 2022-11-30 PROCEDURE — 99239 HOSP IP/OBS DSCHRG MGMT >30: CPT | Mod: GC

## 2022-11-30 PROCEDURE — 85025 COMPLETE CBC W/AUTO DIFF WBC: CPT

## 2022-11-30 PROCEDURE — 83605 ASSAY OF LACTIC ACID: CPT

## 2022-11-30 PROCEDURE — G1004: CPT

## 2022-11-30 PROCEDURE — 96375 TX/PRO/DX INJ NEW DRUG ADDON: CPT

## 2022-11-30 PROCEDURE — 85610 PROTHROMBIN TIME: CPT

## 2022-11-30 PROCEDURE — 97530 THERAPEUTIC ACTIVITIES: CPT

## 2022-11-30 PROCEDURE — 81001 URINALYSIS AUTO W/SCOPE: CPT

## 2022-11-30 PROCEDURE — 93306 TTE W/DOPPLER COMPLETE: CPT

## 2022-11-30 PROCEDURE — 93005 ELECTROCARDIOGRAM TRACING: CPT

## 2022-11-30 PROCEDURE — 99285 EMERGENCY DEPT VISIT HI MDM: CPT

## 2022-11-30 PROCEDURE — 87086 URINE CULTURE/COLONY COUNT: CPT

## 2022-11-30 PROCEDURE — 97116 GAIT TRAINING THERAPY: CPT

## 2022-11-30 PROCEDURE — 87040 BLOOD CULTURE FOR BACTERIA: CPT

## 2022-11-30 PROCEDURE — 85730 THROMBOPLASTIN TIME PARTIAL: CPT

## 2022-11-30 PROCEDURE — 80053 COMPREHEN METABOLIC PANEL: CPT

## 2022-11-30 PROCEDURE — 99232 SBSQ HOSP IP/OBS MODERATE 35: CPT

## 2022-11-30 PROCEDURE — 87186 SC STD MICRODIL/AGAR DIL: CPT

## 2022-11-30 PROCEDURE — 71045 X-RAY EXAM CHEST 1 VIEW: CPT

## 2022-11-30 RX ORDER — POTASSIUM CHLORIDE 20 MEQ
40 PACKET (EA) ORAL ONCE
Refills: 0 | Status: COMPLETED | OUTPATIENT
Start: 2022-11-30 | End: 2022-11-30

## 2022-11-30 RX ORDER — POTASSIUM CHLORIDE 20 MEQ
40 PACKET (EA) ORAL ONCE
Refills: 0 | Status: DISCONTINUED | OUTPATIENT
Start: 2022-11-30 | End: 2022-11-30

## 2022-11-30 RX ADMIN — ATORVASTATIN CALCIUM 20 MILLIGRAM(S): 80 TABLET, FILM COATED ORAL at 11:18

## 2022-11-30 RX ADMIN — Medication 40 MILLIEQUIVALENT(S): at 11:18

## 2022-11-30 RX ADMIN — Medication 200 MILLIGRAM(S): at 05:03

## 2022-11-30 RX ADMIN — DORZOLAMIDE HYDROCHLORIDE 1 DROP(S): 20 SOLUTION/ DROPS OPHTHALMIC at 05:03

## 2022-11-30 RX ADMIN — MEMANTINE HYDROCHLORIDE 5 MILLIGRAM(S): 10 TABLET ORAL at 05:03

## 2022-11-30 RX ADMIN — ENOXAPARIN SODIUM 40 MILLIGRAM(S): 100 INJECTION SUBCUTANEOUS at 05:04

## 2022-11-30 RX ADMIN — Medication 81 MILLIGRAM(S): at 11:18

## 2022-11-30 RX ADMIN — FINASTERIDE 5 MILLIGRAM(S): 5 TABLET, FILM COATED ORAL at 11:18

## 2022-11-30 RX ADMIN — TAMSULOSIN HYDROCHLORIDE 0.4 MILLIGRAM(S): 0.4 CAPSULE ORAL at 11:18

## 2022-11-30 NOTE — DISCHARGE NOTE NURSING/CASE MANAGEMENT/SOCIAL WORK - PATIENT PORTAL LINK FT
You can access the FollowMyHealth Patient Portal offered by [facility] by registering at the following website: [email or web address]. By joining [employer]’s FollowMyHealth portal, you will also be able to view your health information using other applications (apps) compatible with our system.

## 2022-11-30 NOTE — PROGRESS NOTE ADULT - SUBJECTIVE AND OBJECTIVE BOX
Date/Time Patient Seen:  		  Referring MD:   Data Reviewed	       Patient is a 89y old  Male who presents with a chief complaint of Infection due to severe acute respiratory syndrome coronavirus 2 (SARS-CoV-2)     (29 Nov 2022 14:25)      Subjective/HPI     PAST MEDICAL & SURGICAL HISTORY:  Dementia    Kidney stones    Stented coronary artery          Medication list         MEDICATIONS  (STANDING):  aspirin enteric coated 81 milliGRAM(s) Oral daily  atorvastatin 20 milliGRAM(s) Oral daily  dorzolamide 2% Ophthalmic Solution 1 Drop(s) Both EYES three times a day  enoxaparin Injectable 40 milliGRAM(s) SubCutaneous every 24 hours  finasteride 5 milliGRAM(s) Oral daily  lactulose Syrup 10 Gram(s) Oral at bedtime  memantine 5 milliGRAM(s) Oral two times a day  tamsulosin 0.4 milliGRAM(s) Oral daily    MEDICATIONS  (PRN):  acetaminophen     Tablet .. 650 milliGRAM(s) Oral every 6 hours PRN Temp greater or equal to 38C (100.4F), Mild Pain (1 - 3)         Vitals log        ICU Vital Signs Last 24 Hrs  T(C): 36.7 (30 Nov 2022 04:50), Max: 37.4 (29 Nov 2022 19:49)  T(F): 98.1 (30 Nov 2022 04:50), Max: 99.3 (29 Nov 2022 19:49)  HR: 63 (30 Nov 2022 04:50) (63 - 95)  BP: 121/70 (30 Nov 2022 04:50) (118/71 - 124/71)  BP(mean): --  ABP: --  ABP(mean): --  RR: 18 (30 Nov 2022 04:50) (16 - 18)  SpO2: 97% (30 Nov 2022 04:50) (92% - 98%)    O2 Parameters below as of 30 Nov 2022 04:50  Patient On (Oxygen Delivery Method): room air                 Input and Output:  I&O's Detail    28 Nov 2022 07:01  -  29 Nov 2022 07:00  --------------------------------------------------------  IN:    Oral Fluid: 540 mL  Total IN: 540 mL    OUT:  Total OUT: 0 mL    Total NET: 540 mL      29 Nov 2022 07:01  -  30 Nov 2022 06:08  --------------------------------------------------------  IN:    Oral Fluid: 360 mL  Total IN: 360 mL    OUT:  Total OUT: 0 mL    Total NET: 360 mL          Lab Data                        13.9   5.07  )-----------( 127      ( 29 Nov 2022 06:09 )             42.3     11-29    143  |  112<H>  |  15  ----------------------------<  92  5.0   |  28  |  0.81    Ca    9.6      29 Nov 2022 06:09  Phos  3.0     11-29    TPro  6.7  /  Alb  2.7<L>  /  TBili  0.9  /  DBili  x   /  AST  39<H>  /  ALT  14  /  AlkPhos  74  11-29            Review of Systems	      Objective     Physical Examination    heart s1s2  lung dec BS  head nc  head at      Pertinent Lab findings & Imaging      Ondina:  NO   Adequate UO     I&O's Detail    28 Nov 2022 07:01  -  29 Nov 2022 07:00  --------------------------------------------------------  IN:    Oral Fluid: 540 mL  Total IN: 540 mL    OUT:  Total OUT: 0 mL    Total NET: 540 mL      29 Nov 2022 07:01  -  30 Nov 2022 06:08  --------------------------------------------------------  IN:    Oral Fluid: 360 mL  Total IN: 360 mL    OUT:  Total OUT: 0 mL    Total NET: 360 mL               Discussed with:     Cultures:	        Radiology

## 2022-11-30 NOTE — DISCHARGE NOTE NURSING/CASE MANAGEMENT/SOCIAL WORK - NSDCPEFALRISK_GEN_ALL_CORE
For information on Fall & Injury Prevention, visit: [email or web address] OR  [email or web address] OR  https://www.cdc.gov/steadi/patient.html

## 2022-11-30 NOTE — PROGRESS NOTE ADULT - ASSESSMENT
89 y/o male w/ a PMH of CAD s/p stents (2014), TIA, HLD, GERD, BPH s/p prostate surgery (2017), ESBL E. coli UTI secondary to left kidney stones s/p ureteral stent placement (4/30/21), Dementia p/w fever, chills, and urinary frequency. Admit for Cystitis and COVID. Cardiology called for bradycardia and possible heart block.      2nd degree heart block   - Initial EKG in ER and flow sheets with SR and ST. EKG with ST 1st degree AV block    - Mobitz type 1 and short episodes of 2:1 HB noted on tele on 11/25, with rates in 40s till evening, then SR 1HB  - No further arrhythmias noted, continue to maintain  NSR, now off tele, no need for any PPM.   - ECHO showed normal LV & RV size and function mild MR and TR    - H/o CAD s/p stents (2014)  - No anginal complaints  - Continue statin and ASA.    - BP stable and controlled     - COVID and UTI management per primary   - Monitor and replete lytes, keep K>4, Mg>2.  - Will continue to follow.    Pablo Kee, MS FNP, AGAP  Nurse Practitioner- Cardiology   Spectra # [id number] /[phone]

## 2022-11-30 NOTE — PROGRESS NOTE ADULT - PROVIDER SPECIALTY LIST ADULT
Cardiology
Pulmonology
Cardiology
Infectious Disease
Infectious Disease
Cardiology
Pulmonology
Hospitalist

## 2022-11-30 NOTE — PROGRESS NOTE ADULT - NS ATTEND AMEND GEN_ALL_CORE FT
No evidence of any significant melvina arrhythmia.  No indication for PPM.  Continue supportive care for COVID per primary.  To follow while admitted.
periods of 2nd deg type 1 avb alt with 2:1  this has seemingly resolved  no need for ppm  no acute ischemia, known cad s/p pci, con asa and statin
I have personally seen and examined the patient in detail.  I have spoken to the provider regarding the assessment and plan of care.  I have made changes to the note accordingly.
Keep off of Namenda.  Monitor telemetry.  No indication for PPM at present time.
No evidence of any significant melvina arrhythmia. Continue supportive care for COVID per primary.  To follow while admitted.

## 2022-11-30 NOTE — PROGRESS NOTE ADULT - ASSESSMENT
89 y/o male w/ a PMHx of CAD s/p stents (2014), TIA, HLD, GERD, BPH s/p prostate surgery (2017), ESBL E. coli UTI secondary to left kidney stones s/p ureteral stent placement (4/30/21), Dementia presents to the ED w/ fever, chills, and urinary frequency.    pulm nodules  hiatal hernia   source of infection eval in progress  HLD  BPH  GERD  nephrolithiasis   CAD - stents - PCI-   Dementia      s/p ABX  TTE reviewed - mild valv heart disease    cardio eval - AF eval - hx of cad - htn hld - tia -   [facility]vs rx regimen  BP control  pulm nodules - sub cm - bronchiectasis - aspiration is a concern in pt with Dementia and Hiatal hernia - CT chest reviewed -   HOB elev  asp prec  on pureed diet  Hiatal hernia - GERD - consider PPI or H2 blocker - antacid - At Risk for Aspiration  covid positive - isolation precs - dvt P - acap prn - cough rx regimen prn - consideration for Remdesivir - will defer to ID -   assist with needs - ADL  GOC discussion

## 2022-11-30 NOTE — PROGRESS NOTE ADULT - SUBJECTIVE AND OBJECTIVE BOX
[facility] -- Rhonda Good Pannella, Patel, Savella, Goodger: Office # [phone]    Follow Up:  Bradycardia and Mobitz 1 and 2    Subjective/Observations: Patient awake, alert, resting in bed. Denies chest pain, palpitations and dizziness. Denies any difficulty breathing. No orthopnea and PND. Tolerating room air.     REVIEW OF SYSTEMS: All other review of systems are negative unless indicated above    PAST MEDICAL & SURGICAL HISTORY:  Dementia      Kidney stones      Stented coronary artery      MEDICATIONS  (STANDING):  aspirin enteric coated 81 milliGRAM(s) Oral daily  atorvastatin 20 milliGRAM(s) Oral daily  dorzolamide 2% Ophthalmic Solution 1 Drop(s) Both EYES three times a day  enoxaparin Injectable 40 milliGRAM(s) SubCutaneous every 24 hours  finasteride 5 milliGRAM(s) Oral daily  lactulose Syrup 10 Gram(s) Oral at bedtime  memantine 5 milliGRAM(s) Oral two times a day  tamsulosin 0.4 milliGRAM(s) Oral daily    MEDICATIONS  (PRN):  acetaminophen     Tablet .. 650 milliGRAM(s) Oral every 6 hours PRN Temp greater or equal to 38C (100.4F), Mild Pain (1 - 3)    Allergies  No Known Allergies    Vital Signs Last 24 Hrs  T(C): 36.7 (30 Nov 2022 04:50), Max: 37.4 (29 Nov 2022 19:49)  T(F): 98.1 (30 Nov 2022 04:50), Max: 99.3 (29 Nov 2022 19:49)  HR: 63 (30 Nov 2022 04:50) (63 - 95)  BP: 121/70 (30 Nov 2022 04:50) (118/71 - 124/71)  BP(mean): --  RR: 18 (30 Nov 2022 04:50) (16 - 18)  SpO2: 97% (30 Nov 2022 04:50) (92% - 98%)    Parameters below as of 30 Nov 2022 04:50  Patient On (Oxygen Delivery Method): room air      I&O's Summary    29 Nov 2022 07:01  -  30 Nov 2022 07:00  --------------------------------------------------------  IN: 360 mL / OUT: 0 mL / NET: 360 mL       TELE: Not on telemetry   PHYSICAL EXAM:  Constitutional: NAD, awake and alert, Frail   HEENT: Moist Mucous Membranes, Anicteric  Pulmonary: Non-labored, breath sounds are clear bilaterally, No wheezing, rales or rhonchi  Cardiovascular: Regular, S1 and S2, No murmurs, No rubs, gallops or clicks  Gastrointestinal:  soft, nontender, nondistended   Lymph: No peripheral edema. No lymphadenopathy.   Skin: No visible rashes or ulcers.  Psych:  Mood & affect appropriate    LABS: All Labs Reviewed:                        14.1   6.62  )-----------( 134      ( 30 Nov 2022 06:22 )             43.1                         13.9   5.07  )-----------( 127      ( 29 Nov 2022 06:09 )             42.3                         13.2   4.86  )-----------( 110      ( 28 Nov 2022 06:05 )             40.5     30 Nov 2022 06:22    141    |  107    |  14     ----------------------------<  94     3.1     |  27     |  0.86   29 Nov 2022 06:09    143    |  112    |  15     ----------------------------<  92     5.0     |  28     |  0.81   28 Nov 2022 06:05    143    |  111    |  15     ----------------------------<  93     4.1     |  26     |  0.86     Ca    9.9        30 Nov 2022 06:22  Ca    9.6        29 Nov 2022 06:09  Ca    9.4        28 Nov 2022 06:05  Phos  3.0       29 Nov 2022 06:09  Phos  2.0       28 Nov 2022 06:05  Mg     2.4       28 Nov 2022 06:05    TPro  6.7    /  Alb  2.9    /  TBili  1.0    /  DBili  x      /  AST  33     /  ALT  16     /  AlkPhos  81     30 Nov 2022 06:22  TPro  6.7    /  Alb  2.7    /  TBili  0.9    /  DBili  x      /  AST  39     /  ALT  14     /  AlkPhos  74     29 Nov 2022 06:09  TPro  6.2    /  Alb  2.6    /  TBili  0.8    /  DBili  x      /  AST  33     /  ALT  13     /  AlkPhos  67     28 Nov 2022 06:05   LIVER FUNCTIONS - ( 30 Nov 2022 06:22 )  Alb: 2.9 g/dL / Pro: 6.7 g/dL / ALK PHOS: 81 U/L / ALT: 16 U/L / AST: 33 U/L / GGT: x             12 Lead ECG:   Ventricular Rate 35 BPM    Atrial Rate 35 BPM    P-R Interval 286 ms    QRS Duration 80 ms    Q-T Interval 426 ms    QTC Calculation(Bazett) 325 ms    P Axis 60 degrees    R Axis 5 degrees    T Axis 30 degrees    Diagnosis Line Marked sinus bradycardia with 1st degree AV block  2:1 avb  Nonspecific T wave abnormality  Abnormal ECG    Confirmed by Billy Ellis MD ([id number]) on 11/25/2022 4:41:34 PM (11-25-22 @ 10:01)      ACC: [id number] EXAM:  ECHO TTE WO CON COMP W DOPP                          PROCEDURE DATE:  11/28/2022          INTERPRETATION:  INDICATION: Abnormal EKG  Sonographer AS    Blood Pressure 117/77    Height 170 cm     Weight 73 kg       BSA 1.84 sq   m    Dimensions:  LA 3.1       Normal Values: 2.0 - 4.0 cm  Ao 3.4        Normal Values: 2.0 - 3.8 cm  SEPTUM 1.0       Normal Values: 0.6 - 1.2 cm  PWT 0.9       Normal Values: 0.6 - 1.1 cm  LVIDd 3.1         Normal Values: 3.0 - 5.6 cm  LVIDs 2.2     Normal Values: 1.8 - 4.0 cm      OBSERVATIONS:  Mitral Valve: Mild MR.  Aortic Valve/Aorta: Calcified trileaflet aortic valve with grossly normal   opening.  Tricuspid Valve: Mild TR.  Pulmonic Valve: Trace PI  Left Atrium: normal  Right Atrium:Not well-visualized  Left Ventricle: normal LV size and systolic function, estimated LVEF of   60%.  Right Ventricle: Grossly normal size and systolic function.  Pericardium: no significant pericardial effusion.  Pulmonary/RV Pressure: estimated PA systolic pressure of at least 19 mmHg   assuming an RA pressure of 3 mmHg.        IMPRESSION:  Normal left ventricular internal dimensions and systolic function,   estimated LVEF of 60%.  Grossly normal RV size and systolic function.  Calcified trileaflet aortic valve with grossly normal opening, without AI.  Mild MR and TR.  No significant pericardial effusion.    --- End of Report ---  ORLANDO MATTHEW MD; Attending Cardiologist  This document has been electronically signed. Nov 29 2022  4:46PM

## 2022-12-05 RX ORDER — DONEPEZIL HYDROCHLORIDE 10 MG/1
1 TABLET, FILM COATED ORAL
Qty: 0 | Refills: 0 | DISCHARGE

## 2022-12-05 RX ORDER — TAMSULOSIN HYDROCHLORIDE 0.4 MG/1
1 CAPSULE ORAL
Qty: 0 | Refills: 0 | DISCHARGE

## 2022-12-05 RX ORDER — LACTULOSE 10 G/15ML
15 SOLUTION ORAL
Qty: 0 | Refills: 0 | DISCHARGE

## 2022-12-05 RX ORDER — DICLOFENAC SODIUM 30 MG/G
0 GEL TOPICAL
Qty: 0 | Refills: 0 | DISCHARGE

## 2022-12-12 NOTE — ED ADULT NURSE NOTE - SUICIDE SCREENING QUESTION 1
[facility]  Post-operative instructions  For: Saravanan Wagner. Your first postop appointment should be scheduled with Dr. Isael Conklin for 2 weeks post-op. [address], [address]  [place]  Phone: [phone]  Hand Therapy Phone: [phone]  Fax: [phone]    Please follow these instructions for a safe and speedy recovery:    1. Surgical Bandage: Leave the bandage in place until we remove it. Please keep it clean and dry. To shower or bathe, apply a plastic bag or GLAD Press'n Seal® plastic wrap around the bandage or simply sponge bathe. 2. Elevation: Hand swelling is best prevented by keeping your hand elevated above the level of your heart at all times, night and day. The opposite, dangling your hand below your waist, will cause additional pain, swelling, and later stiffness. You can elevate the hand in a sling or by propping it on a pillow at night. Occasionally, we will provide you with a custom-made foam block for elevating the arm. Ice compresses may help but do not rep[lace elevation. Frequently, extreme pain is caused by a tight bandage, which should be loosened. If pain is severe and progressive, call us at [phone] during the day (ask for immediate connection to Dr. Louis Parra Team) or during the night (ask for the on-call physician). 3. Medication: You will be provided with an appropriate pain medication (over-the-counter or prescription). Please fill this at a pharmacy promptly so you will have it available when all local anesthetic wears off. Take this to relieve pain as directed on the bottle. Please refrain from driving, drinking alcohol, and making important medical decisions while taking the medication. Please call us if you need something stronger. Medication changes or refills must be made before 5pm or through your pharmacy.     4. Weight bearing: Do NOT bear any weight on the operative extremity. I want to thank you for choosing us for your hand care needs. My staff and I are committed to providing all our customers with the highest quality hand surgery and subsequent hand therapy care as possible. We want your recovery to be comfortable. If you have clinical non-emergent questions about your surgery or other hand conditions please call [phone] and ask for my team. Your call will be returned within 24 hours. TAKE NARCOTIC PAIN MEDICATIONS WITH FOOD! For the night of surgery, while block is still in effect, start with 1 pain pill at bedtime    Narcotics tend to be constipating and we recommend taking a stool softener such as Colace or Miralax (follow package instructions). If you were given prescriptions, please review the written information on the prescribed medications. DO NOT DRIVE WHILE TAKING NARCOTIC PAIN MEDICATIONS. CPAP PATIENTS BE SURE TO WEAR MACHINE WHENEVER NAPPING OR SLEEPING DAY/NIGHT OF SURGERY! DISCHARGE SUMMARY from Nurse    The following personal items collected during your admission are returned to you:   Dental Appliance: Dental Appliances: None  Vision: Visual Aid: None  Hearing Aid:    Jewelry: Jewelry: Bracelet, Necklace, Ring, Watch (in patient belonging bag)  Clothing: Clothing: With patient  Other Valuables: Other Valuables: Other (comment) (boot for left leg)  Valuables sent to safe:        PATIENT INSTRUCTIONS:    After General Anesthesia or Intravenous Sedation, for 24 hours or while taking prescription Narcotics:  Someone should be with you for the next 24 hours. For your own safety, a responsible adult must drive you home. Limit your activities  Recommended activity: Rest today, up with assistance today. Do not climb stairs or shower unattended for the next 24 hours. Start with a soft bland diet and advance as tolerated (no nausea) to regular diet.   If you have a sore throat some things that may help are: fluids, warm salt water gargle, or throat lozenges. If this does not improve after several days please follow up with your family physician. Do not drive and operate hazardous machinery  Do not make important personal or business decisions  Do  not drink alcoholic beverages  If you have not urinated within 8 hours after discharge, please contact your surgeon on call. Report the following to your surgeon:  Excessive pain, swelling, redness or odor of or around the surgical area  Temperature over 100.5  Nausea and vomiting lasting longer than 4 hours or if unable to take medications  Any signs of decreased circulation or nerve impairment to extremity: change in color, persistent  numbness, tingling, coldness or increase pain    If you received an upper extremity nerve block, please wear your sling until the block has worn off, then refer to your surgeons post-operative instructions. If you have had a shoulder block or a block near your collar bone, you may have              symptoms such as:          1. Mild shortness of breath        2. A hoarse voice        3. Blurry vision        4. Unequal pupils        5. Drooping of your face on the same side as the nerve block. These symptoms will disappear as the nerve block wears off. You will receive a Post Operative Call from one of the Recovery Room Nurses on the day after your surgery to check on you. It is very important for us to know how you are recovering after your surgery. If you have an issue please call your surgeon, do not wait for the post operative call. You may receive an e-mail or letter in the mail from [employer] regarding your experience with us in the Ambulatory Surgery Unit. Your feedback is valuable to us and we appreciate your participation in the survey. If the above instructions are not adequate or you are having problems after your surgery, call your physician at their office number.     We wish youre a speedy recovery ? What to do at Home:    *  Please give a list of your current medications to your Primary Care Provider. *  Please update this list whenever your medications are discontinued, doses are      changed, or new medications (including over-the-counter products) are added. *  Please carry medication information at all times in case of emergency situations. If you have not had your influenza or pneumococcal vaccines, please follow up with your primary care physician. The discharge information has been reviewed with the patient and caregiver. The patient and caregiver verbalized understanding. TO PREVENT AN INFECTION      [address] YOUR HANDS    To prevent infection, good handwashing is the most important thing you or your caregiver can do. Wash your hands with soap and water or use the hand  we gave you before you touch any wounds. SHOWER    Use the antibacterial soap we gave you when you take a shower. Shower with this soap until your wounds are healed. To reach all areas of your body, you may need someone to help you. Dont forget to clean your belly button with every shower. USE CLEAN SHEETS    Use freshly cleaned sheets on your bed after surgery. To keep the surgery site clean, do not allow pets to sleep with you while your wound is still healing. STOP SMOKING    Stop smoking, or at least cut back on smoking    Smoking slows your healing. CONTROL YOUR BLOOD SUGAR    High blood sugars slow wound healing. If you are diabetic, control your blood sugar levels before and after your surgery. No

## 2023-01-09 NOTE — ED ADULT NURSE NOTE - TEMPLATE
Continue metformin and glimiperide as prescribed    Fasting < 130  2 hours after < 160    https://www.diabetesfoodhub.org/articles/what-is-the-diabetes-plate-method.html    Total Carbs - Whole grains 40-50 gm for meals and 20-30 gm for snacks and incorporate fibre and lean protein with every meal for improved glycemic control.  Plate your meal such 1/2 fruits and vegetables 1/4 lean protein 1/4 carbohydrates.   Avoid refined carbohydrates such as white flour, sugar.  Avoid packages food high in saturated fat, added sugars and sodium.  Stay physically active.    Schedule eye exam  Proper foot care    Repeat A1c in 3months     1/19/23 blood test for CK non fasting     Patient Information    Lab -- Non-Fasting labwork has been ordered for you   Non-Fasting Labs    Please come prior to your next appointment to recheck non fasting labs.    No diet restrictions prior to lab draw.    Follow Up  -- Make an appointment with Stephania Leach MD in three months      Additional Educational Resources:  For additional resources regarding your symptoms, diagnosis, or further health information, please visit the Health Resources section on [email or web address] or the Online Health Resources section in [employer].    
 Symptoms

## 2023-01-11 ENCOUNTER — APPOINTMENT (OUTPATIENT)
Dept: UROLOGY | Facility: CLINIC | Age: 88
End: 2023-01-11
Payer: MEDICARE

## 2023-01-11 DIAGNOSIS — N32.3 DIVERTICULUM OF BLADDER: ICD-10-CM

## 2023-01-11 PROCEDURE — 99213 OFFICE O/P EST LOW 20 MIN: CPT

## 2023-01-11 NOTE — HISTORY OF PRESENT ILLNESS
[FreeTextEntry1] : Now 89 yr old male presents to follow up with h/o kidney stones, uti (with MDR organism)\par s/p left URS and left PCNL on 07/19/2021\par \par stone- 80% Calcium phosphate (apatite)\par 10% Calcium oxalate monohydrate\par 10% Calcium oxalate dihydrate \par \par last seen 11/2/22 in office with renal US\par \par had admit to [facility] for febrile UTI, and covid infection\par \par CT scan done there reviewed: ~ 9 to 10 mm right upper pole renal stone unchanged, several 3-4 mm left renal stones. No hydro. No obstruction.\par \par E coli on culture, but pan Sensitive except for fluoroquinolones--- new organism compared to prior ESBL e coli in past.\par \par Feeling well at this time [None] : no symptoms

## 2023-01-11 NOTE — ASSESSMENT
[FreeTextEntry1] : films reviewed\par \par new scripts sent for tamsulosin, finasteride, methenamine, vit C\par \par overall, doing well- no acute changes at this time\par check urine culture today\par f/u as planned with renal US 5/2023

## 2023-01-11 NOTE — PHYSICAL EXAM
[General Appearance - Well Developed] : well developed [General Appearance - Well Nourished] : well nourished [Normal Appearance] : normal appearance [Well Groomed] : well groomed [General Appearance - In No Acute Distress] : no acute distress [Abdomen Soft] : soft [Edema] : no peripheral edema [] : no respiratory distress [Respiration, Rhythm And Depth] : normal respiratory rhythm and effort [Exaggerated Use Of Accessory Muscles For Inspiration] : no accessory muscle use [Oriented To Time, Place, And Person] : oriented to person, place, and time [Affect] : the affect was normal [Mood] : the mood was normal [Not Anxious] : not anxious [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

## 2023-01-30 LAB — BACTERIA UR CULT: ABNORMAL

## 2023-01-30 NOTE — ED ADULT NURSE NOTE - NSFALLRSKOUTCOME_ED_ALL_ED
Fall with Harm Risk Nasal Turnover Hinge Flap Text: The defect edges were debeveled with a #15 scalpel blade.  Given the size, depth, location of the defect and the defect being full thickness a nasal turnover hinge flap was deemed most appropriate.  Using a sterile surgical marker, an appropriate hinge flap was drawn incorporating the defect. The area thus outlined was incised with a #15 scalpel blade. The flap was designed to recreate the nasal mucosal lining and the alar rim. The skin margins were undermined to an appropriate distance in all directions utilizing iris scissors.

## 2023-01-31 ENCOUNTER — NON-APPOINTMENT (OUTPATIENT)
Age: 88
End: 2023-01-31

## 2023-01-31 RX ORDER — FOSFOMYCIN TROMETHAMINE 3 G/1
3 POWDER ORAL
Qty: 3 | Refills: 0 | Status: ACTIVE | COMMUNITY
Start: 2021-08-14

## 2023-02-16 NOTE — PATIENT PROFILE ADULT - NSPRESCRUSEDDRG_GEN_A_NUR
Chief Complaint   Patient presents with   • Office Visit   • New Patient     New patient thickened / discolored nails, left great toenail mostly an issue       Subjective: This is a 67 year old male patient who presents to the clinic for initial evaluation of thickened and discolored toenails.  Relates that most of his nails done bothering much but his left great toenail is gotten significantly thickened and causes some some issues.  This is an initial evaluation of this patient. Denies any nausea, fever, chills, night sweats, vomiting, or other systemic symptoms.    ALLERGIES:  No Known Allergies    Past Medical History:   Diagnosis Date   • HTN (hypertension)    • Hyperlipidemia        Current Outpatient Medications   Medication Sig   • ciclopirox (Penlac) 8 % topical solution Apply to affected nails daily. Remove buildup once weekly and then begin reapplication   • clotrimazole (LOTRIMIN) 1 % cream Apply 1 application topically 2 times daily. Apply to affected area twice daily to right thigh for 2 weeks   • albuterol 108 (90 Base) MCG/ACT inhaler Inhale 2 puffs into the lungs every 4 hours as needed for Shortness of Breath or Wheezing.   • hydroCORTisone (CORTIZONE) 1 % cream Apply 1 application topically 2 times daily. To right ankle rash for 7 days   • fluticasone-umeclidin-vilanterol (TRELEGY ELLIPTA) 100-62.5-25 MCG/ACT inhaler Inhale 1 puff into the lungs daily.   • atorvastatin (Lipitor) 10 MG tablet Take 1 tablet by mouth daily.   • metoPROLOL succinate (Toprol XL) 50 MG 24 hr tablet Take 1 tablet by mouth daily.   • sucralfate (CARAFATE) 1 g tablet Take 1 tablet by mouth 4 times daily.   • pantoprazole (PROTONIX) 40 MG tablet Take 1 tablet by mouth in the morning and 1 tablet in the evening. Take before meals.   • aspirin 325 MG tablet Take 325 mg by mouth daily. Takes as needed for a headache   • nicotine (NICODERM) 21 MG/24HR patch Place 1 patch onto the skin every 24 hours.   • clotrimazole (LOTRIMIN)  1 % cream Apply topically 2 times daily.   • Multiple Vitamin (MULTI-VITAMIN DAILY PO)    • thiamine (VITAMIN B1) 100 MG tablet Take 1 tablet by mouth daily.   • folic acid (FOLATE) 1 MG tablet Take 1 tablet by mouth daily.   • ibuprofen (MOTRIN) 200 MG tablet Take 200 mg by mouth every 6 hours as needed for Pain.     No current facility-administered medications for this visit.       Objective:  General Appearance: This is a well-developed 67 year old male  Integument:  Warm, dry, slightly atrophic.  There is some thickness of multiple nails both feet especially left great toenail which is significantly thickened.  Vascular: Dorsalis pedis 2/4, posterior tibial 2/4 bilaterally, capillary filling time is less than 3seconds bilaterally.   Neurological:  Intact to light touch and painful stimuli as well as [place] monofilament.  Musculoskeletal:  Discomfort great toenail of the left foot    Assessment:  1. Onychomycosis        Plan:  Discussed in depth, in detail with the patient the condition as well as treatment options.  Discussed topical versus oral versus laser treatment for onychomycosis.  Prescribed Penlac nail lacquer  Orders Placed This Encounter   • ciclopirox (Penlac) 8 % topical solution     Return if symptoms worsen or fail to improve.     No

## 2023-05-02 PROBLEM — N20.0 CALCULUS OF KIDNEY: Chronic | Status: ACTIVE | Noted: 2022-11-24

## 2023-05-02 PROBLEM — F03.90 UNSPECIFIED DEMENTIA WITHOUT BEHAVIORAL DISTURBANCE: Chronic | Status: ACTIVE | Noted: 2022-11-24

## 2023-05-02 PROBLEM — F03.90 UNSPECIFIED DEMENTIA, UNSPECIFIED SEVERITY, WITHOUT BEHAVIORAL DISTURBANCE, PSYCHOTIC DISTURBANCE, MOOD DISTURBANCE, AND ANXIETY: Chronic | Status: ACTIVE | Noted: 2022-11-24

## 2023-05-03 ENCOUNTER — APPOINTMENT (OUTPATIENT)
Dept: UROLOGY | Facility: CLINIC | Age: 88
End: 2023-05-03
Payer: MEDICARE

## 2023-05-03 ENCOUNTER — OUTPATIENT (OUTPATIENT)
Dept: OUTPATIENT SERVICES | Facility: HOSPITAL | Age: 88
LOS: 1 days | End: 2023-05-03
Payer: COMMERCIAL

## 2023-05-03 DIAGNOSIS — Z95.5 PRESENCE OF CORONARY ANGIOPLASTY IMPLANT AND GRAFT: Chronic | ICD-10-CM

## 2023-05-03 DIAGNOSIS — Z98.890 OTHER SPECIFIED POSTPROCEDURAL STATES: Chronic | ICD-10-CM

## 2023-05-03 DIAGNOSIS — R35.0 FREQUENCY OF MICTURITION: ICD-10-CM

## 2023-05-03 PROCEDURE — 76775 US EXAM ABDO BACK WALL LIM: CPT | Mod: 26

## 2023-05-03 PROCEDURE — 99213 OFFICE O/P EST LOW 20 MIN: CPT | Mod: 25

## 2023-05-03 PROCEDURE — 76775 US EXAM ABDO BACK WALL LIM: CPT

## 2023-05-03 NOTE — HISTORY OF PRESENT ILLNESS
[None] : no symptoms [FreeTextEntry1] : Now 89 yr old male presents to follow up with h/o kidney stones, uti (with MDR organism)\par s/p left URS and left PCNL on 07/19/2021\par \par stone- 80% Calcium phosphate (apatite)\par 10% Calcium oxalate monohydrate\par 10% Calcium oxalate dihydrate \par \par last seen 01/2/23 in office with renal US 11/2022\par \par Feeling well at this time\par \par F/u stones bilat.

## 2023-05-03 NOTE — PHYSICAL EXAM
[General Appearance - Well Developed] : well developed [General Appearance - Well Nourished] : well nourished [Normal Appearance] : normal appearance [Well Groomed] : well groomed [General Appearance - In No Acute Distress] : no acute distress [Abdomen Soft] : soft [Edema] : no peripheral edema [Respiration, Rhythm And Depth] : normal respiratory rhythm and effort [Exaggerated Use Of Accessory Muscles For Inspiration] : no accessory muscle use [Oriented To Time, Place, And Person] : oriented to person, place, and time [Affect] : the affect was normal [Mood] : the mood was normal [Not Anxious] : not anxious [Urinary Bladder Findings] : the bladder was normal on palpation [] : no rash [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

## 2023-05-03 NOTE — ASSESSMENT
[FreeTextEntry1] : Renal US reviewed: prior right upper pole stone ~ 1.1 cm not seen on this exam. Left side with ~4- 5 hyperechoic, lower (rather than mid) with shadowing/twinkle artifact. No hydro, no solid renal mass\par \par continue tamsulosin, finasteride, methenamine, vit C\par \par overall, doing well- no acute changes at this time\par stone on right not previously seen- no sx, so of only mild concern at this time\par RTC 6 months with renal us- overall appears stable

## 2023-05-04 DIAGNOSIS — N20.0 CALCULUS OF KIDNEY: ICD-10-CM

## 2023-05-04 DIAGNOSIS — N40.1 BENIGN PROSTATIC HYPERPLASIA WITH LOWER URINARY TRACT SYMPTOMS: ICD-10-CM

## 2023-06-17 NOTE — PHYSICAL THERAPY INITIAL EVALUATION ADULT - WEIGHT-BEARING RESTRICTIONS: SIT/STAND, REHAB EVAL
[Never] : never [TextBox_4] : 66 yo female presents for evaluation of productive cough for five days. She denies fever, chest pain or hemoptysis. She uses albuterol MDI PRN alone. The patient had similar complaints last fall, treated by me with oral steroids and ICS/LABA/LAMA. [TextBox_29] : Denies snoring, daytime somnolence, apneic episodes, AM headaches full weight-bearing

## 2023-06-20 NOTE — ED ADULT NURSE NOTE - NSIMPLEMENTINTERV_GEN_ALL_ED
Implemented All Fall with Harm Risk Interventions:  [place] to call system. Call bell, personal items and telephone within reach. Instruct patient to call for assistance. Room bathroom lighting operational. Non-slip footwear when patient is off stretcher. Physically safe environment: no spills, clutter or unnecessary equipment. Stretcher in lowest position, wheels locked, appropriate side rails in place. Provide visual cue, wrist band, yellow gown, etc. Monitor gait and stability. Monitor for mental status changes and reorient to person, place, and time. Review medications for side effects contributing to fall risk. Reinforce activity limits and safety measures with patient and family. Provide visual clues: red socks.
never used

## 2023-07-04 ENCOUNTER — INPATIENT (INPATIENT)
Facility: HOSPITAL | Age: 88
LOS: 4 days | Discharge: HOME | DRG: 871 | End: 2023-07-09
Attending: HOSPITALIST | Admitting: STUDENT IN AN ORGANIZED HEALTH CARE EDUCATION/TRAINING PROGRAM
Payer: COMMERCIAL

## 2023-07-04 VITALS
RESPIRATION RATE: 16 BRPM | OXYGEN SATURATION: 96 % | SYSTOLIC BLOOD PRESSURE: 122 MMHG | HEART RATE: 102 BPM | WEIGHT: 149.91 LBS | DIASTOLIC BLOOD PRESSURE: 78 MMHG | TEMPERATURE: 101 F

## 2023-07-04 DIAGNOSIS — Z29.9 ENCOUNTER FOR PROPHYLACTIC MEASURES, UNSPECIFIED: ICD-10-CM

## 2023-07-04 DIAGNOSIS — E78.5 HYPERLIPIDEMIA, UNSPECIFIED: ICD-10-CM

## 2023-07-04 DIAGNOSIS — J18.9 PNEUMONIA, UNSPECIFIED ORGANISM: ICD-10-CM

## 2023-07-04 DIAGNOSIS — Z95.5 PRESENCE OF CORONARY ANGIOPLASTY IMPLANT AND GRAFT: Chronic | ICD-10-CM

## 2023-07-04 DIAGNOSIS — Z98.890 OTHER SPECIFIED POSTPROCEDURAL STATES: Chronic | ICD-10-CM

## 2023-07-04 DIAGNOSIS — B34.8 OTHER VIRAL INFECTIONS OF UNSPECIFIED SITE: ICD-10-CM

## 2023-07-04 DIAGNOSIS — I25.10 ATHEROSCLEROTIC HEART DISEASE OF NATIVE CORONARY ARTERY WITHOUT ANGINA PECTORIS: ICD-10-CM

## 2023-07-04 DIAGNOSIS — N40.0 BENIGN PROSTATIC HYPERPLASIA WITHOUT LOWER URINARY TRACT SYMPTOMS: ICD-10-CM

## 2023-07-04 DIAGNOSIS — F03.90 UNSPECIFIED DEMENTIA WITHOUT BEHAVIORAL DISTURBANCE: ICD-10-CM

## 2023-07-04 DIAGNOSIS — N39.0 URINARY TRACT INFECTION, SITE NOT SPECIFIED: ICD-10-CM

## 2023-07-04 LAB
ALBUMIN SERPL ELPH-MCNC: 3.3 G/DL — SIGNIFICANT CHANGE UP (ref 3.3–5)
ALP SERPL-CCNC: 105 U/L — SIGNIFICANT CHANGE UP (ref 40–120)
ALT FLD-CCNC: 12 U/L — SIGNIFICANT CHANGE UP (ref 12–78)
ANION GAP SERPL CALC-SCNC: 4 MMOL/L — LOW (ref 5–17)
APPEARANCE UR: CLEAR — SIGNIFICANT CHANGE UP
APTT BLD: 30 SEC — SIGNIFICANT CHANGE UP (ref 27.5–35.5)
AST SERPL-CCNC: 19 U/L — SIGNIFICANT CHANGE UP (ref 15–37)
BASOPHILS # BLD AUTO: 0.03 K/UL — SIGNIFICANT CHANGE UP (ref 0–0.2)
BASOPHILS NFR BLD AUTO: 0.2 % — SIGNIFICANT CHANGE UP (ref 0–2)
BILIRUB SERPL-MCNC: 1.3 MG/DL — HIGH (ref 0.2–1.2)
BILIRUB UR-MCNC: NEGATIVE — SIGNIFICANT CHANGE UP
BUN SERPL-MCNC: 17 MG/DL — SIGNIFICANT CHANGE UP (ref 7–23)
CALCIUM SERPL-MCNC: 10.3 MG/DL — HIGH (ref 8.5–10.1)
CHLORIDE SERPL-SCNC: 107 MMOL/L — SIGNIFICANT CHANGE UP (ref 96–108)
CO2 SERPL-SCNC: 31 MMOL/L — SIGNIFICANT CHANGE UP (ref 22–31)
COLOR SPEC: SIGNIFICANT CHANGE UP
CREAT SERPL-MCNC: 1.1 MG/DL — SIGNIFICANT CHANGE UP (ref 0.5–1.3)
DIFF PNL FLD: ABNORMAL
EGFR: 63 ML/MIN/1.73M2 — SIGNIFICANT CHANGE UP
EOSINOPHIL # BLD AUTO: 0.08 K/UL — SIGNIFICANT CHANGE UP (ref 0–0.5)
EOSINOPHIL NFR BLD AUTO: 0.6 % — SIGNIFICANT CHANGE UP (ref 0–6)
GLUCOSE SERPL-MCNC: 152 MG/DL — HIGH (ref 70–99)
GLUCOSE UR QL: NEGATIVE MG/DL — SIGNIFICANT CHANGE UP
HCT VFR BLD CALC: 45 % — SIGNIFICANT CHANGE UP (ref 39–50)
HGB BLD-MCNC: 14.4 G/DL — SIGNIFICANT CHANGE UP (ref 13–17)
IMM GRANULOCYTES NFR BLD AUTO: 0.5 % — SIGNIFICANT CHANGE UP (ref 0–0.9)
INR BLD: 1.22 RATIO — HIGH (ref 0.88–1.16)
KETONES UR-MCNC: NEGATIVE MG/DL — SIGNIFICANT CHANGE UP
LACTATE SERPL-SCNC: 1.6 MMOL/L — SIGNIFICANT CHANGE UP (ref 0.7–2)
LEUKOCYTE ESTERASE UR-ACNC: ABNORMAL
LYMPHOCYTES # BLD AUTO: 1.15 K/UL — SIGNIFICANT CHANGE UP (ref 1–3.3)
LYMPHOCYTES # BLD AUTO: 8.1 % — LOW (ref 13–44)
MCHC RBC-ENTMCNC: 30.3 PG — SIGNIFICANT CHANGE UP (ref 27–34)
MCHC RBC-ENTMCNC: 32 GM/DL — SIGNIFICANT CHANGE UP (ref 32–36)
MCV RBC AUTO: 94.5 FL — SIGNIFICANT CHANGE UP (ref 80–100)
MONOCYTES # BLD AUTO: 0.89 K/UL — SIGNIFICANT CHANGE UP (ref 0–0.9)
MONOCYTES NFR BLD AUTO: 6.2 % — SIGNIFICANT CHANGE UP (ref 2–14)
NEUTROPHILS # BLD AUTO: 12.05 K/UL — HIGH (ref 1.8–7.4)
NEUTROPHILS NFR BLD AUTO: 84.4 % — HIGH (ref 43–77)
NITRITE UR-MCNC: POSITIVE
NRBC # BLD: 0 /100 WBCS — SIGNIFICANT CHANGE UP (ref 0–0)
PH UR: 6.5 — SIGNIFICANT CHANGE UP (ref 5–8)
PLATELET # BLD AUTO: 161 K/UL — SIGNIFICANT CHANGE UP (ref 150–400)
POTASSIUM SERPL-MCNC: 4 MMOL/L — SIGNIFICANT CHANGE UP (ref 3.5–5.3)
POTASSIUM SERPL-SCNC: 4 MMOL/L — SIGNIFICANT CHANGE UP (ref 3.5–5.3)
PROT SERPL-MCNC: 8 G/DL — SIGNIFICANT CHANGE UP (ref 6–8.3)
PROT UR-MCNC: NEGATIVE MG/DL — SIGNIFICANT CHANGE UP
PROTHROM AB SERPL-ACNC: 14.3 SEC — HIGH (ref 10.5–13.4)
RAPID RVP RESULT: DETECTED
RBC # BLD: 4.76 M/UL — SIGNIFICANT CHANGE UP (ref 4.2–5.8)
RBC # FLD: 13.8 % — SIGNIFICANT CHANGE UP (ref 10.3–14.5)
RV+EV RNA SPEC QL NAA+PROBE: DETECTED
SARS-COV-2 RNA SPEC QL NAA+PROBE: SIGNIFICANT CHANGE UP
SODIUM SERPL-SCNC: 142 MMOL/L — SIGNIFICANT CHANGE UP (ref 135–145)
SP GR SPEC: 1.01 — SIGNIFICANT CHANGE UP (ref 1–1.03)
UROBILINOGEN FLD QL: 1 MG/DL — SIGNIFICANT CHANGE UP (ref 0.2–1)
WBC # BLD: 14.27 K/UL — HIGH (ref 3.8–10.5)
WBC # FLD AUTO: 14.27 K/UL — HIGH (ref 3.8–10.5)

## 2023-07-04 PROCEDURE — 99223 1ST HOSP IP/OBS HIGH 75: CPT | Mod: GC

## 2023-07-04 PROCEDURE — 99285 EMERGENCY DEPT VISIT HI MDM: CPT

## 2023-07-04 PROCEDURE — 93010 ELECTROCARDIOGRAM REPORT: CPT

## 2023-07-04 PROCEDURE — 71045 X-RAY EXAM CHEST 1 VIEW: CPT | Mod: 26

## 2023-07-04 RX ORDER — TAMSULOSIN HYDROCHLORIDE 0.4 MG/1
0.4 CAPSULE ORAL AT BEDTIME
Refills: 0 | Status: DISCONTINUED | OUTPATIENT
Start: 2023-07-04 | End: 2023-07-09

## 2023-07-04 RX ORDER — ASPIRIN/CALCIUM CARB/MAGNESIUM 324 MG
1 TABLET ORAL
Qty: 0 | Refills: 0 | DISCHARGE

## 2023-07-04 RX ORDER — CEFEPIME 1 G/1
1000 INJECTION, POWDER, FOR SOLUTION INTRAMUSCULAR; INTRAVENOUS ONCE
Refills: 0 | Status: COMPLETED | OUTPATIENT
Start: 2023-07-04 | End: 2023-07-04

## 2023-07-04 RX ORDER — LACTULOSE 10 G/15ML
30 SOLUTION ORAL
Qty: 0 | Refills: 0 | DISCHARGE

## 2023-07-04 RX ORDER — LACTOBACILLUS ACIDOPHILUS 100MM CELL
1 CAPSULE ORAL
Qty: 0 | Refills: 0 | DISCHARGE

## 2023-07-04 RX ORDER — DORZOLAMIDE HYDROCHLORIDE 20 MG/ML
1 SOLUTION/ DROPS OPHTHALMIC THREE TIMES A DAY
Refills: 0 | Status: DISCONTINUED | OUTPATIENT
Start: 2023-07-04 | End: 2023-07-09

## 2023-07-04 RX ORDER — FOSFOMYCIN TROMETHAMINE 3 G/1
0 POWDER ORAL
Qty: 0 | Refills: 0 | DISCHARGE

## 2023-07-04 RX ORDER — ACETAMINOPHEN 500 MG
650 TABLET ORAL ONCE
Refills: 0 | Status: COMPLETED | OUTPATIENT
Start: 2023-07-04 | End: 2023-07-04

## 2023-07-04 RX ORDER — BRINZOLAMIDE 10 MG/ML
1 SUSPENSION/ DROPS OPHTHALMIC
Qty: 0 | Refills: 0 | DISCHARGE

## 2023-07-04 RX ORDER — FINASTERIDE 5 MG/1
5 TABLET, FILM COATED ORAL DAILY
Refills: 0 | Status: DISCONTINUED | OUTPATIENT
Start: 2023-07-04 | End: 2023-07-09

## 2023-07-04 RX ORDER — MEMANTINE HYDROCHLORIDE 10 MG/1
5 TABLET ORAL
Refills: 0 | Status: DISCONTINUED | OUTPATIENT
Start: 2023-07-04 | End: 2023-07-09

## 2023-07-04 RX ORDER — VANCOMYCIN HCL 1 G
1000 VIAL (EA) INTRAVENOUS ONCE
Refills: 0 | Status: COMPLETED | OUTPATIENT
Start: 2023-07-04 | End: 2023-07-04

## 2023-07-04 RX ORDER — MULTIVIT-MIN/FERROUS GLUCONATE 9 MG/15 ML
1 LIQUID (ML) ORAL DAILY
Refills: 0 | Status: DISCONTINUED | OUTPATIENT
Start: 2023-07-04 | End: 2023-07-09

## 2023-07-04 RX ORDER — SODIUM CHLORIDE 9 MG/ML
2100 INJECTION INTRAMUSCULAR; INTRAVENOUS; SUBCUTANEOUS ONCE
Refills: 0 | Status: COMPLETED | OUTPATIENT
Start: 2023-07-04 | End: 2023-07-04

## 2023-07-04 RX ORDER — ATORVASTATIN CALCIUM 80 MG/1
20 TABLET, FILM COATED ORAL AT BEDTIME
Refills: 0 | Status: DISCONTINUED | OUTPATIENT
Start: 2023-07-04 | End: 2023-07-09

## 2023-07-04 RX ORDER — METHENAMINE MANDELATE 1 G
1 TABLET ORAL
Qty: 0 | Refills: 0 | DISCHARGE

## 2023-07-04 RX ORDER — ATORVASTATIN CALCIUM 80 MG/1
1 TABLET, FILM COATED ORAL
Qty: 0 | Refills: 0 | DISCHARGE

## 2023-07-04 RX ADMIN — Medication 1000 MILLIGRAM(S): at 20:21

## 2023-07-04 RX ADMIN — SODIUM CHLORIDE 2100 MILLILITER(S): 9 INJECTION INTRAMUSCULAR; INTRAVENOUS; SUBCUTANEOUS at 19:05

## 2023-07-04 RX ADMIN — CEFEPIME 100 MILLIGRAM(S): 1 INJECTION, POWDER, FOR SOLUTION INTRAMUSCULAR; INTRAVENOUS at 20:34

## 2023-07-04 RX ADMIN — SODIUM CHLORIDE 2100 MILLILITER(S): 9 INJECTION INTRAMUSCULAR; INTRAVENOUS; SUBCUTANEOUS at 21:05

## 2023-07-04 RX ADMIN — Medication 250 MILLIGRAM(S): at 19:05

## 2023-07-04 RX ADMIN — CEFEPIME 1000 MILLIGRAM(S): 1 INJECTION, POWDER, FOR SOLUTION INTRAMUSCULAR; INTRAVENOUS at 21:10

## 2023-07-04 RX ADMIN — Medication 650 MILLIGRAM(S): at 19:05

## 2023-07-04 NOTE — ED PROVIDER NOTE - CLINICAL SUMMARY MEDICAL DECISION MAKING FREE TEXT BOX
Patient is a 89-year-old male with history of hypertension hyperlipidemia coronary artery disease dementia GERD and kidney stones sent to the emergency department today from home per EMS for evaluation of cough and fever.  Additional information at this time is limited because of patient's dementia.  This case will require thorough evaluation labs cultures IV fluids imaging as well as antibiotics.

## 2023-07-04 NOTE — ED PROVIDER NOTE - OBJECTIVE STATEMENT
89 yo M PMHx dementia 89 yo M PMHx dementia, BPH, GERD presents to ED with son c/o cough and weakness x 3 days. fever x today. Pt denies HA, dizziness, chest pain, SOB, abd pain, N/V/D, recent travel.

## 2023-07-04 NOTE — ED ADULT NURSE NOTE - NSFALLRISKINTERV_ED_ALL_ED
Assistance OOB with selected safe patient handling equipment if applicable/Assistance with ambulation/Communicate fall risk and risk factors to all staff, patient, and family/Monitor gait and stability/Provide visual cue: yellow wristband, yellow gown, etc/Reinforce activity limits and safety measures with patient and family/Call bell, personal items and telephone in reach/Instruct patient to call for assistance before getting out of bed/chair/stretcher/Non-slip footwear applied when patient is off stretcher/[place] to call system/Physically safe environment - no spills, clutter or unnecessary equipment/Purposeful Proactive Rounding/Room/bathroom lighting operational, light cord in reach

## 2023-07-04 NOTE — H&P ADULT - PROBLEM SELECTOR PLAN 3
Chronic. Pt A+O x2 (person, place).  - Continue home memantine Chronic. Pt A+O x2 (person, place).  - Continue home memantine  - Pureed diet Pt with history of UTI presenting with  fever likely 2/2 UTI. Takes methenamine at home.  - S/P tylenol suppository 650mg, cefepime 1000mg IV, vancomycin 1000mg IV, sodium chloride 0.9% bolus 2100mL IV  - UA (positive nitrite, moderate leukocyte esterase, 15 WBC, 5 RBC, many bacteria, squamous epithelial cells present)  - Tylenol 650 mg PO q6h PRN fever or mild pain  - IV Rocephin  - Trend WBC and monitor for fever  - Follow up blood cultures x2  - Will obtain urine cultures. Follow up results.  - ID (Dr. Chiu) consulted, will appreciate recs Pt with history of UTI (ESBL, Pseudomonas on prior cultures) presenting with  fever likely 2/2 UTI. Takes methenamine at home.  - S/P tylenol suppository 650mg, cefepime 1000mg IV, vancomycin 1000mg IV, sodium chloride 0.9% bolus 2100mL IV  - UA (positive nitrite, moderate leukocyte esterase, 15 WBC, 5 RBC, many bacteria, squamous epithelial cells present)  - Tylenol 650 mg PO q6h PRN fever or mild pain  - IV ertapenem  - Trend WBC and monitor for fever  - Follow up blood cultures x2  - Will obtain urine cultures. Follow up results.  - ID (Dr. Chiu) consulted, will appreciate recs Pt with history of UTI (ESBL, Pseudomonas on prior cultures) presenting with  fever likely 2/2 UTI. Takes methenamine at home.  - S/P tylenol suppository 650mg, cefepime 1000mg IV, vancomycin 1000mg IV, sodium chloride 0.9% bolus 2100mL IV  - UA (positive nitrite, moderate leukocyte esterase, 15 WBC, 5 RBC, many bacteria, squamous epithelial cells present)  - Tylenol 650 mg PO q6h PRN fever or mild pain  - continue IV ertapenem  - Trend WBC and monitor for fever  - Follow up blood cultures x2  - Will obtain urine cultures. Follow up results.  - ID (Dr. Chiu) consulted, will appreciate recs

## 2023-07-04 NOTE — ED PROVIDER NOTE - CONSTITUTIONAL, MLM
chronically ill appearing elderly male awake, alert, oriented to person, place, time/situation and in no apparent distress. normal...

## 2023-07-04 NOTE — ED PROVIDER NOTE - ATTENDING APP SHARED VISIT CONTRIBUTION OF CARE
Examination reveals a well-developed fairly well-nourished slightly pale male in no acute distress with very warm dry skin coarse breath sounds bilaterally heart which is tachycardic without any murmurs flat nontender abdomen and extremities that are free from any clubbing cyanosis or edema.  I agree with plan and management outlined by PA.

## 2023-07-04 NOTE — ED PROVIDER NOTE - CARE PLAN
1 Principal Discharge DX:	Rhinovirus  Secondary Diagnosis:	Acute UTI  Secondary Diagnosis:	Pneumonia

## 2023-07-04 NOTE — H&P ADULT - ASSESSMENT
89yo M with PMH dementia, BPH, CAD, GERD, hyperlipidemia, kidney stones, UTI presents with cough and fever. Admitted for UTI, pneumonia.

## 2023-07-04 NOTE — ED PROVIDER NOTE - NSICDXPASTMEDICALHX_GEN_ALL_CORE_FT
PAST MEDICAL HISTORY:  Benign prostatic hyperplasia, presence of lower urinary tract symptoms unspecified, unspecified morphology     CAD (coronary artery disease)     Calculus of kidney     Calculus of ureter     Dementia     GERD (gastroesophageal reflux disease)     Hyperlipidemia     Kidney stones     UTI (urinary tract infection)

## 2023-07-04 NOTE — H&P ADULT - NSHPSOCIALHISTORY_GEN_ALL_CORE
Lives at home with son. Mostly independent but requires assistance with getting dressed. Denies tobacco use. Denies alcohol use.

## 2023-07-04 NOTE — H&P ADULT - NSHPPHYSICALEXAM_GEN_ALL_CORE
CONSTITUTIONAL: Well groomed, no apparent distress  EYES: No conjunctival or scleral injection, non-icteric  ENMT: Oral mucosa with moist membranes.   NECK: Supple, symmetric and without tracheal deviation   RESP: Rhonchi present. No respiratory distress, no use of accessory muscles.  CV: RRR, +S1S2, no peripheral edema  GI: Soft, NT, ND  MSK: Normal ROM without pain, no joint pain   SKIN: No rashes or ulcers noted  NEURO: Sensation intact in upper and lower extremities b/l to light touch   PSYCH: A+O x 2 (person, place), mood and affect appropriate CONSTITUTIONAL: Well groomed, no apparent distress  EYES: No conjunctival or scleral injection, non-icteric  ENMT: Oral mucosa with moist membranes.   NECK: Supple, symmetric and without tracheal deviation   RESP: Rhonchi present. No respiratory distress, no use of accessory muscles.  CV: RRR, +S1S2, no peripheral edema  GI: Soft, NT, ND  MSK: Normal ROM without pain, no joint pain   SKIN: No rashes or ulcers noted  NEURO: Sensation intact in upper and lower extremities b/l to light touch   PSYCH: A+O x 2 (person, place), mood and affect appropriate  Vital Signs Last 24 Hrs  T(C): 37 (04 Jul 2023 22:50), Max: 38.6 (04 Jul 2023 18:50)  T(F): 98.6 (04 Jul 2023 22:50), Max: 101.4 (04 Jul 2023 18:50)  HR: 82 (04 Jul 2023 22:50) (82 - 104)  BP: 167/68 (04 Jul 2023 22:50) (122/78 - 167/68)  BP(mean): --  RR: 18 (04 Jul 2023 22:50) (16 - 18)  SpO2: 99% (04 Jul 2023 22:50) (96% - 99%)    Parameters below as of 04 Jul 2023 22:50  Patient On (Oxygen Delivery Method): room air

## 2023-07-04 NOTE — H&P ADULT - HISTORY OF PRESENT ILLNESS
89yo M with PMH dementia, BPH, CAD, GERD, hyperlipidemia, kidney stones, UTI presents with cough and fever. Per discussion with pt's son, pt with ongoing cough x3 days. Pt was given Dayquil, Nyquil, Robitussin without much improvement in cough. Son states pt developed fever this afternoon. Pt consumes a pureed diet at home. Denies sick contacts.     IN THE ED:  Temp   98.6F , 82 HR  , BP  167/68  ,RR 18, SpO2 18  S/P tylenol suppository 650mg, cefepime 1000mg IV, vancomycin 1000mg IV, sodium chloride 0.9% bolus 2100mL IV  EKG: Sinus rhythm with 1st degree AV block, VR 100bpm  Labs significant for WBC 14.27, lactate 1.6, UA (positive nitrite, moderate leukocyte esterase, 15 WBC, 5 RBC, many bacteria, squamous epithelial cells present), RVP positive for entero/rhinovirus  Imaging:   89yo M with PMH dementia, BPH, CAD, GERD, hyperlipidemia, kidney stones, UTI presents with cough and fever. Per discussion with pt's son, pt with ongoing cough x3 days. Pt was given Dayquil, Nyquil, Robitussin without much improvement in cough. Son states pt developed fever this afternoon. Pt consumes a pureed diet at home. Denies sick contacts.     IN THE ED:  Temp   98.6F , 82 HR  , BP  167/68  ,RR 18, SpO2 18  S/P tylenol suppository 650mg, cefepime 1000mg IV, vancomycin 1000mg IV, sodium chloride 0.9% bolus 2100mL IV  EKG: Sinus rhythm with 1st degree AV block, VR 100bpm  Labs significant for WBC 14.27, lactate 1.6, UA (positive nitrite, moderate leukocyte esterase, 15 WBC, 5 RBC, many bacteria, squamous epithelial cells present), RVP positive for entero/rhinovirus  Imaging: Wet read CXR - patchy, bilateral infiltrates L>R   89yo M with PMH dementia, BPH, CAD, GERD, hyperlipidemia, kidney stones, UTI presents with cough and fever. History from son via phone, patient poor historian 2/2 dementia. Per discussion with pt's son, pt with ongoing cough x3 days. Pt was given Dayquil, Nyquil, Robitussin without much improvement in cough. Son states pt developed fever this afternoon. Pt consumes a pureed diet at home. Denies sick contacts.     IN THE ED:  Temp   98.6F , 82 HR  , BP  167/68  ,RR 18, SpO2 18  S/P tylenol suppository 650mg, cefepime 1000mg IV, vancomycin 1000mg IV, sodium chloride 0.9% bolus 2100mL IV  EKG: Sinus rhythm with 1st degree AV block, VR 100bpm  Labs significant for WBC 14.27, lactate 1.6, UA (positive nitrite, moderate leukocyte esterase, 15 WBC, 5 RBC, many bacteria, squamous epithelial cells present), RVP positive for entero/rhinovirus  Imaging: Wet read CXR - patchy, bilateral infiltrates L>R

## 2023-07-04 NOTE — H&P ADULT - PROBLEM SELECTOR PLAN 5
Chronic  - Continue home atorvastatin Chronic  - Continue home finasteride  - Continue home tamsulosin

## 2023-07-04 NOTE — H&P ADULT - ATTENDING COMMENTS
89yo M with PMH dementia, BPH, CAD, GERD, hyperlipidemia, kidney stones, UTI presents with cough and fever. Admitted for UTI, pneumonia.    Agree with above. Edited where appropriate

## 2023-07-04 NOTE — H&P ADULT - PROBLEM SELECTOR PLAN 2
Pt with history of UTI presenting with  fever likely 2/2 UTI. Takes methenamine at home.  - S/P tylenol suppository 650mg, cefepime 1000mg IV, vancomycin 1000mg IV, sodium chloride 0.9% bolus 2100mL IV  - UA (positive nitrite, moderate leukocyte esterase, 15 WBC, 5 RBC, many bacteria, squamous epithelial cells present)  - Tylenol 650 mg PO q6h PRN fever or mild pain  - IV Rocephin  - Trend WBC and monitor for fever  - Follow up blood cultures x2  - ID (Dr. Chiu) consulted, will appreiate recs Pt with history of UTI presenting with  fever likely 2/2 UTI. Takes methenamine at home.  - S/P tylenol suppository 650mg, cefepime 1000mg IV, vancomycin 1000mg IV, sodium chloride 0.9% bolus 2100mL IV  - UA (positive nitrite, moderate leukocyte esterase, 15 WBC, 5 RBC, many bacteria, squamous epithelial cells present)  - Tylenol 650 mg PO q6h PRN fever or mild pain  - IV Rocephin  - Trend WBC and monitor for fever  - Follow up blood cultures x2  - Will obtain urine cultures. Follow up results.  - ID (Dr. Chiu) consulted, will appreciate recs Pt presents with cough x3 days, fever likely 2/2 pneumonia.   - S/P tylenol suppository 650mg, cefepime 1000mg IV, vancomycin 1000mg IV, sodium chloride 0.9% bolus 2100mL IV  - Labs significant for WBC 14.27, lactate 1.6, RVP positive for entero/rhinovirus  - Imaging: Wet read CXR - patchy, bilateral infiltrates L>R  - IV Rocephin, azithromycin  - Gentle IV fluids NS @ 60cc/hr x12 hrs (last echo showed normal LV with EF 60%)  - Trend WBC and monitor for fever  - Will obtain MRSA, legionella, strep pnemo antigen tests. Follow up results.  - Follow up blood cultures x2  - Pureed diet  - ID (Dr. Chiu) consulted, will appreciate recs Pt presents with cough x3 days, fever likely 2/2 pneumonia.   - S/P tylenol suppository 650mg, cefepime 1000mg IV, vancomycin 1000mg IV, sodium chloride 0.9% bolus 2100mL IV  - Labs significant for WBC 14.27, lactate 1.6, RVP positive for entero/rhinovirus  - Imaging: Wet read CXR - patchy, bilateral infiltrates L>R  - IV ertapenem  - Gentle IV fluids NS @ 60cc/hr x12 hrs (last echo showed normal LV with EF 60%)  - Trend WBC and monitor for fever  - Will obtain MRSA, legionella, strep pnemo antigen tests. Follow up results.  - Follow up blood cultures x2  - Pureed diet  - ID (Dr. Chiu) consulted, will appreciate recs  - Pulm (Dr. Hinkle) consulted, will appreciate recs Pt presents with cough x3 days, fever. CXR with likely PNA, enterorhino +. viral vs superimposed bacterial pna   - S/P tylenol suppository 650mg, cefepime 1000mg IV, vancomycin 1000mg IV, sodium chloride 0.9% bolus 2100mL IV  - Labs significant for WBC 14.27, lactate 1.6, RVP positive for entero/rhinovirus  - Imaging: Wet read CXR - patchy, bilateral infiltrates L>R  - IV ertapenem  - Will obtain MRSA, legionella, strep pnemo antigen tests. Follow up results.  - Follow up blood cultures x2  - ID (Dr. Chiu) consulted, will appreciate recs  - Pulm (Dr. Hinkle) consulted, will appreciate recs

## 2023-07-04 NOTE — H&P ADULT - PROBLEM SELECTOR PLAN 4
Chronic  - Continue home finasteride  - Continue home tamsulosin Chronic. Pt A+O x2 (person, place).  - Continue home memantine  - Pureed diet

## 2023-07-04 NOTE — ED PROVIDER NOTE - NSICDXPASTSURGICALHX_GEN_ALL_CORE_FT
PAST SURGICAL HISTORY:  H/O heart artery stent stent x 2    History of prostate surgery     S/P ureteral stent placement Left sided    Stented coronary artery

## 2023-07-05 DIAGNOSIS — A41.9 SEPSIS, UNSPECIFIED ORGANISM: ICD-10-CM

## 2023-07-05 LAB
A1C WITH ESTIMATED AVERAGE GLUCOSE RESULT: 5.7 % — HIGH (ref 4–5.6)
ALBUMIN SERPL ELPH-MCNC: 2.5 G/DL — LOW (ref 3.3–5)
ALP SERPL-CCNC: 76 U/L — SIGNIFICANT CHANGE UP (ref 40–120)
ALT FLD-CCNC: 9 U/L — LOW (ref 12–78)
ANION GAP SERPL CALC-SCNC: 5 MMOL/L — SIGNIFICANT CHANGE UP (ref 5–17)
AST SERPL-CCNC: 17 U/L — SIGNIFICANT CHANGE UP (ref 15–37)
BASOPHILS # BLD AUTO: 0.02 K/UL — SIGNIFICANT CHANGE UP (ref 0–0.2)
BASOPHILS NFR BLD AUTO: 0.2 % — SIGNIFICANT CHANGE UP (ref 0–2)
BILIRUB SERPL-MCNC: 1.3 MG/DL — HIGH (ref 0.2–1.2)
BUN SERPL-MCNC: 11 MG/DL — SIGNIFICANT CHANGE UP (ref 7–23)
CALCIUM SERPL-MCNC: 9.1 MG/DL — SIGNIFICANT CHANGE UP (ref 8.5–10.1)
CHLORIDE SERPL-SCNC: 113 MMOL/L — HIGH (ref 96–108)
CO2 SERPL-SCNC: 26 MMOL/L — SIGNIFICANT CHANGE UP (ref 22–31)
CREAT SERPL-MCNC: 0.74 MG/DL — SIGNIFICANT CHANGE UP (ref 0.5–1.3)
EGFR: 84 ML/MIN/1.73M2 — SIGNIFICANT CHANGE UP
EOSINOPHIL # BLD AUTO: 0.13 K/UL — SIGNIFICANT CHANGE UP (ref 0–0.5)
EOSINOPHIL NFR BLD AUTO: 1.2 % — SIGNIFICANT CHANGE UP (ref 0–6)
ESTIMATED AVERAGE GLUCOSE: 117 MG/DL — HIGH (ref 68–114)
GLUCOSE SERPL-MCNC: 95 MG/DL — SIGNIFICANT CHANGE UP (ref 70–99)
HCT VFR BLD CALC: 39.1 % — SIGNIFICANT CHANGE UP (ref 39–50)
HGB BLD-MCNC: 12.8 G/DL — LOW (ref 13–17)
IMM GRANULOCYTES NFR BLD AUTO: 0.3 % — SIGNIFICANT CHANGE UP (ref 0–0.9)
LYMPHOCYTES # BLD AUTO: 1.35 K/UL — SIGNIFICANT CHANGE UP (ref 1–3.3)
LYMPHOCYTES # BLD AUTO: 12.1 % — LOW (ref 13–44)
MCHC RBC-ENTMCNC: 30.5 PG — SIGNIFICANT CHANGE UP (ref 27–34)
MCHC RBC-ENTMCNC: 32.7 GM/DL — SIGNIFICANT CHANGE UP (ref 32–36)
MCV RBC AUTO: 93.1 FL — SIGNIFICANT CHANGE UP (ref 80–100)
MONOCYTES # BLD AUTO: 0.87 K/UL — SIGNIFICANT CHANGE UP (ref 0–0.9)
MONOCYTES NFR BLD AUTO: 7.8 % — SIGNIFICANT CHANGE UP (ref 2–14)
NEUTROPHILS # BLD AUTO: 8.75 K/UL — HIGH (ref 1.8–7.4)
NEUTROPHILS NFR BLD AUTO: 78.4 % — HIGH (ref 43–77)
NRBC # BLD: 0 /100 WBCS — SIGNIFICANT CHANGE UP (ref 0–0)
PLATELET # BLD AUTO: 127 K/UL — LOW (ref 150–400)
POTASSIUM SERPL-MCNC: 3.3 MMOL/L — LOW (ref 3.5–5.3)
POTASSIUM SERPL-SCNC: 3.3 MMOL/L — LOW (ref 3.5–5.3)
PROT SERPL-MCNC: 6.3 G/DL — SIGNIFICANT CHANGE UP (ref 6–8.3)
RBC # BLD: 4.2 M/UL — SIGNIFICANT CHANGE UP (ref 4.2–5.8)
RBC # FLD: 13.5 % — SIGNIFICANT CHANGE UP (ref 10.3–14.5)
SODIUM SERPL-SCNC: 144 MMOL/L — SIGNIFICANT CHANGE UP (ref 135–145)
WBC # BLD: 11.15 K/UL — HIGH (ref 3.8–10.5)
WBC # FLD AUTO: 11.15 K/UL — HIGH (ref 3.8–10.5)

## 2023-07-05 PROCEDURE — 99233 SBSQ HOSP IP/OBS HIGH 50: CPT

## 2023-07-05 PROCEDURE — 99222 1ST HOSP IP/OBS MODERATE 55: CPT

## 2023-07-05 RX ORDER — ENOXAPARIN SODIUM 100 MG/ML
40 INJECTION SUBCUTANEOUS EVERY 24 HOURS
Refills: 0 | Status: DISCONTINUED | OUTPATIENT
Start: 2023-07-05 | End: 2023-07-09

## 2023-07-05 RX ORDER — MEROPENEM 1 G/30ML
1000 INJECTION INTRAVENOUS EVERY 8 HOURS
Refills: 0 | Status: DISCONTINUED | OUTPATIENT
Start: 2023-07-05 | End: 2023-07-09

## 2023-07-05 RX ORDER — SODIUM CHLORIDE 9 MG/ML
1000 INJECTION INTRAMUSCULAR; INTRAVENOUS; SUBCUTANEOUS
Refills: 0 | Status: DISCONTINUED | OUTPATIENT
Start: 2023-07-05 | End: 2023-07-09

## 2023-07-05 RX ORDER — ACETAMINOPHEN 500 MG
650 TABLET ORAL EVERY 6 HOURS
Refills: 0 | Status: DISCONTINUED | OUTPATIENT
Start: 2023-07-05 | End: 2023-07-09

## 2023-07-05 RX ORDER — ERTAPENEM SODIUM 1 G/1
1000 INJECTION, POWDER, LYOPHILIZED, FOR SOLUTION INTRAMUSCULAR; INTRAVENOUS EVERY 24 HOURS
Refills: 0 | Status: DISCONTINUED | OUTPATIENT
Start: 2023-07-05 | End: 2023-07-05

## 2023-07-05 RX ORDER — AZITHROMYCIN 500 MG/1
500 TABLET, FILM COATED ORAL EVERY 24 HOURS
Refills: 0 | Status: DISCONTINUED | OUTPATIENT
Start: 2023-07-05 | End: 2023-07-05

## 2023-07-05 RX ORDER — CEFTRIAXONE 500 MG/1
1000 INJECTION, POWDER, FOR SOLUTION INTRAMUSCULAR; INTRAVENOUS EVERY 24 HOURS
Refills: 0 | Status: DISCONTINUED | OUTPATIENT
Start: 2023-07-05 | End: 2023-07-05

## 2023-07-05 RX ORDER — POTASSIUM CHLORIDE 20 MEQ
40 PACKET (EA) ORAL ONCE
Refills: 0 | Status: COMPLETED | OUTPATIENT
Start: 2023-07-05 | End: 2023-07-05

## 2023-07-05 RX ORDER — OLANZAPINE 15 MG/1
5 TABLET, FILM COATED ORAL EVERY 8 HOURS
Refills: 0 | Status: DISCONTINUED | OUTPATIENT
Start: 2023-07-05 | End: 2023-07-05

## 2023-07-05 RX ORDER — QUETIAPINE FUMARATE 200 MG/1
25 TABLET, FILM COATED ORAL AT BEDTIME
Refills: 0 | Status: DISCONTINUED | OUTPATIENT
Start: 2023-07-05 | End: 2023-07-09

## 2023-07-05 RX ADMIN — DORZOLAMIDE HYDROCHLORIDE 1 DROP(S): 20 SOLUTION/ DROPS OPHTHALMIC at 21:50

## 2023-07-05 RX ADMIN — MEMANTINE HYDROCHLORIDE 5 MILLIGRAM(S): 10 TABLET ORAL at 17:18

## 2023-07-05 RX ADMIN — TAMSULOSIN HYDROCHLORIDE 0.4 MILLIGRAM(S): 0.4 CAPSULE ORAL at 21:27

## 2023-07-05 RX ADMIN — ENOXAPARIN SODIUM 40 MILLIGRAM(S): 100 INJECTION SUBCUTANEOUS at 05:10

## 2023-07-05 RX ADMIN — MEROPENEM 100 MILLIGRAM(S): 1 INJECTION INTRAVENOUS at 14:46

## 2023-07-05 RX ADMIN — QUETIAPINE FUMARATE 25 MILLIGRAM(S): 200 TABLET, FILM COATED ORAL at 21:27

## 2023-07-05 RX ADMIN — FINASTERIDE 5 MILLIGRAM(S): 5 TABLET, FILM COATED ORAL at 12:01

## 2023-07-05 RX ADMIN — MEMANTINE HYDROCHLORIDE 5 MILLIGRAM(S): 10 TABLET ORAL at 05:10

## 2023-07-05 RX ADMIN — SODIUM CHLORIDE 60 MILLILITER(S): 9 INJECTION INTRAMUSCULAR; INTRAVENOUS; SUBCUTANEOUS at 02:04

## 2023-07-05 RX ADMIN — DORZOLAMIDE HYDROCHLORIDE 1 DROP(S): 20 SOLUTION/ DROPS OPHTHALMIC at 14:46

## 2023-07-05 RX ADMIN — OLANZAPINE 5 MILLIGRAM(S): 15 TABLET, FILM COATED ORAL at 17:17

## 2023-07-05 RX ADMIN — ATORVASTATIN CALCIUM 20 MILLIGRAM(S): 80 TABLET, FILM COATED ORAL at 21:27

## 2023-07-05 RX ADMIN — MEROPENEM 100 MILLIGRAM(S): 1 INJECTION INTRAVENOUS at 21:27

## 2023-07-05 RX ADMIN — ERTAPENEM SODIUM 120 MILLIGRAM(S): 1 INJECTION, POWDER, LYOPHILIZED, FOR SOLUTION INTRAMUSCULAR; INTRAVENOUS at 05:09

## 2023-07-05 RX ADMIN — DORZOLAMIDE HYDROCHLORIDE 1 DROP(S): 20 SOLUTION/ DROPS OPHTHALMIC at 05:10

## 2023-07-05 RX ADMIN — Medication 40 MILLIEQUIVALENT(S): at 21:47

## 2023-07-05 RX ADMIN — Medication 1 TABLET(S): at 12:02

## 2023-07-05 NOTE — CONSULT NOTE ADULT - SUBJECTIVE AND OBJECTIVE BOX
Date/Time Patient Seen:  		  Referring MD:   Data Reviewed	       Patient is a 89y old  Male who presents with a chief complaint of pneumonia, UTI (04 Jul 2023 23:08)      Subjective/HPI   89yo M with PMH dementia, BPH, CAD, GERD, hyperlipidemia, kidney stones, UTI presents with cough and fever.  PAST MEDICAL & SURGICAL HISTORY:  Benign prostatic hyperplasia, presence of lower urinary tract symptoms unspecified, unspecified morphology    Hyperlipidemia    GERD (gastroesophageal reflux disease)    CAD (coronary artery disease)    UTI (urinary tract infection)    Calculus of kidney    Calculus of ureter    Dementia    Kidney stones    H/O heart artery stent  stent x 2    History of prostate surgery    S/P ureteral stent placement  Left sided    Stented coronary artery          Medication list         MEDICATIONS  (STANDING):  atorvastatin 20 milliGRAM(s) Oral at bedtime  dorzolamide 2% Ophthalmic Solution 1 Drop(s) Both EYES three times a day  enoxaparin Injectable 40 milliGRAM(s) SubCutaneous every 24 hours  ertapenem  IVPB 1000 milliGRAM(s) IV Intermittent every 24 hours  finasteride 5 milliGRAM(s) Oral daily  memantine 5 milliGRAM(s) Oral two times a day  multivitamin/minerals 1 Tablet(s) Oral daily  sodium chloride 0.9%. 1000 milliLiter(s) (60 mL/Hr) IV Continuous <Continuous>  tamsulosin 0.4 milliGRAM(s) Oral at bedtime    MEDICATIONS  (PRN):  acetaminophen     Tablet .. 650 milliGRAM(s) Oral every 6 hours PRN Temp greater or equal to 38C (100.4F), Mild Pain (1 - 3)         Vitals log        ICU Vital Signs Last 24 Hrs  T(C): 36.6 (05 Jul 2023 04:41), Max: 38.6 (04 Jul 2023 18:50)  T(F): 97.8 (05 Jul 2023 04:41), Max: 101.4 (04 Jul 2023 18:50)  HR: 59 (05 Jul 2023 04:41) (59 - 104)  BP: 149/70 (05 Jul 2023 04:41) (109/61 - 167/68)  BP(mean): --  ABP: --  ABP(mean): --  RR: 18 (05 Jul 2023 04:41) (16 - 18)  SpO2: 95% (05 Jul 2023 04:41) (94% - 99%)    O2 Parameters below as of 05 Jul 2023 04:41  Patient On (Oxygen Delivery Method): room air                 Input and Output:  I&O's Detail      Lab Data                        14.4   14.27 )-----------( 161      ( 04 Jul 2023 18:50 )             45.0     07-04    142  |  107  |  17  ----------------------------<  152<H>  4.0   |  31  |  1.10    Ca    10.3<H>      04 Jul 2023 18:50    TPro  8.0  /  Alb  3.3  /  TBili  1.3<H>  /  DBili  x   /  AST  19  /  ALT  12  /  AlkPhos  105  07-04      FAMILY HISTORY:  Child  Still living? Yes, Estimated age: Age Unknown  FH: hypertension, Age at diagnosis: Age Unknown.     Social History:  · Substance use	No  · Social History (marital status, living situation, occupation, and sexual history)	Lives at home with son. Mostly independent but requires assistance with getting dressed. Denies tobacco use. Denies alcohol use.     Tobacco Screening:  · Core Measure Site	Yes  · Has the patient used tobacco in the past 30 days?	No    Risk Assessment:    Present on Admission:  Deep Venous Thrombosis	no  Pulmonary Embolus	no  Urinary Catheter	no  Central Venous Catheter/PICC Line	no  Surgical Site Incision	no  Pressure Ulcer(s)	no     HIV Screening:  · In accordance with [place] State law, we offer every patient who comes to our ED an HIV test. Would you like to be tested today?	Opt out        Review of Systems	  cough  fever  chills      Objective     Physical Examination        Pertinent Lab findings & Imaging      Nj:  NO   Adequate UO     I&O's Detail           Discussed with:     Cultures:	        Radiology  CXR - patchy, bilateral infiltrates L>R                           Date/Time Patient Seen:  		  Referring MD:   Data Reviewed	       Patient is a 89y old  Male who presents with a chief complaint of pneumonia, UTI (04 Jul 2023 23:08)      Subjective/HPI  in bed  seen and examined  vs noted  labs reviewed  imaging reviewed  on room air  h and p reviewed  er provider note reviewed       89yo M with PMH dementia, BPH, CAD, GERD, hyperlipidemia, kidney stones, UTI presents with cough and fever.  PAST MEDICAL & SURGICAL HISTORY:  Benign prostatic hyperplasia, presence of lower urinary tract symptoms unspecified, unspecified morphology    Hyperlipidemia    GERD (gastroesophageal reflux disease)    CAD (coronary artery disease)    UTI (urinary tract infection)    Calculus of kidney    Calculus of ureter    Dementia    Kidney stones    H/O heart artery stent  stent x 2    History of prostate surgery    S/P ureteral stent placement  Left sided    Stented coronary artery          Medication list         MEDICATIONS  (STANDING):  atorvastatin 20 milliGRAM(s) Oral at bedtime  dorzolamide 2% Ophthalmic Solution 1 Drop(s) Both EYES three times a day  enoxaparin Injectable 40 milliGRAM(s) SubCutaneous every 24 hours  ertapenem  IVPB 1000 milliGRAM(s) IV Intermittent every 24 hours  finasteride 5 milliGRAM(s) Oral daily  memantine 5 milliGRAM(s) Oral two times a day  multivitamin/minerals 1 Tablet(s) Oral daily  sodium chloride 0.9%. 1000 milliLiter(s) (60 mL/Hr) IV Continuous <Continuous>  tamsulosin 0.4 milliGRAM(s) Oral at bedtime    MEDICATIONS  (PRN):  acetaminophen     Tablet .. 650 milliGRAM(s) Oral every 6 hours PRN Temp greater or equal to 38C (100.4F), Mild Pain (1 - 3)         Vitals log        ICU Vital Signs Last 24 Hrs  T(C): 36.6 (05 Jul 2023 04:41), Max: 38.6 (04 Jul 2023 18:50)  T(F): 97.8 (05 Jul 2023 04:41), Max: 101.4 (04 Jul 2023 18:50)  HR: 59 (05 Jul 2023 04:41) (59 - 104)  BP: 149/70 (05 Jul 2023 04:41) (109/61 - 167/68)  BP(mean): --  ABP: --  ABP(mean): --  RR: 18 (05 Jul 2023 04:41) (16 - 18)  SpO2: 95% (05 Jul 2023 04:41) (94% - 99%)    O2 Parameters below as of 05 Jul 2023 04:41  Patient On (Oxygen Delivery Method): room air                 Input and Output:  I&O's Detail      Lab Data                        14.4   14.27 )-----------( 161      ( 04 Jul 2023 18:50 )             45.0     07-04    142  |  107  |  17  ----------------------------<  152<H>  4.0   |  31  |  1.10    Ca    10.3<H>      04 Jul 2023 18:50    TPro  8.0  /  Alb  3.3  /  TBili  1.3<H>  /  DBili  x   /  AST  19  /  ALT  12  /  AlkPhos  105  07-04      FAMILY HISTORY:  Child  Still living? Yes, Estimated age: Age Unknown  FH: hypertension, Age at diagnosis: Age Unknown.     Social History:  · Substance use	No  · Social History (marital status, living situation, occupation, and sexual history)	Lives at home with son. Mostly independent but requires assistance with getting dressed. Denies tobacco use. Denies alcohol use.     Tobacco Screening:  · Core Measure Site	Yes  · Has the patient used tobacco in the past 30 days?	No    Risk Assessment:    Present on Admission:  Deep Venous Thrombosis	no  Pulmonary Embolus	no  Urinary Catheter	no  Central Venous Catheter/PICC Line	no  Surgical Site Incision	no  Pressure Ulcer(s)	no     HIV Screening:  · In accordance with [place] State law, we offer every patient who comes to our ED an HIV test. Would you like to be tested today?	Opt out        Review of Systems	  cough  fever  chills      Objective     Physical Examination    head nc  on room air  head at  heart s1s2  lung dc bS  abd soft      Pertinent Lab findings & Imaging      Nj:  NO   Adequate UO     I&O's Detail           Discussed with:     Cultures:	        Radiology  CXR - patchy, bilateral infiltrates L>R

## 2023-07-05 NOTE — PATIENT PROFILE ADULT - FUNCTIONAL ASSESSMENT - BASIC MOBILITY 6.
3-calculated by average/Not able to assess (calculate score using [facility] averaging method)

## 2023-07-05 NOTE — PHARMACOTHERAPY INTERVENTION NOTE - COMMENTS
Pt on ertapenem 1g q 24h for PNA. Recommended escalating to meropenem 1g q 8h (eGFR>50ml/min) due to previous ESBL & Pseudomonas cultures.

## 2023-07-05 NOTE — CONSULT NOTE ADULT - SUBJECTIVE AND OBJECTIVE BOX
[facility]  INFECTIOUS DISEASES   [address] [place] [address]  Tel: [phone]     Fax: [phone]  ========================================================  MD Ariane Tillman Kaushal, MD Cho, Michelle, MD Sunjit, Jaspal, MD  ========================================================    MRN-[id number]  IDRIS DANIEL     CC: Fever and cough     HPI:  89yo man with PMH of CAD, Renal stones, dementia, and UTIs was admitted on 7/4 with  presents with cough and fever. History from son via phone, patient poor historian 2/2 dementia. Per discussion with pt's son, pt with ongoing cough x3 days. Pt was given Dayquil, Nyquil, Robitussin without much improvement in cough. Son states pt developed fever this afternoon. Pt consumes a pureed diet at home. Denies sick contacts.     IN THE ED:  Temp   98.6F , 82 HR  , BP  167/68  ,RR 18, SpO2 18  S/P tylenol suppository 650mg, cefepime 1000mg IV, vancomycin 1000mg IV, sodium chloride 0.9% bolus 2100mL IV  EKG: Sinus rhythm with 1st degree AV block, VR 100bpm  Labs significant for WBC 14.27, lactate 1.6, UA (positive nitrite, moderate leukocyte esterase, 15 WBC, 5 RBC, many bacteria, squamous epithelial cells present), RVP positive for entero/rhinovirus  Imaging: Wet read CXR - patchy, bilateral infiltrates L>R   (04 Jul 2023 23:08)      PAST MEDICAL & SURGICAL HISTORY:  Benign prostatic hyperplasia, presence of lower urinary tract symptoms unspecified, unspecified morphology      Hyperlipidemia      GERD (gastroesophageal reflux disease)      CAD (coronary artery disease)      UTI (urinary tract infection)      Calculus of kidney      Calculus of ureter      Dementia      Kidney stones      H/O heart artery stent  stent x 2      History of prostate surgery      S/P ureteral stent placement  Left sided      Stented coronary artery          Social Hx: No smoking, EtOH or drugs     FAMILY HISTORY:  FH: hypertension (Child)        Allergies    No Known Allergies    Intolerances        Antibiotics:  MEDICATIONS  (STANDING):  atorvastatin 20 milliGRAM(s) Oral at bedtime  dorzolamide 2% Ophthalmic Solution 1 Drop(s) Both EYES three times a day  enoxaparin Injectable 40 milliGRAM(s) SubCutaneous every 24 hours  ertapenem  IVPB 1000 milliGRAM(s) IV Intermittent every 24 hours  finasteride 5 milliGRAM(s) Oral daily  memantine 5 milliGRAM(s) Oral two times a day  multivitamin/minerals 1 Tablet(s) Oral daily  sodium chloride 0.9%. 1000 milliLiter(s) (60 mL/Hr) IV Continuous <Continuous>  tamsulosin 0.4 milliGRAM(s) Oral at bedtime    MEDICATIONS  (PRN):  acetaminophen     Tablet .. 650 milliGRAM(s) Oral every 6 hours PRN Temp greater or equal to 38C (100.4F), Mild Pain (1 - 3)  guaiFENesin Oral Liquid (Sugar-Free) 200 milliGRAM(s) Oral every 6 hours PRN Cough       REVIEW OF SYSTEMS:  CONSTITUTIONAL:  No Fever or chills  HEENT:  No diplopia or blurred vision.  No sore throat or runny nose.  CARDIOVASCULAR:  No chest pain or SOB.  RESPIRATORY:  No cough, shortness of breath, PND or orthopnea.  GASTROINTESTINAL:  No nausea, vomiting or diarrhea.  GENITOURINARY:  No dysuria, frequency or urgency. No Blood in urine  MUSCULOSKELETAL:  no joint aches, no muscle pain  SKIN:  No change in skin, hair or nails.  NEUROLOGIC:  No paresthesias or weakness.  PSYCHIATRIC:  No disorder of thought or mood.  ENDOCRINE:  No heat or cold intolerance, polyuria or polydipsia.  HEMATOLOGICAL:  No easy bruising or bleeding.     Physical Exam:  Vital Signs Last 24 Hrs  T(C): 36.6 (05 Jul 2023 04:41), Max: 38.6 (04 Jul 2023 18:50)  T(F): 97.8 (05 Jul 2023 04:41), Max: 101.4 (04 Jul 2023 18:50)  HR: 59 (05 Jul 2023 04:41) (59 - 104)  BP: 149/70 (05 Jul 2023 04:41) (109/61 - 167/68)  BP(mean): --  RR: 18 (05 Jul 2023 04:41) (16 - 18)  SpO2: 95% (05 Jul 2023 04:41) (94% - 99%)    Parameters below as of 05 Jul 2023 04:41  Patient On (Oxygen Delivery Method): room air        Weight (kg): 74.7 (07-05 @ 04:41)  GEN: NAD  HEENT: normocephalic and atraumatic. EOMI. PERRL.    NECK: Supple.  No lymphadenopathy   LUNGS: Clear to auscultation.  HEART: Regular rate and rhythm without murmur.  ABDOMEN: Soft, nontender, and nondistended.  Positive bowel sounds.    : No CVA tenderness  EXTREMITIES: Without edema.  NEUROLOGIC: grossly intact.  PSYCHIATRIC: Appropriate affect .  SKIN: No rash     Labs:  07-05    144  |  113<H>  |  11  ----------------------------<  95  3.3<L>   |  26  |  0.74    Ca    9.1      05 Jul 2023 08:05    TPro  6.3  /  Alb  2.5<L>  /  TBili  1.3<H>  /  DBili  x   /  AST  17  /  ALT  9<L>  /  AlkPhos  76  07-05                          12.8   11.15 )-----------( 127      ( 05 Jul 2023 08:05 )             39.1     PT/INR - ( 04 Jul 2023 18:50 )   PT: 14.3 sec;   INR: 1.22 ratio         PTT - ( 04 Jul 2023 18:50 )  PTT:30.0 sec  Urinalysis Basic - ( 05 Jul 2023 08:05 )    Color: x / Appearance: x / SG: x / pH: x  Gluc: 95 mg/dL / Ketone: x  / Bili: x / Urobili: x   Blood: x / Protein: x / Nitrite: x   Leuk Esterase: x / RBC: x / WBC x   Sq Epi: x / Non Sq Epi: x / Bacteria: x      LIVER FUNCTIONS - ( 05 Jul 2023 08:05 )  Alb: 2.5 g/dL / Pro: 6.3 g/dL / ALK PHOS: 76 U/L / ALT: 9 U/L / AST: 17 U/L / GGT: x                       SARS-CoV-2: NotDetec (07-04-23 @ 18:50)      RECENT CULTURES:  07-04 @ 18:50          Detected        All imaging and other data have been reviewed.      Assessment and Plan:       Thank you for courtesy of this consult.     Will follow.  Discussed with the primary team.     Tyler Chiu MD  Division of Infectious Diseases   Please call ID service at [phone] with any question.    75 minutes spent on total encounter assessing patient, examination, chart review, counseling and coordinating care by the attending physician/nurse/care manager.   [facility]  INFECTIOUS DISEASES   [address] [place] [address]  Tel: [phone]     Fax: [phone]  ========================================================  MD Ariane Tillman Kaushal, MD Cho, Michelle, MD Sunjit, Jaspal, MD  ========================================================    MRN-[id number]  IDRIS DANIEL     CC: Fever and cough     HPI:  89yo man with PMH of CAD, Renal stones, dementia, and UTIs was admitted on 7/4 with cough and fever.   History taken from chart and family. He has cough for 3-4 days, taking OTC meds. Since developed fever on the day of admission, he was brought ot ED.   No sick contacts.   In ED didn't have fever and was on RA with good sat. He was given one dose of cefepime and vancomycin.   RVP showed Enterovirus  Leukocytosis on admission 14k  UA with WBC 15 and a positive nitrate   CXR in self reading with patchy, bilateral infiltrates L>R    PAST MEDICAL & SURGICAL HISTORY:  Benign prostatic hyperplasia, presence of lower urinary tract symptoms unspecified, unspecified morphology  Hyperlipidemia  GERD (gastroesophageal reflux disease)  CAD (coronary artery disease)  UTI (urinary tract infection)  Calculus of kidney  Calculus of ureter  Dementia  Kidney stones  H/O heart artery stent  stent x 2  History of prostate surgery  S/P ureteral stent placement  Left sided  Stented coronary artery    Social Hx: No smoking, EtOH or drugs     FAMILY HISTORY:  FH: hypertension (Child)    Allergies  No Known Allergies    Antibiotics:  MEDICATIONS  (STANDING):  atorvastatin 20 milliGRAM(s) Oral at bedtime  dorzolamide 2% Ophthalmic Solution 1 Drop(s) Both EYES three times a day  enoxaparin Injectable 40 milliGRAM(s) SubCutaneous every 24 hours  ertapenem  IVPB 1000 milliGRAM(s) IV Intermittent every 24 hours  finasteride 5 milliGRAM(s) Oral daily  memantine 5 milliGRAM(s) Oral two times a day  multivitamin/minerals 1 Tablet(s) Oral daily  sodium chloride 0.9%. 1000 milliLiter(s) (60 mL/Hr) IV Continuous <Continuous>  tamsulosin 0.4 milliGRAM(s) Oral at bedtime    MEDICATIONS  (PRN):  acetaminophen     Tablet .. 650 milliGRAM(s) Oral every 6 hours PRN Temp greater or equal to 38C (100.4F), Mild Pain (1 - 3)  guaiFENesin Oral Liquid (Sugar-Free) 200 milliGRAM(s) Oral every 6 hours PRN Cough     REVIEW OF SYSTEMS:  CONSTITUTIONAL:  No Fever or chills  HEENT:  No diplopia or blurred vision.  No sore throat or runny nose.  CARDIOVASCULAR:  No chest pain or SOB.  RESPIRATORY:  +cough, no shortness of breath, PND or orthopnea.  GASTROINTESTINAL:  No nausea, vomiting or diarrhea.  GENITOURINARY:  No dysuria, frequency or urgency. No Blood in urine  MUSCULOSKELETAL:  no joint aches, no muscle pain  SKIN:  No change in skin, hair or nails.  NEUROLOGIC:  No paresthesias or weakness.  PSYCHIATRIC:  No disorder of thought or mood.  ENDOCRINE:  No heat or cold intolerance, polyuria or polydipsia.  HEMATOLOGICAL:  No easy bruising or bleeding.     Physical Exam:  Vital Signs Last 24 Hrs  T(C): 36.6 (05 Jul 2023 04:41), Max: 38.6 (04 Jul 2023 18:50)  T(F): 97.8 (05 Jul 2023 04:41), Max: 101.4 (04 Jul 2023 18:50)  HR: 59 (05 Jul 2023 04:41) (59 - 104)  BP: 149/70 (05 Jul 2023 04:41) (109/61 - 167/68)  BP(mean): --  RR: 18 (05 Jul 2023 04:41) (16 - 18)  SpO2: 95% (05 Jul 2023 04:41) (94% - 99%)  Parameters below as of 05 Jul 2023 04:41  Patient On (Oxygen Delivery Method): room air  Weight (kg): 74.7 (07-05 @ 04:41)  GEN: NAD  HEENT: normocephalic and atraumatic. EOMI. PERRL.    NECK: Supple.  No lymphadenopathy   LUNGS: Clear to auscultation.  HEART: Regular rate and rhythm without murmur.  ABDOMEN: Soft, nontender, and nondistended.  Positive bowel sounds.    : No CVA tenderness  EXTREMITIES: Without edema.  NEUROLOGIC: grossly intact.  PSYCHIATRIC: Appropriate affect .  SKIN: No rash     Labs:  07-05    144  |  113<H>  |  11  ----------------------------<  95  3.3<L>   |  26  |  0.74    Ca    9.1      05 Jul 2023 08:05    TPro  6.3  /  Alb  2.5<L>  /  TBili  1.3<H>  /  DBili  x   /  AST  17  /  ALT  9<L>  /  AlkPhos  76  07-05                        12.8   11.15 )-----------( 127      ( 05 Jul 2023 08:05 )             39.1     PT/INR - ( 04 Jul 2023 18:50 )   PT: 14.3 sec;   INR: 1.22 ratio    PTT - ( 04 Jul 2023 18:50 )  PTT:30.0 sec  Urinalysis Basic - ( 05 Jul 2023 08:05 )    Color: x / Appearance: x / SG: x / pH: x  Gluc: 95 mg/dL / Ketone: x  / Bili: x / Urobili: x   Blood: x / Protein: x / Nitrite: x   Leuk Esterase: x / RBC: x / WBC x   Sq Epi: x / Non Sq Epi: x / Bacteria: x    LIVER FUNCTIONS - ( 05 Jul 2023 08:05 )  Alb: 2.5 g/dL / Pro: 6.3 g/dL / ALK PHOS: 76 U/L / ALT: 9 U/L / AST: 17 U/L / GGT: x           SARS-CoV-2: NotDetec (07-04-23 @ 18:50)    RECENT CULTURES:  07-04 @ 18:50      Detected    All imaging and other data have been reviewed.      Assessment and Plan:   89yo man with PMH of CAD, Renal stones, dementia, and UTIs was admitted on 7/4 with cough and fever.   History taken from chart and family. He has cough for 3-4 days, taking OTC meds. Since developed fever on the day of admission, he was brought ot ED.   No sick contacts.   In ED didn't have fever and was on RA with good sat. He was given one dose of cefepime and vancomycin.   RVP showed Enterovirus  Leukocytosis on admission 14k  UA with WBC 15 and a positive nitrate   UA is positive but symptoms unreliable due to dementia, had ESBL in the past. CXR has some opacities but official reading still pending, it could be post viral pneumonia.     #Viral URI  #Pneumonia   # UTI    - Will follow blood and urine cultures   - Will send nares MRSA   - Will send strep U ag and legionella   - Can start Ertapenem 1gm daily   - Will follow CXR result  - For viral infection will continue supportive care     Thank you for courtesy of this consult.     Will follow.  Discussed with the primary team.     Tyler Chiu MD  Division of Infectious Diseases   Please call ID service at [phone] with any question.    75 minutes spent on total encounter assessing patient, examination, chart review, counseling and coordinating care by the attending physician/nurse/care manager.   [facility]  INFECTIOUS DISEASES   [address] [place] [address]  Tel: [phone]     Fax: [phone]  ========================================================  MD Ariane Tillman Kaushal, MD Cho, Michelle, MD Sunjit, Jaspal, MD  ========================================================    MRN-[id number]  IDRIS DANIEL     CC: Fever and cough     HPI:  89yo man with PMH of CAD, Renal stones, dementia, and UTIs was admitted on 7/4 with cough and fever.   History taken from chart and family. He has cough for 3-4 days, taking OTC meds. Since developed fever on the day of admission, he was brought ot ED.   No sick contacts.   In ED didn't have fever and was on RA with good sat. He was given one dose of cefepime and vancomycin.   RVP showed Enterovirus  Leukocytosis on admission 14k  UA with WBC 15 and a positive nitrate   CXR in self reading with patchy, bilateral infiltrates L>R    PAST MEDICAL & SURGICAL HISTORY:  Benign prostatic hyperplasia, presence of lower urinary tract symptoms unspecified, unspecified morphology  Hyperlipidemia  GERD (gastroesophageal reflux disease)  CAD (coronary artery disease)  UTI (urinary tract infection)  Calculus of kidney  Calculus of ureter  Dementia  Kidney stones  H/O heart artery stent  stent x 2  History of prostate surgery  S/P ureteral stent placement  Left sided  Stented coronary artery    Social Hx: No smoking, EtOH or drugs     FAMILY HISTORY:  FH: hypertension (Child)    Allergies  No Known Allergies    Antibiotics:  MEDICATIONS  (STANDING):  atorvastatin 20 milliGRAM(s) Oral at bedtime  dorzolamide 2% Ophthalmic Solution 1 Drop(s) Both EYES three times a day  enoxaparin Injectable 40 milliGRAM(s) SubCutaneous every 24 hours  ertapenem  IVPB 1000 milliGRAM(s) IV Intermittent every 24 hours  finasteride 5 milliGRAM(s) Oral daily  memantine 5 milliGRAM(s) Oral two times a day  multivitamin/minerals 1 Tablet(s) Oral daily  sodium chloride 0.9%. 1000 milliLiter(s) (60 mL/Hr) IV Continuous <Continuous>  tamsulosin 0.4 milliGRAM(s) Oral at bedtime    MEDICATIONS  (PRN):  acetaminophen     Tablet .. 650 milliGRAM(s) Oral every 6 hours PRN Temp greater or equal to 38C (100.4F), Mild Pain (1 - 3)  guaiFENesin Oral Liquid (Sugar-Free) 200 milliGRAM(s) Oral every 6 hours PRN Cough     REVIEW OF SYSTEMS:  CONSTITUTIONAL:  No Fever or chills  HEENT:  No diplopia or blurred vision.  No sore throat or runny nose.  CARDIOVASCULAR:  No chest pain or SOB.  RESPIRATORY:  +cough, no shortness of breath, PND or orthopnea.  GASTROINTESTINAL:  No nausea, vomiting or diarrhea.  GENITOURINARY:  No dysuria, frequency or urgency. No Blood in urine  MUSCULOSKELETAL:  no joint aches, no muscle pain  SKIN:  No change in skin, hair or nails.  NEUROLOGIC:  No paresthesias or weakness.  PSYCHIATRIC:  No disorder of thought or mood.  ENDOCRINE:  No heat or cold intolerance, polyuria or polydipsia.  HEMATOLOGICAL:  No easy bruising or bleeding.     Physical Exam:  Vital Signs Last 24 Hrs  T(C): 36.6 (05 Jul 2023 04:41), Max: 38.6 (04 Jul 2023 18:50)  T(F): 97.8 (05 Jul 2023 04:41), Max: 101.4 (04 Jul 2023 18:50)  HR: 59 (05 Jul 2023 04:41) (59 - 104)  BP: 149/70 (05 Jul 2023 04:41) (109/61 - 167/68)  BP(mean): --  RR: 18 (05 Jul 2023 04:41) (16 - 18)  SpO2: 95% (05 Jul 2023 04:41) (94% - 99%)  Parameters below as of 05 Jul 2023 04:41  Patient On (Oxygen Delivery Method): room air  Weight (kg): 74.7 (07-05 @ 04:41)  GEN: NAD  HEENT: normocephalic and atraumatic. EOMI. PERRL.    NECK: Supple.  No lymphadenopathy   LUNGS: rhonchi bilaterally   HEART: Regular rate and rhythm   ABDOMEN: Soft, nontender, and nondistended.  Positive bowel sounds.    : No CVA tenderness  EXTREMITIES: Without edema.  NEUROLOGIC: grossly intact.  PSYCHIATRIC: Awake and alert   SKIN: No rash     Labs:  07-05    144  |  113<H>  |  11  ----------------------------<  95  3.3<L>   |  26  |  0.74    Ca    9.1      05 Jul 2023 08:05    TPro  6.3  /  Alb  2.5<L>  /  TBili  1.3<H>  /  DBili  x   /  AST  17  /  ALT  9<L>  /  AlkPhos  76  07-05                        12.8   11.15 )-----------( 127      ( 05 Jul 2023 08:05 )             39.1     PT/INR - ( 04 Jul 2023 18:50 )   PT: 14.3 sec;   INR: 1.22 ratio    PTT - ( 04 Jul 2023 18:50 )  PTT:30.0 sec  Urinalysis Basic - ( 05 Jul 2023 08:05 )    Color: x / Appearance: x / SG: x / pH: x  Gluc: 95 mg/dL / Ketone: x  / Bili: x / Urobili: x   Blood: x / Protein: x / Nitrite: x   Leuk Esterase: x / RBC: x / WBC x   Sq Epi: x / Non Sq Epi: x / Bacteria: x    LIVER FUNCTIONS - ( 05 Jul 2023 08:05 )  Alb: 2.5 g/dL / Pro: 6.3 g/dL / ALK PHOS: 76 U/L / ALT: 9 U/L / AST: 17 U/L / GGT: x           SARS-CoV-2: NotDetec (07-04-23 @ 18:50)    RECENT CULTURES:  07-04 @ 18:50      Detected    All imaging and other data have been reviewed.      Assessment and Plan:   89yo man with PMH of CAD, Renal stones, dementia, and UTIs was admitted on 7/4 with cough and fever.   History taken from chart and family. He has cough for 3-4 days, taking OTC meds. Since developed fever on the day of admission, he was brought ot ED.   No sick contacts.   In ED didn't have fever and was on RA with good sat. He was given one dose of cefepime and vancomycin.   RVP showed Enterovirus  Leukocytosis on admission 14k  UA with WBC 15 and a positive nitrate   UA is positive but symptoms unreliable due to dementia, had ESBL in the past. CXR has some opacities but official reading still pending, it could be post viral pneumonia.     #Viral URI  #Pneumonia   # UTI    - Will follow blood and urine cultures   - Will send nares MRSA   - Will send strep U ag and legionella   - Can start Ertapenem 1gm daily   - Will follow CXR result, could be viral infection or edema?   - For viral infection will continue supportive care     Thank you for courtesy of this consult.     Will follow.  Discussed with the primary team.     Tyler Chiu MD  Division of Infectious Diseases   Please call ID service at [phone] with any question.    75 minutes spent on total encounter assessing patient, examination, chart review, counseling and coordinating care by the attending physician/nurse/care manager.

## 2023-07-05 NOTE — PROGRESS NOTE ADULT - SUBJECTIVE AND OBJECTIVE BOX
Patient is a 89y old  Male who presents with a chief complaint of pneumonia, UTI (05 Jul 2023 10:15)       INTERVAL HPI/OVERNIGHT EVENTS: Patient seen and examined at bedside. No new events/complaints noted. Patient has h/o dementia and is confused at baseline     MEDICATIONS  (STANDING):  atorvastatin 20 milliGRAM(s) Oral at bedtime  dorzolamide 2% Ophthalmic Solution 1 Drop(s) Both EYES three times a day  enoxaparin Injectable 40 milliGRAM(s) SubCutaneous every 24 hours  ertapenem  IVPB 1000 milliGRAM(s) IV Intermittent every 24 hours  finasteride 5 milliGRAM(s) Oral daily  memantine 5 milliGRAM(s) Oral two times a day  multivitamin/minerals 1 Tablet(s) Oral daily  sodium chloride 0.9%. 1000 milliLiter(s) (60 mL/Hr) IV Continuous <Continuous>  tamsulosin 0.4 milliGRAM(s) Oral at bedtime    MEDICATIONS  (PRN):  acetaminophen     Tablet .. 650 milliGRAM(s) Oral every 6 hours PRN Temp greater or equal to 38C (100.4F), Mild Pain (1 - 3)  guaiFENesin Oral Liquid (Sugar-Free) 200 milliGRAM(s) Oral every 6 hours PRN Cough      Allergies    No Known Allergies    Intolerances        REVIEW OF SYSTEMS:  Unable to obtain due to confusion   Vital Signs Last 24 Hrs  T(C): 36.6 (05 Jul 2023 04:41), Max: 38.6 (04 Jul 2023 18:50)  T(F): 97.8 (05 Jul 2023 04:41), Max: 101.4 (04 Jul 2023 18:50)  HR: 59 (05 Jul 2023 04:41) (59 - 104)  BP: 149/70 (05 Jul 2023 04:41) (109/61 - 167/68)  BP(mean): --  RR: 18 (05 Jul 2023 04:41) (16 - 18)  SpO2: 95% (05 Jul 2023 04:41) (94% - 99%)    Parameters below as of 05 Jul 2023 04:41  Patient On (Oxygen Delivery Method): room air        PHYSICAL EXAM:  GENERAL: Alert, awake, no apparent distress  EYES: No conjunctival or scleral injection, non-icteric  ENMT: Oral mucosa with moist membranes.   NECK: Supple, symmetric and without tracheal deviation   RESP: Rhonchi present. No respiratory distress, no use of accessory muscles.  CV: RRR, +S1S2, no peripheral edema  GI: Soft, NT, ND  MSK: Normal ROM without pain, no joint pain   SKIN: No rashes or ulcers noted  NEURO: Sensation intact in upper and lower extremities b/l to light touch   PSYCH: A+O x 2 (person, place), mood and affect appropriate  LABS:                        12.8   11.15 )-----------( 127      ( 05 Jul 2023 08:05 )             39.1     05 Jul 2023 08:05    144    |  113    |  11     ----------------------------<  95     3.3     |  26     |  0.74     Ca    9.1        05 Jul 2023 08:05    TPro  6.3    /  Alb  2.5    /  TBili  1.3    /  DBili  x      /  AST  17     /  ALT  9      /  AlkPhos  76     05 Jul 2023 08:05    PT/INR - ( 04 Jul 2023 18:50 )   PT: 14.3 sec;   INR: 1.22 ratio         PTT - ( 04 Jul 2023 18:50 )  PTT:30.0 sec  CAPILLARY BLOOD GLUCOSE        BLOOD CULTURE    RADIOLOGY & ADDITIONAL TESTS:    Imaging Personally Reviewed:  [ ] YES     Consultant(s) Notes Reviewed:      Care Discussed with Consultants/Other Providers:

## 2023-07-05 NOTE — CARE COORDINATION ASSESSMENT. - OTHER PERTINENT REFERRAL INFORMATION
SW met with pt and pt daughter Wade at bedside. Pt has hx of dementia and is unable to answer SW questions. Pt resides in a ranch style home, there are 14 [address]. Pt has a RW at home. Pt daughter Wade reports that she, her brother, and sister in law all take turns to support pt at home everyday. Per, daughter, pt has hx of homecare, however, doesn't recall agency name.

## 2023-07-05 NOTE — CONSULT NOTE ADULT - ASSESSMENT
89yo M with PMH dementia, BPH, CAD, GERD, hyperlipidemia, kidney stones, UTI presents with cough and fever.    dementia  BPH  UTI eval  PNA eval  OP  OA  CAD  GERD  Ataxic gait  HLD   89yo M with PMH dementia, BPH, CAD, GERD, hyperlipidemia, kidney stones, UTI presents with cough and fever.    dementia  BPH  UTI eval  PNA eval  OP  OA  CAD  GERD  Ataxic gait  HLD  Dysphagia  URI    URI sx management - tylenol - robitussin prn   monitor VS and Sat  emp ABX in progress  cx - biomarkers  pt with known Dysphagia - asp prec - HOB elev -   oral hygiene  skin care  monitor VS and HD and Sat  assist with needs  GOC discussion   cxr noted  labs reviewed  old records reviewed  pt is tolerating RA - VS noted

## 2023-07-05 NOTE — PATIENT PROFILE ADULT - FALL HARM RISK - RISK INTERVENTIONS
Assistance OOB with selected safe patient handling equipment/Assistance with ambulation/Communicate Fall Risk and Risk Factors to all staff, patient, and family/Discuss with provider need for PT consult/Monitor for mental status changes/Monitor gait and stability/Provide patient with walking aids - walker, cane, crutches/Reinforce activity limits and safety measures with patient and family/Reorient to person, place and time as needed/Use of alarms - bed, chair and/or voice tab/Visual Cue: Yellow wristband/Bed in lowest position, wheels locked, appropriate side rails in place/Call bell, personal items and telephone in reach/Instruct patient to call for assistance before getting out of bed or chair/Non-slip footwear when patient is out of bed/[place] to call system/Physically safe environment - no spills, clutter or unnecessary equipment/Purposeful Proactive Rounding/Room/bathroom lighting operational, light cord in reach

## 2023-07-05 NOTE — CARE COORDINATION ASSESSMENT. - NSCAREPROVIDERS_GEN_ALL_CORE_FT
CARE PROVIDERS:  Accepting Physician: Molly Mccormick  Administration: Dewey Bloom  Administration: Berhane Michaud  Admitting: Molly Mccormick  Attending: Ap Scott  Case Management: Wilmer Montez  Consultant: Tai Hinkle  Consultant: Tyler Chiu  ED ACP: [facility] ED Attending: Celestino Shepard ED Nurse: Romairo Gupta  Nurse: Agnieszka Ndiaye  Nurse: Zink, Corinne  Nurse: Dodie Richards  Nurse: Effie Chino  Ordered: ADM, User  Override: Ekeh Agnieszka  Override: Effie Chino  Override: Dodie Richards  Override: Jasmyne Mascorro  PCA/Nursing Assistant: Latrice Carreon  PCA/Nursing Assistant: Ana Pop  Primary Team: Holley Newberry  Primary Team: Molly Mccormick  Primary Team: Katey Estrella  Primary Team: Shantanu Castro  Primary Team: Shannan Garay  Primary Team: Henri Navarro  Primary Team: Ap Scott  Registered Dietitian: Rose Nance  : Gia Colin  : Susannah Amaro

## 2023-07-05 NOTE — PHYSICAL THERAPY INITIAL EVALUATION ADULT - PERTINENT HX OF CURRENT PROBLEM, REHAB EVAL
89 yo M PMHx dementia, BPH, GERD presents to ED with son c/o cough and weakness x 3 days. (+) fever. Pt denies HA, dizziness, chest pain, SOB, abd pain, N/V/D, recent travel.

## 2023-07-05 NOTE — CARE COORDINATION ASSESSMENT. - NSPASTMEDSURGHISTORY_GEN_ALL_CORE_FT
PAST MEDICAL & SURGICAL HISTORY:  Benign prostatic hyperplasia, presence of lower urinary tract symptoms unspecified, unspecified morphology      Hyperlipidemia      H/O heart artery stent  stent x 2      GERD (gastroesophageal reflux disease)      CAD (coronary artery disease)      S/P ureteral stent placement  Left sided      History of prostate surgery      UTI (urinary tract infection)      Calculus of ureter      Calculus of kidney      Kidney stones      Dementia      Stented coronary artery

## 2023-07-05 NOTE — CARE COORDINATION ASSESSMENT. - NSDCPLANSERVICES_GEN_ALL_CORE
Pt daughter was made aware of dc recommendation to [facility]. Pt daughter reports she would like to discuss dc plan with brother before she makes a decision./Subacute Rehabilitation

## 2023-07-05 NOTE — PATIENT PROFILE ADULT - DEAF OR HARD OF HEARING?
Orders were previously placed.
Patient mom calling and scheduled appt for pt injections     Please see message below
Pt's mother called and lvm over the weekend stating she would like to schedule a nurse visit for pt to get the  flu and Hepatitis A # 2 vaccines. Called and lvm for pt's mother to schedule said apt.
yes

## 2023-07-05 NOTE — PATIENT CHOICE NOTE. - NSPTCHOICESTATE_GEN_ALL_CORE

## 2023-07-06 LAB
ANION GAP SERPL CALC-SCNC: 4 MMOL/L — LOW (ref 5–17)
BUN SERPL-MCNC: 12 MG/DL — SIGNIFICANT CHANGE UP (ref 7–23)
CALCIUM SERPL-MCNC: 9.6 MG/DL — SIGNIFICANT CHANGE UP (ref 8.5–10.1)
CHLORIDE SERPL-SCNC: 115 MMOL/L — HIGH (ref 96–108)
CO2 SERPL-SCNC: 24 MMOL/L — SIGNIFICANT CHANGE UP (ref 22–31)
CREAT SERPL-MCNC: 0.7 MG/DL — SIGNIFICANT CHANGE UP (ref 0.5–1.3)
EGFR: 86 ML/MIN/1.73M2 — SIGNIFICANT CHANGE UP
GLUCOSE SERPL-MCNC: 76 MG/DL — SIGNIFICANT CHANGE UP (ref 70–99)
HCT VFR BLD CALC: 42.5 % — SIGNIFICANT CHANGE UP (ref 39–50)
HGB BLD-MCNC: 14 G/DL — SIGNIFICANT CHANGE UP (ref 13–17)
MCHC RBC-ENTMCNC: 30.7 PG — SIGNIFICANT CHANGE UP (ref 27–34)
MCHC RBC-ENTMCNC: 32.9 GM/DL — SIGNIFICANT CHANGE UP (ref 32–36)
MCV RBC AUTO: 93.2 FL — SIGNIFICANT CHANGE UP (ref 80–100)
NRBC # BLD: 0 /100 WBCS — SIGNIFICANT CHANGE UP (ref 0–0)
PLATELET # BLD AUTO: 97 K/UL — LOW (ref 150–400)
POTASSIUM SERPL-MCNC: 4.4 MMOL/L — SIGNIFICANT CHANGE UP (ref 3.5–5.3)
POTASSIUM SERPL-SCNC: 4.4 MMOL/L — SIGNIFICANT CHANGE UP (ref 3.5–5.3)
RBC # BLD: 4.56 M/UL — SIGNIFICANT CHANGE UP (ref 4.2–5.8)
RBC # FLD: 13.8 % — SIGNIFICANT CHANGE UP (ref 10.3–14.5)
SODIUM SERPL-SCNC: 143 MMOL/L — SIGNIFICANT CHANGE UP (ref 135–145)
WBC # BLD: 9.11 K/UL — SIGNIFICANT CHANGE UP (ref 3.8–10.5)
WBC # FLD AUTO: 9.11 K/UL — SIGNIFICANT CHANGE UP (ref 3.8–10.5)

## 2023-07-06 PROCEDURE — 99233 SBSQ HOSP IP/OBS HIGH 50: CPT

## 2023-07-06 PROCEDURE — 93010 ELECTROCARDIOGRAM REPORT: CPT

## 2023-07-06 RX ORDER — POLYETHYLENE GLYCOL 3350 17 G/17G
17 POWDER, FOR SOLUTION ORAL DAILY
Refills: 0 | Status: DISCONTINUED | OUTPATIENT
Start: 2023-07-06 | End: 2023-07-09

## 2023-07-06 RX ORDER — SENNA PLUS 8.6 MG/1
2 TABLET ORAL AT BEDTIME
Refills: 0 | Status: DISCONTINUED | OUTPATIENT
Start: 2023-07-06 | End: 2023-07-09

## 2023-07-06 RX ADMIN — DORZOLAMIDE HYDROCHLORIDE 1 DROP(S): 20 SOLUTION/ DROPS OPHTHALMIC at 07:39

## 2023-07-06 RX ADMIN — MEROPENEM 100 MILLIGRAM(S): 1 INJECTION INTRAVENOUS at 22:49

## 2023-07-06 RX ADMIN — ENOXAPARIN SODIUM 40 MILLIGRAM(S): 100 INJECTION SUBCUTANEOUS at 07:39

## 2023-07-06 RX ADMIN — MEROPENEM 100 MILLIGRAM(S): 1 INJECTION INTRAVENOUS at 15:23

## 2023-07-06 RX ADMIN — MEROPENEM 100 MILLIGRAM(S): 1 INJECTION INTRAVENOUS at 07:39

## 2023-07-06 RX ADMIN — MEMANTINE HYDROCHLORIDE 5 MILLIGRAM(S): 10 TABLET ORAL at 07:39

## 2023-07-06 RX ADMIN — TAMSULOSIN HYDROCHLORIDE 0.4 MILLIGRAM(S): 0.4 CAPSULE ORAL at 22:49

## 2023-07-06 RX ADMIN — DORZOLAMIDE HYDROCHLORIDE 1 DROP(S): 20 SOLUTION/ DROPS OPHTHALMIC at 22:49

## 2023-07-06 RX ADMIN — Medication 1 TABLET(S): at 13:01

## 2023-07-06 RX ADMIN — DORZOLAMIDE HYDROCHLORIDE 1 DROP(S): 20 SOLUTION/ DROPS OPHTHALMIC at 15:23

## 2023-07-06 RX ADMIN — QUETIAPINE FUMARATE 25 MILLIGRAM(S): 200 TABLET, FILM COATED ORAL at 22:49

## 2023-07-06 RX ADMIN — MEMANTINE HYDROCHLORIDE 5 MILLIGRAM(S): 10 TABLET ORAL at 18:48

## 2023-07-06 RX ADMIN — SENNA PLUS 2 TABLET(S): 8.6 TABLET ORAL at 22:49

## 2023-07-06 RX ADMIN — FINASTERIDE 5 MILLIGRAM(S): 5 TABLET, FILM COATED ORAL at 13:02

## 2023-07-06 RX ADMIN — ATORVASTATIN CALCIUM 20 MILLIGRAM(S): 80 TABLET, FILM COATED ORAL at 22:49

## 2023-07-06 RX ADMIN — POLYETHYLENE GLYCOL 3350 17 GRAM(S): 17 POWDER, FOR SOLUTION ORAL at 13:01

## 2023-07-06 NOTE — PROGRESS NOTE ADULT - SUBJECTIVE AND OBJECTIVE BOX
Date/Time Patient Seen:  		  Referring MD:   Data Reviewed	       Patient is a 89y old  Male who presents with a chief complaint of pneumonia, UTI (05 Jul 2023 10:17)      Subjective/HPI     PAST MEDICAL & SURGICAL HISTORY:  Benign prostatic hyperplasia, presence of lower urinary tract symptoms unspecified, unspecified morphology    Hyperlipidemia    GERD (gastroesophageal reflux disease)    CAD (coronary artery disease)    UTI (urinary tract infection)    Calculus of kidney    Calculus of ureter    Dementia    Kidney stones    H/O heart artery stent  stent x 2    History of prostate surgery    S/P ureteral stent placement  Left sided    Stented coronary artery          Medication list         MEDICATIONS  (STANDING):  atorvastatin 20 milliGRAM(s) Oral at bedtime  dorzolamide 2% Ophthalmic Solution 1 Drop(s) Both EYES three times a day  enoxaparin Injectable 40 milliGRAM(s) SubCutaneous every 24 hours  finasteride 5 milliGRAM(s) Oral daily  memantine 5 milliGRAM(s) Oral two times a day  meropenem  IVPB 1000 milliGRAM(s) IV Intermittent every 8 hours  multivitamin/minerals 1 Tablet(s) Oral daily  QUEtiapine 25 milliGRAM(s) Oral at bedtime  sodium chloride 0.9%. 1000 milliLiter(s) (60 mL/Hr) IV Continuous <Continuous>  tamsulosin 0.4 milliGRAM(s) Oral at bedtime    MEDICATIONS  (PRN):  acetaminophen     Tablet .. 650 milliGRAM(s) Oral every 6 hours PRN Temp greater or equal to 38C (100.4F), Mild Pain (1 - 3)  guaiFENesin Oral Liquid (Sugar-Free) 200 milliGRAM(s) Oral every 6 hours PRN Cough         Vitals log        ICU Vital Signs Last 24 Hrs  T(C): 36.4 (06 Jul 2023 04:43), Max: 36.6 (05 Jul 2023 12:01)  T(F): 97.5 (06 Jul 2023 04:43), Max: 97.9 (05 Jul 2023 12:01)  HR: 55 (06 Jul 2023 04:43) (51 - 59)  BP: 160/72 (06 Jul 2023 04:43) (115/70 - 179/62)  BP(mean): --  ABP: --  ABP(mean): --  RR: 18 (06 Jul 2023 04:43) (18 - 18)  SpO2: 97% (06 Jul 2023 04:43) (95% - 97%)    O2 Parameters below as of 06 Jul 2023 04:43  Patient On (Oxygen Delivery Method): room air                 Input and Output:  I&O's Detail      Lab Data                        12.8   11.15 )-----------( 127      ( 05 Jul 2023 08:05 )             39.1     07-05    144  |  113<H>  |  11  ----------------------------<  95  3.3<L>   |  26  |  0.74    Ca    9.1      05 Jul 2023 08:05    TPro  6.3  /  Alb  2.5<L>  /  TBili  1.3<H>  /  DBili  x   /  AST  17  /  ALT  9<L>  /  AlkPhos  76  07-05            Review of Systems	      Objective     Physical Examination    heart s1s2  lung dc BS  head nc      Pertinent Lab findings & Imaging      Ondina:  NO   Adequate UO     I&O's Detail           Discussed with:     Cultures:	        Radiology

## 2023-07-06 NOTE — PROGRESS NOTE ADULT - SUBJECTIVE AND OBJECTIVE BOX
[facility]  INFECTIOUS DISEASES   [address] [place] [address]  Tel: [phone]     Fax: [phone]  ========================================================  MD Ariane Tillman Kaushal, MD Cho, Michelle, MD Sunjit, Jaspal, MD  ========================================================    [id number]  IDRIS DANIEL     Follow up: Pneumonia and UTI    Patient has cough, looks dry. No fever. Lying in bed, awake and alert.   NAD.     PAST MEDICAL & SURGICAL HISTORY:  Benign prostatic hyperplasia, presence of lower urinary tract symptoms unspecified, unspecified morphology  Hyperlipidemia  GERD (gastroesophageal reflux disease)  CAD (coronary artery disease)  UTI (urinary tract infection)  Calculus of kidney  Calculus of ureter  Dementia  Kidney stones  H/O heart artery stent  stent x 2  History of prostate surgery  S/P ureteral stent placement  Left sided  Stented coronary artery    Social Hx: No smoking, EtOH or drugs     FAMILY HISTORY:  FH: hypertension (Child)    Allergies  No Known Allergies    Antibiotics:  MEDICATIONS  (STANDING):  atorvastatin 20 milliGRAM(s) Oral at bedtime  dorzolamide 2% Ophthalmic Solution 1 Drop(s) Both EYES three times a day  enoxaparin Injectable 40 milliGRAM(s) SubCutaneous every 24 hours  ertapenem  IVPB 1000 milliGRAM(s) IV Intermittent every 24 hours  finasteride 5 milliGRAM(s) Oral daily  memantine 5 milliGRAM(s) Oral two times a day  multivitamin/minerals 1 Tablet(s) Oral daily  sodium chloride 0.9%. 1000 milliLiter(s) (60 mL/Hr) IV Continuous <Continuous>  tamsulosin 0.4 milliGRAM(s) Oral at bedtime    MEDICATIONS  (PRN):  acetaminophen     Tablet .. 650 milliGRAM(s) Oral every 6 hours PRN Temp greater or equal to 38C (100.4F), Mild Pain (1 - 3)  guaiFENesin Oral Liquid (Sugar-Free) 200 milliGRAM(s) Oral every 6 hours PRN Cough     REVIEW OF SYSTEMS:  CONSTITUTIONAL:  No Fever or chills  HEENT:  No diplopia or blurred vision.  No sore throat or runny nose.  CARDIOVASCULAR:  No chest pain or SOB.  RESPIRATORY:  +cough, no shortness of breath, PND or orthopnea.  GASTROINTESTINAL:  No nausea, vomiting or diarrhea.  GENITOURINARY:  No dysuria, frequency or urgency. No Blood in urine  MUSCULOSKELETAL:  no joint aches, no muscle pain  SKIN:  No change in skin, hair or nails.  NEUROLOGIC:  No paresthesias or weakness.  PSYCHIATRIC:  No disorder of thought or mood.  ENDOCRINE:  No heat or cold intolerance, polyuria or polydipsia.  HEMATOLOGICAL:  No easy bruising or bleeding.     Physical Exam:  Vital Signs Last 24 Hrs  T(C): 36.4 (06 Jul 2023 11:37), Max: 36.4 (05 Jul 2023 20:58)  T(F): 97.5 (06 Jul 2023 11:37), Max: 97.6 (05 Jul 2023 20:58)  HR: 79 (06 Jul 2023 11:37) (55 - 79)  BP: 180/83 (06 Jul 2023 11:37) (160/72 - 180/83)  BP(mean): --  RR: 18 (06 Jul 2023 11:37) (18 - 18)  SpO2: 94% (06 Jul 2023 11:37) (94% - 97%)  Parameters below as of 06 Jul 2023 11:37  Patient On (Oxygen Delivery Method): room air  GEN: NAD  HEENT: normocephalic and atraumatic. EOMI. PERRL.    NECK: Supple.  No lymphadenopathy   LUNGS: rhonchi bilaterally   HEART: Regular rate and rhythm   ABDOMEN: Soft, nontender, and nondistended.  Positive bowel sounds.    : No CVA tenderness  EXTREMITIES: Without edema.  NEUROLOGIC: grossly intact.  PSYCHIATRIC: Awake and alert   SKIN: No rash       Labs:                        14.0   9.11  )-----------( 97       ( 06 Jul 2023 06:39 )             42.5     07-06    143  |  115<H>  |  12  ----------------------------<  76  4.4   |  24  |  0.70    Ca    9.6      06 Jul 2023 06:39    TPro  6.3  /  Alb  2.5<L>  /  TBili  1.3<H>  /  DBili  x   /  AST  17  /  ALT  9<L>  /  AlkPhos  76  07-05    Culture - Blood (collected 07-04-23 @ 19:00)  Source: .Blood Blood-Peripheral    Culture - Blood (collected 07-04-23 @ 18:50)  Source: .Blood Blood-Peripheral    WBC Count: 9.11 K/uL (07-06-23 @ 06:39)  WBC Count: 11.15 K/uL (07-05-23 @ 08:05)  WBC Count: 14.27 K/uL (07-04-23 @ 18:50)    Creatinine: 0.70 mg/dL (07-06-23 @ 06:39)  Creatinine: 0.74 mg/dL (07-05-23 @ 08:05)  Creatinine: 1.10 mg/dL (07-04-23 @ 18:50)     SARS-CoV-2: NotDetec (07-04-23 @ 18:50)    All imaging and other data have been reviewed.  < from: Xray Chest 1 View- PORTABLE-Urgent (Xray Chest 1 View- PORTABLE-Urgent .) (07.04.23 @ 21:10) >  IMPRESSION: Bilateral infiltrates increased from prior.    Assessment and Plan:   89yo man with PMH of CAD, Renal stones, dementia, and UTIs was admitted on 7/4 with cough and fever.   History taken from chart and family. He has cough for 3-4 days, taking OTC meds. Since developed fever on the day of admission, he was brought ot ED.   No sick contacts.   In ED didn't have fever and was on RA with good sat. He was given one dose of cefepime and vancomycin.   RVP showed Enterovirus  Leukocytosis on admission 14k  UA with WBC 15 and a positive nitrate   UA is positive but symptoms unreliable due to dementia, had ESBL in the past. CXR has some opacities it could be post viral pneumonia or partly edema but has constant dry cough.     #Viral URI  #Pneumonia   # UTI    - Blood cultures NGTD   - MRSA PCR and strep U ag and legionella all pending   - Continue Meropenem for now (had pseudomonas in the past, covers UTI and pneumonia)  - Supportive care and aspiration precautions     Will follow.  Discussed with the primary team.     Tyler Chiu MD  Division of Infectious Diseases   Please call ID service at [phone] with any question.    35 minutes spent on total encounter assessing patient, examination, chart review, counseling and coordinating care by the attending physician/nurse/care manager.   [facility]  INFECTIOUS DISEASES   [address] [place] [address]  Tel: [phone]     Fax: [phone]  ========================================================  MD Ariane Tillman Kaushal, MD Cho, Michelle, MD Sunjit, Jaspal, MD  ========================================================    [id number]  IDRIS DANIEL     Follow up: Pneumonia and UTI    Patient has cough, looks dry. No fever. Lying in bed, awake and alert.   NAD.     PAST MEDICAL & SURGICAL HISTORY:  Benign prostatic hyperplasia, presence of lower urinary tract symptoms unspecified, unspecified morphology  Hyperlipidemia  GERD (gastroesophageal reflux disease)  CAD (coronary artery disease)  UTI (urinary tract infection)  Calculus of kidney  Calculus of ureter  Dementia  Kidney stones  H/O heart artery stent  stent x 2  History of prostate surgery  S/P ureteral stent placement  Left sided  Stented coronary artery    Social Hx: No smoking, EtOH or drugs     FAMILY HISTORY:  FH: hypertension (Child)    Allergies  No Known Allergies    Antibiotics:  MEDICATIONS  (STANDING):  atorvastatin 20 milliGRAM(s) Oral at bedtime  dorzolamide 2% Ophthalmic Solution 1 Drop(s) Both EYES three times a day  enoxaparin Injectable 40 milliGRAM(s) SubCutaneous every 24 hours  ertapenem  IVPB 1000 milliGRAM(s) IV Intermittent every 24 hours  finasteride 5 milliGRAM(s) Oral daily  memantine 5 milliGRAM(s) Oral two times a day  multivitamin/minerals 1 Tablet(s) Oral daily  sodium chloride 0.9%. 1000 milliLiter(s) (60 mL/Hr) IV Continuous <Continuous>  tamsulosin 0.4 milliGRAM(s) Oral at bedtime    MEDICATIONS  (PRN):  acetaminophen     Tablet .. 650 milliGRAM(s) Oral every 6 hours PRN Temp greater or equal to 38C (100.4F), Mild Pain (1 - 3)  guaiFENesin Oral Liquid (Sugar-Free) 200 milliGRAM(s) Oral every 6 hours PRN Cough     REVIEW OF SYSTEMS:  CONSTITUTIONAL:  No Fever or chills  HEENT:  No diplopia or blurred vision.  No sore throat or runny nose.  CARDIOVASCULAR:  No chest pain or SOB.  RESPIRATORY:  +cough, no shortness of breath, PND or orthopnea.  GASTROINTESTINAL:  No nausea, vomiting or diarrhea.  GENITOURINARY:  No dysuria, frequency or urgency. No Blood in urine  MUSCULOSKELETAL:  no joint aches, no muscle pain  SKIN:  No change in skin, hair or nails.  NEUROLOGIC:  No paresthesias or weakness.  PSYCHIATRIC:  No disorder of thought or mood.  ENDOCRINE:  No heat or cold intolerance, polyuria or polydipsia.  HEMATOLOGICAL:  No easy bruising or bleeding.     Physical Exam:  Vital Signs Last 24 Hrs  T(C): 36.4 (06 Jul 2023 11:37), Max: 36.4 (05 Jul 2023 20:58)  T(F): 97.5 (06 Jul 2023 11:37), Max: 97.6 (05 Jul 2023 20:58)  HR: 79 (06 Jul 2023 11:37) (55 - 79)  BP: 180/83 (06 Jul 2023 11:37) (160/72 - 180/83)  BP(mean): --  RR: 18 (06 Jul 2023 11:37) (18 - 18)  SpO2: 94% (06 Jul 2023 11:37) (94% - 97%)  Parameters below as of 06 Jul 2023 11:37  Patient On (Oxygen Delivery Method): room air  GEN: NAD  HEENT: normocephalic and atraumatic. EOMI. PERRL.    NECK: Supple.  No lymphadenopathy   LUNGS: rhonchi bilaterally   HEART: Regular rate and rhythm   ABDOMEN: Soft, nontender, and nondistended.  Positive bowel sounds.    : No CVA tenderness  EXTREMITIES: Without edema.  NEUROLOGIC: grossly intact.  PSYCHIATRIC: Awake and alert   SKIN: No rash       Labs:                        14.0   9.11  )-----------( 97       ( 06 Jul 2023 06:39 )             42.5     07-06    143  |  115<H>  |  12  ----------------------------<  76  4.4   |  24  |  0.70    Ca    9.6      06 Jul 2023 06:39    TPro  6.3  /  Alb  2.5<L>  /  TBili  1.3<H>  /  DBili  x   /  AST  17  /  ALT  9<L>  /  AlkPhos  76  07-05    Culture - Blood (collected 07-04-23 @ 19:00)  Source: .Blood Blood-Peripheral    Culture - Blood (collected 07-04-23 @ 18:50)  Source: .Blood Blood-Peripheral    WBC Count: 9.11 K/uL (07-06-23 @ 06:39)  WBC Count: 11.15 K/uL (07-05-23 @ 08:05)  WBC Count: 14.27 K/uL (07-04-23 @ 18:50)    Creatinine: 0.70 mg/dL (07-06-23 @ 06:39)  Creatinine: 0.74 mg/dL (07-05-23 @ 08:05)  Creatinine: 1.10 mg/dL (07-04-23 @ 18:50)     SARS-CoV-2: NotDetec (07-04-23 @ 18:50)    All imaging and other data have been reviewed.  < from: Xray Chest 1 View- PORTABLE-Urgent (Xray Chest 1 View- PORTABLE-Urgent .) (07.04.23 @ 21:10) >  IMPRESSION: Bilateral infiltrates increased from prior.    Assessment and Plan:   89yo man with PMH of CAD, Renal stones, dementia, and UTIs was admitted on 7/4 with cough and fever.   History taken from chart and family. He has cough for 3-4 days, taking OTC meds. Since developed fever on the day of admission, he was brought ot ED.   No sick contacts.   In ED didn't have fever and was on RA with good sat. He was given one dose of cefepime and vancomycin.   RVP showed Enterovirus  Leukocytosis on admission 14k  UA with WBC 15 and a positive nitrate   UA is positive but symptoms unreliable due to dementia, had ESBL in the past. CXR has some opacities it could be post viral pneumonia or partly edema but has constant dry cough.     #Viral URI  #Pneumonia   # UTI    - Blood cultures NGTD   - Will follow UC   - Leukocytosis normalized 14k-->9k  - MRSA PCR and strep U ag and legionella all pending   - Continue Meropenem for now (had pseudomonas in the past, covers UTI and pneumonia)  - Supportive care and aspiration precautions     Will follow.  Discussed with the primary team.     Tyler Chiu MD  Division of Infectious Diseases   Please call ID service at [phone] with any question.    35 minutes spent on total encounter assessing patient, examination, chart review, counseling and coordinating care by the attending physician/nurse/care manager.

## 2023-07-06 NOTE — SOCIAL WORK PROGRESS NOTE - NSSWPROGRESSNOTE_GEN_ALL_CORE
CHRISTOPHER spoke with pt daler Wade who reports that she wishes for pt to be dc home when medically stable. Pato Olvera confirmed that pt son will stay with pt around the clock to assist at home. SW to follow for medical clearance.

## 2023-07-06 NOTE — PROGRESS NOTE ADULT - SUBJECTIVE AND OBJECTIVE BOX
Patient is a 89y old  Male who presents with a chief complaint of pneumonia, UTI (06 Jul 2023 05:31)       INTERVAL HPI/OVERNIGHT EVENTS: Patient seen and examined at bedside. Events noted for agitation. Now calm.     MEDICATIONS  (STANDING):  atorvastatin 20 milliGRAM(s) Oral at bedtime  dorzolamide 2% Ophthalmic Solution 1 Drop(s) Both EYES three times a day  enoxaparin Injectable 40 milliGRAM(s) SubCutaneous every 24 hours  finasteride 5 milliGRAM(s) Oral daily  memantine 5 milliGRAM(s) Oral two times a day  meropenem  IVPB 1000 milliGRAM(s) IV Intermittent every 8 hours  multivitamin/minerals 1 Tablet(s) Oral daily  QUEtiapine 25 milliGRAM(s) Oral at bedtime  sodium chloride 0.9%. 1000 milliLiter(s) (60 mL/Hr) IV Continuous <Continuous>  tamsulosin 0.4 milliGRAM(s) Oral at bedtime    MEDICATIONS  (PRN):  acetaminophen     Tablet .. 650 milliGRAM(s) Oral every 6 hours PRN Temp greater or equal to 38C (100.4F), Mild Pain (1 - 3)  guaiFENesin Oral Liquid (Sugar-Free) 200 milliGRAM(s) Oral every 6 hours PRN Cough      Allergies    No Known Allergies    Intolerances        REVIEW OF SYSTEMS:  Unable to obtain, confused at baseline   Vital Signs Last 24 Hrs  T(C): 36.4 (06 Jul 2023 04:43), Max: 36.6 (05 Jul 2023 12:01)  T(F): 97.5 (06 Jul 2023 04:43), Max: 97.9 (05 Jul 2023 12:01)  HR: 55 (06 Jul 2023 04:43) (51 - 59)  BP: 160/72 (06 Jul 2023 04:43) (115/70 - 179/62)  BP(mean): --  RR: 18 (06 Jul 2023 04:43) (18 - 18)  SpO2: 97% (06 Jul 2023 04:43) (95% - 97%)    Parameters below as of 06 Jul 2023 04:43  Patient On (Oxygen Delivery Method): room air        PHYSICAL EXAM:  GENERAL: Confused  EYES: No conjunctival or scleral injection, non-icteric  ENMT: Oral mucosa with moist membranes.   NECK: Supple, symmetric and without tracheal deviation   RESP: CTA bilat. No respiratory distress, no use of accessory muscles.  CV: RRR, +S1S2, no peripheral edema  GI: Soft, NT, ND  MSK: Normal ROM without pain, no joint pain   SKIN: No rashes or ulcers noted  NEURO: Sensation intact in upper and lower extremities b/l to light touch       LABS:                        12.8   11.15 )-----------( 127      ( 05 Jul 2023 08:05 )             39.1     05 Jul 2023 08:05    144    |  113    |  11     ----------------------------<  95     3.3     |  26     |  0.74     Ca    9.1        05 Jul 2023 08:05    TPro  6.3    /  Alb  2.5    /  TBili  1.3    /  DBili  x      /  AST  17     /  ALT  9      /  AlkPhos  76     05 Jul 2023 08:05    PT/INR - ( 04 Jul 2023 18:50 )   PT: 14.3 sec;   INR: 1.22 ratio         PTT - ( 04 Jul 2023 18:50 )  PTT:30.0 sec  CAPILLARY BLOOD GLUCOSE        BLOOD CULTURE  07-04 @ 19:00   No growth at 24 hours  --  --  07-04 @ 18:50   No growth at 24 hours  --  --    RADIOLOGY & ADDITIONAL TESTS:    Imaging Personally Reviewed:  [ ] YES     Consultant(s) Notes Reviewed:      Care Discussed with Consultants/Other Providers:

## 2023-07-07 ENCOUNTER — TRANSCRIPTION ENCOUNTER (OUTPATIENT)
Age: 88
End: 2023-07-07

## 2023-07-07 LAB
ANION GAP SERPL CALC-SCNC: 5 MMOL/L — SIGNIFICANT CHANGE UP (ref 5–17)
BASOPHILS # BLD AUTO: 0.03 K/UL — SIGNIFICANT CHANGE UP (ref 0–0.2)
BASOPHILS NFR BLD AUTO: 0.3 % — SIGNIFICANT CHANGE UP (ref 0–2)
BUN SERPL-MCNC: 12 MG/DL — SIGNIFICANT CHANGE UP (ref 7–23)
CALCIUM SERPL-MCNC: 9.5 MG/DL — SIGNIFICANT CHANGE UP (ref 8.5–10.1)
CHLORIDE SERPL-SCNC: 111 MMOL/L — HIGH (ref 96–108)
CO2 SERPL-SCNC: 27 MMOL/L — SIGNIFICANT CHANGE UP (ref 22–31)
CREAT SERPL-MCNC: 0.83 MG/DL — SIGNIFICANT CHANGE UP (ref 0.5–1.3)
EGFR: 82 ML/MIN/1.73M2 — SIGNIFICANT CHANGE UP
EOSINOPHIL # BLD AUTO: 0.3 K/UL — SIGNIFICANT CHANGE UP (ref 0–0.5)
EOSINOPHIL NFR BLD AUTO: 3.5 % — SIGNIFICANT CHANGE UP (ref 0–6)
GLUCOSE SERPL-MCNC: 89 MG/DL — SIGNIFICANT CHANGE UP (ref 70–99)
HCT VFR BLD CALC: 39.9 % — SIGNIFICANT CHANGE UP (ref 39–50)
HGB BLD-MCNC: 13.2 G/DL — SIGNIFICANT CHANGE UP (ref 13–17)
IMM GRANULOCYTES NFR BLD AUTO: 0.5 % — SIGNIFICANT CHANGE UP (ref 0–0.9)
LYMPHOCYTES # BLD AUTO: 1.79 K/UL — SIGNIFICANT CHANGE UP (ref 1–3.3)
LYMPHOCYTES # BLD AUTO: 20.6 % — SIGNIFICANT CHANGE UP (ref 13–44)
MCHC RBC-ENTMCNC: 30.3 PG — SIGNIFICANT CHANGE UP (ref 27–34)
MCHC RBC-ENTMCNC: 33.1 GM/DL — SIGNIFICANT CHANGE UP (ref 32–36)
MCV RBC AUTO: 91.5 FL — SIGNIFICANT CHANGE UP (ref 80–100)
MONOCYTES # BLD AUTO: 0.77 K/UL — SIGNIFICANT CHANGE UP (ref 0–0.9)
MONOCYTES NFR BLD AUTO: 8.9 % — SIGNIFICANT CHANGE UP (ref 2–14)
NEUTROPHILS # BLD AUTO: 5.74 K/UL — SIGNIFICANT CHANGE UP (ref 1.8–7.4)
NEUTROPHILS NFR BLD AUTO: 66.2 % — SIGNIFICANT CHANGE UP (ref 43–77)
NRBC # BLD: 0 /100 WBCS — SIGNIFICANT CHANGE UP (ref 0–0)
PLATELET # BLD AUTO: 154 K/UL — SIGNIFICANT CHANGE UP (ref 150–400)
POTASSIUM SERPL-MCNC: 3.6 MMOL/L — SIGNIFICANT CHANGE UP (ref 3.5–5.3)
POTASSIUM SERPL-SCNC: 3.6 MMOL/L — SIGNIFICANT CHANGE UP (ref 3.5–5.3)
RBC # BLD: 4.36 M/UL — SIGNIFICANT CHANGE UP (ref 4.2–5.8)
RBC # FLD: 13.6 % — SIGNIFICANT CHANGE UP (ref 10.3–14.5)
SODIUM SERPL-SCNC: 143 MMOL/L — SIGNIFICANT CHANGE UP (ref 135–145)
WBC # BLD: 8.67 K/UL — SIGNIFICANT CHANGE UP (ref 3.8–10.5)
WBC # FLD AUTO: 8.67 K/UL — SIGNIFICANT CHANGE UP (ref 3.8–10.5)

## 2023-07-07 PROCEDURE — 99233 SBSQ HOSP IP/OBS HIGH 50: CPT

## 2023-07-07 PROCEDURE — 99232 SBSQ HOSP IP/OBS MODERATE 35: CPT

## 2023-07-07 RX ADMIN — DORZOLAMIDE HYDROCHLORIDE 1 DROP(S): 20 SOLUTION/ DROPS OPHTHALMIC at 21:16

## 2023-07-07 RX ADMIN — Medication 1 TABLET(S): at 13:11

## 2023-07-07 RX ADMIN — MEMANTINE HYDROCHLORIDE 5 MILLIGRAM(S): 10 TABLET ORAL at 05:01

## 2023-07-07 RX ADMIN — MEROPENEM 100 MILLIGRAM(S): 1 INJECTION INTRAVENOUS at 21:18

## 2023-07-07 RX ADMIN — DORZOLAMIDE HYDROCHLORIDE 1 DROP(S): 20 SOLUTION/ DROPS OPHTHALMIC at 15:11

## 2023-07-07 RX ADMIN — QUETIAPINE FUMARATE 25 MILLIGRAM(S): 200 TABLET, FILM COATED ORAL at 21:16

## 2023-07-07 RX ADMIN — MEROPENEM 100 MILLIGRAM(S): 1 INJECTION INTRAVENOUS at 15:11

## 2023-07-07 RX ADMIN — POLYETHYLENE GLYCOL 3350 17 GRAM(S): 17 POWDER, FOR SOLUTION ORAL at 13:10

## 2023-07-07 RX ADMIN — SENNA PLUS 2 TABLET(S): 8.6 TABLET ORAL at 21:16

## 2023-07-07 RX ADMIN — MEROPENEM 100 MILLIGRAM(S): 1 INJECTION INTRAVENOUS at 05:01

## 2023-07-07 RX ADMIN — DORZOLAMIDE HYDROCHLORIDE 1 DROP(S): 20 SOLUTION/ DROPS OPHTHALMIC at 05:00

## 2023-07-07 RX ADMIN — FINASTERIDE 5 MILLIGRAM(S): 5 TABLET, FILM COATED ORAL at 13:11

## 2023-07-07 RX ADMIN — ATORVASTATIN CALCIUM 20 MILLIGRAM(S): 80 TABLET, FILM COATED ORAL at 21:16

## 2023-07-07 RX ADMIN — MEMANTINE HYDROCHLORIDE 5 MILLIGRAM(S): 10 TABLET ORAL at 18:14

## 2023-07-07 RX ADMIN — TAMSULOSIN HYDROCHLORIDE 0.4 MILLIGRAM(S): 0.4 CAPSULE ORAL at 21:16

## 2023-07-07 NOTE — CASE MANAGEMENT PROGRESS NOTE - NSCMPROGRESSNOTE_GEN_ALL_CORE
spoke with Wade patients daughter yesterday re home care choices and POC and she would like patient to be home with family. Patients son will be with patient 24/7. She would like [facility] for PT. ref sent and accepted  We discussed ambulance for transport home. CM will remain available and help with discharge when he is medically ready.

## 2023-07-07 NOTE — CASE MANAGEMENT PROGRESS NOTE - NSCMPROGRESSNOTE_GEN_ALL_CORE
CM called and spoke with emerg contact Wade [phone] and discussed DC planned for 7/8. She is in agreement. CM contacted [facility] and ref sent for SOC 7/9-accepted. [phone], Amb request  to [facility] for pickup on 7/8 at 1pm sent . family will have someone at home to accept. RN and MD aware.

## 2023-07-07 NOTE — DISCHARGE NOTE PROVIDER - NSDCMRMEDTOKEN_GEN_ALL_CORE_FT
atorvastatin 20 mg oral tablet: 1 tab(s) orally once a day  Azopt 1% ophthalmic suspension: 1 dose(s) to each affected eye once a day  finasteride 5 mg oral tablet: 1 tab(s) orally once a day  memantine 5 mg oral tablet: 1 tab(s) orally 2 times a day  methenamine hippurate 1 g oral tablet: 1 orally 2 times a day  multivitamin with minerals:   tamsulosin 0.4 mg oral capsule: 1 cap(s) orally once a day   atorvastatin 20 mg oral tablet: 1 tab(s) orally once a day  Azopt 1% ophthalmic suspension: 1 dose(s) to each affected eye once a day  finasteride 5 mg oral tablet: 1 tab(s) orally once a day  memantine 5 mg oral tablet: 1 tab(s) orally 2 times a day  methenamine hippurate 1 g oral tablet: 1 orally 2 times a day  multivitamin with minerals:   QUEtiapine 25 mg oral tablet: 1 tab(s) orally once a day (at bedtime)  tamsulosin 0.4 mg oral capsule: 1 cap(s) orally once a day

## 2023-07-07 NOTE — DISCHARGE NOTE NURSING/CASE MANAGEMENT/SOCIAL WORK - PATIENT PORTAL LINK FT
You can access the FollowMyHealth Patient Portal offered by [facility] by registering at the following website: [email or web address]. By joining [facility]’s FollowMyHealth portal, you will also be able to view your health information using other applications (apps) compatible with our system.

## 2023-07-07 NOTE — PROGRESS NOTE ADULT - SUBJECTIVE AND OBJECTIVE BOX
Patient is a 89y old  Male who presents with a chief complaint of pneumonia, UTI (07 Jul 2023 05:57)       INTERVAL HPI/OVERNIGHT EVENTS: Patient seen and examined at bedside. No new events/complaints noted. Confused at baseline     MEDICATIONS  (STANDING):  atorvastatin 20 milliGRAM(s) Oral at bedtime  dorzolamide 2% Ophthalmic Solution 1 Drop(s) Both EYES three times a day  enoxaparin Injectable 40 milliGRAM(s) SubCutaneous every 24 hours  finasteride 5 milliGRAM(s) Oral daily  memantine 5 milliGRAM(s) Oral two times a day  meropenem  IVPB 1000 milliGRAM(s) IV Intermittent every 8 hours  multivitamin/minerals 1 Tablet(s) Oral daily  polyethylene glycol 3350 17 Gram(s) Oral daily  QUEtiapine 25 milliGRAM(s) Oral at bedtime  senna 2 Tablet(s) Oral at bedtime  sodium chloride 0.9%. 1000 milliLiter(s) (60 mL/Hr) IV Continuous <Continuous>  tamsulosin 0.4 milliGRAM(s) Oral at bedtime    MEDICATIONS  (PRN):  acetaminophen     Tablet .. 650 milliGRAM(s) Oral every 6 hours PRN Temp greater or equal to 38C (100.4F), Mild Pain (1 - 3)  guaiFENesin Oral Liquid (Sugar-Free) 200 milliGRAM(s) Oral every 6 hours PRN Cough      Allergies    No Known Allergies    Intolerances        REVIEW OF SYSTEMS:  Unable to obtain. Confused at baseline   Vital Signs Last 24 Hrs  T(C): 36.7 (07 Jul 2023 05:03), Max: 36.7 (07 Jul 2023 05:03)  T(F): 98 (07 Jul 2023 05:03), Max: 98 (07 Jul 2023 05:03)  HR: 67 (07 Jul 2023 05:03) (67 - 79)  BP: 158/71 (07 Jul 2023 05:03) (157/79 - 180/83)  BP(mean): --  RR: 17 (07 Jul 2023 05:03) (17 - 18)  SpO2: 96% (07 Jul 2023 05:03) (94% - 96%)    Parameters below as of 07 Jul 2023 05:03  Patient On (Oxygen Delivery Method): room air        PHYSICAL EXAM:  GENERAL: Confused  EYES: No conjunctival or scleral injection, non-icteric  ENMT: Oral mucosa with moist membranes.   NECK: Supple, symmetric and without tracheal deviation   RESP: CTA bilat. No respiratory distress, no use of accessory muscles.  CV: RRR, +S1S2, no peripheral edema  GI: Soft, NT, ND  MSK: Normal ROM without pain, no joint pain   SKIN: No rashes or ulcers noted  NEURO: Sensation intact in upper and lower extremities b/l to light touch   LABS:                        13.2   8.67  )-----------( 154      ( 07 Jul 2023 06:06 )             39.9     07 Jul 2023 06:06    143    |  111    |  12     ----------------------------<  89     3.6     |  27     |  0.83     Ca    9.5        07 Jul 2023 06:06        CAPILLARY BLOOD GLUCOSE        BLOOD CULTURE  07-04 @ 19:00   No growth at 48 Hours  --  --  07-04 @ 18:50   No growth at 48 Hours  --  --    RADIOLOGY & ADDITIONAL TESTS:    Imaging Personally Reviewed:  [ ] YES     Consultant(s) Notes Reviewed:      Care Discussed with Consultants/Other Providers:

## 2023-07-07 NOTE — DISCHARGE NOTE PROVIDER - NSDCCPCAREPLAN_GEN_ALL_CORE_FT
PRINCIPAL DISCHARGE DIAGNOSIS  Diagnosis: Rhinovirus  Assessment and Plan of Treatment:       SECONDARY DISCHARGE DIAGNOSES  Diagnosis: Acute UTI  Assessment and Plan of Treatment: You wre admitted for pneumonia, we checked your blood work including your urine. Your urine resulted positive with elevated white count and nitrates. You were seen by the infectious disease doctors who recommended you start on intravenous antibiotics since you have history of ESBL/UTI in the past.  Your have completed a total of 4 days of antibiotics. Please continue to monitor your symptoms. Drink plenty of water. If your symptoms return, please call your primary medical doctor for management.    Diagnosis: Pneumonia  Assessment and Plan of Treatment: Pneumonia is a lung infection that can cause a fever, cough, and trouble breathing. You were treated with intravenous antibiotics for suspicion of pneumonia on chest xray.   Continue all antibiotics as ordered until complete.  Nutrition is important, eat small frequent meals.  Get lots of rest and drink fluids.  Call your health care provider upon arrival home from hospital and make a follow up appointment for one week.  If your cough worsens, you develop fever greater than 101', you have shaking chills, a fast heartbeat, trouble breathing and/or feel your are breathing much faster than usual, call your healthcare provider.  Make sure you wash your hands frequently.      Diagnosis: CAD (coronary artery disease)  Assessment and Plan of Treatment: Continue current medical management. Follow with your cardiologist as outpatient.     PRINCIPAL DISCHARGE DIAGNOSIS  Diagnosis: Rhinovirus  Assessment and Plan of Treatment:       SECONDARY DISCHARGE DIAGNOSES  Diagnosis: Acute UTI  Assessment and Plan of Treatment: You wre admitted for pneumonia, we checked your blood work including your urine. Your urine resulted positive with elevated white count and nitrates. You were seen by the infectious disease doctors who recommended you start on intravenous antibiotics since you have history of ESBL/UTI in the past.  Your have completed a total of 5 days of antibiotics. Please continue to monitor your symptoms. Drink plenty of water. If your symptoms return, please call your primary medical doctor for management.  F/u with your PCP  Seroquel is prescribed for agitation. You can talk to your physician   oral intake and hydration is encourgaed   f/u with pcp in 2-3 days    Diagnosis: Pneumonia  Assessment and Plan of Treatment: Pneumonia is a lung infection that can cause a fever, cough, and trouble breathing. You were treated with intravenous antibiotics for suspicion of pneumonia on chest xray.   Continue all antibiotics as ordered until complete.  Nutrition is important, eat small frequent meals.  Get lots of rest and drink fluids.  Call your health care provider upon arrival home from hospital and make a follow up appointment for one week.  If your cough worsens, you develop fever greater than 101', you have shaking chills, a fast heartbeat, trouble breathing and/or feel your are breathing much faster than usual, call your healthcare provider.  Make sure you wash your hands frequently.      Diagnosis: CAD (coronary artery disease)  Assessment and Plan of Treatment: Continue current medical management. Follow with your cardiologist as outpatient.

## 2023-07-07 NOTE — DISCHARGE NOTE PROVIDER - PROVIDER TOKENS
PROVIDER:[TOKEN:[[id number]],FOLLOWUP:[2 weeks]],PROVIDER:[TOKEN:[[id number]],FOLLOWUP:[2 weeks]]

## 2023-07-07 NOTE — PROGRESS NOTE ADULT - SUBJECTIVE AND OBJECTIVE BOX
Date/Time Patient Seen:  		  Referring MD:   Data Reviewed	       Patient is a 89y old  Male who presents with a chief complaint of pneumonia, UTI (06 Jul 2023 13:49)      Subjective/HPI     PAST MEDICAL & SURGICAL HISTORY:  Benign prostatic hyperplasia, presence of lower urinary tract symptoms unspecified, unspecified morphology    Hyperlipidemia    GERD (gastroesophageal reflux disease)    CAD (coronary artery disease)    UTI (urinary tract infection)    Calculus of kidney    Calculus of ureter    Dementia    Kidney stones    H/O heart artery stent  stent x 2    History of prostate surgery    S/P ureteral stent placement  Left sided    Stented coronary artery          Medication list         MEDICATIONS  (STANDING):  atorvastatin 20 milliGRAM(s) Oral at bedtime  dorzolamide 2% Ophthalmic Solution 1 Drop(s) Both EYES three times a day  enoxaparin Injectable 40 milliGRAM(s) SubCutaneous every 24 hours  finasteride 5 milliGRAM(s) Oral daily  memantine 5 milliGRAM(s) Oral two times a day  meropenem  IVPB 1000 milliGRAM(s) IV Intermittent every 8 hours  multivitamin/minerals 1 Tablet(s) Oral daily  polyethylene glycol 3350 17 Gram(s) Oral daily  QUEtiapine 25 milliGRAM(s) Oral at bedtime  senna 2 Tablet(s) Oral at bedtime  sodium chloride 0.9%. 1000 milliLiter(s) (60 mL/Hr) IV Continuous <Continuous>  tamsulosin 0.4 milliGRAM(s) Oral at bedtime    MEDICATIONS  (PRN):  acetaminophen     Tablet .. 650 milliGRAM(s) Oral every 6 hours PRN Temp greater or equal to 38C (100.4F), Mild Pain (1 - 3)  guaiFENesin Oral Liquid (Sugar-Free) 200 milliGRAM(s) Oral every 6 hours PRN Cough         Vitals log        ICU Vital Signs Last 24 Hrs  T(C): 36.7 (07 Jul 2023 05:03), Max: 36.7 (07 Jul 2023 05:03)  T(F): 98 (07 Jul 2023 05:03), Max: 98 (07 Jul 2023 05:03)  HR: 67 (07 Jul 2023 05:03) (67 - 79)  BP: 158/71 (07 Jul 2023 05:03) (157/79 - 180/83)  BP(mean): --  ABP: --  ABP(mean): --  RR: 17 (07 Jul 2023 05:03) (17 - 18)  SpO2: 96% (07 Jul 2023 05:03) (94% - 96%)    O2 Parameters below as of 07 Jul 2023 05:03  Patient On (Oxygen Delivery Method): room air                 Input and Output:  I&O's Detail      Lab Data                        14.0   9.11  )-----------( 97       ( 06 Jul 2023 06:39 )             42.5     07-06    143  |  115<H>  |  12  ----------------------------<  76  4.4   |  24  |  0.70    Ca    9.6      06 Jul 2023 06:39    TPro  6.3  /  Alb  2.5<L>  /  TBili  1.3<H>  /  DBili  x   /  AST  17  /  ALT  9<L>  /  AlkPhos  76  07-05            Review of Systems	      Objective     Physical Examination    heart s1s2  lung dc BS  head nc      Pertinent Lab findings & Imaging      n[place]ina:  NO   Adequate UO     I&O's Detail           Discussed with:     Cultures:	        Radiology

## 2023-07-07 NOTE — DISCHARGE NOTE PROVIDER - CARE PROVIDER_API CALL
Angeline Ness.  Internal Medicine  [address] [place] [address]  Phone: [phone]  Fax: [phone]  Follow Up Time: 2 weeks    Hoenig, David Mayer  Urology  [address] [place] [id number]  Phone: [phone]  Fax: [phone]  Follow Up Time: 2 weeks

## 2023-07-07 NOTE — DISCHARGE NOTE PROVIDER - HOSPITAL COURSE
HPI:  89yo M with PMH dementia, BPH, CAD, GERD, hyperlipidemia, kidney stones, UTI presents with cough and fever. History from son via phone, patient poor historian 2/2 dementia. Per discussion with pt's son, pt with ongoing cough x3 days. Pt was given Dayquil, Nyquil, Robitussin without much improvement in cough. Son states pt developed fever this afternoon. Pt consumes a pureed diet at home. Denies sick contacts.     IN THE ED:  Temp   98.6F , 82 HR  , BP  167/68  ,RR 18, SpO2 18  S/P tylenol suppository 650mg, cefepime 1000mg IV, vancomycin 1000mg IV, sodium chloride 0.9% bolus 2100mL IV  EKG: Sinus rhythm with 1st degree AV block, VR 100bpm  Labs significant for WBC 14.27, lactate 1.6, UA (positive nitrite, moderate leukocyte esterase, 15 WBC, 5 RBC, many bacteria, squamous epithelial cells present), RVP positive for entero/rhinovirus  Imaging: Wet read CXR - patchy, bilateral infiltrates L>R   (04 Jul 2023 23:08)      ---  HOSPITAL COURSE: Patient admitted to medicine floor for management of     Pt seen and examined on day of discharge. Patient is medically optimized for discharge to home with close outpatient followup.    PHYSICAL EXAM ON DAY OF DISCHARGE:        ---  CONSULTANTS:     ---  TIME SPENT:  I, the attending physician, was physically present for the key portions of the evaluation and management (E/M) service provided. The total amount of time spent reviewing the hospital notes, laboratory values, imaging findings, assessing/counseling the patient, discussing with consultant physicians, social work, nursing staff was -- minutes    ---  Primary care provider was made aware of plan for discharge:      [  ] NO     [  ] YES   HPI:  89yo M with PMH dementia, BPH, CAD, GERD, hyperlipidemia, kidney stones, UTI presents with cough and fever. History from son via phone, patient poor historian 2/2 dementia. Per discussion with pt's son, pt with ongoing cough x3 days. Pt was given Dayquil, Nyquil, Robitussin without much improvement in cough. Son states pt developed fever this afternoon. Pt consumes a pureed diet at home. Denies sick contacts.     IN THE ED:  Temp   98.6F , 82 HR  , BP  167/68  ,RR 18, SpO2 18  S/P tylenol suppository 650mg, cefepime 1000mg IV, vancomycin 1000mg IV, sodium chloride 0.9% bolus 2100mL IV  EKG: Sinus rhythm with 1st degree AV block, VR 100bpm  Labs significant for WBC 14.27, lactate 1.6, UA (positive nitrite, moderate leukocyte esterase, 15 WBC, 5 RBC, many bacteria, squamous epithelial cells present), RVP positive for entero/rhinovirus  Imaging: Wet read CXR - patchy, bilateral infiltrates L>R   (04 Jul 2023 23:08)      ---  HOSPITAL COURSE: Patient admitted to medicine floor for management of UTI and viral URI. Doubt pneumonia. Have completed course of antibiotic as per ID recommendations. PT suggested [facility] but family have decided for home. Seen and examined. VSS   GENERAL: Confused at baseline   EYES: No conjunctival or scleral injection, non-icteric  ENMT: Oral mucosa with moist membranes.   NECK: Supple, symmetric and without tracheal deviation   RESP: CTA bilat. No respiratory distress, no use of accessory muscles.  CV: RRR, +S1S2, no peripheral edema  GI: Soft, NT, ND  MSK: Normal ROM without pain, no joint pain   SKIN: No rashes or ulcers noted  NEURO: Sensation intact in upper and lower extremities b/l to light touch     Pt seen and examined on day of discharge. Patient is medically optimized for discharge to home with close outpatient followup.    PHYSICAL EXAM ON DAY OF DISCHARGE:        ---  CONSULTANTS:     ---  TIME SPENT:  I, the attending physician, was physically present for the key portions of the evaluation and management (E/M) service provided. The total amount of time spent reviewing the hospital notes, laboratory values, imaging findings, assessing/counseling the patient, discussing with consultant physicians, social work, nursing staff was 55 minutes    ---  Primary care provider was made aware of plan for discharge:      [  ] NO     [  ] YES

## 2023-07-07 NOTE — PROGRESS NOTE ADULT - SUBJECTIVE AND OBJECTIVE BOX
[facility]  INFECTIOUS DISEASES   [address] [place] [address]  Tel: [phone]     Fax: [phone]  ========================================================  MD Ariane Tillman Kaushal, MD Cho, Michelle, MD Sunjit, Jaspal, MD  ========================================================    MRN-[id number]  IDRIS DANIEL     Follow up: Pneumonia and UTI    Patient has less cough, more awake and communicative. No fever. Lying in bed, NAD.     PAST MEDICAL & SURGICAL HISTORY:  Benign prostatic hyperplasia, presence of lower urinary tract symptoms unspecified, unspecified morphology  Hyperlipidemia  GERD (gastroesophageal reflux disease)  CAD (coronary artery disease)  UTI (urinary tract infection)  Calculus of kidney  Calculus of ureter  Dementia  Kidney stones  H/O heart artery stent  stent x 2  History of prostate surgery  S/P ureteral stent placement  Left sided  Stented coronary artery    Social Hx: No smoking, EtOH or drugs     FAMILY HISTORY:  FH: hypertension (Child)    Allergies  No Known Allergies    Antibiotics:  MEDICATIONS  (STANDING):  atorvastatin 20 milliGRAM(s) Oral at bedtime  dorzolamide 2% Ophthalmic Solution 1 Drop(s) Both EYES three times a day  enoxaparin Injectable 40 milliGRAM(s) SubCutaneous every 24 hours  ertapenem  IVPB 1000 milliGRAM(s) IV Intermittent every 24 hours  finasteride 5 milliGRAM(s) Oral daily  memantine 5 milliGRAM(s) Oral two times a day  multivitamin/minerals 1 Tablet(s) Oral daily  sodium chloride 0.9%. 1000 milliLiter(s) (60 mL/Hr) IV Continuous <Continuous>  tamsulosin 0.4 milliGRAM(s) Oral at bedtime    MEDICATIONS  (PRN):  acetaminophen     Tablet .. 650 milliGRAM(s) Oral every 6 hours PRN Temp greater or equal to 38C (100.4F), Mild Pain (1 - 3)  guaiFENesin Oral Liquid (Sugar-Free) 200 milliGRAM(s) Oral every 6 hours PRN Cough     REVIEW OF SYSTEMS:  CONSTITUTIONAL:  No Fever or chills  HEENT:  No diplopia or blurred vision.  No sore throat or runny nose.  CARDIOVASCULAR:  No chest pain or SOB.  RESPIRATORY:  +cough, no shortness of breath, PND or orthopnea.  GASTROINTESTINAL:  No nausea, vomiting or diarrhea.  GENITOURINARY:  No dysuria, frequency or urgency. No Blood in urine  MUSCULOSKELETAL:  no joint aches, no muscle pain  SKIN:  No change in skin, hair or nails.  NEUROLOGIC:  No paresthesias or weakness.  PSYCHIATRIC:  No disorder of thought or mood.  ENDOCRINE:  No heat or cold intolerance, polyuria or polydipsia.  HEMATOLOGICAL:  No easy bruising or bleeding.     Physical Exam:  Vital Signs Last 24 Hrs  T(C): 36.6 (07 Jul 2023 11:53), Max: 36.7 (07 Jul 2023 05:03)  T(F): 97.9 (07 Jul 2023 11:53), Max: 98 (07 Jul 2023 05:03)  HR: 91 (07 Jul 2023 11:53) (67 - 91)  BP: 110/72 (07 Jul 2023 11:53) (110/72 - 158/71)  BP(mean): --  RR: 17 (07 Jul 2023 11:53) (17 - 18)  SpO2: 96% (07 Jul 2023 11:53) (94% - 96%)  Parameters below as of 07 Jul 2023 11:53  Patient On (Oxygen Delivery Method): room air  GEN: NAD  HEENT: normocephalic and atraumatic. EOMI. PERRL.    NECK: Supple.  No lymphadenopathy   LUNGS: rhonchi bilaterally   HEART: Regular rate and rhythm   ABDOMEN: Soft, nontender, and nondistended.  Positive bowel sounds.    : No CVA tenderness  EXTREMITIES: Without edema.  NEUROLOGIC: grossly intact.  PSYCHIATRIC: Awake and alert   SKIN: No rash     Labs:                        13.2   8.67  )-----------( 154      ( 07 Jul 2023 06:06 )             39.9     07-07    143  |  111<H>  |  12  ----------------------------<  89  3.6   |  27  |  0.83    Ca    9.5      07 Jul 2023 06:06    Culture - Blood (collected 07-04-23 @ 19:00)  Source: .Blood Blood-Peripheral    Culture - Blood (collected 07-04-23 @ 18:50)  Source: .Blood Blood-Peripheral    WBC Count: 8.67 K/uL (07-07-23 @ 06:06)  WBC Count: 9.11 K/uL (07-06-23 @ 06:39)  WBC Count: 11.15 K/uL (07-05-23 @ 08:05)  WBC Count: 14.27 K/uL (07-04-23 @ 18:50)    Creatinine: 0.83 mg/dL (07-07-23 @ 06:06)  Creatinine: 0.70 mg/dL (07-06-23 @ 06:39)  Creatinine: 0.74 mg/dL (07-05-23 @ 08:05)  Creatinine: 1.10 mg/dL (07-04-23 @ 18:50)     SARS-CoV-2: NotDetec (07-04-23 @ 18:50)    All imaging and other data have been reviewed.  < from: Xray Chest 1 View- PORTABLE-Urgent (Xray Chest 1 View- PORTABLE-Urgent .) (07.04.23 @ 21:10) >  IMPRESSION: Bilateral infiltrates increased from prior.    Assessment and Plan:   89yo man with PMH of CAD, Renal stones, dementia, and UTIs was admitted on 7/4 with cough and fever.   History taken from chart and family. He has cough for 3-4 days, taking OTC meds. Since developed fever on the day of admission, he was brought ot ED.   No sick contacts.   In ED didn't have fever and was on RA with good sat. He was given one dose of cefepime and vancomycin.   RVP showed Enterovirus  Leukocytosis on admission 14k  UA with WBC 15 and a positive nitrate   UA is positive but symptoms unreliable due to dementia, had ESBL in the past. CXR has some opacities it could be post viral pneumonia or partly edema but has constant dry cough.   UC was not done.     #Viral URI  #Pneumonia   # UTI    - Blood cultures NGTD   - Leukocytosis normalized 14k-->8k  - Continue Meropenem (had pseudomonas in the past, covers UTI and pneumonia)  - Will complete total 5days, today day 3/5  - Supportive care and aspiration precautions     Will follow PRN.    Tyler Chiu MD  Division of Infectious Diseases   Please call ID service at [phone] with any question.    35 minutes spent on total encounter assessing patient, examination, chart review, counseling and coordinating care by the attending physician/nurse/care manager.

## 2023-07-08 PROCEDURE — 99232 SBSQ HOSP IP/OBS MODERATE 35: CPT

## 2023-07-08 PROCEDURE — 99233 SBSQ HOSP IP/OBS HIGH 50: CPT

## 2023-07-08 RX ADMIN — DORZOLAMIDE HYDROCHLORIDE 1 DROP(S): 20 SOLUTION/ DROPS OPHTHALMIC at 05:49

## 2023-07-08 RX ADMIN — MEROPENEM 100 MILLIGRAM(S): 1 INJECTION INTRAVENOUS at 05:49

## 2023-07-08 RX ADMIN — DORZOLAMIDE HYDROCHLORIDE 1 DROP(S): 20 SOLUTION/ DROPS OPHTHALMIC at 13:19

## 2023-07-08 RX ADMIN — DORZOLAMIDE HYDROCHLORIDE 1 DROP(S): 20 SOLUTION/ DROPS OPHTHALMIC at 21:59

## 2023-07-08 RX ADMIN — POLYETHYLENE GLYCOL 3350 17 GRAM(S): 17 POWDER, FOR SOLUTION ORAL at 13:19

## 2023-07-08 RX ADMIN — MEROPENEM 100 MILLIGRAM(S): 1 INJECTION INTRAVENOUS at 21:59

## 2023-07-08 RX ADMIN — Medication 200 MILLIGRAM(S): at 22:23

## 2023-07-08 RX ADMIN — MEROPENEM 100 MILLIGRAM(S): 1 INJECTION INTRAVENOUS at 13:20

## 2023-07-08 RX ADMIN — ENOXAPARIN SODIUM 40 MILLIGRAM(S): 100 INJECTION SUBCUTANEOUS at 05:49

## 2023-07-08 RX ADMIN — MEMANTINE HYDROCHLORIDE 5 MILLIGRAM(S): 10 TABLET ORAL at 17:03

## 2023-07-08 RX ADMIN — TAMSULOSIN HYDROCHLORIDE 0.4 MILLIGRAM(S): 0.4 CAPSULE ORAL at 21:59

## 2023-07-08 RX ADMIN — FINASTERIDE 5 MILLIGRAM(S): 5 TABLET, FILM COATED ORAL at 13:19

## 2023-07-08 RX ADMIN — Medication 1 TABLET(S): at 13:19

## 2023-07-08 RX ADMIN — QUETIAPINE FUMARATE 25 MILLIGRAM(S): 200 TABLET, FILM COATED ORAL at 21:59

## 2023-07-08 RX ADMIN — ATORVASTATIN CALCIUM 20 MILLIGRAM(S): 80 TABLET, FILM COATED ORAL at 21:59

## 2023-07-08 RX ADMIN — MEMANTINE HYDROCHLORIDE 5 MILLIGRAM(S): 10 TABLET ORAL at 05:49

## 2023-07-08 NOTE — PROGRESS NOTE ADULT - PROBLEM SELECTOR PLAN 7
Chronic  - Continue home atorvastatin

## 2023-07-08 NOTE — PROGRESS NOTE ADULT - SUBJECTIVE AND OBJECTIVE BOX
Patient is a 89y old  Male who presents with a chief complaint of pneumonia, UTI (08 Jul 2023 05:44)       INTERVAL HPI/OVERNIGHT EVENTS: Patient seen and examined at bedside. No new events /complaints noted. Confused at baseline due to dementia     MEDICATIONS  (STANDING):  atorvastatin 20 milliGRAM(s) Oral at bedtime  dorzolamide 2% Ophthalmic Solution 1 Drop(s) Both EYES three times a day  enoxaparin Injectable 40 milliGRAM(s) SubCutaneous every 24 hours  finasteride 5 milliGRAM(s) Oral daily  memantine 5 milliGRAM(s) Oral two times a day  meropenem  IVPB 1000 milliGRAM(s) IV Intermittent every 8 hours  multivitamin/minerals 1 Tablet(s) Oral daily  polyethylene glycol 3350 17 Gram(s) Oral daily  QUEtiapine 25 milliGRAM(s) Oral at bedtime  senna 2 Tablet(s) Oral at bedtime  sodium chloride 0.9%. 1000 milliLiter(s) (60 mL/Hr) IV Continuous <Continuous>  tamsulosin 0.4 milliGRAM(s) Oral at bedtime    MEDICATIONS  (PRN):  acetaminophen     Tablet .. 650 milliGRAM(s) Oral every 6 hours PRN Temp greater or equal to 38C (100.4F), Mild Pain (1 - 3)  guaiFENesin Oral Liquid (Sugar-Free) 200 milliGRAM(s) Oral every 6 hours PRN Cough      Allergies    No Known Allergies    Intolerances        REVIEW OF SYSTEMS:  Unable to obtain, confused   Vital Signs Last 24 Hrs  T(C): 36.6 (08 Jul 2023 05:10), Max: 36.8 (07 Jul 2023 21:32)  T(F): 97.8 (08 Jul 2023 05:10), Max: 98.2 (07 Jul 2023 21:32)  HR: 72 (08 Jul 2023 05:10) (72 - 97)  BP: 144/60 (08 Jul 2023 05:10) (109/66 - 144/60)  BP(mean): --  RR: 17 (08 Jul 2023 05:10) (17 - 17)  SpO2: 96% (08 Jul 2023 05:10) (95% - 96%)    Parameters below as of 08 Jul 2023 05:10  Patient On (Oxygen Delivery Method): room air        PHYSICAL EXAM:  GENERAL: Confused  EYES: No conjunctival or scleral injection, non-icteric  ENMT: Oral mucosa with moist membranes.   NECK: Supple, symmetric and without tracheal deviation   RESP: CTA bilat. No respiratory distress, no use of accessory muscles.  CV: RRR, +S1S2, no peripheral edema  GI: Soft, NT, ND  MSK: Normal ROM without pain, no joint pain   SKIN: No rashes or ulcers noted  NEURO: Sensation intact in upper and lower extremities b/l to light touch   LABS:      Ca    9.5        07 Jul 2023 06:06        CAPILLARY BLOOD GLUCOSE        BLOOD CULTURE  07-04 @ 19:00   No growth at 72 Hours  --  --  07-04 @ 18:50   No growth at 72 Hours  --  --    RADIOLOGY & ADDITIONAL TESTS:    Imaging Personally Reviewed:  [ ] YES     Consultant(s) Notes Reviewed:      Care Discussed with Consultants/Other Providers:

## 2023-07-08 NOTE — PROGRESS NOTE ADULT - TIME BILLING
Note written by attending. Meds, labs, vitals, chart reviewed.
Note written by attending. Meds, labs, vitals, chart reviewed.
Note written by attending. Meds, labs, vitals, chart reviewed. D/w son at bedside. Patient will go home upon dc on Sunday.
Note written by attending. Meds, labs, vitals, chart reviewed. D/w daughter at bedside. They will take patient home when he is ready for discharge. Do not want EL[facility]E

## 2023-07-08 NOTE — PROGRESS NOTE ADULT - SUBJECTIVE AND OBJECTIVE BOX
[facility]  INFECTIOUS DISEASES   [address] [place] [address]  Tel: [phone]     Fax: [phone]  ========================================================  MD Ariane Tillman Kaushal, MD Cho, Michelle, MD Sunjit, Jaspal, MD  ========================================================    [id number]  IDRIS DANIEL     Follow up: Pneumonia and UTI    Doing better, more awake and communicative. No fever. Lying in bed, NAD.     PAST MEDICAL & SURGICAL HISTORY:  Benign prostatic hyperplasia, presence of lower urinary tract symptoms unspecified, unspecified morphology  Hyperlipidemia  GERD (gastroesophageal reflux disease)  CAD (coronary artery disease)  UTI (urinary tract infection)  Calculus of kidney  Calculus of ureter  Dementia  Kidney stones  H/O heart artery stent  stent x 2  History of prostate surgery  S/P ureteral stent placement  Left sided  Stented coronary artery    Social Hx: No smoking, EtOH or drugs     FAMILY HISTORY:  FH: hypertension (Child)    Allergies  No Known Allergies    Antibiotics:  MEDICATIONS  (STANDING):  atorvastatin 20 milliGRAM(s) Oral at bedtime  dorzolamide 2% Ophthalmic Solution 1 Drop(s) Both EYES three times a day  enoxaparin Injectable 40 milliGRAM(s) SubCutaneous every 24 hours  ertapenem  IVPB 1000 milliGRAM(s) IV Intermittent every 24 hours  finasteride 5 milliGRAM(s) Oral daily  memantine 5 milliGRAM(s) Oral two times a day  multivitamin/minerals 1 Tablet(s) Oral daily  sodium chloride 0.9%. 1000 milliLiter(s) (60 mL/Hr) IV Continuous <Continuous>  tamsulosin 0.4 milliGRAM(s) Oral at bedtime    MEDICATIONS  (PRN):  acetaminophen     Tablet .. 650 milliGRAM(s) Oral every 6 hours PRN Temp greater or equal to 38C (100.4F), Mild Pain (1 - 3)  guaiFENesin Oral Liquid (Sugar-Free) 200 milliGRAM(s) Oral every 6 hours PRN Cough     REVIEW OF SYSTEMS:  CONSTITUTIONAL:  No Fever or chills  HEENT:  No diplopia or blurred vision.  No sore throat or runny nose.  CARDIOVASCULAR:  No chest pain or SOB.  RESPIRATORY:  +cough, no shortness of breath, PND or orthopnea.  GASTROINTESTINAL:  No nausea, vomiting or diarrhea.  GENITOURINARY:  No dysuria, frequency or urgency. No Blood in urine  MUSCULOSKELETAL:  no joint aches, no muscle pain  SKIN:  No change in skin, hair or nails.  NEUROLOGIC:  No paresthesias or weakness.  PSYCHIATRIC:  No disorder of thought or mood.  ENDOCRINE:  No heat or cold intolerance, polyuria or polydipsia.  HEMATOLOGICAL:  No easy bruising or bleeding.     Physical Exam:  Vital Signs Last 24 Hrs  T(C): 36.6 (08 Jul 2023 05:10), Max: 36.8 (07 Jul 2023 21:32)  T(F): 97.8 (08 Jul 2023 05:10), Max: 98.2 (07 Jul 2023 21:32)  HR: 72 (08 Jul 2023 05:10) (72 - 97)  BP: 144/60 (08 Jul 2023 05:10) (109/66 - 144/60)  BP(mean): --  RR: 17 (08 Jul 2023 05:10) (17 - 17)  SpO2: 96% (08 Jul 2023 05:10) (95% - 96%)  Parameters below as of 08 Jul 2023 05:10  Patient On (Oxygen Delivery Method): room air  GEN: NAD  HEENT: normocephalic and atraumatic. EOMI. PERRL.    NECK: Supple.  No lymphadenopathy   LUNGS: rhonchi bilaterally   HEART: Regular rate and rhythm   ABDOMEN: Soft, nontender, and nondistended.  Positive bowel sounds.    : No CVA tenderness  EXTREMITIES: Without edema.  NEUROLOGIC: grossly intact.  PSYCHIATRIC: Awake and alert   SKIN: No rash     Labs:                        13.2   8.67  )-----------( 154      ( 07 Jul 2023 06:06 )             39.9     07-07    143  |  111<H>  |  12  ----------------------------<  89  3.6   |  27  |  0.83    Ca    9.5      07 Jul 2023 06:06    Culture - Blood (collected 07-04-23 @ 19:00)  Source: .Blood Blood-Peripheral    Culture - Blood (collected 07-04-23 @ 18:50)  Source: .Blood Blood-Peripheral    WBC Count: 8.67 K/uL (07-07-23 @ 06:06)  WBC Count: 9.11 K/uL (07-06-23 @ 06:39)  WBC Count: 11.15 K/uL (07-05-23 @ 08:05)  WBC Count: 14.27 K/uL (07-04-23 @ 18:50)    Creatinine: 0.83 mg/dL (07-07-23 @ 06:06)  Creatinine: 0.70 mg/dL (07-06-23 @ 06:39)  Creatinine: 0.74 mg/dL (07-05-23 @ 08:05)  Creatinine: 1.10 mg/dL (07-04-23 @ 18:50)     SARS-CoV-2: NotDetec (07-04-23 @ 18:50)    All imaging and other data have been reviewed.  < from: Xray Chest 1 View- PORTABLE-Urgent (Xray Chest 1 View- PORTABLE-Urgent .) (07.04.23 @ 21:10) >  IMPRESSION: Bilateral infiltrates increased from prior.    Assessment and Plan:   89yo man with PMH of CAD, Renal stones, dementia, and UTIs was admitted on 7/4 with cough and fever.   History taken from chart and family. He has cough for 3-4 days, taking OTC meds. Since developed fever on the day of admission, he was brought ot ED.   No sick contacts.   In ED didn't have fever and was on RA with good sat. He was given one dose of cefepime and vancomycin.   RVP showed Enterovirus  Leukocytosis on admission 14k  UA with WBC 15 and a positive nitrate   UA is positive but symptoms unreliable due to dementia, had ESBL in the past. CXR has some opacities it could be post viral pneumonia or partly edema but has constant dry cough.   UC was not done.     #Viral URI  #Pneumonia   # UTI    - Blood cultures NGTD   - Leukocytosis normalized 14k-->8k  - Continue Meropenem (had pseudomonas in the past, covers UTI and pneumonia)  - Will complete total 5days, tomorrow after morning dose can be stopped.   - Supportive care and aspiration precautions     Will sign off please call with any question.     Tyler Chiu MD  Division of Infectious Diseases   Please call ID service at [phone] with any question.    35 minutes spent on total encounter assessing patient, examination, chart review, counseling and coordinating care by the attending physician/nurse/care manager.

## 2023-07-08 NOTE — PROGRESS NOTE ADULT - PROBLEM SELECTOR PLAN 2
Plan as above
Pt presents with cough x3 days, fever. CXR with likely PNA, enterorhino +. viral vs superimposed bacterial pna   - S/P tylenol suppository 650mg, cefepime 1000mg IV, vancomycin 1000mg IV, sodium chloride 0.9% bolus 2100mL IV  - Labs significant for WBC 14.27, lactate 1.6, RVP positive for entero/rhinovirus  - Imaging: Wet read CXR - patchy, bilateral infiltrates L>R  - IV ertapenem  - Will obtain MRSA, legionella, strep pnemo antigen tests. Follow up results.  - Follow up blood cultures x2  - ID (Dr. Chiu) consulted, will appreciate recs  - Pulm (Dr. Hinkle) consulted, will appreciate recs
Plan as above
Plan as above
Statement Selected

## 2023-07-08 NOTE — PROGRESS NOTE ADULT - PROBLEM SELECTOR PLAN 4
Chronic. Pt A+O x2 (person, place). Agitated last night. Started on Seroquel QHS  - Continue home memantine  - Pureed diet  -Aspiration precautions
Chronic. Pt A+O x2 (person, place).  - Continue home memantine  - Pureed diet
Chronic. Pt A+O x2 (person, place). Agitated last night. Started on Seroquel QHS  - Continue home memantine  - Pureed diet  -Aspiration precautions
Chronic. Pt A+O x2 (person, place). Agitated last night. Started on Seroquel QHS  - Continue home memantine  - Pureed diet  -Aspiration precautions

## 2023-07-08 NOTE — PROGRESS NOTE ADULT - PROBLEM SELECTOR PLAN 1
Pt meets sepsis criteria on admission, likely 2/2 UTI vs PNA.   - S/P tylenol suppository 650mg, cefepime 1000mg IV, vancomycin 1000mg IV, sodium chloride 0.9% bolus 2100mL IV  - Labs significant for WBC 14.27, lactate 1.6, RVP positive for entero/rhinovirus  -  UA (positive nitrite, moderate leukocyte esterase, 15 WBC, 5 RBC, many bacteria, squamous epithelial cells present)  - Imaging: Wet read CXR - patchy, bilateral infiltrates L>R  - IV ertapenem given history of MDRO UTI in the past   - Gentle IV fluids NS @ 60cc/hr x12 hrs (last echo showed normal LV with EF 60%)  -- ID (Dr. Chiu) consulted  - Monitor hemodynamics
+ Rhino Virus  On meropenem due to h/o ESBL UTI  BC NGTD  Monitor for fever  ID following  Hypokalemia: Replete PRN. Check K, Mg
+ Rhino Virus  MRSA PCR and strep U ag and legionella all pending   Continue Meropenem for now (had pseudomonas in the past, covers UTI and pneumonia)  Supportive care and aspiration precautions   BC NGTD  Monitor for fever  ID following  Hypokalemia: Replete PRN. Check K, Mg
Viral URI: + Rhino Virus  MRSA PCR and strep U ag and legionella all pending   Continue Meropenem for now (had pseudomonas in the past, covers UTI and pneumonia), last dose on 07/09/2023 per ID   Supportive care and aspiration precautions   BC NGTD  Monitor for fever  ID following  Hypokalemia: Replete PRN. Check K, Mg

## 2023-07-08 NOTE — PROGRESS NOTE ADULT - PROBLEM SELECTOR PLAN 3
Pt with history of UTI (ESBL, Pseudomonas on prior cultures) presenting with  fever likely 2/2 UTI. Takes methenamine at home.  - S/P tylenol suppository 650mg, cefepime 1000mg IV, vancomycin 1000mg IV, sodium chloride 0.9% bolus 2100mL IV  - UA (positive nitrite, moderate leukocyte esterase, 15 WBC, 5 RBC, many bacteria, squamous epithelial cells present)  - Tylenol 650 mg PO q6h PRN fever or mild pain  - continue IV ertapenem  - Trend WBC and monitor for fever  - Follow up blood cultures x2  - Will obtain urine cultures. Follow up results.  - ID (Dr. Chiu) consulted, will appreciate recs
On Bhanu  F/u final C&S

## 2023-07-08 NOTE — CHART NOTE - NSCHARTNOTEFT_GEN_A_CORE
I called and updated the daughter Miss Das about discharge for tomorrow. She stated that they want ambulance to be set up for transport. I informed CM team

## 2023-07-08 NOTE — CASE MANAGEMENT PROGRESS NOTE - NSCMPROGRESSNOTE_GEN_ALL_CORE
Discussed patient with Dr. Scott and patient will be D/C'd home tomorrow (SUn 7/9) after 6am dose of meropenem as per ID. [facility] accepted. I updated ref with new SOC date and F2F. Ambulance requested for 11am pickup. Provided [facility] with [facility] info and faxed [facility] transportation request form to [phone]. I spoke with patient's son who verbalizes understanding and agrees with change in DC plan. Anticipate [facility] to  @11am as son requested. Son will be here at hospital and daughter will be at home. Floor nurse aware of DC plan for 11am tomorrow. CM will cont to follow.

## 2023-07-09 VITALS
DIASTOLIC BLOOD PRESSURE: 60 MMHG | TEMPERATURE: 98 F | SYSTOLIC BLOOD PRESSURE: 132 MMHG | OXYGEN SATURATION: 94 % | RESPIRATION RATE: 17 BRPM | HEART RATE: 65 BPM

## 2023-07-09 PROCEDURE — 80053 COMPREHEN METABOLIC PANEL: CPT

## 2023-07-09 PROCEDURE — 97116 GAIT TRAINING THERAPY: CPT

## 2023-07-09 PROCEDURE — 99285 EMERGENCY DEPT VISIT HI MDM: CPT | Mod: 25

## 2023-07-09 PROCEDURE — 81001 URINALYSIS AUTO W/SCOPE: CPT

## 2023-07-09 PROCEDURE — 85730 THROMBOPLASTIN TIME PARTIAL: CPT

## 2023-07-09 PROCEDURE — 99239 HOSP IP/OBS DSCHRG MGMT >30: CPT

## 2023-07-09 PROCEDURE — 83605 ASSAY OF LACTIC ACID: CPT

## 2023-07-09 PROCEDURE — 85025 COMPLETE CBC W/AUTO DIFF WBC: CPT

## 2023-07-09 PROCEDURE — 80048 BASIC METABOLIC PNL TOTAL CA: CPT

## 2023-07-09 PROCEDURE — 87040 BLOOD CULTURE FOR BACTERIA: CPT

## 2023-07-09 PROCEDURE — 97162 PT EVAL MOD COMPLEX 30 MIN: CPT

## 2023-07-09 PROCEDURE — 85027 COMPLETE CBC AUTOMATED: CPT

## 2023-07-09 PROCEDURE — 85610 PROTHROMBIN TIME: CPT

## 2023-07-09 PROCEDURE — 97530 THERAPEUTIC ACTIVITIES: CPT

## 2023-07-09 PROCEDURE — 71045 X-RAY EXAM CHEST 1 VIEW: CPT

## 2023-07-09 PROCEDURE — 0225U NFCT DS DNA&RNA 21 SARSCOV2: CPT

## 2023-07-09 PROCEDURE — 93005 ELECTROCARDIOGRAM TRACING: CPT

## 2023-07-09 PROCEDURE — 96365 THER/PROPH/DIAG IV INF INIT: CPT

## 2023-07-09 PROCEDURE — 83036 HEMOGLOBIN GLYCOSYLATED A1C: CPT

## 2023-07-09 PROCEDURE — 36415 COLL VENOUS BLD VENIPUNCTURE: CPT

## 2023-07-09 PROCEDURE — 96367 TX/PROPH/DG ADDL SEQ IV INF: CPT

## 2023-07-09 RX ORDER — QUETIAPINE FUMARATE 200 MG/1
1 TABLET, FILM COATED ORAL
Qty: 7 | Refills: 0
Start: 2023-07-09 | End: 2023-07-15

## 2023-07-09 RX ADMIN — MEMANTINE HYDROCHLORIDE 5 MILLIGRAM(S): 10 TABLET ORAL at 05:25

## 2023-07-09 RX ADMIN — MEROPENEM 100 MILLIGRAM(S): 1 INJECTION INTRAVENOUS at 05:25

## 2023-07-09 RX ADMIN — ENOXAPARIN SODIUM 40 MILLIGRAM(S): 100 INJECTION SUBCUTANEOUS at 05:25

## 2023-07-09 RX ADMIN — DORZOLAMIDE HYDROCHLORIDE 1 DROP(S): 20 SOLUTION/ DROPS OPHTHALMIC at 05:25

## 2023-07-09 NOTE — PROGRESS NOTE ADULT - ASSESSMENT
89yo M with PMH dementia, BPH, CAD, GERD, hyperlipidemia, kidney stones, UTI presents with cough and fever.    dementia  BPH  UTI eval  PNA eval  OP  OA  CAD  GERD  Ataxic gait  HLD  Dysphagia  URI    on sam  on room air  dc planning  cm follow up noted  vs noted    URI sx management - tylenol - robitussin prn   monitor VS and Sat  emp ABX in progress  cx - biomarkers  pt with known Dysphagia - asp prec - HOB elev -   oral hygiene  skin care  monitor VS and HD and Sat  assist with needs  GOC discussion   cxr noted  labs reviewed  old records reviewed  pt is tolerating RA - VS noted
89yo M with PMH dementia, BPH, CAD, GERD, hyperlipidemia, kidney stones, UTI presents with cough and fever.    dementia  BPH  UTI eval  PNA eval  OP  OA  CAD  GERD  Ataxic gait  HLD  Dysphagia  URI    s/p IVF  on BEA  on Pureed Diet  vs noted  labs reviewed    URI sx management - tylenol - robitussin prn   monitor VS and Sat  emp ABX in progress  cx - biomarkers  pt with known Dysphagia - asp prec - HOB elev -   oral hygiene  skin care  monitor VS and HD and Sat  assist with needs  GOC discussion   cxr noted  labs reviewed  old records reviewed  pt is tolerating RA - VS noted  
89yo M with PMH dementia, BPH, CAD, GERD, hyperlipidemia, kidney stones, UTI presents with cough and fever. Admitted for UTI, pneumonia.
89yo M with PMH dementia, BPH, CAD, GERD, hyperlipidemia, kidney stones, UTI presents with cough and fever.    dementia  BPH  UTI eval  PNA eval  OP  OA  CAD  GERD  Ataxic gait  HLD  Dysphagia  URI    on RA  on BEA  on Pureed Diet  vs noted  labs reviewed    URI sx management - tylenol - robitussin prn   monitor VS and Sat  emp ABX in progress  cx - biomarkers  pt with known Dysphagia - asp prec - HOB elev -   oral hygiene  skin care  monitor VS and HD and Sat  assist with needs  GOC discussion   cxr noted  labs reviewed  old records reviewed  pt is tolerating RA - VS noted
89yo M with PMH dementia, BPH, CAD, GERD, hyperlipidemia, kidney stones, UTI presents with cough and fever.    dementia  BPH  UTI eval  PNA eval  OP  OA  CAD  GERD  Ataxic gait  HLD  Dysphagia  URI    on sam  on room air  dc planning  cm follow up noted  vs noted    URI sx management - tylenol - robitussin prn   monitor VS and Sat  emp ABX in progress  cx - biomarkers  pt with known Dysphagia - asp prec - HOB elev -   oral hygiene  skin care  monitor VS and HD and Sat  assist with needs  GOC discussion   cxr noted  labs reviewed  old records reviewed  pt is tolerating RA - VS noted
89yo M with PMH dementia, BPH, CAD, GERD, hyperlipidemia, kidney stones, UTI presents with cough and fever. Admitted for UTI, pneumonia.
89yo M with PMH dementia, BPH, CAD, GERD, hyperlipidemia, kidney stones, UTI presents with cough and fever. Admitted for UTI, pneumonia.
89yo M with PMH dementia, BPH, CAD, GERD, hyperlipidemia, kidney stones, UTI presents with cough and fever. Admitted for UTI, pneumonia.

## 2023-07-09 NOTE — CASE MANAGEMENT PROGRESS NOTE - NSCMPROGRESSNOTE_GEN_ALL_CORE
Weekend task follow up: Per MD, patient is medically cleared for discharge home today. Patient's discharge was arranged for today from previous CM and son was made aware of discharge plan. Ambulance is scheduled for 11am. Referral to [facility] for SOC 7/10/23 has been accepted. Bedside RN is aware of the discharge plan.

## 2023-07-09 NOTE — PROGRESS NOTE ADULT - PROVIDER SPECIALTY LIST ADULT
Infectious Disease
Infectious Disease
Pulmonology
Pulmonology
Infectious Disease
Pulmonology
Pulmonology
Internal Medicine

## 2023-07-09 NOTE — PROGRESS NOTE ADULT - REASON FOR ADMISSION
pneumonia, UTI

## 2023-07-09 NOTE — PROGRESS NOTE ADULT - SUBJECTIVE AND OBJECTIVE BOX
Date/Time Patient Seen:  		  Referring MD:   Data Reviewed	       Patient is a 89y old  Male who presents with a chief complaint of pneumonia, UTI (08 Jul 2023 11:45)      Subjective/HPI     PAST MEDICAL & SURGICAL HISTORY:  Benign prostatic hyperplasia, presence of lower urinary tract symptoms unspecified, unspecified morphology    Hyperlipidemia    GERD (gastroesophageal reflux disease)    CAD (coronary artery disease)    UTI (urinary tract infection)    Calculus of kidney    Calculus of ureter    Dementia    Kidney stones    H/O heart artery stent  stent x 2    History of prostate surgery    S/P ureteral stent placement  Left sided    Stented coronary artery          Medication list         MEDICATIONS  (STANDING):  atorvastatin 20 milliGRAM(s) Oral at bedtime  dorzolamide 2% Ophthalmic Solution 1 Drop(s) Both EYES three times a day  enoxaparin Injectable 40 milliGRAM(s) SubCutaneous every 24 hours  finasteride 5 milliGRAM(s) Oral daily  memantine 5 milliGRAM(s) Oral two times a day  meropenem  IVPB 1000 milliGRAM(s) IV Intermittent every 8 hours  multivitamin/minerals 1 Tablet(s) Oral daily  polyethylene glycol 3350 17 Gram(s) Oral daily  QUEtiapine 25 milliGRAM(s) Oral at bedtime  senna 2 Tablet(s) Oral at bedtime  sodium chloride 0.9%. 1000 milliLiter(s) (60 mL/Hr) IV Continuous <Continuous>  tamsulosin 0.4 milliGRAM(s) Oral at bedtime    MEDICATIONS  (PRN):  acetaminophen     Tablet .. 650 milliGRAM(s) Oral every 6 hours PRN Temp greater or equal to 38C (100.4F), Mild Pain (1 - 3)  guaiFENesin Oral Liquid (Sugar-Free) 200 milliGRAM(s) Oral every 6 hours PRN Cough         Vitals log        ICU Vital Signs Last 24 Hrs  T(C): 36.5 (09 Jul 2023 04:41), Max: 36.6 (08 Jul 2023 13:05)  T(F): 97.7 (09 Jul 2023 04:41), Max: 97.9 (08 Jul 2023 13:05)  HR: 65 (09 Jul 2023 04:41) (65 - 84)  BP: 132/60 (09 Jul 2023 04:41) (108/71 - 132/60)  BP(mean): --  ABP: --  ABP(mean): --  RR: 17 (09 Jul 2023 04:41) (17 - 17)  SpO2: 94% (09 Jul 2023 04:41) (94% - 95%)    O2 Parameters below as of 08 Jul 2023 20:48  Patient On (Oxygen Delivery Method): room air                 Input and Output:  I&O's Detail      Lab Data                        13.2   8.67  )-----------( 154      ( 07 Jul 2023 06:06 )             39.9     07-07    143  |  111<H>  |  12  ----------------------------<  89  3.6   |  27  |  0.83    Ca    9.5      07 Jul 2023 06:06              Review of Systems	      Objective     Physical Examination  heart s1s2  lung dc BS  head nc        Pertinent Lab findings & Imaging      Ondina:  NO   Adequate UO     I&O's Detail           Discussed with:     Cultures:	        Radiology

## 2023-08-10 ENCOUNTER — APPOINTMENT (OUTPATIENT)
Dept: UROLOGY | Facility: CLINIC | Age: 88
End: 2023-08-10
Payer: MEDICARE

## 2023-08-10 VITALS
DIASTOLIC BLOOD PRESSURE: 66 MMHG | OXYGEN SATURATION: 96 % | SYSTOLIC BLOOD PRESSURE: 102 MMHG | HEART RATE: 91 BPM | TEMPERATURE: 96.5 F

## 2023-08-10 PROCEDURE — 99214 OFFICE O/P EST MOD 30 MIN: CPT

## 2023-08-10 NOTE — PHYSICAL EXAM
[General Appearance - Well Developed] : well developed [General Appearance - Well Nourished] : well nourished [Normal Appearance] : normal appearance [Well Groomed] : well groomed [General Appearance - In No Acute Distress] : no acute distress [Abdomen Soft] : soft [Urinary Bladder Findings] : the bladder was normal on palpation [Edema] : no peripheral edema [] : no respiratory distress [Respiration, Rhythm And Depth] : normal respiratory rhythm and effort [Exaggerated Use Of Accessory Muscles For Inspiration] : no accessory muscle use [Oriented To Time, Place, And Person] : oriented to person, place, and time [Affect] : the affect was normal [Mood] : the mood was normal [Not Anxious] : not anxious [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

## 2023-08-10 NOTE — HISTORY OF PRESENT ILLNESS
[FreeTextEntry1] : Now 89 yr old male presents to follow up with h/o kidney stones, uti (with MDR organism) on office urine cultures. Most recent, had admit to [facility] with pneumonia. No urine culture in computer. blood cultures neg pt doing okay- low vol intake notes odor to urine- no dysuria or hematuria reported. No imaging at hosp for kidneys during admission  s/p left URS and left PCNL on 07/19/2021  stone- 80% Calcium phosphate (apatite) 10% Calcium oxalate monohydrate 10% Calcium oxalate dihydrate   last seen 05/23 in office with renal US -- did not see all stones on 11/2022 CT, but no hydro  Feeling well at this time  F/u stones bilat, UTI hx with e coli [None] : no symptoms

## 2023-08-10 NOTE — ASSESSMENT
[FreeTextEntry1] :  continue tamsulosin, finasteride, methenamine, vit C  Urine culture today RTC 3 months with renal us- for additional followup on stones

## 2023-08-17 LAB — BACTERIA UR CULT: ABNORMAL

## 2023-08-17 RX ORDER — NITROFURANTOIN MACROCRYSTALS 100 MG/1
100 CAPSULE ORAL
Qty: 20 | Refills: 0 | Status: ACTIVE | COMMUNITY
Start: 2021-08-14 | End: 1900-01-01

## 2023-10-23 NOTE — PROVIDER CONTACT NOTE (OTHER) - SITUATION
pt son requesting PT consult due to multiple falls. Xolair Counseling:  Patient informed of potential adverse effects including but not limited to fever, muscle aches, rash and allergic reactions.  The patient verbalized understanding of the proper use and possible adverse effects of Xolair.  All of the patient's questions and concerns were addressed.

## 2023-11-07 NOTE — DISCHARGE NOTE PROVIDER - NSDCCAREPROVSEEN_GEN_ALL_CORE_FT
Bilateral herniorrhaphy scheduled for 12/13/2023     
Monitor: The problem is newly identified.  Evaluation: No labs/tests required today.  Assessment/Treatment:  Update treatment regimen per encounter summary   Following up with cardiology next month.   
Tramadol refill and increased quantity from 90 to 100 tablets.   prescribed Naloxone spray  Pain management profile was ordered   consent opoid pain agreement is in place.   
Hoenig, David

## 2023-11-07 NOTE — DISCHARGE NOTE ADULT - DISCHARGE DATE
Calling [facility] to clarify interaction. Writer consulted our pharmacist showing no interactions. Fax sent over stating possible prolonged QTC. Need to clarify. Left detailed message on vm.    10-Celestino-2019

## 2023-11-15 ENCOUNTER — OUTPATIENT (OUTPATIENT)
Dept: OUTPATIENT SERVICES | Facility: HOSPITAL | Age: 88
LOS: 1 days | End: 2023-11-15
Payer: COMMERCIAL

## 2023-11-15 ENCOUNTER — APPOINTMENT (OUTPATIENT)
Dept: UROLOGY | Facility: CLINIC | Age: 88
End: 2023-11-15
Payer: MEDICARE

## 2023-11-15 DIAGNOSIS — Z98.890 OTHER SPECIFIED POSTPROCEDURAL STATES: Chronic | ICD-10-CM

## 2023-11-15 DIAGNOSIS — Z95.5 PRESENCE OF CORONARY ANGIOPLASTY IMPLANT AND GRAFT: Chronic | ICD-10-CM

## 2023-11-15 DIAGNOSIS — R35.0 FREQUENCY OF MICTURITION: ICD-10-CM

## 2023-11-15 PROCEDURE — 99213 OFFICE O/P EST LOW 20 MIN: CPT | Mod: 25

## 2023-11-15 PROCEDURE — 96402 CHEMO HORMON ANTINEOPL SQ/IM: CPT

## 2023-11-15 PROCEDURE — 76770 US EXAM ABDO BACK WALL COMP: CPT | Mod: 26

## 2023-11-15 RX ORDER — TAMSULOSIN HYDROCHLORIDE 0.4 MG/1
0.4 CAPSULE ORAL
Qty: 90 | Refills: 3 | Status: ACTIVE | COMMUNITY
Start: 2020-02-03 | End: 1900-01-01

## 2023-11-15 RX ORDER — MULTIVIT-MIN/IRON/FOLIC ACID/K 18-600-40
500 CAPSULE ORAL
Qty: 180 | Refills: 3 | Status: ACTIVE | COMMUNITY
Start: 2021-10-27 | End: 1900-01-01

## 2023-11-15 RX ORDER — FINASTERIDE 5 MG/1
5 TABLET, FILM COATED ORAL
Qty: 90 | Refills: 3 | Status: ACTIVE | COMMUNITY
Start: 2019-12-30 | End: 1900-01-01

## 2023-11-21 NOTE — PROVIDER CONTACT NOTE (CRITICAL VALUE NOTIFICATION) - DATE AND TIME:
Problem: Breathing Pattern Ineffective  Goal: Air exchange is effective, demonstrated by Sp02 sat of greater then or = 92% (or as ordered)  Outcome: Outcome Met, Continue evaluating goal progress toward completion     
30-Apr-2021 14:28
29-Apr-2021 21:25
02-May-2021 16:08

## 2023-12-01 NOTE — ASU PATIENT PROFILE, ADULT - MEDICATION ADMINISTRATION INFO, PROFILE
Spoke to patient regarding his upcoming surgery we will need one more xray for surgical planning. His surgery is on 12/7/2023 with .         Understood and closing encounter.   
no concerns

## 2024-01-19 NOTE — DISCHARGE NOTE PROVIDER - DISCHARGE DATE
DR. MONTERO WILL DO H&P    PATIENT WILL NEED TO STOP HIS ASPIRIN 7 DAYS PRIOR TO HIS PROCEDURE    PATIENT WILL NEED TO BEGIN HIS ANTIBIOTIC ONE DAY PRIOR TO HIS PROCEDURE, CONTINUE THE DAY OF HIS PROCEDURE, AND FINISH ONE DAY FOLLOWING HIS PROCEDURE.     PATIENT WILL NEED TO HAVE FULLY COMPLETED HIS FLEET ENEMA AT LEAST ONE HOUR PRIOR TO LEAVING HIS HOME FOR THE HOSPITAL THE DAY OF HIS PROCEDURE.      27-Nov-2022

## 2024-01-28 ENCOUNTER — EMERGENCY (EMERGENCY)
Facility: HOSPITAL | Age: 89
LOS: 1 days | Discharge: HOME | End: 2024-01-28
Attending: STUDENT IN AN ORGANIZED HEALTH CARE EDUCATION/TRAINING PROGRAM | Admitting: STUDENT IN AN ORGANIZED HEALTH CARE EDUCATION/TRAINING PROGRAM
Payer: COMMERCIAL

## 2024-01-28 VITALS
RESPIRATION RATE: 16 BRPM | HEIGHT: 67 IN | HEART RATE: 63 BPM | TEMPERATURE: 98 F | OXYGEN SATURATION: 100 % | DIASTOLIC BLOOD PRESSURE: 78 MMHG | WEIGHT: 149.91 LBS | SYSTOLIC BLOOD PRESSURE: 150 MMHG

## 2024-01-28 VITALS
DIASTOLIC BLOOD PRESSURE: 80 MMHG | TEMPERATURE: 99 F | SYSTOLIC BLOOD PRESSURE: 152 MMHG | RESPIRATION RATE: 16 BRPM | OXYGEN SATURATION: 98 % | HEART RATE: 64 BPM

## 2024-01-28 DIAGNOSIS — Z95.5 PRESENCE OF CORONARY ANGIOPLASTY IMPLANT AND GRAFT: Chronic | ICD-10-CM

## 2024-01-28 DIAGNOSIS — Z98.890 OTHER SPECIFIED POSTPROCEDURAL STATES: Chronic | ICD-10-CM

## 2024-01-28 LAB
ALBUMIN SERPL ELPH-MCNC: 3 G/DL — LOW (ref 3.3–5)
ALP SERPL-CCNC: 98 U/L — SIGNIFICANT CHANGE UP (ref 40–120)
ALT FLD-CCNC: 9 U/L — LOW (ref 12–78)
ANION GAP SERPL CALC-SCNC: 5 MMOL/L — SIGNIFICANT CHANGE UP (ref 5–17)
AST SERPL-CCNC: 18 U/L — SIGNIFICANT CHANGE UP (ref 15–37)
BASOPHILS # BLD AUTO: 0.02 K/UL — SIGNIFICANT CHANGE UP (ref 0–0.2)
BASOPHILS NFR BLD AUTO: 0.2 % — SIGNIFICANT CHANGE UP (ref 0–2)
BILIRUB SERPL-MCNC: 0.9 MG/DL — SIGNIFICANT CHANGE UP (ref 0.2–1.2)
BUN SERPL-MCNC: 15 MG/DL — SIGNIFICANT CHANGE UP (ref 7–23)
CALCIUM SERPL-MCNC: 10 MG/DL — SIGNIFICANT CHANGE UP (ref 8.5–10.1)
CHLORIDE SERPL-SCNC: 110 MMOL/L — HIGH (ref 96–108)
CO2 SERPL-SCNC: 23 MMOL/L — SIGNIFICANT CHANGE UP (ref 22–31)
CREAT SERPL-MCNC: 1.1 MG/DL — SIGNIFICANT CHANGE UP (ref 0.5–1.3)
EGFR: 62 ML/MIN/1.73M2 — SIGNIFICANT CHANGE UP
EOSINOPHIL # BLD AUTO: 0.05 K/UL — SIGNIFICANT CHANGE UP (ref 0–0.5)
EOSINOPHIL NFR BLD AUTO: 0.4 % — SIGNIFICANT CHANGE UP (ref 0–6)
FLUAV AG NPH QL: SIGNIFICANT CHANGE UP
FLUBV AG NPH QL: SIGNIFICANT CHANGE UP
GLUCOSE SERPL-MCNC: 113 MG/DL — HIGH (ref 70–99)
HCT VFR BLD CALC: 41.4 % — SIGNIFICANT CHANGE UP (ref 39–50)
HGB BLD-MCNC: 13.6 G/DL — SIGNIFICANT CHANGE UP (ref 13–17)
IMM GRANULOCYTES NFR BLD AUTO: 0.4 % — SIGNIFICANT CHANGE UP (ref 0–0.9)
LIDOCAIN IGE QN: 33 U/L — SIGNIFICANT CHANGE UP (ref 13–75)
LYMPHOCYTES # BLD AUTO: 1.45 K/UL — SIGNIFICANT CHANGE UP (ref 1–3.3)
LYMPHOCYTES # BLD AUTO: 12.3 % — LOW (ref 13–44)
MAGNESIUM SERPL-MCNC: 1.9 MG/DL — SIGNIFICANT CHANGE UP (ref 1.6–2.6)
MCHC RBC-ENTMCNC: 30.6 PG — SIGNIFICANT CHANGE UP (ref 27–34)
MCHC RBC-ENTMCNC: 32.9 GM/DL — SIGNIFICANT CHANGE UP (ref 32–36)
MCV RBC AUTO: 93 FL — SIGNIFICANT CHANGE UP (ref 80–100)
MONOCYTES # BLD AUTO: 0.76 K/UL — SIGNIFICANT CHANGE UP (ref 0–0.9)
MONOCYTES NFR BLD AUTO: 6.4 % — SIGNIFICANT CHANGE UP (ref 2–14)
NEUTROPHILS # BLD AUTO: 9.48 K/UL — HIGH (ref 1.8–7.4)
NEUTROPHILS NFR BLD AUTO: 80.3 % — HIGH (ref 43–77)
NRBC # BLD: 0 /100 WBCS — SIGNIFICANT CHANGE UP (ref 0–0)
NT-PROBNP SERPL-SCNC: 206 PG/ML — SIGNIFICANT CHANGE UP (ref 0–450)
PLATELET # BLD AUTO: 135 K/UL — LOW (ref 150–400)
POTASSIUM SERPL-MCNC: 3.9 MMOL/L — SIGNIFICANT CHANGE UP (ref 3.5–5.3)
POTASSIUM SERPL-SCNC: 3.9 MMOL/L — SIGNIFICANT CHANGE UP (ref 3.5–5.3)
PROT SERPL-MCNC: 6.9 G/DL — SIGNIFICANT CHANGE UP (ref 6–8.3)
RBC # BLD: 4.45 M/UL — SIGNIFICANT CHANGE UP (ref 4.2–5.8)
RBC # FLD: 14.2 % — SIGNIFICANT CHANGE UP (ref 10.3–14.5)
RSV RNA NPH QL NAA+NON-PROBE: SIGNIFICANT CHANGE UP
SARS-COV-2 RNA SPEC QL NAA+PROBE: SIGNIFICANT CHANGE UP
SODIUM SERPL-SCNC: 138 MMOL/L — SIGNIFICANT CHANGE UP (ref 135–145)
TROPONIN I, HIGH SENSITIVITY RESULT: 8.9 NG/L — SIGNIFICANT CHANGE UP
TROPONIN I, HIGH SENSITIVITY RESULT: 9.4 NG/L — SIGNIFICANT CHANGE UP
WBC # BLD: 11.81 K/UL — HIGH (ref 3.8–10.5)
WBC # FLD AUTO: 11.81 K/UL — HIGH (ref 3.8–10.5)

## 2024-01-28 PROCEDURE — 83880 ASSAY OF NATRIURETIC PEPTIDE: CPT

## 2024-01-28 PROCEDURE — 87637 SARSCOV2&INF A&B&RSV AMP PRB: CPT

## 2024-01-28 PROCEDURE — 83690 ASSAY OF LIPASE: CPT

## 2024-01-28 PROCEDURE — 36415 COLL VENOUS BLD VENIPUNCTURE: CPT

## 2024-01-28 PROCEDURE — 99285 EMERGENCY DEPT VISIT HI MDM: CPT

## 2024-01-28 PROCEDURE — 99285 EMERGENCY DEPT VISIT HI MDM: CPT | Mod: 25

## 2024-01-28 PROCEDURE — 93005 ELECTROCARDIOGRAM TRACING: CPT

## 2024-01-28 PROCEDURE — 71045 X-RAY EXAM CHEST 1 VIEW: CPT

## 2024-01-28 PROCEDURE — 80053 COMPREHEN METABOLIC PANEL: CPT

## 2024-01-28 PROCEDURE — 84484 ASSAY OF TROPONIN QUANT: CPT

## 2024-01-28 PROCEDURE — 85025 COMPLETE CBC W/AUTO DIFF WBC: CPT

## 2024-01-28 PROCEDURE — 71045 X-RAY EXAM CHEST 1 VIEW: CPT | Mod: 26

## 2024-01-28 PROCEDURE — 83735 ASSAY OF MAGNESIUM: CPT

## 2024-01-28 PROCEDURE — 93010 ELECTROCARDIOGRAM REPORT: CPT | Mod: 76

## 2024-01-28 NOTE — ED PROVIDER NOTE - OBJECTIVE STATEMENT
89 yo M with PMH dementia, BPH, CAD, GERD, hyperlipidemia, kidney stones, UTI here with son due to feeling of heart heaviness at 630 PM. no associated reported dizziness, nausea, vomiting, shortness of breath. per son, patient was restless today. Son noted BP elevated at home. PMD Angeline Ness

## 2024-01-28 NOTE — ED PROVIDER NOTE - NSFOLLOWUPINSTRUCTIONS_ED_ALL_ED_FT
Please follow up with your Primary Care Physician and any specialists as discussed- Cardiology.  Please take your medications as prescribed and or instructed.  If your symptoms persist or worsen, please seek care. Either return to the Emergency Department, go to urgent care or see your primary care doctor.  Please refer to general information and instructions attached or below:     Chest Pain    WHAT YOU NEED TO KNOW:    Chest pain can be caused by a range of conditions, from not serious to life-threatening. Chest pain can be a symptom of a digestive problem, such as acid reflux or a stomach ulcer. An anxiety attack or a strong emotion, such as anger, can also cause chest pain. Infection, inflammation, or a fracture in the bones or cartilage in your chest can cause pain or discomfort. Sometimes chest pain or pressure is caused by poor blood flow to your heart (angina). Chest pain may also be caused by life-threatening conditions such as a heart attack or blood clot in your lungs.     DISCHARGE INSTRUCTIONS:    Call 911 if:     You have any of the following signs of a heart attack:   Squeezing, pressure, or pain in your chest       and any of the following:   Discomfort or pain in your back, neck, jaw, stomach, or arm       Shortness of breath      Nausea or vomiting      Lightheadedness or a sudden cold sweat        Return to the emergency department if:     You have chest discomfort that gets worse, even with medicine.      You cough or vomit blood.       Your bowel movements are black or bloody.       You cannot stop vomiting, or it hurts to swallow.     Contact your healthcare provider if:     You have questions or concerns about your condition or care.        Medicines:     Medicines may be given to treat the cause of your chest pain. Examples include pain medicine, anxiety medicine, or medicines to increase blood flow to your heart.       Do not take certain medicines without asking your healthcare provider first. These include NSAIDs, herbal or vitamin supplements, or hormones (estrogen or progestin).       Take your medicine as directed. Contact your healthcare provider if you think your medicine is not helping or if you have side effects. Tell him or her if you are allergic to any medicine. Keep a list of the medicines, vitamins, and herbs you take. Include the amounts, and when and why you take them. Bring the list or the pill bottles to follow-up visits. Carry your medicine list with you in case of an emergency.    Follow up with your healthcare provider within 72 hours, or as directed: You may need to return for more tests to find the cause of your chest pain. You may be referred to a specialist, such as a cardiologist or gastroenterologist. Write down your questions so you remember to ask them during your visits.     Healthy living tips: The following are general healthy guidelines. If your chest pain is caused by a heart problem, your healthcare provider will give you specific guidelines to follow.    Do not smoke. Nicotine and other chemicals in cigarettes and cigars can cause lung and heart damage. Ask your healthcare provider for information if you currently smoke and need help to quit. E-cigarettes or smokeless tobacco still contain nicotine. Talk to your healthcare provider before you use these products.       Eat a variety of healthy, low-fat, low-salt foods. Healthy foods include fruits, vegetables, whole-grain breads, low-fat dairy products, beans, lean meats, and fish. Ask for more information about a heart healthy diet.      Drink plenty of water every day. Your body is made of mostly water. Water helps your body to control your temperature and blood pressure. Ask your healthcare provider how much water you should drink every day.      Ask about activity. Your healthcare provider will tell you which activities to limit or avoid. Ask when you can drive, return to work, and have sex. Ask about the best exercise plan for you.      Maintain a healthy weight. Ask your healthcare provider how much you should weigh. Ask him or her to help you create a weight loss plan if you are overweight.       Get the flu and pneumonia vaccines. All adults should get the influenza (flu) vaccine. Get it every year as soon as it becomes available. The pneumococcal vaccine is given to adults aged 65 years or older. The vaccine is given every 5 years to prevent pneumococcal disease, such as pneumonia.    If you have a stent:     Carry your stent card with you at all times.       Let all healthcare providers know that you have a stent.

## 2024-01-28 NOTE — ED PROVIDER NOTE - PATIENT PORTAL LINK FT
You can access the FollowMyHealth Patient Portal offered by [facility] by registering at the following website: [email or web address]. By joining [facility]’s FollowMyHealth portal, you will also be able to view your health information using other applications (apps) compatible with our system.

## 2024-01-28 NOTE — ED PROVIDER NOTE - PROGRESS NOTE DETAILS
family asking repeatedly to be discharged home. UA not done, they will follow up with PMD to have this checked.

## 2024-01-28 NOTE — ED PROVIDER NOTE - PHYSICAL EXAMINATION
Vital signs as available reviewed.  General:  No acute distress.  Head:  Normocephalic, atraumatic.  Eyes:  Conjunctiva pink, no icterus.  Cardiovascular:  Regular rate, no obvious murmur.  Respiratory:  bi basilar crackles.  Abdomen:  Soft, non-tender.  Musculoskeletal:  No obvious deformity.  Neurologic: Alert and oriented, moving all extremities.  Skin:  Warm and dry.

## 2024-01-28 NOTE — ED PROVIDER NOTE - CLINICAL SUMMARY MEDICAL DECISION MAKING FREE TEXT BOX
here complaining of chest pain in setting of CAD. bibasilar crackles on exam. will do labs, XR, consider CT, trend BP, re-evaluate.

## 2024-01-28 NOTE — ED ADULT NURSE NOTE - OBJECTIVE STATEMENT
Brought in to ED accompanied by son reports high blood pressure with some chest heaviness tonight. Patient son also reports patient is restless today. As per son no fever at home/ nausea/ vomiting. BP in triage noted 150/78.

## 2024-01-29 ENCOUNTER — TRANSCRIPTION ENCOUNTER (OUTPATIENT)
Age: 89
End: 2024-01-29

## 2024-01-30 ENCOUNTER — INPATIENT (INPATIENT)
Facility: HOSPITAL | Age: 89
LOS: 5 days | Discharge: HOME HEALTH | DRG: 854 | End: 2024-02-05
Attending: HOSPITALIST | Admitting: INTERNAL MEDICINE
Payer: COMMERCIAL

## 2024-01-30 VITALS
WEIGHT: 160.06 LBS | OXYGEN SATURATION: 98 % | RESPIRATION RATE: 28 BRPM | SYSTOLIC BLOOD PRESSURE: 100 MMHG | HEART RATE: 144 BPM | DIASTOLIC BLOOD PRESSURE: 56 MMHG | HEIGHT: 67 IN | TEMPERATURE: 99 F

## 2024-01-30 DIAGNOSIS — N17.9 ACUTE KIDNEY FAILURE, UNSPECIFIED: ICD-10-CM

## 2024-01-30 DIAGNOSIS — I25.10 ATHEROSCLEROTIC HEART DISEASE OF NATIVE CORONARY ARTERY WITHOUT ANGINA PECTORIS: ICD-10-CM

## 2024-01-30 DIAGNOSIS — Z29.9 ENCOUNTER FOR PROPHYLACTIC MEASURES, UNSPECIFIED: ICD-10-CM

## 2024-01-30 DIAGNOSIS — N20.0 CALCULUS OF KIDNEY: ICD-10-CM

## 2024-01-30 DIAGNOSIS — Z95.5 PRESENCE OF CORONARY ANGIOPLASTY IMPLANT AND GRAFT: Chronic | ICD-10-CM

## 2024-01-30 DIAGNOSIS — F03.90 UNSPECIFIED DEMENTIA WITHOUT BEHAVIORAL DISTURBANCE: ICD-10-CM

## 2024-01-30 DIAGNOSIS — Z98.890 OTHER SPECIFIED POSTPROCEDURAL STATES: Chronic | ICD-10-CM

## 2024-01-30 DIAGNOSIS — N39.0 URINARY TRACT INFECTION, SITE NOT SPECIFIED: ICD-10-CM

## 2024-01-30 DIAGNOSIS — A41.9 SEPSIS, UNSPECIFIED ORGANISM: ICD-10-CM

## 2024-01-30 DIAGNOSIS — E78.5 HYPERLIPIDEMIA, UNSPECIFIED: ICD-10-CM

## 2024-01-30 DIAGNOSIS — R93.89 ABNORMAL FINDINGS ON DIAGNOSTIC IMAGING OF OTHER SPECIFIED BODY STRUCTURES: ICD-10-CM

## 2024-01-30 DIAGNOSIS — N40.0 BENIGN PROSTATIC HYPERPLASIA WITHOUT LOWER URINARY TRACT SYMPTOMS: ICD-10-CM

## 2024-01-30 DIAGNOSIS — K59.09 OTHER CONSTIPATION: ICD-10-CM

## 2024-01-30 LAB
ALBUMIN SERPL ELPH-MCNC: 2.7 G/DL — LOW (ref 3.3–5)
ALP SERPL-CCNC: 80 U/L — SIGNIFICANT CHANGE UP (ref 40–120)
ALT FLD-CCNC: 8 U/L — LOW (ref 12–78)
ANION GAP SERPL CALC-SCNC: 6 MMOL/L — SIGNIFICANT CHANGE UP (ref 5–17)
APPEARANCE UR: ABNORMAL
APTT BLD: 29.6 SEC — SIGNIFICANT CHANGE UP (ref 24.5–35.6)
AST SERPL-CCNC: 21 U/L — SIGNIFICANT CHANGE UP (ref 15–37)
BACTERIA # UR AUTO: ABNORMAL /HPF
BASOPHILS # BLD AUTO: 0.02 K/UL — SIGNIFICANT CHANGE UP (ref 0–0.2)
BASOPHILS NFR BLD AUTO: 0.2 % — SIGNIFICANT CHANGE UP (ref 0–2)
BILIRUB SERPL-MCNC: 2.7 MG/DL — HIGH (ref 0.2–1.2)
BILIRUB UR-MCNC: NEGATIVE — SIGNIFICANT CHANGE UP
BUN SERPL-MCNC: 25 MG/DL — HIGH (ref 7–23)
CALCIUM SERPL-MCNC: 9.7 MG/DL — SIGNIFICANT CHANGE UP (ref 8.5–10.1)
CHLORIDE SERPL-SCNC: 112 MMOL/L — HIGH (ref 96–108)
CO2 SERPL-SCNC: 23 MMOL/L — SIGNIFICANT CHANGE UP (ref 22–31)
COLOR SPEC: YELLOW — SIGNIFICANT CHANGE UP
CREAT SERPL-MCNC: 1.3 MG/DL — SIGNIFICANT CHANGE UP (ref 0.5–1.3)
DIFF PNL FLD: ABNORMAL
EGFR: 51 ML/MIN/1.73M2 — LOW
EOSINOPHIL # BLD AUTO: 0.01 K/UL — SIGNIFICANT CHANGE UP (ref 0–0.5)
EOSINOPHIL NFR BLD AUTO: 0.1 % — SIGNIFICANT CHANGE UP (ref 0–6)
EPI CELLS # UR: PRESENT
GLUCOSE SERPL-MCNC: 115 MG/DL — HIGH (ref 70–99)
GLUCOSE UR QL: NEGATIVE MG/DL — SIGNIFICANT CHANGE UP
HCT VFR BLD CALC: 40 % — SIGNIFICANT CHANGE UP (ref 39–50)
HGB BLD-MCNC: 13 G/DL — SIGNIFICANT CHANGE UP (ref 13–17)
IMM GRANULOCYTES NFR BLD AUTO: 0.5 % — SIGNIFICANT CHANGE UP (ref 0–0.9)
INR BLD: 1.24 RATIO — HIGH (ref 0.85–1.18)
KETONES UR-MCNC: ABNORMAL MG/DL
LACTATE SERPL-SCNC: 1.6 MMOL/L — SIGNIFICANT CHANGE UP (ref 0.7–2)
LEUKOCYTE ESTERASE UR-ACNC: ABNORMAL
LYMPHOCYTES # BLD AUTO: 0.78 K/UL — LOW (ref 1–3.3)
LYMPHOCYTES # BLD AUTO: 6 % — LOW (ref 13–44)
MCHC RBC-ENTMCNC: 30.9 PG — SIGNIFICANT CHANGE UP (ref 27–34)
MCHC RBC-ENTMCNC: 32.5 GM/DL — SIGNIFICANT CHANGE UP (ref 32–36)
MCV RBC AUTO: 95 FL — SIGNIFICANT CHANGE UP (ref 80–100)
MONOCYTES # BLD AUTO: 0.48 K/UL — SIGNIFICANT CHANGE UP (ref 0–0.9)
MONOCYTES NFR BLD AUTO: 3.7 % — SIGNIFICANT CHANGE UP (ref 2–14)
NEUTROPHILS # BLD AUTO: 11.61 K/UL — HIGH (ref 1.8–7.4)
NEUTROPHILS NFR BLD AUTO: 89.5 % — HIGH (ref 43–77)
NITRITE UR-MCNC: POSITIVE
NRBC # BLD: 0 /100 WBCS — SIGNIFICANT CHANGE UP (ref 0–0)
PH UR: 6.5 — SIGNIFICANT CHANGE UP (ref 5–8)
PLATELET # BLD AUTO: 110 K/UL — LOW (ref 150–400)
POTASSIUM SERPL-MCNC: 3.9 MMOL/L — SIGNIFICANT CHANGE UP (ref 3.5–5.3)
POTASSIUM SERPL-SCNC: 3.9 MMOL/L — SIGNIFICANT CHANGE UP (ref 3.5–5.3)
PROT SERPL-MCNC: 6.5 G/DL — SIGNIFICANT CHANGE UP (ref 6–8.3)
PROT UR-MCNC: 30 MG/DL
PROTHROM AB SERPL-ACNC: 14.4 SEC — HIGH (ref 9.5–13)
RAPID RVP RESULT: SIGNIFICANT CHANGE UP
RBC # BLD: 4.21 M/UL — SIGNIFICANT CHANGE UP (ref 4.2–5.8)
RBC # FLD: 14.7 % — HIGH (ref 10.3–14.5)
RBC CASTS # UR COMP ASSIST: 1 /HPF — SIGNIFICANT CHANGE UP (ref 0–4)
SARS-COV-2 RNA SPEC QL NAA+PROBE: SIGNIFICANT CHANGE UP
SODIUM SERPL-SCNC: 141 MMOL/L — SIGNIFICANT CHANGE UP (ref 135–145)
SP GR SPEC: 1.02 — SIGNIFICANT CHANGE UP (ref 1–1.03)
TROPONIN I, HIGH SENSITIVITY RESULT: 15.8 NG/L — SIGNIFICANT CHANGE UP
UROBILINOGEN FLD QL: 0.2 MG/DL — SIGNIFICANT CHANGE UP (ref 0.2–1)
WBC # BLD: 12.97 K/UL — HIGH (ref 3.8–10.5)
WBC # FLD AUTO: 12.97 K/UL — HIGH (ref 3.8–10.5)
WBC UR QL: ABNORMAL /HPF (ref 0–5)

## 2024-01-30 PROCEDURE — 99223 1ST HOSP IP/OBS HIGH 75: CPT | Mod: 25,57

## 2024-01-30 PROCEDURE — 99285 EMERGENCY DEPT VISIT HI MDM: CPT

## 2024-01-30 PROCEDURE — 74420 UROGRAPHY RTRGR +-KUB: CPT

## 2024-01-30 PROCEDURE — 71045 X-RAY EXAM CHEST 1 VIEW: CPT | Mod: 26

## 2024-01-30 PROCEDURE — 99222 1ST HOSP IP/OBS MODERATE 55: CPT

## 2024-01-30 PROCEDURE — 93010 ELECTROCARDIOGRAM REPORT: CPT

## 2024-01-30 PROCEDURE — 74176 CT ABD & PELVIS W/O CONTRAST: CPT | Mod: 26,59,MA

## 2024-01-30 PROCEDURE — 74178 CT ABD&PLV WO CNTR FLWD CNTR: CPT | Mod: 26

## 2024-01-30 PROCEDURE — 71250 CT THORAX DX C-: CPT | Mod: 26,MA

## 2024-01-30 PROCEDURE — 99223 1ST HOSP IP/OBS HIGH 75: CPT | Mod: GC

## 2024-01-30 PROCEDURE — 52332 CYSTOSCOPY AND TREATMENT: CPT | Mod: RT

## 2024-01-30 DEVICE — URETERAL STENT CONTOUR 6FR 24CM: Type: IMPLANTABLE DEVICE | Site: RIGHT | Status: FUNCTIONAL

## 2024-01-30 DEVICE — GUIDEWIRE SENSOR DUAL-FLEX NITINOL STRAIGHT .035" X 150CM: Type: IMPLANTABLE DEVICE | Site: RIGHT | Status: FUNCTIONAL

## 2024-01-30 DEVICE — URETERAL CATH OPEN END FLEXI-TIP 5FR .038" X 70CM: Type: IMPLANTABLE DEVICE | Site: RIGHT | Status: FUNCTIONAL

## 2024-01-30 RX ORDER — HYDROMORPHONE HYDROCHLORIDE 2 MG/ML
0.5 INJECTION INTRAMUSCULAR; INTRAVENOUS; SUBCUTANEOUS
Refills: 0 | Status: DISCONTINUED | OUTPATIENT
Start: 2024-01-30 | End: 2024-01-30

## 2024-01-30 RX ORDER — POLYETHYLENE GLYCOL 3350 17 G/17G
17 POWDER, FOR SOLUTION ORAL DAILY
Refills: 0 | Status: DISCONTINUED | OUTPATIENT
Start: 2024-01-30 | End: 2024-01-30

## 2024-01-30 RX ORDER — TAMSULOSIN HYDROCHLORIDE 0.4 MG/1
0.4 CAPSULE ORAL AT BEDTIME
Refills: 0 | Status: DISCONTINUED | OUTPATIENT
Start: 2024-01-30 | End: 2024-01-30

## 2024-01-30 RX ORDER — CEFEPIME 1 G/1
2000 INJECTION, POWDER, FOR SOLUTION INTRAMUSCULAR; INTRAVENOUS EVERY 12 HOURS
Refills: 0 | Status: DISCONTINUED | OUTPATIENT
Start: 2024-01-30 | End: 2024-01-31

## 2024-01-30 RX ORDER — MEMANTINE HYDROCHLORIDE 10 MG/1
5 TABLET ORAL
Refills: 0 | Status: DISCONTINUED | OUTPATIENT
Start: 2024-01-30 | End: 2024-02-05

## 2024-01-30 RX ORDER — SODIUM CHLORIDE 9 MG/ML
1000 INJECTION, SOLUTION INTRAVENOUS
Refills: 0 | Status: DISCONTINUED | OUTPATIENT
Start: 2024-01-30 | End: 2024-01-30

## 2024-01-30 RX ORDER — ONDANSETRON 8 MG/1
4 TABLET, FILM COATED ORAL ONCE
Refills: 0 | Status: DISCONTINUED | OUTPATIENT
Start: 2024-01-30 | End: 2024-01-30

## 2024-01-30 RX ORDER — SODIUM CHLORIDE 9 MG/ML
1000 INJECTION INTRAMUSCULAR; INTRAVENOUS; SUBCUTANEOUS
Refills: 0 | Status: DISCONTINUED | OUTPATIENT
Start: 2024-01-30 | End: 2024-02-05

## 2024-01-30 RX ORDER — SENNA PLUS 8.6 MG/1
2 TABLET ORAL AT BEDTIME
Refills: 0 | Status: DISCONTINUED | OUTPATIENT
Start: 2024-01-30 | End: 2024-01-30

## 2024-01-30 RX ORDER — ACETAMINOPHEN 500 MG
1000 TABLET ORAL ONCE
Refills: 0 | Status: DISCONTINUED | OUTPATIENT
Start: 2024-01-30 | End: 2024-01-30

## 2024-01-30 RX ORDER — SODIUM CHLORIDE 9 MG/ML
1000 INJECTION INTRAMUSCULAR; INTRAVENOUS; SUBCUTANEOUS
Refills: 0 | Status: DISCONTINUED | OUTPATIENT
Start: 2024-01-30 | End: 2024-01-30

## 2024-01-30 RX ORDER — SENNA PLUS 8.6 MG/1
2 TABLET ORAL AT BEDTIME
Refills: 0 | Status: DISCONTINUED | OUTPATIENT
Start: 2024-01-30 | End: 2024-02-05

## 2024-01-30 RX ORDER — TAMSULOSIN HYDROCHLORIDE 0.4 MG/1
0.4 CAPSULE ORAL AT BEDTIME
Refills: 0 | Status: DISCONTINUED | OUTPATIENT
Start: 2024-01-30 | End: 2024-02-05

## 2024-01-30 RX ORDER — CEFEPIME 1 G/1
INJECTION, POWDER, FOR SOLUTION INTRAMUSCULAR; INTRAVENOUS
Refills: 0 | Status: DISCONTINUED | OUTPATIENT
Start: 2024-01-30 | End: 2024-01-30

## 2024-01-30 RX ORDER — POLYETHYLENE GLYCOL 3350 17 G/17G
17 POWDER, FOR SOLUTION ORAL DAILY
Refills: 0 | Status: DISCONTINUED | OUTPATIENT
Start: 2024-01-30 | End: 2024-02-05

## 2024-01-30 RX ORDER — CEFEPIME 1 G/1
2000 INJECTION, POWDER, FOR SOLUTION INTRAMUSCULAR; INTRAVENOUS ONCE
Refills: 0 | Status: COMPLETED | OUTPATIENT
Start: 2024-01-30 | End: 2024-01-30

## 2024-01-30 RX ORDER — ACETAMINOPHEN 500 MG
1000 TABLET ORAL ONCE
Refills: 0 | Status: COMPLETED | OUTPATIENT
Start: 2024-01-31 | End: 2024-01-31

## 2024-01-30 RX ORDER — FINASTERIDE 5 MG/1
5 TABLET, FILM COATED ORAL DAILY
Refills: 0 | Status: DISCONTINUED | OUTPATIENT
Start: 2024-01-30 | End: 2024-01-30

## 2024-01-30 RX ORDER — CEFEPIME 1 G/1
2000 INJECTION, POWDER, FOR SOLUTION INTRAMUSCULAR; INTRAVENOUS EVERY 8 HOURS
Refills: 0 | Status: DISCONTINUED | OUTPATIENT
Start: 2024-01-30 | End: 2024-01-30

## 2024-01-30 RX ORDER — ACETAMINOPHEN 500 MG
1000 TABLET ORAL ONCE
Refills: 0 | Status: COMPLETED | OUTPATIENT
Start: 2024-01-30 | End: 2024-01-30

## 2024-01-30 RX ORDER — MEMANTINE HYDROCHLORIDE 10 MG/1
5 TABLET ORAL
Refills: 0 | Status: DISCONTINUED | OUTPATIENT
Start: 2024-01-30 | End: 2024-01-30

## 2024-01-30 RX ORDER — ASPIRIN/CALCIUM CARB/MAGNESIUM 324 MG
81 TABLET ORAL DAILY
Refills: 0 | Status: DISCONTINUED | OUTPATIENT
Start: 2024-01-30 | End: 2024-01-30

## 2024-01-30 RX ORDER — CEFEPIME 1 G/1
2000 INJECTION, POWDER, FOR SOLUTION INTRAMUSCULAR; INTRAVENOUS EVERY 12 HOURS
Refills: 0 | Status: DISCONTINUED | OUTPATIENT
Start: 2024-01-30 | End: 2024-01-30

## 2024-01-30 RX ORDER — ATORVASTATIN CALCIUM 80 MG/1
20 TABLET, FILM COATED ORAL AT BEDTIME
Refills: 0 | Status: DISCONTINUED | OUTPATIENT
Start: 2024-01-30 | End: 2024-01-30

## 2024-01-30 RX ORDER — ASPIRIN/CALCIUM CARB/MAGNESIUM 324 MG
81 TABLET ORAL DAILY
Refills: 0 | Status: DISCONTINUED | OUTPATIENT
Start: 2024-01-30 | End: 2024-02-05

## 2024-01-30 RX ORDER — SODIUM CHLORIDE 9 MG/ML
2300 INJECTION, SOLUTION INTRAVENOUS ONCE
Refills: 0 | Status: COMPLETED | OUTPATIENT
Start: 2024-01-30 | End: 2024-01-30

## 2024-01-30 RX ADMIN — SENNA PLUS 2 TABLET(S): 8.6 TABLET ORAL at 22:45

## 2024-01-30 RX ADMIN — TAMSULOSIN HYDROCHLORIDE 0.4 MILLIGRAM(S): 0.4 CAPSULE ORAL at 22:45

## 2024-01-30 RX ADMIN — SODIUM CHLORIDE 75 MILLILITER(S): 9 INJECTION, SOLUTION INTRAVENOUS at 20:21

## 2024-01-30 RX ADMIN — Medication 1000 MILLIGRAM(S): at 08:00

## 2024-01-30 RX ADMIN — Medication 400 MILLIGRAM(S): at 07:02

## 2024-01-30 RX ADMIN — CEFEPIME 100 MILLIGRAM(S): 1 INJECTION, POWDER, FOR SOLUTION INTRAMUSCULAR; INTRAVENOUS at 08:01

## 2024-01-30 RX ADMIN — SODIUM CHLORIDE 2300 MILLILITER(S): 9 INJECTION, SOLUTION INTRAVENOUS at 08:01

## 2024-01-30 RX ADMIN — Medication 1000 MILLIGRAM(S): at 07:45

## 2024-01-30 RX ADMIN — SODIUM CHLORIDE 75 MILLILITER(S): 9 INJECTION INTRAMUSCULAR; INTRAVENOUS; SUBCUTANEOUS at 17:43

## 2024-01-30 RX ADMIN — CEFEPIME 2000 MILLIGRAM(S): 1 INJECTION, POWDER, FOR SOLUTION INTRAMUSCULAR; INTRAVENOUS at 08:31

## 2024-01-30 RX ADMIN — SODIUM CHLORIDE 75 MILLILITER(S): 9 INJECTION INTRAMUSCULAR; INTRAVENOUS; SUBCUTANEOUS at 22:45

## 2024-01-30 RX ADMIN — SODIUM CHLORIDE 2300 MILLILITER(S): 9 INJECTION, SOLUTION INTRAVENOUS at 07:01

## 2024-01-30 NOTE — ED ADULT TRIAGE NOTE - NS ED TRIAGE AVPU SCALE
O:    T(C): 37.3 (11-09-17 @ 04:58), Max: 37.3 (11-09-17 @ 04:58)  HR: 106 (11-09-17 @ 05:43) (106 - 130)  BP: 148/92 (11-09-17 @ 05:43) (148/92 - 158/101)  RR: 16 (11-09-17 @ 05:43) (16 - 18)  SpO2: 98% (11-09-17 @ 05:43) (97% - 98%)  Abdomen:  soft, non-tender, non-distended, +bowel sounds.  Uterus:  Fundus firm below umbilicus  Incision:  Clean/dry/intact  VE:  minimal lochia  Ext:  Non-tender. Verbal - The patient responds to verbal stimuli by opening their eyes when someone speaks to them. The patient is not fully oriented to time, place, or person.

## 2024-01-30 NOTE — ED ADULT NURSE NOTE - ED CARDIAC RATE
Patient presents to consult with Dr. Messina for ETOh Cirrhosis. Referred by YANIV Jansen. Medications and allergies reviewed via [employer]. Assessment deferred to MD. Per Dr. Messina - needs labs today, CT 4 phase abdomen soon, lactulose 15mL po daily PRN, follow up with labs (cbc, cmp, inr, afp) in 6 months. Plan reviewed with patient who verbalized understanding. Orders entered. Rx sent to patient preferred pharmacy.   normal

## 2024-01-30 NOTE — ED ADULT NURSE NOTE - OBJECTIVE STATEMENT
received pt from [facility] for fever rectal temp 102 .4 pt evaluated by MD placed on cardiac monitor xray done and blood wowrk sent to lab ivf and tylenol OFirmev in progress

## 2024-01-30 NOTE — H&P ADULT - PROBLEM SELECTOR PLAN 7
- chronic   - Continue home memantine Chronic  - on finasteride and tamsulosin.  - cont. home medications

## 2024-01-30 NOTE — H&P ADULT - PROBLEM SELECTOR PLAN 8
LUNGS AND LARGE AIRWAYS: Patent central airways. Central lower lobe   bronchial mural thickening, unchanged. Stable millimeter size calcified   RUL granuloma. Subcentimeter-sized Anterior ILEANA (2/27) and mid RIGHT   perifissural nodules (2/40) , unchanged. No segmental airspace opacities   or interval suspicious pulmonary lesions. Mild peripheral fibrotic   changes are not significantly changed. Dependent pleuroparenchymal   reactive change .    CHEST WALL AND LOWER NECK: Gynecomastia  LIVER: Millimeter-sized calcified RIGHT hepatic granuloma  ADRENALS: Stable centimeter-sized LEFT adrenal nodule.  BOWEL: Moderate hiatus hernia/partial intrathoracic stomach, otherwise   underdistended/ nonevaluable stomach. . No bowel obstruction. Normal   appendix . No diverticulitis  BONES: Thoracic kyphosis. Osteopenia. Healed LEFT rib fractures.   Thoracolumbar degenerative changes produces up to moderate severe/severe   L4-5 central canal stenosis. Chronic  - cont. home memantine

## 2024-01-30 NOTE — ED PROVIDER NOTE - PHYSICAL EXAMINATION
Vital signs as available reviewed.  General:  No acute distress.  Head:  Normocephalic, atraumatic.  Eyes:  Conjunctiva pink, no icterus.  Cardiovascular:  tachycardia  Respiratory: bibasilar crackles.  Abdomen:  Soft, non-tender.  Musculoskeletal:  No obvious deformity.  Neurologic: Alert, moving all extremities.  Skin:  Warm and dry.

## 2024-01-30 NOTE — CONSULT NOTE ADULT - ASSESSMENT
Assessment: 89 yo M with PMH dementia, BPH, CAD s/p 3 stent placements in 2016, GERD, HLD,  kidney stones, UTI presents with fever, chills, coughing, and multiple episodes of weakness/confusion; admitted for urosepsis, consulted for cardiac clearance for Uro procedure.     Plan:   - Patient without symptoms of angina or heart failure at this time.  - No clear evidence of acute ischemia, trops negative, 15.8.   - EK bpm, sinus tachycardia with 1st degree AV block Axis: Normal· QTC: 306/398  - Prior EKGs varying between sinus bradycardia, normal sinus, and sinus tach, all with first degree AV block dating back to   - Stress testing  negative for angina, no evidence of MI with mild degree of reversible Myocardial ischemia of a small-medium size area of the basal septal and inferior walls (RCA), however may be artifact 2/2 motion     - No meaningful evidence of volume overload.  - Previous ECHO 2022: LVEF 60%, mild TR and MR     - BP well controlled, monitor routine hemodynamics.  - Continue ___.    - Monitor and replete lytes, keep K>4, Mg>2.  - Pt has no active ischemia, decompensated heart failure, unstable arrythmia, or severe stenotic valvular disease, and has __ cardiac risk factors. In the setting of low risk ___, he/she is optimized from cardiovascular standpoint to proceed with planned procedure with routine hemodynamic monitoring.   - Pt has no modifiable active cardiac risk factors and in the setting of low risk _____, patient is optimized as best as possible from cardiovascular standpoint to proceed with planned procedure with routine hemodynamic monitoring.    - Other cardiovascular workup will depend on clinical course.  - All other workup per primary team.  - Will continue to follow.             Assessment: 89 yo M with PMH dementia, BPH, CAD s/p 3 stent placements in 2016, GERD, HLD,  kidney stones, UTI presents with fever, chills, coughing, and multiple episodes of weakness/confusion; admitted for urosepsis, consulted for cardiac clearance for Uro procedure.     Plan:   - Patient without symptoms of angina or heart failure at this time.  - No clear evidence of acute ischemia, trops negative, 15.8.   - EK bpm, sinus tachycardia with 1st degree AV block Axis: Normal· QTC: 306/398  - Prior EKGs varying between sinus bradycardia, normal sinus, and sinus tach, all with first degree AV block dating back to   - Tachycardia in setting of Urosepsis  - Stress testing 2021 negative for angina, no evidence of MI with mild degree of reversible Myocardial ischemia of a small-medium size area of the basal septal and inferior walls (RCA), however may be artifact 2/2 motion     - No meaningful evidence of volume overload.  - Previous ECHO 2022: LVEF 60%, mild TR and MR   - CAD with 3 stents on Aspirin, continue     - Monitor and replete lytes, keep K>4, Mg>2.  - Pt with METs >4, no smoking hx, alcohol use, or diabetes presents with need for cardiac clearance for Stent placement in setting or Urosepsis. Pt has no active ischemia, decompensated heart failure, unstable arrythmia, or severe stenotic valvular disease, and has minimal cardiac risk factors. In the setting of moderate risk Stent placement in the setting of Urosepsis, pt is optimized from cardiovascular standpoint to proceed with planned procedure with routine hemodynamic monitoring. .    - Other cardiovascular workup will depend on clinical course.  - All other workup per primary team.  - Will continue to follow.

## 2024-01-30 NOTE — CONSULT NOTE ADULT - SUBJECTIVE AND OBJECTIVE BOX
Patient is a 89y old  Male who presents with a chief complaint of sepsis 2/2 renal stone (2024 10:37)      HPI:  89 yo M with PMH dementia, BPH, CAD s/p 3 stent placements in 2016, GERD, HLD,  kidney stones, UTI presents with fever, chills, coughing, and multiple episodes of weakness/confusion. Pt is unable to provide accurate history due to his medical history of dementia, son was at bedside who provided the information. Per son (who lives at home with father), the pt was brought to the [facility] ED on  for chest discomfort and uneasiness; pt was discharged after blood work and was directed to follow-up with the PCP. After discharge from ED, pt was found to have an elevated temperature at 99 the next day, was given tylenol by family members. The son noted that his father had episodes of weakness, confusion and closed his eyes, less responsive when he was urinating. Per son, this occurred around three times yesterday and once this morning when he was ambulating to the kitchen. During these episodes, the son states that he needed to assist the pt to his bed, and after he was brought back to his bed, the pt returned to baseline mental status.  This morning, the son noted that his father started coughing with minimal sputum production and having shivers. The pt denies headache, dizziness, chest pain, shortness of breath, nausea, vomiting. abdominal pain.      ED consulted urology (Dr. Rivera), CT scan reviewed, states needs ct urogram, then will decide if needs to go to OR for uro stent placement.    IN ED,  VITALS: /56  RR 28 temp 98F on admission  PERTINENT LABS: wbc 12K neutro 89% Platelet count: 110 BUN/Cr: 25/1.3 ,Cr 1.1 on  eGFR 51  albumin: 2.7 bili 2.7  CXR  Shows  Hiatus hernia. Left lower lung linear atelectasis versus scar.  CT A/P no cont: SHOWS Mild RIGHT hydroureteronephrosis presumed secondary to 5 mm distal intramural RIGHT UV junction calculus (). A recently passed calculus adjacent to RIGHT UV junction is considered an alternate less likely possibility.  EKG Rate: 102 sinus tachycardia Axis: Normal· QTC: 306/398  s/p Ix dose Ofirmev 1 G, Cefepime 2 G x1 and LR 2300 cc bolus in  ER  (2024 10:37)      CARDIAC Hx:  - Cardiologist: Dr. SANTINO Perez   - ECHO: LVEF 60%, mild TR and MR   - EK bpm, sinus tachycardia with 1st degree AV block Axis: Normal· QTC: 306/398    PAST MEDICAL & SURGICAL HISTORY:  Benign prostatic hyperplasia, presence of lower urinary tract symptoms unspecified, unspecified morphology      Hyperlipidemia      GERD (gastroesophageal reflux disease)      CAD (coronary artery disease)      UTI (urinary tract infection)      Calculus of kidney      Calculus of ureter      Dementia      Kidney stones      H/O heart artery stent  stent x 2      History of prostate surgery      S/P ureteral stent placement  Left sided      Stented coronary artery          MEDICATIONS  (STANDING):  aspirin enteric coated 81 milliGRAM(s) Oral daily  atorvastatin 20 milliGRAM(s) Oral at bedtime  cefepime   IVPB 2000 milliGRAM(s) IV Intermittent every 12 hours  finasteride 5 milliGRAM(s) Oral daily  memantine 5 milliGRAM(s) Oral two times a day  senna 2 Tablet(s) Oral at bedtime  sodium chloride 0.9%. 1000 milliLiter(s) (75 mL/Hr) IV Continuous <Continuous>  tamsulosin 0.4 milliGRAM(s) Oral at bedtime    MEDICATIONS  (PRN):  polyethylene glycol 3350 17 Gram(s) Oral daily PRN Constipation      FAMILY HISTORY:  FH: hypertension (Child)      Denies Family history of CAD or early MI    REVIEW OF SYSTEMS: As per HPI, otherwise negative       SOCIAL HISTORY:    No tobacco, Alcohol or Drug use    Vital Signs Last 24 Hrs  T(C): 36.4 (2024 12:00), Max: 37.7 (2024 08:10)  T(F): 97.5 (2024 12:00), Max: 99.9 (2024 08:10)  HR: 70 (2024 12:00) (70 - 144)  BP: 107/51 (2024 12:00) (100/56 - 118/52)  BP(mean): --  RR: 17 (2024 12:00) (17 - 28)  SpO2: 97% (2024 12:00) (97% - 98%)    Parameters below as of 2024 12:00  Patient On (Oxygen Delivery Method): room air        Physical Exam:  General: Well developed, well nourished, NAD  HEENT: NCAT, PERRLA, EOMI bl, moist mucous membranes   Neck: Supple, nontender, no mass  Neurology: A&Ox3, nonfocal, sensation intact   Respiratory: CTA B/L, No W/R/R  CV: RRR, +S1/S2, no murmurs, rubs or gallops  Abdominal: Soft, NT, ND +BSx4, no palpable masses  Extremities: No C/C/E, + peripheral pulses  MSK: Normal ROM, no joint erythema or warmth, no joint swelling   Heme: No obvious ecchymosis or petechiae   Skin: warm, dry, normal color      I&O's Detail      LABS:                        13.0   12.97 )-----------( 110      ( 2024 06:55 )             40.0         141  |  112<H>  |  25<H>  ----------------------------<  115<H>  3.9   |  23  |  1.30    Ca    9.7      2024 06:55  Mg     1.9     01-28    TPro  6.5  /  Alb  2.7<L>  /  TBili  2.7<H>  /  DBili  x   /  AST  21  /  ALT  8<L>  /  AlkPhos  80  01-30        PT/INR - ( 2024 06:55 )   PT: 14.4 sec;   INR: 1.24 ratio         PTT - ( 2024 06:55 )  PTT:29.6 sec  Urinalysis Basic - ( 2024 10:30 )    Color: Yellow / Appearance: Cloudy / S.019 / pH: x  Gluc: x / Ketone: Trace mg/dL  / Bili: Negative / Urobili: 0.2 mg/dL   Blood: x / Protein: 30 mg/dL / Nitrite: Positive   Leuk Esterase: Large / RBC: 1 /HPF / WBC Too Numerous to count /HPF   Sq Epi: x / Non Sq Epi: x / Bacteria: Moderate /HPF      I&O's Summary    BNP  RADIOLOGY & ADDITIONAL STUDIES: Patient is a 89y old  Male who presents with a chief complaint of sepsis 2/2 renal stone (2024 10:37). Pt with baseline dementia, Son spoke to for supplemental information. HPI per below. Son states that pt is typically very independent and walks with a walker at home. No hx of CHF, no smoking hx, no alcohol use. Endorses METs >4 typically, but has been more lethargic over the last 4 days. Patient follows with Dr. Dugan for Cardiology and has been in otherwise good health.       HPI:  89 yo M with PMH dementia, BPH, CAD s/p 3 stent placements in 2016, GERD, HLD,  kidney stones, UTI presents with fever, chills, coughing, and multiple episodes of weakness/confusion. Pt is unable to provide accurate history due to his medical history of dementia, son was at bedside who provided the information. Per son (who lives at home with father), the pt was brought to the [facility] ED on  for chest discomfort and uneasiness; pt was discharged after blood work and was directed to follow-up with the PCP. After discharge from ED, pt was found to have an elevated temperature at 99 the next day, was given tylenol by family members. The son noted that his father had episodes of weakness, confusion and closed his eyes, less responsive when he was urinating. Per son, this occurred around three times yesterday and once this morning when he was ambulating to the kitchen. During these episodes, the son states that he needed to assist the pt to his bed, and after he was brought back to his bed, the pt returned to baseline mental status.  This morning, the son noted that his father started coughing with minimal sputum production and having shivers. The pt denies headache, dizziness, chest pain, shortness of breath, nausea, vomiting. abdominal pain.      ED consulted urology (Dr. Rivera), CT scan reviewed, states needs ct urogram, then will decide if needs to go to OR for uro stent placement.    IN ED,  VITALS: /56  RR 28 temp 98F on admission  PERTINENT LABS: wbc 12K neutro 89% Platelet count: 110 BUN/Cr: 25/1.3 ,Cr 1.1 on  eGFR 51  albumin: 2.7 bili 2.7  CXR  Shows  Hiatus hernia. Left lower lung linear atelectasis versus scar.  CT A/P no cont: SHOWS Mild RIGHT hydroureteronephrosis presumed secondary to 5 mm distal intramural RIGHT UV junction calculus (2/143). A recently passed calculus adjacent to RIGHT UV junction is considered an alternate less likely possibility.  EKG Rate: 102 sinus tachycardia Axis: Normal· QTC: 306/398  s/p Ix dose Ofirmev 1 G, Cefepime 2 G x1 and LR 2300 cc bolus in  ER  (2024 10:37)      CARDIAC Hx:  - Cardiologist: Dr. SANTINO Perez   - ECHO: LVEF 60%, mild TR and MR   - EK bpm, sinus tachycardia with 1st degree AV block Axis: Normal· QTC: 306/398  - CAD with 3 stents in 2016, on Aspirin     PAST MEDICAL & SURGICAL HISTORY:  Benign prostatic hyperplasia, presence of lower urinary tract symptoms unspecified, unspecified morphology      Hyperlipidemia      GERD (gastroesophageal reflux disease)      CAD (coronary artery disease)      UTI (urinary tract infection)      Calculus of kidney      Calculus of ureter      Dementia      Kidney stones      H/O heart artery stent  stent x 2      History of prostate surgery      S/P ureteral stent placement  Left sided      Stented coronary artery          MEDICATIONS  (STANDING):  aspirin enteric coated 81 milliGRAM(s) Oral daily  atorvastatin 20 milliGRAM(s) Oral at bedtime  cefepime   IVPB 2000 milliGRAM(s) IV Intermittent every 12 hours  finasteride 5 milliGRAM(s) Oral daily  memantine 5 milliGRAM(s) Oral two times a day  senna 2 Tablet(s) Oral at bedtime  sodium chloride 0.9%. 1000 milliLiter(s) (75 mL/Hr) IV Continuous <Continuous>  tamsulosin 0.4 milliGRAM(s) Oral at bedtime    MEDICATIONS  (PRN):  polyethylene glycol 3350 17 Gram(s) Oral daily PRN Constipation      FAMILY HISTORY:  FH: hypertension (Child)      Denies Family history of CAD or early MI    REVIEW OF SYSTEMS: As per HPI, otherwise negative       SOCIAL HISTORY:    No tobacco, Alcohol or Drug use    T(C): 36.4 (24 @ 12:00), Max: 37.7 (24 @ 08:10)  HR: 70 (24 @ 12:00) (70 - 144)  BP: 107/51 (24 @ 12:00) (100/56 - 118/52)  RR: 17 (24 @ 12:00) (17 - 28)  SpO2: 97% (24 @ 12:00) (97% - 98%)    GENERAL: Elderly man, appears well, no acute distress, does not interact   EYES: sclera clear, no exudates  LUNGS: good air entry bilaterally, clear to auscultation, symmetric breath sounds, no wheezing or rhonchi appreciated  HEART: soft S1/S2, regular rate and rhythm, no murmurs noted, no lower extremity edema  GASTROINTESTINAL: abdomen is soft, nontender, nondistended, normoactive bowel sounds  INTEGUMENT: good skin turgor, warm skin, appears well perfused  MUSCULOSKELETAL: no clubbing or cyanosis, no obvious deformity  NEUROLOGIC: baseline dementia   HEME/LYMPH: no obvious ecchymosis or petechiae       I&O's Detail      LABS:                        13.0   12.97 )-----------( 110      ( 2024 06:55 )             40.0         141  |  112<H>  |  25<H>  ----------------------------<  115<H>  3.9   |  23  |  1.30    Ca    9.7      2024 06:55  Mg     1.9         TPro  6.5  /  Alb  2.7<L>  /  TBili  2.7<H>  /  DBili  x   /  AST  21  /  ALT  8<L>  /  AlkPhos  80          PT/INR - ( 2024 06:55 )   PT: 14.4 sec;   INR: 1.24 ratio         PTT - ( 2024 06:55 )  PTT:29.6 sec  Urinalysis Basic - ( 2024 10:30 )    Color: Yellow / Appearance: Cloudy / S.019 / pH: x  Gluc: x / Ketone: Trace mg/dL  / Bili: Negative / Urobili: 0.2 mg/dL   Blood: x / Protein: 30 mg/dL / Nitrite: Positive   Leuk Esterase: Large / RBC: 1 /HPF / WBC Too Numerous to count /HPF   Sq Epi: x / Non Sq Epi: x / Bacteria: Moderate /HPF      I&O's Summary    BNP  RADIOLOGY & ADDITIONAL STUDIES:

## 2024-01-30 NOTE — H&P ADULT - ATTENDING COMMENTS
89 yo M with PMH dementia, BPH, CAD s/p 3 stent placements in 2016, GERD, HLD,  kidney stones, UTI presents with fever, chills, coughing, and multiple episodes of weakness/confusion. CT A/P no cont showing mild RIGHT hydroureteronephrosis presumed secondary to 5 mm distal intramural RIGHT UV junction calculus; a recently passed calculus adjacent to RIGHT UV junction is considered an alternate less likely possibility. Patient meet sepsis criteria, needs repeat CT scan and per urology needs ct urogram, then will decide if needs to go to OR for uro stent placement. Admitted for sepsis 2/2 renal stone     HPI as above.     T(C): 36.4 (01-30-24 @ 12:00), Max: 37.7 (01-30-24 @ 08:10)  HR: 70 (01-30-24 @ 12:00) (70 - 144)  BP: 107/51 (01-30-24 @ 12:00) (100/56 - 118/52)  RR: 17 (01-30-24 @ 12:00) (17 - 28)  SpO2: 97% (01-30-24 @ 12:00) (97% - 98%)  Wt(kg): --    Physical Exam:   GENERAL: well-groomed,  NAD  HEENT: head NC/AT;conjunctiva & sclera clear; hearing grossly diminished, dry mucous membranes  NECK: supple, no JVD  RESPIRATORY: CTA B/L, no wheezing, rales, rhonchi or rubs  CARDIOVASCULAR: S1&S2, RRR, no murmurs or gallops  ABDOMEN: soft, non-tender, non-distended, + Bowel sounds x4 quadrants, no guarding, rebound or rigidity  MUSCULOSKELETAL:  no clubbing, cyanosis or edema of all 4 extremities  LYMPH: no cervical lymphadenopathy  VASCULAR: Radial pulses 2+ bilaterally, no varicose veins   SKIN: warm and dry, color normal  NEUROLOGIC: AA&O X1, MAEx4    Plan:  UTI with kidney stone:   continue IV abx  -f/u repeat CT scan and culture reports  -urology consult for possible stent placement    Elevated Bilirubin: T Bili 2.7, repeat LFT's in AM  Elevated Serum Cr: continue IVF while NPO  Thrombocytopenia: monitor Plt count

## 2024-01-30 NOTE — H&P ADULT - PROBLEM SELECTOR PLAN 3
-  BUN/Cr: 25/1.3 ,Cr 1.1 on 1/24 eGFR 51   - baseline 0.8 -1.1  - s/p LR 2300 cc bolus in  ER   - NS at rate of 60 cc/hr x 12 hours   - Avoid nephrotoxic agents.  - F/u BMP in AM BUN/Cr: 25/1.3   - Cr 1.1 on 1/24, eGFR 51; baseline 0.8 -1.1  - s/p LR 2300 cc bolus in  ER   - start NS at rate of 60 cc/hr x 12 hours   - Avoid nephrotoxic agents.  - F/u BMP in AM BUN/Cr: 25/1.3   - Cr 1.1 on 1/24, eGFR 51; baseline 0.8 -1.1  - s/p LR 2300 cc bolus in  ER   - start NS at rate of 75 cc/hr x 10 hours   - Avoid nephrotoxic agents.  - F/u BMP in AM

## 2024-01-30 NOTE — PATIENT PROFILE ADULT - FUNCTIONAL ASSESSMENT - DAILY ACTIVITY 4.
Had episode of emesis. Pt cleaned up, new sheet and gown. Zofran given. Will notify nurse. 2 = A lot of assistance

## 2024-01-30 NOTE — H&P ADULT - PROBLEM SELECTOR PLAN 9
VTE pro: SCD's for now Pt takes miralax at home PRN for chronic constipation  - Start senna x 2 & miralax

## 2024-01-30 NOTE — H&P ADULT - PROBLEM SELECTOR PLAN 10
LUNGS AND LARGE AIRWAYS: Patent central airways. Central lower lobe bronchial mural thickening, unchanged. Stable millimeter size calcified RUL granuloma. Subcentimeter-sized Anterior ILEANA (2/27) and mid RIGHT perifissural nodules (2/40) , unchanged. No segmental airspace opacities or interval suspicious pulmonary lesions. Mild peripheral fibrotic changes are not significantly changed. Dependent pleuroparenchymal reactive change.   LIVER: Millimeter-sized calcified RIGHT hepatic granuloma  ADRENALS: Stable centimeter-sized LEFT adrenal nodule.  BOWEL: Moderate hiatus hernia/partial intrathoracic stomach, otherwise underdistended/ nonevaluable stomach. . No bowel obstruction. Normal appendix . No diverticulitis  BONES: Thoracic kyphosis. Osteopenia. Healed LEFT rib fractures. Thoracolumbar degenerative changes produces up to moderate severe/severe L4-5 central canal stenosis. LUNGS AND LARGE AIRWAYS: Patent central airways. Central lower lobe bronchial mural thickening, unchanged. Stable millimeter size calcified RUL granuloma. Subcentimeter-sized Anterior ILEANA (2/27) and mid RIGHT perifissural nodules (2/40) , unchanged. No segmental airspace opacities or interval suspicious pulmonary lesions. Mild peripheral fibrotic changes are not significantly changed. Dependent pleuroparenchymal reactive change.   LIVER: Millimeter-sized calcified RIGHT hepatic granuloma  ADRENALS: Stable centimeter-sized LEFT adrenal nodule.  BOWEL: Moderate hiatus hernia/partial intrathoracic stomach, otherwise underdistended/ nonevaluable stomach. .   BONES: Thoracic kyphosis. Osteopenia. Healed LEFT rib fractures. Thoracolumbar degenerative changes produces up to moderate severe/severe L4-5 central canal stenosis.    Patients son informed of findings and instructed to f/u with PCP about above findings and discuss need for surveillance of findings about. Outpt f/u with PCP within 4 weeks CT Chest, Abdomen, Pelvis:    LUNGS AND LARGE AIRWAYS: Patent central airways. Central lower lobe bronchial mural thickening, unchanged. Stable millimeter size calcified RUL granuloma. Subcentimeter-sized Anterior ILEANA (2/27) and mid RIGHT perifissural nodules (2/40) , unchanged. No segmental airspace opacities or interval suspicious pulmonary lesions. Mild peripheral fibrotic changes are not significantly changed. Dependent pleuroparenchymal reactive change.   LIVER: Millimeter-sized calcified RIGHT hepatic granuloma  ADRENALS: Stable centimeter-sized LEFT adrenal nodule.  BOWEL: Moderate hiatus hernia/partial intrathoracic stomach, otherwise underdistended/ nonevaluable stomach. .   BONES: Thoracic kyphosis. Osteopenia. Healed LEFT rib fractures. Thoracolumbar degenerative changes produces up to moderate severe/severe L4-5 central canal stenosis.    - Patients son informed of findings and instructed to f/u with PCP about above findings and discuss need for surveillance of findings about. Outpt f/u with PCP within 4 weeks

## 2024-01-30 NOTE — ED PROVIDER NOTE - OBJECTIVE STATEMENT
89 yo M with PMH dementia, BPH, CAD, GERD, hyperlipidemia, kidney stones, UTI here with daughter due to weakness and shaking. low grade fever of 99 yesterday. slight cough. no nausea, vomiting. no complaint of abdomen, constipation, diarrhea, dysuria. PMD S Thi.

## 2024-01-30 NOTE — CONSULT NOTE ADULT - SUBJECTIVE AND OBJECTIVE BOX
SURGERY PA CONSULT NOTE:    CHIEF COMPLAINT:  Patient is a 89y old  Male who presents with a chief complaint of sepsis 2/2 renal stone (2024 10:37)      HPI FROM ED:  HPI:  89 yo M with PMH dementia, BPH, CAD s/p 3 stent placements in 2016, GERD, HLD,  kidney stones, UTI presents with fever, chills, coughing, and multiple episodes of weakness/confusion. Pt is unable to provide accurate history due to his medical history of dementia, son was at bedside who provided the information. Per son (who lives at home with father), the pt was brought to the [facility] ED on  for chest discomfort and uneasiness; pt was discharged after blood work and was directed to follow-up with the PCP. After discharge from ED, pt was found to have an elevated temperature at 99 the next day, was given tylenol by family members. The son noted that his father had episodes of weakness, confusion and closed his eyes, less responsive when he was urinating. Per son, this occurred around three times yesterday and once this morning when he was ambulating to the kitchen. During these episodes, the son states that he needed to assist the pt to his bed, and after he was brought back to his bed, the pt returned to baseline mental status.  This morning, the son noted that his father started coughing with minimal sputum production and having shivers. The pt denies headache, dizziness, chest pain, shortness of breath, nausea, vomiting. abdominal pain.      Interval HPI:   89 yo male with pmhx of dementia, BPH, CAD s/p 3 stent placements in 2016, GERD, HLD,  kidney stones, UTI presents with fever, chills, coughing, and multiple episodes of weakness/confusion.   Unable to obtain history due to his medical history of dementia and neurological status.      PAST MEDICAL HISTORY:  PAST MEDICAL & SURGICAL HISTORY:  Benign prostatic hyperplasia, presence of lower urinary tract symptoms unspecified, unspecified morphology    Hyperlipidemia    GERD (gastroesophageal reflux disease)    CAD (coronary artery disease)    UTI (urinary tract infection)    Calculus of kidney    Calculus of ureter    Dementia    Kidney stones        PAST SURGICAL HISTORY:  H/O heart artery stent  stent x 2    History of prostate surgery    S/P ureteral stent placement  Left sided    Stented coronary artery    REVIEW OF SYSTEMS:  General/Constitutional: No acute distress, no headache, weakness, fevers, or chills   Unable to obtain due to history of dementia    MEDICATIONS:  Home Medications:  aspirin 81 mg oral tablet: 1 tab(s) orally once a day (2024 12:15)  atorvastatin 20 mg oral tablet: 1 tab(s) orally once a day (2024 12:16)  Azopt 1% ophthalmic suspension: 1 dose(s) to each affected eye once a day (2024 12:16)  finasteride 5 mg oral tablet: 1 tab(s) orally once a day (2024 12:16)  memantine 5 mg oral tablet: 1 tab(s) orally 2 times a day (2024 12:16)  methenamine hippurate 1 g oral tablet: 1 orally 2 times a day (2024 12:16)  multivitamin with minerals:  (2024 12:16)  tamsulosin 0.4 mg oral capsule: 1 cap(s) orally once a day (2024 12:16)    MEDICATIONS  (STANDING):  aspirin enteric coated 81 milliGRAM(s) Oral daily  atorvastatin 20 milliGRAM(s) Oral at bedtime  cefepime   IVPB 2000 milliGRAM(s) IV Intermittent every 12 hours  finasteride 5 milliGRAM(s) Oral daily  memantine 5 milliGRAM(s) Oral two times a day  senna 2 Tablet(s) Oral at bedtime  sodium chloride 0.9%. 1000 milliLiter(s) (75 mL/Hr) IV Continuous <Continuous>  tamsulosin 0.4 milliGRAM(s) Oral at bedtime    MEDICATIONS  (PRN):  polyethylene glycol 3350 17 Gram(s) Oral daily PRN Constipation      ALLERGIES:  Allergies    No Known Allergies          SOCIAL HISTORY:  Social History:  Denies alcohol, tobacco, illicit drug use.   Lives at home with son, who is primary caretaker, ambulates with walker, dependent on son for ADLs, no dietary restrictions. (2024 10:37)        FAMILY HISTORY:  FAMILY HISTORY:  FH: hypertension (Child)      VITAL SIGNS:  Vital Signs Last 24 Hrs  T(C): 36.4 (2024 12:00), Max: 37.7 (2024 08:10)  T(F): 97.5 (2024 12:00), Max: 99.9 (2024 08:10)  HR: 70 (2024 12:00) (70 - 144)  BP: 107/51 (2024 12:00) (100/56 - 118/52)  BP(mean): --  RR: 17 (2024 12:00) (17 - 28)  SpO2: 97% (2024 12:00) (97% - 98%)    Parameters below as of 2024 12:00  Patient On (Oxygen Delivery Method): room air        PHYSICAL EXAM:  General: No acute distress  HEENT: dry mucosa  Abdomen: Soft, non tender, non distended  : +disla with 900cc dark yellow urine  Extremity: No swelling, no calf tenderness  Neuro: Awake, responds to name.      LABS:                        13.0   12.97 )-----------( 110      ( 2024 06:55 )             40.0     30    141  |  112<H>  |  25<H>  ----------------------------<  115<H>  3.9   |  23  |  1.30    Ca    9.7      2024 06:55  Mg     1.9     28    TPro  6.5  /  Alb  2.7<L>  /  TBili  2.7<H>  /  DBili  x   /  AST  21  /  ALT  8<L>  /  AlkPhos  80  01-30    PT/INR - ( 2024 06:55 )   PT: 14.4 sec;   INR: 1.24 ratio         PTT - ( 2024 06:55 )  PTT:29.6 sec  Urinalysis Basic - ( 2024 10:30 )    Color: Yellow / Appearance: Cloudy / S.019 / pH: x  Gluc: x / Ketone: Trace mg/dL  / Bili: Negative / Urobili: 0.2 mg/dL   Blood: x / Protein: 30 mg/dL / Nitrite: Positive   Leuk Esterase: Large / RBC: 1 /HPF / WBC Too Numerous to count /HPF   Sq Epi: x / Non Sq Epi: x / Bacteria: Moderate /HPF      LIVER FUNCTIONS - ( 2024 06:55 )  Alb: 2.7 g/dL / Pro: 6.5 g/dL / ALK PHOS: 80 U/L / ALT: 8 U/L / AST: 21 U/L / GGT: x               RADIOLOGY & ADDITIONAL STUDIES:  < from: CT Abdomen and Pelvis No Cont (24 @ 07:47) >  ACC: [id number] EXAM:  CT ABDOMEN AND PELVIS   ORDERED BY: MYRANDA BOO     ACC: [id number] EXAM:  CT CHEST   ORDERED BY: MYRANDA BOO     PROCEDURE DATE:  2024          INTERPRETATION:  CLINICAL INFORMATION: Sepsis    COMPARISON: 2022    CONTRAST/COMPLICATIONS:  IV Contrast: NONE  Oral Contrast: NONE  Complications: None reported at time of study completion    PROCEDURE:  CT of the Chest, Abdomen and Pelvis was performed.  Sagittal and coronal reformats were performed.    LIMITATIONS: Lack of intravenous contrast material limits diagnostic   sensitivity in evaluating solid organs /vasculature. Images degraded by   respiratory motion unsharpness.    FINDINGS:  CHEST:  LUNGS AND LARGE AIRWAYS: Patent central airways. Central lower lobe   bronchial mural thickening, unchanged. Stable millimeter size calcified   RUL granuloma. Subcentimeter-sized Anterior ILEANA (2/27) and mid RIGHT   perifissural nodules (2/40) , unchanged. No segmental airspace opacities   or interval suspicious pulmonary lesions. Mild peripheral fibrotic   changes are not significantly changed. Dependent pleuroparenchymal   reactive change .  PLEURA: No pleural effusion.  VESSELS: No aortic dilatation. Aortic /coronary artery calcification.  HEART: Heart size is normal. No pericardial effusion.  MEDIASTINUM AND QUETA: No lymphadenopathy. Stable multiple, subcentimeter   sized mediastinal lymph nodes  CHEST WALL AND LOWER NECK: Gynecomastia    ABDOMEN AND PELVIS:  LIVER: Millimeter-sized calcified RIGHT hepatic granuloma  BILE DUCTS: Normal caliber.  GALLBLADDER: 25 mm gallstone. No pericholecystic edema.  SPLEEN: Within normal limits.  PANCREAS: Within normal limits.  ADRENALS: Stable centimeter-sized LEFT adrenal nodule.  KIDNEYS/URETERS: Mild RIGHT hydroureteronephrosis presumed secondary to 5   mm distal intramural RIGHT UV junction calculus (2/143). A recently   passed calculus adjacent to RIGHT UV junction is considered an alternate   less likely possibility. Bilateral intrarenal calculi and or   calcification/renovascular calcification.. 14 mm LEFT lower pole renal   cyst/cystic lesion, unchanged.    BLADDER: See above. Small urinary bladder diverticula c/w  chronic lower   tract obstructive symptoms. Millimeter-sized dependent/layering punctate   bladder calculi or mural calcification, equivocally increased  REPRODUCTIVE ORGANS: Prominent prostate with coarse calcification,   unchanged    BOWEL: Moderate hiatus hernia/partial intrathoracic stomach, otherwise   underdistended/nonevaluable stomach. . No bowel obstruction. Normal   appendix . No diverticulitis  PERITONEUM: No ascites.  VESSELS: Atherosclerotic changes.  RETROPERITONEUM/LYMPH NODES: No lymphadenopathy.  ABDOMINAL WALL: Fat-containing RIGHT inguinal hernia. LEFT inguinal   herniorrhaphy.  BONES: Thoracic kyphosis. Osteopenia. Healed LEFT rib fractures.   Thoracolumbar degenerative changes produces up to moderate severe/severe   L4-5 central canal stenosis.    IMPRESSION:  1.  Mild RIGHT hydroureteronephrosis presumed secondary to 5 mm distal   intramural RIGHT UV junction calculus (2/143). A recently passed calculus   adjacent to RIGHT UV junction is considered an alternate less likely   possibility        --- End of Report ---    < end of copied text >

## 2024-01-30 NOTE — H&P ADULT - NSHPSOCIALHISTORY_GEN_ALL_CORE
Denies alcohol, tobacco, illicit drug use.   Lives at home with son, who is primary caretaker, ambulates with walker, dependent on son for ADLs, no dietary restrictions.

## 2024-01-30 NOTE — H&P ADULT - HISTORY OF PRESENT ILLNESS
89 yo M with PMH dementia, BPH, CAD, GERD, HLD,  kidney stones, UTI here with daughter due to weakness and shaking. low grade fever of 99 yesterday. slight cough. no nausea, vomiting. no complaint of abdomen, constipation, diarrhea, dysuria.  ED consulted urology, dr. Rivera, ct scan reviwed, states needs ct urogram, then will decide if needs to go to OR for uro stent placement.    IN ED,  VITALS: /56  RR 28 temp 98F on admission  PERTINENT LABS: wbc 12K neutro 89% Platelet count: 110 BUN/Cr: 25/1.3 ,Cr 1.1 on 1/24 eGFR 51  albumin: 2.7 bili 2.7    CXR 1/24 Shows  Hiatus hernia. Left lower lung linear atelectasis versus scar.    CT A/P no cont: SHOWS Mild RIGHT hydroureteronephrosis presumed secondary to 5 mm distal intramural RIGHT UV junction calculus (2/143). A recently passed calculus adjacent to RIGHT UV junction is considered an alternate less likely possibility.    EKG Rate: 102 sinus tachycardia Axis: Normal· QTC: 306/398    s/p Ix dose Ofirmev 1 G, Cefepime 2 G x1 and LR 2300 cc bolus in  ER    89 yo M with PMH dementia, BPH, CAD s/p 3 stent placements in 2016, GERD, HLD,  kidney stones, UTI presents with fever, chills, coughing, and multiple episodes of weakness/confusion. Pt is unable to provide accurate history due to his medical history of dementia, son was at bedside who provided the information. Per son (who lives at home with father), the pt was brought to the [facility] ED on 1/28 for chest discomfort and uneasiness; pt was discharged after blood work and was directed to follow-up with the PCP. After discharge from ED, pt was found to have an elevated temperature at 99 the next day, was given tylenol by family members. The son noted that his father had episodes of weakness, confusion and closed his eyes, minimally responsive when he was urinating. Per son, this occured around three times yesterday and once this morning when he was ambulating to the kitchen. During these episodes, the son states that he needed to assist the pt to his bed, and after he was brought back to his bed, the pt returned to baseline mental status.  This morning, the son noted that his father started coughing with minimal sputum production and having shivers. The pt denies headache, dizziness, chest pain, shortness of breath, nausea, vomiting. abdominal pain.      ED consulted urology (Dr. Rivera), CT scan reviewed, states needs ct urogram, then will decide if needs to go to OR for uro stent placement.    IN ED,  VITALS: /56  RR 28 temp 98F on admission  PERTINENT LABS: wbc 12K neutro 89% Platelet count: 110 BUN/Cr: 25/1.3 ,Cr 1.1 on 1/24 eGFR 51  albumin: 2.7 bili 2.7  CXR 1/24 Shows  Hiatus hernia. Left lower lung linear atelectasis versus scar.  CT A/P no cont: SHOWS Mild RIGHT hydroureteronephrosis presumed secondary to 5 mm distal intramural RIGHT UV junction calculus (2/143). A recently passed calculus adjacent to RIGHT UV junction is considered an alternate less likely possibility.  EKG Rate: 102 sinus tachycardia Axis: Normal· QTC: 306/398  s/p Ix dose Ofirmev 1 G, Cefepime 2 G x1 and LR 2300 cc bolus in  ER    89 yo M with PMH dementia, BPH, CAD s/p 3 stent placements in 2016, GERD, HLD,  kidney stones, UTI presents with fever, chills, coughing, and multiple episodes of weakness/confusion. Pt is unable to provide accurate history due to his medical history of dementia, son was at bedside who provided the information. Per son (who lives at home with father), the pt was brought to the [facility] ED on 1/28 for chest discomfort and uneasiness; pt was discharged after blood work and was directed to follow-up with the PCP. After discharge from ED, pt was found to have an elevated temperature at 99 the next day, was given tylenol by family members. The son noted that his father had episodes of weakness, confusion and closed his eyes, less responsive when he was urinating. Per son, this occurred around three times yesterday and once this morning when he was ambulating to the kitchen. During these episodes, the son states that he needed to assist the pt to his bed, and after he was brought back to his bed, the pt returned to baseline mental status.  This morning, the son noted that his father started coughing with minimal sputum production and having shivers. The pt denies headache, dizziness, chest pain, shortness of breath, nausea, vomiting. abdominal pain.      ED consulted urology (Dr. Rivera), CT scan reviewed, states needs ct urogram, then will decide if needs to go to OR for uro stent placement.    IN ED,  VITALS: /56  RR 28 temp 98F on admission  PERTINENT LABS: wbc 12K neutro 89% Platelet count: 110 BUN/Cr: 25/1.3 ,Cr 1.1 on 1/24 eGFR 51  albumin: 2.7 bili 2.7  CXR 1/24 Shows  Hiatus hernia. Left lower lung linear atelectasis versus scar.  CT A/P no cont: SHOWS Mild RIGHT hydroureteronephrosis presumed secondary to 5 mm distal intramural RIGHT UV junction calculus (2/143). A recently passed calculus adjacent to RIGHT UV junction is considered an alternate less likely possibility.  EKG Rate: 102 sinus tachycardia Axis: Normal· QTC: 306/398  s/p Ix dose Ofirmev 1 G, Cefepime 2 G x1 and LR 2300 cc bolus in  ER

## 2024-01-30 NOTE — ED ADULT NURSE REASSESSMENT NOTE - NS ED NURSE REASSESS COMMENT FT1
0812:  comfort measures performed.  condom cath placed.  patient noted to have some scabbing / break down in-between buttocks, close to rectum.  no redness.  daughter, Herminio, is waiting in waiting room.  nani magallanes.

## 2024-01-30 NOTE — H&P ADULT - NSHPREVIEWOFSYSTEMS_GEN_ALL_CORE
Review of Systems:   CONSTITUTIONAL: No fever, weight loss, or fatigue  EYES: No eye pain, visual disturbances, or discharge  ENMT:   No ear pain, No nose bleed; No sinus or throat pain  NECK: No pain or stiffness  RESPIRATORY: No cough, wheezing, or hemoptysis, no shortness of breath  CARDIOVASCULAR: No chest pain, palpitations, no leg swelling  GASTROINTESTINAL: No abdominal or epigastric pain. No nausea, vomiting, or hematemesis; No diarrhea or constipation. No melena or hematochezia.  GENITOURINARY: No dysuria, frequency, hematuria, or incontinence  NEUROLOGICAL: No headaches, memory loss, loss of strength, numbness; No dizziness  SKIN: No itching, burning, rashes, or lesions   MUSCULOSKELETAL: No joint pain or swelling; No muscle, back, or extremity pain  ALLERGY AND IMMUNOLOGIC: No hives or eczema Review of Systems: limited due to dementia     CONSTITUTIONAL: No fever  NECK: No pain or stiffness  RESPIRATORY: Mild cough; no shortness of breath  CARDIOVASCULAR: No chest pain, palpitations  GASTROINTESTINAL: chronic constipation; no abdominal pain. No nausea, vomiting, no diarrhea  GENITOURINARY: chonic incontinence, now with disla catheter  NEUROLOGICAL: history of dementia, episodes of weakness; no dizziness Review of Systems: limited due to dementia     CONSTITUTIONAL: No fever  NECK: No pain or stiffness  RESPIRATORY: Mild cough; no shortness of breath  CARDIOVASCULAR: No chest pain, palpitations  GASTROINTESTINAL: chronic constipation; no abdominal pain. No nausea, vomiting, no diarrhea  GENITOURINARY: chonic incontinence, now with disla catheter placed in ED  NEUROLOGICAL: history of dementia, episodes of weakness; no dizziness

## 2024-01-30 NOTE — ED PROVIDER NOTE - CARE PLAN
Principal Discharge DX:	Sepsis   1 Principal Discharge DX:	Sepsis  Secondary Diagnosis:	Kidney stone

## 2024-01-30 NOTE — ED ADULT NURSE REASSESSMENT NOTE - NS ED NURSE REASSESS COMMENT FT1
1124:  patient taken back down to CT scan via stretcher.  Admission MD at bedside and speaking to family members.  nani magallanes.

## 2024-01-30 NOTE — ED PROVIDER NOTE - PROGRESS NOTE DETAILS
spoke with urology on call attending dr. Rivera, ct scan reviwed, states needs ct urogram, then will decide if needs to go to OR for uro stent placement

## 2024-01-30 NOTE — BRIEF OPERATIVE NOTE - NSICDXBRIEFPOSTOP_GEN_ALL_CORE_FT
POST-OP DIAGNOSIS:  Right ureteral stone 30-Jan-2024 19:55:57  Roly Aguilar  Acute UTI 30-Jan-2024 19:56:15  Roly Aguilar

## 2024-01-30 NOTE — H&P ADULT - PROBLEM SELECTOR PLAN 2
as above CT A&P non con: Mild RIGHT hydroureteronephrosis presumed secondary to 5 mm distal intramural RIGHT UV junction calculus (2/143). A recently passed calculus adjacent to RIGHT UV junction is considered an alternate less likely possibility.   - Urology (Dr. Rivera) consulted, recommend ct urogram --> will decide based on imaging results if going to OR for uro stent placement; f/u recs  - rest of plan as above. CT A&P non con: Mild RIGHT hydroureteronephrosis presumed secondary to 5 mm distal intramural RIGHT UV junction calculus (2/143). A recently passed calculus adjacent to RIGHT UV junction is considered an alternate less likely possibility.   - Urology (Dr. Rivera) consulted, recommend ct urogram --> will decide based on imaging results if going to OR for uro stent placement; f/u recs  -CT shows there is a heterogeneous enhancement 3 cm region in the right lower kidney. -> f/u Urology recs    - rest of plan as above.

## 2024-01-30 NOTE — H&P ADULT - NSHPPHYSICALEXAM_GEN_ALL_CORE
Vital Signs Last 24 Hrs  T(C): 37.7 (30 Jan 2024 08:10), Max: 37.7 (30 Jan 2024 08:10)  T(F): 99.8 (30 Jan 2024 08:10), Max: 99.8 (30 Jan 2024 08:10)  HR: 98 (30 Jan 2024 08:10) (98 - 144)  BP: 118/52 (30 Jan 2024 08:10) (100/56 - 118/52)  BP(mean): --  RR: 18 (30 Jan 2024 08:10) (18 - 28)  SpO2: 98% (30 Jan 2024 08:10) (98% - 98%)    Parameters below as of 30 Jan 2024 08:10  Patient On (Oxygen Delivery Method): room air    GENERAL:  no acute distress  EYES: sclera clear,EOM intact, PERRLA, no exudates  ENMT: normocephalic, atraumatic, moist mucous membranes, no lesions in oral cavity, TM intact  NECK: supple, soft  LUNGS: good air entry bilaterally, clear to auscultation, symmetric breath sounds, no wheezing or rhonchi appreciated  HEART: soft S1/S2, regular rate and rhythm, no murmurs noted, no lower extremity edema  GASTROINTESTINAL: abdomen is soft, nontender, nondistended, normoactive bowel sounds, no palpable masses  INTEGUMENT: good skin turgor, no lesions noted  MUSCULOSKELETAL: no cyanosis, clubbing, edema, calves nontender to palpation b/l  NEUROLOGIC: awake, alert, oriented x3, good muscle tone in 4 extremities, no obvious sensory deficits  PSYCHIATRIC: mood is good, affect is congruent, linear and logical thought process  HEME/LYMPH: no palpable supraclavicular nodules, no obvious ecchymosis or petechiae Vital Signs Last 24 Hrs  T(C): 37.7 (30 Jan 2024 08:10), Max: 37.7 (30 Jan 2024 08:10)  T(F): 99.8 (30 Jan 2024 08:10), Max: 99.8 (30 Jan 2024 08:10)  HR: 98 (30 Jan 2024 08:10) (98 - 144)  BP: 118/52 (30 Jan 2024 08:10) (100/56 - 118/52)  BP(mean): --  RR: 18 (30 Jan 2024 08:10) (18 - 28)  SpO2: 98% (30 Jan 2024 08:10) (98% - 98%)    Parameters below as of 30 Jan 2024 08:10  Patient On (Oxygen Delivery Method): room air    GENERAL:  no acute distress, limited participation in physical exam   EYES: findings consistent with glaucoma, PERRLA  ENMT: normocephalic, atraumatic, dry mucous membranes   NECK: supple, soft  LUNGS: good air entry bilaterally, decreased lung sounds, symmetric, no wheezing or rhonchi appreciated  HEART: soft S1/S2, regular rate and rhythm, no murmurs noted, no lower extremity edema  GASTROINTESTINAL: abdomen is soft, nontender, nondistended, normoactive bowel sounds  MUSCULOSKELETAL: no cyanosis, clubbing, edema, calves nontender to palpation b/l  NEUROLOGIC: awake, alert, oriented x1, good muscle tone in upper extremities, no obvious sensory deficits  HEME/LYMPH: no obvious ecchymosis or petechiae Vital Signs Last 24 Hrs  T(C): 37.7 (30 Jan 2024 08:10), Max: 37.7 (30 Jan 2024 08:10)  T(F): 99.8 (30 Jan 2024 08:10), Max: 99.8 (30 Jan 2024 08:10)  HR: 98 (30 Jan 2024 08:10) (98 - 144)  BP: 118/52 (30 Jan 2024 08:10) (100/56 - 118/52)  BP(mean): --  RR: 18 (30 Jan 2024 08:10) (18 - 28)  SpO2: 98% (30 Jan 2024 08:10) (98% - 98%)    Parameters below as of 30 Jan 2024 08:10  Patient On (Oxygen Delivery Method): room air    GENERAL:  no acute distress, limited participation in physical exam   EYES: PERRLA  ENMT: normocephalic, atraumatic, dry mucous membranes   NECK: supple, soft  LUNGS: good air entry bilaterally, decreased lung sounds, symmetric, no wheezing or rhonchi appreciated  HEART: soft S1/S2, regular rate and rhythm, no murmurs noted, no lower extremity edema  GASTROINTESTINAL: abdomen is soft, nontender, nondistended, normoactive bowel sounds  MUSCULOSKELETAL: no cyanosis, clubbing, edema, calves nontender to palpation b/l  NEUROLOGIC: awake, alert, oriented x1, good muscle tone in upper extremities, no obvious sensory deficits  HEME/LYMPH: no obvious ecchymosis or petechiae

## 2024-01-30 NOTE — ED ADULT NURSE REASSESSMENT NOTE - NS ED NURSE REASSESS COMMENT FT1
1240:  surgical resident informed me that the patient is going to be headed to the OR, at approximately 1800 today.  the patient is NPO.  if any questions, concerns, call surgical resident at 1823.  Urologist is MD elpidio Rivera rn.

## 2024-01-30 NOTE — CONSULT NOTE ADULT - NSCONSULTADDITIONALINFOA_GEN_ALL_CORE
I agree with assessment and plan. I discussed with the daughter of the patient the need to attempt cystoscopy + stone basketing vs right ureteral stent placement to drain the right kidney given UTI. Daughter wishes to proceed with surgery.

## 2024-01-30 NOTE — H&P ADULT - ASSESSMENT
89 yo M with PMH dementia, BPH, CAD, GERD, HLD,  kidney stones, UTI presented with fever, weakness. CT A/P no cont: Mild RIGHT hydroureteronephrosis presumed secondary to 5 mm distal intramural RIGHT UV junction calculus (2/143). A recently passed calculus adjacent to RIGHT UV junction is considered an alternate less likely possibility. Patient meet sepsis criteria, needs repeat CT scan and per urology needs ct urogram, then will decide if needs to go to OR for uro stent placement. Admitted for sepsis 2/2 renal stone    89 yo M with PMH dementia, BPH, CAD s/p 3 stent placements in 2016, GERD, HLD,  kidney stones, UTI presents with fever, chills, coughing, and multiple episodes of weakness/confusion. CT A/P no cont showing mild RIGHT hydroureteronephrosis presumed secondary to 5 mm distal intramural RIGHT UV junction calculus; a recently passed calculus adjacent to RIGHT UV junction is considered an alternate less likely possibility. Patient meet sepsis criteria, needs repeat CT scan and per urology needs ct urogram, then will decide if needs to go to OR for uro stent placement. Admitted for sepsis 2/2 renal stone

## 2024-01-30 NOTE — ED ADULT NURSE REASSESSMENT NOTE - NS ED NURSE REASSESS COMMENT FT1
1810:  patient was taken to the or via stretcher.  Son, who was at the bedside most of the day, took all of his belongings.  he left the er in stable condition.  report was previously given to the OR by Luis E Rdz RN.  vitals were previously recorded.  nani magallanes.

## 2024-01-30 NOTE — PATIENT PROFILE ADULT - DIETITIAN.
Assessment  1  Chronic low back pain (724 2,338 29) (M54 5,G89 29)   2  Lumbar spondylosis (721 3) (M47 816)   3  Pain syndrome, chronic (338 4) (G89 4)   4  Peripheral neuropathy (356 9) (G62 9)   5  Chronic myofascial pain (729 1,338 29) (M79 1,G89 29)   6  Shortness of breath (786 05) (R06 02)    Plan  Chronic low back pain, Chronic myofascial pain, Lumbar spondylosis, Pain syndrome,  chronic, Peripheral neuropathy    · From  Nortriptyline HCl - 25 MG Oral Capsule Take 1 po hs To Nortriptyline HCl  - 25 MG Oral Capsule Take 1 pill every other night until gone and then STOP   Rx By: Anyi West; Dispense: 30 Days ; #:30 Capsule; Refill: 0;For: Chronic low back pain, Chronic myofascial pain, Lumbar spondylosis, Pain syndrome, chronic, Peripheral neuropathy; JANEL = N; Record  Pain syndrome, chronic    · Follow-up PRN Evaluation and Treatment  Follow-up  Status: Complete  Done:  [id number]   Ordered; For: Pain syndrome, chronic; Ordered By: Anyi West Performed:  Due: [id number]    Discussion/Summary    While the patient and his wife were in the office today, I did explain to him that it is unlikely that the shortness of breath is related to the nortriptyline, especially since we have continue to decrease it and with the decrease in Nortriptyline, his shortness of breath has worsened  I explained to the patient that there may be something else going on and that I feel it is in his best interest to follow-up with his primary care provider sooner than later  The patient and his wife were agreeable and verbalized understanding  regards to the Nortriptyline, I explained to the patient and his wife that this point since he does not feel it is helping and he is on a subtherapeutic dose anyway, I feel would be in his best interest to titrate off of the nortriptyline as instructed and see how he does without it   I explained to the patient that in general he has tried and failed both gabapentin and Cymbalta as well and it seems he is sensitive to these counter medications, for now, we mutually agreed that it is in his best interest just hold off medications, especially since his back pain has significantly improved  discussed with the patient that at this point time he can followup with our office on an as-needed basis  I did review the patient that if his pain symptoms should change, worsen, and/or if he would experience any new symptoms he would like to be evaluated for, he should give our office a call  The patient was agreeable and verbalized an understanding  The patient has the current Goals: To titrate off of the nortriptyline and just see how he does over the next few months  The patent has the current Barriers: Chronic pain syndrome  Patient is able to Self-Care  The treatment plan was reviewed with the patient/guardian  The patient/guardian understands and agrees with the treatment plan   The patient and patient's family was counseled regarding instructions for management,-- prognosis,-- patient and family education,-- risks and benefits of treatment options-- and-- importance of compliance with treatment  total time of encounter was 20 minutes  Chief Complaint  1  Pain  Chronic low back and leg pain, improved/stable  History of Present Illness  The patient and his wife present today for a follow-up office visit  The patient is currently being treated for his chronic low back pain and lower extremity radicular symptoms with EMG evidence of a left L5 radiculopathy  Since his last office visit he feels that his back pain has almost completely subsided and although he does continue with the leg pains and symptoms, mostly at nighttime, overall he feels he is doing better  At this point he is down on the nortriptyline to 25 mg at bedtime and still feels is not significantly helpful and causing some dizziness and other side effects   The patient also reports that he continues to have shortness of breath with activity, especially when he goes up and down the stairs or even mowing the grass on a riding mower  The patient was wondering if this could be caused by the nortriptyline as it seems to have gotten worse as were decreasing the nortriptyline, but is not certain is from the medicine, but just wanted to discuss that today  The patient presents today to discuss his medication regimen and treatment plan  Yuliana Carty presents with complaints of constant episodes of moderate left lower back, bilateral knee, bilateral lower leg, bilateral ankle and bilateral foot pain, described as sharp, burning and throbbing, radiating to the lower back and bilateral lower extremity  On a scale of 1 to 10, the patient rates the pain as 5  Symptoms are unchanged  Review of Systems    Constitutional: no fever,-- no recent weight gain-- and-- no recent weight loss  Eyes: no double vision-- and-- no blurry vision  Cardiovascular: no chest pain,-- no palpitations-- and-- no lower extremity edema  Respiratory: shortness of breath, but-- no wheezing  Musculoskeletal: muscle weakness,-- pain in extremity -- and-- decreased range of motion, but-- no difficulty walking,-- no joint stiffness,-- no joint swelling-- and-- no limb swelling  Neurological: dizziness, but-- no difficulty swallowing,-- no memory loss,-- no loss of consciousness-- and-- no seizures  Gastrointestinal: no nausea,-- no vomiting,-- no constipation-- and-- no diarrhea  Genitourinary: no difficulty initiating urine stream,-- no genital pain-- and-- no frequent urination  Integumentary: no complaints of skin rash  Psychiatric: no depression  Endocrine: no excessive thirst,-- no adrenal disease,-- no hypothyroidism-- and-- no hyperthyroidism  Hematologic/Lymphatic: no tendency for easy bruising-- and-- no tendency for easy bleeding  Active Problems  1  Arthritis (716 90) (M19 90)   2   Chronic low back pain (724 2,338 29) (M54 5,G22 29) 3  Chronic myofascial pain (729 1,338 29) (M79 1,G89 29)   4  Disability examination (V68 01) (Z02 71)   5  GERD (gastroesophageal reflux disease) (530 81) (K21 9)   6  Lumbar spondylosis (721 3) (M47 816)   7  Pain syndrome, chronic (338 4) (G89 4)   8  Peripheral neuropathy (356 9) (G62 9)   9  Work related injury (959 9) (Y99 0)    Past Medical History    The active problems and past medical history were reviewed and updated today  Surgical History  1  History of Appendectomy    The surgical history was reviewed and updated today  Family History  Father    1  Family history of cardiac disorder (V17 49) (Z82 49)    The family history was reviewed and updated today  Social History   · Denied: History of Drug use   · Never a smoker   · Social alcohol use (Z78 9)  The social history was reviewed and updated today  The social history was reviewed and is unchanged  Current Meds   1  B-12 3000 MCG Oral Capsule Recorded   2  Nortriptyline HCl - 25 MG Oral Capsule; Take 1 po hs; Therapy: [id number] to (77 173 135)  Requested for: 91Vvg6456; Last   Rx:[id number] Ordered   3  Omega-3 Fish Oil CAPS; take 1 capsule daily; Therapy: (Recorded:22Nov2016) to Recorded   4  Osteo-Bi-Flex TABS; Take 1 tablet twice daily; Therapy: (Recorded:22Nov2016) to Recorded   5  Tylenol Extra Strength 500 MG TABS; TAKE 1 TABLET EVERY 4 TO 6 HOURS AS   NEEDED; Therapy: (Recorded:22Nov2016) to Recorded   6  Vitamin D 2000 UNIT Oral Capsule; take 1 capsule daily; Therapy: (Recorded:22Nov2016) to Recorded    The medication list was reviewed and updated today  Allergies  1  Morphine Derivatives   2  OxyCODONE HCl CAPS   3  Codeine   4   Gabapentin CAPS    Vitals  Vital Signs    Recorded: 64VQY3755 01:06PM   Temperature 98 F   Heart Rate 72   Systolic 179   Diastolic 80   Height 6 ft 2 in   Weight 176 lb 8 0 oz   BMI Calculated 22 66   BSA Calculated 2 06   Pain Scale 2     Physical Exam    Constitutional General appearance: Well developed, well nourished, alert, in no distress, non-toxic and no overt pain behavior  Eyes   Sclera: anicteric   HEENT   Hearing grossly intact  Pulmonary   Respiratory effort: Even and unlabored  Cardiovascular   Examination of extremities: No edema or pitting edema present  Abdomen   Abdomen: Soft, non-tender, non-distended  Skin   Skin and subcutaneous tissue: Normal without rashes or lesions, well hydrated  Psychiatric   Mood and affect: Mood and affect appropriate  Neurologic Motor Tone:    Cranial nerves: Cranial nerves II-XII grossly intact  -- Slightly antalgic, but steady gait without the use of any assistive devices     Musculoskeletal       Signatures   Electronically signed by : KRISH Hebert; Oct  3 2017  7:34AM EST                       (Author)    Electronically signed by : Nolvia Hoffman DO; Oct  3 2017  7:41AM EST dietitian/nutrition services

## 2024-01-30 NOTE — BRIEF OPERATIVE NOTE - NSICDXBRIEFPROCEDURE_GEN_ALL_CORE_FT
PROCEDURES:  Cystoscopy with retrograde pyelography with insertion of right ureteral stent 30-Jan-2024 19:55:31  Roly Aguilar

## 2024-01-30 NOTE — ED ADULT NURSE NOTE - NSFALLHARMRISKINTERV_ED_ALL_ED

## 2024-01-30 NOTE — H&P ADULT - PROBLEM SELECTOR PLAN 1
- Meets Sepsis Criteria of tachycardia, tachypnea, hypotensive on admission , renal stone as source of infection  - wbc 12K neutro 89% predominant   - s/p Ix dose Ofirmev 1 G, Cefepime 2 G x1 and LR 2300 cc bolus in  ER   - will continue Cefepime, switch according to sensitivity.   - Monitor cbc with diff  - Tylenol prn for mild pain and fever  - ID Aram consulted, follow rec  - F/U blood cx  x2 and  urine cx  -  Urology consulted by the ED, Dr Rivera recommend needs repeat CT scan and per urology needs ct urogram, then will decide if needs to go to OR for uro stent placement. Meets Sepsis Criteria of tachycardia, tachypnea, leukocytosis on admission, renal stone as source of infection  - WBC 12K, neutro 89% predominant   - s/p 1x dose Ofirmev 1g, Cefepime 2g x1 and LR 2300 cc bolus in  ER   - will continue Cefepime, switch according to sensitivity.   - Tylenol prn for mild pain and fever  - DHEERAJ Chiu consulted, follow rec  - Monitor CBC w/ diff  - F/U Bcx  x2 and Ucx  - Urology (Dr. Rivera) consulted, recommend ct urogram --> will decide based on imaging results if going to OR for uro stent placement; f/u recs Meets Sepsis Criteria of tachycardia, tachypnea, leukocytosis on admission, renal stone as source of infection  - WBC 12K, neutro 89% predominant   - UA 30 protein, trace ketones, positive nitrites large LE, cloudy, small blood  - s/p 1x dose Ofirmev 1g, Cefepime 2g x1 and LR 2300 cc bolus in  ER   - will continue Cefepime, switch according to sensitivity.   - Tylenol prn for mild pain and fever  - ID Aram consulted, follow rec  - Monitor CBC w/ diff  - F/U Bcx  x2 and Ucx  - Urology (Dr. Rivera) consulted, recommend ct urogram --> will decide based on imaging results if going to OR for uro stent placement; f/u recs  - Cardio (Dr. Green) consulted for cardiac clearance for possible stent placement Meets Sepsis Criteria of tachycardia, tachypnea, leukocytosis on admission, renal stone as source of infection  - WBC 12K, neutro 89% predominant   - UA 30 protein, trace ketones, positive nitrites large LE, cloudy, small blood  - s/p 1x dose Ofirmev 1g, Cefepime 2g x1 and LR 2300 cc bolus in  ER   - will continue Cefepime, switch according to sensitivity.   - Tylenol prn for mild pain and fever  - ID Aram consulted, follow rec  - Monitor CBC w/ diff  - F/U Bcx  x2 and Ucx  - Urology (Dr. Rivera) consulted, recommend ct urogram --> will decide based on imaging results if going to OR for uro stent placement; f/u recs  - Pt will be NPO except meds for possible OR   - Cardio (Dr. Green) consulted for cardiac clearance for possible stent placement

## 2024-01-30 NOTE — ED ADULT NURSE REASSESSMENT NOTE - NS ED NURSE REASSESS COMMENT FT1
Pt found lying comfortably in bed, spouse at bedside. Reports pt w/ dementia and incontinent of urine. Also states pt has not had diaper changed since last night. RN will tend to as soon as possible. Pt just taken to CT. NAD at this time, Fluids and IV APAP running. Will use [place] cath to obtain urine when pt returns. VS stable but tachy. Will continue to monitor. Received report from outgoing RN.  It was told to us in report that the patient had a 102.4 rectal temperature.  Pt found lying comfortably in bed, spouse at bedside. Reports pt w/ dementia and incontinent of urine. Patient has IV Tylenol and LR infusing.  Also states pt has not had diaper changed since last night. RN will tend to as soon as possible. Pt just taken to CT. NAD at this time, Fluids and IV APAP running. Will use [place] cath to obtain urine when pt returns. VS stable but tachy. Will continue to monitor.

## 2024-01-30 NOTE — CONSULT NOTE ADULT - NS ATTEND AMEND GEN_ALL_CORE FT
89 yo M with PMH dementia, BPH, CAD s/p 3 stent placements in 2016, GERD, HLD, here with urosepsis. Cardiology consulted for preop clearance.    - history obtained from family, as patient is lethargic and not speaking  - no sign of acute ischemia or volume overload  - ekg shows sr/st with 1st degree av block but no worrisome findings  - echo in 2022 with normal LV function  - cont asa and statin  - did have stress test in 2021 with mild inferior reversibility, though considered likely to be infarct given normal wall motion.  - no cardiac contraindication to proceeding to OR for stent placement. Routine monitoring is recommended.   - trend creatinine and electrolyte. Keep K>4, mg>2  - will follow with you

## 2024-01-30 NOTE — H&P ADULT - PROBLEM SELECTOR PLAN 4
continue home statins Pt takes methenamine hippurate at home   - hold at this time, as pt is on cefepime for sepsis Pt takes methenamine hippurate at home   - UA 30 protein, trace ketones, positive nitrites large LE, cloudy, small blood  - hold at this time, as pt is on cefepime for sepsis

## 2024-01-30 NOTE — BRIEF OPERATIVE NOTE - NSICDXBRIEFPREOP_GEN_ALL_CORE_FT
PRE-OP DIAGNOSIS:  Right ureteral stone 30-Jan-2024 19:55:46  Roly Aguilar  Acute UTI 30-Jan-2024 19:57:27  Roly Aguilar

## 2024-01-30 NOTE — CONSULT NOTE ADULT - ASSESSMENT
89 yo male with pmhx of dementia, BPH, CAD s/p 3 stent placements in 2016, GERD, HLD,  kidney stones, UTI presents with fever, chills, coughing, and multiple episodes of weakness/confusion; unable to obtain history due to baseline status. CT demonstrates mild right hydroureteronephrosis presumed secondary to 5 mm distal intramural RIGHT UV junction calculus    Leukocytosis 12.97   UTI +nitrate  Continue IV abx cefepime  Diet: NPO  Planning for OR today for Cystoscopy with right ureteral stent placement around 6pm  Case discussed with Dr. Rivera.    89 yo male with pmhx of dementia, BPH, CAD s/p 3 stent placements in 2016, GERD, HLD,  kidney stones, UTI presents with fever, chills, coughing, and multiple episodes of weakness/confusion; unable to obtain history due to baseline status. CT demonstrates mild right hydroureteronephrosis presumed secondary to 5 mm distal intramural RIGHT UV junction calculus    Leukocytosis 12.97   UTI +nitrate  Continue IV abx cefepime  Diet: NPO  Planning for OR today for Cystoscopy with right ureteral stent placement  Case discussed with Dr. Rivera.

## 2024-01-30 NOTE — PATIENT PROFILE ADULT - FUNCTIONAL ASSESSMENT - BASIC MOBILITY 6.
2-calculated by average/Not able to assess (calculate score using [facility] averaging method)

## 2024-01-30 NOTE — H&P ADULT - PROBLEM SELECTOR PLAN 6
- Chronic  - Continue home finasteride  - Continue home tamsulosin. History of PCI 2016  - on ASA 81 at home  - cont. home medication History of PCI 2016  - on ASA 81 at home  - cont. home medication  -Dr Green consulted History of PCI 2016  - on ASA 81 at home  - cont. home medication  - Cardio (Dr Green) consulted

## 2024-01-31 LAB
ALBUMIN SERPL ELPH-MCNC: 2.4 G/DL — LOW (ref 3.3–5)
ALP SERPL-CCNC: 70 U/L — SIGNIFICANT CHANGE UP (ref 40–120)
ALT FLD-CCNC: 9 U/L — LOW (ref 12–78)
ANION GAP SERPL CALC-SCNC: 2 MMOL/L — LOW (ref 5–17)
AST SERPL-CCNC: 19 U/L — SIGNIFICANT CHANGE UP (ref 15–37)
BASOPHILS # BLD AUTO: 0 K/UL — SIGNIFICANT CHANGE UP (ref 0–0.2)
BASOPHILS NFR BLD AUTO: 0 % — SIGNIFICANT CHANGE UP (ref 0–2)
BILIRUB SERPL-MCNC: 1.5 MG/DL — HIGH (ref 0.2–1.2)
BUN SERPL-MCNC: 27 MG/DL — HIGH (ref 7–23)
CALCIUM SERPL-MCNC: 9.5 MG/DL — SIGNIFICANT CHANGE UP (ref 8.5–10.1)
CHLORIDE SERPL-SCNC: 113 MMOL/L — HIGH (ref 96–108)
CO2 SERPL-SCNC: 26 MMOL/L — SIGNIFICANT CHANGE UP (ref 22–31)
CREAT SERPL-MCNC: 1.1 MG/DL — SIGNIFICANT CHANGE UP (ref 0.5–1.3)
E COLI DNA BLD POS QL NAA+NON-PROBE: SIGNIFICANT CHANGE UP
EGFR: 62 ML/MIN/1.73M2 — SIGNIFICANT CHANGE UP
EOSINOPHIL # BLD AUTO: 0 K/UL — SIGNIFICANT CHANGE UP (ref 0–0.5)
EOSINOPHIL NFR BLD AUTO: 0 % — SIGNIFICANT CHANGE UP (ref 0–6)
GLUCOSE SERPL-MCNC: 156 MG/DL — HIGH (ref 70–99)
GRAM STN FLD: ABNORMAL
HCT VFR BLD CALC: 38.7 % — LOW (ref 39–50)
HGB BLD-MCNC: 12.5 G/DL — LOW (ref 13–17)
IMM GRANULOCYTES NFR BLD AUTO: 0.4 % — SIGNIFICANT CHANGE UP (ref 0–0.9)
LYMPHOCYTES # BLD AUTO: 0.55 K/UL — LOW (ref 1–3.3)
LYMPHOCYTES # BLD AUTO: 6.8 % — LOW (ref 13–44)
MCHC RBC-ENTMCNC: 30.9 PG — SIGNIFICANT CHANGE UP (ref 27–34)
MCHC RBC-ENTMCNC: 32.3 GM/DL — SIGNIFICANT CHANGE UP (ref 32–36)
MCV RBC AUTO: 95.6 FL — SIGNIFICANT CHANGE UP (ref 80–100)
METHOD TYPE: SIGNIFICANT CHANGE UP
MONOCYTES # BLD AUTO: 0.17 K/UL — SIGNIFICANT CHANGE UP (ref 0–0.9)
MONOCYTES NFR BLD AUTO: 2.1 % — SIGNIFICANT CHANGE UP (ref 2–14)
NEUTROPHILS # BLD AUTO: 7.32 K/UL — SIGNIFICANT CHANGE UP (ref 1.8–7.4)
NEUTROPHILS NFR BLD AUTO: 90.7 % — HIGH (ref 43–77)
NRBC # BLD: 0 /100 WBCS — SIGNIFICANT CHANGE UP (ref 0–0)
PLATELET # BLD AUTO: 96 K/UL — LOW (ref 150–400)
POTASSIUM SERPL-MCNC: 4.2 MMOL/L — SIGNIFICANT CHANGE UP (ref 3.5–5.3)
POTASSIUM SERPL-SCNC: 4.2 MMOL/L — SIGNIFICANT CHANGE UP (ref 3.5–5.3)
PROT SERPL-MCNC: 6.4 G/DL — SIGNIFICANT CHANGE UP (ref 6–8.3)
RBC # BLD: 4.05 M/UL — LOW (ref 4.2–5.8)
RBC # FLD: 14.8 % — HIGH (ref 10.3–14.5)
SODIUM SERPL-SCNC: 141 MMOL/L — SIGNIFICANT CHANGE UP (ref 135–145)
SPECIMEN SOURCE: SIGNIFICANT CHANGE UP
SPECIMEN SOURCE: SIGNIFICANT CHANGE UP
WBC # BLD: 8.07 K/UL — SIGNIFICANT CHANGE UP (ref 3.8–10.5)
WBC # FLD AUTO: 8.07 K/UL — SIGNIFICANT CHANGE UP (ref 3.8–10.5)

## 2024-01-31 PROCEDURE — 99232 SBSQ HOSP IP/OBS MODERATE 35: CPT

## 2024-01-31 PROCEDURE — 99223 1ST HOSP IP/OBS HIGH 75: CPT

## 2024-01-31 PROCEDURE — 99233 SBSQ HOSP IP/OBS HIGH 50: CPT

## 2024-01-31 RX ORDER — ERTAPENEM SODIUM 1 G/1
1000 INJECTION, POWDER, LYOPHILIZED, FOR SOLUTION INTRAMUSCULAR; INTRAVENOUS ONCE
Refills: 0 | Status: COMPLETED | OUTPATIENT
Start: 2024-01-31 | End: 2024-01-31

## 2024-01-31 RX ORDER — ERTAPENEM SODIUM 1 G/1
INJECTION, POWDER, LYOPHILIZED, FOR SOLUTION INTRAMUSCULAR; INTRAVENOUS
Refills: 0 | Status: DISCONTINUED | OUTPATIENT
Start: 2024-01-31 | End: 2024-02-01

## 2024-01-31 RX ORDER — ERTAPENEM SODIUM 1 G/1
1000 INJECTION, POWDER, LYOPHILIZED, FOR SOLUTION INTRAMUSCULAR; INTRAVENOUS EVERY 24 HOURS
Refills: 0 | Status: DISCONTINUED | OUTPATIENT
Start: 2024-02-01 | End: 2024-02-01

## 2024-01-31 RX ADMIN — MEMANTINE HYDROCHLORIDE 5 MILLIGRAM(S): 10 TABLET ORAL at 21:08

## 2024-01-31 RX ADMIN — Medication 400 MILLIGRAM(S): at 04:55

## 2024-01-31 RX ADMIN — ERTAPENEM SODIUM 120 MILLIGRAM(S): 1 INJECTION, POWDER, LYOPHILIZED, FOR SOLUTION INTRAMUSCULAR; INTRAVENOUS at 13:25

## 2024-01-31 RX ADMIN — CEFEPIME 100 MILLIGRAM(S): 1 INJECTION, POWDER, FOR SOLUTION INTRAMUSCULAR; INTRAVENOUS at 06:01

## 2024-01-31 RX ADMIN — Medication 81 MILLIGRAM(S): at 11:40

## 2024-01-31 RX ADMIN — Medication 400 MILLIGRAM(S): at 00:36

## 2024-01-31 RX ADMIN — Medication 1000 MILLIGRAM(S): at 01:00

## 2024-01-31 RX ADMIN — SENNA PLUS 2 TABLET(S): 8.6 TABLET ORAL at 21:08

## 2024-01-31 RX ADMIN — TAMSULOSIN HYDROCHLORIDE 0.4 MILLIGRAM(S): 0.4 CAPSULE ORAL at 21:08

## 2024-01-31 RX ADMIN — MEMANTINE HYDROCHLORIDE 5 MILLIGRAM(S): 10 TABLET ORAL at 06:01

## 2024-01-31 RX ADMIN — Medication 1000 MILLIGRAM(S): at 06:00

## 2024-01-31 NOTE — PROGRESS NOTE ADULT - ASSESSMENT
89 yo M with PMH dementia, BPH, CAD s/p 3 stent placements in 2016, GERD, HLD,  kidney stones, UTI presents with fever, chills, coughing, and multiple episodes of weakness/confusion; admitted for urosepsis, consulted for cardiac clearance for Uro procedure.     Cardiac Optimization, Hx CAD s/p stents  - Patient without symptoms of angina or heart failure at this time.  - No clear evidence of acute ischemia, trops negative.  No need to trend  - EK bpm, sinus tachycardia with 1st degree AV block.  No ischemic changes  - Tachycardia in setting of Urosepsis.  Rates normalized  - Stress testing 2021 negative for angina, no evidence of MI with mild degree of reversible Myocardial ischemia of a small-medium size area of the basal septal and inferior walls (RCA), however may be artifact 2/2 motion     - No meaningful evidence of volume overload.  Non-orthopneic on RA  - Previous ECHO 2022: LVEF 60%, mild TR and MR   - CAD with 3 stents.  Continue ASA  - Resume home statin    - s/p cystoscopy with retrograde pyelography with insertion of right ureteral stent.  Tolerated procedure well  - Monitor and replete lytes, keep K>4, Mg>2.  - Will continue to follow.    Marnie Keller DNP, NP-C, AGACNP-C  Cardiology   Call TEAMS

## 2024-01-31 NOTE — CONSULT NOTE ADULT - SUBJECTIVE AND OBJECTIVE BOX
[facility]  INFECTIOUS DISEASES   [address] [place] [address]  Tel: [phone]     Fax: [phone]  ========================================================  MD Ariane Tillman Kaushal, MD Cho, Michelle, MD Sunjit, Jaspal, MD  ========================================================    MRN-[id number]  IDRIS DANIEL     CC: Patient is a 89y old  Male who presents with a chief complaint of sepsis 2/2 renal stone (31 Jan 2024 08:35)    HPI:  89 yo man with PMH of dementia, BPH and kidney stones presents with fever, chills, coughing, weakness and confusion.   "Pt is unable to provide accurate history due to his medical history of dementia, son was at bedside who provided the information. Per son (who lives at home with father), the pt was brought to the [facility] ED on 1/28 for chest discomfort and uneasiness; pt was discharged after blood work and was directed to follow-up with the PCP. After discharge from ED, pt was found to have an elevated temperature at 99 the next day, with weakness, confusion, less responsive. On the day of admission, the son noted that his father started coughing with minimal sputum production and having shivers. The pt denies headache, dizziness, chest pain, shortness of breath, nausea, vomiting, abdominal pain.    CT A/P no cont: SHOWS Mild RIGHT hydroureteronephrosis presumed secondary to 5 mm distal intramural RIGHT UV junction calculus (2/143). A recently passed calculus adjacent to RIGHT UV junction is considered an alternate less likely possibility.  So was taken to OR by  and had R stent placed.  This morning his blood culture was reported as EColi.     PAST MEDICAL & SURGICAL HISTORY:  Benign prostatic hyperplasia, presence of lower urinary tract symptoms unspecified, unspecified morphology  Hyperlipidemia  GERD (gastroesophageal reflux disease)  CAD (coronary artery disease)  UTI (urinary tract infection)  Calculus of kidney  Calculus of ureter  Dementia  Kidney stones  H/O heart artery stent  stent x 2  History of prostate surgery  S/P ureteral stent placement  Left sided  Stented coronary artery    Social Hx: No current smoking, EtOH or drugs     FAMILY HISTORY:  FH: hypertension (Child)    Allergies  No Known Allergies    MEDICATIONS  (STANDING):  aspirin enteric coated 81 milliGRAM(s) Oral daily  cefepime   IVPB 2000 milliGRAM(s) IV Intermittent every 12 hours  memantine 5 milliGRAM(s) Oral two times a day  senna 2 Tablet(s) Oral at bedtime  sodium chloride 0.9%. 1000 milliLiter(s) (75 mL/Hr) IV Continuous <Continuous>  tamsulosin 0.4 milliGRAM(s) Oral at bedtime    MEDICATIONS  (PRN):  polyethylene glycol 3350 17 Gram(s) Oral daily PRN Constipation     REVIEW OF SYSTEMS:  CONSTITUTIONAL:  No Fever or chills  HEENT:  No diplopia or blurred vision.  No sore throat or runny nose.  CARDIOVASCULAR:  No chest pain   RESPIRATORY:  No cough, shortness of breath, PND or orthopnea.  GASTROINTESTINAL:  No nausea, vomiting or diarrhea.  GENITOURINARY:  No dysuria, frequency or urgency. No Blood in urine  MUSCULOSKELETAL:  no joint aches, no muscle pain  SKIN:  No change in skin, hair or nails.    Physical Exam:  Vital Signs Last 24 Hrs  T(C): 36.3 (31 Jan 2024 05:38), Max: 37.1 (30 Jan 2024 21:44)  T(F): 97.4 (31 Jan 2024 05:38), Max: 98.8 (30 Jan 2024 21:44)  HR: 70 (31 Jan 2024 05:38) (59 - 83)  BP: 123/60 (31 Jan 2024 05:38) (107/51 - 158/63)  BP(mean): --  RR: 20 (31 Jan 2024 05:38) (14 - 20)  SpO2: 95% (31 Jan 2024 05:38) (95% - 100%)  Parameters below as of 31 Jan 2024 05:38  Patient On (Oxygen Delivery Method): room air  GEN: NAD  HEENT: normocephalic and atraumatic. EOMI. PERRL.    NECK: Supple.  No lymphadenopathy   LUNGS: occasional rhonchi   HEART: Regular rate and rhythm   ABDOMEN: Soft, nontender, and nondistended.  Positive bowel sounds.    : No CVA tenderness  EXTREMITIES: Without edema.  NEUROLOGIC: grossly intact.  PSYCHIATRIC: Appropriate affect .  SKIN: No rash     Labs:  01-31    141  |  113<H>  |  27<H>  ----------------------------<  156<H>  4.2   |  26  |  1.10    Ca    9.5      31 Jan 2024 06:35    TPro  6.4  /  Alb  2.4<L>  /  TBili  1.5<H>  /  DBili  0.4<H>  /  AST  19  /  ALT  9<L>  /  AlkPhos  70  01-31                        12.5   8.07  )-----------( x        ( 31 Jan 2024 06:35 )             38.7     PT/INR - ( 30 Jan 2024 06:55 )   PT: 14.4 sec;   INR: 1.24 ratio    PTT - ( 30 Jan 2024 06:55 )  PTT:29.6 sec  Urinalysis Basic - ( 31 Jan 2024 06:35 )    Color: x / Appearance: x / SG: x / pH: x  Gluc: 156 mg/dL / Ketone: x  / Bili: x / Urobili: x   Blood: x / Protein: x / Nitrite: x   Leuk Esterase: x / RBC: x / WBC x   Sq Epi: x / Non Sq Epi: x / Bacteria: x    LIVER FUNCTIONS - ( 31 Jan 2024 06:35 )  Alb: 2.4 g/dL / Pro: 6.4 g/dL / ALK PHOS: 70 U/L / ALT: 9 U/L / AST: 19 U/L / GGT: x           SARS-CoV-2: NotDetec (01-30-24 @ 06:55)  SARS-CoV-2 Result: NotDetec (01-28-24 @ 20:50)    RECENT CULTURES:  01-30 @ 06:55 .Blood Blood-Peripheral Blood Culture PCR    Growth in aerobic bottle: Gram Negative Rods  Growth in anaerobic bottle: Gram Negative Rods  Direct identification is available within approximately 3-5  hours either by Blood Panel Multiplexed PCR or Direct  MALDI-TOF. Details: [email or web address]    Growth in aerobic bottle: Gram Negative Rods  Growth in anaerobic bottle: Gram Negative Rods    NotDetec  01-30 @ 06:50 .Blood Blood-Peripheral     Growth in aerobic bottle: Gram Negative Rods    Growth in aerobic bottle: Gram Negative Rods    All imaging and other data have been reviewed.  < from: CT Abdomen and Pelvis Urogram w/wo IV Cont (01.30.24 @ 11:46) >  IMPRESSION:  Interval placement of a Nj catheter with decompression of the bladder.   Right mild hydronephrosis has resolved and the right ureter is collapsed,   suggesting prior hydronephrosis was related to bladder distention. The   calcification is again seen along the right posterior lateral aspect of   the bladder, but is favored to be a recently passed bladder calculus or   wall calcification.The excretory phase shows symmetric contrast   throughout the ureters suggesting no obstruction.    Assessment and Plan:   89 yo man with PMH of dementia, BPH and kidney stones presents with fever, chills, weakness and confusion.     CT A/P no cont: SHOWS Mild RIGHT hydroureteronephrosis presumed secondary to 5 mm distal intramural RIGHT UV junction calculus (2/143). A recently passed calculus adjacent to RIGHT UV junction is considered an alternate less likely possibility.  So was taken to OR by  and had R stent placed.  Blood cultures with EColi  UA with a very high WBC    # Blacteremia  # UTI  # Renal stone s/p stent     - Will follow blood cultures for sensitivity of Ecoli  - Will continue cefepime for now   - Based on cultures results will modify ABx regimen  - Monitor WBC ~13-->8  - urology follow up     Thank you for courtesy of this consult.     Will follow.    Tyler Chiu MD  Division of Infectious Diseases   Please call ID service at [phone] with any question.    75 minutes spent on total encounter assessing patient, examination, chart review, counseling and coordinating care by the attending physician/nurse/care manager.   [facility]  INFECTIOUS DISEASES   [address] [place] [address]  Tel: [phone]     Fax: [phone]  ========================================================  MD Ariane Tillman Kaushal, MD Cho, Michelle, MD Sunjit, Jaspal, MD  ========================================================    MRN-[id number]  IDRIS DANIEL     CC: Patient is a 89y old  Male who presents with a chief complaint of sepsis 2/2 renal stone (31 Jan 2024 08:35)    HPI:  89 yo man with PMH of dementia, BPH and kidney stones presents with fever, chills, coughing, weakness and confusion.   "Pt is unable to provide accurate history due to his medical history of dementia, son was at bedside who provided the information. Per son (who lives at home with father), the pt was brought to the [facility] ED on 1/28 for chest discomfort and uneasiness; pt was discharged after blood work and was directed to follow-up with the PCP. After discharge from ED, pt was found to have an elevated temperature at 99 the next day, with weakness, confusion, less responsive. On the day of admission, the son noted that his father started coughing with minimal sputum production and having shivers. The pt denies headache, dizziness, chest pain, shortness of breath, nausea, vomiting, abdominal pain.    CT A/P no cont: SHOWS Mild RIGHT hydroureteronephrosis presumed secondary to 5 mm distal intramural RIGHT UV junction calculus (2/143). A recently passed calculus adjacent to RIGHT UV junction is considered an alternate less likely possibility.  So was taken to OR by  and had R stent placed.  This morning his blood culture was reported as EColi.     PAST MEDICAL & SURGICAL HISTORY:  Benign prostatic hyperplasia, presence of lower urinary tract symptoms unspecified, unspecified morphology  Hyperlipidemia  GERD (gastroesophageal reflux disease)  CAD (coronary artery disease)  UTI (urinary tract infection)  Calculus of kidney  Calculus of ureter  Dementia  Kidney stones  H/O heart artery stent  stent x 2  History of prostate surgery  S/P ureteral stent placement  Left sided  Stented coronary artery    Social Hx: No current smoking, EtOH or drugs     FAMILY HISTORY:  FH: hypertension (Child)    Allergies  No Known Allergies    MEDICATIONS  (STANDING):  aspirin enteric coated 81 milliGRAM(s) Oral daily  cefepime   IVPB 2000 milliGRAM(s) IV Intermittent every 12 hours  memantine 5 milliGRAM(s) Oral two times a day  senna 2 Tablet(s) Oral at bedtime  sodium chloride 0.9%. 1000 milliLiter(s) (75 mL/Hr) IV Continuous <Continuous>  tamsulosin 0.4 milliGRAM(s) Oral at bedtime    MEDICATIONS  (PRN):  polyethylene glycol 3350 17 Gram(s) Oral daily PRN Constipation     REVIEW OF SYSTEMS:  CONSTITUTIONAL:  No Fever or chills  HEENT:  No diplopia or blurred vision.  No sore throat or runny nose.  CARDIOVASCULAR:  No chest pain   RESPIRATORY:  No cough, shortness of breath, PND or orthopnea.  GASTROINTESTINAL:  No nausea, vomiting or diarrhea.  GENITOURINARY:  No dysuria, frequency or urgency. No Blood in urine  MUSCULOSKELETAL:  no joint aches, no muscle pain  SKIN:  No change in skin, hair or nails.    Physical Exam:  Vital Signs Last 24 Hrs  T(C): 36.3 (31 Jan 2024 05:38), Max: 37.1 (30 Jan 2024 21:44)  T(F): 97.4 (31 Jan 2024 05:38), Max: 98.8 (30 Jan 2024 21:44)  HR: 70 (31 Jan 2024 05:38) (59 - 83)  BP: 123/60 (31 Jan 2024 05:38) (107/51 - 158/63)  BP(mean): --  RR: 20 (31 Jan 2024 05:38) (14 - 20)  SpO2: 95% (31 Jan 2024 05:38) (95% - 100%)  Parameters below as of 31 Jan 2024 05:38  Patient On (Oxygen Delivery Method): room air  GEN: NAD  HEENT: normocephalic and atraumatic. EOMI. PERRL.    NECK: Supple.  No lymphadenopathy   LUNGS: occasional rhonchi   HEART: Regular rate and rhythm   ABDOMEN: Soft, nontender, and nondistended.  Positive bowel sounds.    : No CVA tenderness  EXTREMITIES: Without edema.  NEUROLOGIC: grossly intact.  PSYCHIATRIC: Appropriate affect .  SKIN: No rash     Labs:  01-31    141  |  113<H>  |  27<H>  ----------------------------<  156<H>  4.2   |  26  |  1.10    Ca    9.5      31 Jan 2024 06:35    TPro  6.4  /  Alb  2.4<L>  /  TBili  1.5<H>  /  DBili  0.4<H>  /  AST  19  /  ALT  9<L>  /  AlkPhos  70  01-31                        12.5   8.07  )-----------( x        ( 31 Jan 2024 06:35 )             38.7     PT/INR - ( 30 Jan 2024 06:55 )   PT: 14.4 sec;   INR: 1.24 ratio    PTT - ( 30 Jan 2024 06:55 )  PTT:29.6 sec  Urinalysis Basic - ( 31 Jan 2024 06:35 )    Color: x / Appearance: x / SG: x / pH: x  Gluc: 156 mg/dL / Ketone: x  / Bili: x / Urobili: x   Blood: x / Protein: x / Nitrite: x   Leuk Esterase: x / RBC: x / WBC x   Sq Epi: x / Non Sq Epi: x / Bacteria: x    LIVER FUNCTIONS - ( 31 Jan 2024 06:35 )  Alb: 2.4 g/dL / Pro: 6.4 g/dL / ALK PHOS: 70 U/L / ALT: 9 U/L / AST: 19 U/L / GGT: x           SARS-CoV-2: NotDetec (01-30-24 @ 06:55)  SARS-CoV-2 Result: NotDetec (01-28-24 @ 20:50)    RECENT CULTURES:  01-30 @ 06:55 .Blood Blood-Peripheral Blood Culture PCR    Growth in aerobic bottle: Gram Negative Rods  Growth in anaerobic bottle: Gram Negative Rods  Direct identification is available within approximately 3-5  hours either by Blood Panel Multiplexed PCR or Direct  MALDI-TOF. Details: [email or web address]    Growth in aerobic bottle: Gram Negative Rods  Growth in anaerobic bottle: Gram Negative Rods    NotDetec  01-30 @ 06:50 .Blood Blood-Peripheral     Growth in aerobic bottle: Gram Negative Rods    Growth in aerobic bottle: Gram Negative Rods    All imaging and other data have been reviewed.  < from: CT Abdomen and Pelvis Urogram w/wo IV Cont (01.30.24 @ 11:46) >  IMPRESSION:  Interval placement of a Nj catheter with decompression of the bladder.   Right mild hydronephrosis has resolved and the right ureter is collapsed,   suggesting prior hydronephrosis was related to bladder distention. The   calcification is again seen along the right posterior lateral aspect of   the bladder, but is favored to be a recently passed bladder calculus or   wall calcification.The excretory phase shows symmetric contrast   throughout the ureters suggesting no obstruction.    Assessment and Plan:   89 yo man with PMH of dementia, BPH and kidney stones presents with fever, chills, weakness and confusion.     CT A/P no cont: SHOWS Mild RIGHT hydroureteronephrosis presumed secondary to 5 mm distal intramural RIGHT UV junction calculus (2/143). A recently passed calculus adjacent to RIGHT UV junction is considered an alternate less likely possibility.  So was taken to OR by  and had R stent placed.  Blood cultures with EColi  UA with a very high WBC  Had ESBL ecoli in the past     # Blacteremia  # UTI  # Renal stone s/p stent     - Will follow blood cultures for sensitivity of Ecoli  - Will stop cefepime  - Start Ertapenem 1gm daily due to ESBL history   - Based on cultures results will modify ABx regimen  - Monitor WBC ~13-->8  - Urology follow up     Thank you for courtesy of this consult.     Will follow.    Tyler Chiu MD  Division of Infectious Diseases   Please call ID service at [phone] with any question.    75 minutes spent on total encounter assessing patient, examination, chart review, counseling and coordinating care by the attending physician/nurse/care manager.

## 2024-01-31 NOTE — PROGRESS NOTE ADULT - ASSESSMENT
A/P: 89yMale s/p cysto w stent  May resume regular diet  IVF/ABX  Pain/nausea control  DVT ppx  urine cx pending

## 2024-01-31 NOTE — PROGRESS NOTE ADULT - ASSESSMENT
89 yo M with PMH dementia, BPH, CAD s/p 3 stent placements in 2016, GERD, HLD,  kidney stones, UTI presents with fever, chills, coughing, and multiple episodes of weakness/confusion. CT A/P no cont showing mild RIGHT hydroureteronephrosis presumed secondary to 5 mm distal intramural RIGHT UV junction calculus; a recently passed calculus adjacent to RIGHT UV junction is considered an alternate less likely possibility. Patient meet sepsis criteria, needs repeat CT scan and per urology needs ct urogram, then will decide if needs to go to OR for uro stent placement. Admitted for sepsis 2/2 renal stone

## 2024-01-31 NOTE — CARE COORDINATION ASSESSMENT. - NSCAREPROVIDERS_GEN_ALL_CORE_FT
CARE PROVIDERS:  Accepting Physician: Alex Thakkar  Administration: Brian Jaramillo  Administration: Benjamin Leija  Admitting: Alex Thakkar  Attending: Ap Scott  Consultant: Pablo Kee  Consultant: Akin Green  Consultant: Billy Ellis  Consultant: Marnie Keller  Consultant: Shasta Lehman  Consultant: Roly Aguilar  Consultant: Khan, Fahrina  Consultant: Tyler Chiu  Consultant: Luis E Maldonado  Consultant: Nani Torres  ED Attending: Fercho Escalona  ED Nurse: Luis E Rdz  ED Nurse 2: Esperanza Oseguera  Nurse: Brenda Medina  Nurse: Tomy Shields  Nurse: Candy Li  Ordered: ServiceAccount, SCMMLM  Ordered: Doctor, Unknown  Override: Luis E Davis  Override: Brenda Medina  Override: Tomy Shields  PCA/Nursing Assistant: Ortega Medina  Physical Therapy: Reginald Caro  Primary Team: Anna Forbes  Primary Team: Akin Green  Primary Team: Ap Scott  Primary Team: Pilo Fuller  Primary Team: Molly Mccormick  Primary Team: Alex Thakkar  Primary Team: Adwoa Garcia  Primary Team: Christina Blanca  Registered Dietitian: Rose Nance  Team: PLEVER  Hospitalists, Team  UR// Supp. Assoc.: Liz Bills

## 2024-01-31 NOTE — PROGRESS NOTE ADULT - SUBJECTIVE AND OBJECTIVE BOX
Patient is a 89y old  Male who presents with a chief complaint of sepsis 2/2 renal stone (31 Jan 2024 07:34)      INTERVAL HPI/OVERNIGHT EVENTS: Patient seen and examined at bedside. Awake, alert, confused     MEDICATIONS  (STANDING):  aspirin enteric coated 81 milliGRAM(s) Oral daily  cefepime   IVPB 2000 milliGRAM(s) IV Intermittent every 12 hours  memantine 5 milliGRAM(s) Oral two times a day  senna 2 Tablet(s) Oral at bedtime  sodium chloride 0.9%. 1000 milliLiter(s) (75 mL/Hr) IV Continuous <Continuous>  tamsulosin 0.4 milliGRAM(s) Oral at bedtime    MEDICATIONS  (PRN):  polyethylene glycol 3350 17 Gram(s) Oral daily PRN Constipation      Allergies    No Known Allergies    Intolerances        REVIEW OF SYSTEMS:  Unable to obtain, patient is confused   Vital Signs Last 24 Hrs  T(C): 36.3 (31 Jan 2024 05:38), Max: 37.1 (30 Jan 2024 21:44)  T(F): 97.4 (31 Jan 2024 05:38), Max: 98.8 (30 Jan 2024 21:44)  HR: 70 (31 Jan 2024 05:38) (59 - 83)  BP: 123/60 (31 Jan 2024 05:38) (107/51 - 158/63)  BP(mean): --  RR: 20 (31 Jan 2024 05:38) (14 - 20)  SpO2: 95% (31 Jan 2024 05:38) (95% - 100%)    Parameters below as of 31 Jan 2024 05:38  Patient On (Oxygen Delivery Method): room air        PHYSICAL EXAM:  GENERAL: NAD  HEAD:  Atraumatic, Normocephalic  EYES: EOMI, PERRLA, conjunctiva and sclera clear  ENMT: No tonsillar erythema, exudates, or enlargement; Moist mucous membranes  NECK: Supple, No JVD, Normal thyroid  NERVOUS SYSTEM:  Confused, alert, moving all extremities   CHEST/LUNG: Clear to auscultation bilaterally; No rales, rhonchi, wheezing, or rubs  HEART: S1S2+, Regular rate and rhythm  ABDOMEN: Soft, Nontender, Nondistended; Bowel sounds present  EXTREMITIES:  2+ Peripheral Pulses, No clubbing, cyanosis  LYMPH: No lymphadenopathy noted  SKIN: No rashes or lesions    LABS:                        12.5   8.07  )-----------( x        ( 31 Jan 2024 06:35 )             38.7       Ca    9.7        30 Jan 2024 06:55      PT/INR - ( 30 Jan 2024 06:55 )   PT: 14.4 sec;   INR: 1.24 ratio         PTT - ( 30 Jan 2024 06:55 )  PTT:29.6 sec  CAPILLARY BLOOD GLUCOSE        BLOOD CULTURE  01-30 @ 06:55   Growth in aerobic bottle: Gram Negative Rods  Direct identification is available within approximately 3-5  hours either by Blood Panel Multiplexed PCR or Direct  MALDI-TOF. Details: [email or web address]  --  --    RADIOLOGY & ADDITIONAL TESTS:    Imaging Personally Reviewed:  [ ] YES     Consultant(s) Notes Reviewed:      Care Discussed with Consultants/Other Providers:

## 2024-01-31 NOTE — CARE COORDINATION ASSESSMENT. - LIVING ARRANGEMENTS, PROFILE
[facility] ED  eMERGENCYdEPARTMENT eNCOUnter      Pt Name: Katiana Muniz  MRN: [id number]  Abiodungfzion 1953  Date of evaluation: 1/29/2023  Provider:Charles Dallas Goodell, PA-C    CHIEF COMPLAINT           HPI  Katiana Muniz is a 79 y.o. male per chart review has a h/o opioid abuse, chronic back pain and knee pain presenting with back pain and knee pain. Patient reports gradual onset, worsening, intermittent sharp nonradiating low back pain and left knee pain ongoing for months but acutely worse tonight. He states he has been seen here and got an MRI and x-ray imaging and has follow-up with orthopedic already scheduled however the pain was out of control tonight. He denies any new symptoms including bowel or bladder incontinence, saddle anesthesia, IV drug use, fever, chills. ROS  Review of Systems   Constitutional:  Negative for chills, fatigue and fever. HENT:  Negative for ear pain, hearing loss and sore throat. Eyes:  Negative for pain and visual disturbance. Respiratory:  Negative for chest tightness and shortness of breath. Cardiovascular:  Negative for chest pain. Gastrointestinal:  Negative for diarrhea and nausea. Endocrine: Negative for cold intolerance. Genitourinary:  Negative for hematuria. Musculoskeletal:  Positive for back pain. Left knee pain   Skin:  Negative for rash and wound. Neurological:  Negative for dizziness and headaches. Psychiatric/Behavioral:  Negative for behavioral problems and confusion. Except as noted above the remainder of the review of systems was reviewed and negative.        PAST MEDICAL HISTORY     Past Medical History:   Diagnosis Date    Allergic rhinitis 2/19/2018    Anxiety     Chronic back pain     COPD (chronic obstructive pulmonary disease) ([address]     Depression     Disorder of stomach     valve not closing properly    Emphysema lung ([facility]     Essential hypertension 11/4/2019    Gastroesophageal reflux disease 10/4/2019    Hyperlipidemia     meds > 22 yrs    Low back pain 5/1/2018    ARNAUD (obstructive sleep apnea) 2/19/2018    Other emphysema ([facility] 10/4/2019    Other intervertebral disc degeneration, lumbar region 3/23/2018    Radiculopathy, lumbar region 2/28/2018    Restless leg syndrome     Seasonal affective disorder ([facility] [facility] 10/4/2019    Substance abuse ([facility] [address]     alcholic, 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          SURGICAL HISTORY       Past Surgical History:   Procedure Laterality Date    COLONOSCOPY  12/22/2016    DALIA LAST MD    DENTAL SURGERY  10 yrs ago    ENDOSCOPY, COLON, DIAGNOSTIC      EYE SURGERY      Lasik OU    FINGER TRIGGER RELEASE Left 11/1/2022    LEFT HAND TRIGGER FINGER RING FINGER RELEASE OF A1 PANFILO performed by Johnita Boast, MD at [place] [address] ARTHROSCOPY Right     KNEE SURGERY Right 05/2016    Ray procedure    KNEE SURGERY      ID NASAL/SINUS ENDOSCOPY W/MAXILLARY ANTROSTOMY N/A 2/22/2018    SEPTOPLASTY MICRODEBRIDER ASSISTED TURBINOPLASTY AND OUT-FRACTURING BILATERAL NASAL ENDOSCOPY performed by Sangeetha Coyle MD at [address] [place] [address]       Previous Medications    ACETAMINOPHEN (TYLENOL) 500 MG TABLET    Take 2 tablets by mouth every 6 hours as needed for Pain or Fever    ALBUTEROL SULFATE HFA (VENTOLIN HFA) 108 (90 BASE) MCG/ACT INHALER    Inhale 2 puffs into the lungs 4 times daily as needed for Wheezing    ALBUTEROL SULFATE HFA (VENTOLIN HFA) 108 (90 BASE) MCG/ACT INHALER    Inhale 2 puffs into the lungs 4 times daily as needed for Wheezing    BUDESONIDE-FORMOTEROL (SYMBICORT) 160-4.5 MCG/ACT AERO    Inhale 2 puffs into the lungs 2 times daily    FLUOXETINE (PROZAC) 40 MG CAPSULE    TAKE 1 CAPSULE BY MOUTH DAILY.     GABAPENTIN (NEURONTIN) 400 MG CAPSULE    take 1 capsule by mouth three times a day    LIDOCAINE 4 % EXTERNAL PATCH    Place 1 patch onto the skin daily    MELOXICAM (MOBIC) 15 MG TABLET    Take 1 tablet by mouth daily as needed for Pain    ROSUVASTATIN (CRESTOR) 40 MG TABLET    TAKE 1 TABLET BY MOUTH DAILY AT BEDTIME. ALLERGIES     Alcohol, Benadryl [diphenhydramine], Demerol hcl [meperidine], and Sulfa antibiotics    FAMILY HISTORY       Family History   Problem Relation Age of Onset    Cancer Mother         stomach    Arthritis Mother     Heart Disease Father 48    Cancer Father         bone    Cancer Maternal Grandmother         lung    Cancer Paternal Grandmother     Heart Disease Paternal Grandfather     No Known Problems Sister     Cancer Brother     Heart Disease Brother         hole in heart in 65 at 1 months age          SOCIAL HISTORY       Social History     Socioeconomic History    Marital status:      Spouse name: None    Number of children: 2    Years of education: 12    Highest education level: High school graduate   Tobacco Use    Smoking status: Every Day     Packs/day: 2.00     Years: 52.00     Pack years: 104.00     Types: Cigars, Cigarettes     Start date: 1970    Smokeless tobacco: Never    Tobacco comments:     3-5 cigars qd   Vaping Use    Vaping Use: Never used   Substance and Sexual Activity    Alcohol use: No     Comment: sober > 25 years    Drug use: No    Sexual activity: Not Currently     Partners: Female     Social Determinants of Health     Physical Activity: Inactive    Days of Exercise per Week: 0 days    Minutes of Exercise per Session: 0 min         PHYSICAL EXAM       ED Triage Vitals [01/29/23 0413]   BP Temp Temp Source Heart Rate Resp SpO2 Height Weight   118/85 97.8 °F (36.6 °C) Oral 97 18 99 % 5' 9\" (1.753 m) 140 lb (63.5 kg)       Physical Exam  Constitutional:       Appearance: Normal appearance. HENT:      Head: Normocephalic and atraumatic. Nose: Nose normal.      Mouth/Throat:      Mouth: Mucous membranes are moist.      Pharynx: No oropharyngeal exudate or posterior oropharyngeal erythema. Eyes:      Extraocular Movements: Extraocular movements intact.       Conjunctiva/sclera: Conjunctivae normal. Pupils: Pupils are equal, round, and reactive to light. Cardiovascular:      Rate and Rhythm: Normal rate and regular rhythm. Heart sounds: Normal heart sounds. Pulmonary:      Effort: Pulmonary effort is normal.      Breath sounds: Normal breath sounds. No wheezing or rhonchi. Abdominal:      General: Bowel sounds are normal.      Palpations: Abdomen is soft. Tenderness: There is no abdominal tenderness. There is no guarding. Musculoskeletal:         General: No deformity. Normal range of motion. Cervical back: Normal range of motion and neck supple. Back:         Legs:    Skin:     General: Skin is warm and dry. Coloration: Skin is not jaundiced. Neurological:      General: No focal deficit present. Mental Status: He is alert and oriented to person, place, and time. Psychiatric:         Mood and Affect: Mood normal.         Behavior: Behavior normal.         MDM  Is a 9year-old male presenting with low back pain and knee pain. Patient is afebrile and stable. Patient given IM Toradol, IM Norflex, IM steroids. Patient reevaluated pain is much improved. He is agreeable to discharge continuing to use his current medications and will follow up with orthopedics as planned. She will return if symptoms change or worsen. FINAL IMPRESSION      1. Acute exacerbation of chronic low back pain    2.  Chronic pain of left knee          DISPOSITION/PLAN   DISPOSITION Discharge - Pending Orders Complete 01/29/2023 04:39:19 AM        DISCHARGE MEDICATIONS:  [unfilled]         Jt Grayson PA-C(electronically signed)  Attending Emergency Physician           Jt Grayson PA-C  01/29/23 [id number] house

## 2024-01-31 NOTE — PROGRESS NOTE ADULT - SUBJECTIVE AND OBJECTIVE BOX
[facility] -- Rhonda Good, Joselo Jimenez Savella, , Karen Willett  Office # [phone]    Follow Up:  Cardiac Optimization    Subjective/Observations: Asleep but arousable to name calling.  Mildly confused.  Non-orthopneic on RA.  Denies dysuria.  Denies any form of respiratory or cardiac discomfort    REVIEW OF SYSTEMS: All other review of systems is negative unless indicated above  PAST MEDICAL & SURGICAL HISTORY:  Benign prostatic hyperplasia, presence of lower urinary tract symptoms unspecified, unspecified morphology  Hyperlipidemia  GERD (gastroesophageal reflux disease)  CAD (coronary artery disease)  UTI (urinary tract infection)  Calculus of kidney  Calculus of ureter  Dementia  Kidney stones  H/O heart artery stent  stent x 2  History of prostate surgery  S/P ureteral stent placement  Left sided  Stented coronary artery    MEDICATIONS  (STANDING):  aspirin enteric coated 81 milliGRAM(s) Oral daily  ertapenem  IVPB 1000 milliGRAM(s) IV Intermittent once  ertapenem  IVPB      memantine 5 milliGRAM(s) Oral two times a day  senna 2 Tablet(s) Oral at bedtime  sodium chloride 0.9%. 1000 milliLiter(s) (75 mL/Hr) IV Continuous <Continuous>  tamsulosin 0.4 milliGRAM(s) Oral at bedtime    MEDICATIONS  (PRN):  polyethylene glycol 3350 17 Gram(s) Oral daily PRN Constipation    Allergies    No Known Allergies    Intolerances    Vital Signs Last 24 Hrs  T(C): 36.3 (31 Jan 2024 05:38), Max: 37.1 (30 Jan 2024 21:44)  T(F): 97.4 (31 Jan 2024 05:38), Max: 98.8 (30 Jan 2024 21:44)  HR: 70 (31 Jan 2024 05:38) (59 - 83)  BP: 123/60 (31 Jan 2024 05:38) (107/51 - 158/63)  BP(mean): --  RR: 20 (31 Jan 2024 05:38) (14 - 20)  SpO2: 95% (31 Jan 2024 05:38) (95% - 100%)    Parameters below as of 31 Jan 2024 05:38  Patient On (Oxygen Delivery Method): room air    I&O's Summary    30 Jan 2024 07:01  -  31 Jan 2024 07:00  --------------------------------------------------------  IN: 80 mL / OUT: 0 mL / NET: 80 mL    31 Jan 2024 07:01  -  31 Jan 2024 11:17  --------------------------------------------------------  IN: 0 mL / OUT: 100 mL / NET: -100 mL     PHYSICAL EXAM:  TELE: Not on tele  Constitutional: NAD, asleep but arousable  HEENT: Moist Mucous Membranes, Anicteric  Pulmonary: Non-labored, breath sounds are clear bilaterally, No wheezing, rales or rhonchi  Cardiovascular: Regular, S1 and S2, No murmurs, rubs, gallops or clicks  Gastrointestinal: Bowel Sounds present, soft, nontender.   Lymph: No peripheral edema. No lymphadenopathy.  Skin: No visible rashes or ulcers.  Psych:  Mood & affect: Mildly confused  LABS: All Labs Reviewed:                        12.5   8.07  )-----------( 96       ( 31 Jan 2024 06:35 )             38.7                         13.0   12.97 )-----------( 110      ( 30 Jan 2024 06:55 )             40.0                         13.6   11.81 )-----------( 135      ( 28 Jan 2024 20:50 )             41.4     31 Jan 2024 06:35    141    |  113    |  27     ----------------------------<  156    4.2     |  26     |  1.10   30 Jan 2024 06:55    141    |  112    |  25     ----------------------------<  115    3.9     |  23     |  1.30   28 Jan 2024 20:50    138    |  110    |  15     ----------------------------<  113    3.9     |  23     |  1.10     Ca    9.5        31 Jan 2024 06:35  Ca    9.7        30 Jan 2024 06:55  Ca    10.0       28 Jan 2024 20:50  Mg     1.9       28 Jan 2024 20:50    TPro  6.4    /  Alb  2.4    /  TBili  1.5    /  DBili  0.4    /  AST  19     /  ALT  9      /  AlkPhos  70     31 Jan 2024 06:35  TPro  6.5    /  Alb  2.7    /  TBili  2.7    /  DBili  x      /  AST  21     /  ALT  8      /  AlkPhos  80     30 Jan 2024 06:55  TPro  6.9    /  Alb  3.0    /  TBili  0.9    /  DBili  x      /  AST  18     /  ALT  9      /  AlkPhos  98     28 Jan 2024 20:50    PT/INR - ( 30 Jan 2024 06:55 )   PT: 14.4 sec;   INR: 1.24 ratio         PTT - ( 30 Jan 2024 06:55 )  PTT:29.6 sec    12 Lead ECG:   Ventricular Rate 102 BPM    Atrial Rate 102 BPM    P-R Interval 296 ms    QRS Duration 82 ms    Q-T Interval 306 ms    QTC Calculation(Bazett) 398 ms    R Axis 26 degrees    T Axis 7 degrees    Diagnosis Line Sinus tachycardia with 1st degree AV block  Confirmed by davey Green ([id number]) on 1/30/2024 1:56:28 PM (01-30-24 @ 07:51)  ACC: [id number] EXAM:  ECHO TTE WO CON COMP W DOPP                          PROCEDURE DATE:  11/28/2022      INTERPRETATION:  INDICATION: Abnormal EKG  Sonographer AS    Blood Pressure 117/77    Height 170 cm     Weight 73 kg       BSA 1.84 sq   m    Dimensions:  LA 3.1       Normal Values: 2.0 - 4.0 cm  Ao 3.4        Normal Values: 2.0 - 3.8 cm  SEPTUM 1.0       Normal Values: 0.6 - 1.2 cm  PWT 0.9       Normal Values: 0.6 - 1.1 cm  LVIDd 3.1         Normal Values: 3.0 - 5.6 cm  LVIDs 2.2     Normal Values: 1.8 - 4.0 cm    OBSERVATIONS:  Mitral Valve: Mild MR.  Aortic Valve/Aorta: Calcified trileaflet aortic valve with grossly normal   opening.  Tricuspid Valve: Mild TR.  Pulmonic Valve: Trace PI  Left Atrium: normal  Right Atrium:Not well-visualized  Left Ventricle: normal LV size and systolic function, estimated LVEF of   60%.  Right Ventricle: Grossly normal size and systolic function.  Pericardium: no significant pericardial effusion.  Pulmonary/RV Pressure: estimated PA systolic pressure of at least 19 mmHg   assuming an RA pressure of 3 mmHg.    IMPRESSION:  Normal left ventricular internal dimensions and systolic function,   estimated LVEF of 60%.  Grossly normal RV size and systolic function.  Calcified trileaflet aortic valve with grossly normal opening, without AI.  Mild MR and TR.  No significant pericardial effusion.    --- End of Report ---    ORLANDO MATTHEW MD; Attending Cardiologist  This document has been electronically signed. Nov 29 2022  4:46PM

## 2024-01-31 NOTE — CARE COORDINATION ASSESSMENT. - NSTRANSPORTNEEDS_GEN_ALL_CORE
Private Transportation
[facility]  Cardiology  [address] [place] [address], [address]  [place] [address]  Phone: [phone]  Fax:   Follow Up Time: Routine

## 2024-01-31 NOTE — PROGRESS NOTE ADULT - SUBJECTIVE AND OBJECTIVE BOX
s/p cysto w pyelography and stent placement   Dr. Miguel Aguilar    S: Pt has no complaints. Resting comfortably in bed.    O:  T(C): 37.1 (01-30-24 @ 21:44), Max: 37.1 (01-30-24 @ 21:44)  T(F): 98.8 (01-30-24 @ 21:44), Max: 98.8 (01-30-24 @ 21:44)  HR: 59 (01-30-24 @ 21:44) (59 - 59)  BP: 131/73 (01-30-24 @ 21:44) (131/73 - 131/73)  RR: 20 (01-30-24 @ 21:44) (20 - 20)  SpO2: 97% (01-30-24 @ 21:44) (97% - 97%)  Wt(kg): --                        13.0   12.97 )-----------( 110      ( 30 Jan 2024 06:55 )             40.0     01-30    141  |  112<H>  |  25<H>  ----------------------------<  115<H>  3.9   |  23  |  1.30    Ca    9.7      30 Jan 2024 06:55    TPro  6.5  /  Alb  2.7<L>  /  TBili  2.7<H>  /  DBili  x   /  AST  21  /  ALT  8<L>  /  AlkPhos  80  01-30      Gen: NAD, resting comfortably in bed  C/V: NSR  Pulm: Nonlabored breathing, no respiratory distress  Extrem: WWP, no calf edema, SCDs in place

## 2024-01-31 NOTE — PROGRESS NOTE ADULT - PROBLEM SELECTOR PLAN 1
- Chart reviewed.  Met Sepsis Criteria of tachycardia, tachypnea, leukocytosis on admission, renal stone as source of infection  - WBC 12K, neutro 89% predominant   - UA 30 protein, trace ketones, positive nitrites large LE, cloudy, small blood  - s/p 1x dose Ofirmev 1g, Cefepime 2g x1 and LR 2300 cc bolus in  ER   - will continue Cefepime, switch according to sensitivity.   - Tylenol prn for mild pain and fever  - ID Aram consulted, follow rec  - Monitor CBC w/ diff  - + BC noted for Gm Neg Rods. f/u UC and sensitivities   - S/p right Ureteral stent placement by  on 01/30/24

## 2024-01-31 NOTE — CARE COORDINATION ASSESSMENT. - OTHER PERTINENT REFERRAL INFORMATION
Spoke with patients son at bedside.   Role of case management explained w verbalized understanding. Son states he lives w patient in a PH w [address]. Pt was managing well PTA, w RW. No recent HCS but was known to [facility] from the distant past. Son is eceptive to [facility]. CM to follow and assess needs pending hospital course. He will transport pt home

## 2024-01-31 NOTE — PROGRESS NOTE ADULT - SUBJECTIVE AND OBJECTIVE BOX
[facility] -- Rhonda Good, Joselo Jimenez Savella, , Karen Willett  Office # [phone]    Follow Up:      Subjective/Observations:     REVIEW OF SYSTEMS: All other review of systems is negative unless indicated above  PAST MEDICAL & SURGICAL HISTORY:  Benign prostatic hyperplasia, presence of lower urinary tract symptoms unspecified, unspecified morphology      Hyperlipidemia      GERD (gastroesophageal reflux disease)      CAD (coronary artery disease)      UTI (urinary tract infection)      Calculus of kidney      Calculus of ureter      Dementia      Kidney stones      H/O heart artery stent  stent x 2      History of prostate surgery      S/P ureteral stent placement  Left sided      Stented coronary artery        MEDICATIONS  (STANDING):  aspirin enteric coated 81 milliGRAM(s) Oral daily  cefepime   IVPB 2000 milliGRAM(s) IV Intermittent every 12 hours  memantine 5 milliGRAM(s) Oral two times a day  senna 2 Tablet(s) Oral at bedtime  sodium chloride 0.9%. 1000 milliLiter(s) (75 mL/Hr) IV Continuous <Continuous>  tamsulosin 0.4 milliGRAM(s) Oral at bedtime    MEDICATIONS  (PRN):  polyethylene glycol 3350 17 Gram(s) Oral daily PRN Constipation    Allergies    No Known Allergies    Intolerances      Vital Signs Last 24 Hrs  T(C): 36.3 (31 Jan 2024 05:38), Max: 37.7 (30 Jan 2024 08:10)  T(F): 97.4 (31 Jan 2024 05:38), Max: 99.9 (30 Jan 2024 08:10)  HR: 70 (31 Jan 2024 05:38) (59 - 98)  BP: 123/60 (31 Jan 2024 05:38) (107/51 - 158/63)  BP(mean): --  RR: 20 (31 Jan 2024 05:38) (14 - 20)  SpO2: 95% (31 Jan 2024 05:38) (95% - 100%)    Parameters below as of 31 Jan 2024 05:38  Patient On (Oxygen Delivery Method): room air      I&O's Summary    30 Jan 2024 07:01  -  31 Jan 2024 07:00  --------------------------------------------------------  IN: 80 mL / OUT: 0 mL / NET: 80 mL        PHYSICAL EXAM:  TELE:   Constitutional: NAD, awake and alert, well-developed  HEENT: Moist Mucous Membranes, Anicteric  Pulmonary: Non-labored, breath sounds are clear bilaterally, No wheezing, rales or rhonchi  Cardiovascular: Regular, S1 and S2, No murmurs, rubs, gallops or clicks  Gastrointestinal: Bowel Sounds present, soft, nontender.   Lymph: No peripheral edema. No lymphadenopathy.  Skin: No visible rashes or ulcers.  Psych:  Mood & affect appropriate  LABS: All Labs Reviewed:                        13.0   12.97 )-----------( 110      ( 30 Jan 2024 06:55 )             40.0                         13.6   11.81 )-----------( 135      ( 28 Jan 2024 20:50 )             41.4     30 Jan 2024 06:55    141    |  112    |  25     ----------------------------<  115    3.9     |  23     |  1.30   28 Jan 2024 20:50    138    |  110    |  15     ----------------------------<  113    3.9     |  23     |  1.10     Ca    9.7        30 Jan 2024 06:55  Ca    10.0       28 Jan 2024 20:50  Mg     1.9       28 Jan 2024 20:50    TPro  6.5    /  Alb  2.7    /  TBili  2.7    /  DBili  x      /  AST  21     /  ALT  8      /  AlkPhos  80     30 Jan 2024 06:55  TPro  6.9    /  Alb  3.0    /  TBili  0.9    /  DBili  x      /  AST  18     /  ALT  9      /  AlkPhos  98     28 Jan 2024 20:50    PT/INR - ( 30 Jan 2024 06:55 )   PT: 14.4 sec;   INR: 1.24 ratio         PTT - ( 30 Jan 2024 06:55 )  PTT:29.6 sec      12 Lead ECG:   Ventricular Rate 102 BPM    Atrial Rate 102 BPM    P-R Interval 296 ms    QRS Duration 82 ms    Q-T Interval 306 ms    QTC Calculation(Bazett) 398 ms    R Axis 26 degrees    T Axis 7 degrees    Diagnosis Line Sinus tachycardia with 1st degree AV block  Confirmed by davey Green ([id number]) on 1/30/2024 1:56:28 PM (01-30-24 @ 07:51)      ACC: [id number] EXAM:  ECHO TTE WO CON COMP W DOPP                          PROCEDURE DATE:  11/28/2022          INTERPRETATION:  INDICATION: Abnormal EKG  Sonographer AS    Blood Pressure 117/77    Height 170 cm     Weight 73 kg       BSA 1.84 sq   m    Dimensions:  LA 3.1       Normal Values: 2.0 - 4.0 cm  Ao 3.4        Normal Values: 2.0 - 3.8 cm  SEPTUM 1.0       Normal Values: 0.6 - 1.2 cm  PWT 0.9       Normal Values: 0.6 - 1.1 cm  LVIDd 3.1         Normal Values: 3.0 - 5.6 cm  LVIDs 2.2     Normal Values: 1.8 - 4.0 cm      OBSERVATIONS:  Mitral Valve: Mild MR.  Aortic Valve/Aorta: Calcified trileaflet aortic valve with grossly normal   opening.  Tricuspid Valve: Mild TR.  Pulmonic Valve: Trace PI  Left Atrium: normal  Right Atrium:Not well-visualized  Left Ventricle: normal LV size and systolic function, estimated LVEF of   60%.  Right Ventricle: Grossly normal size and systolic function.  Pericardium: no significant pericardial effusion.  Pulmonary/RV Pressure: estimated PA systolic pressure of at least 19 mmHg   assuming an RA pressure of 3 mmHg.        IMPRESSION:  Normal left ventricular internal dimensions and systolic function,   estimated LVEF of 60%.  Grossly normal RV size and systolic function.  Calcified trileaflet aortic valve with grossly normal opening, without AI.  Mild MR and TR.  No significant pericardial effusion.    --- End of Report ---            ORLANDO MATTHEW MD; Attending Cardiologist  This document has been electronically signed. Nov 29 2022  4:46PM

## 2024-02-01 LAB
-  AMPICILLIN/SULBACTAM: SIGNIFICANT CHANGE UP
-  AMPICILLIN: SIGNIFICANT CHANGE UP
-  AZTREONAM: SIGNIFICANT CHANGE UP
-  CEFAZOLIN: SIGNIFICANT CHANGE UP
-  CEFEPIME: SIGNIFICANT CHANGE UP
-  CEFOXITIN: SIGNIFICANT CHANGE UP
-  CEFTRIAXONE: SIGNIFICANT CHANGE UP
-  CIPROFLOXACIN: SIGNIFICANT CHANGE UP
-  ERTAPENEM: SIGNIFICANT CHANGE UP
-  GENTAMICIN: SIGNIFICANT CHANGE UP
-  IMIPENEM: SIGNIFICANT CHANGE UP
-  LEVOFLOXACIN: SIGNIFICANT CHANGE UP
-  MEROPENEM: SIGNIFICANT CHANGE UP
-  PIPERACILLIN/TAZOBACTAM: SIGNIFICANT CHANGE UP
-  TOBRAMYCIN: SIGNIFICANT CHANGE UP
-  TRIMETHOPRIM/SULFAMETHOXAZOLE: SIGNIFICANT CHANGE UP
ANION GAP SERPL CALC-SCNC: 3 MMOL/L — LOW (ref 5–17)
BUN SERPL-MCNC: 39 MG/DL — HIGH (ref 7–23)
CALCIUM SERPL-MCNC: 9.4 MG/DL — SIGNIFICANT CHANGE UP (ref 8.5–10.1)
CHLORIDE SERPL-SCNC: 117 MMOL/L — HIGH (ref 96–108)
CO2 SERPL-SCNC: 25 MMOL/L — SIGNIFICANT CHANGE UP (ref 22–31)
CREAT SERPL-MCNC: 0.92 MG/DL — SIGNIFICANT CHANGE UP (ref 0.5–1.3)
CULTURE RESULTS: ABNORMAL
CULTURE RESULTS: ABNORMAL
EGFR: 77 ML/MIN/1.73M2 — SIGNIFICANT CHANGE UP
GLUCOSE SERPL-MCNC: 123 MG/DL — HIGH (ref 70–99)
GRAM STN FLD: ABNORMAL
HCT VFR BLD CALC: 34.7 % — LOW (ref 39–50)
HGB BLD-MCNC: 11.3 G/DL — LOW (ref 13–17)
MCHC RBC-ENTMCNC: 30.3 PG — SIGNIFICANT CHANGE UP (ref 27–34)
MCHC RBC-ENTMCNC: 32.6 GM/DL — SIGNIFICANT CHANGE UP (ref 32–36)
MCV RBC AUTO: 93 FL — SIGNIFICANT CHANGE UP (ref 80–100)
METHOD TYPE: SIGNIFICANT CHANGE UP
NRBC # BLD: 0 /100 WBCS — SIGNIFICANT CHANGE UP (ref 0–0)
ORGANISM # SPEC MICROSCOPIC CNT: ABNORMAL
ORGANISM # SPEC MICROSCOPIC CNT: ABNORMAL
ORGANISM # SPEC MICROSCOPIC CNT: SIGNIFICANT CHANGE UP
PLATELET # BLD AUTO: 107 K/UL — LOW (ref 150–400)
POTASSIUM SERPL-MCNC: 3.9 MMOL/L — SIGNIFICANT CHANGE UP (ref 3.5–5.3)
POTASSIUM SERPL-SCNC: 3.9 MMOL/L — SIGNIFICANT CHANGE UP (ref 3.5–5.3)
RBC # BLD: 3.73 M/UL — LOW (ref 4.2–5.8)
RBC # FLD: 14.6 % — HIGH (ref 10.3–14.5)
SODIUM SERPL-SCNC: 145 MMOL/L — SIGNIFICANT CHANGE UP (ref 135–145)
SPECIMEN SOURCE: SIGNIFICANT CHANGE UP
SPECIMEN SOURCE: SIGNIFICANT CHANGE UP
WBC # BLD: 11.53 K/UL — HIGH (ref 3.8–10.5)
WBC # FLD AUTO: 11.53 K/UL — HIGH (ref 3.8–10.5)

## 2024-02-01 PROCEDURE — 99233 SBSQ HOSP IP/OBS HIGH 50: CPT

## 2024-02-01 PROCEDURE — 99232 SBSQ HOSP IP/OBS MODERATE 35: CPT

## 2024-02-01 RX ORDER — LANOLIN ALCOHOL/MO/W.PET/CERES
3 CREAM (GRAM) TOPICAL ONCE
Refills: 0 | Status: COMPLETED | OUTPATIENT
Start: 2024-02-01 | End: 2024-02-01

## 2024-02-01 RX ORDER — CEFTRIAXONE 500 MG/1
2000 INJECTION, POWDER, FOR SOLUTION INTRAMUSCULAR; INTRAVENOUS EVERY 24 HOURS
Refills: 0 | Status: DISCONTINUED | OUTPATIENT
Start: 2024-02-01 | End: 2024-02-02

## 2024-02-01 RX ORDER — ATORVASTATIN CALCIUM 80 MG/1
20 TABLET, FILM COATED ORAL AT BEDTIME
Refills: 0 | Status: DISCONTINUED | OUTPATIENT
Start: 2024-02-01 | End: 2024-02-05

## 2024-02-01 RX ADMIN — MEMANTINE HYDROCHLORIDE 5 MILLIGRAM(S): 10 TABLET ORAL at 05:38

## 2024-02-01 RX ADMIN — MEMANTINE HYDROCHLORIDE 5 MILLIGRAM(S): 10 TABLET ORAL at 21:46

## 2024-02-01 RX ADMIN — POLYETHYLENE GLYCOL 3350 17 GRAM(S): 17 POWDER, FOR SOLUTION ORAL at 18:17

## 2024-02-01 RX ADMIN — Medication 81 MILLIGRAM(S): at 11:16

## 2024-02-01 RX ADMIN — SENNA PLUS 2 TABLET(S): 8.6 TABLET ORAL at 21:45

## 2024-02-01 RX ADMIN — TAMSULOSIN HYDROCHLORIDE 0.4 MILLIGRAM(S): 0.4 CAPSULE ORAL at 21:45

## 2024-02-01 RX ADMIN — ATORVASTATIN CALCIUM 20 MILLIGRAM(S): 80 TABLET, FILM COATED ORAL at 21:45

## 2024-02-01 RX ADMIN — Medication 3 MILLIGRAM(S): at 23:55

## 2024-02-01 RX ADMIN — CEFTRIAXONE 100 MILLIGRAM(S): 500 INJECTION, POWDER, FOR SOLUTION INTRAMUSCULAR; INTRAVENOUS at 17:33

## 2024-02-01 RX ADMIN — ERTAPENEM SODIUM 120 MILLIGRAM(S): 1 INJECTION, POWDER, LYOPHILIZED, FOR SOLUTION INTRAMUSCULAR; INTRAVENOUS at 11:46

## 2024-02-01 NOTE — PROGRESS NOTE ADULT - SUBJECTIVE AND OBJECTIVE BOX
[facility] - Rhonda Good, Joselo Jimenez, Brennon Green  Office Number:  [phone]    Patient resting comfortably in bed in NAD.    On RA  No chest pain or SOB    ROS: negative unless otherwise mentioned.      MEDICATIONS  (STANDING):  aspirin enteric coated 81 milliGRAM(s) Oral daily  atorvastatin 20 milliGRAM(s) Oral at bedtime  ertapenem  IVPB      ertapenem  IVPB 1000 milliGRAM(s) IV Intermittent every 24 hours  memantine 5 milliGRAM(s) Oral two times a day  senna 2 Tablet(s) Oral at bedtime  sodium chloride 0.9%. 1000 milliLiter(s) (75 mL/Hr) IV Continuous <Continuous>  tamsulosin 0.4 milliGRAM(s) Oral at bedtime    MEDICATIONS  (PRN):  polyethylene glycol 3350 17 Gram(s) Oral daily PRN Constipation      Allergies    No Known Allergies    Intolerances        Vital Signs Last 24 Hrs  T(C): 36.7 (01 Feb 2024 11:33), Max: 36.7 (01 Feb 2024 11:33)  T(F): 98 (01 Feb 2024 11:33), Max: 98 (01 Feb 2024 11:33)  HR: 55 (01 Feb 2024 11:33) (54 - 55)  BP: 122/73 (01 Feb 2024 11:33) (122/73 - 146/72)  BP(mean): --  RR: 20 (01 Feb 2024 11:33) (19 - 20)  SpO2: 91% (01 Feb 2024 11:33) (91% - 95%)    Parameters below as of 01 Feb 2024 11:33  Patient On (Oxygen Delivery Method): room air        I&O's Summary    31 Jan 2024 07:01  -  01 Feb 2024 07:00  --------------------------------------------------------  IN: 675 mL / OUT: 250 mL / NET: 425 mL    01 Feb 2024 07:01  -  01 Feb 2024 15:13  --------------------------------------------------------  IN: 600 mL / OUT: 0 mL / NET: 600 mL        ON EXAM:    General: NAD, awake and alert, oriented x 3  HEENT: Mucous membranes are moist, anicteric  Lungs: Non-labored, breath sounds are clear bilaterally, No wheezing, rales or rhonchi  Cardiovascular: Regular, S1 and S2, no murmurs, rubs, or gallops  Gastrointestinal: Bowel Sounds present, soft, nontender.   Lymph: No peripheral edema. No lymphadenopathy.  Skin: No rashes or ulcers  Psych:  Mood & affect appropriate    LABS: All Labs Reviewed:                        11.3   11.53 )-----------( 107      ( 01 Feb 2024 06:36 )             34.7                         12.5   8.07  )-----------( 96       ( 31 Jan 2024 06:35 )             38.7                         13.0   12.97 )-----------( 110      ( 30 Jan 2024 06:55 )             40.0     01 Feb 2024 06:36    145    |  117    |  39     ----------------------------<  123    3.9     |  25     |  0.92   31 Jan 2024 06:35    141    |  113    |  27     ----------------------------<  156    4.2     |  26     |  1.10   30 Jan 2024 06:55    141    |  112    |  25     ----------------------------<  115    3.9     |  23     |  1.30     Ca    9.4        01 Feb 2024 06:36  Ca    9.5        31 Jan 2024 06:35  Ca    9.7        30 Jan 2024 06:55    TPro  6.4    /  Alb  2.4    /  TBili  1.5    /  DBili  0.4    /  AST  19     /  ALT  9      /  AlkPhos  70     31 Jan 2024 06:35  TPro  6.5    /  Alb  2.7    /  TBili  2.7    /  DBili  x      /  AST  21     /  ALT  8      /  AlkPhos  80     30 Jan 2024 06:55          Blood Culture: Organism --  Gram Stain Blood -- Gram Stain --  Specimen Source Clean Catch Clean Catch (Midstream)  Culture-Blood --    Organism Blood Culture PCR  Gram Stain Blood -- Gram Stain   Growth in aerobic bottle: Gram Negative Rods  Growth in anaerobic bottle: Gram Negative Rods  Specimen Source .Blood Blood-Peripheral  Culture-Blood --    Organism --  Gram Stain Blood -- Gram Stain   Growth in aerobic bottle: Gram Negative Rods  Growth in anaerobic bottle: Gram Negative Rods  Specimen Source .Blood Blood-Peripheral  Culture-Blood --      OBSERVATIONS:  Mitral Valve: Mild MR.  Aortic Valve/Aorta: Calcified trileaflet aortic valve with grossly normal   opening.  Tricuspid Valve: Mild TR.  Pulmonic Valve: Trace PI  Left Atrium: normal  Right Atrium:Not well-visualized  Left Ventricle: normal LV size and systolic function, estimated LVEF of   60%.  Right Ventricle: Grossly normal size and systolic function.  Pericardium: no significant pericardial effusion.  Pulmonary/RV Pressure: estimated PA systolic pressure of at least 19 mmHg   assuming an RA pressure of 3 mmHg.    IMPRESSION:  Normal left ventricular internal dimensions and systolic function,   estimated LVEF of 60%.  Grossly normal RV size and systolic function.  Calcified trileaflet aortic valve with grossly normal opening, without AI.  Mild MR and TR.  No significant pericardial effusion.    --- End of Report ---    ORLANDO MATTHEW MD; Attending Cardiologist  This document has been electronically signed. Nov 29 2022  4:46PM

## 2024-02-01 NOTE — PROGRESS NOTE ADULT - PROBLEM SELECTOR PLAN 1
- Sepsis secondary to UTI   - + BC/ UC  noted for Gm Neg Rods. f/u sensitivities   -F/u repeat BC   - will continue Cefepime, switch according to sensitivity.   - Tylenol prn for mild pain and fever  - DHEERAJ Chiu consulted, follow rec  - Monitor CBC w/ diff  - S/p right Ureteral stent placement by  on 01/30/24

## 2024-02-01 NOTE — PHYSICAL THERAPY INITIAL EVALUATION ADULT - ADDITIONAL COMMENTS
Information obtained from son at bedside: Pt lives w/ his family in a house, + steps to enter and stairs inside to get to main level ( [place]). Family assisted pt on the steps. Pt ambulated w/ RW and assist and required assistance for ADLs.

## 2024-02-01 NOTE — PROGRESS NOTE ADULT - ASSESSMENT
89 yo M with PMH dementia, BPH, CAD s/p 3 stent placements in 2016, GERD, HLD,  kidney stones, UTI presents with fever, chills, coughing, and multiple episodes of weakness/confusion. CT A/P no cont showing mild RIGHT hydroureteronephrosis presumed secondary to 5 mm distal intramural RIGHT UV junction calculus; a recently passed calculus adjacent to RIGHT UV junction is considered an alternate less likely possibility. Patient meet sepsis criteria, needs repeat CT scan and per urology needs ct urogram, then will decide if needs to go to OR for uro stent placement. Admitted for sepsis 2/2 renal stone, s/p stent placement, has E coli bacteremia probably secondary to E coli UTI

## 2024-02-01 NOTE — PROGRESS NOTE ADULT - SUBJECTIVE AND OBJECTIVE BOX
Patient is a 89y old  Male who presents with a chief complaint of sepsis 2/2 renal stone (31 Jan 2024 11:17)      INTERVAL HPI/OVERNIGHT EVENTS: Patient seen and examined at bedside. Confused, awake, alert.     MEDICATIONS  (STANDING):  aspirin enteric coated 81 milliGRAM(s) Oral daily  ertapenem  IVPB      ertapenem  IVPB 1000 milliGRAM(s) IV Intermittent every 24 hours  memantine 5 milliGRAM(s) Oral two times a day  senna 2 Tablet(s) Oral at bedtime  sodium chloride 0.9%. 1000 milliLiter(s) (75 mL/Hr) IV Continuous <Continuous>  tamsulosin 0.4 milliGRAM(s) Oral at bedtime    MEDICATIONS  (PRN):  polyethylene glycol 3350 17 Gram(s) Oral daily PRN Constipation      Allergies    No Known Allergies    Intolerances        REVIEW OF SYSTEMS:  Unable to obtain, confused at baseline, but calm   Vital Signs Last 24 Hrs  T(C): 36.3 (01 Feb 2024 04:07), Max: 36.7 (31 Jan 2024 12:02)  T(F): 97.3 (01 Feb 2024 04:07), Max: 98 (31 Jan 2024 12:02)  HR: 54 (01 Feb 2024 04:07) (54 - 55)  BP: 146/72 (01 Feb 2024 04:07) (108/56 - 146/72)  BP(mean): --  RR: 19 (01 Feb 2024 04:07) (19 - 20)  SpO2: 95% (01 Feb 2024 04:07) (95% - 97%)    Parameters below as of 01 Feb 2024 04:07  Patient On (Oxygen Delivery Method): room air        PHYSICAL EXAM:  GENERAL: NAD  HEAD:  Atraumatic, Normocephalic  EYES: EOMI, PERRLA, conjunctiva and sclera clear  ENMT: No tonsillar erythema, exudates, or enlargement; Moist mucous membranes  NECK: Supple, No JVD, Normal thyroid  NERVOUS SYSTEM:  Confused, alert, moving all extremities   CHEST/LUNG: Clear to auscultation bilaterally; No rales, rhonchi, wheezing, or rubs  HEART: S1S2+, Regular rate and rhythm  ABDOMEN: Soft, Nontender, Nondistended; Bowel sounds present  EXTREMITIES:  2+ Peripheral Pulses, No clubbing, cyanosis  LYMPH: No lymphadenopathy noted  SKIN: No rashes or lesions  LABS:                        11.3   11.53 )-----------( 107      ( 01 Feb 2024 06:36 )             34.7       Ca    9.5        31 Jan 2024 06:35        CAPILLARY BLOOD GLUCOSE        BLOOD CULTURE  01-30 @ 10:30   10,000 - 49,000 CFU/mL Escherichia coli  --  --  01-30 @ 06:55   Growth in aerobic and anaerobic bottles: Escherichia coli  Direct identification is available within approximately 3-5  hours either by Blood Panel Multiplexed PCR or Direct  MALDI-TOF. Details: [email or web address]  --  Blood Culture PCR  01-30 @ 06:50   Growth in aerobic bottle: Gram Negative Rods  Growth in anaerobic bottle: Gram Negative Rods  --  --    RADIOLOGY & ADDITIONAL TESTS:    Imaging Personally Reviewed:  [ ] YES     Consultant(s) Notes Reviewed:      Care Discussed with Consultants/Other Providers:

## 2024-02-01 NOTE — PHYSICAL THERAPY INITIAL EVALUATION ADULT - PERTINENT HX OF CURRENT PROBLEM, REHAB EVAL
89 yo M adm 1/30 with PMH dementia, BPH, CAD s/p 3 stent placements in 2016, GERD, HLD,  kidney stones, UTI presents with fever, chills, coughing, and multiple episodes of weakness/confusion. CT A/P no cont showing mild RIGHT hydroureteronephrosis presumed secondary to 5 mm distal intramural RIGHT UV junction calculus; a recently passed calculus adjacent to RIGHT UV junction is considered an alternate less likely possibility. Admitted for sepsis 2/2 renal stone. 1/31 s/p cystoscopy with insertion of R ureteral stent.

## 2024-02-01 NOTE — PROGRESS NOTE ADULT - ASSESSMENT
89 yo M with PMH dementia, BPH, CAD s/p 3 stent placements in 2016, GERD, HLD,  kidney stones, UTI presents with fever, chills, coughing, and multiple episodes of weakness/confusion; admitted for urosepsis, consulted for cardiac clearance for Uro procedure.     Cardiac Optimization, Hx CAD s/p stents  - Patient without symptoms of angina or heart failure at this time.  - No clear evidence of acute ischemia, trops negative.  No need to trend  - EK bpm, sinus tachycardia with 1st degree AV block.  No ischemic changes  - Tachycardia in setting of Urosepsis.  Resolved  - Stress test 2021 negative for angina, no evidence of MI with mild degree of reversible Myocardial ischemia of a small-medium size area of the basal septal and inferior walls (RCA), however may be artifact 2/2 motion     - No meaningful evidence of volume overload.  No O2 requirement  - Previous ECHO 2022: LVEF 60%, mild TR and MR   - Continue ASA  - Resume home statin Lipitor 20 mg HS    - s/p cystoscopy with retrograde pyelography with insertion of right ureteral stent.  Tolerated procedure well  - Monitor and replete lytes, keep K>4, Mg>2.  - Will continue to follow.  Otherwise, stable from cardiac standpoint    Marnie Keller DNP, NP-C, AGACNP-C  Cardiology   Call TEAMS

## 2024-02-01 NOTE — PROGRESS NOTE ADULT - SUBJECTIVE AND OBJECTIVE BOX
[facility] -- Rhonda Good, Joselo Jimenez Savella, , Karen Willett  Office # [phone]    Follow Up:      Subjective/Observations:     REVIEW OF SYSTEMS: All other review of systems is negative unless indicated above  PAST MEDICAL & SURGICAL HISTORY:  Benign prostatic hyperplasia, presence of lower urinary tract symptoms unspecified, unspecified morphology      Hyperlipidemia      GERD (gastroesophageal reflux disease)      CAD (coronary artery disease)      UTI (urinary tract infection)      Calculus of kidney      Calculus of ureter      Dementia      Kidney stones      H/O heart artery stent  stent x 2      History of prostate surgery      S/P ureteral stent placement  Left sided      Stented coronary artery        MEDICATIONS  (STANDING):  aspirin enteric coated 81 milliGRAM(s) Oral daily  ertapenem  IVPB 1000 milliGRAM(s) IV Intermittent every 24 hours  ertapenem  IVPB      memantine 5 milliGRAM(s) Oral two times a day  senna 2 Tablet(s) Oral at bedtime  sodium chloride 0.9%. 1000 milliLiter(s) (75 mL/Hr) IV Continuous <Continuous>  tamsulosin 0.4 milliGRAM(s) Oral at bedtime    MEDICATIONS  (PRN):  polyethylene glycol 3350 17 Gram(s) Oral daily PRN Constipation    Allergies    No Known Allergies    Intolerances      Vital Signs Last 24 Hrs  T(C): 36.3 (01 Feb 2024 04:07), Max: 36.7 (31 Jan 2024 12:02)  T(F): 97.3 (01 Feb 2024 04:07), Max: 98 (31 Jan 2024 12:02)  HR: 54 (01 Feb 2024 04:07) (54 - 55)  BP: 146/72 (01 Feb 2024 04:07) (108/56 - 146/72)  BP(mean): --  RR: 19 (01 Feb 2024 04:07) (19 - 20)  SpO2: 95% (01 Feb 2024 04:07) (95% - 97%)    Parameters below as of 01 Feb 2024 04:07  Patient On (Oxygen Delivery Method): room air      I&O's Summary    31 Jan 2024 07:01  -  01 Feb 2024 07:00  --------------------------------------------------------  IN: 675 mL / OUT: 250 mL / NET: 425 mL        PHYSICAL EXAM:  TELE:   Constitutional: NAD, awake and alert, well-developed  HEENT: Moist Mucous Membranes, Anicteric  Pulmonary: Non-labored, breath sounds are clear bilaterally, No wheezing, rales or rhonchi  Cardiovascular: Regular, S1 and S2, No murmurs, rubs, gallops or clicks  Gastrointestinal: Bowel Sounds present, soft, nontender.   Lymph: No peripheral edema. No lymphadenopathy.  Skin: No visible rashes or ulcers.  Psych:  Mood & affect appropriate  LABS: All Labs Reviewed:                        11.3   11.53 )-----------( 107      ( 01 Feb 2024 06:36 )             34.7                         12.5   8.07  )-----------( 96       ( 31 Jan 2024 06:35 )             38.7                         13.0   12.97 )-----------( 110      ( 30 Jan 2024 06:55 )             40.0     01 Feb 2024 06:36    145    |  117    |  39     ----------------------------<  123    3.9     |  25     |  0.92   31 Jan 2024 06:35    141    |  113    |  27     ----------------------------<  156    4.2     |  26     |  1.10   30 Jan 2024 06:55    141    |  112    |  25     ----------------------------<  115    3.9     |  23     |  1.30     Ca    9.4        01 Feb 2024 06:36  Ca    9.5        31 Jan 2024 06:35  Ca    9.7        30 Jan 2024 06:55    TPro  6.4    /  Alb  2.4    /  TBili  1.5    /  DBili  0.4    /  AST  19     /  ALT  9      /  AlkPhos  70     31 Jan 2024 06:35  TPro  6.5    /  Alb  2.7    /  TBili  2.7    /  DBili  x      /  AST  21     /  ALT  8      /  AlkPhos  80     30 Jan 2024 06:55          12 Lead ECG:   Ventricular Rate 102 BPM    Atrial Rate 102 BPM    P-R Interval 296 ms    QRS Duration 82 ms    Q-T Interval 306 ms    QTC Calculation(Bazett) 398 ms    R Axis 26 degrees    T Axis 7 degrees    Diagnosis Line Sinus tachycardia with 1st degree AV block  Confirmed by davey Green ([id number]) on 1/30/2024 1:56:28 PM (01-30-24 @ 07:51)      ACC: [id number] EXAM:  ECHO TTE WO CON COMP W DOPP                          PROCEDURE DATE:  11/28/2022          INTERPRETATION:  INDICATION: Abnormal EKG  Sonographer AS    Blood Pressure 117/77    Height 170 cm     Weight 73 kg       BSA 1.84 sq   m    Dimensions:  LA 3.1       Normal Values: 2.0 - 4.0 cm  Ao 3.4        Normal Values: 2.0 - 3.8 cm  SEPTUM 1.0       Normal Values: 0.6 - 1.2 cm  PWT 0.9       Normal Values: 0.6 - 1.1 cm  LVIDd 3.1         Normal Values: 3.0 - 5.6 cm  LVIDs 2.2     Normal Values: 1.8 - 4.0 cm      OBSERVATIONS:  Mitral Valve: Mild MR.  Aortic Valve/Aorta: Calcified trileaflet aortic valve with grossly normal   opening.  Tricuspid Valve: Mild TR.  Pulmonic Valve: Trace PI  Left Atrium: normal  Right Atrium:Not well-visualized  Left Ventricle: normal LV size and systolic function, estimated LVEF of   60%.  Right Ventricle: Grossly normal size and systolic function.  Pericardium: no significant pericardial effusion.  Pulmonary/RV Pressure: estimated PA systolic pressure of at least 19 mmHg   assuming an RA pressure of 3 mmHg.        IMPRESSION:  Normal left ventricular internal dimensions and systolic function,   estimated LVEF of 60%.  Grossly normal RV size and systolic function.  Calcified trileaflet aortic valve with grossly normal opening, without AI.  Mild MR and TR.  No significant pericardial effusion.    --- End of Report ---    ORLANDO MATTHEW MD; Attending Cardiologist  This document has been electronically signed. Nov 29 2022  4:46PM      [facility] -- Rhonda Good, Tony, Peter Josue, , Karen Willett  Office # [phone]    Follow Up:  Cardiac Optimization, Hx CAD s/p stents    Subjective/Observations: Sitting on the chair, comfortable on RA.  Non English-speaking but per son at bedside, patient has no complaints.  Comfortable on TA    REVIEW OF SYSTEMS: All other review of systems is negative unless indicated above  PAST MEDICAL & SURGICAL HISTORY:  Benign prostatic hyperplasia, presence of lower urinary tract symptoms unspecified, unspecified morphology      Hyperlipidemia      GERD (gastroesophageal reflux disease)      CAD (coronary artery disease)      UTI (urinary tract infection)      Calculus of kidney      Calculus of ureter      Dementia      Kidney stones      H/O heart artery stent  stent x 2  History of prostate surgery  S/P ureteral stent placement  Left sided  Stented coronary artery    MEDICATIONS  (STANDING):  aspirin enteric coated 81 milliGRAM(s) Oral daily  ertapenem  IVPB 1000 milliGRAM(s) IV Intermittent every 24 hours  ertapenem  IVPB      memantine 5 milliGRAM(s) Oral two times a day  senna 2 Tablet(s) Oral at bedtime  sodium chloride 0.9%. 1000 milliLiter(s) (75 mL/Hr) IV Continuous <Continuous>  tamsulosin 0.4 milliGRAM(s) Oral at bedtime    MEDICATIONS  (PRN):  polyethylene glycol 3350 17 Gram(s) Oral daily PRN Constipation    Allergies    No Known Allergies    Intolerances    Vital Signs Last 24 Hrs  T(C): 36.3 (01 Feb 2024 04:07), Max: 36.7 (31 Jan 2024 12:02)  T(F): 97.3 (01 Feb 2024 04:07), Max: 98 (31 Jan 2024 12:02)  HR: 54 (01 Feb 2024 04:07) (54 - 55)  BP: 146/72 (01 Feb 2024 04:07) (108/56 - 146/72)  BP(mean): --  RR: 19 (01 Feb 2024 04:07) (19 - 20)  SpO2: 95% (01 Feb 2024 04:07) (95% - 97%)    Parameters below as of 01 Feb 2024 04:07  Patient On (Oxygen Delivery Method): room air      I&O's Summary    31 Jan 2024 07:01  -  01 Feb 2024 07:00  --------------------------------------------------------  IN: 675 mL / OUT: 250 mL / NET: 425 mL    PHYSICAL EXAM:  TELE: Not on tele  Constitutional: NAD, awake and alert  HEENT: Moist Mucous Membranes, Anicteric  Pulmonary: Non-labored, breath sounds are clear but diminished bilaterally, No wheezing, rales or rhonchi  Cardiovascular: Regular, S1 and S2, No murmurs, rubs, gallops or clicks  Gastrointestinal: Bowel Sounds present, soft, nontender.   Lymph: No peripheral edema. No lymphadenopathy.  Skin: No visible rashes or ulcers.  Psych:  Mood & affect: Flat  LABS: All Labs Reviewed:                        11.3   11.53 )-----------( 107      ( 01 Feb 2024 06:36 )             34.7                         12.5   8.07  )-----------( 96       ( 31 Jan 2024 06:35 )             38.7                         13.0   12.97 )-----------( 110      ( 30 Jan 2024 06:55 )             40.0     01 Feb 2024 06:36    145    |  117    |  39     ----------------------------<  123    3.9     |  25     |  0.92   31 Jan 2024 06:35    141    |  113    |  27     ----------------------------<  156    4.2     |  26     |  1.10   30 Jan 2024 06:55    141    |  112    |  25     ----------------------------<  115    3.9     |  23     |  1.30     Ca    9.4        01 Feb 2024 06:36  Ca    9.5        31 Jan 2024 06:35  Ca    9.7        30 Jan 2024 06:55    TPro  6.4    /  Alb  2.4    /  TBili  1.5    /  DBili  0.4    /  AST  19     /  ALT  9      /  AlkPhos  70     31 Jan 2024 06:35  TPro  6.5    /  Alb  2.7    /  TBili  2.7    /  DBili  x      /  AST  21     /  ALT  8      /  AlkPhos  80     30 Jan 2024 06:55    12 Lead ECG:   Ventricular Rate 102 BPM    Atrial Rate 102 BPM    P-R Interval 296 ms    QRS Duration 82 ms    Q-T Interval 306 ms    QTC Calculation(Bazett) 398 ms    R Axis 26 degrees    T Axis 7 degrees    Diagnosis Line Sinus tachycardia with 1st degree AV block  Confirmed by davey Green ([id number]) on 1/30/2024 1:56:28 PM (01-30-24 @ 07:51)      ACC: [id number] EXAM:  ECHO TTE WO CON COMP W DOPP                          PROCEDURE DATE:  11/28/2022      INTERPRETATION:  INDICATION: Abnormal EKG  Sonographer AS    Blood Pressure 117/77    Height 170 cm     Weight 73 kg       BSA 1.84 sq   m    Dimensions:  LA 3.1       Normal Values: 2.0 - 4.0 cm  Ao 3.4        Normal Values: 2.0 - 3.8 cm  SEPTUM 1.0       Normal Values: 0.6 - 1.2 cm  PWT 0.9       Normal Values: 0.6 - 1.1 cm  LVIDd 3.1         Normal Values: 3.0 - 5.6 cm  LVIDs 2.2     Normal Values: 1.8 - 4.0 cm    OBSERVATIONS:  Mitral Valve: Mild MR.  Aortic Valve/Aorta: Calcified trileaflet aortic valve with grossly normal   opening.  Tricuspid Valve: Mild TR.  Pulmonic Valve: Trace PI  Left Atrium: normal  Right Atrium:Not well-visualized  Left Ventricle: normal LV size and systolic function, estimated LVEF of   60%.  Right Ventricle: Grossly normal size and systolic function.  Pericardium: no significant pericardial effusion.  Pulmonary/RV Pressure: estimated PA systolic pressure of at least 19 mmHg   assuming an RA pressure of 3 mmHg.    IMPRESSION:  Normal left ventricular internal dimensions and systolic function,   estimated LVEF of 60%.  Grossly normal RV size and systolic function.  Calcified trileaflet aortic valve with grossly normal opening, without AI.  Mild MR and TR.  No significant pericardial effusion.    --- End of Report ---    ORLANDO MATTHEW MD; Attending Cardiologist  This document has been electronically signed. Nov 29 2022  4:46PM

## 2024-02-01 NOTE — CASE MANAGEMENT PROGRESS NOTE - NSCMPROGRESSNOTE_GEN_ALL_CORE
PT recommending [facility]. Son is not sure if he wants him there or home. Not DC ready  pos BC awaiting sensitivities. CM to follow with social work.

## 2024-02-01 NOTE — PROGRESS NOTE ADULT - ASSESSMENT
89 yo M with PMH dementia, BPH, CAD s/p 3 stent placements in 2016, GERD, HLD,  kidney stones, UTI presents with fever, chills, coughing, and multiple episodes of weakness/confusion; admitted for urosepsis, consulted for cardiac clearance for Uro procedure.     Cardiac Optimization, Hx CAD s/p stents  - Patient without symptoms of angina or heart failure at this time.  - No clear evidence of acute ischemia, trops negative.  No need to trend  - EK bpm, sinus tachycardia with 1st degree AV block.  No ischemic changes  - Tachycardia in setting of Urosepsis.  Rates normalized  - Stress testing 2021 negative for angina, no evidence of MI with mild degree of reversible Myocardial ischemia of a small-medium size area of the basal septal and inferior walls (RCA), however may be artifact 2/2 motion     - No meaningful evidence of volume overload.  Non-orthopneic on RA  - Previous ECHO 2022: LVEF 60%, mild TR and MR   - CAD with 3 stents.  Continue ASA, Statin    - s/p cystoscopy with retrograde pyelography with insertion of right ureteral stent.  Tolerated procedure well  - Monitor and replete lytes, keep K>4, Mg>2.  - Will continue to follow.

## 2024-02-01 NOTE — PROGRESS NOTE ADULT - SUBJECTIVE AND OBJECTIVE BOX
[facility]  INFECTIOUS DISEASES   [address] [place] [address]  Tel: [phone]     Fax: [phone]  ========================================================  MD Ariane Tillman Kaushal, MD Cho, Michelle, MD Sunjit, Jaspal, MD  ========================================================    [id number]  IDRIS DANIEL     Follow up: Septic stone, bacteremia     Doing well, sitting on chair, son at the bedside. No fever.   No pain or urinary symptoms.     PAST MEDICAL & SURGICAL HISTORY:  Benign prostatic hyperplasia, presence of lower urinary tract symptoms unspecified, unspecified morphology  Hyperlipidemia  GERD (gastroesophageal reflux disease)  CAD (coronary artery disease)  UTI (urinary tract infection)  Calculus of kidney  Calculus of ureter  Dementia  Kidney stones  H/O heart artery stent  stent x 2  History of prostate surgery  S/P ureteral stent placement  Left sided  Stented coronary artery    Social Hx: No current smoking, EtOH or drugs     FAMILY HISTORY:  FH: hypertension (Child)    Allergies  No Known Allergies    MEDICATIONS  (STANDING):  aspirin enteric coated 81 milliGRAM(s) Oral daily  cefepime   IVPB 2000 milliGRAM(s) IV Intermittent every 12 hours  memantine 5 milliGRAM(s) Oral two times a day  senna 2 Tablet(s) Oral at bedtime  sodium chloride 0.9%. 1000 milliLiter(s) (75 mL/Hr) IV Continuous <Continuous>  tamsulosin 0.4 milliGRAM(s) Oral at bedtime    MEDICATIONS  (PRN):  polyethylene glycol 3350 17 Gram(s) Oral daily PRN Constipation     REVIEW OF SYSTEMS:  CONSTITUTIONAL:  No Fever or chills  HEENT:  No diplopia or blurred vision.  No sore throat or runny nose.  CARDIOVASCULAR:  No chest pain   RESPIRATORY:  No cough, shortness of breath, PND or orthopnea.  GASTROINTESTINAL:  No nausea, vomiting or diarrhea.  GENITOURINARY:  No dysuria, frequency or urgency. No Blood in urine  MUSCULOSKELETAL:  no joint aches, no muscle pain  SKIN:  No change in skin, hair or nails.    Physical Exam:  Vital Signs Last 24 Hrs  T(C): 36.3 (01 Feb 2024 04:07), Max: 36.7 (31 Jan 2024 12:02)  T(F): 97.3 (01 Feb 2024 04:07), Max: 98 (31 Jan 2024 12:02)  HR: 54 (01 Feb 2024 04:07) (54 - 55)  BP: 146/72 (01 Feb 2024 04:07) (108/56 - 146/72)  BP(mean): --  RR: 19 (01 Feb 2024 04:07) (19 - 20)  SpO2: 95% (01 Feb 2024 04:07) (95% - 97%)  Parameters below as of 01 Feb 2024 04:07  Patient On (Oxygen Delivery Method): room air  GEN: NAD  HEENT: normocephalic and atraumatic. EOMI. PERRL.    NECK: Supple.  No lymphadenopathy   LUNGS: occasional rhonchi   HEART: Regular rate and rhythm   ABDOMEN: Soft, nontender, and nondistended.  Positive bowel sounds.    : No CVA tenderness  EXTREMITIES: Without edema.  NEUROLOGIC: grossly intact.  PSYCHIATRIC: Appropriate affect .  SKIN: No rash     Labs:                        11.3   11.53 )-----------( 107      ( 01 Feb 2024 06:36 )             34.7     02-01    145  |  117<H>  |  39<H>  ----------------------------<  123<H>  3.9   |  25  |  0.92    Ca    9.4      01 Feb 2024 06:36    TPro  6.4  /  Alb  2.4<L>  /  TBili  1.5<H>  /  DBili  0.4<H>  /  AST  19  /  ALT  9<L>  /  AlkPhos  70  01-31    Culture - Urine (collected 01-30-24 @ 10:30)  Source: Clean Catch Clean Catch (Midstream)  Preliminary Report (02-01-24 @ 06:51):    10,000 - 49,000 CFU/mL Escherichia coli    Culture - Blood (collected 01-30-24 @ 06:55)  Source: .Blood Blood-Peripheral  Gram Stain (01-31-24 @ 10:25):    Growth in aerobic bottle: Gram Negative Rods    Growth in anaerobic bottle: Gram Negative Rods  Preliminary Report (01-31-24 @ 23:45):    Growth in aerobic and anaerobic bottles: Escherichia coli    Direct identification is available within approximately 3-5    hours either by Blood Panel Multiplexed PCR or Direct    MALDI-TOF. Details: [email or web address]  Organism: Blood Culture PCR (01-31-24 @ 08:46)  Organism: Blood Culture PCR (01-31-24 @ 08:46)    Sensitivities:      -  Escherichia coli: Detec      Method Type: PCR    Culture - Blood (collected 01-30-24 @ 06:50)  Source: .Blood Blood-Peripheral  Gram Stain (02-01-24 @ 06:30):    Growth in aerobic bottle: Gram Negative Rods    Growth in anaerobic bottle: Gram Negative Rods  Preliminary Report (02-01-24 @ 06:30):    Growth in aerobic bottle: Gram Negative Rods    Growth in anaerobic bottle: Gram Negative Rods    WBC Count: 11.53 K/uL (02-01-24 @ 06:36)  WBC Count: 8.07 K/uL (01-31-24 @ 06:35)  WBC Count: 12.97 K/uL (01-30-24 @ 06:55)  WBC Count: 11.81 K/uL (01-28-24 @ 20:50)    Creatinine: 0.92 mg/dL (02-01-24 @ 06:36)  Creatinine: 1.10 mg/dL (01-31-24 @ 06:35)  Creatinine: 1.30 mg/dL (01-30-24 @ 06:55)  Creatinine: 1.10 mg/dL (01-28-24 @ 20:50)    SARS-CoV-2: NotDetec (01-30-24 @ 06:55)  SARS-CoV-2 Result: NotDetec (01-28-24 @ 20:50)    All imaging and other data have been reviewed.  < from: CT Abdomen and Pelvis Urogram w/wo IV Cont (01.30.24 @ 11:46) >  IMPRESSION:  Interval placement of a Nj catheter with decompression of the bladder.   Right mild hydronephrosis has resolved and the right ureter is collapsed,   suggesting prior hydronephrosis was related to bladder distention. The   calcification is again seen along the right posterior lateral aspect of   the bladder, but is favored to be a recently passed bladder calculus or   wall calcification.The excretory phase shows symmetric contrast   throughout the ureters suggesting no obstruction.    Assessment and Plan:   89 yo man with PMH of dementia, BPH and kidney stones presents with fever, chills, weakness and confusion.     CT A/P no cont: SHOWS Mild RIGHT hydroureteronephrosis presumed secondary to 5 mm distal intramural RIGHT UV junction calculus (2/143). A recently passed calculus adjacent to RIGHT UV junction is considered an alternate less likely possibility.  So was taken to OR by  and had R stent placed.  Blood cultures with EColi  UA with a very high WBC  Had ESBL ecoli in the past     # Blacteremia  # UTI  # Renal stone s/p stent     - Will follow blood cultures for sensitivity of Ecoli  - Continue Ertapenem 1gm daily due to ESBL history (looks like no ESBL genes in PCR this time)  - Based on cultures results will modify ABx regimen  - Monitor WBC and Tmax  - Urology follow up     Will follow.  D/W son at the bedside.     Tyler Chiu MD  Division of Infectious Diseases   Please call ID service at [phone] with any question.    50 minutes spent on total encounter assessing patient, examination, chart review, counseling and coordinating care by the attending physician/nurse/care manager.

## 2024-02-02 LAB
-  AMOXICILLIN/CLAVULANIC ACID: SIGNIFICANT CHANGE UP
-  AMOXICILLIN/CLAVULANIC ACID: SIGNIFICANT CHANGE UP
-  AMPICILLIN/SULBACTAM: SIGNIFICANT CHANGE UP
-  AMPICILLIN/SULBACTAM: SIGNIFICANT CHANGE UP
-  AMPICILLIN: SIGNIFICANT CHANGE UP
-  AMPICILLIN: SIGNIFICANT CHANGE UP
-  AZTREONAM: SIGNIFICANT CHANGE UP
-  AZTREONAM: SIGNIFICANT CHANGE UP
-  CEFAZOLIN: SIGNIFICANT CHANGE UP
-  CEFAZOLIN: SIGNIFICANT CHANGE UP
-  CEFEPIME: SIGNIFICANT CHANGE UP
-  CEFEPIME: SIGNIFICANT CHANGE UP
-  CEFTRIAXONE: SIGNIFICANT CHANGE UP
-  CEFTRIAXONE: SIGNIFICANT CHANGE UP
-  CEFUROXIME: SIGNIFICANT CHANGE UP
-  CEFUROXIME: SIGNIFICANT CHANGE UP
-  CIPROFLOXACIN: SIGNIFICANT CHANGE UP
-  CIPROFLOXACIN: SIGNIFICANT CHANGE UP
-  ERTAPENEM: SIGNIFICANT CHANGE UP
-  ERTAPENEM: SIGNIFICANT CHANGE UP
-  GENTAMICIN: SIGNIFICANT CHANGE UP
-  GENTAMICIN: SIGNIFICANT CHANGE UP
-  IMIPENEM: SIGNIFICANT CHANGE UP
-  IMIPENEM: SIGNIFICANT CHANGE UP
-  LEVOFLOXACIN: SIGNIFICANT CHANGE UP
-  LEVOFLOXACIN: SIGNIFICANT CHANGE UP
-  MEROPENEM: SIGNIFICANT CHANGE UP
-  MEROPENEM: SIGNIFICANT CHANGE UP
-  NITROFURANTOIN: SIGNIFICANT CHANGE UP
-  PIPERACILLIN/TAZOBACTAM: SIGNIFICANT CHANGE UP
-  PIPERACILLIN/TAZOBACTAM: SIGNIFICANT CHANGE UP
-  TOBRAMYCIN: SIGNIFICANT CHANGE UP
-  TOBRAMYCIN: SIGNIFICANT CHANGE UP
-  TRIMETHOPRIM/SULFAMETHOXAZOLE: SIGNIFICANT CHANGE UP
-  TRIMETHOPRIM/SULFAMETHOXAZOLE: SIGNIFICANT CHANGE UP
ALBUMIN SERPL ELPH-MCNC: 2.4 G/DL — LOW (ref 3.3–5)
ALP SERPL-CCNC: 74 U/L — SIGNIFICANT CHANGE UP (ref 40–120)
ALT FLD-CCNC: 12 U/L — SIGNIFICANT CHANGE UP (ref 12–78)
ANION GAP SERPL CALC-SCNC: 2 MMOL/L — LOW (ref 5–17)
AST SERPL-CCNC: 21 U/L — SIGNIFICANT CHANGE UP (ref 15–37)
BASOPHILS # BLD AUTO: 0.01 K/UL — SIGNIFICANT CHANGE UP (ref 0–0.2)
BASOPHILS NFR BLD AUTO: 0.1 % — SIGNIFICANT CHANGE UP (ref 0–2)
BILIRUB SERPL-MCNC: 0.8 MG/DL — SIGNIFICANT CHANGE UP (ref 0.2–1.2)
BUN SERPL-MCNC: 30 MG/DL — HIGH (ref 7–23)
CALCIUM SERPL-MCNC: 9.4 MG/DL — SIGNIFICANT CHANGE UP (ref 8.5–10.1)
CHLORIDE SERPL-SCNC: 114 MMOL/L — HIGH (ref 96–108)
CO2 SERPL-SCNC: 28 MMOL/L — SIGNIFICANT CHANGE UP (ref 22–31)
CREAT SERPL-MCNC: 0.91 MG/DL — SIGNIFICANT CHANGE UP (ref 0.5–1.3)
CULTURE RESULTS: ABNORMAL
CULTURE RESULTS: ABNORMAL
EGFR: 78 ML/MIN/1.73M2 — SIGNIFICANT CHANGE UP
EOSINOPHIL # BLD AUTO: 0.28 K/UL — SIGNIFICANT CHANGE UP (ref 0–0.5)
EOSINOPHIL NFR BLD AUTO: 3.4 % — SIGNIFICANT CHANGE UP (ref 0–6)
GLUCOSE SERPL-MCNC: 91 MG/DL — SIGNIFICANT CHANGE UP (ref 70–99)
HCT VFR BLD CALC: 37.7 % — LOW (ref 39–50)
HGB BLD-MCNC: 12.2 G/DL — LOW (ref 13–17)
IMM GRANULOCYTES NFR BLD AUTO: 0.6 % — SIGNIFICANT CHANGE UP (ref 0–0.9)
LYMPHOCYTES # BLD AUTO: 1.3 K/UL — SIGNIFICANT CHANGE UP (ref 1–3.3)
LYMPHOCYTES # BLD AUTO: 16 % — SIGNIFICANT CHANGE UP (ref 13–44)
MAGNESIUM SERPL-MCNC: 2 MG/DL — SIGNIFICANT CHANGE UP (ref 1.6–2.6)
MCHC RBC-ENTMCNC: 30.3 PG — SIGNIFICANT CHANGE UP (ref 27–34)
MCHC RBC-ENTMCNC: 32.4 GM/DL — SIGNIFICANT CHANGE UP (ref 32–36)
MCV RBC AUTO: 93.8 FL — SIGNIFICANT CHANGE UP (ref 80–100)
METHOD TYPE: SIGNIFICANT CHANGE UP
METHOD TYPE: SIGNIFICANT CHANGE UP
MONOCYTES # BLD AUTO: 1.02 K/UL — HIGH (ref 0–0.9)
MONOCYTES NFR BLD AUTO: 12.5 % — SIGNIFICANT CHANGE UP (ref 2–14)
NEUTROPHILS # BLD AUTO: 5.47 K/UL — SIGNIFICANT CHANGE UP (ref 1.8–7.4)
NEUTROPHILS NFR BLD AUTO: 67.4 % — SIGNIFICANT CHANGE UP (ref 43–77)
NRBC # BLD: 0 /100 WBCS — SIGNIFICANT CHANGE UP (ref 0–0)
ORGANISM # SPEC MICROSCOPIC CNT: ABNORMAL
ORGANISM # SPEC MICROSCOPIC CNT: ABNORMAL
ORGANISM # SPEC MICROSCOPIC CNT: SIGNIFICANT CHANGE UP
ORGANISM # SPEC MICROSCOPIC CNT: SIGNIFICANT CHANGE UP
PLATELET # BLD AUTO: 123 K/UL — LOW (ref 150–400)
POTASSIUM SERPL-MCNC: 3.3 MMOL/L — LOW (ref 3.5–5.3)
POTASSIUM SERPL-SCNC: 3.3 MMOL/L — LOW (ref 3.5–5.3)
PROT SERPL-MCNC: 6 G/DL — SIGNIFICANT CHANGE UP (ref 6–8.3)
RBC # BLD: 4.02 M/UL — LOW (ref 4.2–5.8)
RBC # FLD: 14.5 % — SIGNIFICANT CHANGE UP (ref 10.3–14.5)
SODIUM SERPL-SCNC: 144 MMOL/L — SIGNIFICANT CHANGE UP (ref 135–145)
SPECIMEN SOURCE: SIGNIFICANT CHANGE UP
SPECIMEN SOURCE: SIGNIFICANT CHANGE UP
WBC # BLD: 8.13 K/UL — SIGNIFICANT CHANGE UP (ref 3.8–10.5)
WBC # FLD AUTO: 8.13 K/UL — SIGNIFICANT CHANGE UP (ref 3.8–10.5)

## 2024-02-02 PROCEDURE — 99233 SBSQ HOSP IP/OBS HIGH 50: CPT

## 2024-02-02 PROCEDURE — 99232 SBSQ HOSP IP/OBS MODERATE 35: CPT

## 2024-02-02 RX ORDER — BRINZOLAMIDE 10 MG/ML
1 SUSPENSION/ DROPS OPHTHALMIC DAILY
Refills: 0 | Status: DISCONTINUED | OUTPATIENT
Start: 2024-02-02 | End: 2024-02-05

## 2024-02-02 RX ORDER — ERTAPENEM SODIUM 1 G/1
1000 INJECTION, POWDER, LYOPHILIZED, FOR SOLUTION INTRAMUSCULAR; INTRAVENOUS EVERY 24 HOURS
Refills: 0 | Status: DISCONTINUED | OUTPATIENT
Start: 2024-02-02 | End: 2024-02-05

## 2024-02-02 RX ORDER — POTASSIUM CHLORIDE 20 MEQ
40 PACKET (EA) ORAL EVERY 4 HOURS
Refills: 0 | Status: COMPLETED | OUTPATIENT
Start: 2024-02-02 | End: 2024-02-02

## 2024-02-02 RX ADMIN — SENNA PLUS 2 TABLET(S): 8.6 TABLET ORAL at 21:14

## 2024-02-02 RX ADMIN — ATORVASTATIN CALCIUM 20 MILLIGRAM(S): 80 TABLET, FILM COATED ORAL at 21:14

## 2024-02-02 RX ADMIN — Medication 81 MILLIGRAM(S): at 11:05

## 2024-02-02 RX ADMIN — MEMANTINE HYDROCHLORIDE 5 MILLIGRAM(S): 10 TABLET ORAL at 21:14

## 2024-02-02 RX ADMIN — TAMSULOSIN HYDROCHLORIDE 0.4 MILLIGRAM(S): 0.4 CAPSULE ORAL at 21:14

## 2024-02-02 RX ADMIN — MEMANTINE HYDROCHLORIDE 5 MILLIGRAM(S): 10 TABLET ORAL at 05:10

## 2024-02-02 RX ADMIN — Medication 40 MILLIEQUIVALENT(S): at 11:05

## 2024-02-02 RX ADMIN — BRINZOLAMIDE 1 DROP(S): 10 SUSPENSION/ DROPS OPHTHALMIC at 17:04

## 2024-02-02 RX ADMIN — ERTAPENEM SODIUM 120 MILLIGRAM(S): 1 INJECTION, POWDER, LYOPHILIZED, FOR SOLUTION INTRAMUSCULAR; INTRAVENOUS at 14:25

## 2024-02-02 RX ADMIN — Medication 40 MILLIEQUIVALENT(S): at 16:15

## 2024-02-02 NOTE — DIETITIAN INITIAL EVALUATION ADULT - OTHER INFO
89 yo M with PMH dementia, BPH, CAD s/p 3 stent placements in 2016, GERD, HLD,  kidney stones, UTI presented with fever, chills, coughing, and multiple episodes of weakness/confusion. CT A/P no cont showing mild RIGHT hydroureteronephrosis. Patient meet sepsis criteria admitted for sepsis 2/2 renal stone, s/p stent placement, has E coli bacteremia probably secondary to E coli UTI .PT suggested ELIANE.     At time of RD visit to pts room, pt sitting up in chair, daughter in attendance . She reports pt lives with a son, eats a puree diet at home, is 5'8" # might be 150# now , eats no pork, beef and shellfish

## 2024-02-02 NOTE — PROGRESS NOTE ADULT - ASSESSMENT
89 yo M with PMH dementia, BPH, CAD s/p 3 stent placements in 2016, GERD, HLD,  kidney stones, UTI presents with fever, chills, coughing, and multiple episodes of weakness/confusion; admitted for urosepsis, consulted for cardiac clearance for Uro procedure.     Cardiac Optimization, Hx CAD s/p stents  - Patient without symptoms of angina or volumeoverload  - No clear evidence of acute ischemia, trops negative.  No need to trend  - EK bpm, sinus tachycardia with 1st degree AV block.  No ischemic changes  - Tachycardia in setting of Urosepsis.  Rates normalized, borderline melvina at times  - Stress test 2021 negative for angina, no evidence of MI with mild degree of reversible Myocardial ischemia of a small-medium size area of the basal septal and inferior walls (RCA), however may be artifact 2/2 motion     - No meaningful evidence of volume overload.  Non-orthopneic on RA.    - Previous ECHO 2022: LVEF 60%, mild TR and MR   - CAD with 3 stents.  Continue ASA, Statin    - s/p cystoscopy with retrograde pyelography with insertion of right ureteral stent.  Tolerated procedure well  - Monitor and replete lytes, keep K>4, Mg>2.  - Will continue to follow.  Stable from cardiac standpoint

## 2024-02-02 NOTE — DIETITIAN INITIAL EVALUATION ADULT - PROBLEM SELECTOR PLAN 10
CT Chest, Abdomen, Pelvis:    LUNGS AND LARGE AIRWAYS: Patent central airways. Central lower lobe bronchial mural thickening, unchanged. Stable millimeter size calcified RUL granuloma. Subcentimeter-sized Anterior ILEANA (2/27) and mid RIGHT perifissural nodules (2/40) , unchanged. No segmental airspace opacities or interval suspicious pulmonary lesions. Mild peripheral fibrotic changes are not significantly changed. Dependent pleuroparenchymal reactive change.   LIVER: Millimeter-sized calcified RIGHT hepatic granuloma  ADRENALS: Stable centimeter-sized LEFT adrenal nodule.  BOWEL: Moderate hiatus hernia/partial intrathoracic stomach, otherwise underdistended/ nonevaluable stomach. .   BONES: Thoracic kyphosis. Osteopenia. Healed LEFT rib fractures. Thoracolumbar degenerative changes produces up to moderate severe/severe L4-5 central canal stenosis.    - Patients son informed of findings and instructed to f/u with PCP about above findings and discuss need for surveillance of findings about. Outpt f/u with PCP within 4 weeks

## 2024-02-02 NOTE — DIETITIAN INITIAL EVALUATION ADULT - PROBLEM SELECTOR PLAN 1
Meets Sepsis Criteria of tachycardia, tachypnea, leukocytosis on admission, renal stone as source of infection  - WBC 12K, neutro 89% predominant   - UA 30 protein, trace ketones, positive nitrites large LE, cloudy, small blood  - s/p 1x dose Ofirmev 1g, Cefepime 2g x1 and LR 2300 cc bolus in  ER   - will continue Cefepime, switch according to sensitivity.   - Tylenol prn for mild pain and fever  - ID Aram consulted, follow rec  - Monitor CBC w/ diff  - F/U Bcx  x2 and Ucx  - Urology (Dr. Rivera) consulted, recommend ct urogram --> will decide based on imaging results if going to OR for uro stent placement; f/u recs  - Pt will be NPO except meds for possible OR   - Cardio (Dr. Green) consulted for cardiac clearance for possible stent placement

## 2024-02-02 NOTE — CHART NOTE - NSCHARTNOTEFT_GEN_A_CORE
Called by nurse to report patient agitated. Patient was in the bunch overnight under constant supervision from the RN. Patient needed to be transferred back into his room this AM, and there are concerns patient will become increasingly agitated. Day team to please reassess the need for PRN/standing agitation medications or protocols.

## 2024-02-02 NOTE — PROVIDER CONTACT NOTE (CRITICAL VALUE NOTIFICATION) - TEST AND RESULT REPORTED:
Urine culture on 1/30 greater than 100,000 e.coli
less than 10,000 cfu/ml Ecoli and ESBL
blood culture 1/30 preliminary  Growth  in aerobic bottle gram negative rods
Blood culture done Jan 30th and revealed growth aerobic bottle and gram negative marybeth. Identification available in 3-5 hours.

## 2024-02-02 NOTE — DIETITIAN INITIAL EVALUATION ADULT - FUNCTIONAL SCREEN CURRENT LEVEL: SWALLOWING (IF SCORE 2 OR MORE FOR ANY ITEM, CONSULT REHAB SERVICES), MLM)
HPI:    Patient ID: Lynette Thomas is a 46year old female. Cough  This is a new problem. Episode onset: 4-5 m. The problem has been unchanged. The problem occurs every few minutes. The cough is non-productive. Associated symptoms include wheezing.  Per tenderness. There is no rebound. Musculoskeletal: She exhibits no edema. Lymphadenopathy:     She has no cervical adenopathy. Skin: No rash noted. She is not diaphoretic. Nursing note and vitals reviewed.              ASSESSMENT/PLAN:   Post-infecti 0 = swallows foods/liquids without difficulty 2 = difficulty swallowing liquids/foods

## 2024-02-02 NOTE — PROGRESS NOTE ADULT - PROBLEM SELECTOR PLAN 1
- Sepsis secondary to UTI   - + BC/ UC  noted for E coli. Sensitivities noted  - Repeat BC NGTD   - will continue Cefepime, switch according to sensitivity. ID f/u   - Tylenol prn for mild pain and fever  - Patient confused at baseline but became agitated overnight, likely due to hospital acquired delirium. Fall precautions. Expect improvement in mental status to baseline at home   -PT suggested ELIANE. Will d/w family.   - Monitor CBC w/ diff  - S/p right Ureteral stent placement by  on 01/30/24 - Sepsis secondary to UTI   - + BC/ UC  noted for E coli. Sensitivities noted  - Repeat BC NGTD   - On ceftriaxone now,  switch according to sensitivity. ID f/u   - Tylenol prn for mild pain and fever  - Patient confused at baseline but became agitated overnight, likely due to hospital acquired delirium. Fall precautions. Expect improvement in mental status to baseline at home   -PT suggested ELIANE. D/w daughter, they do not want ELIANE but wants PT here as inpatient for another couple days and she and her brother will stay with patient all the time, at night also, so he does not get agitated. D/w SW   - Monitor CBC w/ diff  - S/p right Ureteral stent placement by IRENE on 01/30/24

## 2024-02-02 NOTE — PROGRESS NOTE ADULT - SUBJECTIVE AND OBJECTIVE BOX
Patient is a 89y old  Male who presents with a chief complaint of sepsis 2/2 renal stone (01 Feb 2024 15:13)       INTERVAL HPI/OVERNIGHT EVENTS: Patient seen and examined at bedside. Events noted for patient agitated and now on constant observation.     MEDICATIONS  (STANDING):  aspirin enteric coated 81 milliGRAM(s) Oral daily  atorvastatin 20 milliGRAM(s) Oral at bedtime  cefTRIAXone   IVPB 2000 milliGRAM(s) IV Intermittent every 24 hours  memantine 5 milliGRAM(s) Oral two times a day  senna 2 Tablet(s) Oral at bedtime  sodium chloride 0.9%. 1000 milliLiter(s) (75 mL/Hr) IV Continuous <Continuous>  tamsulosin 0.4 milliGRAM(s) Oral at bedtime    MEDICATIONS  (PRN):  polyethylene glycol 3350 17 Gram(s) Oral daily PRN Constipation      Allergies    No Known Allergies    Intolerances        REVIEW OF SYSTEMS:  Unable to obtain, confused at baseline   Vital Signs Last 24 Hrs  T(C): 37.1 (02 Feb 2024 04:25), Max: 37.1 (01 Feb 2024 20:16)  T(F): 98.7 (02 Feb 2024 04:25), Max: 98.7 (01 Feb 2024 20:16)  HR: 52 (02 Feb 2024 04:25) (52 - 60)  BP: 122/64 (02 Feb 2024 04:25) (122/64 - 155/75)  BP(mean): --  RR: 17 (02 Feb 2024 04:25) (17 - 20)  SpO2: 94% (02 Feb 2024 04:25) (91% - 94%)    Parameters below as of 02 Feb 2024 04:25  Patient On (Oxygen Delivery Method): room air        PHYSICAL EXAM:  GENERAL: NAD  HEAD:  Atraumatic, Normocephalic  EYES: EOMI, PERRLA, conjunctiva and sclera clear  ENMT: No tonsillar erythema, exudates, or enlargement; Moist mucous membranes  NECK: Supple, No JVD, Normal thyroid  NERVOUS SYSTEM:  Confused, alert, moving all extremities   CHEST/LUNG: Clear to auscultation bilaterally; No rales, rhonchi, wheezing, or rubs  HEART: S1S2+, Regular rate and rhythm  ABDOMEN: Soft, Nontender, Nondistended; Bowel sounds present  EXTREMITIES:  2+ Peripheral Pulses, No clubbing, cyanosis  LYMPH: No lymphadenopathy noted  SKIN: No rashes or lesions  LABS:      Ca    9.4        01 Feb 2024 06:36        CAPILLARY BLOOD GLUCOSE        BLOOD CULTURE  01-31 @ 13:35   No growth at 24 hours  --  --  01-31 @ 13:30   No growth at 24 hours  --  --  01-30 @ 19:48   <10,000 CFU/ml Escherichia coli  --  --  01-30 @ 10:30   10,000 - 49,000 CFU/mL Escherichia coli ESBL  --  Escherichia coli ESBL  01-30 @ 06:55   Growth in aerobic and anaerobic bottles: Escherichia coli  Direct identification is available within approximately 3-5  hours either by Blood Panel Multiplexed PCR or Direct  MALDI-TOF. Details: [email or web address]  --  Blood Culture PCR  01-30 @ 06:50   Growth in aerobic and anaerobic bottles: Escherichia coli  See previous culture [id number]  --  --    RADIOLOGY & ADDITIONAL TESTS:    Imaging Personally Reviewed:  [ ] YES     Consultant(s) Notes Reviewed:      Care Discussed with Consultants/Other Providers:

## 2024-02-02 NOTE — PROGRESS NOTE ADULT - TIME BILLING
Note written by attending. Meds, labs, vitals, chart reviewed. d/w son and daughter Note written by attending. Meds, labs, vitals, chart reviewed. D/w daughter, SW

## 2024-02-02 NOTE — DIETITIAN INITIAL EVALUATION ADULT - PROBLEM SELECTOR PLAN 4
Pt takes methenamine hippurate at home   - UA 30 protein, trace ketones, positive nitrites large LE, cloudy, small blood  - hold at this time, as pt is on cefepime for sepsis

## 2024-02-02 NOTE — DIETITIAN INITIAL EVALUATION ADULT - NSICDXPASTMEDICALHX_GEN_ALL_CORE_FT
home
PAST MEDICAL HISTORY:  Benign prostatic hyperplasia, presence of lower urinary tract symptoms unspecified, unspecified morphology     CAD (coronary artery disease)     Calculus of kidney     Calculus of ureter     Dementia     GERD (gastroesophageal reflux disease)     Hyperlipidemia     Kidney stones     UTI (urinary tract infection)

## 2024-02-02 NOTE — DIETITIAN INITIAL EVALUATION ADULT - PROBLEM SELECTOR PLAN 2
CT A&P non con: Mild RIGHT hydroureteronephrosis presumed secondary to 5 mm distal intramural RIGHT UV junction calculus (2/143). A recently passed calculus adjacent to RIGHT UV junction is considered an alternate less likely possibility.   - Urology (Dr. Rivera) consulted, recommend ct urogram --> will decide based on imaging results if going to OR for uro stent placement; f/u recs  -CT shows there is a heterogeneous enhancement 3 cm region in the right lower kidney. -> f/u Urology recs    - rest of plan as above.

## 2024-02-02 NOTE — PHARMACOTHERAPY INTERVENTION NOTE - COMMENTS
Patient is a 89 yo male admitted for E. coli bacteremia/UTI, currently ordered for ceftriaxone 2 g daily. Urine culture resulted with ESBL E. coli, sensitive to ertapenem. Discussed with DHEERAJ Chiu and recommended switch back to ertapenem 1000 mg daily for ESBL coverage. Recommendation was accepted and order was entered STAT per discussion.

## 2024-02-02 NOTE — DIETITIAN INITIAL EVALUATION ADULT - PERTINENT MEDS FT
MEDICATIONS  (STANDING):  aspirin enteric coated 81 milliGRAM(s) Oral daily  atorvastatin 20 milliGRAM(s) Oral at bedtime  cefTRIAXone   IVPB 2000 milliGRAM(s) IV Intermittent every 24 hours  memantine 5 milliGRAM(s) Oral two times a day  senna 2 Tablet(s) Oral at bedtime  sodium chloride 0.9%. 1000 milliLiter(s) (75 mL/Hr) IV Continuous <Continuous>  tamsulosin 0.4 milliGRAM(s) Oral at bedtime    MEDICATIONS  (PRN):  polyethylene glycol 3350 17 Gram(s) Oral daily PRN Constipation

## 2024-02-02 NOTE — PROVIDER CONTACT NOTE (CRITICAL VALUE NOTIFICATION) - SITUATION
Admitted for renal colic/ Acute UTI
Admitted for Acute UTI , RENAL COLIC
Admitted for renal colic
Pt was admitted for sepsis

## 2024-02-02 NOTE — PATIENT CHOICE NOTE. - NSPTCHOICESTATE_GEN_ALL_CORE

## 2024-02-02 NOTE — DIETITIAN INITIAL EVALUATION ADULT - PROBLEM SELECTOR PLAN 3
BUN/Cr: 25/1.3   - Cr 1.1 on 1/24, eGFR 51; baseline 0.8 -1.1  - s/p LR 2300 cc bolus in  ER   - start NS at rate of 75 cc/hr x 10 hours   - Avoid nephrotoxic agents.  - F/u BMP in AM

## 2024-02-02 NOTE — DIETITIAN INITIAL EVALUATION ADULT - PERTINENT LABORATORY DATA
02-01    145  |  117<H>  |  39<H>  ----------------------------<  123<H>  3.9   |  25  |  0.92    Ca    9.4      01 Feb 2024 06:36    A1C with Estimated Average Glucose Result: 5.7 % (07-05-23 @ 08:05)

## 2024-02-02 NOTE — PROVIDER CONTACT NOTE (CRITICAL VALUE NOTIFICATION) - ACTION/TREATMENT ORDERED:
on cefepime. no new order at this time
PT ON iNVANZ
No orders were given at this itme.
Pt put back on Invanz today

## 2024-02-02 NOTE — DIETITIAN INITIAL EVALUATION ADULT - ENERGY INTAKE
89 yo M with PMH dementia, BPH, CAD s/p 3 stent placements in 2016, GERD, HLD,  kidney stones, UTI presents with fever, chills, coughing, and multiple episodes of weakness/confusion. CT A/P no cont showing mild RIGHT hydroureteronephrosis presumed secondary to 5 mm distal intramural RIGHT UV junction calculus; a recently passed calculus adjacent to RIGHT UV junction is considered an alternate less likely possibility. Patient meet sepsis criteria, needs repeat CT scan and per urology needs ct urogram, then will decide if needs to go to OR for uro stent placement. Admitted for sepsis 2/2 renal stone, s/p stent placement, has E coli bacteremia probably secondary to E coli UTI          Problem/Plan - 1:  ·  Problem: Sepsis.   ·  Plan: - Sepsis secondary to UTI   - + BC/ UC  noted for E coli. Sensitivities noted  - Repeat BC NGTD   - will continue Cefepime, switch according to sensitivity. ID f/u   - Tylenol prn for mild pain and fever  - Patient confused at baseline but became agitated overnight, likely due to hospital acquired delirium. Fall precautions. Expect improvement in mental status to baseline at home   -PT suggested ELIANE. Will d/w family.   - Monitor CBC w/ diff  - S/p right Ureteral stent placement by  on 01/30/24.     Problem/Plan - 2:  ·  Problem: Renal stone.   ·  Plan: Plan as above.     Problem/Plan - 3:  ·  Problem: ATUL (acute kidney injury).   ·  Plan: S/p IVF  Monitor renal function, urine output  Avoid Nephrotoxins.     Problem/Plan - 4:  ·  Problem: Frequent UTI.   ·  Plan: Pt takes methenamine hippurate at home   - UA 30 protein, trace ketones, positive nitrites large LE, cloudy, small blood  - hold at this time, as pt is on cefepime for sepsis.     Problem/Plan - 5:  ·  Problem: HLD (hyperlipidemia).   ·  Plan: Chronic  - on atorvastatin at home  - cont. home medication.     Problem/Plan - 6:  ·  Problem: CAD (coronary artery disease).   ·  Plan: History of PCI 2016  - on ASA 81 at home  - cont. home medication  - Cardio (Dr Green) consulted.     Problem/Plan - 7:  ·  Problem: BPH (benign prostatic hyperplasia).   ·  Plan: Chronic  - on finasteride and tamsulosin.  - cont. home medications.     Problem/Plan - 8:  ·  Problem: Dementia.   ·  Plan: Chronic  - cont. home memantine.     Problem/Plan - 9:  ·  Problem: Chronic constipation.   ·  Plan: Pt takes miralax at home PRN for chronic constipation  - Start senna x 2 & miralax.     Adequate (%)  good

## 2024-02-02 NOTE — CASE MANAGEMENT PROGRESS NOTE - NSCMPROGRESSNOTE_GEN_ALL_CORE
Discussed patient in interdisciplinary rounds.  Patient to receive IVAX over weekend.  Spoke with daughter at bedside and she does not want patient to go to [facility], feels that patient will fare better at home with family assistance.  Requested referral be sent to [facility] when D/C ready.  WIll remain available.

## 2024-02-02 NOTE — PROGRESS NOTE ADULT - SUBJECTIVE AND OBJECTIVE BOX
[facility] -- Rhonda Good, Joselo Jimenez Savella, , Karen Willett  Office # [phone]    Follow Up:  Cardiac Optimization, CAD s/p stents    Subjective/Observations: Awake but confused.  Overnight events noted.  Became very restless and attempting to get OOB.  Now on constant observation.  On RA.  No respiratory discomfort noted.    REVIEW OF SYSTEMS: All other review of systems is negative unless indicated above  PAST MEDICAL & SURGICAL HISTORY:  Benign prostatic hyperplasia, presence of lower urinary tract symptoms unspecified, unspecified morphology  Hyperlipidemia  GERD (gastroesophageal reflux disease)  CAD (coronary artery disease)  UTI (urinary tract infection)  Calculus of kidney  Calculus of ureter  Dementia  Kidney stones  H/O heart artery stent  stent x 2  History of prostate surgery  S/P ureteral stent placement  Left sided  Stented coronary artery    MEDICATIONS  (STANDING):  aspirin enteric coated 81 milliGRAM(s) Oral daily  atorvastatin 20 milliGRAM(s) Oral at bedtime  cefTRIAXone   IVPB 2000 milliGRAM(s) IV Intermittent every 24 hours  memantine 5 milliGRAM(s) Oral two times a day  potassium chloride    Tablet ER 40 milliEquivalent(s) Oral every 4 hours  senna 2 Tablet(s) Oral at bedtime  sodium chloride 0.9%. 1000 milliLiter(s) (75 mL/Hr) IV Continuous <Continuous>  tamsulosin 0.4 milliGRAM(s) Oral at bedtime    MEDICATIONS  (PRN):  polyethylene glycol 3350 17 Gram(s) Oral daily PRN Constipation    Allergies    No Known Allergies    Intolerances      Vital Signs Last 24 Hrs  T(C): 37.1 (02 Feb 2024 04:25), Max: 37.1 (01 Feb 2024 20:16)  T(F): 98.7 (02 Feb 2024 04:25), Max: 98.7 (01 Feb 2024 20:16)  HR: 52 (02 Feb 2024 04:25) (52 - 60)  BP: 122/64 (02 Feb 2024 04:25) (122/64 - 155/75)  BP(mean): --  RR: 17 (02 Feb 2024 04:25) (17 - 20)  SpO2: 94% (02 Feb 2024 04:25) (91% - 94%)    Parameters below as of 02 Feb 2024 04:25  Patient On (Oxygen Delivery Method): room air    I&O's Summary    01 Feb 2024 07:01  -  02 Feb 2024 07:00  --------------------------------------------------------  IN: 675 mL / OUT: 0 mL / NET: 675 mL    PHYSICAL EXAM:  TELE: Not on tele  Constitutional: NAD, awake and alert  HEENT: Moist Mucous Membranes, Anicteric  Pulmonary: Non-labored, breath sounds are clear bilaterally, No wheezing, rales or rhonchi  Cardiovascular: Regular, S1 and S2, No murmurs, rubs, gallops or clicks  Gastrointestinal: Bowel Sounds present, soft, nontender.   Lymph: No peripheral edema. No lymphadenopathy.  Skin: No visible rashes or ulcers.  Psych:  Mood & affect: Confused, restless  LABS: All Labs Reviewed:                        12.2   8.13  )-----------( 123      ( 02 Feb 2024 08:08 )             37.7                         11.3   11.53 )-----------( 107      ( 01 Feb 2024 06:36 )             34.7                         12.5   8.07  )-----------( 96       ( 31 Jan 2024 06:35 )             38.7     02 Feb 2024 08:08    144    |  114    |  30     ----------------------------<  91     3.3     |  28     |  0.91   01 Feb 2024 06:36    145    |  117    |  39     ----------------------------<  123    3.9     |  25     |  0.92   31 Jan 2024 06:35    141    |  113    |  27     ----------------------------<  156    4.2     |  26     |  1.10     Ca    9.4        02 Feb 2024 08:08  Ca    9.4        01 Feb 2024 06:36  Ca    9.5        31 Jan 2024 06:35  Mg     2.0       02 Feb 2024 08:08    TPro  6.0    /  Alb  2.4    /  TBili  0.8    /  DBili  x      /  AST  21     /  ALT  12     /  AlkPhos  74     02 Feb 2024 08:08  TPro  6.4    /  Alb  2.4    /  TBili  1.5    /  DBili  0.4    /  AST  19     /  ALT  9      /  AlkPhos  70     31 Jan 2024 06:35    12 Lead ECG:   Ventricular Rate 102 BPM    Atrial Rate 102 BPM    P-R Interval 296 ms    QRS Duration 82 ms    Q-T Interval 306 ms    QTC Calculation(Bazett) 398 ms    R Axis 26 degrees    T Axis 7 degrees    Diagnosis Line Sinus tachycardia with 1st degree AV block  Confirmed by davey Green ([id number]) on 1/30/2024 1:56:28 PM (01-30-24 @ 07:51)    ACC: [id number] EXAM:  ECHO TTE WO CON COMP W DOPP                          PROCEDURE DATE:  11/28/2022      INTERPRETATION:  INDICATION: Abnormal EKG  Sonographer AS    Blood Pressure 117/77    Height 170 cm     Weight 73 kg       BSA 1.84 sq   m    Dimensions:  LA 3.1       Normal Values: 2.0 - 4.0 cm  Ao 3.4        Normal Values: 2.0 - 3.8 cm  SEPTUM 1.0       Normal Values: 0.6 - 1.2 cm  PWT 0.9       Normal Values: 0.6 - 1.1 cm  LVIDd 3.1         Normal Values: 3.0 - 5.6 cm  LVIDs 2.2     Normal Values: 1.8 - 4.0 cm    OBSERVATIONS:  Mitral Valve: Mild MR.  Aortic Valve/Aorta: Calcified trileaflet aortic valve with grossly normal   opening.  Tricuspid Valve: Mild TR.  Pulmonic Valve: Trace PI  Left Atrium: normal  Right Atrium:Not well-visualized  Left Ventricle: normal LV size and systolic function, estimated LVEF of   60%.  Right Ventricle: Grossly normal size and systolic function.  Pericardium: no significant pericardial effusion.  Pulmonary/RV Pressure: estimated PA systolic pressure of at least 19 mmHg   assuming an RA pressure of 3 mmHg.    IMPRESSION:  Normal left ventricular internal dimensions and systolic function,   estimated LVEF of 60%.  Grossly normal RV size and systolic function.  Calcified trileaflet aortic valve with grossly normal opening, without AI.  Mild MR and TR.  No significant pericardial effusion.    --- End of Report ---    ORLANDO MATTHEW MD; Attending Cardiologist  This document has been electronically signed. Nov 29 2022  4:46PM

## 2024-02-02 NOTE — PROGRESS NOTE ADULT - SUBJECTIVE AND OBJECTIVE BOX
[facility]  INFECTIOUS DISEASES   [address] [place] [address]  Tel: [phone]     Fax: [phone]  ========================================================  MD Ariane Tillman Kaushal, MD Cho, Michelle, MD Sunjit, Jaspal, MD  ========================================================    N-[id number]  IDRIS DANIEL     Follow up: Septic stone, bacteremia     Doing well, No complaint, daughter at the bedside. No fever.   No pain or urinary symptoms.     PAST MEDICAL & SURGICAL HISTORY:  Benign prostatic hyperplasia, presence of lower urinary tract symptoms unspecified, unspecified morphology  Hyperlipidemia  GERD (gastroesophageal reflux disease)  CAD (coronary artery disease)  UTI (urinary tract infection)  Calculus of kidney  Calculus of ureter  Dementia  Kidney stones  H/O heart artery stent  stent x 2  History of prostate surgery  S/P ureteral stent placement  Left sided  Stented coronary artery    Social Hx: No current smoking, EtOH or drugs     FAMILY HISTORY:  FH: hypertension (Child)    Allergies  No Known Allergies    MEDICATIONS  (STANDING):  aspirin enteric coated 81 milliGRAM(s) Oral daily  atorvastatin 20 milliGRAM(s) Oral at bedtime  cefTRIAXone   IVPB 2000 milliGRAM(s) IV Intermittent every 24 hours  memantine 5 milliGRAM(s) Oral two times a day  potassium chloride    Tablet ER 40 milliEquivalent(s) Oral every 4 hours  senna 2 Tablet(s) Oral at bedtime  sodium chloride 0.9%. 1000 milliLiter(s) (75 mL/Hr) IV Continuous <Continuous>  tamsulosin 0.4 milliGRAM(s) Oral at bedtime    REVIEW OF SYSTEMS:  CONSTITUTIONAL:  No Fever or chills  HEENT:  No diplopia or blurred vision.  No sore throat or runny nose.  CARDIOVASCULAR:  No chest pain   RESPIRATORY:  No cough, shortness of breath, PND or orthopnea.  GASTROINTESTINAL:  No nausea, vomiting or diarrhea.  GENITOURINARY:  No dysuria, frequency or urgency. No Blood in urine  MUSCULOSKELETAL:  no joint aches, no muscle pain  SKIN:  No change in skin, hair or nails.    Physical Exam:  Vital Signs Last 24 Hrs  T(C): 36.7 (02 Feb 2024 11:31), Max: 37.1 (01 Feb 2024 20:16)  T(F): 98 (02 Feb 2024 11:31), Max: 98.7 (01 Feb 2024 20:16)  HR: 55 (02 Feb 2024 11:31) (52 - 60)  BP: 124/70 (02 Feb 2024 11:31) (122/64 - 155/75)  BP(mean): --  RR: 18 (02 Feb 2024 11:31) (17 - 18)  SpO2: 96% (02 Feb 2024 11:31) (94% - 96%)  Parameters below as of 02 Feb 2024 11:31  Patient On (Oxygen Delivery Method): room air  GEN: NAD  HEENT: normocephalic and atraumatic. EOMI. PERRL.    NECK: Supple.  No lymphadenopathy   LUNGS: occasional rhonchi   HEART: Regular rate and rhythm   ABDOMEN: Soft, nontender, and nondistended.  Positive bowel sounds.    : No CVA tenderness  EXTREMITIES: Without edema.  NEUROLOGIC: grossly intact.  PSYCHIATRIC: Appropriate affect .  SKIN: No rash       Labs:                        12.2   8.13  )-----------( 123      ( 02 Feb 2024 08:08 )             37.7     02-02    144  |  114<H>  |  30<H>  ----------------------------<  91  3.3<L>   |  28  |  0.91    Ca    9.4      02 Feb 2024 08:08  Mg     2.0     02-02    TPro  6.0  /  Alb  2.4<L>  /  TBili  0.8  /  DBili  x   /  AST  21  /  ALT  12  /  AlkPhos  74  02-02      Culture - Blood (collected 01-31-24 @ 13:35)  Source: .Blood Blood  Preliminary Report (02-01-24 @ 19:02):    No growth at 24 hours    Culture - Blood (collected 01-31-24 @ 13:30)  Source: .Blood Blood  Preliminary Report (02-01-24 @ 19:02):    No growth at 24 hours    Culture - Urine (collected 01-30-24 @ 19:48)  Source: Kidney Kidney  Preliminary Report (02-01-24 @ 23:07):    <10,000 CFU/ml Escherichia coli    Culture - Urine (collected 01-30-24 @ 10:30)  Source: Clean Catch Clean Catch (Midstream)  Final Report (02-02-24 @ 07:36):    10,000 - 49,000 CFU/mL Escherichia coli ESBL  Organism: Escherichia coli ESBL (02-02-24 @ 07:36)  Organism: Escherichia coli ESBL (02-02-24 @ 07:36)    Sensitivities:      Method Type: LUIS      -  Amoxicillin/Clavulanic Acid: I 16/8      -  Ampicillin: R >16 These ampicillin results predict results for amoxicillin      -  Ampicillin/Sulbactam: I 16/8      -  Aztreonam: R >16      -  Cefazolin: R >16 For uncomplicated UTI with K. pneumoniae, E. coli, or P. mirablis: LUIS <=16 is sensitive and LUIS >=32 is resistant. This also predicts results for oral agents cefaclor, cefdinir, cefpodoxime, cefprozil, cefuroxime axetil, cephalexin and locarbef for uncomplicated UTI. Note that some isolates may be susceptible to these agents while testing resistant to cefazolin.      -  Cefepime: R >16      -  Ceftriaxone: R >32      -  Cefuroxime: R >16      -  Ciprofloxacin: R >2      -  Ertapenem: S <=0.5      -  Gentamicin: S <=2      -  Imipenem: S <=1      -  Levofloxacin: R >4      -  Meropenem: S <=1      -  Nitrofurantoin: S <=32 Should not be used to treat pyelonephritis      -  Piperacillin/Tazobactam: S <=8      -  Tobramycin: R >8      -  Trimethoprim/Sulfamethoxazole: R >2/38    Culture - Blood (collected 01-30-24 @ 06:55)  Source: .Blood Blood-Peripheral  Gram Stain (01-31-24 @ 10:25):    Growth in aerobic bottle: Gram Negative Rods    Growth in anaerobic bottle: Gram Negative Rods  Final Report (02-01-24 @ 15:08):    Growth in aerobic and anaerobic bottles: Escherichia coli    Direct identification is available within approximately 3-5    hours either by Blood Panel Multiplexed PCR or Direct    MALDI-TOF. Details: [email or web address]  Organism: Blood Culture PCR  Escherichia coli (02-01-24 @ 15:08)  Organism: Escherichia coli (02-01-24 @ 15:08)    Sensitivities:      Method Type: LUIS      -  Ampicillin: R >16 These ampicillin results predict results for amoxicillin      -  Ampicillin/Sulbactam: I 16/8      -  Aztreonam: S <=4      -  Cefazolin: I 4      -  Cefepime: S <=2      -  Cefoxitin: S <=8      -  Ceftriaxone: S <=1      -  Ciprofloxacin: R >2      -  Ertapenem: S <=0.5      -  Gentamicin: R >8      -  Imipenem: S <=1      -  Levofloxacin: R >4      -  Meropenem: S <=1      -  Piperacillin/Tazobactam: S <=8      -  Tobramycin: R 8      -  Trimethoprim/Sulfamethoxazole: S 2/38  Organism: Blood Culture PCR (02-01-24 @ 15:08)    Sensitivities:      Method Type: PCR      -  Escherichia coli: Detec    Culture - Blood (collected 01-30-24 @ 06:50)  Source: .Blood Blood-Peripheral  Gram Stain (02-01-24 @ 06:30):    Growth in aerobic bottle: Gram Negative Rods    Growth in anaerobic bottle: Gram Negative Rods  Final Report (02-01-24 @ 19:26):    Growth in aerobic and anaerobic bottles: Escherichia coli    See previous culture [id number]    Culture - Blood (collected 07-04-23 @ 19:00)  Source: .Blood Blood-Peripheral  Final Report (07-10-23 @ 01:01):    No growth at 5 days    Culture - Blood (collected 07-04-23 @ 18:50)  Source: .Blood Blood-Peripheral  Final Report (07-10-23 @ 01:01):    No growth at 5 days    Culture - Urine (collected 11-24-22 @ 21:50)  Source: Clean Catch Clean Catch (Midstream)  Final Report (11-27-22 @ 14:39):    >100,000 CFU/ml Escherichia coli  Organism: Escherichia coli (11-27-22 @ 14:39)  Organism: Escherichia coli (11-27-22 @ 14:39)    Sensitivities:      Method Type: LUIS      -  Amikacin: S <=16      -  Amoxicillin/Clavulanic Acid: S <=8/4      -  Ampicillin: S <=8 These ampicillin results predict results for amoxicillin      -  Ampicillin/Sulbactam: S <=4/2 Enterobacter, Klebsiella aerogenes, Citrobacter, and Serratia may develop resistance during prolonged therapy (3-4 days)      -  Aztreonam: S <=4      -  Cefazolin: S 16 For uncomplicated UTI with K. pneumoniae, E. coli, or P. mirablis: LUIS <=16 is sensitive and LUIS >=32 is resistant. This also predicts results for oral agents cefaclor, cefdinir, cefpodoxime, cefprozil, cefuroxime axetil, cephalexin and locarbeffor uncomplicated UTI. Note that some isolates may be susceptible to these agents while testing resistant to cefazolin.      -  Cefepime: S <=2      -  Cefoxitin: S <=8      -  Ceftriaxone: S <=1 Enterobacter, Klebsiella aerogenes, Citrobacter, and Serratia may develop resistance during prolonged therapy      -  Ciprofloxacin: R >2      -  Ertapenem: S <=0.5      -  Gentamicin: S <=2      -  Imipenem: S <=1      -  Levofloxacin: R >4      -  Meropenem: S <=1      -  Nitrofurantoin: S <=32 Should not be used to treat pyelonephritis      -  Piperacillin/Tazobactam: S <=8      -  Tobramycin: S <=2      -  Trimethoprim/Sulfamethoxazole: S <=0.5/9.5    Culture - Blood (collected 11-24-22 @ 20:50)  Source: .Blood Blood-Peripheral  Final Report (11-30-22 @ 01:00):    No Growth Final    Culture - Blood (collected 11-24-22 @ 20:40)  Source: .Blood Blood-Peripheral  Final Report (11-30-22 @ 01:00):    No Growth Final    WBC Count: 8.13 K/uL (02-02-24 @ 08:08)  WBC Count: 11.53 K/uL (02-01-24 @ 06:36)  WBC Count: 8.07 K/uL (01-31-24 @ 06:35)  WBC Count: 12.97 K/uL (01-30-24 @ 06:55)  WBC Count: 11.81 K/uL (01-28-24 @ 20:50)    Creatinine: 0.91 mg/dL (02-02-24 @ 08:08)  Creatinine: 0.92 mg/dL (02-01-24 @ 06:36)  Creatinine: 1.10 mg/dL (01-31-24 @ 06:35)  Creatinine: 1.30 mg/dL (01-30-24 @ 06:55)  Creatinine: 1.10 mg/dL (01-28-24 @ 20:50)     SARS-CoV-2: NotDetec (01-30-24 @ 06:55)  SARS-CoV-2 Result: NotDetec (01-28-24 @ 20:50)    All imaging and other data have been reviewed.  < from: CT Abdomen and Pelvis Urogram w/wo IV Cont (01.30.24 @ 11:46) >  IMPRESSION:  Interval placement of a Nj catheter with decompression of the bladder.   Right mild hydronephrosis has resolved and the right ureter is collapsed,   suggesting prior hydronephrosis was related to bladder distention. The   calcification is again seen along the right posterior lateral aspect of   the bladder, but is favored to be a recently passed bladder calculus or   wall calcification.The excretory phase shows symmetric contrast   throughout the ureters suggesting no obstruction.    Assessment and Plan:   89 yo man with PMH of dementia, BPH and kidney stones presents with fever, chills, weakness and confusion.     CT A/P no cont: SHOWS Mild RIGHT hydroureteronephrosis presumed secondary to 5 mm distal intramural RIGHT UV junction calculus (2/143). A recently passed calculus adjacent to RIGHT UV junction is considered an alternate less likely possibility.  So was taken to OR by  and had R stent placed.  Blood cultures with EColi S to ceftriaxone   UA with a very high WBC  UC with low CFU ESBL ecoli   OR culture with <10,000 Ecoli  Was on ertapenem but later switched to ceftriaxone     # Bacteremia  # UTI  # Renal stone s/p stent     - Repeat blood cultures are NGTD   - Switched to ceftriaxone yesterday, can continue while in the hospital  - When ready for discharge can start on Bactrim DS q12 until 2/8  - Based on cultures results will modify ABx regimen  - WBC, creat and Tmax all normal   - Urology follow up after discharge     Will sign off please call with any question.   D/W daughter at the bedside.     Tyler Chiu MD  Division of Infectious Diseases   Please call ID service at [phone] with any question.    50 minutes spent on total encounter assessing patient, examination, chart review, counseling and coordinating care by the attending physician/nurse/care manager.   [facility]  INFECTIOUS DISEASES   [address] [place] [address]  Tel: [phone]     Fax: [phone]  ========================================================  MD Ariane Tillman Kaushal, MD Cho, Michelle, MD Sunjit, Jaspal, MD  ========================================================    N-[id number]  IDRIS DANIEL     Follow up: Septic stone, bacteremia     Doing well, No complaint, daughter at the bedside. No fever.   No pain or urinary symptoms.     PAST MEDICAL & SURGICAL HISTORY:  Benign prostatic hyperplasia, presence of lower urinary tract symptoms unspecified, unspecified morphology  Hyperlipidemia  GERD (gastroesophageal reflux disease)  CAD (coronary artery disease)  UTI (urinary tract infection)  Calculus of kidney  Calculus of ureter  Dementia  Kidney stones  H/O heart artery stent  stent x 2  History of prostate surgery  S/P ureteral stent placement  Left sided  Stented coronary artery    Social Hx: No current smoking, EtOH or drugs     FAMILY HISTORY:  FH: hypertension (Child)    Allergies  No Known Allergies    MEDICATIONS  (STANDING):  aspirin enteric coated 81 milliGRAM(s) Oral daily  atorvastatin 20 milliGRAM(s) Oral at bedtime  cefTRIAXone   IVPB 2000 milliGRAM(s) IV Intermittent every 24 hours  memantine 5 milliGRAM(s) Oral two times a day  potassium chloride    Tablet ER 40 milliEquivalent(s) Oral every 4 hours  senna 2 Tablet(s) Oral at bedtime  sodium chloride 0.9%. 1000 milliLiter(s) (75 mL/Hr) IV Continuous <Continuous>  tamsulosin 0.4 milliGRAM(s) Oral at bedtime    REVIEW OF SYSTEMS:  CONSTITUTIONAL:  No Fever or chills  HEENT:  No diplopia or blurred vision.  No sore throat or runny nose.  CARDIOVASCULAR:  No chest pain   RESPIRATORY:  No cough, shortness of breath, PND or orthopnea.  GASTROINTESTINAL:  No nausea, vomiting or diarrhea.  GENITOURINARY:  No dysuria, frequency or urgency. No Blood in urine  MUSCULOSKELETAL:  no joint aches, no muscle pain  SKIN:  No change in skin, hair or nails.    Physical Exam:  Vital Signs Last 24 Hrs  T(C): 36.7 (02 Feb 2024 11:31), Max: 37.1 (01 Feb 2024 20:16)  T(F): 98 (02 Feb 2024 11:31), Max: 98.7 (01 Feb 2024 20:16)  HR: 55 (02 Feb 2024 11:31) (52 - 60)  BP: 124/70 (02 Feb 2024 11:31) (122/64 - 155/75)  BP(mean): --  RR: 18 (02 Feb 2024 11:31) (17 - 18)  SpO2: 96% (02 Feb 2024 11:31) (94% - 96%)  Parameters below as of 02 Feb 2024 11:31  Patient On (Oxygen Delivery Method): room air  GEN: NAD  HEENT: normocephalic and atraumatic. EOMI. PERRL.    NECK: Supple.  No lymphadenopathy   LUNGS: occasional rhonchi   HEART: Regular rate and rhythm   ABDOMEN: Soft, nontender, and nondistended.  Positive bowel sounds.    : No CVA tenderness  EXTREMITIES: Without edema.  NEUROLOGIC: grossly intact.  PSYCHIATRIC: Appropriate affect .  SKIN: No rash       Labs:                        12.2   8.13  )-----------( 123      ( 02 Feb 2024 08:08 )             37.7     02-02    144  |  114<H>  |  30<H>  ----------------------------<  91  3.3<L>   |  28  |  0.91    Ca    9.4      02 Feb 2024 08:08  Mg     2.0     02-02    TPro  6.0  /  Alb  2.4<L>  /  TBili  0.8  /  DBili  x   /  AST  21  /  ALT  12  /  AlkPhos  74  02-02      Culture - Blood (collected 01-31-24 @ 13:35)  Source: .Blood Blood  Preliminary Report (02-01-24 @ 19:02):    No growth at 24 hours    Culture - Blood (collected 01-31-24 @ 13:30)  Source: .Blood Blood  Preliminary Report (02-01-24 @ 19:02):    No growth at 24 hours    Culture - Urine (collected 01-30-24 @ 19:48)  Source: Kidney Kidney  Preliminary Report (02-01-24 @ 23:07):    <10,000 CFU/ml Escherichia coli    Culture - Urine (collected 01-30-24 @ 10:30)  Source: Clean Catch Clean Catch (Midstream)  Final Report (02-02-24 @ 07:36):    10,000 - 49,000 CFU/mL Escherichia coli ESBL  Organism: Escherichia coli ESBL (02-02-24 @ 07:36)  Organism: Escherichia coli ESBL (02-02-24 @ 07:36)    Sensitivities:      Method Type: LUIS      -  Amoxicillin/Clavulanic Acid: I 16/8      -  Ampicillin: R >16 These ampicillin results predict results for amoxicillin      -  Ampicillin/Sulbactam: I 16/8      -  Aztreonam: R >16      -  Cefazolin: R >16 For uncomplicated UTI with K. pneumoniae, E. coli, or P. mirablis: LUIS <=16 is sensitive and LUIS >=32 is resistant. This also predicts results for oral agents cefaclor, cefdinir, cefpodoxime, cefprozil, cefuroxime axetil, cephalexin and locarbef for uncomplicated UTI. Note that some isolates may be susceptible to these agents while testing resistant to cefazolin.      -  Cefepime: R >16      -  Ceftriaxone: R >32      -  Cefuroxime: R >16      -  Ciprofloxacin: R >2      -  Ertapenem: S <=0.5      -  Gentamicin: S <=2      -  Imipenem: S <=1      -  Levofloxacin: R >4      -  Meropenem: S <=1      -  Nitrofurantoin: S <=32 Should not be used to treat pyelonephritis      -  Piperacillin/Tazobactam: S <=8      -  Tobramycin: R >8      -  Trimethoprim/Sulfamethoxazole: R >2/38    Culture - Blood (collected 01-30-24 @ 06:55)  Source: .Blood Blood-Peripheral  Gram Stain (01-31-24 @ 10:25):    Growth in aerobic bottle: Gram Negative Rods    Growth in anaerobic bottle: Gram Negative Rods  Final Report (02-01-24 @ 15:08):    Growth in aerobic and anaerobic bottles: Escherichia coli    Direct identification is available within approximately 3-5    hours either by Blood Panel Multiplexed PCR or Direct    MALDI-TOF. Details: [email or web address]  Organism: Blood Culture PCR  Escherichia coli (02-01-24 @ 15:08)  Organism: Escherichia coli (02-01-24 @ 15:08)    Sensitivities:      Method Type: LUIS      -  Ampicillin: R >16 These ampicillin results predict results for amoxicillin      -  Ampicillin/Sulbactam: I 16/8      -  Aztreonam: S <=4      -  Cefazolin: I 4      -  Cefepime: S <=2      -  Cefoxitin: S <=8      -  Ceftriaxone: S <=1      -  Ciprofloxacin: R >2      -  Ertapenem: S <=0.5      -  Gentamicin: R >8      -  Imipenem: S <=1      -  Levofloxacin: R >4      -  Meropenem: S <=1      -  Piperacillin/Tazobactam: S <=8      -  Tobramycin: R 8      -  Trimethoprim/Sulfamethoxazole: S 2/38  Organism: Blood Culture PCR (02-01-24 @ 15:08)    Sensitivities:      Method Type: PCR      -  Escherichia coli: Detec    Culture - Blood (collected 01-30-24 @ 06:50)  Source: .Blood Blood-Peripheral  Gram Stain (02-01-24 @ 06:30):    Growth in aerobic bottle: Gram Negative Rods    Growth in anaerobic bottle: Gram Negative Rods  Final Report (02-01-24 @ 19:26):    Growth in aerobic and anaerobic bottles: Escherichia coli    See previous culture [id number]    Culture - Blood (collected 07-04-23 @ 19:00)  Source: .Blood Blood-Peripheral  Final Report (07-10-23 @ 01:01):    No growth at 5 days    Culture - Blood (collected 07-04-23 @ 18:50)  Source: .Blood Blood-Peripheral  Final Report (07-10-23 @ 01:01):    No growth at 5 days    Culture - Urine (collected 11-24-22 @ 21:50)  Source: Clean Catch Clean Catch (Midstream)  Final Report (11-27-22 @ 14:39):    >100,000 CFU/ml Escherichia coli  Organism: Escherichia coli (11-27-22 @ 14:39)  Organism: Escherichia coli (11-27-22 @ 14:39)    Sensitivities:      Method Type: LUIS      -  Amikacin: S <=16      -  Amoxicillin/Clavulanic Acid: S <=8/4      -  Ampicillin: S <=8 These ampicillin results predict results for amoxicillin      -  Ampicillin/Sulbactam: S <=4/2 Enterobacter, Klebsiella aerogenes, Citrobacter, and Serratia may develop resistance during prolonged therapy (3-4 days)      -  Aztreonam: S <=4      -  Cefazolin: S 16 For uncomplicated UTI with K. pneumoniae, E. coli, or P. mirablis: LUIS <=16 is sensitive and LUIS >=32 is resistant. This also predicts results for oral agents cefaclor, cefdinir, cefpodoxime, cefprozil, cefuroxime axetil, cephalexin and locarbeffor uncomplicated UTI. Note that some isolates may be susceptible to these agents while testing resistant to cefazolin.      -  Cefepime: S <=2      -  Cefoxitin: S <=8      -  Ceftriaxone: S <=1 Enterobacter, Klebsiella aerogenes, Citrobacter, and Serratia may develop resistance during prolonged therapy      -  Ciprofloxacin: R >2      -  Ertapenem: S <=0.5      -  Gentamicin: S <=2      -  Imipenem: S <=1      -  Levofloxacin: R >4      -  Meropenem: S <=1      -  Nitrofurantoin: S <=32 Should not be used to treat pyelonephritis      -  Piperacillin/Tazobactam: S <=8      -  Tobramycin: S <=2      -  Trimethoprim/Sulfamethoxazole: S <=0.5/9.5    Culture - Blood (collected 11-24-22 @ 20:50)  Source: .Blood Blood-Peripheral  Final Report (11-30-22 @ 01:00):    No Growth Final    Culture - Blood (collected 11-24-22 @ 20:40)  Source: .Blood Blood-Peripheral  Final Report (11-30-22 @ 01:00):    No Growth Final    WBC Count: 8.13 K/uL (02-02-24 @ 08:08)  WBC Count: 11.53 K/uL (02-01-24 @ 06:36)  WBC Count: 8.07 K/uL (01-31-24 @ 06:35)  WBC Count: 12.97 K/uL (01-30-24 @ 06:55)  WBC Count: 11.81 K/uL (01-28-24 @ 20:50)    Creatinine: 0.91 mg/dL (02-02-24 @ 08:08)  Creatinine: 0.92 mg/dL (02-01-24 @ 06:36)  Creatinine: 1.10 mg/dL (01-31-24 @ 06:35)  Creatinine: 1.30 mg/dL (01-30-24 @ 06:55)  Creatinine: 1.10 mg/dL (01-28-24 @ 20:50)     SARS-CoV-2: NotDetec (01-30-24 @ 06:55)  SARS-CoV-2 Result: NotDetec (01-28-24 @ 20:50)    All imaging and other data have been reviewed.  < from: CT Abdomen and Pelvis Urogram w/wo IV Cont (01.30.24 @ 11:46) >  IMPRESSION:  Interval placement of a Nj catheter with decompression of the bladder.   Right mild hydronephrosis has resolved and the right ureter is collapsed,   suggesting prior hydronephrosis was related to bladder distention. The   calcification is again seen along the right posterior lateral aspect of   the bladder, but is favored to be a recently passed bladder calculus or   wall calcification.The excretory phase shows symmetric contrast   throughout the ureters suggesting no obstruction.    Assessment and Plan:   89 yo man with PMH of dementia, BPH and kidney stones presents with fever, chills, weakness and confusion.     CT A/P no cont: SHOWS Mild RIGHT hydroureteronephrosis presumed secondary to 5 mm distal intramural RIGHT UV junction calculus (2/143). A recently passed calculus adjacent to RIGHT UV junction is considered an alternate less likely possibility.  So was taken to OR by  and had R stent placed.  Blood cultures with EColi S to ceftriaxone   UA with a very high WBC  UC with low CFU ESBL ecoli   OR culture with <10,000 Ecoli  Was on ertapenem but later switched to ceftriaxone on 2/1    # Bacteremia  # UTI  # Renal stone s/p stent     - Repeat blood cultures are NGTD   - Switched to ceftriaxone yesterday, can go back to ertapenem since UC is ESBL now  - When ready for discharge can start on Bactrim DS q12 until 2/8  - Based on cultures results will modify ABx regimen  - WBC, creat and Tmax all normal   - Urology follow up after discharge     Will sign off please call with any question.   D/W daughter at the bedside.     Tyler Chiu MD  Division of Infectious Diseases   Please call ID service at [phone] with any question.    50 minutes spent on total encounter assessing patient, examination, chart review, counseling and coordinating care by the attending physician/nurse/care manager.

## 2024-02-03 LAB
ALBUMIN SERPL ELPH-MCNC: 2.3 G/DL — LOW (ref 3.3–5)
ALP SERPL-CCNC: 73 U/L — SIGNIFICANT CHANGE UP (ref 40–120)
ALT FLD-CCNC: 13 U/L — SIGNIFICANT CHANGE UP (ref 12–78)
ANION GAP SERPL CALC-SCNC: 0 MMOL/L — LOW (ref 5–17)
AST SERPL-CCNC: 19 U/L — SIGNIFICANT CHANGE UP (ref 15–37)
BILIRUB SERPL-MCNC: 0.7 MG/DL — SIGNIFICANT CHANGE UP (ref 0.2–1.2)
BUN SERPL-MCNC: 24 MG/DL — HIGH (ref 7–23)
CALCIUM SERPL-MCNC: 9.4 MG/DL — SIGNIFICANT CHANGE UP (ref 8.5–10.1)
CHLORIDE SERPL-SCNC: 114 MMOL/L — HIGH (ref 96–108)
CO2 SERPL-SCNC: 29 MMOL/L — SIGNIFICANT CHANGE UP (ref 22–31)
CREAT SERPL-MCNC: 0.86 MG/DL — SIGNIFICANT CHANGE UP (ref 0.5–1.3)
EGFR: 80 ML/MIN/1.73M2 — SIGNIFICANT CHANGE UP
GLUCOSE SERPL-MCNC: 102 MG/DL — HIGH (ref 70–99)
HCT VFR BLD CALC: 37.6 % — LOW (ref 39–50)
HGB BLD-MCNC: 12.3 G/DL — LOW (ref 13–17)
MCHC RBC-ENTMCNC: 30.5 PG — SIGNIFICANT CHANGE UP (ref 27–34)
MCHC RBC-ENTMCNC: 32.7 GM/DL — SIGNIFICANT CHANGE UP (ref 32–36)
MCV RBC AUTO: 93.3 FL — SIGNIFICANT CHANGE UP (ref 80–100)
NRBC # BLD: 0 /100 WBCS — SIGNIFICANT CHANGE UP (ref 0–0)
PLATELET # BLD AUTO: 130 K/UL — LOW (ref 150–400)
POTASSIUM SERPL-MCNC: 4.3 MMOL/L — SIGNIFICANT CHANGE UP (ref 3.5–5.3)
POTASSIUM SERPL-SCNC: 4.3 MMOL/L — SIGNIFICANT CHANGE UP (ref 3.5–5.3)
PROT SERPL-MCNC: 5.9 G/DL — LOW (ref 6–8.3)
RBC # BLD: 4.03 M/UL — LOW (ref 4.2–5.8)
RBC # FLD: 14.1 % — SIGNIFICANT CHANGE UP (ref 10.3–14.5)
SODIUM SERPL-SCNC: 143 MMOL/L — SIGNIFICANT CHANGE UP (ref 135–145)
WBC # BLD: 7.24 K/UL — SIGNIFICANT CHANGE UP (ref 3.8–10.5)
WBC # FLD AUTO: 7.24 K/UL — SIGNIFICANT CHANGE UP (ref 3.8–10.5)

## 2024-02-03 PROCEDURE — 99232 SBSQ HOSP IP/OBS MODERATE 35: CPT

## 2024-02-03 PROCEDURE — 99233 SBSQ HOSP IP/OBS HIGH 50: CPT

## 2024-02-03 RX ADMIN — BRINZOLAMIDE 1 DROP(S): 10 SUSPENSION/ DROPS OPHTHALMIC at 12:16

## 2024-02-03 RX ADMIN — Medication 81 MILLIGRAM(S): at 12:16

## 2024-02-03 RX ADMIN — SENNA PLUS 2 TABLET(S): 8.6 TABLET ORAL at 21:22

## 2024-02-03 RX ADMIN — TAMSULOSIN HYDROCHLORIDE 0.4 MILLIGRAM(S): 0.4 CAPSULE ORAL at 21:23

## 2024-02-03 RX ADMIN — MEMANTINE HYDROCHLORIDE 5 MILLIGRAM(S): 10 TABLET ORAL at 21:22

## 2024-02-03 RX ADMIN — MEMANTINE HYDROCHLORIDE 5 MILLIGRAM(S): 10 TABLET ORAL at 05:31

## 2024-02-03 RX ADMIN — ERTAPENEM SODIUM 120 MILLIGRAM(S): 1 INJECTION, POWDER, LYOPHILIZED, FOR SOLUTION INTRAMUSCULAR; INTRAVENOUS at 13:21

## 2024-02-03 RX ADMIN — ATORVASTATIN CALCIUM 20 MILLIGRAM(S): 80 TABLET, FILM COATED ORAL at 21:21

## 2024-02-03 NOTE — PROGRESS NOTE ADULT - SUBJECTIVE AND OBJECTIVE BOX
[facility] -- Latisha Ash,  Rhonda, Joselo Jimenez Savella, Goodger, Cohen  Office # [phone]    Follow Up:  Cardiac Optimization, CAD s/p stents    Subjective/Observations: No events overnight resting comfortably in bed.  Unable to provide meaningful information. Doesn't appear to be in acute distress, on RA.      REVIEW OF SYSTEMS: All other review of systems is negative unless indicated above  PAST MEDICAL & SURGICAL HISTORY:  Benign prostatic hyperplasia, presence of lower urinary tract symptoms unspecified, unspecified morphology      Hyperlipidemia      GERD (gastroesophageal reflux disease)      CAD (coronary artery disease)      UTI (urinary tract infection)      Calculus of kidney      Calculus of ureter      Dementia      Kidney stones      H/O heart artery stent  stent x 2      History of prostate surgery      S/P ureteral stent placement  Left sided      Stented coronary artery        MEDICATIONS  (STANDING):  aspirin enteric coated 81 milliGRAM(s) Oral daily  atorvastatin 20 milliGRAM(s) Oral at bedtime  brinzolamide 1% Ophthalmic Suspension 1 Drop(s) Both EYES daily  ertapenem  IVPB 1000 milliGRAM(s) IV Intermittent every 24 hours  memantine 5 milliGRAM(s) Oral two times a day  senna 2 Tablet(s) Oral at bedtime  sodium chloride 0.9%. 1000 milliLiter(s) (75 mL/Hr) IV Continuous <Continuous>  tamsulosin 0.4 milliGRAM(s) Oral at bedtime    MEDICATIONS  (PRN):  polyethylene glycol 3350 17 Gram(s) Oral daily PRN Constipation    Allergies    No Known Allergies    Intolerances      Vital Signs Last 24 Hrs  T(C): 36.6 (03 Feb 2024 04:46), Max: 37.1 (02 Feb 2024 21:33)  T(F): 97.9 (03 Feb 2024 04:46), Max: 98.8 (02 Feb 2024 21:33)  HR: 58 (03 Feb 2024 04:46) (55 - 64)  BP: 153/75 (03 Feb 2024 04:46) (124/70 - 155/75)  BP(mean): --  RR: 18 (03 Feb 2024 04:46) (18 - 18)  SpO2: 94% (03 Feb 2024 04:46) (94% - 96%)    Parameters below as of 03 Feb 2024 04:46  Patient On (Oxygen Delivery Method): room air      I&O's Summary    02 Feb 2024 07:01  -  03 Feb 2024 07:00  --------------------------------------------------------  IN: 575 mL / OUT: 300 mL / NET: 275 mL        TELE: off  PHYSICAL EXAM:  Constitutional: NAD, awake and alert  HEENT: Moist Mucous Membranes, Anicteric  Pulmonary: Non-labored, breath sounds are clear bilaterally, No wheezing, rales or rhonchi  Cardiovascular: Regular, S1 and S2, No murmurs, rubs, gallops or clicks  Gastrointestinal: Bowel Sounds present, soft, nontender.   Lymph: No peripheral edema. No lymphadenopathy.  Skin: No visible rashes or ulcers.  Psych:  Mood & affect: Confused  LABS: All Labs Reviewed:                        12.3   7.24  )-----------( 130      ( 03 Feb 2024 04:24 )             37.6                         12.2   8.13  )-----------( 123      ( 02 Feb 2024 08:08 )             37.7                         11.3   11.53 )-----------( 107      ( 01 Feb 2024 06:36 )             34.7     03 Feb 2024 04:24    143    |  114    |  24     ----------------------------<  102    4.3     |  29     |  0.86   02 Feb 2024 08:08    144    |  114    |  30     ----------------------------<  91     3.3     |  28     |  0.91   01 Feb 2024 06:36    145    |  117    |  39     ----------------------------<  123    3.9     |  25     |  0.92     Ca    9.4        03 Feb 2024 04:24  Ca    9.4        02 Feb 2024 08:08  Ca    9.4        01 Feb 2024 06:36  Mg     2.0       02 Feb 2024 08:08    TPro  5.9    /  Alb  2.3    /  TBili  0.7    /  DBili  x      /  AST  19     /  ALT  13     /  AlkPhos  73     03 Feb 2024 04:24  TPro  6.0    /  Alb  2.4    /  TBili  0.8    /  DBili  x      /  AST  21     /  ALT  12     /  AlkPhos  74     02 Feb 2024 08:08          12 Lead ECG:   Ventricular Rate 102 BPM    Atrial Rate 102 BPM    P-R Interval 296 ms    QRS Duration 82 ms    Q-T Interval 306 ms    QTC Calculation(Bazett) 398 ms    R Axis 26 degrees    T Axis 7 degrees    Diagnosis Line Sinus tachycardia with 1st degree AV block  Confirmed by davey Green ([id number]) on 1/30/2024 1:56:28 PM (01-30-24 @ 07:51)      ACC: [id number] EXAM:  ECHO TTE WO CON COMP W DOPP                          PROCEDURE DATE:  11/28/2022          INTERPRETATION:  INDICATION: Abnormal EKG  Sonographer AS    Blood Pressure 117/77    Height 170 cm     Weight 73 kg       BSA 1.84 sq   m    Dimensions:  LA 3.1       Normal Values: 2.0 - 4.0 cm  Ao 3.4        Normal Values: 2.0 - 3.8 cm  SEPTUM 1.0       Normal Values: 0.6 - 1.2 cm  PWT 0.9       Normal Values: 0.6 - 1.1 cm  LVIDd 3.1         Normal Values: 3.0 - 5.6 cm  LVIDs 2.2     Normal Values: 1.8 - 4.0 cm      OBSERVATIONS:  Mitral Valve: Mild MR.  Aortic Valve/Aorta: Calcified trileaflet aortic valve with grossly normal   opening.  Tricuspid Valve: Mild TR.  Pulmonic Valve: Trace PI  Left Atrium: normal  Right Atrium:Not well-visualized  Left Ventricle: normal LV size and systolic function, estimated LVEF of   60%.  Right Ventricle: Grossly normal size and systolic function.  Pericardium: no significant pericardial effusion.  Pulmonary/RV Pressure: estimated PA systolic pressure of at least 19 mmHg   assuming an RA pressure of 3 mmHg.        IMPRESSION:  Normal left ventricular internal dimensions and systolic function,   estimated LVEF of 60%.  Grossly normal RV size and systolic function.  Calcified trileaflet aortic valve with grossly normal opening, without AI.  Mild MR and TR.  No significant pericardial effusion.    --- End of Report ---            ORLANDO MATTHEW MD; Attending Cardiologist  This document has been electronically signed. Nov 29 2022  4:46PM      [Annual] : an annual visit.

## 2024-02-03 NOTE — PROGRESS NOTE ADULT - PROBLEM SELECTOR PLAN 1
- Sepsis secondary to UTI   - + BC/ UC  noted for E coli. Sensitivities noted  - Repeat BC NGTD   - On ceftriaxone now,  switch to Bactrim DS till 02/08/2024 upon discharge, as per discussion with ID  - Tylenol prn for mild pain and fever  - Patient confused at baseline but became agitated,  likely due to hospital acquired delirium. Fall precautions. Expect improvement in mental status to baseline at home. D/ w daughter, will be present overnight also   -PT suggested ELIANE. D/w daughter, they do not want ELIAEN but wants PT here as inpatient for another couple days and she and her brother will stay with patient all the time, at night also, so he does not get agitated. D/w SW   - Monitor CBC w/ diff  - S/p right Ureteral stent placement by IRENE on 01/30/24

## 2024-02-03 NOTE — PROGRESS NOTE ADULT - SUBJECTIVE AND OBJECTIVE BOX
Patient is a 89y old  Male who presents with a chief complaint of  renal stone (02 Feb 2024 12:54)      INTERVAL HPI/OVERNIGHT EVENTS: Patient seen and examined at bedside. Awake, alert, confused but calm. Won't answer     MEDICATIONS  (STANDING):  aspirin enteric coated 81 milliGRAM(s) Oral daily  atorvastatin 20 milliGRAM(s) Oral at bedtime  brinzolamide 1% Ophthalmic Suspension 1 Drop(s) Both EYES daily  ertapenem  IVPB 1000 milliGRAM(s) IV Intermittent every 24 hours  memantine 5 milliGRAM(s) Oral two times a day  senna 2 Tablet(s) Oral at bedtime  sodium chloride 0.9%. 1000 milliLiter(s) (75 mL/Hr) IV Continuous <Continuous>  tamsulosin 0.4 milliGRAM(s) Oral at bedtime    MEDICATIONS  (PRN):  polyethylene glycol 3350 17 Gram(s) Oral daily PRN Constipation      Allergies    No Known Allergies    Intolerances        REVIEW OF SYSTEMS:  Unable to obtain, confused at baseline due to dementia   Vital Signs Last 24 Hrs  T(C): 36.6 (03 Feb 2024 04:46), Max: 37.1 (02 Feb 2024 21:33)  T(F): 97.9 (03 Feb 2024 04:46), Max: 98.8 (02 Feb 2024 21:33)  HR: 58 (03 Feb 2024 04:46) (55 - 64)  BP: 153/75 (03 Feb 2024 04:46) (124/70 - 155/75)  BP(mean): --  RR: 18 (03 Feb 2024 04:46) (18 - 18)  SpO2: 94% (03 Feb 2024 04:46) (94% - 96%)    Parameters below as of 03 Feb 2024 04:46  Patient On (Oxygen Delivery Method): room air        PHYSICAL EXAM:  GENERAL: NAD  HEAD:  Atraumatic, Normocephalic  EYES: EOMI, PERRLA, conjunctiva and sclera clear  ENMT: No tonsillar erythema, exudates, or enlargement; Moist mucous membranes  NECK: Supple, No JVD, Normal thyroid  NERVOUS SYSTEM:  Confused, alert, moving all extremities   CHEST/LUNG: Clear to auscultation bilaterally; No rales, rhonchi, wheezing, or rubs  HEART: S1S2+, Regular rate and rhythm  ABDOMEN: Soft, Nontender, Nondistended; Bowel sounds present  EXTREMITIES:  2+ Peripheral Pulses, No clubbing, cyanosis  LYMPH: No lymphadenopathy noted  SKIN: No rashes or lesions  LABS:                        12.3   7.24  )-----------( 130      ( 03 Feb 2024 04:24 )             37.6     03 Feb 2024 04:24    143    |  114    |  24     ----------------------------<  102    4.3     |  29     |  0.86     Ca    9.4        03 Feb 2024 04:24    TPro  5.9    /  Alb  2.3    /  TBili  0.7    /  DBili  x      /  AST  19     /  ALT  13     /  AlkPhos  73     03 Feb 2024 04:24      CAPILLARY BLOOD GLUCOSE        BLOOD CULTURE  01-31 @ 13:35   No growth at 48 Hours  --  --  01-31 @ 13:30   No growth at 48 Hours  --  --  01-30 @ 19:48   <10,000 CFU/ml Escherichia coli ESBL  --  Escherichia coli ESBL  01-30 @ 10:30   10,000 - 49,000 CFU/mL Escherichia coli ESBL  --  Escherichia coli ESBL    RADIOLOGY & ADDITIONAL TESTS:    Imaging Personally Reviewed:  [ ] YES     Consultant(s) Notes Reviewed:      Care Discussed with Consultants/Other Providers: Plan: Discussed with patient in detail the option of using oral medication if patient wanted to in feature Otc Regimen: Discussed the option of using lamisel cream Detail Level: Zone

## 2024-02-03 NOTE — PROGRESS NOTE ADULT - ASSESSMENT
89 yo M with PMH dementia, BPH, CAD s/p 3 stent placements in 2016, GERD, HLD,  kidney stones, UTI presents with fever, chills, coughing, and multiple episodes of weakness/confusion; admitted for urosepsis, consulted for cardiac clearance for Uro procedure.     Cardiac Optimization, Hx CAD s/p stents  - s/p cystoscopy with retrograde pyelography with insertion of right ureteral stent.  Tolerated procedure well    - EK bpm, sinus tachycardia with 1st degree AV block.  No ischemic changes  - Patient without symptoms of angina or volume overload  - No clear evidence of acute ischemia, trops negative.  No need to trend  - Stress test 2021 negative for angina, no evidence of MI with mild degree of reversible Myocardial ischemia of a small-medium size area of the basal septal and inferior walls (RCA), however may be artifact 2/2 motion   - CAD with 3 stents.  Continue ASA, Statin    - Tachycardia in setting of Urosepsis.  Rates normalized, borderline melvina at times per flowsheet    - No meaningful evidence of volume overload.  Non-orthopneic on RA.    - Previous ECHO 2022: LVEF 60%, mild TR and MR     - Monitor and replete lytes, keep K>4, Mg>2.  - Will continue to follow.    Chris Osullivan NP  Nurse Practitioner- Cardiology   Call TEAMS

## 2024-02-04 LAB
ANION GAP SERPL CALC-SCNC: 4 MMOL/L — LOW (ref 5–17)
BUN SERPL-MCNC: 18 MG/DL — SIGNIFICANT CHANGE UP (ref 7–23)
CALCIUM SERPL-MCNC: 9.5 MG/DL — SIGNIFICANT CHANGE UP (ref 8.5–10.1)
CHLORIDE SERPL-SCNC: 111 MMOL/L — HIGH (ref 96–108)
CO2 SERPL-SCNC: 28 MMOL/L — SIGNIFICANT CHANGE UP (ref 22–31)
CREAT SERPL-MCNC: 0.66 MG/DL — SIGNIFICANT CHANGE UP (ref 0.5–1.3)
EGFR: 87 ML/MIN/1.73M2 — SIGNIFICANT CHANGE UP
GLUCOSE SERPL-MCNC: 108 MG/DL — HIGH (ref 70–99)
HCT VFR BLD CALC: 40.6 % — SIGNIFICANT CHANGE UP (ref 39–50)
HGB BLD-MCNC: 13.2 G/DL — SIGNIFICANT CHANGE UP (ref 13–17)
MCHC RBC-ENTMCNC: 29.9 PG — SIGNIFICANT CHANGE UP (ref 27–34)
MCHC RBC-ENTMCNC: 32.5 GM/DL — SIGNIFICANT CHANGE UP (ref 32–36)
MCV RBC AUTO: 91.9 FL — SIGNIFICANT CHANGE UP (ref 80–100)
NRBC # BLD: 0 /100 WBCS — SIGNIFICANT CHANGE UP (ref 0–0)
PLATELET # BLD AUTO: 125 K/UL — LOW (ref 150–400)
POTASSIUM SERPL-MCNC: 3.9 MMOL/L — SIGNIFICANT CHANGE UP (ref 3.5–5.3)
POTASSIUM SERPL-SCNC: 3.9 MMOL/L — SIGNIFICANT CHANGE UP (ref 3.5–5.3)
RBC # BLD: 4.42 M/UL — SIGNIFICANT CHANGE UP (ref 4.2–5.8)
RBC # FLD: 14.1 % — SIGNIFICANT CHANGE UP (ref 10.3–14.5)
SODIUM SERPL-SCNC: 143 MMOL/L — SIGNIFICANT CHANGE UP (ref 135–145)
WBC # BLD: 9.72 K/UL — SIGNIFICANT CHANGE UP (ref 3.8–10.5)
WBC # FLD AUTO: 9.72 K/UL — SIGNIFICANT CHANGE UP (ref 3.8–10.5)

## 2024-02-04 PROCEDURE — 99232 SBSQ HOSP IP/OBS MODERATE 35: CPT

## 2024-02-04 RX ADMIN — ERTAPENEM SODIUM 120 MILLIGRAM(S): 1 INJECTION, POWDER, LYOPHILIZED, FOR SOLUTION INTRAMUSCULAR; INTRAVENOUS at 12:59

## 2024-02-04 RX ADMIN — ATORVASTATIN CALCIUM 20 MILLIGRAM(S): 80 TABLET, FILM COATED ORAL at 21:33

## 2024-02-04 RX ADMIN — MEMANTINE HYDROCHLORIDE 5 MILLIGRAM(S): 10 TABLET ORAL at 05:50

## 2024-02-04 RX ADMIN — BRINZOLAMIDE 1 DROP(S): 10 SUSPENSION/ DROPS OPHTHALMIC at 12:58

## 2024-02-04 RX ADMIN — MEMANTINE HYDROCHLORIDE 5 MILLIGRAM(S): 10 TABLET ORAL at 21:33

## 2024-02-04 RX ADMIN — TAMSULOSIN HYDROCHLORIDE 0.4 MILLIGRAM(S): 0.4 CAPSULE ORAL at 21:33

## 2024-02-04 RX ADMIN — SENNA PLUS 2 TABLET(S): 8.6 TABLET ORAL at 21:33

## 2024-02-04 RX ADMIN — Medication 81 MILLIGRAM(S): at 12:09

## 2024-02-04 NOTE — PROGRESS NOTE ADULT - PROBLEM SELECTOR PLAN 1
- Sepsis secondary to UTI   - + BC/ UC  noted for E coli. Sensitivities noted  - Repeat BC NGTD   - On ceftriaxone now,  switch to Bactrim DS till 02/08/2024 upon discharge, as per discussion with ID  - Tylenol prn for mild pain and fever  - Patient confused at baseline but became agitated,  likely due to hospital acquired delirium. Fall precautions. Expect improvement in mental status to baseline at home. D/ w daughter, will be present overnight also   -PT suggested ELIANE. D/w daughter, they do not want ELIANE but refusing discharge and  wants PT here as inpatient for another day  and she and her brother will stay with patient all the time, at night also, so he does not get agitated.   - CM for Home care/ transport setup for Monday   - Monitor CBC w/ diff  - S/p right Ureteral stent placement by IRENE on 01/30/24

## 2024-02-04 NOTE — PROGRESS NOTE ADULT - PROBLEM SELECTOR PLAN 5
Chronic  - on atorvastatin at home  - cont. home medication

## 2024-02-04 NOTE — PROGRESS NOTE ADULT - PROBLEM SELECTOR PROBLEM 10
Abnormal finding on CT scan

## 2024-02-04 NOTE — PROGRESS NOTE ADULT - TIME BILLING
Note written by attending. Meds, labs, vitals, chart reviewed. D/w daughter at length, all questions answered.

## 2024-02-04 NOTE — PROGRESS NOTE ADULT - PROBLEM SELECTOR PROBLEM 3
ATUL (acute kidney injury)

## 2024-02-04 NOTE — PROGRESS NOTE ADULT - PROBLEM SELECTOR PLAN 8
Chronic  - cont. home memantine

## 2024-02-04 NOTE — PROGRESS NOTE ADULT - PROBLEM SELECTOR PLAN 7
Chronic  - on finasteride and tamsulosin.  - cont. home medications

## 2024-02-04 NOTE — PROGRESS NOTE ADULT - ASSESSMENT
89 yo M with PMH dementia, BPH, CAD s/p 3 stent placements in 2016, GERD, HLD,  kidney stones, UTI presents with fever, chills, coughing, and multiple episodes of weakness/confusion; admitted for urosepsis, consulted for cardiac clearance for Uro procedure.     Cardiac Optimization, Hx CAD s/p stents  - s/p cystoscopy with retrograde pyelography with insertion of right ureteral stent.  Tolerated procedure well    - EK bpm, sinus tachycardia with 1st degree AV block.  No ischemic changes  - Patient without symptoms of angina or volume overload  - No clear evidence of acute ischemia, trops negative.  No need to trend  - Stress test 2021 negative for angina, no evidence of MI with mild degree of reversible Myocardial ischemia of a small-medium size area of the basal septal and inferior walls (RCA), however may be artifact 2/2 motion   - CAD with 3 stents.  Continue ASA, Statin    - No meaningful evidence of volume overload.  Non-orthopneic on RA.    - Previous ECHO 2022: LVEF 60%, mild TR and MR     -tachycardic on admission 2/2 urosepsis  -BP and HR controlled per flowsheet    - Monitor and replete lytes, keep K>4, Mg>2.  - Will continue to follow.    Chris Osullivan NP  Nurse Practitioner- Cardiology   Call TEAMS 89 yo M with PMH dementia, BPH, CAD s/p 3 stent placements in 2016, GERD, HLD,  kidney stones, UTI presents with fever, chills, coughing, and multiple episodes of weakness/confusion; admitted for urosepsis, consulted for cardiac clearance for Uro procedure.     Cardiac Optimization, Hx CAD s/p stents  - s/p cystoscopy with retrograde pyelography with insertion of right ureteral stent.  Tolerated procedure well    - EK bpm, sinus tachycardia with 1st degree AV block.  No ischemic changes  - Patient without symptoms of angina or volume overload  - No clear evidence of acute ischemia, trops negative.  No need to trend  - Stress test 2021 negative for angina, no evidence of MI with mild degree of reversible Myocardial ischemia of a small-medium size area of the basal septal and inferior walls (RCA), however may be artifact 2/2 motion   - CAD with 3 stents.  Continue ASA, Statin    - No meaningful evidence of volume overload.  Non-orthopneic on RA.    - Previous ECHO 2022: LVEF 60%, mild TR and MR     -tachycardic on admission 2/2 urosepsis  -BP and HR controlled per flowsheet    -DC planning per primary team  - Monitor and replete lytes, keep K>4, Mg>2.  - Will continue to follow.    Chris Osullivan NP  Nurse Practitioner- Cardiology   Call TEAMS

## 2024-02-04 NOTE — PROGRESS NOTE ADULT - PROBLEM SELECTOR PLAN 11
VTE : Lovenox subq
VTE : Lovenox subq    Disposition: D/c Planning for Monday. Needs more PT as inpatient as family does not want ELIANE placement
VTE : Lovenox subq
VTE : Lovenox subq
VTE : Lovenox subq    Disposition: D/c Planning for Monday. Needs more PT as inpatient as family does not want ELIANE placement

## 2024-02-04 NOTE — PROGRESS NOTE ADULT - SUBJECTIVE AND OBJECTIVE BOX
Patient is a 89y old  Male who presents with a chief complaint of sepsis 2/2 renal stone (03 Feb 2024 09:07)      INTERVAL HPI/OVERNIGHT EVENTS: Patient seen and examined at bedside. Calm, confused at baseline, Daughter at bedside, wants to take him home tomorrow not today and more PT here, refusing ELIANE.     MEDICATIONS  (STANDING):  aspirin enteric coated 81 milliGRAM(s) Oral daily  atorvastatin 20 milliGRAM(s) Oral at bedtime  brinzolamide 1% Ophthalmic Suspension 1 Drop(s) Both EYES daily  ertapenem  IVPB 1000 milliGRAM(s) IV Intermittent every 24 hours  memantine 5 milliGRAM(s) Oral two times a day  senna 2 Tablet(s) Oral at bedtime  sodium chloride 0.9%. 1000 milliLiter(s) (75 mL/Hr) IV Continuous <Continuous>  tamsulosin 0.4 milliGRAM(s) Oral at bedtime    MEDICATIONS  (PRN):  polyethylene glycol 3350 17 Gram(s) Oral daily PRN Constipation      Allergies    No Known Allergies    Intolerances        REVIEW OF SYSTEMS:  Unable to obtain, patient is confused   Vital Signs Last 24 Hrs  T(C): 36.4 (04 Feb 2024 04:21), Max: 36.9 (03 Feb 2024 20:19)  T(F): 97.5 (04 Feb 2024 04:21), Max: 98.4 (03 Feb 2024 20:19)  HR: 62 (04 Feb 2024 04:21) (62 - 66)  BP: 149/70 (04 Feb 2024 04:21) (143/74 - 155/73)  BP(mean): --  RR: 17 (04 Feb 2024 04:21) (17 - 19)  SpO2: 93% (04 Feb 2024 04:21) (93% - 95%)    Parameters below as of 04 Feb 2024 04:21  Patient On (Oxygen Delivery Method): room air        PHYSICAL EXAM:  GENERAL: NAD  HEAD:  Atraumatic, Normocephalic  EYES: EOMI, PERRLA, conjunctiva and sclera clear  ENMT: No tonsillar erythema, exudates, or enlargement; Moist mucous membranes  NECK: Supple, No JVD, Normal thyroid  NERVOUS SYSTEM:  Confused, alert, moving all extremities   CHEST/LUNG: Clear to auscultation bilaterally; No rales, rhonchi, wheezing, or rubs  HEART: S1S2+, Regular rate and rhythm  ABDOMEN: Soft, Nontender, Nondistended; Bowel sounds present  EXTREMITIES:  2+ Peripheral Pulses, No clubbing, cyanosis  LYMPH: No lymphadenopathy noted  SKIN: No rashes or lesions    LABS:      Ca    9.4        03 Feb 2024 04:24        CAPILLARY BLOOD GLUCOSE        BLOOD CULTURE  01-31 @ 13:35   No growth at 72 Hours  --  --  01-31 @ 13:30   No growth at 72 Hours  --  --    RADIOLOGY & ADDITIONAL TESTS:    Imaging Personally Reviewed:  [ ] YES     Consultant(s) Notes Reviewed:      Care Discussed with Consultants/Other Providers:

## 2024-02-04 NOTE — PROGRESS NOTE ADULT - PROBLEM SELECTOR PLAN 9
Pt takes miralax at home PRN for chronic constipation  - Start senna x 2 & miralax

## 2024-02-04 NOTE — PROGRESS NOTE ADULT - PROBLEM/PLAN-8
06/10/20                            Noman Lpóez  [id number]  [facility] [id number]    To Whom It May Concern:    This is to certify Noman López is under my care, and is able to perform his duties without restrictions.   He has a diagnosis of generalized anxiety for which he takes fluoxetine 20mg daily and propranolol 10mg as needed.      RESTRICTIONS: None.         Grupo Yan DO        [facility] [place] [address] [address] [address] [facility] [address]  Phone: [phone]    
DISPLAY PLAN FREE TEXT

## 2024-02-04 NOTE — PROGRESS NOTE ADULT - PROBLEM SELECTOR PLAN 10
CT Chest, Abdomen, Pelvis:    LUNGS AND LARGE AIRWAYS: Patent central airways. Central lower lobe bronchial mural thickening, unchanged. Stable millimeter size calcified RUL granuloma. Subcentimeter-sized Anterior ILEANA (2/27) and mid RIGHT perifissural nodules (2/40) , unchanged. No segmental airspace opacities or interval suspicious pulmonary lesions. Mild peripheral fibrotic changes are not significantly changed. Dependent pleuroparenchymal reactive change.   LIVER: Millimeter-sized calcified RIGHT hepatic granuloma  ADRENALS: Stable centimeter-sized LEFT adrenal nodule.  BOWEL: Moderate hiatus hernia/partial intrathoracic stomach, otherwise underdistended/ nonevaluable stomach. .   BONES: Thoracic kyphosis. Osteopenia. Healed LEFT rib fractures. Thoracolumbar degenerative changes produces up to moderate severe/severe L4-5 central canal stenosis.    - Patients son informed of findings and instructed to f/u with PCP about above findings and discuss need for surveillance of findings about. Outpt f/u with PCP within 4 weeks
CT Chest, Abdomen, Pelvis:    LUNGS AND LARGE AIRWAYS: Patent central airways. Central lower lobe bronchial mural thickening, unchanged. Stable millimeter size calcified RUL granuloma. Subcentimeter-sized Anterior LIEANA (2/27) and mid RIGHT perifissural nodules (2/40) , unchanged. No segmental airspace opacities or interval suspicious pulmonary lesions. Mild peripheral fibrotic changes are not significantly changed. Dependent pleuroparenchymal reactive change.   LIVER: Millimeter-sized calcified RIGHT hepatic granuloma  ADRENALS: Stable centimeter-sized LEFT adrenal nodule.  BOWEL: Moderate hiatus hernia/partial intrathoracic stomach, otherwise underdistended/ nonevaluable stomach. .   BONES: Thoracic kyphosis. Osteopenia. Healed LEFT rib fractures. Thoracolumbar degenerative changes produces up to moderate severe/severe L4-5 central canal stenosis.    - Patients son informed of findings and instructed to f/u with PCP about above findings and discuss need for surveillance of findings about. Outpt f/u with PCP within 4 weeks
CT Chest, Abdomen, Pelvis:    LUNGS AND LARGE AIRWAYS: Patent central airways. Central lower lobe bronchial mural thickening, unchanged. Stable millimeter size calcified RUL granuloma. Subcentimeter-sized Anterior ILEANA (2/27) and mid RIGHT perifissural nodules (2/40) , unchanged. No segmental airspace opacities or interval suspicious pulmonary lesions. Mild peripheral fibrotic changes are not significantly changed. Dependent pleuroparenchymal reactive change.   LIVER: Millimeter-sized calcified RIGHT hepatic granuloma  ADRENALS: Stable centimeter-sized LEFT adrenal nodule.  BOWEL: Moderate hiatus hernia/partial intrathoracic stomach, otherwise underdistended/ nonevaluable stomach. .   BONES: Thoracic kyphosis. Osteopenia. Healed LEFT rib fractures. Thoracolumbar degenerative changes produces up to moderate severe/severe L4-5 central canal stenosis.    - Patients son informed of findings and instructed to f/u with PCP about above findings and discuss need for surveillance of findings about. Outpt f/u with PCP within 4 weeks

## 2024-02-04 NOTE — PROGRESS NOTE ADULT - ASSESSMENT
89 yo M with PMH dementia, BPH, CAD s/p 3 stent placements in 2016, GERD, HLD,  kidney stones, UTI presents with fever, chills, coughing, and multiple episodes of weakness/confusion. CT A/P no cont showing mild RIGHT hydroureteronephrosis presumed secondary to 5 mm distal intramural RIGHT UV junction calculus; a recently passed calculus adjacent to RIGHT UV junction is considered an alternate less likely possibility. Patient meet sepsis criteria, needs repeat CT scan and per urology needs ct urogram, then will decide if needs to go to OR for uro stent placement. Admitted for sepsis 2/2 renal stone, s/p stent placement, has E coli bacteremia probably secondary to E coli UTI

## 2024-02-04 NOTE — PROGRESS NOTE ADULT - PROBLEM SELECTOR PLAN 6
History of PCI 2016  - on ASA 81 at home  - cont. home medication  - Cardio (Dr Green) consulted

## 2024-02-04 NOTE — PROGRESS NOTE ADULT - PROBLEM SELECTOR PLAN 3
S/p IVF  Monitor renal function, urine output  Avoid Nephrotoxins

## 2024-02-04 NOTE — PROGRESS NOTE ADULT - SUBJECTIVE AND OBJECTIVE BOX
[facility] -- Latisha Ash,  Rhonda, Joselo Jimenez Savella, Goodger, Cohen  Office # [phone]    Follow Up:  Cardiac Optimization, CAD s/p stents    Subjective/Observations: No events overnight resting comfortably in bed.  Unable to provide meaningful information. Doesn't appear to be in acute distress, on RA.      REVIEW OF SYSTEMS: All other review of systems is negative unless indicated above  PAST MEDICAL & SURGICAL HISTORY:  Benign prostatic hyperplasia, presence of lower urinary tract symptoms unspecified, unspecified morphology      Hyperlipidemia      GERD (gastroesophageal reflux disease)      CAD (coronary artery disease)      UTI (urinary tract infection)      Calculus of kidney      Calculus of ureter      Dementia      Kidney stones      H/O heart artery stent  stent x 2      History of prostate surgery      S/P ureteral stent placement  Left sided      Stented coronary artery        MEDICATIONS  (STANDING):  aspirin enteric coated 81 milliGRAM(s) Oral daily  atorvastatin 20 milliGRAM(s) Oral at bedtime  brinzolamide 1% Ophthalmic Suspension 1 Drop(s) Both EYES daily  ertapenem  IVPB 1000 milliGRAM(s) IV Intermittent every 24 hours  memantine 5 milliGRAM(s) Oral two times a day  senna 2 Tablet(s) Oral at bedtime  sodium chloride 0.9%. 1000 milliLiter(s) (75 mL/Hr) IV Continuous <Continuous>  tamsulosin 0.4 milliGRAM(s) Oral at bedtime    MEDICATIONS  (PRN):  polyethylene glycol 3350 17 Gram(s) Oral daily PRN Constipation    Allergies    No Known Allergies    Intolerances      Vital Signs Last 24 Hrs  T(C): 36.4 (04 Feb 2024 04:21), Max: 36.9 (03 Feb 2024 20:19)  T(F): 97.5 (04 Feb 2024 04:21), Max: 98.4 (03 Feb 2024 20:19)  HR: 62 (04 Feb 2024 04:21) (62 - 66)  BP: 149/70 (04 Feb 2024 04:21) (143/74 - 155/73)  BP(mean): --  RR: 17 (04 Feb 2024 04:21) (17 - 19)  SpO2: 93% (04 Feb 2024 04:21) (93% - 95%)    Parameters below as of 04 Feb 2024 04:21  Patient On (Oxygen Delivery Method): room air      I&O's Summary    03 Feb 2024 07:01  -  04 Feb 2024 07:00  --------------------------------------------------------  IN: 240 mL / OUT: 250 mL / NET: -10 mL        TELE: off  PHYSICAL EXAM:  Constitutional: NAD, awake and alert  HEENT: Moist Mucous Membranes, Anicteric  Pulmonary: Non-labored, breath sounds are clear bilaterally, No wheezing, rales or rhonchi  Cardiovascular: Regular, S1 and S2, No murmurs, rubs, gallops or clicks  Gastrointestinal: Bowel Sounds present, soft, nontender.   Lymph: No peripheral edema. No lymphadenopathy.  Skin: No visible rashes or ulcers.  Psych:  Mood & affect: Confused    LABS: All Labs Reviewed:                        13.2   9.72  )-----------( 125      ( 04 Feb 2024 07:40 )             40.6                         12.3   7.24  )-----------( 130      ( 03 Feb 2024 04:24 )             37.6                         12.2   8.13  )-----------( 123      ( 02 Feb 2024 08:08 )             37.7     04 Feb 2024 07:40    143    |  111    |  18     ----------------------------<  108    3.9     |  28     |  0.66   03 Feb 2024 04:24    143    |  114    |  24     ----------------------------<  102    4.3     |  29     |  0.86   02 Feb 2024 08:08    144    |  114    |  30     ----------------------------<  91     3.3     |  28     |  0.91     Ca    9.5        04 Feb 2024 07:40  Ca    9.4        03 Feb 2024 04:24  Ca    9.4        02 Feb 2024 08:08  Mg     2.0       02 Feb 2024 08:08    TPro  5.9    /  Alb  2.3    /  TBili  0.7    /  DBili  x      /  AST  19     /  ALT  13     /  AlkPhos  73     03 Feb 2024 04:24  TPro  6.0    /  Alb  2.4    /  TBili  0.8    /  DBili  x      /  AST  21     /  ALT  12     /  AlkPhos  74     02 Feb 2024 08:08          12 Lead ECG:   Ventricular Rate 102 BPM    Atrial Rate 102 BPM    P-R Interval 296 ms    QRS Duration 82 ms    Q-T Interval 306 ms    QTC Calculation(Bazett) 398 ms    R Axis 26 degrees    T Axis 7 degrees    Diagnosis Line Sinus tachycardia with 1st degree AV block  Confirmed by davey Green ([id number]) on 1/30/2024 1:56:28 PM (01-30-24 @ 07:51)      ACC: [id number] EXAM:  ECHO TTE WO CON COMP W DOPP                          PROCEDURE DATE:  11/28/2022          INTERPRETATION:  INDICATION: Abnormal EKG  Sonographer AS    Blood Pressure 117/77    Height 170 cm     Weight 73 kg       BSA 1.84 sq   m    Dimensions:  LA 3.1       Normal Values: 2.0 - 4.0 cm  Ao 3.4        Normal Values: 2.0 - 3.8 cm  SEPTUM 1.0       Normal Values: 0.6 - 1.2 cm  PWT 0.9       Normal Values: 0.6 - 1.1 cm  LVIDd 3.1         Normal Values: 3.0 - 5.6 cm  LVIDs 2.2     Normal Values: 1.8 - 4.0 cm      OBSERVATIONS:  Mitral Valve: Mild MR.  Aortic Valve/Aorta: Calcified trileaflet aortic valve with grossly normal   opening.  Tricuspid Valve: Mild TR.  Pulmonic Valve: Trace PI  Left Atrium: normal  Right Atrium:Not well-visualized  Left Ventricle: normal LV size and systolic function, estimated LVEF of   60%.  Right Ventricle: Grossly normal size and systolic function.  Pericardium: no significant pericardial effusion.  Pulmonary/RV Pressure: estimated PA systolic pressure of at least 19 mmHg   assuming an RA pressure of 3 mmHg.        IMPRESSION:  Normal left ventricular internal dimensions and systolic function,   estimated LVEF of 60%.  Grossly normal RV size and systolic function.  Calcified trileaflet aortic valve with grossly normal opening, without AI.  Mild MR and TR.  No significant pericardial effusion.    --- End of Report ---            ORLANDO MATTHEW MD; Attending Cardiologist  This document has been electronically signed. Nov 29 2022  4:46PM      [facility] -- Latisha Ash,  Rhonda, Joselo Jimenez Savella, Goodger, Cohen  Office # [phone]    Follow Up:  Cardiac Optimization, CAD s/p stents    Subjective/Observations: No events overnight resting comfortably in bed.  Eating breakfast, accompanied by daughter. Unable to provide meaningful information. Doesn't appear to be in acute distress, on RA.      REVIEW OF SYSTEMS: All other review of systems is negative unless indicated above  PAST MEDICAL & SURGICAL HISTORY:  Benign prostatic hyperplasia, presence of lower urinary tract symptoms unspecified, unspecified morphology      Hyperlipidemia      GERD (gastroesophageal reflux disease)      CAD (coronary artery disease)      UTI (urinary tract infection)      Calculus of kidney      Calculus of ureter      Dementia      Kidney stones      H/O heart artery stent  stent x 2      History of prostate surgery      S/P ureteral stent placement  Left sided      Stented coronary artery        MEDICATIONS  (STANDING):  aspirin enteric coated 81 milliGRAM(s) Oral daily  atorvastatin 20 milliGRAM(s) Oral at bedtime  brinzolamide 1% Ophthalmic Suspension 1 Drop(s) Both EYES daily  ertapenem  IVPB 1000 milliGRAM(s) IV Intermittent every 24 hours  memantine 5 milliGRAM(s) Oral two times a day  senna 2 Tablet(s) Oral at bedtime  sodium chloride 0.9%. 1000 milliLiter(s) (75 mL/Hr) IV Continuous <Continuous>  tamsulosin 0.4 milliGRAM(s) Oral at bedtime    MEDICATIONS  (PRN):  polyethylene glycol 3350 17 Gram(s) Oral daily PRN Constipation    Allergies    No Known Allergies    Intolerances      Vital Signs Last 24 Hrs  T(C): 36.4 (04 Feb 2024 04:21), Max: 36.9 (03 Feb 2024 20:19)  T(F): 97.5 (04 Feb 2024 04:21), Max: 98.4 (03 Feb 2024 20:19)  HR: 62 (04 Feb 2024 04:21) (62 - 66)  BP: 149/70 (04 Feb 2024 04:21) (143/74 - 155/73)  BP(mean): --  RR: 17 (04 Feb 2024 04:21) (17 - 19)  SpO2: 93% (04 Feb 2024 04:21) (93% - 95%)    Parameters below as of 04 Feb 2024 04:21  Patient On (Oxygen Delivery Method): room air      I&O's Summary    03 Feb 2024 07:01  -  04 Feb 2024 07:00  --------------------------------------------------------  IN: 240 mL / OUT: 250 mL / NET: -10 mL        TELE: off  PHYSICAL EXAM:  Constitutional: NAD, awake and alert  HEENT: Moist Mucous Membranes, Anicteric  Pulmonary: Non-labored, breath sounds are clear bilaterally, No wheezing, rales or rhonchi  Cardiovascular: Regular, S1 and S2, No murmurs, rubs, gallops or clicks  Gastrointestinal: Bowel Sounds present, soft, nontender.   Lymph: No peripheral edema. No lymphadenopathy.  Skin: No visible rashes or ulcers.  Psych:  Mood & affect: Confused    LABS: All Labs Reviewed:                        13.2   9.72  )-----------( 125      ( 04 Feb 2024 07:40 )             40.6                         12.3   7.24  )-----------( 130      ( 03 Feb 2024 04:24 )             37.6                         12.2   8.13  )-----------( 123      ( 02 Feb 2024 08:08 )             37.7     04 Feb 2024 07:40    143    |  111    |  18     ----------------------------<  108    3.9     |  28     |  0.66   03 Feb 2024 04:24    143    |  114    |  24     ----------------------------<  102    4.3     |  29     |  0.86   02 Feb 2024 08:08    144    |  114    |  30     ----------------------------<  91     3.3     |  28     |  0.91     Ca    9.5        04 Feb 2024 07:40  Ca    9.4        03 Feb 2024 04:24  Ca    9.4        02 Feb 2024 08:08  Mg     2.0       02 Feb 2024 08:08    TPro  5.9    /  Alb  2.3    /  TBili  0.7    /  DBili  x      /  AST  19     /  ALT  13     /  AlkPhos  73     03 Feb 2024 04:24  TPro  6.0    /  Alb  2.4    /  TBili  0.8    /  DBili  x      /  AST  21     /  ALT  12     /  AlkPhos  74     02 Feb 2024 08:08          12 Lead ECG:   Ventricular Rate 102 BPM    Atrial Rate 102 BPM    P-R Interval 296 ms    QRS Duration 82 ms    Q-T Interval 306 ms    QTC Calculation(Bazett) 398 ms    R Axis 26 degrees    T Axis 7 degrees    Diagnosis Line Sinus tachycardia with 1st degree AV block  Confirmed by davey Green ([id number]) on 1/30/2024 1:56:28 PM (01-30-24 @ 07:51)      ACC: [id number] EXAM:  ECHO TTE WO CON COMP W DOPP                          PROCEDURE DATE:  11/28/2022          INTERPRETATION:  INDICATION: Abnormal EKG  Sonographer AS    Blood Pressure 117/77    Height 170 cm     Weight 73 kg       BSA 1.84 sq   m    Dimensions:  LA 3.1       Normal Values: 2.0 - 4.0 cm  Ao 3.4        Normal Values: 2.0 - 3.8 cm  SEPTUM 1.0       Normal Values: 0.6 - 1.2 cm  PWT 0.9       Normal Values: 0.6 - 1.1 cm  LVIDd 3.1         Normal Values: 3.0 - 5.6 cm  LVIDs 2.2     Normal Values: 1.8 - 4.0 cm      OBSERVATIONS:  Mitral Valve: Mild MR.  Aortic Valve/Aorta: Calcified trileaflet aortic valve with grossly normal   opening.  Tricuspid Valve: Mild TR.  Pulmonic Valve: Trace PI  Left Atrium: normal  Right Atrium:Not well-visualized  Left Ventricle: normal LV size and systolic function, estimated LVEF of   60%.  Right Ventricle: Grossly normal size and systolic function.  Pericardium: no significant pericardial effusion.  Pulmonary/RV Pressure: estimated PA systolic pressure of at least 19 mmHg   assuming an RA pressure of 3 mmHg.        IMPRESSION:  Normal left ventricular internal dimensions and systolic function,   estimated LVEF of 60%.  Grossly normal RV size and systolic function.  Calcified trileaflet aortic valve with grossly normal opening, without AI.  Mild MR and TR.  No significant pericardial effusion.    --- End of Report ---            ORLANDO MATTHEW MD; Attending Cardiologist  This document has been electronically signed. Nov 29 2022  4:46PM

## 2024-02-05 ENCOUNTER — TRANSCRIPTION ENCOUNTER (OUTPATIENT)
Age: 89
End: 2024-02-05

## 2024-02-05 VITALS
HEART RATE: 72 BPM | SYSTOLIC BLOOD PRESSURE: 109 MMHG | RESPIRATION RATE: 18 BRPM | TEMPERATURE: 98 F | OXYGEN SATURATION: 95 % | DIASTOLIC BLOOD PRESSURE: 71 MMHG

## 2024-02-05 LAB
ANION GAP SERPL CALC-SCNC: 5 MMOL/L — SIGNIFICANT CHANGE UP (ref 5–17)
BUN SERPL-MCNC: 19 MG/DL — SIGNIFICANT CHANGE UP (ref 7–23)
CALCIUM SERPL-MCNC: 9.3 MG/DL — SIGNIFICANT CHANGE UP (ref 8.5–10.1)
CHLORIDE SERPL-SCNC: 111 MMOL/L — HIGH (ref 96–108)
CO2 SERPL-SCNC: 25 MMOL/L — SIGNIFICANT CHANGE UP (ref 22–31)
CREAT SERPL-MCNC: 0.82 MG/DL — SIGNIFICANT CHANGE UP (ref 0.5–1.3)
CULTURE RESULTS: SIGNIFICANT CHANGE UP
CULTURE RESULTS: SIGNIFICANT CHANGE UP
EGFR: 81 ML/MIN/1.73M2 — SIGNIFICANT CHANGE UP
GLUCOSE SERPL-MCNC: 89 MG/DL — SIGNIFICANT CHANGE UP (ref 70–99)
HCT VFR BLD CALC: 42.7 % — SIGNIFICANT CHANGE UP (ref 39–50)
HGB BLD-MCNC: 13.7 G/DL — SIGNIFICANT CHANGE UP (ref 13–17)
MCHC RBC-ENTMCNC: 29.9 PG — SIGNIFICANT CHANGE UP (ref 27–34)
MCHC RBC-ENTMCNC: 32.1 GM/DL — SIGNIFICANT CHANGE UP (ref 32–36)
MCV RBC AUTO: 93.2 FL — SIGNIFICANT CHANGE UP (ref 80–100)
NRBC # BLD: 0 /100 WBCS — SIGNIFICANT CHANGE UP (ref 0–0)
PLATELET # BLD AUTO: 144 K/UL — LOW (ref 150–400)
POTASSIUM SERPL-MCNC: 4.3 MMOL/L — SIGNIFICANT CHANGE UP (ref 3.5–5.3)
POTASSIUM SERPL-SCNC: 4.3 MMOL/L — SIGNIFICANT CHANGE UP (ref 3.5–5.3)
RBC # BLD: 4.58 M/UL — SIGNIFICANT CHANGE UP (ref 4.2–5.8)
RBC # FLD: 13.9 % — SIGNIFICANT CHANGE UP (ref 10.3–14.5)
SODIUM SERPL-SCNC: 141 MMOL/L — SIGNIFICANT CHANGE UP (ref 135–145)
SPECIMEN SOURCE: SIGNIFICANT CHANGE UP
SPECIMEN SOURCE: SIGNIFICANT CHANGE UP
WBC # BLD: 9.78 K/UL — SIGNIFICANT CHANGE UP (ref 3.8–10.5)
WBC # FLD AUTO: 9.78 K/UL — SIGNIFICANT CHANGE UP (ref 3.8–10.5)

## 2024-02-05 PROCEDURE — 87086 URINE CULTURE/COLONY COUNT: CPT

## 2024-02-05 PROCEDURE — C2617: CPT

## 2024-02-05 PROCEDURE — 83605 ASSAY OF LACTIC ACID: CPT

## 2024-02-05 PROCEDURE — 97110 THERAPEUTIC EXERCISES: CPT

## 2024-02-05 PROCEDURE — 87077 CULTURE AEROBIC IDENTIFY: CPT

## 2024-02-05 PROCEDURE — 85027 COMPLETE CBC AUTOMATED: CPT

## 2024-02-05 PROCEDURE — 85025 COMPLETE CBC W/AUTO DIFF WBC: CPT

## 2024-02-05 PROCEDURE — 96365 THER/PROPH/DIAG IV INF INIT: CPT

## 2024-02-05 PROCEDURE — 96367 TX/PROPH/DG ADDL SEQ IV INF: CPT

## 2024-02-05 PROCEDURE — 97161 PT EVAL LOW COMPLEX 20 MIN: CPT

## 2024-02-05 PROCEDURE — 99285 EMERGENCY DEPT VISIT HI MDM: CPT

## 2024-02-05 PROCEDURE — C1758: CPT

## 2024-02-05 PROCEDURE — 99232 SBSQ HOSP IP/OBS MODERATE 35: CPT

## 2024-02-05 PROCEDURE — 0225U NFCT DS DNA&RNA 21 SARSCOV2: CPT

## 2024-02-05 PROCEDURE — 71045 X-RAY EXAM CHEST 1 VIEW: CPT

## 2024-02-05 PROCEDURE — 84484 ASSAY OF TROPONIN QUANT: CPT

## 2024-02-05 PROCEDURE — 36415 COLL VENOUS BLD VENIPUNCTURE: CPT

## 2024-02-05 PROCEDURE — 97116 GAIT TRAINING THERAPY: CPT

## 2024-02-05 PROCEDURE — 83735 ASSAY OF MAGNESIUM: CPT

## 2024-02-05 PROCEDURE — C1769: CPT

## 2024-02-05 PROCEDURE — 80053 COMPREHEN METABOLIC PANEL: CPT

## 2024-02-05 PROCEDURE — 85610 PROTHROMBIN TIME: CPT

## 2024-02-05 PROCEDURE — 76000 FLUOROSCOPY <1 HR PHYS/QHP: CPT

## 2024-02-05 PROCEDURE — 74176 CT ABD & PELVIS W/O CONTRAST: CPT | Mod: MA

## 2024-02-05 PROCEDURE — 97530 THERAPEUTIC ACTIVITIES: CPT

## 2024-02-05 PROCEDURE — 99239 HOSP IP/OBS DSCHRG MGMT >30: CPT

## 2024-02-05 PROCEDURE — 80048 BASIC METABOLIC PNL TOTAL CA: CPT

## 2024-02-05 PROCEDURE — 74178 CT ABD&PLV WO CNTR FLWD CNTR: CPT | Mod: MA

## 2024-02-05 PROCEDURE — 87186 SC STD MICRODIL/AGAR DIL: CPT

## 2024-02-05 PROCEDURE — 82248 BILIRUBIN DIRECT: CPT

## 2024-02-05 PROCEDURE — 96361 HYDRATE IV INFUSION ADD-ON: CPT

## 2024-02-05 PROCEDURE — 81001 URINALYSIS AUTO W/SCOPE: CPT

## 2024-02-05 PROCEDURE — 87150 DNA/RNA AMPLIFIED PROBE: CPT

## 2024-02-05 PROCEDURE — 87040 BLOOD CULTURE FOR BACTERIA: CPT

## 2024-02-05 PROCEDURE — 93005 ELECTROCARDIOGRAM TRACING: CPT

## 2024-02-05 PROCEDURE — 85730 THROMBOPLASTIN TIME PARTIAL: CPT

## 2024-02-05 PROCEDURE — 71250 CT THORAX DX C-: CPT | Mod: MA

## 2024-02-05 RX ORDER — POLYETHYLENE GLYCOL 3350 17 G/17G
17 POWDER, FOR SOLUTION ORAL
Qty: 0 | Refills: 0 | DISCHARGE
Start: 2024-02-05

## 2024-02-05 RX ORDER — METHENAMINE MANDELATE 1 G
1 TABLET ORAL
Refills: 0 | DISCHARGE

## 2024-02-05 RX ORDER — BRINZOLAMIDE 10 MG/ML
1 SUSPENSION/ DROPS OPHTHALMIC
Qty: 0 | Refills: 0 | DISCHARGE

## 2024-02-05 RX ORDER — BRINZOLAMIDE 10 MG/ML
1 SUSPENSION/ DROPS OPHTHALMIC
Qty: 1 | Refills: 0 | DISCHARGE
Start: 2024-02-05

## 2024-02-05 RX ORDER — BRINZOLAMIDE 10 MG/ML
1 SUSPENSION/ DROPS OPHTHALMIC
Qty: 1 | Refills: 0
Start: 2024-02-05

## 2024-02-05 RX ADMIN — MEMANTINE HYDROCHLORIDE 5 MILLIGRAM(S): 10 TABLET ORAL at 05:37

## 2024-02-05 RX ADMIN — Medication 81 MILLIGRAM(S): at 12:32

## 2024-02-05 NOTE — DISCHARGE NOTE PROVIDER - NSDCMRMEDTOKEN_GEN_ALL_CORE_FT
aspirin 81 mg oral tablet: 1 tab(s) orally once a day  atorvastatin 20 mg oral tablet: 1 tab(s) orally once a day  Azopt 1% ophthalmic suspension: 1 dose(s) to each affected eye once a day  finasteride 5 mg oral tablet: 1 tab(s) orally once a day  memantine 5 mg oral tablet: 1 tab(s) orally 2 times a day  multivitamin with minerals:   polyethylene glycol 3350 oral powder for reconstitution: 17 gram(s) orally once a day As needed Constipation  QUEtiapine 25 mg oral tablet: 1 tab(s) orally once a day (at bedtime)  sulfamethoxazole-trimethoprim 800 mg-160 mg oral tablet: 1 tab(s) orally every 12 hours  tamsulosin 0.4 mg oral capsule: 1 cap(s) orally once a day

## 2024-02-05 NOTE — DISCHARGE NOTE PROVIDER - NSDCCPCAREPLAN_GEN_ALL_CORE_FT
PRINCIPAL DISCHARGE DIAGNOSIS  Diagnosis: Sepsis  Assessment and Plan of Treatment: Please completed course of antibiotics as prescribed   f/u with Urologist in 1 week. Please call for appintment   f/u with PCP in 3-5 days   meds per the list  f/u with all your doctors      SECONDARY DISCHARGE DIAGNOSES  Diagnosis: Kidney stone  Assessment and Plan of Treatment:

## 2024-02-05 NOTE — PROGRESS NOTE ADULT - SUBJECTIVE AND OBJECTIVE BOX
[facility] -- Rhonda Good, Joselo Jimenez Savella, , Karen Willett  Office # [phone]    Follow Up:  Cardiac Optimization, CAD s/p stents     Subjective/Observations: Awake and alert.  Non-English speaking.  Comfortable on RA.  No complaints.  Calm and cooperative      REVIEW OF SYSTEMS: All other review of systems is negative unless indicated above  PAST MEDICAL & SURGICAL HISTORY:  Benign prostatic hyperplasia, presence of lower urinary tract symptoms unspecified, unspecified morphology      Hyperlipidemia      GERD (gastroesophageal reflux disease)      CAD (coronary artery disease)      UTI (urinary tract infection)      Calculus of kidney      Calculus of ureter      Dementia      Kidney stones      H/O heart artery stent  stent x 2      History of prostate surgery      S/P ureteral stent placement  Left sided      Stented coronary artery        MEDICATIONS  (STANDING):  aspirin enteric coated 81 milliGRAM(s) Oral daily  atorvastatin 20 milliGRAM(s) Oral at bedtime  brinzolamide 1% Ophthalmic Suspension 1 Drop(s) Both EYES daily  memantine 5 milliGRAM(s) Oral two times a day  senna 2 Tablet(s) Oral at bedtime  sodium chloride 0.9%. 1000 milliLiter(s) (75 mL/Hr) IV Continuous <Continuous>  tamsulosin 0.4 milliGRAM(s) Oral at bedtime  trimethoprim  160 mG/sulfamethoxazole 800 mG 1 Tablet(s) Oral every 12 hours    MEDICATIONS  (PRN):  polyethylene glycol 3350 17 Gram(s) Oral daily PRN Constipation    Allergies    No Known Allergies    Intolerances      Vital Signs Last 24 Hrs  T(C): 36.8 (05 Feb 2024 05:07), Max: 37.8 (04 Feb 2024 12:21)  T(F): 98.2 (05 Feb 2024 05:07), Max: 100.1 (04 Feb 2024 12:21)  HR: 70 (05 Feb 2024 05:07) (65 - 70)  BP: 135/79 (05 Feb 2024 05:07) (120/71 - 135/79)  BP(mean): --  RR: 18 (05 Feb 2024 05:07) (17 - 18)  SpO2: 94% (05 Feb 2024 05:07) (94% - 95%)    Parameters below as of 05 Feb 2024 05:07  Patient On (Oxygen Delivery Method): room air      I&O's Summary    04 Feb 2024 07:01  -  05 Feb 2024 07:00  --------------------------------------------------------  IN: 440 mL / OUT: 550 mL / NET: -110 mL    PHYSICAL EXAM:  TELE: Not on tele  Constitutional: NAD, awake and alert  HEENT: Moist Mucous Membranes, Anicteric  Pulmonary: Non-labored, breath sounds are clear bilaterally, No wheezing, rales or rhonchi  Cardiovascular: Regular, S1 and S2, No murmurs, rubs, gallops or clicks  Gastrointestinal: Bowel Sounds present, soft, nontender.   Lymph: No peripheral edema. No lymphadenopathy.  Skin: No visible rashes or ulcers.  Psych:  Mood & affect: Flat  LABS: All Labs Reviewed:                        13.7   9.78  )-----------( 144      ( 05 Feb 2024 06:33 )             42.7                         13.2   9.72  )-----------( 125      ( 04 Feb 2024 07:40 )             40.6                         12.3   7.24  )-----------( 130      ( 03 Feb 2024 04:24 )             37.6     05 Feb 2024 06:33    141    |  111    |  19     ----------------------------<  89     4.3     |  25     |  0.82   04 Feb 2024 07:40    143    |  111    |  18     ----------------------------<  108    3.9     |  28     |  0.66   03 Feb 2024 04:24    143    |  114    |  24     ----------------------------<  102    4.3     |  29     |  0.86     Ca    9.3        05 Feb 2024 06:33  Ca    9.5        04 Feb 2024 07:40  Ca    9.4        03 Feb 2024 04:24    TPro  5.9    /  Alb  2.3    /  TBili  0.7    /  DBili  x      /  AST  19     /  ALT  13     /  AlkPhos  73     03 Feb 2024 04:24          12 Lead ECG:   Ventricular Rate 102 BPM    Atrial Rate 102 BPM    P-R Interval 296 ms    QRS Duration 82 ms    Q-T Interval 306 ms    QTC Calculation(Bazett) 398 ms    R Axis 26 degrees    T Axis 7 degrees    Diagnosis Line Sinus tachycardia with 1st degree AV block  Confirmed by davey Green ([id number]) on 1/30/2024 1:56:28 PM (01-30-24 @ 07:51)    ACC: [id number] EXAM:  ECHO TTE WO CON COMP W DOPP                          PROCEDURE DATE:  11/28/2022      INTERPRETATION:  INDICATION: Abnormal EKG  Sonographer AS    Blood Pressure 117/77    Height 170 cm     Weight 73 kg       BSA 1.84 sq   m    Dimensions:  LA 3.1       Normal Values: 2.0 - 4.0 cm  Ao 3.4        Normal Values: 2.0 - 3.8 cm  SEPTUM 1.0       Normal Values: 0.6 - 1.2 cm  PWT 0.9       Normal Values: 0.6 - 1.1 cm  LVIDd 3.1         Normal Values: 3.0 - 5.6 cm  LVIDs 2.2     Normal Values: 1.8 - 4.0 cm      OBSERVATIONS:  Mitral Valve: Mild MR.  Aortic Valve/Aorta: Calcified trileaflet aortic valve with grossly normal   opening.  Tricuspid Valve: Mild TR.  Pulmonic Valve: Trace PI  Left Atrium: normal  Right Atrium:Not well-visualized  Left Ventricle: normal LV size and systolic function, estimated LVEF of   60%.  Right Ventricle: Grossly normal size and systolic function.  Pericardium: no significant pericardial effusion.  Pulmonary/RV Pressure: estimated PA systolic pressure of at least 19 mmHg   assuming an RA pressure of 3 mmHg.    IMPRESSION:  Normal left ventricular internal dimensions and systolic function,   estimated LVEF of 60%.  Grossly normal RV size and systolic function.  Calcified trileaflet aortic valve with grossly normal opening, without AI.  Mild MR and TR.  No significant pericardial effusion.    --- End of Report ---    ORLANDO MATTHEW MD; Attending Cardiologist  This document has been electronically signed. Nov 29 2022  4:46PM

## 2024-02-05 NOTE — DISCHARGE NOTE PROVIDER - HOSPITAL COURSE
89 yo M with PMH dementia, BPH, CAD s/p 3 stent placements in 2016, GERD, HLD,  kidney stones, UTI presents with fever, chills, coughing, and multiple episodes of weakness/confusion. CT A/P no cont showing mild RIGHT hydroureteronephrosis presumed secondary to 5 mm distal intramural RIGHT UV junction calculus; a recently passed calculus adjacent to RIGHT UV junction is considered an alternate less likely possibility. Patient meet sepsis criteria, needs repeat CT scan and per urology needs ct urogram, then will decide if needs to go to OR for uro stent placement. Admitted for sepsis 2/2 renal stone, s/p stent placement, has E coli bacteremia probably secondary to E coli UTI     ·  Plan: - Sepsis secondary to UTI   - + BC/ UC  noted for E coli. Sensitivities noted  - Repeat BC NGTD   - On ceftriaxone now,  switch to Bactrim DS till 02/08/2024 upon discharge, as per discussion with ID  -PT suggested ELIANE. D/w daughter, they do not want ELIANE   - S/p right Ureteral stent placement by  on 01/30/24.  -F/u with Urologist as out patient.    Seen and examined at bedside. VSS     GENERAL: NAD  HEAD:  Atraumatic, Normocephalic  EYES: EOMI, PERRLA, conjunctiva and sclera clear  ENMT: No tonsillar erythema, exudates, or enlargement; Moist mucous membranes  NECK: Supple, No JVD, Normal thyroid  NERVOUS SYSTEM:  Confused, alert, moving all extremities   CHEST/LUNG: Clear to auscultation bilaterally; No rales, rhonchi, wheezing, or rubs  HEART: S1S2+, Regular rate and rhythm  ABDOMEN: Soft, Nontender, Nondistended; Bowel sounds present  EXTREMITIES:  2+ Peripheral Pulses, No clubbing, cyanosis  LYMPH: No lymphadenopathy noted  SKIN: No rashes or lesions    Total discharge time spent 50 minutes including medication reconciliation, counseling and education, prescription writing.

## 2024-02-05 NOTE — CASE MANAGEMENT PROGRESS NOTE - NSCMPROGRESSNOTE_GEN_ALL_CORE
Patient is ready for Transitional care will return home with Son. The patient will have [facility] [phone] for Visiting Nurse and Physical therapy. The patient has a RW at home and will be transported by family. Patient educated on the role of CM and discussed DC planning. All questions answered.

## 2024-02-05 NOTE — PROGRESS NOTE ADULT - PROVIDER SPECIALTY LIST ADULT
Cardiology
Surgery
Cardiology
Infectious Disease
Infectious Disease
Cardiology
Cardiology
Hospitalist

## 2024-02-05 NOTE — DISCHARGE NOTE PROVIDER - PROVIDER TOKENS
FREE:[LAST:[Jeff],FIRST:[Roly],PHONE:[(   )    -],FAX:[(   )    -],ADDRESS:[Urologist]],PROVIDER:[TOKEN:[[id number]]]

## 2024-02-05 NOTE — PROGRESS NOTE ADULT - NS ATTEND AMEND GEN_ALL_CORE FT
89 yo M with PMH dementia, BPH, CAD s/p 3 stent placements in 2016, GERD, HLD,  kidney stones, UTI presents with fever, chills, coughing, and multiple episodes of weakness/confusion; admitted for urosepsis, consulted for cardiac clearance for Uro procedure.     Cardiac Optimization, Hx CAD s/p stents  - s/p cystoscopy with retrograde pyelography with insertion of right ureteral stent.  Tolerated procedure well    - EK bpm, sinus tachycardia with 1st degree AV block.  No ischemic changes  - Patient without symptoms of angina or volume overload.  Non-orthopneic on RA  - No clear evidence of acute ischemia, trops negative.   - Stress test 2021 negative for angina, no evidence of MI with mild degree of reversible Myocardial ischemia of a small-medium size area of the basal septal and inferior walls (RCA), however may be artifact 2/2 motion   - CAD with 3 stents.  Continue ASA and statin    - No meaningful evidence of volume overload.  Non-orthopneic on RA.    - Previous ECHO 2022: LVEF 60%, mild TR and MR     - Tachycardia resolved  - BP stable and controlled per flowsheet    - For possible D/C today
89 yo M with PMH dementia, BPH, CAD s/p 3 stent placements in 2016, GERD, HLD,  kidney stones, UTI presents with fever, chills, coughing, and multiple episodes of weakness/confusion; admitted for urosepsis, consulted for cardiac clearance for Uro procedure.       - s/p cystoscopy with retrograde pyelography with insertion of right ureteral stent.  Tolerated procedure well  - CAD with 3 stents.  Continue ASA, Statin
89 yo M with PMH dementia, BPH, CAD s/p 3 stent placements in 2016, GERD, HLD,  kidney stones, UTI presents with fever, chills, coughing, and multiple episodes of weakness/confusion; admitted for urosepsis, consulted for cardiac clearance for Uro procedure.       - CAD with 3 stents.  Continue ASA, Statin  - s/p cystoscopy with retrograde pyelography with insertion of right ureteral stent.  Tolerated procedure well  - Will continue to follow.  Stable from cardiac standpoint
89 yo M with PMH dementia, BPH, CAD s/p 3 stent placements in 2016, GERD, HLD,  kidney stones, UTI presents with fever, chills, coughing, and multiple episodes of weakness/confusion; admitted for urosepsis, consulted for cardiac clearance for Uro procedure.     no symptoms of angina or heart failure at this time.   Stress testing 7/9/2021 negative for angina, no evidence of MI with mild degree of reversible Myocardial ischemia of a small-medium size area of the basal septal and inferior walls (RCA), however may be artifact 2/2 motion   Previous ECHO 11/2022: LVEF 60%, mild TR and MR   known cad s/p pci cont asa and statin  s/p cystoscopy with retrograde pyelography with insertion of right ureteral stent  no cv complications postop
89 yo M with PMH dementia, BPH, CAD s/p 3 stent placements in 2016, GERD, HLD,  kidney stones, UTI presents with fever, chills, coughing, and multiple episodes of weakness/confusion; admitted for urosepsis, consulted for cardiac clearance for Uro procedure.     - s/p cystoscopy with retrograde pyelography with insertion of right ureteral stent.  Tolerated procedure well  - CAD with 3 stents.  Continue ASA, Statin  -BP and HR controlled per flowsheet    -DC planning per primary team

## 2024-02-05 NOTE — DISCHARGE NOTE PROVIDER - CARE PROVIDER_API CALL
Roly Aguilar  Urologist  Phone: (   )    -  Fax: (   )    -  Follow Up Time:     Angeline Ness  Internal Medicine  [address] [place] [id number]  Phone: [phone]  Fax: [phone]  Follow Up Time:

## 2024-02-05 NOTE — DISCHARGE NOTE NURSING/CASE MANAGEMENT/SOCIAL WORK - PATIENT PORTAL LINK FT
You can access the FollowMyHealth Patient Portal offered by [facility] by registering at the following website: [email or web address]. By joining [employer]’s FollowMyHealth portal, you will also be able to view your health information using other applications (apps) compatible with our system.

## 2024-02-05 NOTE — PROGRESS NOTE ADULT - REASON FOR ADMISSION
sepsis 2/2 renal stone
Renal stone
sepsis 2/2 renal stone

## 2024-02-05 NOTE — ED PROVIDER NOTE - IV ALTEPLASE DOOR HIDDEN
Physical Therapy Progress Note     Name: Kaylen Holliday  Clinic Number: [id number]    Therapy Diagnosis:   Encounter Diagnosis   Name Primary?    Unstable balance Yes     Physician: Familia Rothman DO    Visit Date: 2/5/2024    Physician Orders: PT Eval and Treat   Medical Diagnosis from Referral: R26.9 (ICD-10-CM) - Gait abnormality  Evaluation Date: 7/19/2023  Authorization Period Expiration: 12/31/2024  Plan of Care Certification Period: 7/19/2023 to 1/19/2024; extended to 8/5/2024  Visit # / Visits authorized: 2/20    Time In: 2:36  Time Out: 3:16  Total Billable Time: 40 minutes    Precautions: Standard and Fall    Subjective     Kaylen arrived to her physical therapy follow up appointment with mom and dad who were present for session.   Parent/Caregiver reports: she has been doing well and improving but still trips and falls a lot.  Mom reports she thinks some of this has to do with not paying attention to where she's going or what she's doing.    Response to previous treatment: ongoing   Mom brought Kaylen to therapy today.    Pain: Kaylen is unable to reate pain on numeric scale.  Pain behaviors were not noted.    Objective   Session focused on: exercises to develop LE strength and muscular endurance, LE range of motion and flexibility, sitting balance, standing balance, coordination, posture, kinesthetic sense and proprioception, desensitization techniques, facilitation of gait, stair negotiation, enhancement of sensory processing, promotion of adaptive responses to environmental demands, gross motor stimulation, cardiovascular endurance training, parent education and training, initiation/progression of HEP eye-hand coordination, core muscle activation.    Kaylen received therapeutic exercises to develop strength, endurance, ROM, flexibility, posture, and core stabilization for 15 minutes including:  Ascending/descending set of 3-4" steps with close stand by assistance x multiple reps  Squats to pick  up toys throughout session x multiple reps  Rock wall climbing x 1 reps with minimum assistance   Heavy works with weighted shopping cart racing in hallway x multiple reps    Kaylen participated in neuromuscular re-education activities to improve: Balance, Coordination, Kinesthetic, Sense, Proprioception, and Posture for 25 minutes. The following activities were included:  Participation in re-assessment of PDMS-II    Kaylen participated in dynamic functional therapeutic activities to improve functional performance for 0 minutes, including:    Kaylen participated in gait training to improve functional mobility and safety for 0 minutes, including:    Standardized Assessment  Gross Motor:  -[place] Developmental Motor Scales-2 (PDMS-2)-a comprehensive norm-referenced and criterion-referenced test used to measure motor patterns and skills (age: birth-83 months)      -Clinical Observation of Developmentally Functional Abilities (Gait, Transfers, Balance, Coordination)     Gross motor skills were evaluated using the PDMS-2. Skills were evaluated in three subsets areas with the following scores obtained:  PDMS-II scores:    Raw Score Age Equivalent Percentile Classification   Stationary 40 28 months 2% Poor   Locomotor 136 35 months 5% Poor   Object Manipulation 27 32 months 5% Poor      Reflexes & Balance: This area evaluates the child's ability to automatically react to environment. This subsection tests 0-12 months.   Stationary Skills: This area evaluates the childs ability to sustain control of his body within its center of gravity and retain balance.    Locomotor Skills:  This area evaluates the childs ability to move from one place to another.   Object Manipulation: This evaluates the child kicking, throwing, and catching a ball.  This subsection starts at 12 months of age.                  Gross Motor Quotient: 66 which is in the 1st percentile and considered poor.  While Kaylen improved in each category of  the PDMS since last assessment, she still remains well below average for her age.    *Per medicaid guidelines, the total time of treatment session will be billed as therapeutic exercise.    Home Exercises Provided and Patient Education Provided     Education provided:   - Patient's mother was educated on patient's current functional status and progress.  Patient's mother was educated on updated HEP.  Patient's mother verbalized understanding.  - reviewed session and activities to work on at home     Written Home Exercises Provided:   Exercises were reviewed and Kaylen was able to demonstrate them prior to the end of the session.  Kaylen demonstrated good  understanding of the education provided.     See EMR under Patient Instructions for exercises provided  next visit .    Assessment    Kaylen was seen for physical therapy for management of medical diagnosis of gait abnormality.  Kaylen demonstrates weakness in her bilateral quadriceps, as seen by knee hyperextension in stance as well as decreased eccentric control with stepping down. Improved stepping down with less support from curb heights this date. Challenged to perform single leg balance. Improved eccentric control to step down noted with use of rail this date in alternating foot pattern.    Improvements noted in: none at this time  Limited/no progress noted in: balance, single limb balance, falls, etc  Kaylen Is progressing well towards her goals.   Pt prognosis is Good.     Pt will continue to benefit from skilled outpatient physical therapy to address the deficits listed in the problem list box on initial evaluation, provide pt/family education and to maximize pt's level of independence in the home and community environment.     Pt's spiritual, cultural and educational needs considered and pt agreeable to plan of care and goals.    Anticipated barriers to physical therapy: attention and home compliance    Goals:     Goal: Patient/family will verbalize  "understanding of HEP and report ongoing adherence to recommendations.   Date Initiated: 7/19/23  Duration: Ongoing through discharge   Status: Initiated  Comments: 12/4/23: parents consistent with home exercise program    1/3/24: parents consistent with home exercise program    Goal: Patient will demonstrate ability to tandem step across balance beam with close stand by assistance for safety in session to demonstrate improvements in balance, narrow base of support ambulating and functional age appropriate mobility.  Date Initiated: 7/19/23; extended 2/5/24  Duration: 4 months  Status: Initiated  Comments: 8/15/23: requires contact guard assistance for all trials with several loss of balance today   10/9/23: performed with hand held assistance today   Goal: Patient will demonstrate ability to single limb stance for 10 seconds on either limb with close stand by assistance for safety to demonstrate improved single limb balance for clearing obstacles in community, stair navigation and other functional age appropriate activities.  Date Initiated: 7/19/23; extended 2/5/24  Duration: 6 months  Status: Initiated  Comments:    1/3/24: 3-5 seconds   Goal: Patient will demonstrate ability to reciprocally ascend and descend set of 4-6" steps with 0 upper extremity support and stand by assistance in a session to demonstrate improvements in strength, balance, and age appropriate mobility and independence.  Date Initiated: 7/19/23; extended 2/5/24  Duration: 4 months  Status: Initiated  Comments: 8/15/23: inconsistent reciprocal ascending and step to descending with close stand by assistance   10/9/23: ascends reciprocally with rail, step to with rail to walk down  11/6/23: uses rail up, moderate assistance to alternate feet down  12/4/23: minimum assistance to alternate feet down, uses rail up and down  1/3/24: uses rail to walk up and rail and cues to alternate feet down. Performing on series of 4 inch steps      Plan "     Outpatient Physical Therapy 4 times monthly for 6 months to include the following interventions: Gait Training, Manual Therapy, Neuromuscular Re-ed, Orthotic Management and Training, Patient Education, Therapeutic Activities, and Therapeutic Exercise. May decrease frequency as appropriate based on patient progress.        Galilea Olmedo, PT, DPT 2/5/2024         show

## 2024-02-05 NOTE — PROGRESS NOTE ADULT - ASSESSMENT
89 yo M with PMH dementia, BPH, CAD s/p 3 stent placements in 2016, GERD, HLD,  kidney stones, UTI presents with fever, chills, coughing, and multiple episodes of weakness/confusion; admitted for urosepsis, consulted for cardiac clearance for Uro procedure.     Cardiac Optimization, Hx CAD s/p stents  - s/p cystoscopy with retrograde pyelography with insertion of right ureteral stent.  Tolerated procedure well    - EK bpm, sinus tachycardia with 1st degree AV block.  No ischemic changes  - Patient without symptoms of angina or volume overload.  Non-orthopneic on RA  - No clear evidence of acute ischemia, trops negative.   - Stress test 2021 negative for angina, no evidence of MI with mild degree of reversible Myocardial ischemia of a small-medium size area of the basal septal and inferior walls (RCA), however may be artifact 2/2 motion   - CAD with 3 stents.  Continue ASA and statin    - No meaningful evidence of volume overload.  Non-orthopneic on RA.    - Previous ECHO 2022: LVEF 60%, mild TR and MR     - Tachycardia resolved  - BP stable and controlled per flowsheet    - For possible D/C today  - Monitor and replete lytes, keep K>4, Mg>2.  - Will continue to follow.    Marnie Keller DNP, NP-C, AGACNP-C  Cardiology   Call TEAMS

## 2024-02-15 ENCOUNTER — NON-APPOINTMENT (OUTPATIENT)
Age: 89
End: 2024-02-15

## 2024-02-22 ENCOUNTER — APPOINTMENT (OUTPATIENT)
Dept: UROLOGY | Facility: CLINIC | Age: 89
End: 2024-02-22
Payer: MEDICARE

## 2024-02-22 VITALS
TEMPERATURE: 96.2 F | DIASTOLIC BLOOD PRESSURE: 69 MMHG | SYSTOLIC BLOOD PRESSURE: 103 MMHG | HEART RATE: 95 BPM | OXYGEN SATURATION: 96 %

## 2024-02-22 PROCEDURE — 99214 OFFICE O/P EST MOD 30 MIN: CPT

## 2024-02-22 NOTE — ASSESSMENT
[FreeTextEntry1] : D/w pt's family- appears passed larger right renal stone to right distal ureter, with sepsis. Now mitigated with stent.  CT reviewed: has small additional stone right kidney, several small on left side they would like to proceed with URS and definitive treatment of stone, if medically cleared  sched cysto, right ureteroscopy with laser, stone removal, right stent urine culture medical, possibly cardiology, clearance

## 2024-02-22 NOTE — PHYSICAL EXAM
[General Appearance - Well Developed] : well developed [General Appearance - Well Nourished] : well nourished [Normal Appearance] : normal appearance [Well Groomed] : well groomed [General Appearance - In No Acute Distress] : no acute distress [Abdomen Soft] : soft [Edema] : no peripheral edema [] : no respiratory distress [Respiration, Rhythm And Depth] : normal respiratory rhythm and effort [Exaggerated Use Of Accessory Muscles For Inspiration] : no accessory muscle use [Oriented To Time, Place, And Person] : oriented to person, place, and time [Affect] : the affect was normal [Mood] : the mood was normal [Not Anxious] : not anxious [FreeTextEntry1] : wheel chair  [No Focal Deficits] : no focal deficits

## 2024-02-22 NOTE — HISTORY OF PRESENT ILLNESS
[FreeTextEntry1] : Now 89 yr old male presents to follow up with h/o kidney stones, uti (with MDR organism) on office urine cultures. Most recent, had admit to [facility] for likely right distal ureter stone and sepsis due to uti. Disparity between the esbl e coli in urine and just e coli in blood as far as sensitivities- took 2 weeks bactrim ds for the sens of the blood culture results (though not effective for urinary esbl e coli).  Underwent emergency stent placement on right. Arrive for f/u  s/p left URS and left PCNL on 07/19/2021  stone- 80% Calcium phosphate (apatite) 10% Calcium oxalate monohydrate 10% Calcium oxalate dihydrate    F/u stones bilat, UTI hx with e coli [None] : no symptoms

## 2024-02-29 ENCOUNTER — OUTPATIENT (OUTPATIENT)
Dept: OUTPATIENT SERVICES | Facility: HOSPITAL | Age: 89
LOS: 1 days | End: 2024-02-29
Payer: COMMERCIAL

## 2024-02-29 VITALS
SYSTOLIC BLOOD PRESSURE: 97 MMHG | TEMPERATURE: 98 F | RESPIRATION RATE: 16 BRPM | DIASTOLIC BLOOD PRESSURE: 65 MMHG | WEIGHT: 154.1 LBS | OXYGEN SATURATION: 99 % | HEIGHT: 71 IN | HEART RATE: 87 BPM

## 2024-02-29 DIAGNOSIS — Z01.818 ENCOUNTER FOR OTHER PREPROCEDURAL EXAMINATION: ICD-10-CM

## 2024-02-29 DIAGNOSIS — Z98.890 OTHER SPECIFIED POSTPROCEDURAL STATES: Chronic | ICD-10-CM

## 2024-02-29 DIAGNOSIS — N40.1 BENIGN PROSTATIC HYPERPLASIA WITH LOWER URINARY TRACT SYMPTOMS: ICD-10-CM

## 2024-02-29 DIAGNOSIS — Z95.5 PRESENCE OF CORONARY ANGIOPLASTY IMPLANT AND GRAFT: Chronic | ICD-10-CM

## 2024-02-29 DIAGNOSIS — N20.1 CALCULUS OF URETER: ICD-10-CM

## 2024-02-29 DIAGNOSIS — A49.9 BACTERIAL INFECTION, UNSPECIFIED: ICD-10-CM

## 2024-02-29 LAB
ANION GAP SERPL CALC-SCNC: 9 MMOL/L — SIGNIFICANT CHANGE UP (ref 5–17)
BACTERIA UR CULT: ABNORMAL
BUN SERPL-MCNC: 18 MG/DL — SIGNIFICANT CHANGE UP (ref 7–23)
CALCIUM SERPL-MCNC: 9.9 MG/DL — SIGNIFICANT CHANGE UP (ref 8.4–10.5)
CHLORIDE SERPL-SCNC: 108 MMOL/L — SIGNIFICANT CHANGE UP (ref 96–108)
CO2 SERPL-SCNC: 24 MMOL/L — SIGNIFICANT CHANGE UP (ref 22–31)
CREAT SERPL-MCNC: 1.01 MG/DL — SIGNIFICANT CHANGE UP (ref 0.5–1.3)
EGFR: 69 ML/MIN/1.73M2 — SIGNIFICANT CHANGE UP
GLUCOSE SERPL-MCNC: 112 MG/DL — HIGH (ref 70–99)
HCT VFR BLD CALC: 39.1 % — SIGNIFICANT CHANGE UP (ref 39–50)
HGB BLD-MCNC: 12.6 G/DL — LOW (ref 13–17)
MCHC RBC-ENTMCNC: 30.7 PG — SIGNIFICANT CHANGE UP (ref 27–34)
MCHC RBC-ENTMCNC: 32.2 GM/DL — SIGNIFICANT CHANGE UP (ref 32–36)
MCV RBC AUTO: 95.1 FL — SIGNIFICANT CHANGE UP (ref 80–100)
NRBC # BLD: 0 /100 WBCS — SIGNIFICANT CHANGE UP (ref 0–0)
PLATELET # BLD AUTO: 116 K/UL — LOW (ref 150–400)
POTASSIUM SERPL-MCNC: 4 MMOL/L — SIGNIFICANT CHANGE UP (ref 3.5–5.3)
POTASSIUM SERPL-SCNC: 4 MMOL/L — SIGNIFICANT CHANGE UP (ref 3.5–5.3)
RBC # BLD: 4.11 M/UL — LOW (ref 4.2–5.8)
RBC # FLD: 15.1 % — HIGH (ref 10.3–14.5)
SODIUM SERPL-SCNC: 141 MMOL/L — SIGNIFICANT CHANGE UP (ref 135–145)
WBC # BLD: 6.25 K/UL — SIGNIFICANT CHANGE UP (ref 3.8–10.5)
WBC # FLD AUTO: 6.25 K/UL — SIGNIFICANT CHANGE UP (ref 3.8–10.5)

## 2024-02-29 RX ORDER — SODIUM CHLORIDE 9 MG/ML
3 INJECTION INTRAMUSCULAR; INTRAVENOUS; SUBCUTANEOUS EVERY 8 HOURS
Refills: 0 | Status: DISCONTINUED | OUTPATIENT
Start: 2024-03-11 | End: 2024-03-11

## 2024-02-29 RX ORDER — LIDOCAINE HCL 20 MG/ML
0.2 VIAL (ML) INJECTION ONCE
Refills: 0 | Status: DISCONTINUED | OUTPATIENT
Start: 2024-03-11 | End: 2024-03-11

## 2024-02-29 NOTE — H&P PST ADULT - NSICDXPASTSURGICALHX_GEN_ALL_CORE_FT
PAST SURGICAL HISTORY:  History of prostate surgery     S/P ureteral stent placement Left sided    Stented coronary artery

## 2024-02-29 NOTE — H&P PST ADULT - HISTORY OF PRESENT ILLNESS
Pt. has dementia, [place] and non-verbal, son, Vane, lives with pt. and providing all information at PST, Also spoke with daughter Wade, who is HCP over the phone.  89 yo male, PMH dementia, BPH, MI, CAD s/p 3 stent placements in 2016, TIA, HLD, GERD, BPH s/p prostate surgery 2017, kidney stones s/p lithotripsy X 3,  urosepsis in the past due to ESBLin 2021, more recently  hospitalized 1/28-2/5/24 with urosepsis due to right ureteral stone s/p right ureteral stent placement 1/30/24. Pt. presents to [facility] for cystoscopy, right Ureteroscopy, Laser Stone Removal, Right Stent Placement on 3/11/24. As we were in PST, urine culture from 2/28/24 resulted and + for ESBL, pt. will be treated with IV antibiotics at home, will be arranged by Dr. Hoenig. Pt.'s son and daughter are aware. As per son, no recent fever, chills, chest pain, SOB, hematuria persists 2/2 renal stent. No recent exposure to COVID-19 infection.

## 2024-02-29 NOTE — H&P PST ADULT - ATTENDING COMMENTS
Patient was seen and examined   No change in H&P  Plan for midline placement   Plan for OR right ureteroscopy , laser litho and stent placement   Patient was marked and consent obtained

## 2024-02-29 NOTE — H&P PST ADULT - ASSESSMENT
Activity: Needs assistance with ADL's, uses walker inside the home    DASI scale: 3.07    Mallampati: 2    Dentition: missing most teeth, as per son, no loose teeth

## 2024-02-29 NOTE — H&P PST ADULT - PROBLEM SELECTOR PLAN 1
Cystoscopy, right Ureteroscopy, Laser Stone Removal, Right Stent Placement on 3/11/24  Pre-op instructions provided - all questions answered

## 2024-02-29 NOTE — H&P PST ADULT - AS BP NONINV METHOD
as per son, this is normal for patient. "visiting nurse obtained about the same BP yesterday"/electronic

## 2024-02-29 NOTE — H&P PST ADULT - PROBLEM SELECTOR PLAN 2
Arrangements to be made by Dr. Hoenig, pt. will be receiving IV antibiotics before procedure. Pt.'s son and daughter made aware

## 2024-03-01 RX ORDER — ERTAPENEM SODIUM 1 G/1
1 INJECTION, POWDER, LYOPHILIZED, FOR SOLUTION INTRAMUSCULAR; INTRAVENOUS DAILY
Qty: 14 | Refills: 0 | Status: ACTIVE | COMMUNITY
Start: 2024-03-01 | End: 1900-01-01

## 2024-03-05 ENCOUNTER — NON-APPOINTMENT (OUTPATIENT)
Age: 89
End: 2024-03-05

## 2024-03-06 ENCOUNTER — NON-APPOINTMENT (OUTPATIENT)
Age: 89
End: 2024-03-06

## 2024-03-07 ENCOUNTER — APPOINTMENT (OUTPATIENT)
Dept: INFECTIOUS DISEASE | Facility: CLINIC | Age: 89
End: 2024-03-07
Payer: MEDICARE

## 2024-03-07 ENCOUNTER — NON-APPOINTMENT (OUTPATIENT)
Age: 89
End: 2024-03-07

## 2024-03-07 VITALS
TEMPERATURE: 98 F | HEART RATE: 84 BPM | WEIGHT: 154 LBS | BODY MASS INDEX: 24.86 KG/M2 | SYSTOLIC BLOOD PRESSURE: 97 MMHG | DIASTOLIC BLOOD PRESSURE: 59 MMHG | OXYGEN SATURATION: 98 %

## 2024-03-07 PROCEDURE — 99214 OFFICE O/P EST MOD 30 MIN: CPT

## 2024-03-08 ENCOUNTER — NON-APPOINTMENT (OUTPATIENT)
Age: 89
End: 2024-03-08

## 2024-03-10 ENCOUNTER — TRANSCRIPTION ENCOUNTER (OUTPATIENT)
Age: 89
End: 2024-03-10

## 2024-03-11 ENCOUNTER — OUTPATIENT (OUTPATIENT)
Dept: INPATIENT UNIT | Facility: HOSPITAL | Age: 89
LOS: 1 days | End: 2024-03-11
Payer: COMMERCIAL

## 2024-03-11 ENCOUNTER — TRANSCRIPTION ENCOUNTER (OUTPATIENT)
Age: 89
End: 2024-03-11

## 2024-03-11 ENCOUNTER — APPOINTMENT (OUTPATIENT)
Dept: UROLOGY | Facility: HOSPITAL | Age: 89
End: 2024-03-11

## 2024-03-11 ENCOUNTER — RESULT REVIEW (OUTPATIENT)
Age: 89
End: 2024-03-11

## 2024-03-11 VITALS
HEART RATE: 90 BPM | DIASTOLIC BLOOD PRESSURE: 50 MMHG | SYSTOLIC BLOOD PRESSURE: 106 MMHG | RESPIRATION RATE: 16 BRPM | OXYGEN SATURATION: 99 % | TEMPERATURE: 98 F

## 2024-03-11 VITALS
OXYGEN SATURATION: 97 % | TEMPERATURE: 98 F | DIASTOLIC BLOOD PRESSURE: 63 MMHG | RESPIRATION RATE: 16 BRPM | SYSTOLIC BLOOD PRESSURE: 96 MMHG | HEART RATE: 98 BPM

## 2024-03-11 DIAGNOSIS — Z98.890 OTHER SPECIFIED POSTPROCEDURAL STATES: Chronic | ICD-10-CM

## 2024-03-11 DIAGNOSIS — Z95.5 PRESENCE OF CORONARY ANGIOPLASTY IMPLANT AND GRAFT: Chronic | ICD-10-CM

## 2024-03-11 DIAGNOSIS — N40.1 BENIGN PROSTATIC HYPERPLASIA WITH LOWER URINARY TRACT SYMPTOMS: ICD-10-CM

## 2024-03-11 PROCEDURE — 36569 INSJ PICC 5 YR+ W/O IMAGING: CPT

## 2024-03-11 PROCEDURE — 85027 COMPLETE CBC AUTOMATED: CPT

## 2024-03-11 PROCEDURE — 74420 UROGRAPHY RTRGR +-KUB: CPT | Mod: 26

## 2024-03-11 PROCEDURE — C1889: CPT

## 2024-03-11 PROCEDURE — C1894: CPT

## 2024-03-11 PROCEDURE — 76000 FLUOROSCOPY <1 HR PHYS/QHP: CPT

## 2024-03-11 PROCEDURE — 88300 SURGICAL PATH GROSS: CPT

## 2024-03-11 PROCEDURE — 80048 BASIC METABOLIC PNL TOTAL CA: CPT

## 2024-03-11 PROCEDURE — G0463: CPT

## 2024-03-11 PROCEDURE — 52352 CYSTOURETERO W/STONE REMOVE: CPT | Mod: RT

## 2024-03-11 PROCEDURE — C1758: CPT

## 2024-03-11 PROCEDURE — 82365 CALCULUS SPECTROSCOPY: CPT

## 2024-03-11 PROCEDURE — C1751: CPT

## 2024-03-11 PROCEDURE — C9399: CPT

## 2024-03-11 PROCEDURE — C1769: CPT

## 2024-03-11 PROCEDURE — 88300 SURGICAL PATH GROSS: CPT | Mod: 26

## 2024-03-11 DEVICE — STONE BASKET ZEROTIP NITINOL 4-WIRE 1.9FR 120CM X 12MM: Type: IMPLANTABLE DEVICE | Site: RIGHT | Status: FUNCTIONAL

## 2024-03-11 DEVICE — URETERAL CATH AXXCESS OPEN END 6FR 70CM: Type: IMPLANTABLE DEVICE | Site: RIGHT | Status: FUNCTIONAL

## 2024-03-11 DEVICE — GUIDEWIRE SENSOR DUAL-FLEX NITINOL STRAIGHT .035" X 150CM: Type: IMPLANTABLE DEVICE | Site: RIGHT | Status: FUNCTIONAL

## 2024-03-11 RX ORDER — FINASTERIDE 5 MG/1
1 TABLET, FILM COATED ORAL
Qty: 0 | Refills: 0 | DISCHARGE

## 2024-03-11 RX ORDER — MEMANTINE HYDROCHLORIDE 10 MG/1
1 TABLET ORAL
Qty: 0 | Refills: 0 | DISCHARGE

## 2024-03-11 RX ORDER — MULTIVIT-MIN/FERROUS GLUCONATE 9 MG/15 ML
0 LIQUID (ML) ORAL
Qty: 0 | Refills: 0 | DISCHARGE

## 2024-03-11 RX ORDER — ONDANSETRON 8 MG/1
4 TABLET, FILM COATED ORAL ONCE
Refills: 0 | Status: DISCONTINUED | OUTPATIENT
Start: 2024-03-11 | End: 2024-03-11

## 2024-03-11 RX ORDER — HYDROMORPHONE HYDROCHLORIDE 2 MG/ML
0.25 INJECTION INTRAMUSCULAR; INTRAVENOUS; SUBCUTANEOUS
Refills: 0 | Status: DISCONTINUED | OUTPATIENT
Start: 2024-03-11 | End: 2024-03-11

## 2024-03-11 RX ORDER — CEFAZOLIN SODIUM 1 G
2000 VIAL (EA) INJECTION ONCE
Refills: 0 | Status: COMPLETED | OUTPATIENT
Start: 2024-03-11 | End: 2024-03-11

## 2024-03-11 RX ORDER — ASPIRIN/CALCIUM CARB/MAGNESIUM 324 MG
1 TABLET ORAL
Refills: 0 | DISCHARGE

## 2024-03-11 RX ORDER — ATORVASTATIN CALCIUM 80 MG/1
1 TABLET, FILM COATED ORAL
Qty: 0 | Refills: 0 | DISCHARGE

## 2024-03-11 RX ORDER — METHENAMINE MANDELATE 1 G
1 TABLET ORAL
Refills: 0 | DISCHARGE

## 2024-03-11 NOTE — CHART NOTE - NSCHARTNOTEFT_GEN_A_CORE
Pt needs midline placed for IV abx (ertapenem) infusions at home.   Needs 10 day total course of ertapenem. Began therapy on 3/8. Needs to continue until 3/18.  Home care for IV infusions was set up by outpatient infectious disease (Dr. Chiu) office.  Midline placed by IV RN on 3/11.  To continue ertapenem until 3/18. No further abx required at this time per Dr. Chiu.  discussed with Dr. Fernandez

## 2024-03-11 NOTE — ASSESSMENT
[FreeTextEntry1] : 89 yo man with PMH of dementia, BPH and kidney stones was admitted at [facility] with fever, chills, weakness and confusion late January and early February.  He had UTI with ESBL Ecoli and at that time stent was placed now has appt for removal but  on 3/11. Recent UC again with ESBL ecoli.  Needs Invanz 1gm daily for for total 10days at home Midline should be fine Weekly CBC and BMP.   Patient was given the opportunity to ask questions and all questions were answered to their satisfaction. Counseling included lab results, differential diagnosis, treatment options, risks and benefits, lifestyle changes, current condition, medications and dose adjustments. The patient verbalized understanding.

## 2024-03-11 NOTE — ADVANCED PRACTICE NURSE CONSULT - ASSESSMENT
Midline Catheter Insertion Note    Catheter type: 4F  : Bard  Power injectable: Yes  LOT# [id number]                                                                                                                                                                                                                     Procedure assisted by: DANTE Samayoa RN  Time out was preformed, confirming the patient's first and last name, date of birth, procedure, and correct site prior to state of procedure.    Patient was placed with HOB 30 degrees. Patient placement site was prepped with chlorhexidine solution, then draped using maximum sterile barrier protection. Using the Bard Site Rite 8, the catheter was placed using the Modified Seldinger Technique. Strict adherence to outline aseptic technique including handwashing, glove and gown, utilizing mask and cap, plus draping the patient with a sterile drape was observed. Upon completion of line placement, the insertion site was covered with a sterile occlusive CHG dressing. Pt tolerated procedure well.     All materials used for catheter insertion, including the intact guide wires, were accounted for at the end of the procedure.  Number of attempts: 1  Complications/Comments: Unable to advance catheter all the way in. Midline cut to 13cm.  Emergency Placement: No    Site: New  Anatomical Site of insertion: Right Basilic vein  Catheter size/length: 4F, 13cm  US guided Bard single lumen power midline placed

## 2024-03-11 NOTE — ASU DISCHARGE PLAN (ADULT/PEDIATRIC) - NS MD DC FALL RISK RISK
For information on Fall & Injury Prevention, visit: [email or web address] OR  [email or web address] OR  https://www.cdc.gov/steadi/patient.html

## 2024-03-11 NOTE — HISTORY OF PRESENT ILLNESS
[FreeTextEntry1] : 89 yo man with PMH of dementia, BPH and kidney stones was admitted at [facility] with fever, chills, weakness and confusion late January and early February.  CT A/P no cont: Mild RIGHT hydroureteronephrosis presumed secondary to 5 mm distal intramural RIGHT UV junction calculus (2/143). A recently passed calculus adjacent to RIGHT UV junction is considered an alternate less likely possibility. So was taken to OR by  and had R stent placed. Blood cultures with EColi S to ceftriaxone UA with a very high WBC UC with low CFU ESBL ecoli OR culture with <10,000 Ecoli Was on ertapenem now has been sceheudle for stent removal on 3/11. Recent UC showed ESBL ecoli again, here for follow up and treatment plan. No fever or chills or any other complaint.

## 2024-03-11 NOTE — ASU DISCHARGE PLAN (ADULT/PEDIATRIC) - ASU DC SPECIAL INSTRUCTIONSFT
PAIN CONTROL: You may take 650 mg of Tylenol every 4-6 hours. Do not exceed more than 4000mg or 4 grams of Tylenol daily. You may take Tylenol and Ibuprofen as needed for pain. If you were prescribed narcotic pain medication, take as directed. You should also take senna to prevent constipation.    ANTIBIOTICS: Please complete the course of antibiotics at home (Ertapenem) through midline.    BATHING: Please do not submerge wound underwater. You may shower and/or sponge bathe.    ACTIVITY: No heavy lifting or straining. Otherwise, you may return to your usual level of physical activity. If you are taking narcotic pain medications (such as oxycodone), do NOT drive a car, operate machinery or make important decisions.    DIET: Return to your usual diet    NOTIFY YOUR SURGEON IF: You have any bleeding that does not stop, any fevers (over 100.4F) or chills, persistent nausea/vomiting, persistent diarrhea, your pain is not controlled on your discharge pain medications, heavy bleeding in the urine, inability to urinate, or other worrisome symptoms arise.    FOLLOW UP:  1. Please call your surgeon to make a follow up appointment within two weeks of discharge  2: Please follow up with your primary care physician within 1-2 weeks regarding your hospitalization.

## 2024-03-11 NOTE — ADVANCED PRACTICE NURSE CONSULT - REASON FOR CONSULT
Vascular Access Team    Evaluation for: Bedside MIDLINE placement  Requested by name: Cyril [place]  Date/Time: 3/11@13:30    Indication for MIDLINE: IV abx at home  Allergy to CHG or Heparin or Lidocaine: NO    Platelets(>20): 116  INR(<3): none  eGFR(>40): 69  Pending blood cultures: N/A  Anticoagulants: NO  Arms DVT: NO  Mastectomy: NO  Fistula: NO  IR or Nephrology or ID clearance needed: NO    Consent obtained: N/A    Pending: none    Plan: Bedside midline order evaluated. Will be done shortly. If any question, call [phone] for the VAT RN to place the bedside midline.  
Bedside midline order placed.   Indication: Ertapenem for 6days.

## 2024-03-11 NOTE — ASU DISCHARGE PLAN (ADULT/PEDIATRIC) - CARE PROVIDER_API CALL
Hoenig, David Mayer  Urology  [address] [place] [address]- [place] [address]  Phone: [phone]  Fax: [phone]  Follow Up Time: 2 weeks

## 2024-03-13 ENCOUNTER — APPOINTMENT (OUTPATIENT)
Dept: UROLOGY | Facility: CLINIC | Age: 89
End: 2024-03-13
Payer: MEDICARE

## 2024-03-13 ENCOUNTER — NON-APPOINTMENT (OUTPATIENT)
Age: 89
End: 2024-03-13

## 2024-03-13 DIAGNOSIS — Z16.12 URINARY TRACT INFECTION, SITE NOT SPECIFIED: ICD-10-CM

## 2024-03-13 DIAGNOSIS — B96.29 URINARY TRACT INFECTION, SITE NOT SPECIFIED: ICD-10-CM

## 2024-03-13 DIAGNOSIS — Z87.442 PERSONAL HISTORY OF URINARY CALCULI: ICD-10-CM

## 2024-03-13 DIAGNOSIS — N39.0 URINARY TRACT INFECTION, SITE NOT SPECIFIED: ICD-10-CM

## 2024-03-13 PROCEDURE — 99213 OFFICE O/P EST LOW 20 MIN: CPT

## 2024-03-13 PROCEDURE — 51798 US URINE CAPACITY MEASURE: CPT

## 2024-03-17 PROBLEM — N39.0 URINARY TRACT INFECTION DUE TO EXTENDED-SPECTRUM BETA LACTAMASE (ESBL) PRODUCING ESCHERICHIA COLI: Status: ACTIVE | Noted: 2021-06-28

## 2024-03-17 NOTE — ASSESSMENT
[FreeTextEntry1] : PVR- 260   Pt denies abdominal discomfort.   Follow up with PCP for bilateral foot swelling.   plan 1) follow up as scheduled POA

## 2024-03-17 NOTE — HISTORY OF PRESENT ILLNESS
[Urinary Urgency] : urinary urgency [Urinary Frequency] : urinary frequency [None] : None [FreeTextEntry1] : 89 yr old male presents to follow up with kidney stones. He is A&O x 1 and accompanied by two family members.  Pt complaining of urinary urgency. Overnight, he had difficulty voiding but was able to at 8 am. He has since voided several times with small volumes. Pt and family also complaining of bilateral foot swelling.   s/p  right ureteroscopy on  3/11/24

## 2024-03-18 LAB
CELL MATERIAL STONE EST-MCNT: SIGNIFICANT CHANGE UP
LABORATORY COMMENT REPORT: SIGNIFICANT CHANGE UP
NIDUS STONE QN: SIGNIFICANT CHANGE UP

## 2024-03-19 LAB — SURGICAL PATHOLOGY STUDY: SIGNIFICANT CHANGE UP

## 2024-03-19 NOTE — PATIENT PROFILE ADULT - FUNCTIONAL ASSESSMENT - BASIC MOBILITY SECTION LABEL
Caller: PHAN PULLIAMS    Relationship: Spouse    Best call back number: [phone]     Requested Prescriptions:   Requested Prescriptions     Pending Prescriptions Disp Refills    omeprazole (priLOSEC) 40 MG capsule 30 capsule 0     Sig: Take 1 capsule by mouth Daily.        Pharmacy where request should be sent: [facility] [address] [place], [place] [address] - [phone]  - [phone] FX     Last office visit with prescribing clinician: Visit date not found   Last telemedicine visit with prescribing clinician: Visit date not found   Next office visit with prescribing clinician: 4/16/2024     Additional details provided by patient:     Does the patient have less than a 3 day supply:  [x] Yes  [] No    Would you like a call back once the refill request has been completed: [] Yes [x] No    If the office needs to give you a call back, can they leave a voicemail: [] Yes [x] No    Deepak Guillermo Rep   03/19/24 12:14 EDT        .

## 2024-04-03 ENCOUNTER — APPOINTMENT (OUTPATIENT)
Dept: UROLOGY | Facility: CLINIC | Age: 89
End: 2024-04-03

## 2024-04-23 NOTE — H&P ADULT - PROBLEM SELECTOR PLAN 1
Pt presents with cough x3 days, fever likely 2/2 pneumonia.  - S/P tylenol suppository 650mg, cefepime 1000mg IV, vancomycin 1000mg IV, sodium chloride 0.9% bolus 2100mL IV  - Labs significant for WBC 14.27, lactate 1.6, RVP positive for entero/rhinovirus  - IV Rocephin  - Trend WBC and monitor for fever  - Follow up blood cultures x2  - SLP swallow eval for concern for aspiration pneumonia  - ID (Dr. Chiu) consulted, will appreiate recs Pt presents with cough x3 days, fever likely 2/2 pneumonia.  - S/P tylenol suppository 650mg, cefepime 1000mg IV, vancomycin 1000mg IV, sodium chloride 0.9% bolus 2100mL IV  - Labs significant for WBC 14.27, lactate 1.6, RVP positive for entero/rhinovirus  - IV Rocephin, azithromycin  - Trend WBC and monitor for fever  - Will obtain MRSA, legionella, strep pnemo antigen tests. Follow up results.  - Follow up blood cultures x2  - Pureed diet  - ID (Dr. Chiu) consulted, will appreciate recs No Pt presents with cough x3 days, fever likely 2/2 pneumonia.  - S/P tylenol suppository 650mg, cefepime 1000mg IV, vancomycin 1000mg IV, sodium chloride 0.9% bolus 2100mL IV  - Labs significant for WBC 14.27, lactate 1.6, RVP positive for entero/rhinovirus  - Imaging: Wet read CXR - patchy, bilateral infiltrates L>R  - IV Rocephin, azithromycin  - Trend WBC and monitor for fever  - Will obtain MRSA, legionella, strep pnemo antigen tests. Follow up results.  - Follow up blood cultures x2  - Pureed diet  - ID (Dr. Chiu) consulted, will appreciate recs Pt meets sepsis criteria on admission, likely 2/2 pneumonia vs UTI. Unclear cause  - S/P tylenol suppository 650mg, cefepime 1000mg IV, vancomycin 1000mg IV, sodium chloride 0.9% bolus 2100mL IV  - Labs significant for WBC 14.27, lactate 1.6, RVP positive for entero/rhinovirus  -  UA (positive nitrite, moderate leukocyte esterase, 15 WBC, 5 RBC, many bacteria, squamous epithelial cells present)  - Imaging: Wet read CXR - patchy, bilateral infiltrates L>R  - IV Rocephin, azithromycin  - Gentle IV fluids NS @ 60cc/hr x12 hrs (last echo showed normal LV with EF 60%)  -- ID (Dr. Chiu) consulted, will appreciate recs Pt meets sepsis criteria on admission, likely 2/2 pneumonia vs UTI. Unclear cause  - S/P tylenol suppository 650mg, cefepime 1000mg IV, vancomycin 1000mg IV, sodium chloride 0.9% bolus 2100mL IV  - Labs significant for WBC 14.27, lactate 1.6, RVP positive for entero/rhinovirus  -  UA (positive nitrite, moderate leukocyte esterase, 15 WBC, 5 RBC, many bacteria, squamous epithelial cells present)  - Imaging: Wet read CXR - patchy, bilateral infiltrates L>R  - IV ertapenem  - Gentle IV fluids NS @ 60cc/hr x12 hrs (last echo showed normal LV with EF 60%)  -- ID (Dr. Chiu) consulted, will appreciate recs Pt meets sepsis criteria on admission, likely 2/2 UTI vs PNA.   - S/P tylenol suppository 650mg, cefepime 1000mg IV, vancomycin 1000mg IV, sodium chloride 0.9% bolus 2100mL IV  - Labs significant for WBC 14.27, lactate 1.6, RVP positive for entero/rhinovirus  -  UA (positive nitrite, moderate leukocyte esterase, 15 WBC, 5 RBC, many bacteria, squamous epithelial cells present)  - Imaging: Wet read CXR - patchy, bilateral infiltrates L>R  - IV ertapenem given history of MDRO UTI in the past   - Gentle IV fluids NS @ 60cc/hr x12 hrs (last echo showed normal LV with EF 60%)  -- ID (Dr. Chiu) consulted, will appreciate recs

## 2024-04-25 ENCOUNTER — APPOINTMENT (OUTPATIENT)
Dept: UROLOGY | Facility: CLINIC | Age: 89
End: 2024-04-25
Payer: MEDICARE

## 2024-04-25 DIAGNOSIS — N20.0 CALCULUS OF KIDNEY: ICD-10-CM

## 2024-04-25 DIAGNOSIS — N39.0 URINARY TRACT INFECTION, SITE NOT SPECIFIED: ICD-10-CM

## 2024-04-25 DIAGNOSIS — N13.8 BENIGN PROSTATIC HYPERPLASIA WITH LOWER URINARY TRACT SYMPMS: ICD-10-CM

## 2024-04-25 DIAGNOSIS — N20.1 CALCULUS OF URETER: ICD-10-CM

## 2024-04-25 DIAGNOSIS — B96.29 URINARY TRACT INFECTION, SITE NOT SPECIFIED: ICD-10-CM

## 2024-04-25 DIAGNOSIS — Z16.12 URINARY TRACT INFECTION, SITE NOT SPECIFIED: ICD-10-CM

## 2024-04-25 DIAGNOSIS — N40.1 BENIGN PROSTATIC HYPERPLASIA WITH LOWER URINARY TRACT SYMPMS: ICD-10-CM

## 2024-04-25 PROCEDURE — 99213 OFFICE O/P EST LOW 20 MIN: CPT

## 2024-04-25 PROCEDURE — 51798 US URINE CAPACITY MEASURE: CPT

## 2024-04-30 LAB — BACTERIA UR CULT: ABNORMAL

## 2024-04-30 RX ORDER — SULFAMETHOXAZOLE AND TRIMETHOPRIM 800; 160 MG/1; MG/1
800-160 TABLET ORAL
Qty: 28 | Refills: 0 | Status: ACTIVE | COMMUNITY
Start: 2024-04-30 | End: 1900-01-01

## 2024-05-09 NOTE — H&P ADULT. - NS ABD PE RECTAL EXAM
Refill sent. Please notify due for physical after 6/27/24.    Sandor Moralez PA-C  [facility]      patient refused

## 2024-06-05 ENCOUNTER — NON-APPOINTMENT (OUTPATIENT)
Age: 89
End: 2024-06-05

## 2024-06-05 ENCOUNTER — APPOINTMENT (OUTPATIENT)
Dept: CARDIOLOGY | Facility: CLINIC | Age: 89
End: 2024-06-05
Payer: MEDICARE

## 2024-06-05 VITALS
DIASTOLIC BLOOD PRESSURE: 69 MMHG | WEIGHT: 158 LBS | HEIGHT: 66 IN | OXYGEN SATURATION: 98 % | HEART RATE: 87 BPM | BODY MASS INDEX: 25.39 KG/M2 | SYSTOLIC BLOOD PRESSURE: 107 MMHG | TEMPERATURE: 97.1 F

## 2024-06-05 DIAGNOSIS — R06.02 SHORTNESS OF BREATH: ICD-10-CM

## 2024-06-05 DIAGNOSIS — I25.118 ATHEROSCLEROTIC HEART DISEASE OF NATIVE CORONARY ARTERY WITH OTHER FORMS OF ANGINA PECTORIS: ICD-10-CM

## 2024-06-05 DIAGNOSIS — Z86.79 PERSONAL HISTORY OF OTHER DISEASES OF THE CIRCULATORY SYSTEM: ICD-10-CM

## 2024-06-05 DIAGNOSIS — I10 ESSENTIAL (PRIMARY) HYPERTENSION: ICD-10-CM

## 2024-06-05 DIAGNOSIS — Z86.39 PERSONAL HISTORY OF OTHER ENDOCRINE, NUTRITIONAL AND METABOLIC DISEASE: ICD-10-CM

## 2024-06-05 DIAGNOSIS — I44.30 UNSPECIFIED ATRIOVENTRICULAR BLOCK: ICD-10-CM

## 2024-06-05 DIAGNOSIS — R06.00 DYSPNEA, UNSPECIFIED: ICD-10-CM

## 2024-06-05 DIAGNOSIS — I50.32 CHRONIC DIASTOLIC (CONGESTIVE) HEART FAILURE: ICD-10-CM

## 2024-06-05 DIAGNOSIS — I25.10 ATHEROSCLEROTIC HEART DISEASE OF NATIVE CORONARY ARTERY W/OUT ANGINA PECTORIS: ICD-10-CM

## 2024-06-05 DIAGNOSIS — J98.01 ACUTE BRONCHOSPASM: ICD-10-CM

## 2024-06-05 DIAGNOSIS — J44.9 CHRONIC OBSTRUCTIVE PULMONARY DISEASE, UNSPECIFIED: ICD-10-CM

## 2024-06-05 PROCEDURE — 99214 OFFICE O/P EST MOD 30 MIN: CPT | Mod: 25

## 2024-06-05 PROCEDURE — 93000 ELECTROCARDIOGRAM COMPLETE: CPT

## 2024-06-05 PROCEDURE — 99204 OFFICE O/P NEW MOD 45 MIN: CPT | Mod: 25

## 2024-06-05 RX ORDER — IPRATROPIUM BROMIDE 17 UG/1
17 AEROSOL, METERED RESPIRATORY (INHALATION) TWICE DAILY
Qty: 1 | Refills: 0 | Status: ACTIVE | COMMUNITY
Start: 2024-06-05 | End: 1900-01-01

## 2024-06-05 RX ORDER — FUROSEMIDE 20 MG/1
20 TABLET ORAL DAILY
Refills: 0 | Status: ACTIVE | COMMUNITY
Start: 2024-06-05

## 2024-06-05 NOTE — REASON FOR VISIT
[Symptom and Test Evaluation] : symptom and test evaluation [Cardiac Failure] : cardiac failure [Hypertension] : hypertension [Family Member] : family member [FreeTextEntry1] : has wheezing at night , less pedal edema on lasix

## 2024-06-05 NOTE — REVIEW OF SYSTEMS
[Weight Loss (___ Lbs)] : no recent weight loss [SOB] : shortness of breath [Dyspnea on exertion] : dyspnea during exertion [Lower Ext Edema] : lower extremity edema [Palpitations] : palpitations [Cough] : cough [Wheezing] : wheezing [Urinary Frequency] : urinary frequency [Joint Pain] : joint pain [Limb Weakness (Paresis)] : limb weakness (Paresis) [Speech Disturbance] : speech disturbance [Negative] : Integumentary

## 2024-06-05 NOTE — PHYSICAL EXAM
[Well Developed] : well developed [Well Nourished] : well nourished [Normal Conjunctiva] : normal conjunctiva [Normal Venous Pressure] : normal venous pressure [S4] : S4 [Murmur] : murmur [Wheeze ____] : wheeze [unfilled] [Soft] : abdomen soft [Non Tender] : non-tender [Diminished Pedal Pulses ___] : diminished pedal pulses [unfilled] [Edema ___] : edema [unfilled] [de-identified] : ESM  [de-identified] : not communicative and cognitive impairement

## 2024-06-05 NOTE — DISCUSSION/SUMMARY
[First Degree A-V Block] : first degree AV block [Family] : the patient's family [Hypertension] : hypertension [Stable] : stable [None] : There are no changes in medication management [Heart Failure Diastolic] : diastolic heart failure [COPD] : chronic obstructive pulmonary disease [Improving] : improving [Medication Changes Per Orders] : as documented in orders [EKG obtained to assist in diagnosis and management of assessed problem(s)] : EKG obtained to assist in diagnosis and management of assessed problem(s)

## 2024-06-11 RX ORDER — METHENAMINE HIPPURATE 1 G/1
1 TABLET ORAL TWICE DAILY
Qty: 180 | Refills: 3 | Status: ACTIVE | COMMUNITY
Start: 2021-08-14 | End: 1900-01-01

## 2024-07-10 ENCOUNTER — APPOINTMENT (OUTPATIENT)
Dept: UROLOGY | Facility: CLINIC | Age: 89
End: 2024-07-10
Payer: MEDICARE

## 2024-07-10 ENCOUNTER — OUTPATIENT (OUTPATIENT)
Dept: OUTPATIENT SERVICES | Facility: HOSPITAL | Age: 89
LOS: 1 days | End: 2024-07-10
Payer: COMMERCIAL

## 2024-07-10 DIAGNOSIS — Z98.890 OTHER SPECIFIED POSTPROCEDURAL STATES: Chronic | ICD-10-CM

## 2024-07-10 DIAGNOSIS — N39.0 URINARY TRACT INFECTION, SITE NOT SPECIFIED: ICD-10-CM

## 2024-07-10 DIAGNOSIS — Z16.12 URINARY TRACT INFECTION, SITE NOT SPECIFIED: ICD-10-CM

## 2024-07-10 DIAGNOSIS — N20.0 CALCULUS OF KIDNEY: ICD-10-CM

## 2024-07-10 DIAGNOSIS — Z95.5 PRESENCE OF CORONARY ANGIOPLASTY IMPLANT AND GRAFT: Chronic | ICD-10-CM

## 2024-07-10 DIAGNOSIS — B96.29 URINARY TRACT INFECTION, SITE NOT SPECIFIED: ICD-10-CM

## 2024-07-10 DIAGNOSIS — Z87.442 PERSONAL HISTORY OF URINARY CALCULI: ICD-10-CM

## 2024-07-10 DIAGNOSIS — R35.0 FREQUENCY OF MICTURITION: ICD-10-CM

## 2024-07-10 PROCEDURE — 99213 OFFICE O/P EST LOW 20 MIN: CPT | Mod: 25

## 2024-07-10 PROCEDURE — 76770 US EXAM ABDO BACK WALL COMP: CPT | Mod: 26

## 2024-07-10 PROCEDURE — 76770 US EXAM ABDO BACK WALL COMP: CPT

## 2024-07-11 DIAGNOSIS — Z87.442 PERSONAL HISTORY OF URINARY CALCULI: ICD-10-CM

## 2024-07-11 DIAGNOSIS — N20.0 CALCULUS OF KIDNEY: ICD-10-CM

## 2024-07-11 DIAGNOSIS — N39.0 URINARY TRACT INFECTION, SITE NOT SPECIFIED: ICD-10-CM

## 2024-07-26 ENCOUNTER — INPATIENT (INPATIENT)
Facility: HOSPITAL | Age: 89
LOS: 2 days | Discharge: HOME | DRG: 310 | End: 2024-07-29
Attending: STUDENT IN AN ORGANIZED HEALTH CARE EDUCATION/TRAINING PROGRAM | Admitting: INTERNAL MEDICINE
Payer: COMMERCIAL

## 2024-07-26 VITALS
TEMPERATURE: 100 F | OXYGEN SATURATION: 96 % | HEIGHT: 71 IN | DIASTOLIC BLOOD PRESSURE: 50 MMHG | WEIGHT: 179.9 LBS | HEART RATE: 110 BPM | RESPIRATION RATE: 18 BRPM | SYSTOLIC BLOOD PRESSURE: 88 MMHG

## 2024-07-26 DIAGNOSIS — I25.10 ATHEROSCLEROTIC HEART DISEASE OF NATIVE CORONARY ARTERY WITHOUT ANGINA PECTORIS: ICD-10-CM

## 2024-07-26 DIAGNOSIS — Z29.9 ENCOUNTER FOR PROPHYLACTIC MEASURES, UNSPECIFIED: ICD-10-CM

## 2024-07-26 DIAGNOSIS — Z98.890 OTHER SPECIFIED POSTPROCEDURAL STATES: Chronic | ICD-10-CM

## 2024-07-26 DIAGNOSIS — E83.39 OTHER DISORDERS OF PHOSPHORUS METABOLISM: ICD-10-CM

## 2024-07-26 DIAGNOSIS — N40.0 BENIGN PROSTATIC HYPERPLASIA WITHOUT LOWER URINARY TRACT SYMPTOMS: ICD-10-CM

## 2024-07-26 DIAGNOSIS — N39.0 URINARY TRACT INFECTION, SITE NOT SPECIFIED: ICD-10-CM

## 2024-07-26 DIAGNOSIS — I44.1 ATRIOVENTRICULAR BLOCK, SECOND DEGREE: ICD-10-CM

## 2024-07-26 DIAGNOSIS — J06.9 ACUTE UPPER RESPIRATORY INFECTION, UNSPECIFIED: ICD-10-CM

## 2024-07-26 DIAGNOSIS — R55 SYNCOPE AND COLLAPSE: ICD-10-CM

## 2024-07-26 DIAGNOSIS — F03.90 UNSPECIFIED DEMENTIA, UNSPECIFIED SEVERITY, WITHOUT BEHAVIORAL DISTURBANCE, PSYCHOTIC DISTURBANCE, MOOD DISTURBANCE, AND ANXIETY: ICD-10-CM

## 2024-07-26 DIAGNOSIS — E78.5 HYPERLIPIDEMIA, UNSPECIFIED: ICD-10-CM

## 2024-07-26 DIAGNOSIS — Z95.5 PRESENCE OF CORONARY ANGIOPLASTY IMPLANT AND GRAFT: Chronic | ICD-10-CM

## 2024-07-26 LAB
ALBUMIN SERPL ELPH-MCNC: 2.3 G/DL — LOW (ref 3.3–5)
ALBUMIN SERPL ELPH-MCNC: 2.8 G/DL — LOW (ref 3.3–5)
ALP SERPL-CCNC: 61 U/L — SIGNIFICANT CHANGE UP (ref 40–120)
ALP SERPL-CCNC: 76 U/L — SIGNIFICANT CHANGE UP (ref 40–120)
ALT FLD-CCNC: 8 U/L — LOW (ref 12–78)
ALT FLD-CCNC: 9 U/L — LOW (ref 12–78)
ANION GAP SERPL CALC-SCNC: 6 MMOL/L — SIGNIFICANT CHANGE UP (ref 5–17)
ANION GAP SERPL CALC-SCNC: 8 MMOL/L — SIGNIFICANT CHANGE UP (ref 5–17)
APPEARANCE UR: CLEAR — SIGNIFICANT CHANGE UP
APTT BLD: 24.4 SEC — LOW (ref 24.5–35.6)
AST SERPL-CCNC: 16 U/L — SIGNIFICANT CHANGE UP (ref 15–37)
AST SERPL-CCNC: 16 U/L — SIGNIFICANT CHANGE UP (ref 15–37)
BASOPHILS # BLD AUTO: 0.01 K/UL — SIGNIFICANT CHANGE UP (ref 0–0.2)
BASOPHILS # BLD AUTO: 0.02 K/UL — SIGNIFICANT CHANGE UP (ref 0–0.2)
BASOPHILS NFR BLD AUTO: 0.1 % — SIGNIFICANT CHANGE UP (ref 0–2)
BASOPHILS NFR BLD AUTO: 0.1 % — SIGNIFICANT CHANGE UP (ref 0–2)
BILIRUB SERPL-MCNC: 1.4 MG/DL — HIGH (ref 0.2–1.2)
BILIRUB SERPL-MCNC: 1.5 MG/DL — HIGH (ref 0.2–1.2)
BILIRUB UR-MCNC: NEGATIVE — SIGNIFICANT CHANGE UP
BUN SERPL-MCNC: 19 MG/DL — SIGNIFICANT CHANGE UP (ref 7–23)
BUN SERPL-MCNC: 20 MG/DL — SIGNIFICANT CHANGE UP (ref 7–23)
CALCIUM SERPL-MCNC: 8.4 MG/DL — LOW (ref 8.5–10.1)
CALCIUM SERPL-MCNC: 9.2 MG/DL — SIGNIFICANT CHANGE UP (ref 8.5–10.1)
CHLORIDE SERPL-SCNC: 110 MMOL/L — HIGH (ref 96–108)
CHLORIDE SERPL-SCNC: 114 MMOL/L — HIGH (ref 96–108)
CO2 SERPL-SCNC: 24 MMOL/L — SIGNIFICANT CHANGE UP (ref 22–31)
CO2 SERPL-SCNC: 24 MMOL/L — SIGNIFICANT CHANGE UP (ref 22–31)
COLOR SPEC: YELLOW — SIGNIFICANT CHANGE UP
CREAT SERPL-MCNC: 0.94 MG/DL — SIGNIFICANT CHANGE UP (ref 0.5–1.3)
CREAT SERPL-MCNC: 1.2 MG/DL — SIGNIFICANT CHANGE UP (ref 0.5–1.3)
DIFF PNL FLD: NEGATIVE — SIGNIFICANT CHANGE UP
EGFR: 56 ML/MIN/1.73M2 — LOW
EGFR: 75 ML/MIN/1.73M2 — SIGNIFICANT CHANGE UP
EOSINOPHIL # BLD AUTO: 0 K/UL — SIGNIFICANT CHANGE UP (ref 0–0.5)
EOSINOPHIL # BLD AUTO: 0.04 K/UL — SIGNIFICANT CHANGE UP (ref 0–0.5)
EOSINOPHIL NFR BLD AUTO: 0 % — SIGNIFICANT CHANGE UP (ref 0–6)
EOSINOPHIL NFR BLD AUTO: 0.4 % — SIGNIFICANT CHANGE UP (ref 0–6)
FLUAV AG NPH QL: SIGNIFICANT CHANGE UP
FLUBV AG NPH QL: SIGNIFICANT CHANGE UP
GLUCOSE SERPL-MCNC: 110 MG/DL — HIGH (ref 70–99)
GLUCOSE SERPL-MCNC: 198 MG/DL — HIGH (ref 70–99)
GLUCOSE UR QL: NEGATIVE MG/DL — SIGNIFICANT CHANGE UP
HCT VFR BLD CALC: 34.5 % — LOW (ref 39–50)
HCT VFR BLD CALC: 38.1 % — LOW (ref 39–50)
HGB BLD-MCNC: 11.2 G/DL — LOW (ref 13–17)
HGB BLD-MCNC: 12.3 G/DL — LOW (ref 13–17)
IMM GRANULOCYTES NFR BLD AUTO: 0.2 % — SIGNIFICANT CHANGE UP (ref 0–0.9)
IMM GRANULOCYTES NFR BLD AUTO: 0.4 % — SIGNIFICANT CHANGE UP (ref 0–0.9)
INR BLD: 1.19 RATIO — HIGH (ref 0.85–1.18)
KETONES UR-MCNC: NEGATIVE MG/DL — SIGNIFICANT CHANGE UP
LACTATE SERPL-SCNC: 1.3 MMOL/L — SIGNIFICANT CHANGE UP (ref 0.7–2)
LACTATE SERPL-SCNC: 1.3 MMOL/L — SIGNIFICANT CHANGE UP (ref 0.7–2)
LACTATE SERPL-SCNC: 3.2 MMOL/L — HIGH (ref 0.7–2)
LEUKOCYTE ESTERASE UR-ACNC: ABNORMAL
LYMPHOCYTES # BLD AUTO: 0.82 K/UL — LOW (ref 1–3.3)
LYMPHOCYTES # BLD AUTO: 1.89 K/UL — SIGNIFICANT CHANGE UP (ref 1–3.3)
LYMPHOCYTES # BLD AUTO: 17.1 % — SIGNIFICANT CHANGE UP (ref 13–44)
LYMPHOCYTES # BLD AUTO: 5.3 % — LOW (ref 13–44)
MAGNESIUM SERPL-MCNC: 2 MG/DL — SIGNIFICANT CHANGE UP (ref 1.6–2.6)
MCHC RBC-ENTMCNC: 30.7 PG — SIGNIFICANT CHANGE UP (ref 27–34)
MCHC RBC-ENTMCNC: 30.9 PG — SIGNIFICANT CHANGE UP (ref 27–34)
MCHC RBC-ENTMCNC: 32.3 GM/DL — SIGNIFICANT CHANGE UP (ref 32–36)
MCHC RBC-ENTMCNC: 32.5 GM/DL — SIGNIFICANT CHANGE UP (ref 32–36)
MCV RBC AUTO: 95 FL — SIGNIFICANT CHANGE UP (ref 80–100)
MCV RBC AUTO: 95.3 FL — SIGNIFICANT CHANGE UP (ref 80–100)
MONOCYTES # BLD AUTO: 0.84 K/UL — SIGNIFICANT CHANGE UP (ref 0–0.9)
MONOCYTES # BLD AUTO: 0.86 K/UL — SIGNIFICANT CHANGE UP (ref 0–0.9)
MONOCYTES NFR BLD AUTO: 5.4 % — SIGNIFICANT CHANGE UP (ref 2–14)
MONOCYTES NFR BLD AUTO: 7.8 % — SIGNIFICANT CHANGE UP (ref 2–14)
NEUTROPHILS # BLD AUTO: 13.86 K/UL — HIGH (ref 1.8–7.4)
NEUTROPHILS # BLD AUTO: 8.25 K/UL — HIGH (ref 1.8–7.4)
NEUTROPHILS NFR BLD AUTO: 74.4 % — SIGNIFICANT CHANGE UP (ref 43–77)
NEUTROPHILS NFR BLD AUTO: 88.8 % — HIGH (ref 43–77)
NITRITE UR-MCNC: POSITIVE
NRBC # BLD: 0 /100 WBCS — SIGNIFICANT CHANGE UP (ref 0–0)
NRBC # BLD: 0 /100 WBCS — SIGNIFICANT CHANGE UP (ref 0–0)
NT-PROBNP SERPL-SCNC: 593 PG/ML — HIGH (ref 0–450)
NT-PROBNP SERPL-SCNC: 793 PG/ML — HIGH (ref 0–450)
PH UR: 7.5 — SIGNIFICANT CHANGE UP (ref 5–8)
PHOSPHATE SERPL-MCNC: 2 MG/DL — LOW (ref 2.5–4.5)
PLATELET # BLD AUTO: 112 K/UL — LOW (ref 150–400)
PLATELET # BLD AUTO: 124 K/UL — LOW (ref 150–400)
POTASSIUM SERPL-MCNC: 3.9 MMOL/L — SIGNIFICANT CHANGE UP (ref 3.5–5.3)
POTASSIUM SERPL-MCNC: 4.2 MMOL/L — SIGNIFICANT CHANGE UP (ref 3.5–5.3)
POTASSIUM SERPL-SCNC: 3.9 MMOL/L — SIGNIFICANT CHANGE UP (ref 3.5–5.3)
POTASSIUM SERPL-SCNC: 4.2 MMOL/L — SIGNIFICANT CHANGE UP (ref 3.5–5.3)
PROT SERPL-MCNC: 5.5 G/DL — LOW (ref 6–8.3)
PROT SERPL-MCNC: 6.3 G/DL — SIGNIFICANT CHANGE UP (ref 6–8.3)
PROT UR-MCNC: NEGATIVE MG/DL — SIGNIFICANT CHANGE UP
PROTHROM AB SERPL-ACNC: 13.9 SEC — HIGH (ref 9.5–13)
RBC # BLD: 3.62 M/UL — LOW (ref 4.2–5.8)
RBC # BLD: 4.01 M/UL — LOW (ref 4.2–5.8)
RBC # FLD: 14.8 % — HIGH (ref 10.3–14.5)
RBC # FLD: 14.9 % — HIGH (ref 10.3–14.5)
RSV RNA NPH QL NAA+NON-PROBE: SIGNIFICANT CHANGE UP
SARS-COV-2 RNA SPEC QL NAA+PROBE: SIGNIFICANT CHANGE UP
SODIUM SERPL-SCNC: 142 MMOL/L — SIGNIFICANT CHANGE UP (ref 135–145)
SODIUM SERPL-SCNC: 144 MMOL/L — SIGNIFICANT CHANGE UP (ref 135–145)
SP GR SPEC: 1.01 — SIGNIFICANT CHANGE UP (ref 1–1.03)
TROPONIN I, HIGH SENSITIVITY RESULT: 8.2 NG/L — SIGNIFICANT CHANGE UP
TROPONIN I, HIGH SENSITIVITY RESULT: 9.8 NG/L — SIGNIFICANT CHANGE UP
TSH SERPL-MCNC: 0.4 UIU/ML — SIGNIFICANT CHANGE UP (ref 0.36–3.74)
UROBILINOGEN FLD QL: 0.2 MG/DL — SIGNIFICANT CHANGE UP (ref 0.2–1)
WBC # BLD: 11.07 K/UL — HIGH (ref 3.8–10.5)
WBC # BLD: 15.6 K/UL — HIGH (ref 3.8–10.5)
WBC # FLD AUTO: 11.07 K/UL — HIGH (ref 3.8–10.5)
WBC # FLD AUTO: 15.6 K/UL — HIGH (ref 3.8–10.5)

## 2024-07-26 PROCEDURE — 99223 1ST HOSP IP/OBS HIGH 75: CPT | Mod: GC

## 2024-07-26 PROCEDURE — 99285 EMERGENCY DEPT VISIT HI MDM: CPT

## 2024-07-26 PROCEDURE — 71045 X-RAY EXAM CHEST 1 VIEW: CPT | Mod: 26

## 2024-07-26 PROCEDURE — 93010 ELECTROCARDIOGRAM REPORT: CPT | Mod: 76

## 2024-07-26 RX ORDER — POTASSIUM PHOSPHATE, MONOBASIC AND POTASSIUM PHOSPHATE, DIBASIC 224; 236 MG/ML; MG/ML
30 INJECTION, SOLUTION INTRAVENOUS ONCE
Refills: 0 | Status: DISCONTINUED | OUTPATIENT
Start: 2024-07-26 | End: 2024-07-27

## 2024-07-26 RX ORDER — AZITHROMYCIN 250 MG
250 TABLET ORAL DAILY
Refills: 0 | Status: DISCONTINUED | OUTPATIENT
Start: 2024-07-26 | End: 2024-07-29

## 2024-07-26 RX ORDER — ENOXAPARIN SODIUM 120 MG/.8ML
30 INJECTION SUBCUTANEOUS ONCE
Refills: 0 | Status: DISCONTINUED | OUTPATIENT
Start: 2024-07-26 | End: 2024-07-26

## 2024-07-26 RX ORDER — ASPIRIN 500 MG
81 TABLET ORAL DAILY
Refills: 0 | Status: DISCONTINUED | OUTPATIENT
Start: 2024-07-26 | End: 2024-07-29

## 2024-07-26 RX ORDER — CEFEPIME HYDROCHLORIDE 1 G/1
1000 INJECTION, POWDER, FOR SOLUTION INTRAMUSCULAR; INTRAVENOUS ONCE
Refills: 0 | Status: COMPLETED | OUTPATIENT
Start: 2024-07-26 | End: 2024-07-26

## 2024-07-26 RX ORDER — ENOXAPARIN SODIUM 120 MG/.8ML
40 INJECTION SUBCUTANEOUS EVERY 24 HOURS
Refills: 0 | Status: DISCONTINUED | OUTPATIENT
Start: 2024-07-26 | End: 2024-07-29

## 2024-07-26 RX ORDER — ATORVASTATIN CALCIUM 40 MG/1
20 TABLET, FILM COATED ORAL AT BEDTIME
Refills: 0 | Status: DISCONTINUED | OUTPATIENT
Start: 2024-07-26 | End: 2024-07-27

## 2024-07-26 RX ORDER — TAMSULOSIN HCL 0.4 MG
0.4 CAPSULE ORAL AT BEDTIME
Refills: 0 | Status: DISCONTINUED | OUTPATIENT
Start: 2024-07-26 | End: 2024-07-29

## 2024-07-26 RX ORDER — CEFPODOXIME PROXETIL 100 MG
1 TABLET ORAL
Qty: 20 | Refills: 0
Start: 2024-07-26 | End: 2024-08-04

## 2024-07-26 RX ORDER — BACTERIOSTATIC SODIUM CHLORIDE 0.9 %
1000 VIAL (ML) INJECTION ONCE
Refills: 0 | Status: COMPLETED | OUTPATIENT
Start: 2024-07-26 | End: 2024-07-26

## 2024-07-26 RX ORDER — MEMANTINE HYDROCHLORIDE 14 MG/1
5 CAPSULE, EXTENDED RELEASE ORAL
Refills: 0 | Status: DISCONTINUED | OUTPATIENT
Start: 2024-07-26 | End: 2024-07-29

## 2024-07-26 RX ORDER — DORZOLAMIDE HCL 2 %
1 DROPS OPHTHALMIC (EYE) ONCE
Refills: 0 | Status: COMPLETED | OUTPATIENT
Start: 2024-07-26 | End: 2024-07-26

## 2024-07-26 RX ORDER — FINASTERIDE 5 MG/1
5 TABLET, FILM COATED ORAL DAILY
Refills: 0 | Status: DISCONTINUED | OUTPATIENT
Start: 2024-07-26 | End: 2024-07-29

## 2024-07-26 RX ADMIN — CEFEPIME HYDROCHLORIDE 100 MILLIGRAM(S): 1 INJECTION, POWDER, FOR SOLUTION INTRAMUSCULAR; INTRAVENOUS at 10:49

## 2024-07-26 RX ADMIN — Medication 1000 MILLILITER(S): at 12:48

## 2024-07-26 NOTE — H&P ADULT - HISTORY OF PRESENT ILLNESS
Patient is a 89yo male with PMH of dementia, recurrent UTIs, BPH, CAD s/p 3 stents (2016), GERD, HLD, kidney stones who presented after a syncopal episode while standing this morning. Patient was walking to bathroom, became lightheaded and almost passed out, but son was present to hold him up. Denies hitting his head or losing consciousness. Patient's son states that patient developed a productive cough (green sputum with some black particles; no bright red blood) 2 days ago. Yesterday, patient was having trouble swallowing, so son took him to the [facility] where his PCP's office is; patient was started on Azithromycin 250 mg and has been taking it for 1 day now. Son reports patient has not been drinking much water over the past few days, but has been eating. Son states this is patient's baseline and denies any acute mental status changes.    ED COURSE:  Vitals: T 99.4   F , 61 HR  ,  /56 , 17 RR  , SpO2  95% on RA  Labs significant for: WBC 15.6 -> 11.07, phos 2, p-  Imaging: X-ray chest: stable no acute findings  EKG: melvina with 1st degree AV block  Pt received: 1G IV cefepime, 1L Saline   Patient is a 89yo male with PMH of dementia, recurrent UTIs, BPH, CAD s/p 3 stents (2016), GERD, HLD, kidney stones who presented after a syncopal episode while standing this morning. Patient was walking to bathroom, became lightheaded and almost passed out, but son was present to hold him up. Denies hitting his head or losing consciousness. Patient's son states that patient developed a productive cough (green sputum with some black particles; no bright red blood) 2 days ago. Yesterday, patient was having trouble swallowing, so son took him to the [facility] [[facility] where his PCP's office is; patient was started on Azithromycin 250 mg and has been taking it for 1 day now. Son reports patient has not been drinking much water over the past few days, but has been eating. Son states this is patient's baseline and denies any acute mental status changes.    ED COURSE:  Vitals: T 99.4   F , 61 HR  ,  /56 , 17 RR  , SpO2  95% on RA  Labs significant for: WBC 15.6 -> 11.07, phos 2, p-  Imaging: X-ray chest: stable no acute findings  EKG: melvina with 2nd degree AV block, Wenckeback with heart rates in the 50's  Pt received: 1G IV cefepime, 1L Saline   Patient is a 89yo male with PMH of dementia, recurrent UTIs, BPH, CAD s/p 3 stents (2016), GERD, HLD, kidney stones who presented after a syncopal episode while standing this morning. Patient was walking to bathroom, became lightheaded and almost passed out, but son was present to hold him up. Denies hitting his head or losing consciousness. Patient's son states that patient developed a productive cough (green sputum with some black particles; no bright red blood) 2 days ago. Yesterday, patient was having trouble swallowing, so son took him to the [facility] [[facility] where his PCP's office is; patient was started on Azithromycin 250 mg and has been taking it for 1 day now. Son reports patient has not been drinking much water over the past few days, but has been eating. Son states this is patient's baseline and denies any acute mental status changes.    ED COURSE:  Vitals: T 99.4   F , 61 HR  ,  /56 , 17 RR  , SpO2  95% on RA  Labs significant for: WBC 15.6 -> 11.07, phos 2, p-  UA: +bacteria, + leukocyte esterase  Imaging: X-ray chest: stable no acute findings  EKG: melvina with 2nd degree AV block, Wenckeback with heart rates in the 50's  Pt received: 1G IV cefepime, 1L Saline

## 2024-07-26 NOTE — ED PROVIDER NOTE - CLINICAL SUMMARY MEDICAL DECISION MAKING FREE TEXT BOX
Patient is a 89yo male with PMH of dementia at baseline, Hx of recurrent UTIs, BPH, CAD s/p 3 stents (2016), GERD, HLD, kidney stones BIBEMS s/p syncopal episode while standing this morning. EKG done in ED shows no signs of acute ischemia. Syncope likely 2/2 dehydration vs. pneumonia vs. UTI. Will follow up CBC, CMP, trops, proBNP. Follow up UA, UCx to r/o UTI, especially considering prior Hx of recurrent UTIs. Follow up CXR to r/o pneumonia. Rectal temp wnl. Monitor vitals, I&Os. S/p IVF, cefepime 1gm IV x 1. Patient is a 89yo male with PMH of dementia at baseline, Hx of recurrent UTIs, BPH, CAD s/p 3 stents (2016), GERD, HLD, kidney stones BIBEMS s/p syncopal episode while standing this morning. EKG done in ED shows no signs of acute ischemia. Syncope likely 2/2 dehydration vs. pneumonia vs. UTI. Found to be hypotensive on arrival at 88/50, tachycardic 110 bpm; satting well , 96% on RA. Will follow up CBC, CMP, trops, proBNP. Follow up UA, UCx to r/o UTI, especially considering prior Hx of recurrent UTIs. Follow up CXR to r/o pneumonia. Rectal temp wnl. Monitor vitals, I&Os. S/p IVF, cefepime 1gm IV x 1. Admit to tele. Thakkar: Patient is a 89yo male with PMH of dementia at baseline, Hx of recurrent UTIs, BPH, CAD s/p 3 stents (2016), GERD, HLD, kidney stones BIBEMS s/p syncopal episode while standing this morning. EKG done in ED shows no signs of acute ischemia. Syncope likely 2/2 dehydration vs. pneumonia vs. UTI. Found to be hypotensive on arrival at 88/50, tachycardic 110 bpm; satting well , 96% on RA. Will follow up CBC, CMP, trops, proBNP. Follow up UA, UCx to r/o UTI, especially considering prior Hx of recurrent UTIs. Follow up CXR to r/o pneumonia. Rectal temp wnl. Monitor vitals, I&Os. S/p IVF, cefepime 1gm IV x 1.  Pt feeling much better after IV hydration, ED work up reveals UTI and dehydration, stable for dc on abx

## 2024-07-26 NOTE — ED ADULT NURSE NOTE - OBJECTIVE STATEMENT
the patient brought to the er via [facility] after a near syncopal episode at home.  The son states that he has had a cough for the past 2-3 days.  he got up this am to use restroom, normally walks with walker (but getting increasingly weaker) and son was assisting him.  son states his legs gave way and he "collapsed"  he states that he did NOT lose consciousness.  He states that he was recently placed on abx for a UTI.  Son also states that he had some difficulty swallowing yesterday am, but that subsides and he has since been able to eat and take pills.  EKG performed.  [facility] placed a 20g to RAC and I placed a 20G to lac.  labs collected.  Patient cleaned, and fluids infusing.  he denies any pain / chest pain / sob / fever / etc.

## 2024-07-26 NOTE — H&P ADULT - NSHPPHYSICALEXAM_GEN_ALL_CORE
Vital Signs Last 24 Hrs  T(C): 37.4 (26 Jul 2024 17:50), Max: 37.7 (26 Jul 2024 09:08)  T(F): 99.4 (26 Jul 2024 17:50), Max: 99.9 (26 Jul 2024 09:08)  HR: 61 (26 Jul 2024 17:17) (61 - 110)  BP: 111/56 (26 Jul 2024 17:17) (88/50 - 116/54)  BP(mean): --  RR: 17 (26 Jul 2024 17:17) (17 - 18)  SpO2: 95% (26 Jul 2024 17:17) (95% - 98%)    Parameters below as of 26 Jul 2024 17:17  Patient On (Oxygen Delivery Method): room air        CONSTITUTIONAL: awake, alert, no acute distress  HEENT: Atraumatic normocephalic. Moist mucous membranes.  No conjunctival injection.  RESP: No respiratory distress; CTA b/l, no WRR  CV: RRR, +S1S2, no MRG  GI: Bowel sounds present. Soft, nontender, nondistended, no rebound or guarding  MSK: Moving all four extremities spontaneously. No peripheral edema. No calf tenderness.  SKIN: Warm and dry. No rashes noted.  NEURO: AOx3, answering questions and following commands appropriately  PSYCH: Mood and affect appropriate, recent memory intact Vital Signs Last 24 Hrs  T(C): 37.4 (26 Jul 2024 17:50), Max: 37.7 (26 Jul 2024 09:08)  T(F): 99.4 (26 Jul 2024 17:50), Max: 99.9 (26 Jul 2024 09:08)  HR: 61 (26 Jul 2024 17:17) (61 - 110)  BP: 111/56 (26 Jul 2024 17:17) (88/50 - 116/54)  BP(mean): --  RR: 17 (26 Jul 2024 17:17) (17 - 18)  SpO2: 95% (26 Jul 2024 17:17) (95% - 98%)    Parameters below as of 26 Jul 2024 17:17  Patient On (Oxygen Delivery Method): room air        CONSTITUTIONAL: awake, alert, no acute distress  HEENT: Atraumatic normocephalic. Moist mucous membranes.  No conjunctival injection.  RESP: No respiratory distress; course breath sounds  CV: +S1S2, no MRG  GI: Bowel sounds present. Soft, nontender, nondistended,  MSK: Moving all four extremities spontaneously. No peripheral edema. No calf tenderness.  SKIN: Warm and dry. No rashes noted.

## 2024-07-26 NOTE — ED PROVIDER NOTE - NSFOLLOWUPINSTRUCTIONS_ED_ALL_ED_FT
-- You should update your primary care physician on your Emergency Department visit and follow up with them.  If you do not have a physician or have difficulty following up, please call: [phone] to obtain a [facility] doctor or specialist who can provide follow up.    -- Return to the ER for worsening or persistent symptoms, and/or ANY NEW OR CONCERNING SYMPTOMS. Alternatives Discussed Intro (Do Not Add Period): I discussed alternative treatments to Mohs surgery and specifically discussed the risks and benefits of

## 2024-07-26 NOTE — ED ADULT NURSE NOTE - CADM POA CENTRAL LINE
"Chief Complaint   Patient presents with     Follow Up     annual follow up for Dyslipidemia, NSTEMI (non-ST elevated myocardial infarction), Essential hypertension, Ischemic cardiomyopathy and Coronary artery disease involving native coronary artery of native heart with unstable angina pectoris. Patient reports continued MONDRAGON that is unchanged.  He has occassional dizziness after exertion.     Results     echocardiogram completed today.       Initial /82 (BP Location: Right arm, Patient Position: Chair, Cuff Size: Adult Large)   Pulse 97   Wt (!) 225.4 kg (497 lb)   BMI 73.39 kg/m   Estimated body mass index is 73.39 kg/m  as calculated from the following:    Height as of 2/28/20: 1.753 m (5' 9\").    Weight as of this encounter: 225.4 kg (497 lb)..  BP completed using cuff size: kevin Greenberg, VINOD        "
Labs: BMP and LIPIDs due now. Patient demonstrated understanding of this information and agreed to call with further questions or concerns.   Med Reconcile: Reviewed and verified all current medications with the patient. The updated medication list was printed and given to the patient.  New Medication: Patient was educated regarding newly prescribed medication, including discussion of  the indication, administration, side effects, and when to report to MD or RN. Patient demonstrated understanding of this information and agreed to call with further questions or concerns. Asa 81 mg daily  Return Appointment: Patient given instructions regarding scheduling next clinic visit. Patient demonstrated understanding of this information and agreed to call with further questions or concerns.  1 year  Medication Change: Patient was educated regarding prescribed medication change, including discussion of the indication, administration, side effects, and when to report to MD or RN. Patient demonstrated understanding of this information and agreed to call with further questions or concerns.  Decrease lisinopril to 10 mg daily STOP Brilinta  MYCHART MESSAGE SENT WITH DETAILS OF AVS.    Patient stated he understood all health information given and agreed to call with further questions or concerns.    
No

## 2024-07-26 NOTE — CONSULT NOTE ADULT - ASSESSMENT
89 y/o Tony speaking male with dementia, HTN, dyslipidemia, and known CAD s/p PCI, who presented to ED this morning following near syncope. Observed to have periods of bradycardia/wenckeback on ECG and telemetry.  No acute change in mental status and patient otherwise HD stable.  No acute ischemic ECG changed and troponins are negative x2. Patient not currently on AV keyana agents./    Plan:   - No emergent pacing indication at this time.   - Low suspicion for cardiac arrhythma as underlying cause for presenting symptoms.   - Continue to monitor on telemetry for any worsening bradycardia, high grade AVB and/or significant pause.    - remainder of plan per primary team/non-interventional cardiology   - Please reconsult EP as necessary.      Above Plan reviewed with EP attending, Dr. Perez 89 y/o Tony speaking male with dementia, HTN, dyslipidemia, and known CAD s/p PCI, who presented to ED this morning following near syncope. Observed to have periods of bradycardia/wenckeback on ECG and telemetry.  No acute change in mental status and patient otherwise HD stable.  No acute ischemic ECG changed and troponins are negative x2. Patient not currently on AV keyana agents./    Plan:   - No emergent pacing indication at this time.   - Low suspicion for cardiac arrhythmia as underlying cause for presenting symptoms. Would not recommend pacemaker at this time.    - Continue to monitor on telemetry for any worsening bradycardia, high grade AVB and/or significant pause.    - remainder of plan per primary team/non-interventional cardiology   - Please reconsult EP as necessary.      Above Plan reviewed with EP attending, Dr. Perez

## 2024-07-26 NOTE — ED PROVIDER NOTE - OBJECTIVE STATEMENT
Patient is a 89yo male with PMH of dementia at baseline, Hx of recurrent UTIs, BPH, CAD s/p 3 stents (2016), GERD, HLD, kidney stones BIBEMS s/p syncopal episode while standing this morning. Patient's son at bedside reports his father was trying to walk to the bathroom this morning, but became lightheaded and almost passed out; son was able to hold him up, so patient did not hit head; son denies LOC. Patient's son states that patient developed a productive cough (green sputum with some black particles; no bright red blood) 2 days ago. Yesterday, patient was having trouble swallowing, so son took him to the [facility] where his PCP's office is; patient was started on Azithromycin and has been taking it for 1 day now. Son reports patient has not been drinking much water over the past few days, but has been eating. ROS limited due to patient's mental status. Son states this is patient's baseline and denies any mental status changes, new agitation recently. Son states the patient is also constipated and occasionally finds blood on toilet paper, but no blood in stool itself; son denies blood in urine, fevers, chest pains, shortness of breath, n/v.     PCP is Dr. Angeline Ness

## 2024-07-26 NOTE — H&P ADULT - PROBLEM SELECTOR PLAN 6
chronic  - Continue home memantine chronic  - Continue home finasteride, tamsulosin  -check orthostatics

## 2024-07-26 NOTE — ED PROVIDER NOTE - PHYSICAL EXAMINATION
T(C): 37.7 (07-26-24 @ 09:08), Max: 37.7 (07-26-24 @ 09:08)  HR: 110 (07-26-24 @ 09:08) (110 - 110)  BP: 88/50 (07-26-24 @ 09:08) (88/50 - 88/50)  RR: 18 (07-26-24 @ 09:08) (18 - 18)  SpO2: 96% (07-26-24 @ 09:08) (96% - 96%)    GENERAL: patient appears in no acute distress, pleasant  HEAD: normocephalic, atraumatic  EYES: sclera clear and anicteric, no exudates, EOMI  ENMT: oropharynx clear without erythema, no exudates, dry mucous membranes  NECK: supple, soft, no lymphadenopathy   LUNGS: no use of accessory muscles noted, normal respiratory effort, + rhonchi appreciated bilaterally R>L  HEART: soft S1/S2, regular rate and rhythm, no murmurs noted, no lower extremity edema  GASTROINTESTINAL: abdomen is soft, + mild suprapubic TTP, nondistended, normoactive bowel sounds  INTEGUMENT: good skin turgor, no lesions noted  MUSCULOSKELETAL: no clubbing or cyanosis, no obvious deformity  NEUROLOGIC: awake and alert, responds to few questions and does not follow all commands appropriately (baseline per patient's son); no obvious motor or sensory deficits  HEME/LYMPH: no obvious ecchymosis or petechiae

## 2024-07-26 NOTE — H&P ADULT - PROBLEM SELECTOR PLAN 3
Phos 2, low  - Repleted, monitor AM Phos - Patient started on Z-jayson 5 day course recently due to cough and congestion. WBC elevated 15.6->11.07  - On day 2 of course today, continue course.  -repeat EKG in AM to eval QTc again [398ms on EKG from today]  - f/u blood cx

## 2024-07-26 NOTE — ED PROVIDER NOTE - PROGRESS NOTE DETAILS
Dr Hackett - patient was pending discharge back home via ambulance. was not signed out. family concerned about HR. 2nd degree type 2 AV block on EKG? Discussed with cardiology, Dr. Bradshaw, recommends admission and EP workup.  Labs ordered, patient placed on pads.  Blood pressure stable.  Patient mentating well.  Not complaining of chest pain discussed with hospitalist, will admit patient

## 2024-07-26 NOTE — H&P ADULT - ATTENDING COMMENTS
Patient is a 89yo male with PMH of dementia, recurrent UTIs, BPH, CAD s/p 3 stents (2016), GERD, HLD, kidney stones who presented after a syncopal episode while standing this morning. Patient is currently stable, had an episode of near syncope, EKG showed bradycardia with 2nd degree AV block.     HPI as above.     T(C): 36.3 (07-26-24 @ 21:22), Max: 37.7 (07-26-24 @ 09:08)  HR: 57 (07-26-24 @ 21:22) (57 - 110)  BP: 108/72 (07-26-24 @ 21:22) (88/50 - 116/54)  RR: 18 (07-26-24 @ 21:22) (17 - 18)  SpO2: 98% (07-26-24 @ 21:22) (95% - 98%)  Wt(kg): --    Physical Exam:   GENERAL: well-groomed, well-developed, NAD  HEENT: head NC/AT; EOM intact, PERRLA, conjunctiva & sclera clear; hearing grossly diminished, moist mucous membranes  NECK: supple, no JVD  RESPIRATORY: CTA B/L, no wheezing, rales, rhonchi or rubs  CARDIOVASCULAR: S1&S2, RRR, no murmurs or gallops  ABDOMEN: soft, non-tender, non-distended, + Bowel sounds x4 quadrants, no guarding, rebound or rigidity  MUSCULOSKELETAL:  no clubbing, cyanosis or edema of all 4 extremities  LYMPH: no cervical lymphadenopathy  VASCULAR: Radial pulses 2+ bilaterally, no varicose veins   SKIN: warm and dry, color normal  NEUROLOGIC: AA&O X1 [name], MAEx4    Plan:  Syncope: etiology unclear, monitor on tele for further arrythmia  -repeat EKG in AM, monitor QTc  -check orthostatics, TTE, TSH, carotid U/S  -cardio and EP consulted  -monitor electrolytes    UTI: continue Rocephin  -check Urine CUlture    URI: patient was on outpatient azithro will continue    Anemia:   no signs or symptoms of active bleeding. Continue to monitor hgb.   Thrombocytopenia: monitor platelet count  HyperBili: f/u AM CMP    DVT ppx: lovenox    1:1 observation as patient with dementia and sundowns at night, family notes patient often tries to get out of bed nightly.

## 2024-07-26 NOTE — H&P ADULT - PROBLEM SELECTOR PLAN 1
- Syncope likely cardiogenic in the presence of bradycardia in 2nd degree AV block (Mobitz Type 1)  - EP consulted:  - -No emergent pacing indication at this time.   - -Low suspicion for cardiac arrhythmia as underlying cause for presenting symptoms. Would not recommend pacemaker at this time.    - -Continue to monitor on telemetry for any worsening bradycardia, high grade AVB and/or significant pause.  - Monitor AM Mg Phos - Syncope possibly cardiogenic in the presence of bradycardia in 2nd degree AV block Wenkeback  - EP consulted:  - -No emergent pacing indication at this time.   - -Low suspicion for cardiac arrhythmia as underlying cause for presenting symptoms. Would not recommend pacemaker at this time as per EP  - -Continue to monitor on telemetry for any worsening bradycardia, high grade AVB and/or significant pause.  - Monitor AM Mg Phos  -Dr Bradshaw consulted - Syncope possibly cardiogenic in the presence of bradycardia in 2nd degree AV block Wenkeback  - EP consulted:  - -No emergent pacing indication at this time.   - -Low suspicion for cardiac arrhythmia as underlying cause for presenting symptoms. Would not recommend pacemaker at this time as per EP  - -Continue to monitor on telemetry for any worsening bradycardia, high grade AVB and/or significant pause.  - Monitor AM Mg Phos  -Dr Bradshaw consulted, f/u  - f/u TTE echo  - f/u carotid U/S  - f/u TSH  - f/u orthostatic BP

## 2024-07-26 NOTE — H&P ADULT - ASSESSMENT
Patient is a 89yo male with PMH of dementia, recurrent UTIs, BPH, CAD s/p 3 stents (2016), GERD, HLD, kidney stones who presented after a syncopal episode while standing this morning. Patient is currently stable, had an episode of near syncope, EKG showed bradycardia with 2nd degree AV block.

## 2024-07-26 NOTE — H&P ADULT - PROBLEM SELECTOR PLAN 5
chronic  - Continue home finasteride, tamsulosin chronic  - Continue home finasteride, tamsulosin  -check orthostatics chronic  - Continue home aspirin, statin  -avoid beta blocker due to low HR  -not on ACE-I -f/u cardio recs

## 2024-07-26 NOTE — H&P ADULT - PROBLEM SELECTOR PROBLEM 5
Benign prostatic hyperplasia, presence of lower urinary tract symptoms unspecified, unspecified morphology CAD (coronary artery disease)

## 2024-07-26 NOTE — H&P ADULT - PROBLEM SELECTOR PLAN 4
chronic  - Continue home aspirin, chronic  - Continue home aspirin, statin  -avoid beta blocker due to low HR  -not on ACE-I -f/u cardio recs Phos 2, low  - Repleted, monitor AM Phos

## 2024-07-26 NOTE — ED PROVIDER NOTE - CARE PLAN
1 Principal Discharge DX:	Dehydration  Secondary Diagnosis:	Acute UTI   Principal Discharge DX:	Mobitz type 1 second degree AV block  Secondary Diagnosis:	Acute UTI  Secondary Diagnosis:	Dehydration  Secondary Diagnosis:	Syncope

## 2024-07-26 NOTE — H&P ADULT - PROBLEM SELECTOR PLAN 2
- Patient started on Z-jayson 5 day course recently due to cough and congestion. WBC elevated 15.6->11.07  - On day 2 of course today, continue course. - Patient started on Z-jayson 5 day course recently due to cough and congestion. WBC elevated 15.6->11.07  - On day 2 of course today, continue course.  -repeat EKG in AM to eval QTc again [398ms on EKG from today] UA concerning for infection given + bacteria and leukocyte esterase  - ceftriaxone 1 g q24  - f/u urine cx  - f/u blood cx

## 2024-07-26 NOTE — H&P ADULT - PROBLEM SELECTOR PROBLEM 6
Dementia Benign prostatic hyperplasia, presence of lower urinary tract symptoms unspecified, unspecified morphology

## 2024-07-26 NOTE — CONSULT NOTE ADULT - SUBJECTIVE AND OBJECTIVE BOX
Department of Cardiology                                                               Division of Interventional Cardiology                                                               [facility] / [place] [id number]                                                                                 [phone]                                                                                                                               Interventional Cardiology Consult / Pre-Procedure Note    Recent/pertinent 24 hour events:    Subjective/ROS:   Denies CP, SOB, palpitations, N/V, fever/chills, abd pain, numbness/tingling/weakness, other c/o at this time.  ROS negative x 10 systems except as documented as above.    HPI:      PAST MEDICAL & SURGICAL HISTORY:  Benign prostatic hyperplasia, presence of lower urinary tract symptoms unspecified, unspecified morphology      Hyperlipidemia      GERD (gastroesophageal reflux disease)      CAD (coronary artery disease)      UTI (urinary tract infection)      Calculus of kidney      Calculus of ureter      Dementia      Kidney stones      History of prostate surgery      S/P ureteral stent placement  Left sided      Stented coronary artery        FAMILY HISTORY:  FH: hypertension (Child)      Social History:        Prior Cardiac Interventions:    Associated Risk Factors:        Frailty Screening: (N/A, mild, mod, severe)       Cerebrovascular Disease: N/A       Chronic Lung Disease: N/A       Peripheral Arterial Disease: N/A       Chronic Kidney Disease (if yes, what is GFR): N/A       Uncontrolled Diabetes (if yes, what is HgbA1C or FBS): N/A       Poorly Controlled Hypertension (if yes, what is SBP): N/A       Morbid Obesity (if yes, what is BMI): N/A       History of Recent Ventricular Arrhythmia: N/A       Inability to Ambulate Safely: N/A       Need for Therapeutic Anticoagulation: N/A       Antiplatelet or Contrast Allergy: N/A    MEDICATIONS  (STANDING):    MEDICATIONS  (PRN):      Allergies: No Known Allergies        T(C): 37.4 (24 @ 17:50), Max: 37.7 (24 @ 09:08)  HR: 61 (24 @ 17:17) (61 - 110)  BP: 111/56 (24 @ 17:17) (88/50 - 116/54)  RR: 17 (24 @ 17:17) (17 - 18)  SpO2: 95% (24 @ 17:17) (95% - 98%)  Wt(kg): --  I&O's Summary    Daily Height in cm: 180.34 (2024 09:08)    Daily       TELEMETRY: 	      EC Lead ECG:   Ventricular Rate 103 BPM    Atrial Rate 103 BPM    P-R Interval 266 ms    QRS Duration 84 ms    Q-T Interval 304 ms    QTC Calculation(Bazett) 398 ms    P Axis 19 degrees    R Axis 93 degrees    T Axis 41 degrees    Diagnosis Line Sinus tachycardiawith 1st degree AV block  Rightward axis    Confirmed by ALEXANDER HURD ([id number]) on 2024 2:37:55 PM (24 @ 09:18)  ecg    LABS:	 	                        11.2   11.07 )-----------( 112      ( 2024 18:20 )             34.5         144  |  114<H>  |  19  ----------------------------<  110<H>  4.2   |  24  |  0.94    Ca    8.4<L>      2024 18:20  Phos  2.0       Mg     2.0         TPro  5.5<L>  /  Alb  2.3<L>  /  TBili  1.4<H>  /  DBili  x   /  AST  16  /  ALT  8<L>  /  AlkPhos  61      Troponin I, High Sensitivity (24 @ 18:20): 9.8 ng/L  Troponin I, High Sensitivity (24 @ 09:50): 8.2 ng/L    ECG:     Physical Exam:  Constitutional: NAD  Neuro: A+O x 3, non-focal, speech clear and intact  HEENT: NC/AT, PERRL, EOMI, anicteric sclerae, oral mucosa pink and moist  Neck: supple, no JVD  CV: regular rate, regular rhythm, +S1S2, no murmurs or rub  Pulm/chest: lung sounds CTA and equal bilaterally, no accessory muscle use noted  Abd: soft, NT, ND  Ext: GIBBS x 4, no C/C/E  Pulses: R radial 2+, bilat DP 2+  Skin: warm, well perfused  Psych: calm, appropriate affect                                                                                 Department of Cardiology                                                               Division of Interventional Cardiology                                                               [facility] / [place] [id number]                                                                                 [phone]                                                                                                                               Interventional Cardiology Consult / Pre-Procedure Note      HPI: 89 y/o Tony speaking male with dementia, HTN, dyslipidemia, and known CAD s/p PCI, who presented to ED this morning following near syncope.  Patient is unable to provide a history, however per patients son, patient was very weak and fell to the flow when he was being assisted to the bathroom.  Son reports patient was started on amoxicillin and robitussin by PCP yesterday for cough.  Son sats patient is fully dependent at home and functional capacity id severely limited.  He denies any prior h/o syncope.      Initial ECG in ED revealed ST @ 103 bpm with 1st degree AVB.  Initial troponin was negative.  He was found to have a UTI, started on empiric antibiotics and was to be discharge, patient was noted to be bradycardic.  No acute change of mental status was observed and patient remained HD stable.  ECG revealed SB with Wenckeback with heart rates in the 50's.  EP subsequently consulted.       PAST MEDICAL & SURGICAL HISTORY:  Benign prostatic hyperplasia, presence of lower urinary tract symptoms unspecified, unspecified morphology  Hyperlipidemia  GERD (gastroesophageal reflux disease)  CAD (coronary artery disease)  UTI (urinary tract infection)  Calculus of kidney  Calculus of ureter  Dementia  Kidney stones  History of prostate surgery  S/P ureteral stent placement - Left sided  Stented coronary artery      FAMILY HISTORY:  FH: hypertension (Child)        MEDICATIONS  (STANDING):    MEDICATIONS  (PRN):      Allergies: No Known Allergies        T(C): 37.4 (24 @ 17:50), Max: 37.7 (24 @ 09:08)  HR: 61 (24 @ 17:17) (61 - 110)  BP: 111/56 (24 @ 17:17) (88/50 - 116/54)  RR: 17 (24 @ 17:17) (17 - 18)  SpO2: 95% (24 @ 17:17) (95% - 98%)  Wt(kg): --  I&O's Summary    Daily Height in cm: 180.34 (2024 09:08)    Daily       TELEMETRY: 	      EC Lead ECG:   Ventricular Rate 103 BPM  Atrial Rate 103 BPM  P-R Interval 266 ms  QRS Duration 84 ms  Q-T Interval 304 ms  QTC Calculation(Bazett) 398 ms  P Axis 19 degrees  R Axis 93 degrees  T Axis 41 degrees    Diagnosis Line Sinus tachycardia with 1st degree AV block  Rightward axis    Confirmed by ALEXANDER HURD ([id number]) on 2024 2:37:55 PM (24 @ 09:18)  ecg    ECG 2024 @ 16:50: SB with Wenckebach @ 56 bpm.  YESIKA 314 ms, QRS duration 84 ms; QTc 409 ms  ECG 2024 @ 18:14: SB with Wenckebach @ 49 bpm.  YESIKA 330 ms, QRS duration 80 ms; QTc 399 ms.     LABS:	 	                        11.2   11.07 )-----------( 112      ( 2024 18:20 )             34.5         144  |  114<H>  |  19  ----------------------------<  110<H>  4.2   |  24  |  0.94    Ca    8.4<L>      2024 18:20  Phos  2.0       Mg     2.0         TPro  5.5<L>  /  Alb  2.3<L>  /  TBili  1.4<H>  /  DBili  x   /  AST  16  /  ALT  8<L>  /  AlkPhos  61      Troponin I, High Sensitivity (24 @ 18:20): 9.8 ng/L  Troponin I, High Sensitivity (24 @ 09:50): 8.2 ng/L

## 2024-07-26 NOTE — ED ADULT TRIAGE NOTE - IDEAL BODY WEIGHT(KG)
Patient: Ilan Bazzi    Procedure: Procedure(s):  AUGMENTATION, BREAST BILATERAL       Anesthesia Type:  General    Note:  Disposition: Outpatient   Postop Pain Control: Uneventful            Sign Out: Well controlled pain   PONV: No   Neuro/Psych: Uneventful            Sign Out: Acceptable/Baseline neuro status   Airway/Respiratory: Uneventful            Sign Out: Acceptable/Baseline resp. status   CV/Hemodynamics: Uneventful            Sign Out: Acceptable CV status; No obvious hypovolemia; No obvious fluid overload   Other NRE: NONE   DID A NON-ROUTINE EVENT OCCUR? No           Last vitals:  Vitals Value Taken Time   /83 05/09/23 1120   Temp 36.1  C (97  F) 05/09/23 1115   Pulse 105 05/09/23 1122   Resp 15 05/09/23 1122   SpO2 94 % 05/09/23 1122   Vitals shown include unvalidated device data.    Electronically Signed By: Emmett Burroughs MD, MD  May 9, 2023  11:24 AM   75

## 2024-07-26 NOTE — ED ADULT NURSE REASSESSMENT NOTE - NS ED NURSE REASSESS COMMENT FT1
1000:  patient had a bowel movement and he was cleaned.  HIs rectal temperature is 98.7.  bladder scan was performed revealing 372ml.  vitals recorded.  iv access was obtained by EMS, #18 to RAC.  fluids infusing.  MD was made aware.  will draw lactate momentarily   nani magallanes.
1230:  still awaiting urine sample.  Spoke to MD, bladder scan performed, 372.  straight cath ordered.  550 removed, urine collected and sent.  patient appears to be comfortable.  respirations even / unlabored.  no respiratory distress.  although family states he has been coughing for 2-3 days, he has not coughed while here.  vitals recorded.  safety maintained.  nani magallanes.
1605:  returned from break.  patient is d/c.  son is no longer at bedside.  fluids continue to infuse.  respirations even / unlabored.  looked in waiting room for son, but he was not there.  nani magallanes.
1650:  patient noted to be melvina.  he is alert / responsive, but he is hard of hearing, dementia.  repeat ekg performed and MD made aware.  he remains on the cardiac monitor.  MD signed off on ekg.  nani magallanes.
1700: daughter questioning patient's discharge.  Had Dr. Thakkar speak to her, it was explained that he was very dehydrated and needed fluids.  she kept asking why he has not urinated yet.  With Dr. Thakkar and family at bedside, another bladder scan performed revealing approximately 209.  MD explained to the daughter that this may not be enough of him to feel an urge to urinate.  Daughter is just concerned about him going home.  Dr. Thakkar explained that he does have a slight UTI, but nothing like he had back in february, and he was medicated with IV ABX for this today.  He explained to them that he will start to take PO abx tomorrow twice a day.  He should follow up with his primary medical MD.  If he should develop any additional symptoms, it was recommended that he return to the er.  the family is now requesting that we call for transport home since he has difficulty walking.  I told the family that we will call for transport home, but that this can take several hours.  they may decide to take home on their own, and if they do, we will cancel the transport.  MD was made aware of this.  the patient is comfortable at this time.  vitals recorded.  nani magallanes.
1830:  daughter came out of room, states the heart monitor went down to 43 and it says "afib". she video the monitor with her phone, questioning why no one entered room with this low heart rate.  I informed her that someone was watching it, and it was very brief, but I understand her concerns.  he has no history of Afib.  repeat ekg at 1840 and given to er md.  I explained the situation to him, since the patient was discharged and he did not have report on this.  He recommended pads and redraw labs and cardio consult.  nani magallanes.
1910:  cardio NP at bedside.  ekg reviewed.  the patient will be admitted to telemetry.  this was explained to the family, who concur with this plan.  (his ambulance transport which was called earlier was obviously called and cancelled at this time).  Per family and Dr. BRIANNE Thakkar, the iv's will need to be wrapped and he will likely require 1:1 placement as he does have dementia and will sundown at night.  I informed the charge rn.  report given to oncoming MONICA magallanes rn.
called and spoke to the receiving nurse, in the middle of handover, as per CRISTO Nguyen she will call again.
pt. is on 1:1 care and constant observation, NA on bedside.
pt. is ready to shift to telemetry, pt. is attached to monitor, accompanied the pt. going to Telemetry, stable upon transfer.
received pt. from previous RN - Ksenia, introduce self to the relative and pt., pt. is on bed, awake, pt. is demented, deaf and unable to speak english as per the relative,  attached to monitor, relative on bedside, covered the IV site, awaiting for plan, safety maintained applied.
called Telemetry to give report and spoke to Wendy , as per her call again after 10-15 mins.

## 2024-07-26 NOTE — ED ADULT TRIAGE NOTE - CHIEF COMPLAINT QUOTE
pt BIBA to ED s/p syncopal episode while standing up to go to bathroom. denies CP/SOB. recent UTI on PO abx

## 2024-07-26 NOTE — ED PROVIDER NOTE - NSICDXFAMILYHX_GEN_ALL_CORE_FT
Released to pt portal FAMILY HISTORY:  Child  Still living? Yes, Estimated age: Age Unknown  FH: hypertension, Age at diagnosis: Age Unknown

## 2024-07-26 NOTE — ED PROVIDER NOTE - PATIENT PORTAL LINK FT
You can access the FollowMyHealth Patient Portal offered by [facility] by registering at the following website: [email or web address]. By joining [employer]’s FollowMyHealth portal, you will also be able to view your health information using other applications (apps) compatible with our system.

## 2024-07-26 NOTE — ED ADULT NURSE NOTE - NSFALLHARMRISKINTERV_ED_ALL_ED
Assistance OOB with selected safe patient handling equipment if applicable/Assistance with ambulation/Communicate risk of Fall with Harm to all staff, patient, and family/Monitor gait and stability/Monitor for mental status changes and reorient to person, place, and time, as needed/Move patient closer to nursing station/within visual sight of ED staff/Provide patient with walking aids/Provide visual cue: red socks, yellow wristband, yellow gown, etc/Reinforce activity limits and safety measures with patient and family/Toileting schedule using arm’s reach rule for commode and bathroom/Use of alarms - bed, stretcher, chair and/or video monitoring/Bed in lowest position, wheels locked, appropriate side rails in place/Call bell, personal items and telephone in reach/Instruct patient to call for assistance before getting out of bed/chair/stretcher/Non-slip footwear applied when patient is off stretcher/[place] to call system/Physically safe environment - no spills, clutter or unnecessary equipment/Purposeful Proactive Rounding/Room/bathroom lighting operational, light cord in reach

## 2024-07-26 NOTE — ED ADULT NURSE REASSESSMENT NOTE - NSFALLHARMRISKINTERV_ED_ALL_ED
Assistance OOB with selected safe patient handling equipment if applicable/Assistance with ambulation/Communicate risk of Fall with Harm to all staff, patient, and family/Monitor gait and stability/Monitor for mental status changes and reorient to person, place, and time, as needed/Move patient closer to nursing station/within visual sight of ED staff/Provide patient with walking aids/Provide visual cue: red socks, yellow wristband, yellow gown, etc/Reinforce activity limits and safety measures with patient and family/Toileting schedule using arm’s reach rule for commode and bathroom/Use of alarms - bed, stretcher, chair and/or video monitoring/Bed in lowest position, wheels locked, appropriate side rails in place/Call bell, personal items and telephone in reach/Instruct patient to call for assistance before getting out of bed/chair/stretcher/Non-slip footwear applied when patient is off stretcher/[place] to call system/Physically safe environment - no spills, clutter or unnecessary equipment/Purposeful Proactive Rounding/Room/bathroom lighting operational, light cord in reach

## 2024-07-27 ENCOUNTER — RESULT REVIEW (OUTPATIENT)
Age: 89
End: 2024-07-27

## 2024-07-27 LAB
ALBUMIN SERPL ELPH-MCNC: 2.3 G/DL — LOW (ref 3.3–5)
ALP SERPL-CCNC: 62 U/L — SIGNIFICANT CHANGE UP (ref 40–120)
ALT FLD-CCNC: 7 U/L — LOW (ref 12–78)
ANION GAP SERPL CALC-SCNC: 5 MMOL/L — SIGNIFICANT CHANGE UP (ref 5–17)
AST SERPL-CCNC: 18 U/L — SIGNIFICANT CHANGE UP (ref 15–37)
BASOPHILS # BLD AUTO: 0.02 K/UL — SIGNIFICANT CHANGE UP (ref 0–0.2)
BASOPHILS NFR BLD AUTO: 0.2 % — SIGNIFICANT CHANGE UP (ref 0–2)
BILIRUB SERPL-MCNC: 1.8 MG/DL — HIGH (ref 0.2–1.2)
BUN SERPL-MCNC: 20 MG/DL — SIGNIFICANT CHANGE UP (ref 7–23)
CALCIUM SERPL-MCNC: 8.8 MG/DL — SIGNIFICANT CHANGE UP (ref 8.5–10.1)
CHLORIDE SERPL-SCNC: 115 MMOL/L — HIGH (ref 96–108)
CO2 SERPL-SCNC: 25 MMOL/L — SIGNIFICANT CHANGE UP (ref 22–31)
CREAT SERPL-MCNC: 0.73 MG/DL — SIGNIFICANT CHANGE UP (ref 0.5–1.3)
EGFR: 84 ML/MIN/1.73M2 — SIGNIFICANT CHANGE UP
EOSINOPHIL # BLD AUTO: 0.12 K/UL — SIGNIFICANT CHANGE UP (ref 0–0.5)
EOSINOPHIL NFR BLD AUTO: 1.5 % — SIGNIFICANT CHANGE UP (ref 0–6)
GLUCOSE SERPL-MCNC: 100 MG/DL — HIGH (ref 70–99)
HCT VFR BLD CALC: 34.3 % — LOW (ref 39–50)
HGB BLD-MCNC: 11.2 G/DL — LOW (ref 13–17)
IMM GRANULOCYTES NFR BLD AUTO: 0.2 % — SIGNIFICANT CHANGE UP (ref 0–0.9)
LYMPHOCYTES # BLD AUTO: 1.83 K/UL — SIGNIFICANT CHANGE UP (ref 1–3.3)
LYMPHOCYTES # BLD AUTO: 22.5 % — SIGNIFICANT CHANGE UP (ref 13–44)
MAGNESIUM SERPL-MCNC: 2 MG/DL — SIGNIFICANT CHANGE UP (ref 1.6–2.6)
MCHC RBC-ENTMCNC: 30.8 PG — SIGNIFICANT CHANGE UP (ref 27–34)
MCHC RBC-ENTMCNC: 32.7 GM/DL — SIGNIFICANT CHANGE UP (ref 32–36)
MCV RBC AUTO: 94.2 FL — SIGNIFICANT CHANGE UP (ref 80–100)
MONOCYTES # BLD AUTO: 0.77 K/UL — SIGNIFICANT CHANGE UP (ref 0–0.9)
MONOCYTES NFR BLD AUTO: 9.5 % — SIGNIFICANT CHANGE UP (ref 2–14)
NEUTROPHILS # BLD AUTO: 5.37 K/UL — SIGNIFICANT CHANGE UP (ref 1.8–7.4)
NEUTROPHILS NFR BLD AUTO: 66.1 % — SIGNIFICANT CHANGE UP (ref 43–77)
NRBC # BLD: 0 /100 WBCS — SIGNIFICANT CHANGE UP (ref 0–0)
PHOSPHATE SERPL-MCNC: 1.8 MG/DL — LOW (ref 2.5–4.5)
PLATELET # BLD AUTO: 106 K/UL — LOW (ref 150–400)
POTASSIUM SERPL-MCNC: 3.8 MMOL/L — SIGNIFICANT CHANGE UP (ref 3.5–5.3)
POTASSIUM SERPL-SCNC: 3.8 MMOL/L — SIGNIFICANT CHANGE UP (ref 3.5–5.3)
PROT SERPL-MCNC: 5.6 G/DL — LOW (ref 6–8.3)
RBC # BLD: 3.64 M/UL — LOW (ref 4.2–5.8)
RBC # FLD: 14.8 % — HIGH (ref 10.3–14.5)
SODIUM SERPL-SCNC: 145 MMOL/L — SIGNIFICANT CHANGE UP (ref 135–145)
WBC # BLD: 8.13 K/UL — SIGNIFICANT CHANGE UP (ref 3.8–10.5)
WBC # FLD AUTO: 8.13 K/UL — SIGNIFICANT CHANGE UP (ref 3.8–10.5)

## 2024-07-27 PROCEDURE — 70498 CT ANGIOGRAPHY NECK: CPT | Mod: 26

## 2024-07-27 PROCEDURE — 76705 ECHO EXAM OF ABDOMEN: CPT | Mod: 26

## 2024-07-27 PROCEDURE — 99223 1ST HOSP IP/OBS HIGH 75: CPT

## 2024-07-27 PROCEDURE — 99233 SBSQ HOSP IP/OBS HIGH 50: CPT | Mod: GC

## 2024-07-27 PROCEDURE — 93880 EXTRACRANIAL BILAT STUDY: CPT | Mod: 26

## 2024-07-27 PROCEDURE — 93306 TTE W/DOPPLER COMPLETE: CPT | Mod: 26

## 2024-07-27 RX ORDER — POTASSIUM PHOSPHATE, MONOBASIC AND POTASSIUM PHOSPHATE, DIBASIC 224; 236 MG/ML; MG/ML
30 INJECTION, SOLUTION INTRAVENOUS ONCE
Refills: 0 | Status: COMPLETED | OUTPATIENT
Start: 2024-07-27 | End: 2024-07-27

## 2024-07-27 RX ORDER — ATORVASTATIN CALCIUM 40 MG/1
80 TABLET, FILM COATED ORAL AT BEDTIME
Refills: 0 | Status: DISCONTINUED | OUTPATIENT
Start: 2024-07-27 | End: 2024-07-29

## 2024-07-27 RX ADMIN — FINASTERIDE 5 MILLIGRAM(S): 5 TABLET, FILM COATED ORAL at 12:32

## 2024-07-27 RX ADMIN — POTASSIUM PHOSPHATE, MONOBASIC AND POTASSIUM PHOSPHATE, DIBASIC 83.33 MILLIMOLE(S): 224; 236 INJECTION, SOLUTION INTRAVENOUS at 12:33

## 2024-07-27 RX ADMIN — Medication 0.4 MILLIGRAM(S): at 22:07

## 2024-07-27 RX ADMIN — Medication 81 MILLIGRAM(S): at 12:32

## 2024-07-27 RX ADMIN — ATORVASTATIN CALCIUM 80 MILLIGRAM(S): 40 TABLET, FILM COATED ORAL at 22:07

## 2024-07-27 RX ADMIN — Medication 250 MILLIGRAM(S): at 12:32

## 2024-07-27 RX ADMIN — MEMANTINE HYDROCHLORIDE 5 MILLIGRAM(S): 14 CAPSULE, EXTENDED RELEASE ORAL at 07:05

## 2024-07-27 RX ADMIN — ENOXAPARIN SODIUM 40 MILLIGRAM(S): 120 INJECTION SUBCUTANEOUS at 12:32

## 2024-07-27 RX ADMIN — Medication 100 MILLIGRAM(S): at 12:33

## 2024-07-27 NOTE — CONSULT NOTE ADULT - SUBJECTIVE AND OBJECTIVE BOX
[facility] - Rhonda Good, Joselo Jimenez, Peter, Brennon Jones  Office Number: [phone]    Initial Consult Note    CHIEF COMPLAINT: Patient is a 89y old  Male who presents with a chief complaint of Syncope        HPI:  Patient is a 89yo male with PMH of dementia, recurrent UTIs, BPH, CAD s/p 3 stents (2016), GERD, HLD, kidney stones who presented after a syncopal episode while standing this morning. Patient was walking to bathroom, became lightheaded and almost passed out, but son was present to hold him up. Denies hitting his head or losing consciousness. Patient's son states that patient developed a productive cough (green sputum with some black particles; no bright red blood) 2 days ago. Yesterday, patient was having trouble swallowing, so son took him to the [facility] [[facility]] where his PCP's office is; patient was started on Azithromycin 250 mg and has been taking it for 1 day now. Son reports patient has not been drinking much water over the past few days, but has been eating. Son states this is patient's baseline and denies any acute mental status changes.    ED COURSE:  Vitals: T 99.4   F , 61 HR  ,  /56 , 17 RR  , SpO2  95% on RA  Labs significant for: WBC 15.6 -> 11.07, phos 2, p-  UA: +bacteria, + leukocyte esterase  Imaging: X-ray chest: stable no acute findings  EKG: jonathan with 2nd degree AV block, [place] with heart rates in the 50's  Pt received: 1G IV cefepime, 1L Saline      Due to the second degree AVB type 1, EP was consulted.  NO PPM was suggested, and this is thought to not be the cause of his syncope.  He has advanced dementia on my exam, and unable to provide any meaningful interval history.  NO further syncope.  Telemetry overnight showed sinus jonathan, first degree AVB, PVCs, but no second degree avb.        PAST MEDICAL & SURGICAL HISTORY:  Benign prostatic hyperplasia, presence of lower urinary tract symptoms unspecified, unspecified morphology      Hyperlipidemia      GERD (gastroesophageal reflux disease)      CAD (coronary artery disease)      UTI (urinary tract infection)      Calculus of kidney      Calculus of ureter      Dementia      Kidney stones      History of prostate surgery      S/P ureteral stent placement  Left sided      Stented coronary artery          SOCIAL HISTORY:  No tobacco, ethanol, or drug abuse.    FAMILY HISTORY:  FH: hypertension (Child)      No family history of acute MI or sudden cardiac death.    MEDICATIONS  (STANDING):  aspirin enteric coated 81 milliGRAM(s) Oral daily  atorvastatin 20 milliGRAM(s) Oral at bedtime  azithromycin   Tablet 250 milliGRAM(s) Oral daily  cefTRIAXone   IVPB 1000 milliGRAM(s) IV Intermittent every 24 hours  enoxaparin Injectable 40 milliGRAM(s) SubCutaneous every 24 hours  finasteride 5 milliGRAM(s) Oral daily  memantine 5 milliGRAM(s) Oral two times a day  potassium phosphate IVPB 30 milliMole(s) IV Intermittent once  tamsulosin 0.4 milliGRAM(s) Oral at bedtime        Allergies    No Known Allergies            REVIEW OF SYSTEMS:       All other review of systems is negative unless indicated above or unable to be obtained    VITAL SIGNS:   Vital Signs Last 24 Hrs  T(C): 36.4 (27 Jul 2024 05:23), Max: 37.7 (26 Jul 2024 09:08)  T(F): 97.6 (27 Jul 2024 05:23), Max: 99.9 (26 Jul 2024 09:08)  HR: 55 (27 Jul 2024 05:23) (53 - 110)  BP: 126/74 (27 Jul 2024 05:23) (88/50 - 126/74)  BP(mean): --  RR: 18 (27 Jul 2024 05:23) (17 - 18)  SpO2: 95% (27 Jul 2024 05:23) (95% - 98%)    Parameters below as of 27 Jul 2024 05:23  Patient On (Oxygen Delivery Method): room air        I&O's Summary      On Exam:    Constitutional: NAD, awake, confused  Lungs:  Non-labored, breath sounds are clear bilaterally, No wheezing, rales or rhonchi  Cardiovascular: RRR.  S1 and S2 positive.  No murmurs, rubs, gallops or clicks.  Jonathan.  Distant heart sounds  Gastrointestinal: Bowel Sounds present, soft, nontender.   Lymph: No peripheral edema. No cervical lymphadenopathy.  Skin: No rashes or ulcers   Psych:  Unable to properly assess    LABS: All Labs Reviewed:                        11.2   8.13  )-----------( 106      ( 27 Jul 2024 05:55 )             34.3                         11.2   11.07 )-----------( 112      ( 26 Jul 2024 18:20 )             34.5                         12.3   15.60 )-----------( 124      ( 26 Jul 2024 09:50 )             38.1     27 Jul 2024 05:55    145    |  115    |  20     ----------------------------<  100    3.8     |  25     |  0.73   26 Jul 2024 18:20    144    |  114    |  19     ----------------------------<  110    4.2     |  24     |  0.94   26 Jul 2024 09:50    142    |  110    |  20     ----------------------------<  198    3.9     |  24     |  1.20     Ca    8.8        27 Jul 2024 05:55  Ca    8.4        26 Jul 2024 18:20  Ca    9.2        26 Jul 2024 09:50  Phos  1.8       27 Jul 2024 05:55  Phos  2.0       26 Jul 2024 18:20  Mg     2.0       27 Jul 2024 05:55  Mg     2.0       26 Jul 2024 18:20    TPro  5.6    /  Alb  2.3    /  TBili  1.8    /  DBili  x      /  AST  18     /  ALT  7      /  AlkPhos  62     27 Jul 2024 05:55  TPro  5.5    /  Alb  2.3    /  TBili  1.4    /  DBili  x      /  AST  16     /  ALT  8      /  AlkPhos  61     26 Jul 2024 18:20  TPro  6.3    /  Alb  2.8    /  TBili  1.5    /  DBili  x      /  AST  16     /  ALT  9      /  AlkPhos  76     26 Jul 2024 09:50    PT/INR - ( 26 Jul 2024 10:00 )   PT: 13.9 sec;   INR: 1.19 ratio         PTT - ( 26 Jul 2024 10:00 )  PTT:24.4 sec      Blood Culture:     07-26 @ 21:53  TSH: 0.40      RADIOLOGY:    EKG:  Sinus bradycardia with wenchebach

## 2024-07-27 NOTE — PATIENT PROFILE ADULT - FALL HARM RISK - RISK INTERVENTIONS

## 2024-07-27 NOTE — CONSULT NOTE ADULT - SUBJECTIVE AND OBJECTIVE BOX
SURGERY PA CONSULT NOTE:    CHIEF COMPLAINT:  Patient is a 89y old  Male who presents with a chief complaint of Syncope (27 Jul 2024 12:23)      HPI FROM ED:  HPI:  Patient is a 89yo male with PMH of dementia, recurrent UTIs, BPH, CAD s/p 3 stents (2016), GERD, HLD, kidney stones who presented after a syncopal episode while standing this morning. Patient was walking to bathroom, became lightheaded and almost passed out, but son was present to hold him up. Denies hitting his head or losing consciousness. Patient's son states that patient developed a productive cough (green sputum with some black particles; no bright red blood) 2 days ago. Yesterday, patient was having trouble swallowing, so son took him to the [facility] [[facility]] where his PCP's office is; patient was started on Azithromycin 250 mg and has been taking it for 1 day now. Son reports patient has not been drinking much water over the past few days, but has been eating. Son states this is patient's baseline and denies any acute mental status changes.    ED COURSE:  Vitals: T 99.4   F , 61 HR  ,  /56 , 17 RR  , SpO2  95% on RA  Labs significant for: WBC 15.6 -> 11.07, phos 2, p-  UA: +bacteria, + leukocyte esterase  Imaging: X-ray chest: stable no acute findings  EKG: melvina with 2nd degree AV block, Wenckeback with heart rates in the 50's  Pt received: 1G IV cefepime, 1L Saline   (26 Jul 2024 19:42)    Patient was seen and evaluated at the bedside with his son present. The vascular team was consulted due to a critical stenosis vs occlusion of the left ICA. Patient's son speaks on his behalf due to mental status. According to the son, over the past 6 months he's noticed his fathers balance and awareness has worsened. He is unable to walk without assistance because he will fall. He often goes to the bathroom and becomes confused as to where he is and will soil himself. He denies one sided weakness, slurred speech, or changes in vision. He denies any history of stroke or TIA. He denies any following with a vascular doctor in the past. The patient himself is able to answer some questions and is overall very pleasant.       PAST MEDICAL HISTORY:  PAST MEDICAL & SURGICAL HISTORY:  Benign prostatic hyperplasia, presence of lower urinary tract symptoms unspecified, unspecified morphology      Hyperlipidemia      GERD (gastroesophageal reflux disease)      CAD (coronary artery disease)      UTI (urinary tract infection)      Calculus of kidney      Calculus of ureter      Dementia      Kidney stones      History of prostate surgery      S/P ureteral stent placement  Left sided      Stented coronary artery        REVIEW OF SYSTEMS:  General/Constitutional: No acute distress, no headache, weakness, fevers, or chills   HEENT: Denies auditory or visual changes/disturbances, no vertigo, no throat pain, + Dysphagia    Neck: Denies neck pain/stiffness, denies swelling/lumps/hoarseness   Lymphatic: Denies lumps or swelling in the axillae, groin, or neck bilaterally   Respiratory: Denies cough/hemoptysis, denies wheezing/SOB/dyspnea  Cardiac: Denies chest pain, palpitations  Abdomen: Denies abdominal bloating/fullness, nausea or vomiting, denies abdominal pain  Extremities: Denies sores, swelling, discoloration bilat UE/LE  Genitourinary: Denies urinary issues or complaints, denies dysuria/hematuria  Neuro: + Weakness, + difficulty ambulating  Skin: Denies pruritus, pain, rashes  Psych: Denies hallucinations, visual disturbances, or depression    MEDICATIONS:  Home Medications:  aspirin 81 mg oral tablet: 1 tab(s) orally once a day (26 Jul 2024 20:23)  atorvastatin 20 mg oral tablet: 1 tab(s) orally once a day (26 Jul 2024 20:23)  Azithromycin 5 Day Dose Pack 250 mg oral tablet: orally (26 Jul 2024 20:24)  diclofenac topical:  (26 Jul 2024 20:21)  finasteride 5 mg oral tablet: 1 tab(s) orally once a day (26 Jul 2024 20:23)  memantine 5 mg oral tablet: 1 tab(s) orally 2 times a day (26 Jul 2024 20:23)  methenamine hippurate 1 g oral tablet: 1 tab(s) orally 2 times a day (26 Jul 2024 20:23)  multivitamin with minerals:  (26 Jul 2024 20:23)  tamsulosin 0.4 mg oral capsule: 1 cap(s) orally once a day (at bedtime) (26 Jul 2024 20:23)    MEDICATIONS  (STANDING):  aspirin enteric coated 81 milliGRAM(s) Oral daily  atorvastatin 80 milliGRAM(s) Oral at bedtime  azithromycin   Tablet 250 milliGRAM(s) Oral daily  cefTRIAXone   IVPB 1000 milliGRAM(s) IV Intermittent every 24 hours  enoxaparin Injectable 40 milliGRAM(s) SubCutaneous every 24 hours  finasteride 5 milliGRAM(s) Oral daily  memantine 5 milliGRAM(s) Oral two times a day  tamsulosin 0.4 milliGRAM(s) Oral at bedtime        ALLERGIES:  Allergies    No Known Allergies    Intolerances        SOCIAL HISTORY:  Social History:    Smoking: Yes [ ]  No [ ]   ______pk yrs  ETOH  Yes [ ]  No [ ]  Social [ ]  DRUGS:  Yes [ ]  No [ ]  if so what______________    FAMILY HISTORY:  FAMILY HISTORY:  FH: hypertension (Child)        VITAL SIGNS:  Vital Signs Last 24 Hrs  T(C): 36.7 (27 Jul 2024 13:12), Max: 37.4 (26 Jul 2024 17:50)  T(F): 98 (27 Jul 2024 13:12), Max: 99.4 (26 Jul 2024 17:50)  HR: 55 (27 Jul 2024 05:23) (53 - 62)  BP: 126/74 (27 Jul 2024 05:23) (108/72 - 126/74)  BP(mean): --  RR: 18 (27 Jul 2024 13:12) (17 - 18)  SpO2: 96% (27 Jul 2024 13:12) (95% - 98%)    Parameters below as of 27 Jul 2024 13:12  Patient On (Oxygen Delivery Method): room air        PHYSICAL EXAM:  General: No acute distress, appears comfortable, well-groomed, appears stated age  Head, Eyes, Ears, Nose, Throat: Normal cephalic/atraumatic. Conjunctiva appear pale. Mouth demonstrates dry mucosa and dry, scaling tongue.  Neck: Supple, carotids have good upstroke, trachea in the midline, without JVD or thyromegaly. No carotid bruits noted.   Lymphatic: No evidence of masses or lymphadenopathy in the head, neck, trunk, axillary, inguinal, or supraclavicular regions  Chest: Lungs are clear to P&A, no wheezing, no rales, no ronchi, with good inspiratory effort  Heart: Heart rhythm regular, no murmurs  Abdomen: Soft, non tender, good bowel sounds present in all four quadrants.  No guarding, rebound, and no peritoneal signs.     Extremity: No swelling, or open sores, no gross deformities,  good range of motion, 2+ peripheral pulses bilat UE/LE, no edema,  negative Mary Kay's sign, no lymphadenopathy  Neuro: Alert, awake. Oriented to self and place. Able to move extremities.   Psychiatric: Awake , alert, oriented x3 with an appropriate affect.   Skin: Good color, turgor, texture with no gross lesions, no eruptions, no rashes, no subcutaneous nodules and normal temperature.     LABS:                        11.2   8.13  )-----------( 106      ( 27 Jul 2024 05:55 )             34.3     07-27    145  |  115<H>  |  20  ----------------------------<  100<H>  3.8   |  25  |  0.73    Ca    8.8      27 Jul 2024 05:55  Phos  1.8     07-27  Mg     2.0     07-27    TPro  5.6<L>  /  Alb  2.3<L>  /  TBili  1.8<H>  /  DBili  x   /  AST  18  /  ALT  7<L>  /  AlkPhos  62  07-27    PT/INR - ( 26 Jul 2024 10:00 )   PT: 13.9 sec;   INR: 1.19 ratio         PTT - ( 26 Jul 2024 10:00 )  PTT:24.4 sec  Urinalysis Basic - ( 27 Jul 2024 05:55 )    Color: x / Appearance: x / SG: x / pH: x  Gluc: 100 mg/dL / Ketone: x  / Bili: x / Urobili: x   Blood: x / Protein: x / Nitrite: x   Leuk Esterase: x / RBC: x / WBC x   Sq Epi: x / Non Sq Epi: x / Bacteria: x      LIVER FUNCTIONS - ( 27 Jul 2024 05:55 )  Alb: 2.3 g/dL / Pro: 5.6 g/dL / ALK PHOS: 62 U/L / ALT: 7 U/L / AST: 18 U/L / GGT: x               RADIOLOGY & ADDITIONAL STUDIES:    ACC: [id number] EXAM:  US DPLX CAROTIDS COMPL BI   ORDERED BY: SAVANA MARTINEZ     PROCEDURE DATE:  07/27/2024          INTERPRETATION:  CLINICAL INFORMATION: Syncope    COMPARISON: None available.    TECHNIQUE: Grayscale, color and spectral Doppler examination of both   carotid arteries was performed.    FINDINGS:    Mid left ICA demonstrates absent flow on Doppler, highly concerning for   occlusion or critical stenosis. Distal left ICA demonstrates antegrade   flow with elevated velocity of 180cm/s and apparent flow disturbance on   spectral waveform. Recommend CTA neck for further evaluation.    Scattered right carotid artery intimal thickening and minimal plaque   without velocity elevation or hemodynamically significant stenosis.    Peak systolic velocities are as follows:    RIGHT:  PROX CCA = 56 cm/s  MID CCA = 84 cm/s  DIST CCA = 68 cm/s  PROX ICA = 89 cm/s  MID ICA = 109 cm/s  DIST ICA = 119 cm/s  ECA = 89 cm/s    LEFT:  PROX CCA = 43 cm/s  MID CCA = 63 cm/s  DIST CCA = 60 cm/s  PROX ICA = 60 cm/s  MID ICA = Occluded**  DIST ICA = 180 cm/s**  ECA = 48 cm/s    Antegrade flow is noted within both vertebral arteries.    IMPRESSION:    Mid left ICA occlusion or critical stenosis. Distal left ICA demonstrates   antegrade flow with elevated velocity of 180cm/s and apparent flow   disturbance on spectral waveform. Recommend CTA neck for further   evaluation.    No hemodynamically significant right internal carotid artery stenosis.    Dr. Gavin discussed these findings with Dr. Molly Mccormick on   7/27/2024 at 1:27 PM, with read back.    Measurement of carotid stenosis is based on updated recommendations for   carotid stenosis interpretation criteria from the Intersocietal   Accreditation Commission (IAC) Vascular Testing Board, modified from the   Society of Radiologists in Ultrasound (SRU) Consensus Conference Criteria   for Internal Carotid Artery Stenosis.    --- End of Report ---    ASSESSMENT:    1. Left carotid stenosis    PLAN:    Continue Aspirin   Continue Atorvastatin   Conduct CTA of carotids   Fluid resuscitation   Monitor labs   Neurovascular checks daily   PT/OT    SURGERY PA CONSULT NOTE:    CHIEF COMPLAINT:  Patient is a 89y old  Male who presents with a chief complaint of Syncope (27 Jul 2024 12:23)      HPI FROM ED:  HPI:  Patient is a 89yo male with PMH of dementia, recurrent UTIs, BPH, CAD s/p 3 stents (2016), GERD, HLD, kidney stones who presented after a syncopal episode while standing this morning. Patient was walking to bathroom, became lightheaded and almost passed out, but son was present to hold him up. Denies hitting his head or losing consciousness. Patient's son states that patient developed a productive cough (green sputum with some black particles; no bright red blood) 2 days ago. Yesterday, patient was having trouble swallowing, so son took him to the [facility] [[facility]] where his PCP's office is; patient was started on Azithromycin 250 mg and has been taking it for 1 day now. Son reports patient has not been drinking much water over the past few days, but has been eating. Son states this is patient's baseline and denies any acute mental status changes.    ED COURSE:  Vitals: T 99.4   F , 61 HR  ,  /56 , 17 RR  , SpO2  95% on RA  Labs significant for: WBC 15.6 -> 11.07, phos 2, p-  UA: +bacteria, + leukocyte esterase  Imaging: X-ray chest: stable no acute findings  EKG: melvina with 2nd degree AV block, Wenckeback with heart rates in the 50's  Pt received: 1G IV cefepime, 1L Saline   (26 Jul 2024 19:42)    Patient was seen and evaluated at the bedside with his son present. The vascular team was consulted due to a critical stenosis vs occlusion of the left ICA. Patient's son speaks on his behalf due to mental status. According to the son, over the past 6 months he's noticed his fathers balance and awareness has worsened. He is unable to walk without assistance because he will fall. He often goes to the bathroom and becomes confused as to where he is and will soil himself. He denies one sided weakness, slurred speech, or changes in vision. He denies any history of stroke or TIA. He denies any following with a vascular doctor in the past. The patient himself is able to answer some questions and is overall very pleasant.       PAST MEDICAL HISTORY:  PAST MEDICAL & SURGICAL HISTORY:  Benign prostatic hyperplasia, presence of lower urinary tract symptoms unspecified, unspecified morphology      Hyperlipidemia      GERD (gastroesophageal reflux disease)      CAD (coronary artery disease)      UTI (urinary tract infection)      Calculus of kidney      Calculus of ureter      Dementia      Kidney stones      History of prostate surgery      S/P ureteral stent placement  Left sided      Stented coronary artery        REVIEW OF SYSTEMS:  General/Constitutional: No acute distress, no headache, weakness, fevers, or chills   HEENT: Denies auditory or visual changes/disturbances, no vertigo, no throat pain, + Dysphagia    Neck: Denies neck pain/stiffness, denies swelling/lumps/hoarseness   Lymphatic: Denies lumps or swelling in the axillae, groin, or neck bilaterally   Respiratory: Denies cough/hemoptysis, denies wheezing/SOB/dyspnea  Cardiac: Denies chest pain, palpitations  Abdomen: Denies abdominal bloating/fullness, nausea or vomiting, denies abdominal pain  Extremities: Denies sores, swelling, discoloration bilat UE/LE  Genitourinary: Denies urinary issues or complaints, denies dysuria/hematuria  Neuro: + Weakness, + difficulty ambulating  Skin: Denies pruritus, pain, rashes  Psych: Denies hallucinations, visual disturbances, or depression    MEDICATIONS:  Home Medications:  aspirin 81 mg oral tablet: 1 tab(s) orally once a day (26 Jul 2024 20:23)  atorvastatin 20 mg oral tablet: 1 tab(s) orally once a day (26 Jul 2024 20:23)  Azithromycin 5 Day Dose Pack 250 mg oral tablet: orally (26 Jul 2024 20:24)  diclofenac topical:  (26 Jul 2024 20:21)  finasteride 5 mg oral tablet: 1 tab(s) orally once a day (26 Jul 2024 20:23)  memantine 5 mg oral tablet: 1 tab(s) orally 2 times a day (26 Jul 2024 20:23)  methenamine hippurate 1 g oral tablet: 1 tab(s) orally 2 times a day (26 Jul 2024 20:23)  multivitamin with minerals:  (26 Jul 2024 20:23)  tamsulosin 0.4 mg oral capsule: 1 cap(s) orally once a day (at bedtime) (26 Jul 2024 20:23)    MEDICATIONS  (STANDING):  aspirin enteric coated 81 milliGRAM(s) Oral daily  atorvastatin 80 milliGRAM(s) Oral at bedtime  azithromycin   Tablet 250 milliGRAM(s) Oral daily  cefTRIAXone   IVPB 1000 milliGRAM(s) IV Intermittent every 24 hours  enoxaparin Injectable 40 milliGRAM(s) SubCutaneous every 24 hours  finasteride 5 milliGRAM(s) Oral daily  memantine 5 milliGRAM(s) Oral two times a day  tamsulosin 0.4 milliGRAM(s) Oral at bedtime        ALLERGIES:  Allergies    No Known Allergies    Intolerances      FAMILY HISTORY:  FAMILY HISTORY:  FH: hypertension (Child)        VITAL SIGNS:  Vital Signs Last 24 Hrs  T(C): 36.7 (27 Jul 2024 13:12), Max: 37.4 (26 Jul 2024 17:50)  T(F): 98 (27 Jul 2024 13:12), Max: 99.4 (26 Jul 2024 17:50)  HR: 55 (27 Jul 2024 05:23) (53 - 62)  BP: 126/74 (27 Jul 2024 05:23) (108/72 - 126/74)  BP(mean): --  RR: 18 (27 Jul 2024 13:12) (17 - 18)  SpO2: 96% (27 Jul 2024 13:12) (95% - 98%)    Parameters below as of 27 Jul 2024 13:12  Patient On (Oxygen Delivery Method): room air        PHYSICAL EXAM:  General: No acute distress, appears comfortable, well-groomed, appears stated age  Head, Eyes, Ears, Nose, Throat: Normal cephalic/atraumatic. Conjunctiva appear pale. Mouth demonstrates dry mucosa and dry, scaling tongue.  Neck: Supple, carotids have good upstroke, trachea in the midline, without JVD or thyromegaly. No carotid bruits noted.   Lymphatic: No evidence of masses or lymphadenopathy in the head, neck, trunk, axillary, inguinal, or supraclavicular regions  Chest: Lungs are clear to P&A, no wheezing, no rales, no ronchi, with good inspiratory effort  Heart: Heart rhythm regular, no murmurs  Abdomen: Soft, non tender, good bowel sounds present in all four quadrants.  No guarding, rebound, and no peritoneal signs.     Extremity: No swelling, or open sores, no gross deformities,  good range of motion, 2+ peripheral pulses bilat UE/LE, no edema,  negative Mary Kay's sign, no lymphadenopathy  Neuro: Alert, awake. Oriented to self and place. Able to move extremities.   Psychiatric: Awake , alert, oriented x3 with an appropriate affect.   Skin: Good color, turgor, texture with no gross lesions, no eruptions, no rashes, no subcutaneous nodules and normal temperature.     LABS:                        11.2   8.13  )-----------( 106      ( 27 Jul 2024 05:55 )             34.3     07-27    145  |  115<H>  |  20  ----------------------------<  100<H>  3.8   |  25  |  0.73    Ca    8.8      27 Jul 2024 05:55  Phos  1.8     07-27  Mg     2.0     07-27    TPro  5.6<L>  /  Alb  2.3<L>  /  TBili  1.8<H>  /  DBili  x   /  AST  18  /  ALT  7<L>  /  AlkPhos  62  07-27    PT/INR - ( 26 Jul 2024 10:00 )   PT: 13.9 sec;   INR: 1.19 ratio         PTT - ( 26 Jul 2024 10:00 )  PTT:24.4 sec  Urinalysis Basic - ( 27 Jul 2024 05:55 )    Color: x / Appearance: x / SG: x / pH: x  Gluc: 100 mg/dL / Ketone: x  / Bili: x / Urobili: x   Blood: x / Protein: x / Nitrite: x   Leuk Esterase: x / RBC: x / WBC x   Sq Epi: x / Non Sq Epi: x / Bacteria: x      LIVER FUNCTIONS - ( 27 Jul 2024 05:55 )  Alb: 2.3 g/dL / Pro: 5.6 g/dL / ALK PHOS: 62 U/L / ALT: 7 U/L / AST: 18 U/L / GGT: x               RADIOLOGY & ADDITIONAL STUDIES:    ACC: [id number] EXAM:  US DPLX CAROTIDS COMPL BI   ORDERED BY: SAVANA MARTINEZ     PROCEDURE DATE:  07/27/2024          INTERPRETATION:  CLINICAL INFORMATION: Syncope    COMPARISON: None available.    TECHNIQUE: Grayscale, color and spectral Doppler examination of both   carotid arteries was performed.    FINDINGS:    Mid left ICA demonstrates absent flow on Doppler, highly concerning for   occlusion or critical stenosis. Distal left ICA demonstrates antegrade   flow with elevated velocity of 180cm/s and apparent flow disturbance on   spectral waveform. Recommend CTA neck for further evaluation.    Scattered right carotid artery intimal thickening and minimal plaque   without velocity elevation or hemodynamically significant stenosis.    Peak systolic velocities are as follows:    RIGHT:  PROX CCA = 56 cm/s  MID CCA = 84 cm/s  DIST CCA = 68 cm/s  PROX ICA = 89 cm/s  MID ICA = 109 cm/s  DIST ICA = 119 cm/s  ECA = 89 cm/s    LEFT:  PROX CCA = 43 cm/s  MID CCA = 63 cm/s  DIST CCA = 60 cm/s  PROX ICA = 60 cm/s  MID ICA = Occluded**  DIST ICA = 180 cm/s**  ECA = 48 cm/s    Antegrade flow is noted within both vertebral arteries.    IMPRESSION:    Mid left ICA occlusion or critical stenosis. Distal left ICA demonstrates   antegrade flow with elevated velocity of 180cm/s and apparent flow   disturbance on spectral waveform. Recommend CTA neck for further   evaluation.    No hemodynamically significant right internal carotid artery stenosis.    Dr. Gavin discussed these findings with Dr. Molly Mccormick on   7/27/2024 at 1:27 PM, with read back.    Measurement of carotid stenosis is based on updated recommendations for   carotid stenosis interpretation criteria from the Intersocietal   Accreditation Commission (IAC) Vascular Testing Board, modified from the   Society of Radiologists in Ultrasound (SRU) Consensus Conference Criteria   for Internal Carotid Artery Stenosis.    --- End of Report ---    ASSESSMENT:    1. Left carotid stenosis    PLAN:    Continue Aspirin   Continue Atorvastatin   CTA demonstrates an occluded left internal carotid   Fluid resuscitation   Monitor labs   Neurovascular checks daily   PT/OT   No surgical intervention at this time   Please reach out with further questions if needed

## 2024-07-27 NOTE — CONSULT NOTE ADULT - ASSESSMENT
89yo male with PMH of dementia, recurrent UTIs, BPH, CAD s/p 3 stents (2016), GERD, HLD, kidney stones who presented after a syncopal episode while standing, found to have sinus bradycardia with 2nd degree AVB type 1 in the ED:    - Seen by EP. I agree that his 2nd degree AVB type 1 unlikely cause of his syncope  - To continue to monitor on telemetry.  Overnight sinus melvina with PVCs.  no heart block at all  - Avoid AVN blocking agents  - Monitor and replete lytes  - Continue aspirin and statin  - Abx for UTI per primary  - Ordered for echo.  Unlikely to   - To follow while admitted.  Discussed with daughter in detail at bedside, and she is in agreement with the plan

## 2024-07-27 NOTE — PROGRESS NOTE ADULT - PROBLEM SELECTOR PLAN 3
- Patient started on Z-jayson 5 day course recently due to cough and congestion. WBC elevated 15.6->11.07  - On day 2 of course today, continue course.  -repeat EKG in AM to eval QTc again [398ms on EKG from today]  - f/u blood cx - Patient started on Z-jayson 5 day course recently due to cough and congestion. WBC elevated 15.6->11.07  - On day 2 of course today, continue course.  -Qtc stable   - f/u blood cx

## 2024-07-27 NOTE — PROGRESS NOTE ADULT - PROBLEM SELECTOR PLAN 1
- Syncope possibly cardiogenic in the presence of bradycardia in 2nd degree AV block Wenkeback  - EP consulted:  - -No emergent pacing indication at this time.   - -Low suspicion for cardiac arrhythmia as underlying cause for presenting symptoms. Would not recommend pacemaker at this time as per EP  - -Continue to monitor on telemetry for any worsening bradycardia, high grade AVB and/or significant pause.  - Monitor AM Mg Phos  -Dr Bradshaw consulted, recs appreciated  - f/u TTE echo  - f/u carotid U/S  - TSH, 0.4  - f/u orthostatic BP - Syncope etiology unclear, doubt av block   - EP consulted by ed and recommended no emeregent pacing at this time, doubt cardiac arrythmia as cause of syncope  - -Continue to monitor on telemetry for any worsening bradycardia, high grade AVB and/or significant pause.  - Monitor AM Mg Phos  -Dr Bradshaw consulted, recs appreciated  - f/u TTE echo  - f/u carotid U/S  - TSH, 0.4  - f/u orthostatic BP

## 2024-07-27 NOTE — PROGRESS NOTE ADULT - ASSESSMENT
Patient is a 89yo male with PMH of dementia, recurrent UTIs, BPH, CAD s/p 3 stents (2016), GERD, HLD, kidney stones who presented after a syncopal episode while standing this morning. Patient is currently stable, had an episode of near syncope, EKG showed bradycardia with 2nd degree AV block.  Patient is a 89yo male with PMH of dementia, recurrent UTIs, BPH, CAD s/p 3 stents (2016), GERD, HLD, kidney stones who presented after a syncopal episode while standing this morning. Patient is currently stable, had an episode of near syncope, EKG showed bradycardia with 2nd degree AV block.

## 2024-07-27 NOTE — CONSULT NOTE ADULT - ATTENDING COMMENTS
seen and examined incidental finding occluded left ICA not cause of symptoms  continue antiplatelet and statin  no surgical intervention necessary

## 2024-07-27 NOTE — PROGRESS NOTE ADULT - SUBJECTIVE AND OBJECTIVE BOX
Patient is a 89y old  Male who presents with a chief complaint of Syncope (27 Jul 2024 08:38)      INTERVAL HPI/OVERNIGHT EVENTS: Pt was seen and examined at bedside. No overnight events except for patient attempting to get out of bed several times. Unable to answer ROS d/t dementia.    MEDICATIONS  (STANDING):  aspirin enteric coated 81 milliGRAM(s) Oral daily  atorvastatin 20 milliGRAM(s) Oral at bedtime  azithromycin   Tablet 250 milliGRAM(s) Oral daily  cefTRIAXone   IVPB 1000 milliGRAM(s) IV Intermittent every 24 hours  enoxaparin Injectable 40 milliGRAM(s) SubCutaneous every 24 hours  finasteride 5 milliGRAM(s) Oral daily  memantine 5 milliGRAM(s) Oral two times a day  potassium phosphate IVPB 30 milliMole(s) IV Intermittent once  tamsulosin 0.4 milliGRAM(s) Oral at bedtime    MEDICATIONS  (PRN):      Allergies    No Known Allergies    Intolerances        REVIEW OF SYSTEMS:  Unable to answer ROS d/t dementia    Vital Signs Last 24 Hrs  T(C): 36.4 (27 Jul 2024 05:23), Max: 37.4 (26 Jul 2024 17:50)  T(F): 97.6 (27 Jul 2024 05:23), Max: 99.4 (26 Jul 2024 17:50)  HR: 55 (27 Jul 2024 05:23) (53 - 76)  BP: 126/74 (27 Jul 2024 05:23) (108/72 - 126/74)  BP(mean): --  RR: 18 (27 Jul 2024 05:23) (17 - 18)  SpO2: 95% (27 Jul 2024 05:23) (95% - 98%)    Parameters below as of 27 Jul 2024 05:23  Patient On (Oxygen Delivery Method): room air        PHYSICAL EXAM:  GENERAL: NAD  HEENT:  anicteric, moist mucous membranes  CHEST/LUNG:  CTA b/l, no rales, wheezes, or rhonchi  HEART:  RRR, S1, S2  ABDOMEN:  BS+, soft, nontender, nondistended  MUSCULOSKELETAL: no edema, cyanosis, or calf tenderness  NEUROLOGIC: answers questions and follows commands appropriately      LABS:                        11.2   8.13  )-----------( 106      ( 27 Jul 2024 05:55 )             34.3     CBC Full  -  ( 27 Jul 2024 05:55 )  WBC Count : 8.13 K/uL  Hemoglobin : 11.2 g/dL  Hematocrit : 34.3 %  Platelet Count - Automated : 106 K/uL  Mean Cell Volume : 94.2 fl  Mean Cell Hemoglobin : 30.8 pg  Mean Cell Hemoglobin Concentration : 32.7 gm/dL  Auto Neutrophil # : 5.37 K/uL  Auto Lymphocyte # : 1.83 K/uL  Auto Monocyte # : 0.77 K/uL  Auto Eosinophil # : 0.12 K/uL  Auto Basophil # : 0.02 K/uL  Auto Neutrophil % : 66.1 %  Auto Lymphocyte % : 22.5 %  Auto Monocyte % : 9.5 %  Auto Eosinophil % : 1.5 %  Auto Basophil % : 0.2 %    27 Jul 2024 05:55    145    |  115    |  20     ----------------------------<  100    3.8     |  25     |  0.73     Ca    8.8        27 Jul 2024 05:55  Phos  1.8       27 Jul 2024 05:55  Mg     2.0       27 Jul 2024 05:55    TPro  5.6    /  Alb  2.3    /  TBili  1.8    /  DBili  x      /  AST  18     /  ALT  7      /  AlkPhos  62     27 Jul 2024 05:55    PT/INR - ( 26 Jul 2024 10:00 )   PT: 13.9 sec;   INR: 1.19 ratio         PTT - ( 26 Jul 2024 10:00 )  PTT:24.4 sec  Urinalysis Basic - ( 27 Jul 2024 05:55 )    Color: x / Appearance: x / SG: x / pH: x  Gluc: 100 mg/dL / Ketone: x  / Bili: x / Urobili: x   Blood: x / Protein: x / Nitrite: x   Leuk Esterase: x / RBC: x / WBC x   Sq Epi: x / Non Sq Epi: x / Bacteria: x      CAPILLARY BLOOD GLUCOSE              RADIOLOGY & ADDITIONAL TESTS:    Personally reviewed.     Consultant(s) Notes Reviewed:  [x] YES  [ ] NO     Patient is a 89y old  Male who presents with a chief complaint of Syncope (27 Jul 2024 08:38)      INTERVAL HPI/OVERNIGHT EVENTS: Pt was seen and examined at bedside. No overnight events except for patient attempting to get out of bed several times. Unable to answer ROS d/t dementia.    MEDICATIONS  (STANDING):  aspirin enteric coated 81 milliGRAM(s) Oral daily  atorvastatin 20 milliGRAM(s) Oral at bedtime  azithromycin   Tablet 250 milliGRAM(s) Oral daily  cefTRIAXone   IVPB 1000 milliGRAM(s) IV Intermittent every 24 hours  enoxaparin Injectable 40 milliGRAM(s) SubCutaneous every 24 hours  finasteride 5 milliGRAM(s) Oral daily  memantine 5 milliGRAM(s) Oral two times a day  potassium phosphate IVPB 30 milliMole(s) IV Intermittent once  tamsulosin 0.4 milliGRAM(s) Oral at bedtime    MEDICATIONS  (PRN):      Allergies    No Known Allergies    Intolerances        REVIEW OF SYSTEMS:  Unable to answer ROS d/t dementia    Vital Signs Last 24 Hrs  T(C): 36.4 (27 Jul 2024 05:23), Max: 37.4 (26 Jul 2024 17:50)  T(F): 97.6 (27 Jul 2024 05:23), Max: 99.4 (26 Jul 2024 17:50)  HR: 55 (27 Jul 2024 05:23) (53 - 76)  BP: 126/74 (27 Jul 2024 05:23) (108/72 - 126/74)  BP(mean): --  RR: 18 (27 Jul 2024 05:23) (17 - 18)  SpO2: 95% (27 Jul 2024 05:23) (95% - 98%)    Parameters below as of 27 Jul 2024 05:23  Patient On (Oxygen Delivery Method): room air      PHYSICAL EXAM:  GENERAL: NAD  HEENT:  anicteric, moist mucous membranes  CHEST/LUNG:  CTA b/l, no rales, wheezes, or rhonchi  HEART:  RRR, S1, S2  ABDOMEN:  BS+, soft, nontender, nondistended  MUSCULOSKELETAL: no edema, cyanosis, or calf tenderness  NEUROLOGIC: answers questions and follows commands appropriately      LABS:                        11.2   8.13  )-----------( 106      ( 27 Jul 2024 05:55 )             34.3     CBC Full  -  ( 27 Jul 2024 05:55 )  WBC Count : 8.13 K/uL  Hemoglobin : 11.2 g/dL  Hematocrit : 34.3 %  Platelet Count - Automated : 106 K/uL  Mean Cell Volume : 94.2 fl  Mean Cell Hemoglobin : 30.8 pg  Mean Cell Hemoglobin Concentration : 32.7 gm/dL  Auto Neutrophil # : 5.37 K/uL  Auto Lymphocyte # : 1.83 K/uL  Auto Monocyte # : 0.77 K/uL  Auto Eosinophil # : 0.12 K/uL  Auto Basophil # : 0.02 K/uL  Auto Neutrophil % : 66.1 %  Auto Lymphocyte % : 22.5 %  Auto Monocyte % : 9.5 %  Auto Eosinophil % : 1.5 %  Auto Basophil % : 0.2 %    27 Jul 2024 05:55    145    |  115    |  20     ----------------------------<  100    3.8     |  25     |  0.73     Ca    8.8        27 Jul 2024 05:55  Phos  1.8       27 Jul 2024 05:55  Mg     2.0       27 Jul 2024 05:55    TPro  5.6    /  Alb  2.3    /  TBili  1.8    /  DBili  x      /  AST  18     /  ALT  7      /  AlkPhos  62     27 Jul 2024 05:55    PT/INR - ( 26 Jul 2024 10:00 )   PT: 13.9 sec;   INR: 1.19 ratio         PTT - ( 26 Jul 2024 10:00 )  PTT:24.4 sec  Urinalysis Basic - ( 27 Jul 2024 05:55 )    Color: x / Appearance: x / SG: x / pH: x  Gluc: 100 mg/dL / Ketone: x  / Bili: x / Urobili: x   Blood: x / Protein: x / Nitrite: x   Leuk Esterase: x / RBC: x / WBC x   Sq Epi: x / Non Sq Epi: x / Bacteria: x      CAPILLARY BLOOD GLUCOSE              RADIOLOGY & ADDITIONAL TESTS:    Personally reviewed.     Consultant(s) Notes Reviewed:  [x] YES  [ ] NO

## 2024-07-27 NOTE — PATIENT PROFILE ADULT - FALL HARM RISK - TYPE OF ASSESSMENT
Neuro: A&Ox4  CV: 100% Vpacing. In and out of Bi-trigeminal PVCs. Electrolytes checked and replaced per protocol  Resp: 2L NC  GI: Last bowel movement 11/9, no n/v  : Voiding in urinal with good output  Skin: R radial angio site/CMS intact. TR band removed per protocol  Lines: PICCx2 RUE    Pt tearful/overwhelmed this am, requesting to speak to palliative with wife (will pass onto day team). Afebrile, vitals stable. Will continue to monitor.                Admission

## 2024-07-28 LAB
ALBUMIN SERPL ELPH-MCNC: 2.5 G/DL — LOW (ref 3.3–5)
ALP SERPL-CCNC: 68 U/L — SIGNIFICANT CHANGE UP (ref 40–120)
ALT FLD-CCNC: 9 U/L — LOW (ref 12–78)
ANION GAP SERPL CALC-SCNC: 6 MMOL/L — SIGNIFICANT CHANGE UP (ref 5–17)
AST SERPL-CCNC: 27 U/L — SIGNIFICANT CHANGE UP (ref 15–37)
BILIRUB SERPL-MCNC: 1.5 MG/DL — HIGH (ref 0.2–1.2)
BUN SERPL-MCNC: 14 MG/DL — SIGNIFICANT CHANGE UP (ref 7–23)
CALCIUM SERPL-MCNC: 9.3 MG/DL — SIGNIFICANT CHANGE UP (ref 8.5–10.1)
CHLORIDE SERPL-SCNC: 110 MMOL/L — HIGH (ref 96–108)
CO2 SERPL-SCNC: 26 MMOL/L — SIGNIFICANT CHANGE UP (ref 22–31)
CREAT SERPL-MCNC: 0.72 MG/DL — SIGNIFICANT CHANGE UP (ref 0.5–1.3)
EGFR: 85 ML/MIN/1.73M2 — SIGNIFICANT CHANGE UP
GLUCOSE SERPL-MCNC: 100 MG/DL — HIGH (ref 70–99)
HCT VFR BLD CALC: 34.3 % — LOW (ref 39–50)
HGB BLD-MCNC: 11.5 G/DL — LOW (ref 13–17)
MAGNESIUM SERPL-MCNC: 2 MG/DL — SIGNIFICANT CHANGE UP (ref 1.6–2.6)
MCHC RBC-ENTMCNC: 31.4 PG — SIGNIFICANT CHANGE UP (ref 27–34)
MCHC RBC-ENTMCNC: 33.5 GM/DL — SIGNIFICANT CHANGE UP (ref 32–36)
MCV RBC AUTO: 93.7 FL — SIGNIFICANT CHANGE UP (ref 80–100)
NRBC # BLD: 0 /100 WBCS — SIGNIFICANT CHANGE UP (ref 0–0)
PHOSPHATE SERPL-MCNC: 2.4 MG/DL — LOW (ref 2.5–4.5)
PLATELET # BLD AUTO: 127 K/UL — LOW (ref 150–400)
POTASSIUM SERPL-MCNC: 3.7 MMOL/L — SIGNIFICANT CHANGE UP (ref 3.5–5.3)
POTASSIUM SERPL-SCNC: 3.7 MMOL/L — SIGNIFICANT CHANGE UP (ref 3.5–5.3)
PROT SERPL-MCNC: 5.9 G/DL — LOW (ref 6–8.3)
RBC # BLD: 3.66 M/UL — LOW (ref 4.2–5.8)
RBC # FLD: 14.4 % — SIGNIFICANT CHANGE UP (ref 10.3–14.5)
SODIUM SERPL-SCNC: 142 MMOL/L — SIGNIFICANT CHANGE UP (ref 135–145)
WBC # BLD: 8.87 K/UL — SIGNIFICANT CHANGE UP (ref 3.8–10.5)
WBC # FLD AUTO: 8.87 K/UL — SIGNIFICANT CHANGE UP (ref 3.8–10.5)

## 2024-07-28 PROCEDURE — 99232 SBSQ HOSP IP/OBS MODERATE 35: CPT | Mod: GC

## 2024-07-28 PROCEDURE — 99232 SBSQ HOSP IP/OBS MODERATE 35: CPT

## 2024-07-28 RX ADMIN — Medication 81 MILLIGRAM(S): at 11:27

## 2024-07-28 RX ADMIN — FINASTERIDE 5 MILLIGRAM(S): 5 TABLET, FILM COATED ORAL at 11:27

## 2024-07-28 RX ADMIN — Medication 100 MILLIGRAM(S): at 11:27

## 2024-07-28 RX ADMIN — Medication 250 MILLIGRAM(S): at 11:27

## 2024-07-28 RX ADMIN — MEMANTINE HYDROCHLORIDE 5 MILLIGRAM(S): 14 CAPSULE, EXTENDED RELEASE ORAL at 17:37

## 2024-07-28 RX ADMIN — ATORVASTATIN CALCIUM 80 MILLIGRAM(S): 40 TABLET, FILM COATED ORAL at 20:42

## 2024-07-28 RX ADMIN — Medication 0.4 MILLIGRAM(S): at 20:42

## 2024-07-28 RX ADMIN — ENOXAPARIN SODIUM 40 MILLIGRAM(S): 120 INJECTION SUBCUTANEOUS at 11:26

## 2024-07-28 NOTE — PROGRESS NOTE ADULT - ASSESSMENT
Patient is a 89yo male with PMH of dementia, recurrent UTIs, BPH, CAD s/p 3 stents (2016), GERD, HLD, kidney stones who presented after a syncopal episode while standing this morning. Patient is currently stable, had an episode of near syncope, EKG showed bradycardia with 2nd degree AV block.

## 2024-07-28 NOTE — PROGRESS NOTE ADULT - ASSESSMENT
89yo male with PMH of dementia, recurrent UTIs, BPH, CAD s/p 3 stents (2016), GERD, HLD, kidney stones who presented after a syncopal episode while standing, found to have sinus bradycardia with 2nd degree AVB type 1 in the ED:    bradycardia, syncope  - Seen by EP. I agree that his 2nd degree AVB type 1 unlikely cause of his syncope  -overnight SR, no heart block at all  - dc tele, no events  - Avoid AVN blocking agents  - Monitor and replete lytes  - Continue aspirin and statin, for CAD and L carotid stenosis vascular following.  - Abx for UTI per primary  - TTE 7/27 showed  low-normal lv systolic function, EF50 %. Global left ventricular hypokinesis. trace MR and TR    - To follow while admitted.  Discussed with son at bedside, and he is in agreement with the plan  - Monitor and replete lytes, keep K>4, Mg>2.  - Will continue to follow.    Chris Osullivan NP  Nurse Practitioner- Cardiology   Call TEAMS   89yo male with PMH of dementia, recurrent UTIs, BPH, CAD s/p 3 stents (2016), GERD, HLD, kidney stones who presented after a syncopal episode while standing, found to have sinus bradycardia with 2nd degree AVB type 1 in the ED:    bradycardia, syncope  - Seen by EP. I agree that his 2nd degree AVB type 1 unlikely cause of his syncope  -overnight SR, no heart block at all  - dc tele, no events  - Avoid AVN blocking agents  - negative orthostatic  - Continue aspirin and statin, for CAD and L carotid stenosis vascular following.  - Abx for UTI per primary  - TTE 7/27 showed  low-normal lv systolic function, EF50 %. Global left ventricular hypokinesis. trace MR and TR    - To follow while admitted.  Discussed with son at bedside, and he is in agreement with the plan  - dc planning per primary team  - Monitor and replete lytes, keep K>4, Mg>2.  - Will continue to follow.    Chris Osullivan NP  Nurse Practitioner- Cardiology   Call TEAMS

## 2024-07-28 NOTE — PROGRESS NOTE ADULT - PROBLEM SELECTOR PLAN 3
- Patient started on Z-jayson 5 day course recently due to cough and congestion. WBC elevated 15.6->11.07  - On day 3 of course today, continue course.  -Qtc stable   - f/u blood cx, no growth after 24 hours

## 2024-07-28 NOTE — PROGRESS NOTE ADULT - PROBLEM SELECTOR PLAN 6
chronic  - Continue home finasteride, tamsulosin  -check orthostatics, f/u
chronic  - Continue home finasteride, tamsulosin  -check orthostatics, f/u

## 2024-07-28 NOTE — PROGRESS NOTE ADULT - PROBLEM SELECTOR PLAN 5
chronic  - Continue home aspirin, statin  -avoid beta blocker due to low HR  -not on ACE-I -f/u cardio recs
chronic  - Continue home aspirin, statin  -avoid beta blocker due to low HR  -not on ACE-I -f/u cardio recs

## 2024-07-28 NOTE — PROGRESS NOTE ADULT - PROBLEM SELECTOR PLAN 2
UA concerning for infection given + bacteria and leukocyte esterase  - ceftriaxone 1 g q24  - f/u urine cx  - f/u blood cx
UA concerning for infection given + bacteria and leukocyte esterase  - ceftriaxone 1 g q24 (day 3 of course)  - f/u urine cx  - f/u blood cx, no growth after 24 hours

## 2024-07-28 NOTE — PROGRESS NOTE ADULT - TIME BILLING
direct patient care including but not limited to reviewing chart, medications ,laboratory data, imaging reports, discussion of plan of care with consultants on the case, coordination of care with multidisciplinary team involved in the case and discussion of plan with, family ,  family agreeable to plan of care and verbalized understanding the anticipated hospital course and treatment plan.      Time spent exludes teaching and seperately reported services.
direct patient care including but not limited to reviewing chart, medications ,laboratory data, imaging reports, discussion of plan of care with consultants on the case, coordination of care with multidisciplinary team involved in the case and discussion of plan with, family ,  family agreeable to plan of care and verbalized understanding the anticipated hospital course and treatment plan.      Time spent exludes teaching and seperately reported services.

## 2024-07-28 NOTE — PROGRESS NOTE ADULT - SUBJECTIVE AND OBJECTIVE BOX
Patient is a 89y old  Male who presents with a chief complaint of Syncope (28 Jul 2024 09:41)      INTERVAL HPI/OVERNIGHT EVENTS: Pt was seen and examined at bedside. No overnight events. ROS is limited d/t dementia    MEDICATIONS  (STANDING):  aspirin enteric coated 81 milliGRAM(s) Oral daily  atorvastatin 80 milliGRAM(s) Oral at bedtime  azithromycin   Tablet 250 milliGRAM(s) Oral daily  cefTRIAXone   IVPB 1000 milliGRAM(s) IV Intermittent every 24 hours  enoxaparin Injectable 40 milliGRAM(s) SubCutaneous every 24 hours  finasteride 5 milliGRAM(s) Oral daily  memantine 5 milliGRAM(s) Oral two times a day  tamsulosin 0.4 milliGRAM(s) Oral at bedtime    MEDICATIONS  (PRN):      Allergies    No Known Allergies    Intolerances        REVIEW OF SYSTEMS:  ROS limited d/t dementia    Vital Signs Last 24 Hrs  T(C): 37.4 (28 Jul 2024 12:28), Max: 37.4 (28 Jul 2024 12:28)  T(F): 99.4 (28 Jul 2024 12:28), Max: 99.4 (28 Jul 2024 12:28)  HR: 71 (28 Jul 2024 12:28) (65 - 71)  BP: 160/71 (28 Jul 2024 12:28) (133/66 - 160/71)  BP(mean): --  RR: 19 (28 Jul 2024 12:28) (18 - 19)  SpO2: 99% (28 Jul 2024 12:28) (96% - 99%)    Parameters below as of 28 Jul 2024 12:28  Patient On (Oxygen Delivery Method): room air        PHYSICAL EXAM:  GENERAL: NAD  CHEST/LUNG:  CTA b/l, no rales, wheezes, or rhonchi  HEART:  RRR, S1, S2  ABDOMEN:  BS+, soft, nontender, nondistended  MUSCULOSKELETAL: no edema, cyanosis, or calf tenderness  NEUROLOGIC: axox1      LABS:                        11.5   8.87  )-----------( 127      ( 28 Jul 2024 11:25 )             34.3     CBC Full  -  ( 28 Jul 2024 11:25 )  WBC Count : 8.87 K/uL  Hemoglobin : 11.5 g/dL  Hematocrit : 34.3 %  Platelet Count - Automated : 127 K/uL  Mean Cell Volume : 93.7 fl  Mean Cell Hemoglobin : 31.4 pg  Mean Cell Hemoglobin Concentration : 33.5 gm/dL  Auto Neutrophil # : x  Auto Lymphocyte # : x  Auto Monocyte # : x  Auto Eosinophil # : x  Auto Basophil # : x  Auto Neutrophil % : x  Auto Lymphocyte % : x  Auto Monocyte % : x  Auto Eosinophil % : x  Auto Basophil % : x    28 Jul 2024 11:25    142    |  110    |  14     ----------------------------<  100    3.7     |  26     |  0.72     Ca    9.3        28 Jul 2024 11:25  Phos  2.4       28 Jul 2024 11:25  Mg     2.0       28 Jul 2024 11:25    TPro  5.9    /  Alb  2.5    /  TBili  1.5    /  DBili  x      /  AST  27     /  ALT  9      /  AlkPhos  68     28 Jul 2024 11:25      Urinalysis Basic - ( 28 Jul 2024 11:25 )    Color: x / Appearance: x / SG: x / pH: x  Gluc: 100 mg/dL / Ketone: x  / Bili: x / Urobili: x   Blood: x / Protein: x / Nitrite: x   Leuk Esterase: x / RBC: x / WBC x   Sq Epi: x / Non Sq Epi: x / Bacteria: x      CAPILLARY BLOOD GLUCOSE            Culture - Blood (collected 07-26-24 @ 21:53)  Source: .Blood Blood  Preliminary Report (07-28-24 @ 07:02):    No growth at 24 hours    Culture - Blood (collected 07-26-24 @ 21:50)  Source: .Blood Blood  Preliminary Report (07-28-24 @ 07:02):    No growth at 24 hours        RADIOLOGY & ADDITIONAL TESTS:    Personally reviewed.     Consultant(s) Notes Reviewed:  [x] YES  [ ] NO     Patient is a 89y old  Male who presents with a chief complaint of Syncope (28 Jul 2024 09:41)      INTERVAL HPI/OVERNIGHT EVENTS: Pt was seen and examined at bedside. No overnight events. ROS is limited d/t dementia    MEDICATIONS  (STANDING):  aspirin enteric coated 81 milliGRAM(s) Oral daily  atorvastatin 80 milliGRAM(s) Oral at bedtime  azithromycin   Tablet 250 milliGRAM(s) Oral daily  cefTRIAXone   IVPB 1000 milliGRAM(s) IV Intermittent every 24 hours  enoxaparin Injectable 40 milliGRAM(s) SubCutaneous every 24 hours  finasteride 5 milliGRAM(s) Oral daily  memantine 5 milliGRAM(s) Oral two times a day  tamsulosin 0.4 milliGRAM(s) Oral at bedtime    MEDICATIONS  (PRN):      Allergies    No Known Allergies    Intolerances      REVIEW OF SYSTEMS:  ROS limited d/t dementia    Vital Signs Last 24 Hrs  T(C): 37.4 (28 Jul 2024 12:28), Max: 37.4 (28 Jul 2024 12:28)  T(F): 99.4 (28 Jul 2024 12:28), Max: 99.4 (28 Jul 2024 12:28)  HR: 71 (28 Jul 2024 12:28) (65 - 71)  BP: 160/71 (28 Jul 2024 12:28) (133/66 - 160/71)  BP(mean): --  RR: 19 (28 Jul 2024 12:28) (18 - 19)  SpO2: 99% (28 Jul 2024 12:28) (96% - 99%)    Parameters below as of 28 Jul 2024 12:28  Patient On (Oxygen Delivery Method): room air        PHYSICAL EXAM:  GENERAL: NAD  CHEST/LUNG:  CTA b/l, no rales, wheezes, or rhonchi  HEART:  RRR, S1, S2  ABDOMEN:  BS+, soft, nontender, nondistended  MUSCULOSKELETAL: no edema, cyanosis, or calf tenderness  NEUROLOGIC: axox1      LABS:                        11.5   8.87  )-----------( 127      ( 28 Jul 2024 11:25 )             34.3     CBC Full  -  ( 28 Jul 2024 11:25 )  WBC Count : 8.87 K/uL  Hemoglobin : 11.5 g/dL  Hematocrit : 34.3 %  Platelet Count - Automated : 127 K/uL  Mean Cell Volume : 93.7 fl  Mean Cell Hemoglobin : 31.4 pg  Mean Cell Hemoglobin Concentration : 33.5 gm/dL  Auto Neutrophil # : x  Auto Lymphocyte # : x  Auto Monocyte # : x  Auto Eosinophil # : x  Auto Basophil # : x  Auto Neutrophil % : x  Auto Lymphocyte % : x  Auto Monocyte % : x  Auto Eosinophil % : x  Auto Basophil % : x    28 Jul 2024 11:25    142    |  110    |  14     ----------------------------<  100    3.7     |  26     |  0.72     Ca    9.3        28 Jul 2024 11:25  Phos  2.4       28 Jul 2024 11:25  Mg     2.0       28 Jul 2024 11:25    TPro  5.9    /  Alb  2.5    /  TBili  1.5    /  DBili  x      /  AST  27     /  ALT  9      /  AlkPhos  68     28 Jul 2024 11:25      Urinalysis Basic - ( 28 Jul 2024 11:25 )    Color: x / Appearance: x / SG: x / pH: x  Gluc: 100 mg/dL / Ketone: x  / Bili: x / Urobili: x   Blood: x / Protein: x / Nitrite: x   Leuk Esterase: x / RBC: x / WBC x   Sq Epi: x / Non Sq Epi: x / Bacteria: x      CAPILLARY BLOOD GLUCOSE      Culture - Blood (collected 07-26-24 @ 21:53)  Source: .Blood Blood  Preliminary Report (07-28-24 @ 07:02):    No growth at 24 hours    Culture - Blood (collected 07-26-24 @ 21:50)  Source: .Blood Blood  Preliminary Report (07-28-24 @ 07:02):    No growth at 24 hours        RADIOLOGY & ADDITIONAL TESTS:    Personally reviewed.     Consultant(s) Notes Reviewed:  [x] YES  [ ] NO

## 2024-07-28 NOTE — PROGRESS NOTE ADULT - ATTENDING COMMENTS
89yo male with PMH of dementia, recurrent UTIs, BPH, CAD s/p 3 stents (2016), GERD, HLD, kidney stones who presented after a syncopal episode while standing this morning. Patient is currently stable, had an episode of near syncope, EKG showed bradycardia with 2nd degree AV block.    Agree with above. Edited where appropriate
89yo male with PMH of dementia, recurrent UTIs, BPH, CAD s/p 3 stents (2016), GERD, HLD, kidney stones who presented after a syncopal episode while standing this morning. Patient is currently stable, had an episode of near syncope, EKG showed bradycardia with 2nd degree AV block. 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 Will complete rocephin 7/29. Dispo planning

## 2024-07-28 NOTE — PROGRESS NOTE ADULT - SUBJECTIVE AND OBJECTIVE BOX
[facility] -- Latisha Ash,  Rhonda, Joselo Jimenez Savella, Goodger, Cohen  Office # [phone]    Follow Up:  Bradycardia    Subjective/Observations: No events overnight resting comfortably in bed.  No complaints of chest pain, dyspnea, or palpitations reported. No signs of orthopnea or PND.      REVIEW OF SYSTEMS: All other review of systems is negative unless indicated above  PAST MEDICAL & SURGICAL HISTORY:  Benign prostatic hyperplasia, presence of lower urinary tract symptoms unspecified, unspecified morphology      Hyperlipidemia      GERD (gastroesophageal reflux disease)      CAD (coronary artery disease)      UTI (urinary tract infection)      Calculus of kidney      Calculus of ureter      Dementia      Kidney stones      History of prostate surgery      S/P ureteral stent placement  Left sided      Stented coronary artery        MEDICATIONS  (STANDING):  aspirin enteric coated 81 milliGRAM(s) Oral daily  atorvastatin 80 milliGRAM(s) Oral at bedtime  azithromycin   Tablet 250 milliGRAM(s) Oral daily  cefTRIAXone   IVPB 1000 milliGRAM(s) IV Intermittent every 24 hours  enoxaparin Injectable 40 milliGRAM(s) SubCutaneous every 24 hours  finasteride 5 milliGRAM(s) Oral daily  memantine 5 milliGRAM(s) Oral two times a day  tamsulosin 0.4 milliGRAM(s) Oral at bedtime    MEDICATIONS  (PRN):    Allergies    No Known Allergies    Intolerances      Vital Signs Last 24 Hrs  T(C): 36.6 (28 Jul 2024 04:18), Max: 36.7 (27 Jul 2024 13:12)  T(F): 97.8 (28 Jul 2024 04:18), Max: 98 (27 Jul 2024 13:12)  HR: 65 (28 Jul 2024 04:18) (65 - 65)  BP: 133/66 (28 Jul 2024 04:18) (133/66 - 146/76)  BP(mean): --  RR: 18 (28 Jul 2024 04:18) (18 - 18)  SpO2: 96% (28 Jul 2024 04:18) (96% - 98%)    Parameters below as of 28 Jul 2024 04:18  Patient On (Oxygen Delivery Method): room air      I&O's Summary    27 Jul 2024 07:01  -  28 Jul 2024 07:00  --------------------------------------------------------  IN: 0 mL / OUT: 750 mL / NET: -750 mL    28 Jul 2024 07:01  -  28 Jul 2024 09:41  --------------------------------------------------------  IN: 0 mL / OUT: 900 mL / NET: -900 mL        TELE:   PHYSICAL EXAM:  Constitutional: NAD, awake and alert  HEENT: Moist Mucous Membranes, Anicteric  Pulmonary: Non-labored, breath sounds are clear bilaterally, No wheezing, rales or rhonchi  Cardiovascular: Regular, S1 and S2, No murmurs, rubs, gallops or clicks  Gastrointestinal: Bowel Sounds present, soft, nontender.   Lymph: No peripheral edema. No lymphadenopathy.  Skin: No visible rashes or ulcers.  Psych:  confuse  LABS: All Labs Reviewed:                        11.2   8.13  )-----------( 106      ( 27 Jul 2024 05:55 )             34.3                         11.2   11.07 )-----------( 112      ( 26 Jul 2024 18:20 )             34.5                         12.3   15.60 )-----------( 124      ( 26 Jul 2024 09:50 )             38.1     27 Jul 2024 05:55    145    |  115    |  20     ----------------------------<  100    3.8     |  25     |  0.73   26 Jul 2024 18:20    144    |  114    |  19     ----------------------------<  110    4.2     |  24     |  0.94   26 Jul 2024 09:50    142    |  110    |  20     ----------------------------<  198    3.9     |  24     |  1.20     Ca    8.8        27 Jul 2024 05:55  Ca    8.4        26 Jul 2024 18:20  Ca    9.2        26 Jul 2024 09:50  Phos  1.8       27 Jul 2024 05:55  Phos  2.0       26 Jul 2024 18:20  Mg     2.0       27 Jul 2024 05:55  Mg     2.0       26 Jul 2024 18:20    TPro  5.6    /  Alb  2.3    /  TBili  1.8    /  DBili  x      /  AST  18     /  ALT  7      /  AlkPhos  62     27 Jul 2024 05:55  TPro  5.5    /  Alb  2.3    /  TBili  1.4    /  DBili  x      /  AST  16     /  ALT  8      /  AlkPhos  61     26 Jul 2024 18:20  TPro  6.3    /  Alb  2.8    /  TBili  1.5    /  DBili  x      /  AST  16     /  ALT  9      /  AlkPhos  76     26 Jul 2024 09:50    PT/INR - ( 26 Jul 2024 10:00 )   PT: 13.9 sec;   INR: 1.19 ratio         PTT - ( 26 Jul 2024 10:00 )  PTT:24.4 sec      12 Lead ECG:   Ventricular Rate 49 BPM    Atrial Rate 57 BPM    P-R Interval 330 ms    QRS Duration 80 ms    Q-T Interval 442 ms    QTC Calculation(Bazett) 399 ms    P Axis 65 degrees    R Axis 48 degrees    T Axis 73 degrees    Diagnosis Line *** Critical Test Result: AV Block  sinus bradycardia with second degree avb type 1  Confirmed by ALEXANDER JIMENEZ ([id number]) on 7/27/2024 10:24:21 AM (07-26-24 @ 18:14)      ACC: [id number] EXAM:  ECHO TTE WO CON COMP W DOPP                          PROCEDURE DATE:  11/28/2022          INTERPRETATION:  INDICATION: Abnormal EKG  Sonographer AS    Blood Pressure 117/77    Height 170 cm     Weight 73 kg       BSA 1.84 sq   m    Dimensions:  LA 3.1       Normal Values: 2.0 - 4.0 cm  Ao 3.4        Normal Values: 2.0 - 3.8 cm  SEPTUM 1.0       Normal Values: 0.6 - 1.2 cm  PWT 0.9       Normal Values: 0.6 - 1.1 cm  LVIDd 3.1         Normal Values: 3.0 - 5.6 cm  LVIDs 2.2     Normal Values: 1.8 - 4.0 cm      OBSERVATIONS:  Mitral Valve: Mild MR.  Aortic Valve/Aorta: Calcified trileaflet aortic valve with grossly normal   opening.  Tricuspid Valve: Mild TR.  Pulmonic Valve: Trace PI  Left Atrium: normal  Right Atrium:Not well-visualized  Left Ventricle: normal LV size and systolic function, estimated LVEF of   60%.  Right Ventricle: Grossly normal size and systolic function.  Pericardium: no significant pericardial effusion.  Pulmonary/RV Pressure: estimated PA systolic pressure of at least 19 mmHg   assuming an RA pressure of 3 mmHg.        IMPRESSION:  Normal left ventricular internal dimensions and systolic function,   estimated LVEF of 60%.  Grossly normal RV size and systolic function.  Calcified trileaflet aortic valve with grossly normal opening, without AI.  Mild MR and TR.  No significant pericardial effusion.    --- End of Report ---            ORLANDO MATTHEW MD; Attending Cardiologist  This document has been electronically signed. Nov 29 2022  4:46PM

## 2024-07-28 NOTE — PROGRESS NOTE ADULT - PROBLEM SELECTOR PLAN 1
- Syncope etiology unclear, doubt av block   - EP consulted by ed and recommended no emeregent pacing at this time, doubt cardiac arrythmia as cause of syncope  - Continue to monitor on telemetry for any worsening bradycardia, high grade AVB and/or significant pause.  - Monitor AM Mg Phos  -Dr Bradshaw consulted, recs appreciated  - TTE echo, no sig. abnormalities  - Carotid U/S, Impression: Mid left ICA occlusion or critical stenosis. Distal left ICA demonstrates antegrade flow with elevated velocity of 180cm/s and apparent flow disturbance on spectral waveform. No hemodynamically significant right internal carotid artery stenosis.  -CTA Angio Neck w/ IV contrast, Impression: Nonopacification of the origin and proximal 6 mm of the left ICA, most   likely occlusion, less likely minimal nondetectable flow. Cephalad to this, there is 6 mm in length severe, 90%, stenosis.  - TSH, 0.4  - f/u orthostatic BP - Syncope etiology unclear, doubt av block   - EP consulted by ed and recommended no emeregent pacing at this time, doubt cardiac arrythmia as cause of syncope  - Continue to monitor on telemetry for any worsening bradycardia, high grade AVB and/or significant pause.  - Monitor AM Mg Phos  -Dr Bradshaw consulted, recs appreciated  - TTE echo, no sig. abnormalities  - Carotid U/S, Impression: Mid left ICA occlusion or critical stenosis. Distal left ICA demonstrates antegrade flow with elevated velocity of 180cm/s and apparent flow disturbance on spectral waveform. No hemodynamically significant right internal carotid artery stenosis.  -CTA Angio Neck w/ IV contrast, Impression: Nonopacification of the origin and proximal 6 mm of the left ICA, most likely occlusion, less likely minimal nondetectable flow. Cephalad to this, there is 6 mm in length severe, 90%, stenosis.  - TSH, 0.4  - f/u orthostatic BP - Syncope likely 2/2 Left ICA occlusion   - EP consulted by ed and recommended no emeregent pacing at this time, doubt cardiac arrythmia as cause of syncope  - Continue to monitor on telemetry for any worsening bradycardia, high grade AVB and/or significant pause.  - Monitor AM Mg Phos  -Dr Bradshaw consulted, recs appreciated  - TTE echo, no sig. abnormalities  - Carotid U/S, Impression: Mid left ICA occlusion or critical stenosis. Distal left ICA demonstrates antegrade flow with elevated velocity of 180cm/s and apparent flow disturbance on spectral waveform. No hemodynamically significant right internal carotid artery stenosis.  -CTA Angio Neck w/ IV contrast, Impression: Nonopacification of the origin and proximal 6 mm of the left ICA, most likely occlusion, less likely minimal nondetectable flow. Cephalad to this, there is 6 mm in length severe, 90%, stenosis.  - TSH, 0.4  - asa, high dose statin

## 2024-07-29 ENCOUNTER — TRANSCRIPTION ENCOUNTER (OUTPATIENT)
Age: 89
End: 2024-07-29

## 2024-07-29 VITALS
SYSTOLIC BLOOD PRESSURE: 138 MMHG | DIASTOLIC BLOOD PRESSURE: 68 MMHG | RESPIRATION RATE: 16 BRPM | TEMPERATURE: 98 F | HEART RATE: 61 BPM | OXYGEN SATURATION: 95 %

## 2024-07-29 LAB
ALBUMIN SERPL ELPH-MCNC: 2.8 G/DL — LOW (ref 3.3–5)
ALP SERPL-CCNC: 78 U/L — SIGNIFICANT CHANGE UP (ref 40–120)
ALT FLD-CCNC: 10 U/L — LOW (ref 12–78)
ANION GAP SERPL CALC-SCNC: 6 MMOL/L — SIGNIFICANT CHANGE UP (ref 5–17)
AST SERPL-CCNC: 28 U/L — SIGNIFICANT CHANGE UP (ref 15–37)
BILIRUB SERPL-MCNC: 2.1 MG/DL — HIGH (ref 0.2–1.2)
BUN SERPL-MCNC: 10 MG/DL — SIGNIFICANT CHANGE UP (ref 7–23)
CALCIUM SERPL-MCNC: 9.6 MG/DL — SIGNIFICANT CHANGE UP (ref 8.5–10.1)
CHLORIDE SERPL-SCNC: 109 MMOL/L — HIGH (ref 96–108)
CO2 SERPL-SCNC: 26 MMOL/L — SIGNIFICANT CHANGE UP (ref 22–31)
CREAT SERPL-MCNC: 0.68 MG/DL — SIGNIFICANT CHANGE UP (ref 0.5–1.3)
EGFR: 86 ML/MIN/1.73M2 — SIGNIFICANT CHANGE UP
GLUCOSE SERPL-MCNC: 104 MG/DL — HIGH (ref 70–99)
HCT VFR BLD CALC: 39.2 % — SIGNIFICANT CHANGE UP (ref 39–50)
HGB BLD-MCNC: 12.8 G/DL — LOW (ref 13–17)
MAGNESIUM SERPL-MCNC: 2.2 MG/DL — SIGNIFICANT CHANGE UP (ref 1.6–2.6)
MCHC RBC-ENTMCNC: 30.8 PG — SIGNIFICANT CHANGE UP (ref 27–34)
MCHC RBC-ENTMCNC: 32.7 GM/DL — SIGNIFICANT CHANGE UP (ref 32–36)
MCV RBC AUTO: 94.2 FL — SIGNIFICANT CHANGE UP (ref 80–100)
NRBC # BLD: 0 /100 WBCS — SIGNIFICANT CHANGE UP (ref 0–0)
PHOSPHATE SERPL-MCNC: 2.2 MG/DL — LOW (ref 2.5–4.5)
PLATELET # BLD AUTO: 155 K/UL — SIGNIFICANT CHANGE UP (ref 150–400)
POTASSIUM SERPL-MCNC: 3.5 MMOL/L — SIGNIFICANT CHANGE UP (ref 3.5–5.3)
POTASSIUM SERPL-SCNC: 3.5 MMOL/L — SIGNIFICANT CHANGE UP (ref 3.5–5.3)
PROT SERPL-MCNC: 6.5 G/DL — SIGNIFICANT CHANGE UP (ref 6–8.3)
RBC # BLD: 4.16 M/UL — LOW (ref 4.2–5.8)
RBC # FLD: 14.3 % — SIGNIFICANT CHANGE UP (ref 10.3–14.5)
SODIUM SERPL-SCNC: 141 MMOL/L — SIGNIFICANT CHANGE UP (ref 135–145)
WBC # BLD: 7.59 K/UL — SIGNIFICANT CHANGE UP (ref 3.8–10.5)
WBC # FLD AUTO: 7.59 K/UL — SIGNIFICANT CHANGE UP (ref 3.8–10.5)

## 2024-07-29 PROCEDURE — 99239 HOSP IP/OBS DSCHRG MGMT >30: CPT | Mod: GC

## 2024-07-29 PROCEDURE — 84100 ASSAY OF PHOSPHORUS: CPT

## 2024-07-29 PROCEDURE — 85610 PROTHROMBIN TIME: CPT

## 2024-07-29 PROCEDURE — 87637 SARSCOV2&INF A&B&RSV AMP PRB: CPT

## 2024-07-29 PROCEDURE — 96374 THER/PROPH/DIAG INJ IV PUSH: CPT

## 2024-07-29 PROCEDURE — 36415 COLL VENOUS BLD VENIPUNCTURE: CPT

## 2024-07-29 PROCEDURE — 85027 COMPLETE CBC AUTOMATED: CPT

## 2024-07-29 PROCEDURE — 82962 GLUCOSE BLOOD TEST: CPT

## 2024-07-29 PROCEDURE — 83735 ASSAY OF MAGNESIUM: CPT

## 2024-07-29 PROCEDURE — 85730 THROMBOPLASTIN TIME PARTIAL: CPT

## 2024-07-29 PROCEDURE — 97162 PT EVAL MOD COMPLEX 30 MIN: CPT

## 2024-07-29 PROCEDURE — 70498 CT ANGIOGRAPHY NECK: CPT | Mod: MC

## 2024-07-29 PROCEDURE — 99285 EMERGENCY DEPT VISIT HI MDM: CPT

## 2024-07-29 PROCEDURE — 84484 ASSAY OF TROPONIN QUANT: CPT

## 2024-07-29 PROCEDURE — 80053 COMPREHEN METABOLIC PANEL: CPT

## 2024-07-29 PROCEDURE — 93880 EXTRACRANIAL BILAT STUDY: CPT

## 2024-07-29 PROCEDURE — 87086 URINE CULTURE/COLONY COUNT: CPT

## 2024-07-29 PROCEDURE — 87040 BLOOD CULTURE FOR BACTERIA: CPT

## 2024-07-29 PROCEDURE — 83880 ASSAY OF NATRIURETIC PEPTIDE: CPT

## 2024-07-29 PROCEDURE — 83605 ASSAY OF LACTIC ACID: CPT

## 2024-07-29 PROCEDURE — 93005 ELECTROCARDIOGRAM TRACING: CPT

## 2024-07-29 PROCEDURE — 93306 TTE W/DOPPLER COMPLETE: CPT

## 2024-07-29 PROCEDURE — 87186 SC STD MICRODIL/AGAR DIL: CPT

## 2024-07-29 PROCEDURE — 84443 ASSAY THYROID STIM HORMONE: CPT

## 2024-07-29 PROCEDURE — 99232 SBSQ HOSP IP/OBS MODERATE 35: CPT

## 2024-07-29 PROCEDURE — 76705 ECHO EXAM OF ABDOMEN: CPT

## 2024-07-29 PROCEDURE — 71045 X-RAY EXAM CHEST 1 VIEW: CPT

## 2024-07-29 PROCEDURE — 85025 COMPLETE CBC W/AUTO DIFF WBC: CPT

## 2024-07-29 PROCEDURE — 81001 URINALYSIS AUTO W/SCOPE: CPT

## 2024-07-29 RX ORDER — ATORVASTATIN CALCIUM 40 MG/1
1 TABLET, FILM COATED ORAL
Qty: 30 | Refills: 0
Start: 2024-07-29 | End: 2024-08-27

## 2024-07-29 RX ORDER — SOD PHOS DI, MONO/K PHOS MONO 250 MG
1 TABLET ORAL ONCE
Refills: 0 | Status: COMPLETED | OUTPATIENT
Start: 2024-07-29 | End: 2024-07-29

## 2024-07-29 RX ORDER — AZITHROMYCIN 250 MG
0 TABLET ORAL
Refills: 0 | DISCHARGE

## 2024-07-29 RX ORDER — AZITHROMYCIN 250 MG
1 TABLET ORAL
Qty: 1 | Refills: 0
Start: 2024-07-29 | End: 2024-07-29

## 2024-07-29 RX ADMIN — Medication 250 MILLIGRAM(S): at 13:51

## 2024-07-29 RX ADMIN — Medication 81 MILLIGRAM(S): at 13:51

## 2024-07-29 RX ADMIN — FINASTERIDE 5 MILLIGRAM(S): 5 TABLET, FILM COATED ORAL at 13:50

## 2024-07-29 RX ADMIN — MEMANTINE HYDROCHLORIDE 5 MILLIGRAM(S): 14 CAPSULE, EXTENDED RELEASE ORAL at 17:14

## 2024-07-29 RX ADMIN — MEMANTINE HYDROCHLORIDE 5 MILLIGRAM(S): 14 CAPSULE, EXTENDED RELEASE ORAL at 07:04

## 2024-07-29 RX ADMIN — ENOXAPARIN SODIUM 40 MILLIGRAM(S): 120 INJECTION SUBCUTANEOUS at 13:55

## 2024-07-29 RX ADMIN — Medication 100 MILLIGRAM(S): at 13:55

## 2024-07-29 RX ADMIN — Medication 1 PACKET(S): at 13:51

## 2024-07-29 NOTE — PHYSICAL THERAPY INITIAL EVALUATION ADULT - TRANSFER TRAINING, PT EVAL
Patient will perform stand<>sit with minimal assist to be able to get up and go to the bathroom, within 3 to 5 sessions.

## 2024-07-29 NOTE — PHYSICAL THERAPY INITIAL EVALUATION ADULT - NSPTDISCHREC_GEN_A_CORE
ELIANE based on functional mobility (pt requiring 2-person assistance for OOB mobility at this time); should pt go home recommend 24/7 assistance with all mobility and home PT/Sub-acute Rehab

## 2024-07-29 NOTE — CONSULT NOTE ADULT - SUBJECTIVE AND OBJECTIVE BOX
[facility]    Leroy Ranjith Bourgeois NP    [address] [place] [address]  [phone]      Chief Complaint:  Patient is a 89y old  Male who presents with a chief complaint of Syncope (29 Jul 2024 10:48)      HPI:Patient is a 89yo male with PMH of dementia, recurrent UTIs, BPH, CAD s/p 3 stents (2016), GERD, HLD, kidney stones who presented after a syncopal episode while standing this morning. Patient is currently stable, had an episode of near syncope, EKG showed bradycardia with 2nd degree AV block.     Allergies:  No Known Allergies      Medications:  aspirin enteric coated 81 milliGRAM(s) Oral daily  atorvastatin 80 milliGRAM(s) Oral at bedtime  azithromycin   Tablet 250 milliGRAM(s) Oral daily  cefTRIAXone   IVPB 1000 milliGRAM(s) IV Intermittent every 24 hours  enoxaparin Injectable 40 milliGRAM(s) SubCutaneous every 24 hours  finasteride 5 milliGRAM(s) Oral daily  memantine 5 milliGRAM(s) Oral two times a day  potassium phosphate / sodium phosphate Powder (PHOS-NaK) 1 Packet(s) Oral once  tamsulosin 0.4 milliGRAM(s) Oral at bedtime      PMHX/PSHX:  Benign prostatic hyperplasia, presence of lower urinary tract symptoms unspecified, unspecified morphology    Hyperlipidemia    GERD (gastroesophageal reflux disease)    CAD (coronary artery disease)    UTI (urinary tract infection)    Calculus of kidney    Calculus of ureter    Dementia    Kidney stones    H/O heart artery stent    History of prostate surgery    S/P ureteral stent placement    Stented coronary artery        Family history:  No pertinent family history in first degree relatives    No pertinent family history in first degree relatives    FH: hypertension (Child)        Social History:     ROS:     General:  No wt loss, fevers, chills, night sweats, fatigue,   Eyes:  Good vision, no reported pain  ENT:  No sore throat, pain, runny nose, dysphagia  CV:  No pain, palpitations, hypo/hypertension  Resp:  No dyspnea, cough, tachypnea, wheezing  GI:  No pain, No nausea, No vomiting, No diarrhea, No constipation, No weight loss, No fever, No pruritis, No rectal bleeding, No tarry stools, No dysphagia,  :  No pain, bleeding, incontinence, nocturia  Muscle:  No pain, weakness  Neuro:  No weakness, tingling, memory problems  Psych:  No fatigue, insomnia, mood problems, depression  Endocrine:  No polyuria, polydipsia, cold/heat intolerance  Heme:  No petechiae, ecchymosis, easy bruisability  Skin:  No rash, tattoos, scars, edema      PHYSICAL EXAM:   Vital Signs:  Vital Signs Last 24 Hrs  T(C): 36.4 (29 Jul 2024 11:20), Max: 36.5 (28 Jul 2024 20:43)  T(F): 97.6 (29 Jul 2024 11:20), Max: 97.7 (28 Jul 2024 20:43)  HR: 61 (29 Jul 2024 11:20) (61 - 77)  BP: 138/68 (29 Jul 2024 11:20) (127/80 - 158/71)  BP(mean): --  RR: 16 (29 Jul 2024 11:20) (16 - 18)  SpO2: 95% (29 Jul 2024 11:20) (95% - 98%)    Parameters below as of 29 Jul 2024 11:20  Patient On (Oxygen Delivery Method): room air      Daily     Daily     GENERAL:  Appears stated age, well-groomed, well-nourished, no distress  HEENT:  NC/AT,  conjunctivae clear and pink, no thyromegaly, nodules, adenopathy, no JVD, sclera -anicteric  CHEST:  Full & symmetric excursion, no increased effort, breath sounds clear  HEART:  Regular rhythm, S1, S2, no murmur/rub/S3/S4, no abdominal bruit, no edema  ABDOMEN:  Soft, non-tender, non-distended, normoactive bowel sounds,  no masses ,no hepato-splenomegaly, no signs of chronic liver disease  EXTEREMITIES:  no cyanosis,clubbing or edema  SKIN:  No rash/erythema/ecchymoses/petechiae/wounds/abscess/warm/dry  NEURO:  Alert, oriented, no asterixis, no tremor, no encephalopathy    LABS:                        12.8   7.59  )-----------( 155      ( 29 Jul 2024 06:45 )             39.2     07-29    141  |  109<H>  |  10  ----------------------------<  104<H>  3.5   |  26  |  0.68    Ca    9.6      29 Jul 2024 06:45  Phos  2.2     07-29  Mg     2.2     07-29    TPro  6.5  /  Alb  2.8<L>  /  TBili  2.1<H>  /  DBili  x   /  AST  28  /  ALT  10<L>  /  AlkPhos  78  07-29    LIVER FUNCTIONS - ( 29 Jul 2024 06:45 )  Alb: 2.8 g/dL / Pro: 6.5 g/dL / ALK PHOS: 78 U/L / ALT: 10 U/L / AST: 28 U/L / GGT: x             Urinalysis Basic - ( 29 Jul 2024 06:45 )    Color: x / Appearance: x / SG: x / pH: x  Gluc: 104 mg/dL / Ketone: x  / Bili: x / Urobili: x   Blood: x / Protein: x / Nitrite: x   Leuk Esterase: x / RBC: x / WBC x   Sq Epi: x / Non Sq Epi: x / Bacteria: x          Imaging:

## 2024-07-29 NOTE — PHYSICAL THERAPY INITIAL EVALUATION ADULT - PERTINENT HX OF CURRENT PROBLEM, REHAB EVAL
As per EMR "89yo male with PMH of dementia, recurrent UTIs, BPH, CAD s/p 3 stents (2016), GERD, HLD, kidney stones who presented after a syncopal episode while standing this morning. Patient is currently stable, had an episode of near syncope, EKG showed bradycardia with 2nd degree AV block. Also found to have L ICA occlusion."

## 2024-07-29 NOTE — PHYSICAL THERAPY INITIAL EVALUATION ADULT - BED MOBILITY TRAINING, PT EVAL
Patient will perform supine<>sit with minimal assist to be able to reposition in bed and maintain skin integrity, within 3 to 5 sessions.

## 2024-07-29 NOTE — DISCHARGE NOTE NURSING/CASE MANAGEMENT/SOCIAL WORK - NSSCCARECORD_GEN_ALL_CORE
Home Care Agency/[facility]/Durable Medical Equipment Agency/[facility] L&D [facility]

## 2024-07-29 NOTE — CAREGIVER ENGAGEMENT NOTE - CAREGIVER OUTREACH NOTES - FREE TEXT
Per MD patient is medically cleared for discharge home today. Referral to [facility] for start of care on 7/30/24 has been accepted. CM has arranged an ambulance with [facility] for today at 4pm-Em[employer] authorization for ambulance obtained Auth # [id number]. Discharge documentation has been faxed for CDPAP resumption to [facility] F#[phone]. CM left a message for [facility] requesting a return call. CM spoke to the patient's son and patient's daughter Wade over the phone to discuss the transition plan as stated above. Home address confirmed. Patient's daughter stated that the patient's RW is old and is also requesting a shower chair. CM advised that a referral will be sent to [facility] and if approved will be delivered to the patient's home. Patient's daughter verbalized understanding of the transition plan as stated above and is in agreement. IMM reviewed over the phone with the patient's daughter. Bedside RN aware of plan. CM remains available.

## 2024-07-29 NOTE — CARE COORDINATION ASSESSMENT. - MET/SPOKE WITH
Render Post-Care Instructions In Note?: no Son Jamel Gary [phone]. Declined . Patient provided CM with permission to speak with son. Care coordination assessment completed with son./patient

## 2024-07-29 NOTE — DISCHARGE NOTE PROVIDER - DISCHARGE SERVICE FOR PATIENT
on the discharge service for the patient. I have reviewed and made amendments to the documentation where necessary.
declines

## 2024-07-29 NOTE — CARE COORDINATION ASSESSMENT. - ASSESSMENT CONCERNS TO BE ADDRESSED
Per EMR: 89yo male with PMH of dementia, recurrent UTIs, BPH, CAD s/p 3 stents (2016), GERD, HLD, kidney stones presented after a syncopal episode while standing this morning. Patient had an episode of near syncope, EKG showed bradycardia with 2nd degree AV block.   Patient is being treated for a UTI./discharge planning

## 2024-07-29 NOTE — PHYSICAL THERAPY INITIAL EVALUATION ADULT - GENERAL OBSERVATIONS, REHAB EVAL
Patient chart reviewed, events noted. Patient cleared to be seen for therapy by RN prior to session. Patient received semi-reclined in bed +external catheter +IV R UE (not connected to IV pole) +Room air, sleeping but arousable to voice, noted lethargy t/o, in NAD. Son at bedside. Pt agreeable to PT at this time.

## 2024-07-29 NOTE — CARE COORDINATION ASSESSMENT. - NSCAREPROVIDERS_GEN_ALL_CORE_FT
CARE PROVIDERS:  Accepting Physician: Alex Thakkar  Administration: Berhane Michaud  Administration: Molly Mccormick  Administration: Karsten Zhang  Administration: Salvatore Gasca  Admitting: Alex Thakkar  Attending: Molly Mccormick  Case Management: Phylicia Mackey  Consultant: Chris Osullivan  Consultant: Laura Dutton  Consultant: Laura Irvin  Consultant: Antione Jimenez  Consultant: Kesha Adame  Consultant: Molly Lyn  Consultant: Leonid Mak  Consultant: Charity Willett  Consultant: Billy Murphy  Consultant: Fanny Hernandez  Covering Team: Janis Squires  ED Attending: Brigid Thakkar  ED Attending2: Gordo Hackett  ED Nurse: Leonid Escalante  Nurse: Lisa Leigh  Nurse: Tara Trevino  Override: Kianna Medina  PCA/Nursing Assistant: Stefan San  PCA/Nursing Assistant: Javier Mares  Primary Team: Nieves Hurley  Primary Team: Tor Cain  Primary Team: Eliana Coffey  Primary Team: Alex Thakkar  Primary Team: Jesus Jeffries  Primary Team: Viviane Chong  Registered Dietitian: Vandana Green  Team: [facility], Team

## 2024-07-29 NOTE — DISCHARGE NOTE PROVIDER - NSDCFUSCHEDAPPT_GEN_ALL_CORE_FT
Kashmir Perez  [facility] Physician On license of [facility]  CARDIOLOGY [address] [facility]  Scheduled Appointment: 08/06/2024

## 2024-07-29 NOTE — SOCIAL WORK PROGRESS NOTE - NSSWPROGRESSNOTE_GEN_ALL_CORE
Per PT, Patient is recommended for ELIANE. Sw spoke with Pt and family in which they declined ELIANE, stating that pt did not respond well to placement in the past and that they would like to try and manage him at Home. CM informed. Sw made available for any further assistance needed.

## 2024-07-29 NOTE — PHYSICAL THERAPY INITIAL EVALUATION ADULT - ADDITIONAL COMMENTS
Pt poor historian due to history of dementia; per son, pt lives in a private home with his son, son's wife and sister; [place] with 8 [address] and 8 steps within to living level. Per son, pt has not been walking or standing on his own PTA due to not feeling well, has had son (CDPAP) and other family assisting as needed. Per son, pt has a hospital bed, RW. Pt was walking to the bathroom with assistance when he started to "wobble" and was assisted and supported by family.

## 2024-07-29 NOTE — PHYSICAL THERAPY INITIAL EVALUATION ADULT - NSPTDMEREC_GEN_A_CORE
TBD at [facility]; should pt go home pt would benefit from RW as The patient has a mobility limitation that significantly impairs their ability to participate in one or more mobility related activities of daily living within the home.  By utilizing a rolling walker the functional mobility deficit can be sufficiently resolved.  The patient demonstrates safe use of rolling walker.

## 2024-07-29 NOTE — CAREGIVER ENGAGEMENT NOTE - CAREGIVER EDUCATION - TYPES DISCUSSED
CDPAP resumption with [facility]/Discharge plan/DME/Home Care Services/Transportation coordination

## 2024-07-29 NOTE — DISCHARGE NOTE PROVIDER - ATTENDING DISCHARGE PHYSICAL EXAMINATION:
T(C): 36.4 (07-29-24 @ 11:20), Max: 36.5 (07-28-24 @ 20:43)  HR: 61 (07-29-24 @ 11:20) (61 - 77)  BP: 138/68 (07-29-24 @ 11:20) (127/80 - 158/71)  RR: 16 (07-29-24 @ 11:20) (16 - 18)  SpO2: 95% (07-29-24 @ 11:20) (95% - 98%)      PHYSICAL EXAM:  GENERAL: NAD  HEENT:  anicteric, moist mucous membranes  CHEST/LUNG:  CTA b/l, no rales, wheezes, or rhonchi  HEART:  RRR, S1, S2  ABDOMEN:  BS+, soft, nontender, nondistended  EXTREMITIES: no edema, cyanosis, or calf tenderness None

## 2024-07-29 NOTE — PROGRESS NOTE ADULT - NS ATTEND AMEND GEN_ALL_CORE FT
89yo male with PMH of dementia, recurrent UTIs, BPH, CAD s/p 3 stents (2016), GERD, HLD, kidney stones who presented after a syncopal episode while standing, found to have sinus bradycardia with 2nd degree AVB type 1 in the ED:    - Seen by EP. I agree that his 2nd degree AVB type 1 unlikely cause of his syncope  - overnight without any heart block on tele, SR.   - Avoid AVN blocking agents  - Continue aspirin and statin, for CAD and L carotid stenosis vascular following.  - DC planning per primary team.
89yo male with PMH of dementia, recurrent UTIs, BPH, CAD s/p 3 stents (2016), GERD, HLD, kidney stones who presented after a syncopal episode while standing, found to have sinus bradycardia with 2nd degree AVB type 1 in the ED:    - Seen by EP. I agree that his 2nd degree AVB type 1 unlikely cause of his syncope  -overnight SR, no heart block at all  - dc tele, no events  - Avoid AVN blocking agents  - Continue aspirin and statin, for CAD and L carotid stenosis vascular following.  - dc planning per primary team

## 2024-07-29 NOTE — CONSULT NOTE ADULT - ASSESSMENT
elevated bilirubin    isolated elevated bilirubin  son at bedside, states no abdominal pain  u/s noted  given age and co-morbidities would not recommend any further workup given patient asymptomatic

## 2024-07-29 NOTE — CARE COORDINATION ASSESSMENT. - NSPASTMEDSURGHISTORY_GEN_ALL_CORE_FT
PAST MEDICAL & SURGICAL HISTORY:  Benign prostatic hyperplasia, presence of lower urinary tract symptoms unspecified, unspecified morphology      Hyperlipidemia      GERD (gastroesophageal reflux disease)      CAD (coronary artery disease)      S/P ureteral stent placement  Left sided      History of prostate surgery      UTI (urinary tract infection)      Calculus of ureter      Calculus of kidney      Kidney stones      Dementia      Stented coronary artery

## 2024-07-29 NOTE — PHYSICAL THERAPY INITIAL EVALUATION ADULT - NSACTIVITYREC_GEN_A_PT
2-person assistance with bed mobility; 2-person assistance +RW with transfers to chair/commode; recommend 2-person assistance +RW and chair follow if ambulating, as skill not observed at time of PT evaluation; home exercise program.

## 2024-07-29 NOTE — DISCHARGE NOTE PROVIDER - NSDCMRMEDTOKEN_GEN_ALL_CORE_FT
aspirin 81 mg oral tablet: 1 tab(s) orally once a day  atorvastatin 20 mg oral tablet: 1 tab(s) orally once a day  Azithromycin 5 Day Dose Pack 250 mg oral tablet: orally  Azopt 1% ophthalmic suspension: 1 dose(s) to each affected eye once a day  diclofenac topical:   finasteride 5 mg oral tablet: 1 tab(s) orally once a day  memantine 5 mg oral tablet: 1 tab(s) orally 2 times a day  methenamine hippurate 1 g oral tablet: 1 tab(s) orally 2 times a day  multivitamin with minerals:   tamsulosin 0.4 mg oral capsule: 1 cap(s) orally once a day (at bedtime)   aspirin 81 mg oral tablet: 1 tab(s) orally once a day  atorvastatin 80 mg oral tablet: 1 tab(s) orally once a day (at bedtime)  azithromycin 250 mg oral tablet: 1 tab(s) orally once a day Please take 1 tablet on 7/30 to complete 5 day dose pack  Azopt 1% ophthalmic suspension: 1 dose(s) to each affected eye once a day  diclofenac topical:   finasteride 5 mg oral tablet: 1 tab(s) orally once a day  memantine 5 mg oral tablet: 1 tab(s) orally 2 times a day  methenamine hippurate 1 g oral tablet: 1 tab(s) orally 2 times a day  multivitamin with minerals:   tamsulosin 0.4 mg oral capsule: 1 cap(s) orally once a day (at bedtime)

## 2024-07-29 NOTE — DISCHARGE NOTE PROVIDER - NSDCCPCAREPLAN_GEN_ALL_CORE_FT
PRINCIPAL DISCHARGE DIAGNOSIS  Diagnosis: Syncope  Assessment and Plan of Treatment:       SECONDARY DISCHARGE DIAGNOSES  Diagnosis: Acute UTI  Assessment and Plan of Treatment:     Diagnosis: Dehydration  Assessment and Plan of Treatment:     Diagnosis: Syncope  Assessment and Plan of Treatment:      PRINCIPAL DISCHARGE DIAGNOSIS  Diagnosis: Syncope  Assessment and Plan of Treatment: Patient fainted likely due to a left internal carotid artery occlusion, we ruled out other causes such as orthostasis and unlikely due to arrhythmia. Continue with statin and aspirin.      SECONDARY DISCHARGE DIAGNOSES  Diagnosis: Acute UTI  Assessment and Plan of Treatment: Patient had UTI found on urine culture and UA with ecoli, being treated with ceftriaxone.    Diagnosis: Dehydration  Assessment and Plan of Treatment:     Diagnosis: Syncope  Assessment and Plan of Treatment:      PRINCIPAL DISCHARGE DIAGNOSIS  Diagnosis: Syncope  Assessment and Plan of Treatment: You were admitted for a syncope workeup. You fainted likely due to a left internal carotid artery occlusion. You were also found to have a slow heart rate, but it is unlikely that it is what caused your syncope. Cardiology saw you and did not think your syncopal episode was cardiac related. Your Atorvastatin was increased from 20mg to 80mg daily and you were continued on your Aspirin.  Please STOP Atorvastatin 20mg daily.  Please CONTINUE Atorvastatin 80mg daily   Please continue Baby Aspirin daily  Please follow up with your Cardiologist outpatient in 1-2 weeks      SECONDARY DISCHARGE DIAGNOSES  Diagnosis: Acute UTI  Assessment and Plan of Treatment: You were foundto have a UTI on admission. You were treated with 3 days of IV antibiotics and your symptoms improved.   Please follow up with your PCP in 1-2 weeks    Diagnosis: URI (upper respiratory infection)  Assessment and Plan of Treatment: You had an upper respiratory infection prior to cominig to the hospital. You were started on a 5 day Zpakc at home, which was continued in the hospital. You have 1 more day of antibiotics left to take.   Please STOP your home Zpack  Please TAKE 1 tab of Azithromycin on 7/30 to complete the 5 day course fo antibiotics   Please follow up with your PCP in 1 week    Diagnosis: Elevated bilirubin  Assessment and Plan of Treatment: You were found to have elevated bilirubin during your stay. Anultrasound of your abdomen showed possible gallstones. GI saw you and rec _____.   Please follow up outpatient with GI for further workup.     PRINCIPAL DISCHARGE DIAGNOSIS  Diagnosis: Syncope  Assessment and Plan of Treatment: You were admitted for a syncope workeup. You fainted likely due to a left internal carotid artery occlusion. You were also found to have a slow heart rate, but it is unlikely that it is what caused your syncope. Cardiology saw you and did not think your syncopal episode was cardiac related. Your Atorvastatin was increased from 20mg to 80mg daily and you were continued on your Aspirin.  Please STOP Atorvastatin 20mg daily.  Please CONTINUE Atorvastatin 80mg daily   Please continue Baby Aspirin daily  Please follow up with your Cardiologist outpatient in 1-2 weeks      SECONDARY DISCHARGE DIAGNOSES  Diagnosis: Acute UTI  Assessment and Plan of Treatment: You were foundto have a UTI on admission. You were treated with 3 days of IV antibiotics and your symptoms improved.   Please follow up with your PCP in 1-2 weeks    Diagnosis: URI (upper respiratory infection)  Assessment and Plan of Treatment: You had an upper respiratory infection prior to cominig to the hospital. You were started on a 5 day Zpakc at home, which was continued in the hospital. You have 1 more day of antibiotics left to take.   Please STOP your home Zpack  Please TAKE 1 tab of Azithromycin on 7/30 to complete the 5 day course fo antibiotics   Please follow up with your PCP in 1 week    Diagnosis: Elevated bilirubin  Assessment and Plan of Treatment: You were found to have elevated bilirubin during your stay. Anultrasound of your abdomen showed possible gallstones. GI saw you and rec outpatient follow up.   Please follow up outpatient with GI Dr. Russell for further workup.     PRINCIPAL DISCHARGE DIAGNOSIS  Diagnosis: Syncope  Assessment and Plan of Treatment: You were admitted for a syncope workeup. You fainted likely due to a left internal carotid artery occlusion. You were also found to have a slow heart rate, but it is unlikely that it is what caused your syncope. Cardiology saw you and did not think your syncopal episode was cardiac related. Your Atorvastatin was increased from 20mg to 80mg daily and you were continued on your Aspirin.  Please STOP Atorvastatin 20mg daily.  Please CONTINUE Atorvastatin 80mg daily   Please continue Baby Aspirin daily  Please follow up with your Cardiologist outpatient in 1-2 weeks      SECONDARY DISCHARGE DIAGNOSES  Diagnosis: Acute UTI  Assessment and Plan of Treatment: You were foundto have a UTI on admission. You were treated with 3 days of IV antibiotics and your symptoms improved.   Please follow up with your PCP in 1-2 weeks    Diagnosis: URI (upper respiratory infection)  Assessment and Plan of Treatment: You had an upper respiratory infection prior to cominig to the hospital. You were started on a 5 day Zpakc at home, which was continued in the hospital. You have 1 more day of antibiotics left to take.   Please STOP your home Zpack  Please TAKE 1 tab of Azithromycin on 7/30 to complete the 5 day course fo antibiotics   Please follow up with your PCP in 1 week    Diagnosis: Elevated bilirubin  Assessment and Plan of Treatment: You were found to have elevated bilirubin during your stay. Anultrasound of your abdomen showed possible gallstones. GI saw you and rec no further workup needed.  Please f/u with your PCP in 1-2 weeks     PRINCIPAL DISCHARGE DIAGNOSIS  Diagnosis: Syncope  Assessment and Plan of Treatment: You were admitted for a syncope workeup. You fainted likely due to a left internal carotid artery occlusion. You were also found to have a slow heart rate, but it is unlikely that it is what caused your syncope. Cardiology saw you and did not think your syncopal episode was cardiac related. Your Atorvastatin was increased from 20mg to 80mg daily and you were continued on your Aspirin.  Please STOP Atorvastatin 20mg daily.  Please CONTINUE Atorvastatin 80mg daily   Please continue Baby Aspirin daily  Please follow up with your Cardiologist outpatient in 1-2 weeks      SECONDARY DISCHARGE DIAGNOSES  Diagnosis: Acute UTI  Assessment and Plan of Treatment: You were foundto have a UTI on admission. You were treated with 3 days of IV antibiotics and your symptoms improved.   Please follow up with your PCP in 1-2 weeks    Diagnosis: URI (upper respiratory infection)  Assessment and Plan of Treatment: You had an upper respiratory infection prior to cominig to the hospital. You were started on a 5 day Zpakc at home, which was continued in the hospital. You have 1 more day of antibiotics left to take.   Please STOP your home Zpack  Please TAKE 1 tab of Azithromycin on 7/30 to complete the 5 day course fo antibiotics   Please follow up with your PCP in 1 week    Diagnosis: Elevated bilirubin  Assessment and Plan of Treatment: You were found to have elevated bilirubin during your stay. Anultrasound of your abdomen showed possible gallstones. GI saw you and rec no further workup needed.  Please f/u with your PCP in 1-2 weeks    Diagnosis: ICAO (internal carotid artery occlusion), left  Assessment and Plan of Treatment: you were found to have an occulusion of the left internal carotid artery. You were seen by vascular surgery and no intervention was necessary. Please continue taking aspirin. We have increased your cholesterol medication. Please STOP atorvastatin 20mg daily and START taking atorvastatin 80mg daily.

## 2024-07-29 NOTE — PHYSICAL THERAPY INITIAL EVALUATION ADULT - PRECAUTIONS/LIMITATIONS, REHAB EVAL
Universal precautions; Pt Tony speaking per EMR, son states pt spoke perfect English and understands English well; son present to assist with any interpretation as needed./fall precautions

## 2024-07-29 NOTE — PROGRESS NOTE ADULT - ASSESSMENT
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 Keep K > 4 and Mg > 2    - Hx of CAD and L carotid stenosis vascular following.  - Continue ASA and statin  - no anginal complaints     - TTE (7/27) low-normal LVSF, EF50 %. Global left ventricular hypokinesis, trace MR and TR  - no sign of volume overload     - hx of recurrent UTI on abx   - dc planning per primary team    - Will continue to follow.    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 Keep K > 4 and Mg > 2    - Hx of CAD and L carotid stenosis vascular following.  - Continue ASA and statin  - no anginal complaints     - TTE (7/27) low-normal LVSF, EF 50 %. Global left ventricular hypokinesis, trace MR and TR  - no sign of volume overload     - hx of recurrent UTI on abx   - dc planning per primary team    - Will continue to follow.    [facility]  Nurse Practitioner - Cardiology   call TEAMS

## 2024-07-29 NOTE — PHYSICAL THERAPY INITIAL EVALUATION ADULT - LEVEL OF CONSCIOUSNESS, REHAB EVAL
pt arousable to voice, noted lethargic at this time (son states this is not baseline)/lethargic/somnolent

## 2024-07-29 NOTE — PROGRESS NOTE ADULT - SUBJECTIVE AND OBJECTIVE BOX
[facility] -- Rhonda Good Pannella, Patel, Savella, Goodger, Cohen  Office # [phone]    Follow Up:  Bradycardia     Subjective/Observations: seen and examined, asleep, easily awaken, resting comfortably in bed, denies chest pain, dyspnea, palpitations or dizziness, orthopnea and PND. Tolerating room air. no events overnight     REVIEW OF SYSTEMS: All other review of systems is negative unless indicated above  PAST MEDICAL & SURGICAL HISTORY:  Benign prostatic hyperplasia, presence of lower urinary tract symptoms unspecified, unspecified morphology      Hyperlipidemia      GERD (gastroesophageal reflux disease)      CAD (coronary artery disease)      UTI (urinary tract infection)      Calculus of kidney      Calculus of ureter      Dementia      Kidney stones      History of prostate surgery      S/P ureteral stent placement  Left sided      Stented coronary artery        MEDICATIONS  (STANDING):  aspirin enteric coated 81 milliGRAM(s) Oral daily  atorvastatin 80 milliGRAM(s) Oral at bedtime  azithromycin   Tablet 250 milliGRAM(s) Oral daily  cefTRIAXone   IVPB 1000 milliGRAM(s) IV Intermittent every 24 hours  enoxaparin Injectable 40 milliGRAM(s) SubCutaneous every 24 hours  finasteride 5 milliGRAM(s) Oral daily  memantine 5 milliGRAM(s) Oral two times a day  potassium phosphate / sodium phosphate Powder (PHOS-NaK) 1 Packet(s) Oral once  tamsulosin 0.4 milliGRAM(s) Oral at bedtime    MEDICATIONS  (PRN):    Allergies    No Known Allergies    Intolerances      Vital Signs Last 24 Hrs  T(C): 36.5 (29 Jul 2024 04:45), Max: 37.4 (28 Jul 2024 12:28)  T(F): 97.7 (29 Jul 2024 04:45), Max: 99.4 (28 Jul 2024 12:28)  HR: 75 (29 Jul 2024 04:45) (71 - 77)  BP: 127/80 (29 Jul 2024 04:45) (127/80 - 160/71)  BP(mean): --  RR: 18 (29 Jul 2024 04:45) (18 - 19)  SpO2: 98% (29 Jul 2024 04:45) (95% - 99%)    Parameters below as of 29 Jul 2024 04:45  Patient On (Oxygen Delivery Method): room air      I&O's Summary    28 Jul 2024 07:01 - 29 Jul 2024 07:00  --------------------------------------------------------  IN: 0 mL / OUT: 900 mL / NET: -900 mL          TELE: SR 60's   PHYSICAL EXAM:  Constitutional: NAD  HEENT: Moist Mucous Membranes, Anicteric  Pulmonary: Non-labored, breath sounds are clear bilaterally, No wheezing, rales or rhonchi  Cardiovascular: Regular, S1 and S2, No murmurs, rubs, gallops or clicks  Gastrointestinal: Bowel Sounds present, soft, nontender.   Lymph: No peripheral edema. No lymphadenopathy.  Skin: No visible rashes or ulcers.  Psych:  Mood & affect appropriate  LABS: All Labs Reviewed:                        12.8   7.59  )-----------( 155      ( 29 Jul 2024 06:45 )             39.2                         11.5   8.87  )-----------( 127      ( 28 Jul 2024 11:25 )             34.3                         11.2   8.13  )-----------( 106      ( 27 Jul 2024 05:55 )             34.3     29 Jul 2024 06:45    141    |  109    |  10     ----------------------------<  104    3.5     |  26     |  0.68   28 Jul 2024 11:25    142    |  110    |  14     ----------------------------<  100    3.7     |  26     |  0.72   27 Jul 2024 05:55    145    |  115    |  20     ----------------------------<  100    3.8     |  25     |  0.73     Ca    9.6        29 Jul 2024 06:45  Ca    9.3        28 Jul 2024 11:25  Ca    8.8        27 Jul 2024 05:55  Phos  2.2       29 Jul 2024 06:45  Phos  2.4       28 Jul 2024 11:25  Phos  1.8       27 Jul 2024 05:55  Mg     2.2       29 Jul 2024 06:45  Mg     2.0       28 Jul 2024 11:25  Mg     2.0       27 Jul 2024 05:55    TPro  6.5    /  Alb  2.8    /  TBili  2.1    /  DBili  x      /  AST  28     /  ALT  10     /  AlkPhos  78     29 Jul 2024 06:45  TPro  5.9    /  Alb  2.5    /  TBili  1.5    /  DBili  x      /  AST  27     /  ALT  9      /  AlkPhos  68     28 Jul 2024 11:25  TPro  5.6    /  Alb  2.3    /  TBili  1.8    /  DBili  x      /  AST  18     /  ALT  7      /  AlkPhos  62     27 Jul 2024 05:55          12 Lead ECG:   Ventricular Rate 49 BPM    Atrial Rate 57 BPM    P-R Interval 330 ms    QRS Duration 80 ms    Q-T Interval 442 ms    QTC Calculation(Bazett) 399 ms    P Axis 65 degrees    R Axis 48 degrees    T Axis 73 degrees    Diagnosis Line *** Critical Test Result: AV Block  sinus bradycardia with second degree avb type 1  Confirmed by ALEXANDER HURD ([id number]) on 7/27/2024 10:24:21 AM (07-26-24 @ 18:14)      TRANSTHORACIC ECHOCARDIOGRAM REPORT  ________________________________________________________________________________                                      _______       Pt. Name:       IDRIS DANIEL Study Date:    7/27/2024  MRN:            [id number]      YOB: 1929  Accession #:    [id number]     Age:           89 years  Account#:       [id number]    Gender:        M  Heart Rate:                   Height:        70.87 in (180.00 cm)  Rhythm:                       Weight:        178.58 lb (81.01 kg)  Blood Pressure: 126/74 mmHg   BSA/BMI:       2.01 m² / 25.00 kg/m²  ________________________________________________________________________________________  Referring Physician:    [id number] Alex Thakkar  Interpreting Physician: Holley Jones MD  Primary Sonographer:    Cookie Garcia    CPT:               ECHO TTE WO CON COMP W DOPP - 92485.m  Indication(s):     Syncope and collapse - R55  Procedure:         Transthoracic echocardiogram with 2-D, M-mode and complete           spectral and color flow Doppler.  Ordering Location: TELE  Admission Status:  Inpatient    _______________________________________________________________________________________     CONCLUSIONS:      1. Left ventricular systolic function islow-normal with an ejection fraction visually estimated at 50 %. Global left ventricular hypokinesis.   2. Trileaflet aortic valve with normal systolic excursion. There is calcification of the aortic valve leaflets.   3. Trace mitral regurgitation.  4. Trace tricuspid regurgitation.   5. Pulmonic valve was not well visualized.   6. No pericardial effusion seen.    ________________________________________________________________________________________  FINDINGS:     Left Ventricle:  Left ventricular systolic function is low-normal with an ejection fraction visually estimated at 50%. There is global left ventricular hypokinesis.     Left Atrium:  The left atrium is normal in size.     Right Atrium:  The right atrium is normal in size with an indexed area of 4.36 cm²/m².     Aortic Valve:  The aortic valve appears trileaflet with normal systolic excursion. There is calcification of the aortic valve leaflets.     Mitral Valve:  There is trace mitral regurgitation.     Tricuspid Valve:  There is trace tricuspid regurgitation. IVC was evaluated but not visualized.     Pulmonic Valve:  The pulmonic valve was not well visualized.     Aorta:  The aortic root at the sinuses of Valsalva is normal in size, measuring 3.40 cm (indexed 1.69 cm/m²).     Pericardium:  No pericardial effusion seen.     Systemic Veins:  The inferior vena cava was not well visualized.  ____________________________________________________________________  QUANTITATIVE DATA:  Left Ventricle Measurements: (Indexed to BSA)     Visualized LV EF%: 50%     MV E Vmax:    0.96 m/s  MV A Vmax:    0.81 m/s  MV E/A:       1.18  e' lateral:   8.59 cm/s  e' medial:    8.59 cm/s  E/e' lateral: 11.16  E/e' medial:  11.16  E/e' Average: 11.16  MV DT:        204 msec    Aorta Measurements: (Normal range) (Indexed to BSA)     Ao Root d 3.40 cm (3.1 - 3.7 cm) 1.69 cm/m²            Left Atrium Measurements: (Indexed to BSA)  LA Diam 2D:        3.10 cm  LA Vol s, MOD A4C: 56.70 ml.  LA Vol s, MOD A2C: 37.50 ml.            LVOT /RVOT/ Qp/Qs Data: (Indexed to BSA)  LVOT Diameter:  1.70 cm  LVOT Area:      2.27 cm²  LVOT Vmax:      0.82 m/s  LVOT Vmn:       0.519 m/s  LVOT VTI:       19.90 cm  LVOT peak grad: 3 mmHg  LVOT mean grad: 1.0 mmHg  LVOT SV:        45.2 ml   22.51 ml/m²    Aortic Valve Measurements:  AV Vmax:                0.8 m/s  AV Peak Gradient:       2.5 mmHg  AV Mean Gradient:       1.0 mmHg  AV VTI:                 22.3 cm  AV VTI Ratio:           0.89  AoV EOA, Contin:        2.03 cm²  AoV EOA, Contin i:     1.01 cm²/m²  AoV Dimensionless Index 0.89    Mitral Valve Measurements:     MV E Vmax: 1.0 m/s  MV A Vmax: 0.8 m/s  MV E/A:    1.2       Tricuspid Valve Measurements:     TR Vmax:          2.0 m/s  TR Peak Gradient: 16.3 mmHg    ________________________________________________________________________________________  Electronically signed on 7/27/2024 at 5:30:03 PM by Holley Jones MD         *** Final ***

## 2024-07-29 NOTE — CARE COORDINATION ASSESSMENT. - NSDCPLANSERVICES_GEN_ALL_CORE
PT eval is pending, but patient's son stated they prefer the patient home. Home care choice list provided. Agreeable to home care services with [facility] as patient had this service in the past./Anticipated Needs Unclear at Present

## 2024-07-29 NOTE — DISCHARGE NOTE PROVIDER - HOSPITAL COURSE
HPI:  Patient is a 89yo male with PMH of dementia, recurrent UTIs, BPH, CAD s/p 3 stents (2016), GERD, HLD, kidney stones who presented after a syncopal episode while standing this morning. Patient was walking to bathroom, became lightheaded and almost passed out, but son was present to hold him up. Denies hitting his head or losing consciousness. Patient's son states that patient developed a productive cough (green sputum with some black particles; no bright red blood) 2 days ago. Yesterday, patient was having trouble swallowing, so son took him to the [facility] [[facility]] where his PCP's office is; patient was started on Azithromycin 250 mg and has been taking it for 1 day now. Son reports patient has not been drinking much water over the past few days, but has been eating. Son states this is patient's baseline and denies any acute mental status changes.    ED COURSE:  Vitals: T 99.4   F , 61 HR  ,  /56 , 17 RR  , SpO2  95% on RA  Labs significant for: WBC 15.6 -> 11.07, phos 2, p-  UA: +bacteria, + leukocyte esterase  Imaging: X-ray chest: stable no acute findings  EKG: melvina with 2nd degree AV block, [place] with heart rates in the 50's  Pt received: 1G IV cefepime, 1L Saline   (26 Jul 2024 19:42)      ---  HOSPITAL COURSE:       ---  CONSULTANTS:       ---  TIME SPENT:  I, the attending physician, was physically present for the key portions of the evaluation and management (E/M) service provided. The total amount of time spent reviewing the hospital notes, laboratory values, imaging findings, assessing/counseling the patient, discussing with consultant physicians, social work, nursing staff was -- minutes    ---  Primary care provider was made aware of plan for discharge:      [  ] NO     [  ] YES   HPI:  Patient is a 89yo male with PMH of dementia, recurrent UTIs, BPH, CAD s/p 3 stents (2016), GERD, HLD, kidney stones who presented after a syncopal episode while standing this morning. Patient was walking to bathroom, became lightheaded and almost passed out, but son was present to hold him up. Denies hitting his head or losing consciousness. Patient's son states that patient developed a productive cough (green sputum with some black particles; no bright red blood) 2 days ago. Yesterday, patient was having trouble swallowing, so son took him to the [facility] [[facility]] where his PCP's office is; patient was started on Azithromycin 250 mg and has been taking it for 1 day now. Son reports patient has not been drinking much water over the past few days, but has been eating. Son states this is patient's baseline and denies any acute mental status changes.    ED COURSE:  Vitals: T 99.4   F , 61 HR  ,  /56 , 17 RR  , SpO2  95% on RA  Labs significant for: WBC 15.6 -> 11.07, phos 2, p-  UA: +bacteria, + leukocyte esterase  Imaging: X-ray chest: stable no acute findings  EKG: melvina with 2nd degree AV block, [place] with heart rates in the 50's  Pt received: 1G IV cefepime, 1L Saline   (26 Jul 2024 19:42)      ---  HOSPITAL COURSE:   Patient was admitted for _____. Patient was seen and evaluated by __. Specialist saw the patient and recommended _____. Patient was treated with _____. Imaging showed/cultures showed ______. Patient's home medications for were continued inpatient. Patient underwent ___. Physical therapy evaluated the patient and recommended -----. Social work/case management followed the patient case. Patient is stable for discharge to [[place],[facility]    ---  CONSULTANTS:       ---  TIME SPENT:  I, the attending physician, was physically present for the key portions of the evaluation and management (E/M) service provided. The total amount of time spent reviewing the hospital notes, laboratory values, imaging findings, assessing/counseling the patient, discussing with consultant physicians, social work, nursing staff was -- minutes    ---  Primary care provider was made aware of plan for discharge:      [  ] NO     [  ] YES   HPI:  Patient is a 89yo male with PMH of dementia, recurrent UTIs, BPH, CAD s/p 3 stents (2016), GERD, HLD, kidney stones who presented after a syncopal episode while standing this morning. Patient was walking to bathroom, became lightheaded and almost passed out, but son was present to hold him up. Denies hitting his head or losing consciousness. Patient's son states that patient developed a productive cough (green sputum with some black particles; no bright red blood) 2 days ago. Yesterday, patient was having trouble swallowing, so son took him to the [facility] [[facility]] where his PCP's office is; patient was started on Azithromycin 250 mg and has been taking it for 1 day now. Son reports patient has not been drinking much water over the past few days, but has been eating. Son states this is patient's baseline and denies any acute mental status changes.    ED COURSE:  Vitals: T 99.4   F , 61 HR  ,  /56 , 17 RR  , SpO2  95% on RA  Labs significant for: WBC 15.6 -> 11.07, phos 2, p-  UA: +bacteria, + leukocyte esterase  Imaging: X-ray chest: stable no acute findings  EKG: melvina with 2nd degree AV block, [place] with heart rates in the 50's  Pt received: 1G IV cefepime, 1L Saline   (26 Jul 2024 19:42)      ---  HOSPITAL COURSE:   Patient was admitted for 3 days since 7/26. Patient was seen and evaluated by cardiology and vascular. Patient had 2nd degree AV Block found on EKG, cardiology believes unlikely the cause of syncope. Vascular saw the patient and recommended no surgical intervention needed, continue aspirin and statin. CTA showed Left ICA 90% stenosis. U cx and UA demonstrated 50-100k e.coli with + bacteria. Patient was treated with ceftriaxone for UTI and z-jayson for URI. Telemetry was d/c'd after cardio rec due to avb being unlikely cause of syncope. Orthostatic tests performed for syncope causes were negative. Patient's home medications for were continued inpatient aspirin + statin, . Patient underwent TTE which showed 50% EF. Social work/case management followed the patient case. Patient is stable for discharge to [home,[facility]].    Patient was explained hospital course, risks and benefits of treatment, and discharge planning, along with follow-up. Patient expresses understanding of all of the above. Patient is medically stable for discharge with appropriate followup.      ---  CONSULTANTS:   Cardiology Dr. Willett  Vascular Dr. Adame    ---  TIME SPENT:  I, the attending physician, was physically present for the key portions of the evaluation and management (E/M) service provided. The total amount of time spent reviewing the hospital notes, laboratory values, imaging findings, assessing/counseling the patient, discussing with consultant physicians, social work, nursing staff was -- minutes    ---  Primary care provider was made aware of plan for discharge:      [  ] NO     [  ] YES   HPI:  Patient is a 89yo male with PMH of dementia, recurrent UTIs, BPH, CAD s/p 3 stents (2016), GERD, HLD, kidney stones who presented after a syncopal episode while standing this morning. Patient was walking to bathroom, became lightheaded and almost passed out, but son was present to hold him up. Denies hitting his head or losing consciousness. Patient's son states that patient developed a productive cough (green sputum with some black particles; no bright red blood) 2 days ago. Yesterday, patient was having trouble swallowing, so son took him to the [facility] [[facility]] where his PCP's office is; patient was started on Azithromycin 250 mg and has been taking it for 1 day now. Son reports patient has not been drinking much water over the past few days, but has been eating. Son states this is patient's baseline and denies any acute mental status changes.    ED COURSE:  Vitals: T 99.4   F , 61 HR  ,  /56 , 17 RR  , SpO2  95% on RA  Labs significant for: WBC 15.6 -> 11.07, phos 2, p-  UA: +bacteria, + leukocyte esterase  Imaging: X-ray chest: stable no acute findings  EKG: melvina with 2nd degree AV block, [place] with heart rates in the 50's  Pt received: 1G IV cefepime, 1L Saline   (26 Jul 2024 19:42)      ---  HOSPITAL COURSE:   Patient was admitted for 3 days since 7/26 for syncope workup. Patient was seen and evaluated by cardiology and vascular. Patient had 2nd degree AV Block found on EKG, cardiology believes unlikely the cause of syncope. Telemetry was d/c'd after cardio rec due to avb being unlikely cause of syncope. Orthostatic tests performed for syncope causes were negative. CTA showed Left ICA 90% stenosis. Vascular saw the patient and recommended no surgical intervention needed, continue aspirin and statin. U cx and UA demonstrated 50-100k e.coli with + bacteria. Patient was treated with ceftriaxone for UTI. Patient had a URI prior to coming to the hospital and was started on a Zpack at home. Zpack was continued in the  hospital. Patient also found to have elevated bilirubin during his hopital course. RUQ US suggested cholelithiasis and possible Gallbladder   wall thickening. Top normal CBD at 7 mm. GI saw the patient and rec _____. Patient's home medications for were continued inpatient aspirin + statin. Patient underwent TTE which showed 50% EF. Social work/case management followed the patient case. Patient is stable for discharge to [home,[facility]].    Patient was explained hospital course, risks and benefits of treatment, and discharge planning, along with follow-up. Patient expresses understanding of all of the above. Patient is medically stable for discharge with appropriate followup.      ---  CONSULTANTS:   Cardiology Dr. Willett  Vascular Dr. Adame    ---  TIME SPENT:  I, the attending physician, was physically present for the key portions of the evaluation and management (E/M) service provided. The total amount of time spent reviewing the hospital notes, laboratory values, imaging findings, assessing/counseling the patient, discussing with consultant physicians, social work, nursing staff was -- minutes    ---  Primary care provider was made aware of plan for discharge:      [  ] NO     [  ] YES   HPI:  Patient is a 89yo male with PMH of dementia, recurrent UTIs, BPH, CAD s/p 3 stents (2016), GERD, HLD, kidney stones who presented after a syncopal episode while standing this morning. Patient was walking to bathroom, became lightheaded and almost passed out, but son was present to hold him up. Denies hitting his head or losing consciousness. Patient's son states that patient developed a productive cough (green sputum with some black particles; no bright red blood) 2 days ago. Yesterday, patient was having trouble swallowing, so son took him to the [facility] [[facility]] where his PCP's office is; patient was started on Azithromycin 250 mg and has been taking it for 1 day now. Son reports patient has not been drinking much water over the past few days, but has been eating. Son states this is patient's baseline and denies any acute mental status changes.    ED COURSE:  Vitals: T 99.4   F , 61 HR  ,  /56 , 17 RR  , SpO2  95% on RA  Labs significant for: WBC 15.6 -> 11.07, phos 2, p-  UA: +bacteria, + leukocyte esterase  Imaging: X-ray chest: stable no acute findings  EKG: melvina with 2nd degree AV block, [place] with heart rates in the 50's  Pt received: 1G IV cefepime, 1L Saline   (26 Jul 2024 19:42)      ---  HOSPITAL COURSE:   Patient was admitted for 3 days since 7/26 for syncope workup. Patient was seen and evaluated by cardiology and vascular. Patient had 2nd degree AV Block found on EKG, cardiology believes unlikely the cause of syncope. Telemetry was d/c'd after cardio rec due to avb being unlikely cause of syncope. Orthostatic tests performed for syncope causes were negative. CTA showed Left ICA 90% stenosis. Vascular saw the patient and recommended no surgical intervention needed, continue aspirin and statin. U cx and UA demonstrated 50-100k e.coli with + bacteria. Patient was treated with ceftriaxone for UTI. Patient had a URI prior to coming to the hospital and was started on a Zpack at home. Zpack was continued in the  hospital. Patient also found to have elevated bilirubin during his hopital course. RUQ US suggested cholelithiasis and possible Gallbladder   wall thickening. Top normal CBD at 7 mm. GI saw the patient and rec _____. Patient's home medications for were continued inpatient aspirin + statin. Patient underwent TTE which showed 50% EF. Social work/case management followed the patient case. Patient is stable for discharge to [facility] or Home PT.    Patient was explained hospital course, risks and benefits of treatment, and discharge planning, along with follow-up. Patient expresses understanding of all of the above. Patient is medically stable for discharge with appropriate followup.      ---  CONSULTANTS:   Cardiology Dr. Willett  Vascular Dr. Adame    ---  TIME SPENT:  I, the attending physician, was physically present for the key portions of the evaluation and management (E/M) service provided. The total amount of time spent reviewing the hospital notes, laboratory values, imaging findings, assessing/counseling the patient, discussing with consultant physicians, social work, nursing staff was -- minutes    ---  Primary care provider was made aware of plan for discharge:      [  ] NO     [  ] YES   HPI:  Patient is a 89yo male with PMH of dementia, recurrent UTIs, BPH, CAD s/p 3 stents (2016), GERD, HLD, kidney stones who presented after a syncopal episode while standing this morning. Patient was walking to bathroom, became lightheaded and almost passed out, but son was present to hold him up. Denies hitting his head or losing consciousness. Patient's son states that patient developed a productive cough (green sputum with some black particles; no bright red blood) 2 days ago. Yesterday, patient was having trouble swallowing, so son took him to the [facility] [[facility]] where his PCP's office is; patient was started on Azithromycin 250 mg and has been taking it for 1 day now. Son reports patient has not been drinking much water over the past few days, but has been eating. Son states this is patient's baseline and denies any acute mental status changes.    ED COURSE:  Vitals: T 99.4   F , 61 HR  ,  /56 , 17 RR  , SpO2  95% on RA  Labs significant for: WBC 15.6 -> 11.07, phos 2, p-  UA: +bacteria, + leukocyte esterase  Imaging: X-ray chest: stable no acute findings  EKG: melvina with 2nd degree AV block, [place] with heart rates in the 50's  Pt received: 1G IV cefepime, 1L Saline   (26 Jul 2024 19:42)      ---  HOSPITAL COURSE:   Patient was admitted for 3 days since 7/26 for syncope workup. Patient was seen and evaluated by cardiology and vascular. Patient had 2nd degree AV Block found on EKG, cardiology believes unlikely the cause of syncope. Telemetry was d/c'd after cardio rec due to avb being unlikely cause of syncope. Orthostatic tests performed for syncope causes were negative. CTA showed Left ICA 90% stenosis. Vascular saw the patient and recommended no surgical intervention needed, continue aspirin and statin. U cx and UA demonstrated 50-100k e.coli with + bacteria. Patient was treated with ceftriaxone for UTI. Patient had a URI prior to coming to the hospital and was started on a Zpack at home. Zpack was continued in the  hospital. Patient also found to have elevated bilirubin during his hopital course. RUQ US suggested cholelithiasis and possible Gallbladder   wall thickening. Top normal CBD at 7 mm. GI saw the patient and rec _____. Patient's home medications for were continued inpatient aspirin + statin. Patient underwent TTE which showed 50% EF. PT saw the patient and rec home PT with 24/7 assistance. Social work/case management followed the patient case. Patient is stable for discharge for Home.    Patient was explained hospital course, risks and benefits of treatment, and discharge planning, along with follow-up. Patient expresses understanding of all of the above. Patient is medically stable for discharge with appropriate followup.      ---  CONSULTANTS:   Cardiology Dr. Willett  Vascular Dr. Adame    ---  TIME SPENT:  I, the attending physician, was physically present for the key portions of the evaluation and management (E/M) service provided. The total amount of time spent reviewing the hospital notes, laboratory values, imaging findings, assessing/counseling the patient, discussing with consultant physicians, social work, nursing staff was -- minutes    ---  Primary care provider was made aware of plan for discharge:      [  ] NO     [  ] YES   HPI:  Patient is a 89yo male with PMH of dementia, recurrent UTIs, BPH, CAD s/p 3 stents (2016), GERD, HLD, kidney stones who presented after a syncopal episode while standing this morning. Patient was walking to bathroom, became lightheaded and almost passed out, but son was present to hold him up. Denies hitting his head or losing consciousness. Patient's son states that patient developed a productive cough (green sputum with some black particles; no bright red blood) 2 days ago. Yesterday, patient was having trouble swallowing, so son took him to the [facility] [[facility]] where his PCP's office is; patient was started on Azithromycin 250 mg and has been taking it for 1 day now. Son reports patient has not been drinking much water over the past few days, but has been eating. Son states this is patient's baseline and denies any acute mental status changes.    ED COURSE:  Vitals: T 99.4   F , 61 HR  ,  /56 , 17 RR  , SpO2  95% on RA  Labs significant for: WBC 15.6 -> 11.07, phos 2, p-  UA: +bacteria, + leukocyte esterase  Imaging: X-ray chest: stable no acute findings  EKG: melvina with 2nd degree AV block, [place] with heart rates in the 50's  Pt received: 1G IV cefepime, 1L Saline   (26 Jul 2024 19:42)      ---  HOSPITAL COURSE:   Patient was admitted for 3 days since 7/26 for syncope workup. Patient was seen and evaluated by cardiology and vascular. Patient had 2nd degree AV Block found on EKG, cardiology believes unlikely the cause of syncope. Telemetry was d/c'd after cardio rec due to avb being unlikely cause of syncope. Orthostatic tests performed for syncope causes were negative. CTA showed Left ICA 90% stenosis. Vascular saw the patient and recommended no surgical intervention needed, continue aspirin and statin. U cx and UA demonstrated 50-100k e.coli with + bacteria. Patient was treated with ceftriaxone for UTI. Patient had a URI prior to coming to the hospital and was started on a Zpack at home. Zpack was continued in the  hospital. Patient also found to have elevated bilirubin during his hopital course. RUQ US suggested cholelithiasis and possible Gallbladder   wall thickening. Top normal CBD at 7 mm. Patient with no GI complaints. GI saw the patient and rec outpatient followup. Patient's home medications for were continued inpatient aspirin + statin. Patient underwent TTE which showed 50% EF. PT saw the patient and rec home PT with 24/7 assistance. Social work/case management followed the patient case. Patient is stable for discharge for Home.    Patient was explained hospital course, risks and benefits of treatment, and discharge planning, along with follow-up. Patient expresses understanding of all of the above. Patient is medically stable for discharge with appropriate followup.      ---  CONSULTANTS:   Cardiology Dr. Willett  Vascular Dr. Adame    ---  TIME SPENT:  I, the attending physician, was physically present for the key portions of the evaluation and management (E/M) service provided. The total amount of time spent reviewing the hospital notes, laboratory values, imaging findings, assessing/counseling the patient, discussing with consultant physicians, social work, nursing staff was -- minutes    ---  Primary care provider was made aware of plan for discharge:      [  ] NO     [  ] YES   HPI:  Patient is a 89yo male with PMH of dementia, recurrent UTIs, BPH, CAD s/p 3 stents (2016), GERD, HLD, kidney stones who presented after a syncopal episode while standing this morning. Patient was walking to bathroom, became lightheaded and almost passed out, but son was present to hold him up. Denies hitting his head or losing consciousness. Patient's son states that patient developed a productive cough (green sputum with some black particles; no bright red blood) 2 days ago. Yesterday, patient was having trouble swallowing, so son took him to the [facility] [[facility]] where his PCP's office is; patient was started on Azithromycin 250 mg and has been taking it for 1 day now. Son reports patient has not been drinking much water over the past few days, but has been eating. Son states this is patient's baseline and denies any acute mental status changes.    ED COURSE:  Vitals: T 99.4   F , 61 HR  ,  /56 , 17 RR  , SpO2  95% on RA  Labs significant for: WBC 15.6 -> 11.07, phos 2, p-  UA: +bacteria, + leukocyte esterase  Imaging: X-ray chest: stable no acute findings  EKG: melvina with 2nd degree AV block, [place] with heart rates in the 50's  Pt received: 1G IV cefepime, 1L Saline   (26 Jul 2024 19:42)      ---  HOSPITAL COURSE:   Patient was admitted for 3 days since 7/26 for syncope workup. Patient was seen and evaluated by cardiology and vascular. Patient had 2nd degree AV Block found on EKG, cardiology believes unlikely the cause of syncope. Telemetry was d/c'd after cardio rec due to avb being unlikely cause of syncope. Orthostatic tests performed for syncope causes were negative. CTA showed Left ICA 90% stenosis. Vascular saw the patient and recommended no surgical intervention needed, continue aspirin and statin. U cx and UA demonstrated 50-100k e.coli with + bacteria. Patient was treated with ceftriaxone for UTI. Patient had a URI prior to coming to the hospital and was started on a Zpack at home. Zpack was continued in the  hospital. Patient also found to have elevated bilirubin during his hopital course. RUQ US suggested cholelithiasis and possible Gallbladder   wall thickening. Top normal CBD at 7 mm. Patient with no GI complaints. GI saw the patient and rec no further workup needed. Patient's home medications for were continued inpatient aspirin + statin. Patient underwent TTE which showed 50% EF. PT saw the patient and rec home PT with 24/7 assistance. Social work/case management followed the patient case. Patient is stable for discharge for Home.    Patient was explained hospital course, risks and benefits of treatment, and discharge planning, along with follow-up. Patient expresses understanding of all of the above. Patient is medically stable for discharge with appropriate followup.      ---  CONSULTANTS:   Cardiology Dr. Willett  Vascular Dr. Adame    ---  TIME SPENT:  I, the attending physician, was physically present for the key portions of the evaluation and management (E/M) service provided. The total amount of time spent reviewing the hospital notes, laboratory values, imaging findings, assessing/counseling the patient, discussing with consultant physicians, social work, nursing staff was -- minutes    ---  Primary care provider was made aware of plan for discharge:      [  ] NO     [  ] YES   HPI:  Patient is a 89yo male with PMH of dementia, recurrent UTIs, BPH, CAD s/p 3 stents (2016), GERD, HLD, kidney stones who presented after a syncopal episode while standing this morning. Patient was walking to bathroom, became lightheaded and almost passed out, but son was present to hold him up. Denies hitting his head or losing consciousness. Patient's son states that patient developed a productive cough (green sputum with some black particles; no bright red blood) 2 days ago. Yesterday, patient was having trouble swallowing, so son took him to the [facility] [[facility]] where his PCP's office is; patient was started on Azithromycin 250 mg and has been taking it for 1 day now. Son reports patient has not been drinking much water over the past few days, but has been eating. Son states this is patient's baseline and denies any acute mental status changes.    ED COURSE:  Vitals: T 99.4   F , 61 HR  ,  /56 , 17 RR  , SpO2  95% on RA  Labs significant for: WBC 15.6 -> 11.07, phos 2, p-  UA: +bacteria, + leukocyte esterase  Imaging: X-ray chest: stable no acute findings  EKG: melvina with 2nd degree AV block, Wenckeback with heart rates in the 50's  Pt received: 1G IV cefepime, 1L Saline   (26 Jul 2024 19:42)    ---  HOSPITAL COURSE:   Patient was admitted for 3 days since 7/26 for syncope workup. Patient was seen and evaluated by cardiology and vascular. Patient had 2nd degree AV Block found on EKG, cardiology believes unlikely the cause of syncope. Telemetry was d/c'd after cardio rec due to avb being unlikely cause of syncope. Orthostatic tests performed for syncope causes were negative. CTA showed Left ICA 90% stenosis. Vascular saw the patient and recommended no surgical intervention needed, continue aspirin and statin. U cx and UA demonstrated 50-100k e.coli with + bacteria. Patient was treated with ceftriaxone for UTI. Patient had a URI prior to coming to the hospital and was started on a Zpack at home. Zpack was continued in the  hospital. Patient also found to have elevated bilirubin during his hospital course. RUQ US suggested cholelithiasis and possible Gallbladder   wall thickening. Top normal CBD at 7 mm. Patient with no GI complaints. GI saw the patient and rec no further workup needed. Patient's home medications for were continued inpatient aspirin + statin. Patient underwent TTE which showed 50% EF. PT saw the patient and rec home PT with 24/7 assistance. Social work/case management followed the patient case. Patient is stable for discharge for Home.    Patient was explained hospital course, risks and benefits of treatment, and discharge planning, along with follow-up. Patient expresses understanding of all of the above. Patient is medically stable for discharge with appropriate followup.    T(C): 36.4 (07-29-24 @ 11:20), Max: 36.5 (07-28-24 @ 20:43)  HR: 61 (07-29-24 @ 11:20) (61 - 77)  BP: 138/68 (07-29-24 @ 11:20) (127/80 - 158/71)  RR: 16 (07-29-24 @ 11:20) (16 - 18)  SpO2: 95% (07-29-24 @ 11:20) (95% - 98%)      PHYSICAL EXAM:  GENERAL: NAD  HEENT:  anicteric, moist mucous membranes  CHEST/LUNG:  CTA b/l, no rales, wheezes, or rhonchi  HEART:  RRR, S1, S2  ABDOMEN:  BS+, soft, nontender, nondistended  EXTREMITIES: no edema, cyanosis, or calf tenderness        ---  CONSULTANTS:   Cardiology Dr. Willett  Vascular Dr. Adame    ---  TIME SPENT:  I, the attending physician, was physically present for the key portions of the evaluation and management (E/M) service provided. The total amount of time spent reviewing the hospital notes, laboratory values, imaging findings, assessing/counseling the patient, discussing with consultant physicians, social work, nursing staff was 36 minutes    ---

## 2024-07-29 NOTE — DISCHARGE NOTE PROVIDER - CARE PROVIDER_API CALL
Angeline Ness.  Internal Medicine  [address] [place] [id number]  Phone: [phone]  Fax: [phone]  Follow Up Time: 2 weeks

## 2024-07-29 NOTE — CARE COORDINATION ASSESSMENT. - OTHER PERTINENT DISCHARGE PLANNING INFORMATION:
GONSALO met with the patient and his son at the bedside and explained role of CM and transition planning. Patient requested CM speak to his son at the bedside. Declined . Patient lives with son in a private home. 8 stairs to enter outside; 8 stairs inside. Patient ambulates with a RW PTA. Has a cane, RW and wheelchair. No home O2. Son assist with medication management and transportation. Son is the patient's CDPAP aide through [employer] agency. Denies home care services PTA. GONSALO provided direct contact/resource folder and remains available.    GONSALO made contact with [employer] [phone] and was transferred to GONSALO GREGG [phone] to discuss transition planning. Minna provided GONSALO with fax # for discharge documentation [phone]. GONSALO provided Minna with direct contact number.

## 2024-07-29 NOTE — DISCHARGE NOTE NURSING/CASE MANAGEMENT/SOCIAL WORK - PATIENT PORTAL LINK FT
You can access the FollowMyHealth Patient Portal offered by [facility] by registering at the following website: [email or web address]. By joining [facility]’s FollowMyHealth portal, you will also be able to view your health information using other applications (apps) compatible with our system.

## 2024-07-30 LAB
-  AMPICILLIN/SULBACTAM: SIGNIFICANT CHANGE UP
-  AMPICILLIN: SIGNIFICANT CHANGE UP
-  AZTREONAM: SIGNIFICANT CHANGE UP
-  CEFAZOLIN: SIGNIFICANT CHANGE UP
-  CEFEPIME: SIGNIFICANT CHANGE UP
-  CEFTRIAXONE: SIGNIFICANT CHANGE UP
-  CEFUROXIME: SIGNIFICANT CHANGE UP
-  CIPROFLOXACIN: SIGNIFICANT CHANGE UP
-  ERTAPENEM: SIGNIFICANT CHANGE UP
-  GENTAMICIN: SIGNIFICANT CHANGE UP
-  IMIPENEM: SIGNIFICANT CHANGE UP
-  LEVOFLOXACIN: SIGNIFICANT CHANGE UP
-  MEROPENEM: SIGNIFICANT CHANGE UP
-  NITROFURANTOIN: SIGNIFICANT CHANGE UP
-  PIPERACILLIN/TAZOBACTAM: SIGNIFICANT CHANGE UP
-  TOBRAMYCIN: SIGNIFICANT CHANGE UP
-  TRIMETHOPRIM/SULFAMETHOXAZOLE: SIGNIFICANT CHANGE UP
CULTURE RESULTS: ABNORMAL
METHOD TYPE: SIGNIFICANT CHANGE UP
ORGANISM # SPEC MICROSCOPIC CNT: ABNORMAL
ORGANISM # SPEC MICROSCOPIC CNT: SIGNIFICANT CHANGE UP
SPECIMEN SOURCE: SIGNIFICANT CHANGE UP

## 2024-08-06 ENCOUNTER — APPOINTMENT (OUTPATIENT)
Dept: CARDIOLOGY | Facility: CLINIC | Age: 89
End: 2024-08-06

## 2024-08-06 PROBLEM — R55 SYNCOPE: Status: ACTIVE | Noted: 2024-08-06

## 2024-08-06 PROCEDURE — 99214 OFFICE O/P EST MOD 30 MIN: CPT

## 2024-08-06 PROCEDURE — 93242 EXT ECG>48HR<7D RECORDING: CPT

## 2024-08-06 NOTE — CARDIOLOGY SUMMARY
[de-identified] : NSr with First degree AV block  [de-identified] : 7/24/2024 CONCLUSIONS:    1. Left ventricular systolic function is low-normal with an ejection fraction visually estimated at 50 %. Global left ventricular hypokinesis.  2. Trileaflet aortic valve with normal systolic excursion. There is calcification of the aortic valve leaflets.  3. Trace mitral regurgitation.  4. Trace tricuspid regurgitation.  5. Pulmonic valve was not well visualized.  6. No pericardial effusion seen.

## 2024-08-06 NOTE — ED ADULT TRIAGE NOTE - WEIGHT IN LBS
Quality 226: Preventive Care And Screening: Tobacco Use: Screening And Cessation Intervention: Patient screened for tobacco use and is an ex/non-smoker Detail Level: Detailed 160

## 2024-08-06 NOTE — REVIEW OF SYSTEMS
[Feeling Fatigued] : feeling fatigued [Hearing Loss] : hearing loss [SOB] : shortness of breath [Dyspnea on exertion] : dyspnea during exertion [Lower Ext Edema] : lower extremity edema [Syncope] : syncope [Wheezing] : wheezing [Joint Pain] : joint pain [Dizziness] : dizziness [Weakness] : weakness [Confusion] : confusion was observed [Memory Lapses Or Loss] : memory lapses or loss [Negative] : Gastrointestinal [Headache] : no headache [Chest Discomfort] : no chest discomfort [Leg Claudication] : no intermittent leg claudication [Palpitations] : no palpitations [Orthopnea] : no orthopnea [Easy Bleeding] : no tendency for easy bleeding

## 2024-08-06 NOTE — PHYSICAL EXAM
[Well Developed] : well developed [Frail] : frail [Normal Venous Pressure] : normal venous pressure [Carotid Bruit] : carotid bruit [S4] : S4 [Murmur] : murmur [Clear Lung Fields] : clear lung fields [Good Air Entry] : good air entry [Soft] : abdomen soft [Non Tender] : non-tender [Abnormal Gait] : abnormal gait [Normal Radial B/L] : normal radial B/L [Normal PT B/L] : normal PT B/L [Edema ___] : edema [unfilled] [Normal] : no rash, no skin lesions [Memory Deficit] : memory deficit [Alert and Oriented] : alert and oriented [Cognitive Impairment] : cognitive impairment [de-identified] : L  [de-identified] : esm

## 2024-08-06 NOTE — HISTORY OF PRESENT ILLNESS
[FreeTextEntry1] : Patient was hospitalized at [place] due to syncope, Work up revealed Send degree AV block and Tight L ICA stenosis his Lipitor was increased to 80mg and asked to follow up with Cardiologist

## 2024-08-06 NOTE — ASSESSMENT
[FreeTextEntry1] : Syncope will place Zio patch and correlate higher block with symptoms if so consider PPM , discussed with Son   CPD has improved on antibiotics and nebulizer treatments  Carotid Disease will treat Medically aggressive risk factor

## 2024-08-06 NOTE — REASON FOR VISIT
[Symptom and Test Evaluation] : symptom and test evaluation [Arrhythmia/ECG Abnorrmalities] : arrhythmia/ECG abnormalities [Hyperlipidemia] : hyperlipidemia [Carotid, Aortic and Peripheral Vascular Disease] : carotid, aortic and peripheral vascular disease

## 2024-08-21 PROCEDURE — 93248 EXT ECG>7D<15D REV&INTERPJ: CPT

## 2024-08-27 NOTE — DIETITIAN INITIAL EVALUATION ADULT - ENTER TO (ML/KG)
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) 2024     Urinary retention 2024     Syncope 2024     Elevated lactic acid level 2024     Type 2 diabetes mellitus without complication, without long-term current use of insulin (HCC) 2024     Leukocytosis 2024     Abnormal CPK 2024       LOS (days): 4  Geometric Mean LOS (GMLOS) (days): 4.3  Days to GMLOS:0.3     OBJECTIVE:  Risk of Unplanned Readmission Score: 11.1      Current admission status: Inpatient   Preferred Pharmacy:   [id number] [place], [place] - [address] [facility]  [address] [place] [address]  Phone: [phone] Fax: [phone]    Primary Care Provider: Joe Lyon MD  [facility]  Secondary Insurance:     DISCHARGE DETAILS:    Discharge planning discussed with:: Spouse at bedside.  Freedom of Choice: Yes  Comments - Freedom of Choice: [facility] -  provided patient choice list from [facility] with two accepting providers.  [facility]  Also notified of need for transport tomorrow.  CM contacted family/caregiver?: Yes  Were Treatment Team discharge recommendations reviewed with patient/caregiver?: Yes  Did patient/caregiver verbalize understanding of patient care needs?: N/A- going to facility  Were patient/caregiver advised of the risks associated with not following Treatment Team discharge  recommendations?: Yes    Contacts  Patient Contacts: Toyin (spouse)  Relationship to Patient:: Family  Contact Method: In Person  Reason/Outcome: Continuity of Care, Discharge Planning    Requested Home Health Care         Is the patient interested in HHC at discharge?: No    DME Referral Provided  Referral made for DME?: No    Other Referral/Resources/Interventions Provided:  Interventions: Short Term Rehab, Transportation  Referral Comments: [facility] reserved in AIDIN, contacts updated & PASRR attached.  [facility] notified [facility] need for [facility] transport.  (Per Reva, [facility] will provide auth directly to SNF.)    Would you like to participate in our [facility] service program?  : No - Declined    Treatment Team Recommendation: Short Term Rehab  Discharge Destination Plan:: Short Term Rehab  Transport at Discharge : Stretcher van  Dispatcher Contacted: No     IMM Given (Date):: 08/27/24  IMM Given to:: Family (Verbal review of IMM with spouse.  Original to medical records.)    Accepting Facility Name, City & State : [facility] - [address] [place] [address]  Receiving Facility/Agency Phone Number: Phone: [phone]  Facility/Agency Fax Number: Fax: [phone]        30

## 2024-08-30 NOTE — PROGRESS NOTE ADULT - PROBLEM SELECTOR PROBLEM 5
[FreeTextEntry1] : 17 y/o F with malnutrition secondary to AN seen for f/u. Doing better since last visit. Recommend continuing current diet. Continue close follow up with DP team. Support and encouragement provided. F/u next week.  Imaging abnormalities

## 2024-10-02 ENCOUNTER — NON-APPOINTMENT (OUTPATIENT)
Age: 89
End: 2024-10-02

## 2024-10-02 ENCOUNTER — APPOINTMENT (OUTPATIENT)
Dept: UROLOGY | Facility: CLINIC | Age: 89
End: 2024-10-02

## 2024-10-02 VITALS — DIASTOLIC BLOOD PRESSURE: 65 MMHG | SYSTOLIC BLOOD PRESSURE: 99 MMHG

## 2024-10-02 DIAGNOSIS — N39.0 URINARY TRACT INFECTION, SITE NOT SPECIFIED: ICD-10-CM

## 2024-10-02 DIAGNOSIS — N13.8 BENIGN PROSTATIC HYPERPLASIA WITH LOWER URINARY TRACT SYMPMS: ICD-10-CM

## 2024-10-02 DIAGNOSIS — N40.1 BENIGN PROSTATIC HYPERPLASIA WITH LOWER URINARY TRACT SYMPMS: ICD-10-CM

## 2024-10-02 PROCEDURE — 99213 OFFICE O/P EST LOW 20 MIN: CPT

## 2024-10-02 NOTE — HISTORY OF PRESENT ILLNESS
[FreeTextEntry1] : 89 yr old male presents to follow up with kidney stones. He is A&O x 1 and accompanied by a family member.   Family states that his urination is back to normal, voiding every 4-6 hours. Pt and family also complaining of bilateral foot swelling.   s/p  right ureteroscopy on  3/11/24 Stone analysis  50% CP, 30% COM, 20% COD  No complaints today.  Pt completed 10 days of Macrobid bid.  Pt taking Hiprex and vitamin C BID.  [None] : no symptoms

## 2024-10-02 NOTE — ASSESSMENT
[FreeTextEntry1] : Renal US 07/2024- left kidney 4.5 mm kidney stone. Known cyst in the left kidney, about 1.6cm. No hydro.  Family will email us the previous urine culture for review  Would continue to monitor  plan 1) RTC for renal US in January 2025 as scheduled

## 2024-10-10 ENCOUNTER — APPOINTMENT (OUTPATIENT)
Dept: CARDIOLOGY | Facility: CLINIC | Age: 89
End: 2024-10-10
Payer: MEDICARE

## 2024-10-10 VITALS
HEART RATE: 84 BPM | SYSTOLIC BLOOD PRESSURE: 101 MMHG | HEIGHT: 66 IN | WEIGHT: 152 LBS | BODY MASS INDEX: 24.43 KG/M2 | DIASTOLIC BLOOD PRESSURE: 60 MMHG

## 2024-10-10 DIAGNOSIS — I25.118 ATHEROSCLEROTIC HEART DISEASE OF NATIVE CORONARY ARTERY WITH OTHER FORMS OF ANGINA PECTORIS: ICD-10-CM

## 2024-10-10 DIAGNOSIS — R55 SYNCOPE AND COLLAPSE: ICD-10-CM

## 2024-10-10 DIAGNOSIS — R06.02 SHORTNESS OF BREATH: ICD-10-CM

## 2024-10-10 DIAGNOSIS — I48.0 PAROXYSMAL ATRIAL FIBRILLATION: ICD-10-CM

## 2024-10-10 DIAGNOSIS — I44.30 UNSPECIFIED ATRIOVENTRICULAR BLOCK: ICD-10-CM

## 2024-10-10 DIAGNOSIS — I50.32 CHRONIC DIASTOLIC (CONGESTIVE) HEART FAILURE: ICD-10-CM

## 2024-10-10 PROCEDURE — 99214 OFFICE O/P EST MOD 30 MIN: CPT

## 2024-10-10 RX ORDER — PNV NO.95/FERROUS FUM/FOLIC AC 28MG-0.8MG
TABLET ORAL DAILY
Refills: 0 | Status: ACTIVE | COMMUNITY

## 2024-10-10 RX ORDER — CHROMIUM 200 MCG
1000 TABLET ORAL
Refills: 0 | Status: ACTIVE | COMMUNITY

## 2024-10-10 RX ORDER — MULTIVIT-MIN/FOLIC/VIT K/LYCOP 400-300MCG
TABLET ORAL DAILY
Refills: 0 | Status: ACTIVE | COMMUNITY

## 2024-10-10 RX ORDER — MEMANTINE HYDROCHLORIDE 5 MG/1
5 TABLET, FILM COATED ORAL DAILY
Refills: 0 | Status: ACTIVE | COMMUNITY

## 2024-10-30 NOTE — PHYSICAL THERAPY INITIAL EVALUATION ADULT - PERTINENT HX OF CURRENT PROBLEM, REHAB EVAL
Pt is 89 y/o M with CAD s/p two cardiac stents (2014), BPH s/p prostate surgery, L sided kidney stone, hx of TIA who presented to [facility] scheduled for a cystoscopy left ureteroscopy left percutaneous nephrostolithotomy on 7/19. CT renal stone hunt showed Moderate left hydroureteronephrosis secondary to a distal left ureteral calculus measuring up to 12 mm. S/P cystoscopy with stent placement 4/30. room air

## 2024-11-20 LAB
AMORPHOUS PHOSPHATE CRYSTALS: PRESENT
APPEARANCE: ABNORMAL
BACTERIA UR CULT: NORMAL
BACTERIA: ABNORMAL /HPF
BILIRUBIN URINE: NEGATIVE
BLOOD URINE: ABNORMAL
CALCIUM OXALATE CRYSTALS: PRESENT
CAST: NORMAL /LPF
COLOR: YELLOW
EPITHELIAL CELLS: 2 /HPF
GLUCOSE QUALITATIVE U: NEGATIVE MG/DL
KETONES URINE: NEGATIVE MG/DL
LEUKOCYTE ESTERASE URINE: ABNORMAL
MICROSCOPIC-UA: NORMAL
NITRITE URINE: POSITIVE
PH URINE: 7.5
PROTEIN URINE: NORMAL MG/DL
RED BLOOD CELLS URINE: 3 /HPF
REVIEW: NORMAL
SPECIFIC GRAVITY URINE: 1.01
UROBILINOGEN URINE: 0.2 MG/DL
WHITE BLOOD CELLS URINE: 278 /HPF

## 2024-11-20 RX ORDER — CEPHALEXIN 500 MG/1
500 TABLET ORAL
Qty: 20 | Refills: 0 | Status: ACTIVE | COMMUNITY
Start: 2024-11-20 | End: 1900-01-01

## 2024-11-21 RX ORDER — CEPHALEXIN 500 MG/1
500 CAPSULE ORAL TWICE DAILY
Qty: 20 | Refills: 0 | Status: ACTIVE | COMMUNITY
Start: 2024-11-21 | End: 1900-01-01

## 2024-12-07 LAB
APPEARANCE: ABNORMAL
BACTERIA: ABNORMAL /HPF
BILIRUBIN URINE: NEGATIVE
BLOOD URINE: NEGATIVE
CAST: 0 /LPF
COLOR: YELLOW
EPITHELIAL CELLS: 0 /HPF
GLUCOSE QUALITATIVE U: NEGATIVE MG/DL
KETONES URINE: NEGATIVE MG/DL
LEUKOCYTE ESTERASE URINE: ABNORMAL
MICROSCOPIC-UA: NORMAL
NITRITE URINE: POSITIVE
PH URINE: 7.5
PROTEIN URINE: NEGATIVE MG/DL
RED BLOOD CELLS URINE: 1 /HPF
SPECIFIC GRAVITY URINE: 1.01
UROBILINOGEN URINE: 0.2 MG/DL
WHITE BLOOD CELLS URINE: 96 /HPF

## 2024-12-09 ENCOUNTER — NON-APPOINTMENT (OUTPATIENT)
Age: 88
End: 2024-12-09

## 2024-12-10 RX ORDER — SULFAMETHOXAZOLE AND TRIMETHOPRIM 800; 160 MG/1; MG/1
800-160 TABLET ORAL TWICE DAILY
Qty: 42 | Refills: 0 | Status: ACTIVE | COMMUNITY
Start: 2024-12-10 | End: 1900-01-01

## 2024-12-11 LAB — BACTERIA UR CULT: ABNORMAL

## 2024-12-12 RX ORDER — ERTAPENEM SODIUM 1 G/1
1 INJECTION, POWDER, LYOPHILIZED, FOR SOLUTION INTRAMUSCULAR; INTRAVENOUS
Qty: 14 | Refills: 0 | Status: ACTIVE | COMMUNITY
Start: 2024-12-12 | End: 1900-01-01

## 2024-12-13 ENCOUNTER — LABORATORY RESULT (OUTPATIENT)
Age: 88
End: 2024-12-13

## 2024-12-17 ENCOUNTER — TRANSCRIPTION ENCOUNTER (OUTPATIENT)
Age: 88
End: 2024-12-17

## 2024-12-17 ENCOUNTER — OUTPATIENT (OUTPATIENT)
Dept: OUTPATIENT SERVICES | Facility: HOSPITAL | Age: 88
LOS: 1 days | End: 2024-12-17
Payer: COMMERCIAL

## 2024-12-17 ENCOUNTER — RESULT REVIEW (OUTPATIENT)
Age: 88
End: 2024-12-17

## 2024-12-17 VITALS
SYSTOLIC BLOOD PRESSURE: 116 MMHG | DIASTOLIC BLOOD PRESSURE: 59 MMHG | TEMPERATURE: 97 F | RESPIRATION RATE: 18 BRPM | HEART RATE: 90 BPM | OXYGEN SATURATION: 96 %

## 2024-12-17 DIAGNOSIS — Z98.890 OTHER SPECIFIED POSTPROCEDURAL STATES: Chronic | ICD-10-CM

## 2024-12-17 DIAGNOSIS — Z95.5 PRESENCE OF CORONARY ANGIOPLASTY IMPLANT AND GRAFT: Chronic | ICD-10-CM

## 2024-12-17 DIAGNOSIS — N39.0 URINARY TRACT INFECTION, SITE NOT SPECIFIED: ICD-10-CM

## 2024-12-17 PROCEDURE — C1751: CPT

## 2024-12-17 PROCEDURE — 36573 INSJ PICC RS&I 5 YR+: CPT

## 2024-12-17 RX ORDER — CEPHALEXIN 500 MG
0 CAPSULE ORAL
Refills: 0 | DISCHARGE

## 2024-12-17 NOTE — PROCEDURE NOTE - NSINFORMCONSENT_GEN_A_CORE
from daughter/HCP/Benefits, risks, and possible complications of procedure explained to patient/caregiver who verbalized understanding and gave verbal consent.

## 2024-12-17 NOTE — PROCEDURE NOTE - NSPROCDETAILS_GEN_ALL_CORE
guidewire recovered/lumen(s) aspirated and flushed/sterile dressing applied/sterile technique, catheter placed/ultrasound guidance with use of sterile gel and probe cove sterile dressing applied/sterile technique, catheter placed

## 2024-12-17 NOTE — ASU PATIENT PROFILE, ADULT - REASON FOR ADMISSION, PROFILE
03/11/23 1446   Post-Hemodialysis Assessment   Blood Volume Processed (Liters) 99 L   Dialyzer Clearance Lightly streaked   Duration of Treatment 360 minutes   Total UF (mL) 3000 mL   Patient Response to Treatment Tolerated tx, 3 L UF, additional setup needed dt clotted system.   Post-Hemodialysis Comments Tx ended, Pt reinfused.        picc line placement

## 2024-12-17 NOTE — ASU DISCHARGE PLAN (ADULT/PEDIATRIC) - FINANCIAL ASSISTANCE
[facility] provides services at a reduced cost to those who are determined to be eligible through [facility]’s financial assistance program. Information regarding [facility]’s financial assistance program can be found by going to [email or web address] or by calling [phone].

## 2024-12-17 NOTE — ASU DISCHARGE PLAN (ADULT/PEDIATRIC) - NURSING INSTRUCTIONS
Please feel free to contact us at [phone] if any problems arise. After 6PM, Monday through Friday, on weekends and on holidays, please call [phone] and ask for the radiology resident on call to be paged.

## 2024-12-17 NOTE — PROCEDURE NOTE - ADDITIONAL PROCEDURE DETAILS
Successful placement of right brachial PICC over a guidewire under fluoroscopic guidance. Successful placement of single lumen right brachial PICC over a guidewire under fluoroscopic guidance.

## 2024-12-17 NOTE — ASU DISCHARGE PLAN (ADULT/PEDIATRIC) - ASU DC SPECIAL INSTRUCTIONSFT
PICC Placement    Discharge Instructions  - You have had a PICC implanted in your arm.   - The PICC is ready for use.  - You may shower as long as the PICC and dressing remains dry.  -  No soaking or swimming until the PICC is removed or when the site is completely healed.  - Keep the area covered and dry for as long as the PICC remains in. It may be removed and changed by a nurse as needed.  - Do not perform any heavy lifting or put tension on the area for the next week or until the site is healed.  - You may resume your normal diet.  - You may resume your normal medications however you should wait 48 hours before restarting aspirin, plavix, or blood thinners.  - It is normal to experience some pain over the site for the next few days. You may take apply ice to the area (20 minutes on, 20 minutes off) and take Tylenol for that pain. Do not take more frequently than every 6 hours and do not exceed more than 3000mg of Tylenol in a 24 hour period.    Notify your primary physician and/or Interventional Radiology IMMEDIATELY if you experience any of the following       - Fever of 100.4F or 38C       - Chills or Rigors/ Shakes       - Swelling and/or Redness in the area around the port       - Worsening Pain       - Blood soaked bandages or worsening bleeding       - Lightheadedness and/or dizziness upon standing       - Chest Pain/ Tightness       - Shortness of Breath       - Difficulty walking    If you have a problem that you believe requires IMMEDIATE attention, please go to your NEAREST Emergency Room. If you believe your problem can safely wait until you speak to a physician, please call Interventional Radiology for any concerns.      Please feel free to contact us at [phone] if any problems arise. After 6PM, Monday through Friday, on weekends and on holidays, please call [phone] and ask for the radiology resident on call to be paged.

## 2024-12-17 NOTE — PROCEDURE NOTE - NSINDICATIONS_GEN_A_CORE
Outpatient Physical Therapy  DAILY TREATMENT     [facility] [address]  [address]  [place] [address]  Phone:  [phone]  Fax:  [phone]    Date: 06/21/2018    Patient: Rene Chan  YOB: 1949  MRN: [id number]     Time Calculation  Start time: 1000  Stop time: 1100 Time Calculation (min): 60 minutes     Chief Complaint: Back Pain    Visit #: 3    SUBJECTIVE:  Still feeling better despite long car ride. Reports aching in L ankle only today.    OBJECTIVE:      Therapeutic Exercises (CPT 63615):     1. Wall ball squats, x30    2. Supine ext over ball, x3 min    3. Ski jump, 3x1 min    4. Foam Roller, pec stretch, alt UE OH    Therapeutic Treatments and Modalities:     1. Manual Therapy (CPT 27697), T/S rot mobs, Transverse glides TL jct, Central and L unilateral PAs L3-5, CFM ILL B, Sacral float    2. Mechanical Traction (CPT 89165), L/S w/MHP, 80/50#, 60/20s, x15 min    Time-based treatments/modalities:  Manual therapy minutes (CPT 14871): 15 minutes  Therapeutic exercise minutes (CPT 99701): 25 minutes       ASSESSMENT:   Pt w/more notable limp today and trunk lean L. Trialed 1cm heel lift w/lateral build up. Pt w/improved quality of gait    PLAN/RECOMMENDATIONS:   Plan for treatment: therapy treatment to continue next visit.  Progress L/S stab exercises, MFR L LE, foam roller       venous access antibiotic therapy

## 2024-12-17 NOTE — H&P ADULT - HISTORY OF PRESENT ILLNESS
Interventional Radiology    HPI: 89yo male with PMH of dementia, recurrent UTIs, BPH, CAD s/p 3 stents (2016), GERD, HLD, kidney stones. Now wtith UTI requring 2 week course of antibiotics, presents to IR for PICC Line placement. In discussion with daughter at bedside,       Review of Systems:   Constitutional: No fever, weight loss, or fatigue  Neurological: No headaches, memory loss, loss of strength, numbness, or tremors  Respiratory: No cough, wheezing, chills or hemoptysis; No shortness of breath  Cardiovascular: No chest pain, palpitations, dizziness, or leg swelling  Gastrointestinal: No abdominal or epigastric pain. No nausea, vomiting, or hematemesis; No diarrhea or constipation. No melena or hematochezia.  Skin: No itching, burning, rashes, or lesions   Musculoskeletal: No joint pain or swelling; No muscle, back, or extremity pain    Allergies: No Known Allergies    Medications (Abx/Cardiac/Anticoagulation/Blood Products)      Data:    T(C): --  HR: --  BP: --  RR: --  SpO2: --            Physical Exam  General: No acute distress, nontoxic, A&Ox3  Chest: Non labored breathing  Abdomen: Non-distended, non-tender, no peritoneal signs  Extremities: No swelling, warm, pulses +.   Skin: No rashes or lesions    RADIOLOGY & ADDITIONAL TESTS:    Imaging Reviewed    H & P Note Reviewed from: 7/26/24    Plan: 89y Male presents for PICC line placement.   -Risks/Benefits/alternatives explained with the patient and/or healthcare proxy and witnessed informed consent obtained. pt is a DNR/DNI however doesn't have hard copy with her but wants to keep in place during procedure.. MOLST form completed and placed in chart.

## 2024-12-27 DIAGNOSIS — N39.0 URINARY TRACT INFECTION, SITE NOT SPECIFIED: ICD-10-CM

## 2024-12-27 DIAGNOSIS — Z45.2 ENCOUNTER FOR ADJUSTMENT AND MANAGEMENT OF VASCULAR ACCESS DEVICE: ICD-10-CM

## 2025-01-03 ENCOUNTER — INPATIENT (INPATIENT)
Facility: HOSPITAL | Age: 89
LOS: 5 days | Discharge: NURSING, REHAB OR LONG-TERM CARE | DRG: 948 | End: 2025-01-09
Attending: STUDENT IN AN ORGANIZED HEALTH CARE EDUCATION/TRAINING PROGRAM | Admitting: INTERNAL MEDICINE
Payer: COMMERCIAL

## 2025-01-03 VITALS
DIASTOLIC BLOOD PRESSURE: 86 MMHG | HEART RATE: 74 BPM | WEIGHT: 160.06 LBS | SYSTOLIC BLOOD PRESSURE: 146 MMHG | OXYGEN SATURATION: 98 % | TEMPERATURE: 97 F | RESPIRATION RATE: 16 BRPM

## 2025-01-03 DIAGNOSIS — I25.10 ATHEROSCLEROTIC HEART DISEASE OF NATIVE CORONARY ARTERY WITHOUT ANGINA PECTORIS: ICD-10-CM

## 2025-01-03 DIAGNOSIS — R41.82 ALTERED MENTAL STATUS, UNSPECIFIED: ICD-10-CM

## 2025-01-03 DIAGNOSIS — Z95.5 PRESENCE OF CORONARY ANGIOPLASTY IMPLANT AND GRAFT: Chronic | ICD-10-CM

## 2025-01-03 DIAGNOSIS — E78.5 HYPERLIPIDEMIA, UNSPECIFIED: ICD-10-CM

## 2025-01-03 DIAGNOSIS — Z98.890 OTHER SPECIFIED POSTPROCEDURAL STATES: Chronic | ICD-10-CM

## 2025-01-03 DIAGNOSIS — Z29.9 ENCOUNTER FOR PROPHYLACTIC MEASURES, UNSPECIFIED: ICD-10-CM

## 2025-01-03 DIAGNOSIS — N40.0 BENIGN PROSTATIC HYPERPLASIA WITHOUT LOWER URINARY TRACT SYMPTOMS: ICD-10-CM

## 2025-01-03 LAB
ALBUMIN SERPL ELPH-MCNC: 2.7 G/DL — LOW (ref 3.3–5)
ALP SERPL-CCNC: 132 U/L — HIGH (ref 40–120)
ALT FLD-CCNC: 15 U/L — SIGNIFICANT CHANGE UP (ref 12–78)
ANION GAP SERPL CALC-SCNC: 6 MMOL/L — SIGNIFICANT CHANGE UP (ref 5–17)
APPEARANCE UR: CLEAR — SIGNIFICANT CHANGE UP
APTT BLD: 31 SEC — SIGNIFICANT CHANGE UP (ref 24.5–35.6)
AST SERPL-CCNC: 39 U/L — HIGH (ref 15–37)
BASOPHILS # BLD AUTO: 0.03 K/UL — SIGNIFICANT CHANGE UP (ref 0–0.2)
BASOPHILS NFR BLD AUTO: 0.4 % — SIGNIFICANT CHANGE UP (ref 0–2)
BILIRUB SERPL-MCNC: 1.6 MG/DL — HIGH (ref 0.2–1.2)
BILIRUB UR-MCNC: NEGATIVE — SIGNIFICANT CHANGE UP
BUN SERPL-MCNC: 12 MG/DL — SIGNIFICANT CHANGE UP (ref 7–23)
CALCIUM SERPL-MCNC: 9.2 MG/DL — SIGNIFICANT CHANGE UP (ref 8.5–10.1)
CHLORIDE SERPL-SCNC: 112 MMOL/L — HIGH (ref 96–108)
CO2 SERPL-SCNC: 26 MMOL/L — SIGNIFICANT CHANGE UP (ref 22–31)
COLOR SPEC: YELLOW — SIGNIFICANT CHANGE UP
CREAT SERPL-MCNC: 0.68 MG/DL — SIGNIFICANT CHANGE UP (ref 0.5–1.3)
DIFF PNL FLD: NEGATIVE — SIGNIFICANT CHANGE UP
EGFR: 86 ML/MIN/1.73M2 — SIGNIFICANT CHANGE UP
EOSINOPHIL # BLD AUTO: 0.27 K/UL — SIGNIFICANT CHANGE UP (ref 0–0.5)
EOSINOPHIL NFR BLD AUTO: 3.2 % — SIGNIFICANT CHANGE UP (ref 0–6)
FLUAV AG NPH QL: SIGNIFICANT CHANGE UP
FLUBV AG NPH QL: SIGNIFICANT CHANGE UP
GLUCOSE SERPL-MCNC: 95 MG/DL — SIGNIFICANT CHANGE UP (ref 70–99)
GLUCOSE UR QL: NEGATIVE MG/DL — SIGNIFICANT CHANGE UP
HCT VFR BLD CALC: 40.5 % — SIGNIFICANT CHANGE UP (ref 39–50)
HGB BLD-MCNC: 13.4 G/DL — SIGNIFICANT CHANGE UP (ref 13–17)
IMM GRANULOCYTES NFR BLD AUTO: 0.2 % — SIGNIFICANT CHANGE UP (ref 0–0.9)
INR BLD: 1.16 RATIO — SIGNIFICANT CHANGE UP (ref 0.85–1.16)
KETONES UR-MCNC: NEGATIVE MG/DL — SIGNIFICANT CHANGE UP
LACTATE SERPL-SCNC: 1.8 MMOL/L — SIGNIFICANT CHANGE UP (ref 0.7–2)
LEUKOCYTE ESTERASE UR-ACNC: ABNORMAL
LYMPHOCYTES # BLD AUTO: 1.67 K/UL — SIGNIFICANT CHANGE UP (ref 1–3.3)
LYMPHOCYTES # BLD AUTO: 20 % — SIGNIFICANT CHANGE UP (ref 13–44)
MCHC RBC-ENTMCNC: 31.1 PG — SIGNIFICANT CHANGE UP (ref 27–34)
MCHC RBC-ENTMCNC: 33.1 G/DL — SIGNIFICANT CHANGE UP (ref 32–36)
MCV RBC AUTO: 94 FL — SIGNIFICANT CHANGE UP (ref 80–100)
MONOCYTES # BLD AUTO: 0.8 K/UL — SIGNIFICANT CHANGE UP (ref 0–0.9)
MONOCYTES NFR BLD AUTO: 9.6 % — SIGNIFICANT CHANGE UP (ref 2–14)
NEUTROPHILS # BLD AUTO: 5.55 K/UL — SIGNIFICANT CHANGE UP (ref 1.8–7.4)
NEUTROPHILS NFR BLD AUTO: 66.6 % — SIGNIFICANT CHANGE UP (ref 43–77)
NITRITE UR-MCNC: NEGATIVE — SIGNIFICANT CHANGE UP
NRBC # BLD: 0 /100 WBCS — SIGNIFICANT CHANGE UP (ref 0–0)
PH UR: 7.5 — SIGNIFICANT CHANGE UP (ref 5–8)
PLATELET # BLD AUTO: 129 K/UL — LOW (ref 150–400)
POTASSIUM SERPL-MCNC: 3.9 MMOL/L — SIGNIFICANT CHANGE UP (ref 3.5–5.3)
POTASSIUM SERPL-SCNC: 3.9 MMOL/L — SIGNIFICANT CHANGE UP (ref 3.5–5.3)
PROT SERPL-MCNC: 6.4 G/DL — SIGNIFICANT CHANGE UP (ref 6–8.3)
PROT UR-MCNC: NEGATIVE MG/DL — SIGNIFICANT CHANGE UP
PROTHROM AB SERPL-ACNC: 13.5 SEC — HIGH (ref 9.9–13.4)
RBC # BLD: 4.31 M/UL — SIGNIFICANT CHANGE UP (ref 4.2–5.8)
RBC # FLD: 14.6 % — HIGH (ref 10.3–14.5)
RSV RNA NPH QL NAA+NON-PROBE: SIGNIFICANT CHANGE UP
SARS-COV-2 RNA SPEC QL NAA+PROBE: SIGNIFICANT CHANGE UP
SODIUM SERPL-SCNC: 144 MMOL/L — SIGNIFICANT CHANGE UP (ref 135–145)
SP GR SPEC: 1.01 — SIGNIFICANT CHANGE UP (ref 1–1.03)
UROBILINOGEN FLD QL: 0.2 MG/DL — SIGNIFICANT CHANGE UP (ref 0.2–1)
WBC # BLD: 8.34 K/UL — SIGNIFICANT CHANGE UP (ref 3.8–10.5)
WBC # FLD AUTO: 8.34 K/UL — SIGNIFICANT CHANGE UP (ref 3.8–10.5)

## 2025-01-03 PROCEDURE — 71260 CT THORAX DX C+: CPT | Mod: 26,MC

## 2025-01-03 PROCEDURE — 99222 1ST HOSP IP/OBS MODERATE 55: CPT | Mod: GC

## 2025-01-03 PROCEDURE — 74177 CT ABD & PELVIS W/CONTRAST: CPT | Mod: 26,MC

## 2025-01-03 PROCEDURE — 71045 X-RAY EXAM CHEST 1 VIEW: CPT | Mod: 26

## 2025-01-03 PROCEDURE — 99285 EMERGENCY DEPT VISIT HI MDM: CPT

## 2025-01-03 PROCEDURE — 70450 CT HEAD/BRAIN W/O DYE: CPT | Mod: 26,MC

## 2025-01-03 PROCEDURE — 93010 ELECTROCARDIOGRAM REPORT: CPT

## 2025-01-03 RX ORDER — ACETAMINOPHEN 80 MG/.8ML
1000 SOLUTION/ DROPS ORAL ONCE
Refills: 0 | Status: COMPLETED | OUTPATIENT
Start: 2025-01-03 | End: 2025-01-03

## 2025-01-03 RX ORDER — FLUOCINONIDE 0.05 %
1 OINTMENT (GRAM) TOPICAL
Refills: 0 | DISCHARGE

## 2025-01-03 RX ORDER — MEMANTINE HYDROCHLORIDE 14 MG/1
5 CAPSULE, EXTENDED RELEASE ORAL
Refills: 0 | Status: DISCONTINUED | OUTPATIENT
Start: 2025-01-03 | End: 2025-01-09

## 2025-01-03 RX ORDER — LORAZEPAM 1 MG/1
0.25 TABLET ORAL
Refills: 0 | Status: DISCONTINUED | OUTPATIENT
Start: 2025-01-03 | End: 2025-01-04

## 2025-01-03 RX ORDER — SODIUM CHLORIDE 9 MG/ML
2300 INJECTION, SOLUTION INTRAMUSCULAR; INTRAVENOUS; SUBCUTANEOUS ONCE
Refills: 0 | Status: COMPLETED | OUTPATIENT
Start: 2025-01-03 | End: 2025-01-03

## 2025-01-03 RX ORDER — ACETAMINOPHEN 80 MG/.8ML
650 SOLUTION/ DROPS ORAL EVERY 6 HOURS
Refills: 0 | Status: DISCONTINUED | OUTPATIENT
Start: 2025-01-04 | End: 2025-01-09

## 2025-01-03 RX ORDER — LORAZEPAM 1 MG/1
0.25 TABLET ORAL ONCE
Refills: 0 | Status: DISCONTINUED | OUTPATIENT
Start: 2025-01-03 | End: 2025-01-03

## 2025-01-03 RX ORDER — ACETAMINOPHEN 80 MG/.8ML
1000 SOLUTION/ DROPS ORAL ONCE
Refills: 0 | Status: COMPLETED | OUTPATIENT
Start: 2025-01-04 | End: 2025-01-04

## 2025-01-03 RX ORDER — ENOXAPARIN SODIUM 60 MG/.6ML
40 INJECTION INTRAVENOUS; SUBCUTANEOUS EVERY 24 HOURS
Refills: 0 | Status: DISCONTINUED | OUTPATIENT
Start: 2025-01-03 | End: 2025-01-09

## 2025-01-03 RX ORDER — FUROSEMIDE 20 MG
1 TABLET ORAL
Refills: 0 | DISCHARGE

## 2025-01-03 RX ORDER — ASPIRIN 81 MG
81 TABLET, DELAYED RELEASE (ENTERIC COATED) ORAL DAILY
Refills: 0 | Status: DISCONTINUED | OUTPATIENT
Start: 2025-01-03 | End: 2025-01-09

## 2025-01-03 RX ORDER — SODIUM CHLORIDE 9 MG/ML
1000 INJECTION, SOLUTION INTRAMUSCULAR; INTRAVENOUS; SUBCUTANEOUS
Refills: 0 | Status: DISCONTINUED | OUTPATIENT
Start: 2025-01-03 | End: 2025-01-04

## 2025-01-03 RX ORDER — FINASTERIDE 5 MG
5 TABLET ORAL DAILY
Refills: 0 | Status: DISCONTINUED | OUTPATIENT
Start: 2025-01-03 | End: 2025-01-09

## 2025-01-03 RX ORDER — TAMSULOSIN HYDROCHLORIDE 0.4 MG/1
0.4 CAPSULE ORAL AT BEDTIME
Refills: 0 | Status: DISCONTINUED | OUTPATIENT
Start: 2025-01-03 | End: 2025-01-09

## 2025-01-03 RX ORDER — QUETIAPINE FUMARATE 100 MG/1
25 TABLET, FILM COATED ORAL AT BEDTIME
Refills: 0 | Status: DISCONTINUED | OUTPATIENT
Start: 2025-01-03 | End: 2025-01-09

## 2025-01-03 RX ORDER — ATORVASTATIN CALCIUM 40 MG/1
80 TABLET, FILM COATED ORAL AT BEDTIME
Refills: 0 | Status: DISCONTINUED | OUTPATIENT
Start: 2025-01-03 | End: 2025-01-09

## 2025-01-03 RX ORDER — LORAZEPAM 1 MG/1
0.25 TABLET ORAL EVERY 12 HOURS
Refills: 0 | Status: DISCONTINUED | OUTPATIENT
Start: 2025-01-03 | End: 2025-01-03

## 2025-01-03 RX ORDER — FLUOCINONIDE 0.05 %
1 OINTMENT (GRAM) TOPICAL
Refills: 0 | Status: DISCONTINUED | OUTPATIENT
Start: 2025-01-03 | End: 2025-01-09

## 2025-01-03 RX ADMIN — ACETAMINOPHEN 1000 MILLIGRAM(S): 80 SOLUTION/ DROPS ORAL at 16:11

## 2025-01-03 RX ADMIN — Medication 1 DROP(S): at 20:07

## 2025-01-03 RX ADMIN — ACETAMINOPHEN 400 MILLIGRAM(S): 80 SOLUTION/ DROPS ORAL at 15:11

## 2025-01-03 RX ADMIN — LORAZEPAM 0.25 MILLIGRAM(S): 1 TABLET ORAL at 22:25

## 2025-01-03 RX ADMIN — SODIUM CHLORIDE 2300 MILLILITER(S): 9 INJECTION, SOLUTION INTRAMUSCULAR; INTRAVENOUS; SUBCUTANEOUS at 13:58

## 2025-01-03 RX ADMIN — ATORVASTATIN CALCIUM 80 MILLIGRAM(S): 40 TABLET, FILM COATED ORAL at 22:24

## 2025-01-03 RX ADMIN — QUETIAPINE FUMARATE 25 MILLIGRAM(S): 100 TABLET, FILM COATED ORAL at 22:24

## 2025-01-03 RX ADMIN — ACETAMINOPHEN 400 MILLIGRAM(S): 80 SOLUTION/ DROPS ORAL at 23:37

## 2025-01-03 RX ADMIN — Medication 1 APPLICATION(S): at 20:08

## 2025-01-03 RX ADMIN — LORAZEPAM 0.25 MILLIGRAM(S): 1 TABLET ORAL at 16:43

## 2025-01-03 RX ADMIN — TAMSULOSIN HYDROCHLORIDE 0.4 MILLIGRAM(S): 0.4 CAPSULE ORAL at 22:24

## 2025-01-03 RX ADMIN — ENOXAPARIN SODIUM 40 MILLIGRAM(S): 60 INJECTION INTRAVENOUS; SUBCUTANEOUS at 22:24

## 2025-01-03 NOTE — H&P ADULT - ASSESSMENT
89 year old male with PMHx dementia, recurrent UTIs (recently with PICC line and s/p 2 weeks of invanz), BPH, CAD s/p 3 stents (2016), GERD, HLD, kidney stones, presenting with altered mental status. Admitted for advanced dementia, altered mental status, and facility placement. 89 year old male with PMHx dementia, recurrent UTIs (recently with PICC line and s/p 2 weeks of invanz), BPH, CAD s/p 3 stents (2016), GERD, HLD, kidney stones, presenting with altered mental status. Admitted for advanced dementia, altered mental status, and ELIANE placement.

## 2025-01-03 NOTE — H&P ADULT - PROBLEM SELECTOR PLAN 1
Pt with increased confusion, disorientation, advanced dementia, family unable to care for patient at home  -   - continue home memantine, seroquel  - family ok with ativan prn - 0.25mg IV BID prn for agitation  - SW consult for ELIANE placement Pt with increased confusion, disorientation, advanced dementia, family unable to care for patient at home Pt with increased confusion, disorientation, advanced dementia, family unable to care for patient at home  - ED Vitals: BP: 146/86, HR: 74, Temp: 97, RR: 16, SpO2: 98% on RA  - Labs: cl 112, alb 2.7, tbili 1.6, alk phos 132, ast 39   - UA: trace LE  - CXR: Prior left base infiltrate has largely cleared. Scattered slight scars remain and sharp deviation of the trachea to the right at the thoracic inlet is unchanged.  - CT head: Stable head CT. No acute hemorrhage, vasogenic edema, or extra-axial collection. Chronic findings as above.  - CT CAP: No acute abnormality.  - Received in the ED: ofirmev 1g, NS bolus 2.3L  - Admit to medicine  - Pureed diet, crush PO meds in apple sauce, pt needs full assistance with feeding  - 1:1 monitoring  - Continue home memantine, seroquel 25mg qhs  - Family is ok with ativan prn - 0.25mg IV BID prn for agitation  - IV fluids NS 60cc/hr for 16 hours  - Hiprex for UTI ppx, given recurrent/recent infection - no UTI currently  - SW eval for ELIANE placement   - PT eval Pt with increased confusion, disorientation, advanced dementia, family unable to care for patient at home  - ED Vitals: BP: 146/86, HR: 74, Temp: 97, RR: 16, SpO2: 98% on RA  - Labs: cl 112, alb 2.7, tbili 1.6, alk phos 132, ast 39   - UA: trace LE  - CXR: Prior left base infiltrate has largely cleared. Scattered slight scars remain and sharp deviation of the trachea to the right at the thoracic inlet is unchanged.  - CT head: Stable head CT. No acute hemorrhage, vasogenic edema, or extra-axial collection. Chronic findings as above.  - CT CAP: No acute abnormality.  - Received in the ED: ofirmev 1g, NS bolus 2.3L  - Admit to medicine  - Pureed diet (no beef), crush PO meds in apple sauce, pt needs full assistance with feeding  - 1:1 monitoring  - Continue home memantine, seroquel 25mg qhs  - Family is ok with ativan prn - 0.25mg IV BID prn for agitation  - IV fluids NS 60cc/hr for 16 hours  - Ofirmev 1g x 3 more doses (s/p 1 dose in ED)  - Hiprex for UTI ppx, given recurrent/recent infection - no UTI currently  - SW eval for ELIANE placement   - PT eval

## 2025-01-03 NOTE — ED ADULT NURSE NOTE - OBJECTIVE STATEMENT
As per family member patient in mo confused than usual, was on IV Antibiotics from December 18,2024 till december 31st for UTI, has a history of recurrent UTI in the past

## 2025-01-03 NOTE — H&P ADULT - ATTENDING COMMENTS
89 year old male with PMHx dementia, recurrent UTIs (recently with PICC line and s/p 2 weeks of invanz), BPH, CAD s/p 3 stents (2016), GERD, HLD, kidney stones, presenting with altered mental status. Admitted for advanced dementia, altered mental status, and ELIANE placement.    PT eval, coordinate with social work and case management and family on dispo, Psych, Dr. Sibley consulted to optimize medication regiment for pt's agitation, pt recently starte don seroquel, will cont, ativan prn started as well.      Rony Rios, Attending Physician

## 2025-01-03 NOTE — ED PROVIDER NOTE - DIFFERENTIAL DIAGNOSIS
Differential including but not limited to UTI sepsis enteritis colitis diverticulitis electrolyte abnormality dehydration ICH Differential Diagnosis

## 2025-01-03 NOTE — ED ADULT NURSE NOTE - NSFALLHARMRISKINTERV_ED_ALL_ED

## 2025-01-03 NOTE — ED PROVIDER NOTE - ATTENDING APP SHARED VISIT CONTRIBUTION OF CARE
Patient likely workup brought in by family for generalized weakness and worsening of confusion for the past 3 days since finishing invanz via PICC line for UTI.  Patient unable to provide history secondary to dementia.  Patient was her patient's family no coughing or vomiting.  Has had some diarrhea.    Plan sepsis workup including EKG labs CT head chest abdomen pelvis IV fluids Ofirmev    Differential including but not limited to UTI sepsis enteritis colitis diverticulitis electrolyte abnormality dehydration ICH

## 2025-01-03 NOTE — ED PROVIDER NOTE - ED STEMI HIDDEN
______________________________________________________________________________   

  

[id number] CT/CTA Chest  

EXAM: CT ANGIOGRAM CHEST  

   

 INDICATION: COVID+, ELEVATED D DIMER.  

   

 COMPARISON: Chest radiograph same date.  

   

 DISCUSSION:   

 The degree of contrast opacification in the pulmonary arteries is insufficient  

 for embolus detection. No embolus is identified.  

   

 Multifocal consolidation and crazy paving opacities scattered throughout all  

 lobes of both lungs with a mild basal predominant is nonspecific, but compatible  

 the clinical history of COVID pneumonia.  

   

 No pleural or pericardial effusion. Normal heart size. No mediastinal, hilar or  

 axillary lymphadenopathy.  

   

 The imaged upper abdomen and osseous structures are unremarkable.  

   

 IMPRESSION:    

 1.  No pulmonary embolism is identified, but contrast opacification is  

 insufficient for embolus detection.  

 2.  Multifocal consolidation compatible with the clinical history of COVID  

 pneumonia.  

   

 Electronically signed by Matt Oneal MD on 5/9/2021 2:31 PM  

   

  

Matt Oneal MD                 

 05/09/21 0959    

  

Thank you for allowing us to participate in the care of your patient. hide

## 2025-01-03 NOTE — H&P ADULT - PROBLEM SELECTOR PLAN 2
Chronic  - Continue home asa 81mg Chronic  - Continue home asa 81mg  - Pt takes lasix PO prn, can start if needed

## 2025-01-03 NOTE — ED PROVIDER NOTE - PROGRESS NOTE DETAILS
Labs CT UA no acute findings discussed with family requesting admission for rehab placement patient having difficulty taking care of patient since recent decline in physical and mental status

## 2025-01-03 NOTE — H&P ADULT - HISTORY OF PRESENT ILLNESS
89 year old male with PMHx dementia, recurrent UTIs (recently with PICC line and s/p 2 weeks of invanz), BPH, CAD s/p 3 stents (2016), GERD, HLD, kidney stones, presenting with altered mental status. Per family, pt recently completed 2 weeks of invanz via PICC for UTI a few days ago, and since then pt has been notably declining with increased confusion, and family unable to care for patient at home. Pt was started on seroquel 25mg qhs a few days ago. Pt needs full assistance with feeding, pureed diet (no beef), 1:1 monitoring, and can take PO meds but needs crushed in applesauce.    ED Course:   Vitals: BP: , HR: , Temp: , RR: , SpO2: % on   Labs:    UA:   CXR: as per personal read, official read pending   CT:  EKG:   Received in the ED    89 year old male with PMHx dementia, recurrent UTIs (recently with PICC line and s/p 2 weeks of invanz), BPH, CAD s/p 3 stents (2016), GERD, HLD, kidney stones, presenting with altered mental status. Per family, pt recently completed 2 weeks of invanz via PICC for UTI a few days ago, and since then pt has been notably declining with increased confusion, and family unable to care for patient at home. Pt was started on seroquel 25mg qhs a few days ago. Pt needs full assistance with feeding, pureed diet (no beef), 1:1 monitoring, and can take PO meds but needs crushed in applesauce.    ED Course:   Vitals: BP: 146/86, HR: 74, Temp: 97, RR: 16, SpO2: 98% on ra   Labs: cl 112, alb 2.7, tbili 1.6, alk phos 132, ast 39   UA: trace LE  CXR: Prior left base infiltrate has largely cleared. Scattered   slight scars remain and sharp deviation of the trachea to the right at the thoracic inlet is unchanged.  CT head: Stable head CT. No acute hemorrhage, vasogenic edema, or extra-axial collection. Chronic findings as above.  CT CAP: No acute abnormality.  Received in the ED: ofirmev 1g, NS bolus 2.3L 89 year old male with PMHx dementia, recurrent UTIs (recently with PICC line and s/p 2 weeks of invanz), BPH, CAD s/p 3 stents (2016), GERD, HLD, kidney stones, presenting with altered mental status. History obtained from family and chart. Per family, pt recently completed 2 weeks of invanz via PICC for UTI a few days ago, and since then pt has been notably declining with increased confusion, and family unable to care for patient at home. Pt was started on seroquel 25mg qhs a few days ago. Pt needs full assistance with feeding, pureed diet (no beef), 1:1 monitoring, and can take PO meds but needs crushed in applesauce.    ED Course:   Vitals: BP: 146/86, HR: 74, Temp: 97, RR: 16, SpO2: 98% on ra   Labs: cl 112, alb 2.7, tbili 1.6, alk phos 132, ast 39   UA: trace LE  CXR: Prior left base infiltrate has largely cleared. Scattered   slight scars remain and sharp deviation of the trachea to the right at the thoracic inlet is unchanged.  CT head: Stable head CT. No acute hemorrhage, vasogenic edema, or extra-axial collection. Chronic findings as above.  CT CAP: No acute abnormality.  Received in the ED: ofirmev 1g, NS bolus 2.3L 89 year old male with PMHx dementia, recurrent UTIs (recently with PICC line and s/p 2 weeks of invanz), BPH, CAD s/p 3 stents (2016), GERD, HLD, kidney stones, presenting with altered mental status. History obtained from family and chart. Per family, pt recently completed 2 weeks of invanz via PICC for UTI a few days ago, and since then pt has been notably declining with increased confusion, and family unable to care for patient at home. Pt was started on seroquel 25mg qhs a few days ago. Pt needs full assistance with feeding, pureed diet (no beef), 1:1 monitoring, and can take PO meds but needs crushed in applesauce. At time of encounter, pt not responding verbally, does shake head no when asked if he is in pain.    ED Course:   Vitals: BP: 146/86, HR: 74, Temp: 97, RR: 16, SpO2: 98% on ra   Labs: cl 112, alb 2.7, tbili 1.6, alk phos 132, ast 39   UA: trace LE  CXR: Prior left base infiltrate has largely cleared. Scattered slight scars remain and sharp deviation of the trachea to the right at the thoracic inlet is unchanged.  CT head: Stable head CT. No acute hemorrhage, vasogenic edema, or extra-axial collection. Chronic findings as above.  CT CAP: No acute abnormality.  Received in the ED: ofirmev 1g, NS bolus 2.3L

## 2025-01-03 NOTE — H&P ADULT - NSHPPHYSICALEXAM_GEN_ALL_CORE
T(C): 36.1 (01-03-25 @ 10:35), Max: 36.1 (01-03-25 @ 10:35)  HR: 74 (01-03-25 @ 10:35) (74 - 74)  BP: 146/86 (01-03-25 @ 10:35) (146/86 - 146/86)  RR: 16 (01-03-25 @ 10:35) (16 - 16)  SpO2: 98% (01-03-25 @ 10:35) (98% - 98%)    GENERAL: patient appears well, no acute distress, appropriately interactive  EYES: sclera clear, no exudates  ENMT: oropharynx clear without erythema, no exudates, moist mucous membranes  NECK: supple, soft  LUNGS: good air entry bilaterally, clear to auscultation, symmetric breath sounds, no wheezing or rhonchi appreciated  HEART: soft S1/S2, regular rate and rhythm, no murmurs noted, no lower extremity edema  GASTROINTESTINAL: abdomen is soft, nontender, nondistended, normoactive bowel sounds  INTEGUMENT: good skin turgor, warm skin, appears well perfused  MUSCULOSKELETAL: no clubbing or cyanosis, no obvious deformity  NEUROLOGIC: awake, alert, oriented x3, good muscle tone in all 4 extremities  HEME/LYMPH: no obvious ecchymosis or petechiae T(C): 36.1 (01-03-25 @ 10:35), Max: 36.1 (01-03-25 @ 10:35)  HR: 74 (01-03-25 @ 10:35) (74 - 74)  BP: 146/86 (01-03-25 @ 10:35) (146/86 - 146/86)  RR: 16 (01-03-25 @ 10:35) (16 - 16)  SpO2: 98% (01-03-25 @ 10:35) (98% - 98%)    GENERAL: patient appears ill, disoriented, not responding verbally  EYES: sclera clear, no exudates  ENMT: oropharynx clear without erythema, no exudates  NECK: supple, soft  LUNGS: good air entry bilaterally, clear to auscultation, symmetric breath sounds, no wheezing or rhonchi appreciated  HEART: soft S1/S2, regular rate and rhythm, no murmurs noted, no lower extremity edema  GASTROINTESTINAL: abdomen is soft, nontender, nondistended, normoactive bowel sounds  INTEGUMENT: good skin turgor, warm skin, appears well perfused  MUSCULOSKELETAL: no clubbing or cyanosis, no obvious deformity  NEUROLOGIC: awake, disoriented, not responding verbally, good muscle tone in all 4 extremities  HEME/LYMPH: no obvious ecchymosis or petechiae

## 2025-01-03 NOTE — ED ADULT NURSE NOTE - NSFALLRISKFACTORS_ED_ALL_ED
Age: 85 years old or older Age: 85 years old or older/Bone Condition: Including osteoporosis, prolonged steroid use or metastatic bone disease/cancer

## 2025-01-03 NOTE — H&P ADULT - NSHPSOCIALHISTORY_GEN_ALL_CORE
lives with son  Pt needs full assistance with feeding, pureed diet (no beef), 1:1 monitoring, and can take PO meds but needs crushed in applesauce.
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
## 2025-01-03 NOTE — ED PROVIDER NOTE - OBJECTIVE STATEMENT
Patient is a 89-year-old male with past medical history dementia recurrent UTIs BPH CAD with 3 stents GERD hyperlipidemia kidney stones status post completing course of Invanz through PICC line brought in by family for generalized weakness worsening confusion for the last 4 days.  No documented fevers at home patient has also been having some diarrhea.  No vomiting no complaints of chest pain shortness of breath.  Limited history from patient due to dementia family states patient is garbling his speech more and his mouth is very dry.    PCP Thi

## 2025-01-04 DIAGNOSIS — G92.8 OTHER TOXIC ENCEPHALOPATHY: ICD-10-CM

## 2025-01-04 LAB
A1C WITH ESTIMATED AVERAGE GLUCOSE RESULT: 5.7 % — HIGH (ref 4–5.6)
ALBUMIN SERPL ELPH-MCNC: 2.5 G/DL — LOW (ref 3.3–5)
ALP SERPL-CCNC: 117 U/L — SIGNIFICANT CHANGE UP (ref 40–120)
ALT FLD-CCNC: 12 U/L — SIGNIFICANT CHANGE UP (ref 12–78)
ANION GAP SERPL CALC-SCNC: 7 MMOL/L — SIGNIFICANT CHANGE UP (ref 5–17)
AST SERPL-CCNC: 33 U/L — SIGNIFICANT CHANGE UP (ref 15–37)
BASOPHILS # BLD AUTO: 0.02 K/UL — SIGNIFICANT CHANGE UP (ref 0–0.2)
BASOPHILS NFR BLD AUTO: 0.3 % — SIGNIFICANT CHANGE UP (ref 0–2)
BILIRUB DIRECT SERPL-MCNC: 0.4 MG/DL — HIGH (ref 0–0.3)
BILIRUB SERPL-MCNC: 1.9 MG/DL — HIGH (ref 0.2–1.2)
BUN SERPL-MCNC: 10 MG/DL — SIGNIFICANT CHANGE UP (ref 7–23)
CALCIUM SERPL-MCNC: 9.4 MG/DL — SIGNIFICANT CHANGE UP (ref 8.5–10.1)
CHLORIDE SERPL-SCNC: 115 MMOL/L — HIGH (ref 96–108)
CHOLEST SERPL-MCNC: 108 MG/DL — SIGNIFICANT CHANGE UP
CO2 SERPL-SCNC: 23 MMOL/L — SIGNIFICANT CHANGE UP (ref 22–31)
CREAT SERPL-MCNC: 0.53 MG/DL — SIGNIFICANT CHANGE UP (ref 0.5–1.3)
CULTURE RESULTS: NO GROWTH — SIGNIFICANT CHANGE UP
EGFR: 92 ML/MIN/1.73M2 — SIGNIFICANT CHANGE UP
EOSINOPHIL # BLD AUTO: 0.31 K/UL — SIGNIFICANT CHANGE UP (ref 0–0.5)
EOSINOPHIL NFR BLD AUTO: 4.4 % — SIGNIFICANT CHANGE UP (ref 0–6)
ESTIMATED AVERAGE GLUCOSE: 117 MG/DL — HIGH (ref 68–114)
GLUCOSE SERPL-MCNC: 101 MG/DL — HIGH (ref 70–99)
HCT VFR BLD CALC: 38.5 % — LOW (ref 39–50)
HDLC SERPL-MCNC: 47 MG/DL — SIGNIFICANT CHANGE UP
HGB BLD-MCNC: 12.8 G/DL — LOW (ref 13–17)
IMM GRANULOCYTES NFR BLD AUTO: 0.3 % — SIGNIFICANT CHANGE UP (ref 0–0.9)
LIPID PNL WITH DIRECT LDL SERPL: 48 MG/DL — SIGNIFICANT CHANGE UP
LYMPHOCYTES # BLD AUTO: 1.75 K/UL — SIGNIFICANT CHANGE UP (ref 1–3.3)
LYMPHOCYTES # BLD AUTO: 24.8 % — SIGNIFICANT CHANGE UP (ref 13–44)
MAGNESIUM SERPL-MCNC: 2.1 MG/DL — SIGNIFICANT CHANGE UP (ref 1.6–2.6)
MCHC RBC-ENTMCNC: 31.2 PG — SIGNIFICANT CHANGE UP (ref 27–34)
MCHC RBC-ENTMCNC: 33.2 G/DL — SIGNIFICANT CHANGE UP (ref 32–36)
MCV RBC AUTO: 93.9 FL — SIGNIFICANT CHANGE UP (ref 80–100)
MONOCYTES # BLD AUTO: 0.64 K/UL — SIGNIFICANT CHANGE UP (ref 0–0.9)
MONOCYTES NFR BLD AUTO: 9.1 % — SIGNIFICANT CHANGE UP (ref 2–14)
NEUTROPHILS # BLD AUTO: 4.32 K/UL — SIGNIFICANT CHANGE UP (ref 1.8–7.4)
NEUTROPHILS NFR BLD AUTO: 61.1 % — SIGNIFICANT CHANGE UP (ref 43–77)
NON HDL CHOLESTEROL: 61 MG/DL — SIGNIFICANT CHANGE UP
NRBC # BLD: 0 /100 WBCS — SIGNIFICANT CHANGE UP (ref 0–0)
PHOSPHATE SERPL-MCNC: 2.4 MG/DL — LOW (ref 2.5–4.5)
PLATELET # BLD AUTO: 117 K/UL — LOW (ref 150–400)
POTASSIUM SERPL-MCNC: 3.4 MMOL/L — LOW (ref 3.5–5.3)
POTASSIUM SERPL-SCNC: 3.4 MMOL/L — LOW (ref 3.5–5.3)
PROT SERPL-MCNC: 5.8 G/DL — LOW (ref 6–8.3)
RBC # BLD: 4.1 M/UL — LOW (ref 4.2–5.8)
RBC # FLD: 14.7 % — HIGH (ref 10.3–14.5)
SODIUM SERPL-SCNC: 145 MMOL/L — SIGNIFICANT CHANGE UP (ref 135–145)
SPECIMEN SOURCE: SIGNIFICANT CHANGE UP
TRIGL SERPL-MCNC: 54 MG/DL — SIGNIFICANT CHANGE UP
WBC # BLD: 7.06 K/UL — SIGNIFICANT CHANGE UP (ref 3.8–10.5)
WBC # FLD AUTO: 7.06 K/UL — SIGNIFICANT CHANGE UP (ref 3.8–10.5)

## 2025-01-04 PROCEDURE — 99233 SBSQ HOSP IP/OBS HIGH 50: CPT

## 2025-01-04 RX ORDER — SODIUM CHLORIDE, SODIUM GLUCONATE, SODIUM ACETATE, POTASSIUM CHLORIDE AND MAGNESIUM CHLORIDE 30; 37; 368; 526; 502 MG/100ML; MG/100ML; MG/100ML; MG/100ML; MG/100ML
1000 INJECTION, SOLUTION INTRAVENOUS
Refills: 0 | Status: DISCONTINUED | OUTPATIENT
Start: 2025-01-04 | End: 2025-01-06

## 2025-01-04 RX ORDER — SOD PHOS DI, MONO/K PHOS MONO 250 MG
1 TABLET ORAL EVERY 6 HOURS
Refills: 0 | Status: COMPLETED | OUTPATIENT
Start: 2025-01-04 | End: 2025-01-04

## 2025-01-04 RX ORDER — SODIUM CHLORIDE 9 MG/ML
500 INJECTION, SOLUTION INTRAMUSCULAR; INTRAVENOUS; SUBCUTANEOUS ONCE
Refills: 0 | Status: COMPLETED | OUTPATIENT
Start: 2025-01-04 | End: 2025-01-04

## 2025-01-04 RX ADMIN — Medication 1 GRAM(S): at 05:35

## 2025-01-04 RX ADMIN — MEMANTINE HYDROCHLORIDE 5 MILLIGRAM(S): 14 CAPSULE, EXTENDED RELEASE ORAL at 18:13

## 2025-01-04 RX ADMIN — QUETIAPINE FUMARATE 25 MILLIGRAM(S): 100 TABLET, FILM COATED ORAL at 21:32

## 2025-01-04 RX ADMIN — Medication 1 GRAM(S): at 18:13

## 2025-01-04 RX ADMIN — Medication 1 DROP(S): at 07:29

## 2025-01-04 RX ADMIN — MEMANTINE HYDROCHLORIDE 5 MILLIGRAM(S): 14 CAPSULE, EXTENDED RELEASE ORAL at 05:34

## 2025-01-04 RX ADMIN — SODIUM CHLORIDE, SODIUM GLUCONATE, SODIUM ACETATE, POTASSIUM CHLORIDE AND MAGNESIUM CHLORIDE 60 MILLILITER(S): 30; 37; 368; 526; 502 INJECTION, SOLUTION INTRAVENOUS at 13:49

## 2025-01-04 RX ADMIN — TAMSULOSIN HYDROCHLORIDE 0.4 MILLIGRAM(S): 0.4 CAPSULE ORAL at 21:33

## 2025-01-04 RX ADMIN — ATORVASTATIN CALCIUM 80 MILLIGRAM(S): 40 TABLET, FILM COATED ORAL at 21:33

## 2025-01-04 RX ADMIN — Medication 1 PACKET(S): at 18:13

## 2025-01-04 RX ADMIN — Medication 1 PACKET(S): at 13:49

## 2025-01-04 RX ADMIN — Medication 1 APPLICATION(S): at 18:13

## 2025-01-04 RX ADMIN — SODIUM CHLORIDE 500 MILLILITER(S): 9 INJECTION, SOLUTION INTRAMUSCULAR; INTRAVENOUS; SUBCUTANEOUS at 05:00

## 2025-01-04 RX ADMIN — Medication 5 MILLIGRAM(S): at 13:48

## 2025-01-04 RX ADMIN — Medication 1 APPLICATION(S): at 07:29

## 2025-01-04 RX ADMIN — ENOXAPARIN SODIUM 40 MILLIGRAM(S): 60 INJECTION INTRAVENOUS; SUBCUTANEOUS at 21:31

## 2025-01-04 RX ADMIN — Medication 1 DROP(S): at 18:13

## 2025-01-04 RX ADMIN — Medication 81 MILLIGRAM(S): at 13:49

## 2025-01-04 RX ADMIN — ACETAMINOPHEN 1000 MILLIGRAM(S): 80 SOLUTION/ DROPS ORAL at 00:37

## 2025-01-04 NOTE — PHARMACOTHERAPY INTERVENTION NOTE - COMMENTS
89 yo male currently ordered for PRN lorazepam 0.25 mg BID. Utilizing the age friendly 65+ report, recommended optimizing agitation regimen as benzodiazapine use is recommended to be limited in geriatric patients. Discussed with Dr. Navarro and order was discontinued per discussion.

## 2025-01-04 NOTE — PHYSICAL THERAPY INITIAL EVALUATION ADULT - PLANNED THERAPY INTERVENTIONS, PT EVAL
Patient given prescription, discharge instructions verbal and written, patient verbalized understanding. Alert/oriented X4, Clear speech.   Patient exhibits no distress, ambulates with steady gait per self leaving unit, no further request.     Baldomero Victor RN  01/18/20 [id number]
bed mobility training

## 2025-01-04 NOTE — PHYSICAL THERAPY INITIAL EVALUATION ADULT - PATIENT PROFILE REVIEW, REHAB EVAL
PT orders received: ambulate with assistance. Consult with RN Cynthia, pt may participate in PT evaluation./yes

## 2025-01-04 NOTE — PHYSICAL THERAPY INITIAL EVALUATION ADULT - MANUAL MUSCLE TESTING RESULTS, REHAB EVAL
pt unable to follow formal commands at this time, grossly at least 3/5 based on observed functional movement

## 2025-01-04 NOTE — PROGRESS NOTE ADULT - SUBJECTIVE AND OBJECTIVE BOX
Patient is a 89y old  Male who presents with a chief complaint of altered mental status.      INTERVAL HPI/OVERNIGHT EVENTS: Pt was lethargic after low dose ativan of 0.25mg IV. Was rousible around lunch but very confused. Pt is confused and cannot answer questions appropriately. Cannot obtain ROS.     MEDICATIONS  (STANDING):  aspirin enteric coated 81 milliGRAM(s) Oral daily  atorvastatin 80 milliGRAM(s) Oral at bedtime  dextrose 5% + lactated ringers with potassium chloride 20 mEq/L 1000 milliLiter(s) (60 mL/Hr) IV Continuous <Continuous>  dorzolamide 2% Ophthalmic Solution 1 Drop(s) Both EYES <User Schedule>  enoxaparin Injectable 40 milliGRAM(s) SubCutaneous every 24 hours  finasteride 5 milliGRAM(s) Oral daily  fluocinonide 0.05% Cream 1 Application(s) Topical two times a day  memantine 5 milliGRAM(s) Oral two times a day  methenamine hippurate 1 Gram(s) Oral two times a day  QUEtiapine 25 milliGRAM(s) Oral at bedtime  tamsulosin 0.4 milliGRAM(s) Oral at bedtime    MEDICATIONS  (PRN):  acetaminophen     Tablet .. 650 milliGRAM(s) Oral every 6 hours PRN Temp greater or equal to 38C (100.4F), Mild Pain (1 - 3)      Allergies    No Known Allergies    Intolerances        REVIEW OF SYSTEMS:  Cannot obtain ROS due to encephalopathy.     Vital Signs Last 24 Hrs  T(C): 36.6 (04 Jan 2025 20:35), Max: 36.6 (04 Jan 2025 20:35)  T(F): 97.8 (04 Jan 2025 20:35), Max: 97.8 (04 Jan 2025 20:35)  HR: 84 (04 Jan 2025 20:35) (53 - 85)  BP: 138/84 (04 Jan 2025 20:35) (91/60 - 138/84)  BP(mean): --  RR: 18 (04 Jan 2025 20:35) (17 - 18)  SpO2: 95% (04 Jan 2025 20:35) (95% - 97%)    Parameters below as of 04 Jan 2025 20:35  Patient On (Oxygen Delivery Method): room air        PHYSICAL EXAM:  GENERAL: encephalopathic  HEENT:  moist mucous membranes  CHEST/LUNG:  CTA b/l, no rales, wheezes, or rhonchi  HEART:  RRR, S1, S2  ABDOMEN:  BS+, soft, no apparent tenderness, nondistended  EXTREMITIES: no edema, cyanosis, or apparent calf tenderness  NERVOUS SYSTEM: encephalopathic, not answering questions or following commands appropriately    LABS:                        12.8   7.06  )-----------( 117      ( 04 Jan 2025 06:30 )             38.5     CBC Full  -  ( 04 Jan 2025 06:30 )  WBC Count : 7.06 K/uL  Hemoglobin : 12.8 g/dL  Hematocrit : 38.5 %  Platelet Count - Automated : 117 K/uL  Mean Cell Volume : 93.9 fl  Mean Cell Hemoglobin : 31.2 pg  Mean Cell Hemoglobin Concentration : 33.2 g/dL  Auto Neutrophil # : 4.32 K/uL  Auto Lymphocyte # : 1.75 K/uL  Auto Monocyte # : 0.64 K/uL  Auto Eosinophil # : 0.31 K/uL  Auto Basophil # : 0.02 K/uL  Auto Neutrophil % : 61.1 %  Auto Lymphocyte % : 24.8 %  Auto Monocyte % : 9.1 %  Auto Eosinophil % : 4.4 %  Auto Basophil % : 0.3 %    04 Jan 2025 06:30    145    |  115    |  10     ----------------------------<  101    3.4     |  23     |  0.53     Ca    9.4        04 Jan 2025 06:30  Phos  2.4       04 Jan 2025 06:30  Mg     2.1       04 Jan 2025 06:30    TPro  5.8    /  Alb  2.5    /  TBili  1.9    /  DBili  0.4    /  AST  33     /  ALT  12     /  AlkPhos  117    04 Jan 2025 06:30    PT/INR - ( 03 Jan 2025 13:13 )   PT: 13.5 sec;   INR: 1.16 ratio         PTT - ( 03 Jan 2025 13:13 )  PTT:31.0 sec        CAPILLARY BLOOD GLUCOSE            Culture - Urine (collected 01-03-25 @ 13:13)  Source: Clean Catch Clean Catch (Midstream)  Final Report (01-04-25 @ 18:09):    No growth    Culture - Blood (collected 01-03-25 @ 12:00)  Source: .Blood BLOOD  Preliminary Report (01-04-25 @ 19:01):    No growth at 24 hours    Culture - Blood (collected 01-03-25 @ 11:45)  Source: .Blood BLOOD  Preliminary Report (01-04-25 @ 19:01):    No growth at 24 hours        RADIOLOGY & ADDITIONAL TESTS:    Personally reviewed.     Consultant(s) Notes Reviewed:  [x] YES  [ ] NO     Patient is a 89y old  Male who presents with a chief complaint of altered mental status.       INTERVAL HPI/OVERNIGHT EVENTS: Pt was lethargic after low dose ativan of 0.25mg IV. Was rousible around lunch but very confused. Pt is confused and cannot answer questions appropriately. Cannot obtain ROS.     MEDICATIONS  (STANDING):  aspirin enteric coated 81 milliGRAM(s) Oral daily  atorvastatin 80 milliGRAM(s) Oral at bedtime  dextrose 5% + lactated ringers with potassium chloride 20 mEq/L 1000 milliLiter(s) (60 mL/Hr) IV Continuous <Continuous>  dorzolamide 2% Ophthalmic Solution 1 Drop(s) Both EYES <User Schedule>  enoxaparin Injectable 40 milliGRAM(s) SubCutaneous every 24 hours  finasteride 5 milliGRAM(s) Oral daily  fluocinonide 0.05% Cream 1 Application(s) Topical two times a day  memantine 5 milliGRAM(s) Oral two times a day  methenamine hippurate 1 Gram(s) Oral two times a day  QUEtiapine 25 milliGRAM(s) Oral at bedtime  tamsulosin 0.4 milliGRAM(s) Oral at bedtime    MEDICATIONS  (PRN):  acetaminophen     Tablet .. 650 milliGRAM(s) Oral every 6 hours PRN Temp greater or equal to 38C (100.4F), Mild Pain (1 - 3)      Allergies    No Known Allergies    Intolerances        REVIEW OF SYSTEMS:  Cannot obtain ROS due to encephalopathy.     Vital Signs Last 24 Hrs  T(C): 36.6 (04 Jan 2025 20:35), Max: 36.6 (04 Jan 2025 20:35)  T(F): 97.8 (04 Jan 2025 20:35), Max: 97.8 (04 Jan 2025 20:35)  HR: 84 (04 Jan 2025 20:35) (53 - 85)  BP: 138/84 (04 Jan 2025 20:35) (91/60 - 138/84)  BP(mean): --  RR: 18 (04 Jan 2025 20:35) (17 - 18)  SpO2: 95% (04 Jan 2025 20:35) (95% - 97%)    Parameters below as of 04 Jan 2025 20:35  Patient On (Oxygen Delivery Method): room air        PHYSICAL EXAM:  GENERAL: encephalopathic  HEENT:  moist mucous membranes  CHEST/LUNG:  CTA b/l, no rales, wheezes, or rhonchi  HEART:  RRR, S1, S2  ABDOMEN:  BS+, soft, no apparent tenderness, nondistended  EXTREMITIES: no edema, cyanosis, or apparent calf tenderness  NERVOUS SYSTEM: encephalopathic, not answering questions or following commands appropriately    LABS:                        12.8   7.06  )-----------( 117      ( 04 Jan 2025 06:30 )             38.5     CBC Full  -  ( 04 Jan 2025 06:30 )  WBC Count : 7.06 K/uL  Hemoglobin : 12.8 g/dL  Hematocrit : 38.5 %  Platelet Count - Automated : 117 K/uL  Mean Cell Volume : 93.9 fl  Mean Cell Hemoglobin : 31.2 pg  Mean Cell Hemoglobin Concentration : 33.2 g/dL  Auto Neutrophil # : 4.32 K/uL  Auto Lymphocyte # : 1.75 K/uL  Auto Monocyte # : 0.64 K/uL  Auto Eosinophil # : 0.31 K/uL  Auto Basophil # : 0.02 K/uL  Auto Neutrophil % : 61.1 %  Auto Lymphocyte % : 24.8 %  Auto Monocyte % : 9.1 %  Auto Eosinophil % : 4.4 %  Auto Basophil % : 0.3 %    04 Jan 2025 06:30    145    |  115    |  10     ----------------------------<  101    3.4     |  23     |  0.53     Ca    9.4        04 Jan 2025 06:30  Phos  2.4       04 Jan 2025 06:30  Mg     2.1       04 Jan 2025 06:30    TPro  5.8    /  Alb  2.5    /  TBili  1.9    /  DBili  0.4    /  AST  33     /  ALT  12     /  AlkPhos  117    04 Jan 2025 06:30    PT/INR - ( 03 Jan 2025 13:13 )   PT: 13.5 sec;   INR: 1.16 ratio         PTT - ( 03 Jan 2025 13:13 )  PTT:31.0 sec        CAPILLARY BLOOD GLUCOSE            Culture - Urine (collected 01-03-25 @ 13:13)  Source: Clean Catch Clean Catch (Midstream)  Final Report (01-04-25 @ 18:09):    No growth    Culture - Blood (collected 01-03-25 @ 12:00)  Source: .Blood BLOOD  Preliminary Report (01-04-25 @ 19:01):    No growth at 24 hours    Culture - Blood (collected 01-03-25 @ 11:45)  Source: .Blood BLOOD  Preliminary Report (01-04-25 @ 19:01):    No growth at 24 hours        RADIOLOGY & ADDITIONAL TESTS:    Personally reviewed.     Consultant(s) Notes Reviewed:  [x] YES  [ ] NO

## 2025-01-04 NOTE — PHYSICAL THERAPY INITIAL EVALUATION ADULT - BED MOBILITY TRAINING, PT EVAL
Patient will perform supine<->sit with minAx1 by 2 weeks to allow patient to get in/out of bed safely.

## 2025-01-04 NOTE — PATIENT PROFILE ADULT - FALL HARM RISK - HARM RISK INTERVENTIONS

## 2025-01-04 NOTE — PHYSICAL THERAPY INITIAL EVALUATION ADULT - ADDITIONAL COMMENTS
Pt unable to provide information at this time due to current cognitive status. Per EMR, pt lives with son and ambulates with RW. Pt requires assistance with ADLs from son.

## 2025-01-04 NOTE — PHYSICAL THERAPY INITIAL EVALUATION ADULT - PERTINENT HX OF CURRENT PROBLEM, REHAB EVAL
Per H&P: "89 year old male with PMHx dementia, recurrent UTIs (recently with PICC line and s/p 2 weeks of invanz), BPH, CAD s/p 3 stents (2016), GERD, HLD, kidney stones, presenting with altered mental status. History obtained from family and chart. Per family, pt recently completed 2 weeks of invanz via PICC for UTI a few days ago, and since then pt has been notably declining with increased confusion, and family unable to care for patient at home. Pt was started on seroquel 25mg qhs a few days ago. Pt needs full assistance with feeding, pureed diet (no beef), 1:1 monitoring, and can take PO meds but needs crushed in applesauce. At time of encounter, pt not responding verbally, does shake head no when asked if he is in pain."

## 2025-01-05 LAB
AMMONIA BLD-MCNC: 46 UMOL/L — HIGH (ref 11–32)
ANION GAP SERPL CALC-SCNC: 7 MMOL/L — SIGNIFICANT CHANGE UP (ref 5–17)
BUN SERPL-MCNC: 8 MG/DL — SIGNIFICANT CHANGE UP (ref 7–23)
CALCIUM SERPL-MCNC: 9 MG/DL — SIGNIFICANT CHANGE UP (ref 8.5–10.1)
CHLORIDE SERPL-SCNC: 116 MMOL/L — HIGH (ref 96–108)
CO2 SERPL-SCNC: 19 MMOL/L — LOW (ref 22–31)
CREAT SERPL-MCNC: 0.63 MG/DL — SIGNIFICANT CHANGE UP (ref 0.5–1.3)
EGFR: 88 ML/MIN/1.73M2 — SIGNIFICANT CHANGE UP
GLUCOSE SERPL-MCNC: 98 MG/DL — SIGNIFICANT CHANGE UP (ref 70–99)
HCT VFR BLD CALC: 39.2 % — SIGNIFICANT CHANGE UP (ref 39–50)
HGB BLD-MCNC: 13.1 G/DL — SIGNIFICANT CHANGE UP (ref 13–17)
MAGNESIUM SERPL-MCNC: 1.9 MG/DL — SIGNIFICANT CHANGE UP (ref 1.6–2.6)
MCHC RBC-ENTMCNC: 31.3 PG — SIGNIFICANT CHANGE UP (ref 27–34)
MCHC RBC-ENTMCNC: 33.4 G/DL — SIGNIFICANT CHANGE UP (ref 32–36)
MCV RBC AUTO: 93.6 FL — SIGNIFICANT CHANGE UP (ref 80–100)
NRBC # BLD: 0 /100 WBCS — SIGNIFICANT CHANGE UP (ref 0–0)
PHOSPHATE SERPL-MCNC: 1.9 MG/DL — LOW (ref 2.5–4.5)
PLATELET # BLD AUTO: 114 K/UL — LOW (ref 150–400)
POTASSIUM SERPL-MCNC: 3.9 MMOL/L — SIGNIFICANT CHANGE UP (ref 3.5–5.3)
POTASSIUM SERPL-SCNC: 3.9 MMOL/L — SIGNIFICANT CHANGE UP (ref 3.5–5.3)
RBC # BLD: 4.19 M/UL — LOW (ref 4.2–5.8)
RBC # FLD: 14.6 % — HIGH (ref 10.3–14.5)
SODIUM SERPL-SCNC: 142 MMOL/L — SIGNIFICANT CHANGE UP (ref 135–145)
TSH SERPL-MCNC: 1.69 UIU/ML — SIGNIFICANT CHANGE UP (ref 0.36–3.74)
VIT B12 SERPL-MCNC: >2000 PG/ML — HIGH (ref 232–1245)
WBC # BLD: 10.04 K/UL — SIGNIFICANT CHANGE UP (ref 3.8–10.5)
WBC # FLD AUTO: 10.04 K/UL — SIGNIFICANT CHANGE UP (ref 3.8–10.5)

## 2025-01-05 PROCEDURE — 99233 SBSQ HOSP IP/OBS HIGH 50: CPT

## 2025-01-05 RX ORDER — ACETAMINOPHEN 80 MG/.8ML
500 SOLUTION/ DROPS ORAL EVERY 12 HOURS
Refills: 0 | Status: DISCONTINUED | OUTPATIENT
Start: 2025-01-05 | End: 2025-01-09

## 2025-01-05 RX ORDER — POTASSIUM PHOSPHATE, MONOBASIC AND POTASSIUM PHOSPHATE, DIBASIC 224; 236 MG/ML; MG/ML
15 INJECTION, SOLUTION INTRAVENOUS ONCE
Refills: 0 | Status: COMPLETED | OUTPATIENT
Start: 2025-01-05 | End: 2025-01-05

## 2025-01-05 RX ADMIN — Medication 1 APPLICATION(S): at 05:36

## 2025-01-05 RX ADMIN — ACETAMINOPHEN 650 MILLIGRAM(S): 80 SOLUTION/ DROPS ORAL at 13:20

## 2025-01-05 RX ADMIN — QUETIAPINE FUMARATE 25 MILLIGRAM(S): 100 TABLET, FILM COATED ORAL at 21:16

## 2025-01-05 RX ADMIN — Medication 1 APPLICATION(S): at 17:24

## 2025-01-05 RX ADMIN — Medication 1 DROP(S): at 17:23

## 2025-01-05 RX ADMIN — POTASSIUM PHOSPHATE, MONOBASIC AND POTASSIUM PHOSPHATE, DIBASIC 62.5 MILLIMOLE(S): 224; 236 INJECTION, SOLUTION INTRAVENOUS at 12:11

## 2025-01-05 RX ADMIN — TAMSULOSIN HYDROCHLORIDE 0.4 MILLIGRAM(S): 0.4 CAPSULE ORAL at 21:16

## 2025-01-05 RX ADMIN — ACETAMINOPHEN 650 MILLIGRAM(S): 80 SOLUTION/ DROPS ORAL at 12:24

## 2025-01-05 RX ADMIN — Medication 81 MILLIGRAM(S): at 12:11

## 2025-01-05 RX ADMIN — ATORVASTATIN CALCIUM 80 MILLIGRAM(S): 40 TABLET, FILM COATED ORAL at 21:16

## 2025-01-05 RX ADMIN — ENOXAPARIN SODIUM 40 MILLIGRAM(S): 60 INJECTION INTRAVENOUS; SUBCUTANEOUS at 23:12

## 2025-01-05 RX ADMIN — MEMANTINE HYDROCHLORIDE 5 MILLIGRAM(S): 14 CAPSULE, EXTENDED RELEASE ORAL at 05:39

## 2025-01-05 RX ADMIN — Medication 1 DROP(S): at 05:36

## 2025-01-05 RX ADMIN — Medication 5 MILLIGRAM(S): at 12:11

## 2025-01-05 RX ADMIN — ACETAMINOPHEN 500 MILLIGRAM(S): 80 SOLUTION/ DROPS ORAL at 21:16

## 2025-01-05 RX ADMIN — Medication 1 GRAM(S): at 17:21

## 2025-01-05 RX ADMIN — MEMANTINE HYDROCHLORIDE 5 MILLIGRAM(S): 14 CAPSULE, EXTENDED RELEASE ORAL at 17:23

## 2025-01-05 RX ADMIN — Medication 1 GRAM(S): at 05:38

## 2025-01-05 NOTE — PROGRESS NOTE ADULT - SUBJECTIVE AND OBJECTIVE BOX
Patient is a 89y old  Male who presents with a chief complaint of altered mental status.       INTERVAL HPI/OVERNIGHT EVENTS: Pt remains encephalopathic though is following some commands like to eat when being fed by nursing. Pt is more alert being able to open eyes and focus gaze periodically which he was not doing yesterday. Pt cannot answer questions appropriately. Cannot obtain ROS.     MEDICATIONS  (STANDING):  acetaminophen     Tablet .. 500 milliGRAM(s) Oral every 12 hours  aspirin enteric coated 81 milliGRAM(s) Oral daily  atorvastatin 80 milliGRAM(s) Oral at bedtime  dextrose 5% + lactated ringers with potassium chloride 20 mEq/L 1000 milliLiter(s) (60 mL/Hr) IV Continuous <Continuous>  dorzolamide 2% Ophthalmic Solution 1 Drop(s) Both EYES <User Schedule>  enoxaparin Injectable 40 milliGRAM(s) SubCutaneous every 24 hours  finasteride 5 milliGRAM(s) Oral daily  fluocinonide 0.05% Cream 1 Application(s) Topical two times a day  memantine 5 milliGRAM(s) Oral two times a day  methenamine hippurate 1 Gram(s) Oral two times a day  QUEtiapine 25 milliGRAM(s) Oral at bedtime  tamsulosin 0.4 milliGRAM(s) Oral at bedtime    MEDICATIONS  (PRN):  acetaminophen     Tablet .. 650 milliGRAM(s) Oral every 6 hours PRN Temp greater or equal to 38C (100.4F), Mild Pain (1 - 3)      Allergies    No Known Allergies    Intolerances        REVIEW OF SYSTEMS:  Pt cannot answer questions appropriately due to encephalopathy. Cannot obtain ROS.     Vital Signs Last 24 Hrs  T(C): 36.2 (05 Jan 2025 20:16), Max: 36.4 (05 Jan 2025 06:10)  T(F): 97.2 (05 Jan 2025 20:16), Max: 97.5 (05 Jan 2025 06:10)  HR: 62 (05 Jan 2025 20:16) (62 - 87)  BP: 118/71 (05 Jan 2025 20:16) (118/71 - 160/80)  BP(mean): --  RR: 18 (05 Jan 2025 20:16) (18 - 18)  SpO2: 100% (05 Jan 2025 20:16) (97% - 100%)    Parameters below as of 05 Jan 2025 20:16  Patient On (Oxygen Delivery Method): room air      PHYSICAL EXAM:  GENERAL: encephalopathic  HEENT:  moist mucous membranes  CHEST/LUNG:  CTA b/l, no rales, wheezes, or rhonchi  HEART:  RRR, S1, S2  ABDOMEN:  BS+, soft, no apparent tenderness, nondistended  EXTREMITIES: no edema, cyanosis, or apparent calf tenderness  NERVOUS SYSTEM: encephalopathic, not answering questions appropriately; following few simple commands periodically    LABS:                                   13.1   10.04 )-----------( 114      ( 05 Jan 2025 09:30 )             39.2     CBC Full  -  ( 05 Jan 2025 09:30 )  WBC Count : 10.04 K/uL  Hemoglobin : 13.1 g/dL  Hematocrit : 39.2 %  Platelet Count - Automated : 114 K/uL  Mean Cell Volume : 93.6 fl  Mean Cell Hemoglobin : 31.3 pg  Mean Cell Hemoglobin Concentration : 33.4 g/dL  Auto Neutrophil # : x  Auto Lymphocyte # : x  Auto Monocyte # : x  Auto Eosinophil # : x  Auto Basophil # : x  Auto Neutrophil % : x  Auto Lymphocyte % : x  Auto Monocyte % : x  Auto Eosinophil % : x  Auto Basophil % : x    01-05    142  |  116[H]  |  8   ----------------------------<  98  3.9   |  19[L]  |  0.63    Ca    9.0      05 Jan 2025 09:30  Phos  1.9     01-05  Mg     1.9     01-05    TPro  5.8[L]  /  Alb  2.5[L]  /  TBili  1.9[H]  /  DBili  0.4[H]  /  AST  33  /  ALT  12  /  AlkPhos  117  01-04          CAPILLARY BLOOD GLUCOSE        Culture - Urine (collected 03 Jan 2025 13:13)  Source: Clean Catch Clean Catch (Midstream)  Final Report (04 Jan 2025 18:09):    No growth    Culture - Blood (collected 03 Jan 2025 12:00)  Source: .Blood BLOOD  Preliminary Report (05 Jan 2025 19:00):    No growth at 48 Hours    Culture - Blood (collected 03 Jan 2025 11:45)  Source: .Blood BLOOD  Preliminary Report (05 Jan 2025 19:00):    No growth at 48 Hours          RADIOLOGY & ADDITIONAL TESTS:    Personally reviewed.     Consultant(s) Notes Reviewed:  [x] YES  [ ] NO     Patient is a 89y old  Male who presents with a chief complaint of altered mental status.        INTERVAL HPI/OVERNIGHT EVENTS: Pt remains encephalopathic though is following some commands like to eat when being fed by nursing. Pt is more alert being able to open eyes and focus gaze periodically which he was not doing yesterday. Pt cannot answer questions appropriately. Cannot obtain ROS.     MEDICATIONS  (STANDING):  acetaminophen     Tablet .. 500 milliGRAM(s) Oral every 12 hours  aspirin enteric coated 81 milliGRAM(s) Oral daily  atorvastatin 80 milliGRAM(s) Oral at bedtime  dextrose 5% + lactated ringers with potassium chloride 20 mEq/L 1000 milliLiter(s) (60 mL/Hr) IV Continuous <Continuous>  dorzolamide 2% Ophthalmic Solution 1 Drop(s) Both EYES <User Schedule>  enoxaparin Injectable 40 milliGRAM(s) SubCutaneous every 24 hours  finasteride 5 milliGRAM(s) Oral daily  fluocinonide 0.05% Cream 1 Application(s) Topical two times a day  memantine 5 milliGRAM(s) Oral two times a day  methenamine hippurate 1 Gram(s) Oral two times a day  QUEtiapine 25 milliGRAM(s) Oral at bedtime  tamsulosin 0.4 milliGRAM(s) Oral at bedtime    MEDICATIONS  (PRN):  acetaminophen     Tablet .. 650 milliGRAM(s) Oral every 6 hours PRN Temp greater or equal to 38C (100.4F), Mild Pain (1 - 3)      Allergies    No Known Allergies    Intolerances        REVIEW OF SYSTEMS:  Pt cannot answer questions appropriately due to encephalopathy. Cannot obtain ROS.     Vital Signs Last 24 Hrs  T(C): 36.2 (05 Jan 2025 20:16), Max: 36.4 (05 Jan 2025 06:10)  T(F): 97.2 (05 Jan 2025 20:16), Max: 97.5 (05 Jan 2025 06:10)  HR: 62 (05 Jan 2025 20:16) (62 - 87)  BP: 118/71 (05 Jan 2025 20:16) (118/71 - 160/80)  BP(mean): --  RR: 18 (05 Jan 2025 20:16) (18 - 18)  SpO2: 100% (05 Jan 2025 20:16) (97% - 100%)    Parameters below as of 05 Jan 2025 20:16  Patient On (Oxygen Delivery Method): room air      PHYSICAL EXAM:  GENERAL: encephalopathic  HEENT:  moist mucous membranes  CHEST/LUNG:  CTA b/l, no rales, wheezes, or rhonchi  HEART:  RRR, S1, S2  ABDOMEN:  BS+, soft, no apparent tenderness, nondistended  EXTREMITIES: no edema, cyanosis, or apparent calf tenderness  NERVOUS SYSTEM: encephalopathic, not answering questions appropriately; following few simple commands periodically    LABS:                                   13.1   10.04 )-----------( 114      ( 05 Jan 2025 09:30 )             39.2     CBC Full  -  ( 05 Jan 2025 09:30 )  WBC Count : 10.04 K/uL  Hemoglobin : 13.1 g/dL  Hematocrit : 39.2 %  Platelet Count - Automated : 114 K/uL  Mean Cell Volume : 93.6 fl  Mean Cell Hemoglobin : 31.3 pg  Mean Cell Hemoglobin Concentration : 33.4 g/dL  Auto Neutrophil # : x  Auto Lymphocyte # : x  Auto Monocyte # : x  Auto Eosinophil # : x  Auto Basophil # : x  Auto Neutrophil % : x  Auto Lymphocyte % : x  Auto Monocyte % : x  Auto Eosinophil % : x  Auto Basophil % : x    01-05    142  |  116[H]  |  8   ----------------------------<  98  3.9   |  19[L]  |  0.63    Ca    9.0      05 Jan 2025 09:30  Phos  1.9     01-05  Mg     1.9     01-05    TPro  5.8[L]  /  Alb  2.5[L]  /  TBili  1.9[H]  /  DBili  0.4[H]  /  AST  33  /  ALT  12  /  AlkPhos  117  01-04          CAPILLARY BLOOD GLUCOSE        Culture - Urine (collected 03 Jan 2025 13:13)  Source: Clean Catch Clean Catch (Midstream)  Final Report (04 Jan 2025 18:09):    No growth    Culture - Blood (collected 03 Jan 2025 12:00)  Source: .Blood BLOOD  Preliminary Report (05 Jan 2025 19:00):    No growth at 48 Hours    Culture - Blood (collected 03 Jan 2025 11:45)  Source: .Blood BLOOD  Preliminary Report (05 Jan 2025 19:00):    No growth at 48 Hours          RADIOLOGY & ADDITIONAL TESTS:    Personally reviewed.     Consultant(s) Notes Reviewed:  [x] YES  [ ] NO     Patient is a 89y old  Male who presents with a chief complaint of altered mental status.         INTERVAL HPI/OVERNIGHT EVENTS: Pt remains encephalopathic though is following some commands like to eat when being fed by nursing. Pt is more alert being able to open eyes and focus gaze periodically which he was not doing yesterday. Pt cannot answer questions appropriately. Cannot obtain ROS.     MEDICATIONS  (STANDING):  acetaminophen     Tablet .. 500 milliGRAM(s) Oral every 12 hours  aspirin enteric coated 81 milliGRAM(s) Oral daily  atorvastatin 80 milliGRAM(s) Oral at bedtime  dextrose 5% + lactated ringers with potassium chloride 20 mEq/L 1000 milliLiter(s) (60 mL/Hr) IV Continuous <Continuous>  dorzolamide 2% Ophthalmic Solution 1 Drop(s) Both EYES <User Schedule>  enoxaparin Injectable 40 milliGRAM(s) SubCutaneous every 24 hours  finasteride 5 milliGRAM(s) Oral daily  fluocinonide 0.05% Cream 1 Application(s) Topical two times a day  memantine 5 milliGRAM(s) Oral two times a day  methenamine hippurate 1 Gram(s) Oral two times a day  QUEtiapine 25 milliGRAM(s) Oral at bedtime  tamsulosin 0.4 milliGRAM(s) Oral at bedtime    MEDICATIONS  (PRN):  acetaminophen     Tablet .. 650 milliGRAM(s) Oral every 6 hours PRN Temp greater or equal to 38C (100.4F), Mild Pain (1 - 3)      Allergies    No Known Allergies    Intolerances        REVIEW OF SYSTEMS:  Pt cannot answer questions appropriately due to encephalopathy. Cannot obtain ROS.     Vital Signs Last 24 Hrs  T(C): 36.2 (05 Jan 2025 20:16), Max: 36.4 (05 Jan 2025 06:10)  T(F): 97.2 (05 Jan 2025 20:16), Max: 97.5 (05 Jan 2025 06:10)  HR: 62 (05 Jan 2025 20:16) (62 - 87)  BP: 118/71 (05 Jan 2025 20:16) (118/71 - 160/80)  BP(mean): --  RR: 18 (05 Jan 2025 20:16) (18 - 18)  SpO2: 100% (05 Jan 2025 20:16) (97% - 100%)    Parameters below as of 05 Jan 2025 20:16  Patient On (Oxygen Delivery Method): room air      PHYSICAL EXAM:  GENERAL: encephalopathic  HEENT:  moist mucous membranes  CHEST/LUNG:  CTA b/l, no rales, wheezes, or rhonchi  HEART:  RRR, S1, S2  ABDOMEN:  BS+, soft, no apparent tenderness, nondistended  EXTREMITIES: no edema, cyanosis, or apparent calf tenderness  NERVOUS SYSTEM: encephalopathic, not answering questions appropriately; following few simple commands periodically    LABS:                                   13.1   10.04 )-----------( 114      ( 05 Jan 2025 09:30 )             39.2     CBC Full  -  ( 05 Jan 2025 09:30 )  WBC Count : 10.04 K/uL  Hemoglobin : 13.1 g/dL  Hematocrit : 39.2 %  Platelet Count - Automated : 114 K/uL  Mean Cell Volume : 93.6 fl  Mean Cell Hemoglobin : 31.3 pg  Mean Cell Hemoglobin Concentration : 33.4 g/dL  Auto Neutrophil # : x  Auto Lymphocyte # : x  Auto Monocyte # : x  Auto Eosinophil # : x  Auto Basophil # : x  Auto Neutrophil % : x  Auto Lymphocyte % : x  Auto Monocyte % : x  Auto Eosinophil % : x  Auto Basophil % : x    01-05    142  |  116[H]  |  8   ----------------------------<  98  3.9   |  19[L]  |  0.63    Ca    9.0      05 Jan 2025 09:30  Phos  1.9     01-05  Mg     1.9     01-05    TPro  5.8[L]  /  Alb  2.5[L]  /  TBili  1.9[H]  /  DBili  0.4[H]  /  AST  33  /  ALT  12  /  AlkPhos  117  01-04          CAPILLARY BLOOD GLUCOSE        Culture - Urine (collected 03 Jan 2025 13:13)  Source: Clean Catch Clean Catch (Midstream)  Final Report (04 Jan 2025 18:09):    No growth    Culture - Blood (collected 03 Jan 2025 12:00)  Source: .Blood BLOOD  Preliminary Report (05 Jan 2025 19:00):    No growth at 48 Hours    Culture - Blood (collected 03 Jan 2025 11:45)  Source: .Blood BLOOD  Preliminary Report (05 Jan 2025 19:00):    No growth at 48 Hours          RADIOLOGY & ADDITIONAL TESTS:    Personally reviewed.     Consultant(s) Notes Reviewed:  [x] YES  [ ] NO

## 2025-01-05 NOTE — CARE COORDINATION ASSESSMENT. - OTHER PERTINENT DISCHARGE PLANNING INFORMATION:
CHRISTOPHER spoke with dtr Wade as pt's chart reflects that pt is confused. Per Wade, pt lives in private home with pt's son. [place] plan [employer] and has CD[employer] aides (family members) 24 hr care. Pt was reportedly ambulating with assist and walker in his home until about 3 weeks ago after getting in home infusions. Pt has reportedly been bedbound for the past 3 weeks. Wade states that pt was at [facility] in the past but had a negative experience. She is aware that PTE=[facility], but will discuss with her brother and decide if she would like pt to return home/ [facility]. In the past, Wade reports that ambulance will bring up the 14 steps. SW left d/c planning folder at pt's bedside. SW to follow and remain available for any needs.

## 2025-01-05 NOTE — CARE COORDINATION ASSESSMENT. - NSCAREPROVIDERS_GEN_ALL_CORE_FT
CARE PROVIDERS:  Accepting Physician: Rony Rios  Administration: Berhane Michaud  Administration: Brian Jaramillo  Administration: Robert De Leon  Admitting: Rony Rios  Attending: Henri Navarro  Covering Team: Tej Quispe  Covering Team: China Hidalgo  ED ACP: Clifton Carlos  ED Attending: Javier Patel  ED Nurse: Romario Gupta  ED Nurse 2: Austen Green  Emergency Medicine: Javier Patel  Nurse: Cynthia Bill  Nurse: Jazzy Ron  Nurse: Guadalupe Jay  PCA/Nursing Assistant: Edwina Gonzales  PCA/Nursing Assistant: Sosa Arora  Primary Team: Jesus Jeffries  Primary Team: Henri Navarro  : Juliet Hayes  Team: [facility], Team

## 2025-01-06 DIAGNOSIS — E83.39 OTHER DISORDERS OF PHOSPHORUS METABOLISM: ICD-10-CM

## 2025-01-06 LAB
ANION GAP SERPL CALC-SCNC: 4 MMOL/L — LOW (ref 5–17)
BUN SERPL-MCNC: 11 MG/DL — SIGNIFICANT CHANGE UP (ref 7–23)
CALCIUM SERPL-MCNC: 9.4 MG/DL — SIGNIFICANT CHANGE UP (ref 8.5–10.1)
CHLORIDE SERPL-SCNC: 115 MMOL/L — HIGH (ref 96–108)
CO2 SERPL-SCNC: 26 MMOL/L — SIGNIFICANT CHANGE UP (ref 22–31)
CREAT SERPL-MCNC: 0.72 MG/DL — SIGNIFICANT CHANGE UP (ref 0.5–1.3)
EGFR: 84 ML/MIN/1.73M2 — SIGNIFICANT CHANGE UP
GLUCOSE SERPL-MCNC: 105 MG/DL — HIGH (ref 70–99)
HCT VFR BLD CALC: 36.9 % — LOW (ref 39–50)
HGB BLD-MCNC: 12.4 G/DL — LOW (ref 13–17)
MAGNESIUM SERPL-MCNC: 1.9 MG/DL — SIGNIFICANT CHANGE UP (ref 1.6–2.6)
MCHC RBC-ENTMCNC: 31.6 PG — SIGNIFICANT CHANGE UP (ref 27–34)
MCHC RBC-ENTMCNC: 33.6 G/DL — SIGNIFICANT CHANGE UP (ref 32–36)
MCV RBC AUTO: 93.9 FL — SIGNIFICANT CHANGE UP (ref 80–100)
NRBC # BLD: 0 /100 WBCS — SIGNIFICANT CHANGE UP (ref 0–0)
PHOSPHATE SERPL-MCNC: 2.2 MG/DL — LOW (ref 2.5–4.5)
PLATELET # BLD AUTO: 126 K/UL — LOW (ref 150–400)
POTASSIUM SERPL-MCNC: 3.8 MMOL/L — SIGNIFICANT CHANGE UP (ref 3.5–5.3)
POTASSIUM SERPL-SCNC: 3.8 MMOL/L — SIGNIFICANT CHANGE UP (ref 3.5–5.3)
RBC # BLD: 3.93 M/UL — LOW (ref 4.2–5.8)
RBC # FLD: 14.8 % — HIGH (ref 10.3–14.5)
SODIUM SERPL-SCNC: 145 MMOL/L — SIGNIFICANT CHANGE UP (ref 135–145)
WBC # BLD: 8.04 K/UL — SIGNIFICANT CHANGE UP (ref 3.8–10.5)
WBC # FLD AUTO: 8.04 K/UL — SIGNIFICANT CHANGE UP (ref 3.8–10.5)

## 2025-01-06 PROCEDURE — 99233 SBSQ HOSP IP/OBS HIGH 50: CPT

## 2025-01-06 RX ORDER — POTASSIUM PHOSPHATE, MONOBASIC AND POTASSIUM PHOSPHATE, DIBASIC 224; 236 MG/ML; MG/ML
15 INJECTION, SOLUTION INTRAVENOUS ONCE
Refills: 0 | Status: COMPLETED | OUTPATIENT
Start: 2025-01-06 | End: 2025-01-06

## 2025-01-06 RX ADMIN — ACETAMINOPHEN 500 MILLIGRAM(S): 80 SOLUTION/ DROPS ORAL at 21:30

## 2025-01-06 RX ADMIN — MEMANTINE HYDROCHLORIDE 5 MILLIGRAM(S): 14 CAPSULE, EXTENDED RELEASE ORAL at 05:10

## 2025-01-06 RX ADMIN — SODIUM CHLORIDE, SODIUM GLUCONATE, SODIUM ACETATE, POTASSIUM CHLORIDE AND MAGNESIUM CHLORIDE 60 MILLILITER(S): 30; 37; 368; 526; 502 INJECTION, SOLUTION INTRAVENOUS at 16:01

## 2025-01-06 RX ADMIN — Medication 5 MILLIGRAM(S): at 12:58

## 2025-01-06 RX ADMIN — ACETAMINOPHEN 500 MILLIGRAM(S): 80 SOLUTION/ DROPS ORAL at 22:00

## 2025-01-06 RX ADMIN — Medication 1 DROP(S): at 05:11

## 2025-01-06 RX ADMIN — Medication 1 GRAM(S): at 17:27

## 2025-01-06 RX ADMIN — TAMSULOSIN HYDROCHLORIDE 0.4 MILLIGRAM(S): 0.4 CAPSULE ORAL at 21:31

## 2025-01-06 RX ADMIN — POTASSIUM PHOSPHATE, MONOBASIC AND POTASSIUM PHOSPHATE, DIBASIC 62.5 MILLIMOLE(S): 224; 236 INJECTION, SOLUTION INTRAVENOUS at 19:47

## 2025-01-06 RX ADMIN — ENOXAPARIN SODIUM 40 MILLIGRAM(S): 60 INJECTION INTRAVENOUS; SUBCUTANEOUS at 21:31

## 2025-01-06 RX ADMIN — QUETIAPINE FUMARATE 25 MILLIGRAM(S): 100 TABLET, FILM COATED ORAL at 21:31

## 2025-01-06 RX ADMIN — Medication 1 DROP(S): at 17:27

## 2025-01-06 RX ADMIN — Medication 1 APPLICATION(S): at 05:10

## 2025-01-06 RX ADMIN — ACETAMINOPHEN 500 MILLIGRAM(S): 80 SOLUTION/ DROPS ORAL at 09:15

## 2025-01-06 RX ADMIN — ATORVASTATIN CALCIUM 80 MILLIGRAM(S): 40 TABLET, FILM COATED ORAL at 21:31

## 2025-01-06 RX ADMIN — ACETAMINOPHEN 500 MILLIGRAM(S): 80 SOLUTION/ DROPS ORAL at 08:31

## 2025-01-06 RX ADMIN — MEMANTINE HYDROCHLORIDE 5 MILLIGRAM(S): 14 CAPSULE, EXTENDED RELEASE ORAL at 17:27

## 2025-01-06 RX ADMIN — Medication 1 GRAM(S): at 05:10

## 2025-01-06 RX ADMIN — Medication 1 APPLICATION(S): at 17:28

## 2025-01-06 RX ADMIN — Medication 81 MILLIGRAM(S): at 12:58

## 2025-01-06 NOTE — SOCIAL WORK PROGRESS NOTE - NSSWPROGRESSNOTE_GEN_ALL_CORE
sw able to speak w son at bedside whom is in agreement w [facility] on dc. Son states he will review list w his family to choose [facility] facilities.  Per son pt used [facility] 45 days at [facility]. sw to follow for plan for [facility]/ [facility] requested.

## 2025-01-06 NOTE — PROGRESS NOTE ADULT - SUBJECTIVE AND OBJECTIVE BOX
Patient is a 89y old  Male who presents with a chief complaint of altered mental status.    Pt's son at bedside providing translation.   INTERVAL HPI/OVERNIGHT EVENTS: Pt still with AMS, but continuing to improve and is following more commands and answering some yes/no questions. Unclear validity of ROS, but pt denies SOB, CP, abd pain, headache, fever.     MEDICATIONS  (STANDING):  acetaminophen     Tablet .. 500 milliGRAM(s) Oral every 12 hours  aspirin enteric coated 81 milliGRAM(s) Oral daily  atorvastatin 80 milliGRAM(s) Oral at bedtime  dorzolamide 2% Ophthalmic Solution 1 Drop(s) Both EYES <User Schedule>  enoxaparin Injectable 40 milliGRAM(s) SubCutaneous every 24 hours  finasteride 5 milliGRAM(s) Oral daily  fluocinonide 0.05% Cream 1 Application(s) Topical two times a day  memantine 5 milliGRAM(s) Oral two times a day  methenamine hippurate 1 Gram(s) Oral two times a day  QUEtiapine 25 milliGRAM(s) Oral at bedtime  tamsulosin 0.4 milliGRAM(s) Oral at bedtime    MEDICATIONS  (PRN):  acetaminophen     Tablet .. 650 milliGRAM(s) Oral every 6 hours PRN Temp greater or equal to 38C (100.4F), Mild Pain (1 - 3)        Allergies    No Known Allergies    Intolerances        REVIEW OF SYSTEMS:  Pt answering few yes/no questions. Unclear validity of ROS, but pt denies SOB, CP, abd pain, headache, fever.    Vital Signs Last 24 Hrs  T(C): 36.4 (06 Jan 2025 20:51), Max: 36.4 (06 Jan 2025 12:32)  T(F): 97.5 (06 Jan 2025 20:51), Max: 97.6 (06 Jan 2025 12:32)  HR: 80 (06 Jan 2025 20:51) (63 - 80)  BP: 149/87 (06 Jan 2025 20:51) (110/72 - 149/87)  BP(mean): --  RR: 18 (06 Jan 2025 20:51) (18 - 18)  SpO2: 99% (06 Jan 2025 20:51) (97% - 99%)    Parameters below as of 06 Jan 2025 20:51  Patient On (Oxygen Delivery Method): room air      PHYSICAL EXAM:  GENERAL: confused   HEENT:  moist mucous membranes  CHEST/LUNG:  CTA b/l, no rales, wheezes, or rhonchi  HEART:  RRR, S1, S2  ABDOMEN:  BS+, soft, no apparent tenderness, nondistended  EXTREMITIES: no edema, cyanosis, or apparent calf tenderness  NERVOUS SYSTEM: awake, confused, answering few yes/no questions; following some simple commands     LABS:                                              12.4   8.04  )-----------( 126      ( 06 Jan 2025 07:56 )             36.9     CBC Full  -  ( 06 Jan 2025 07:56 )  WBC Count : 8.04 K/uL  Hemoglobin : 12.4 g/dL  Hematocrit : 36.9 %  Platelet Count - Automated : 126 K/uL  Mean Cell Volume : 93.9 fl  Mean Cell Hemoglobin : 31.6 pg  Mean Cell Hemoglobin Concentration : 33.6 g/dL  Auto Neutrophil # : x  Auto Lymphocyte # : x  Auto Monocyte # : x  Auto Eosinophil # : x  Auto Basophil # : x  Auto Neutrophil % : x  Auto Lymphocyte % : x  Auto Monocyte % : x  Auto Eosinophil % : x  Auto Basophil % : x    01-06    145  |  115[H]  |  11  ----------------------------<  105[H]  3.8   |  26  |  0.72    Ca    9.4      06 Jan 2025 07:56  Phos  2.2     01-06  Mg     1.9     01-06        CAPILLARY BLOOD GLUCOSE        Culture - Urine (collected 03 Jan 2025 13:13)  Source: Clean Catch Clean Catch (Midstream)  Final Report (04 Jan 2025 18:09):    No growth    Culture - Blood (collected 03 Jan 2025 12:00)  Source: .Blood BLOOD  Preliminary Report (06 Jan 2025 19:01):    No growth at 72 Hours    Culture - Blood (collected 03 Jan 2025 11:45)  Source: .Blood BLOOD  Preliminary Report (06 Jan 2025 19:01):    No growth at 72 Hours              RADIOLOGY & ADDITIONAL TESTS:    Personally reviewed.     Consultant(s) Notes Reviewed:  [x] YES  [ ] NO     Patient is a 89y old  Male who presents with a chief complaint of altered mental status.    Pt's son at bedside providing translation.   INTERVAL HPI/OVERNIGHT EVENTS: Pt still with AMS, but continuing to improve and is following more commands and answering some yes/no questions. Unclear validity of ROS, but pt denies SOB, CP, abd pain, headache, fever.     MEDICATIONS  (STANDING):  acetaminophen     Tablet .. 500 milliGRAM(s) Oral every 12 hours  aspirin enteric coated 81 milliGRAM(s) Oral daily  atorvastatin 80 milliGRAM(s) Oral at bedtime  dorzolamide 2% Ophthalmic Solution 1 Drop(s) Both EYES <User Schedule>  enoxaparin Injectable 40 milliGRAM(s) SubCutaneous every 24 hours  finasteride 5 milliGRAM(s) Oral daily  fluocinonide 0.05% Cream 1 Application(s) Topical two times a day  memantine 5 milliGRAM(s) Oral two times a day  methenamine hippurate 1 Gram(s) Oral two times a day  QUEtiapine 25 milliGRAM(s) Oral at bedtime  tamsulosin 0.4 milliGRAM(s) Oral at bedtime    MEDICATIONS  (PRN):  acetaminophen     Tablet .. 650 milliGRAM(s) Oral every 6 hours PRN Temp greater or equal to 38C (100.4F), Mild Pain (1 - 3)         Allergies    No Known Allergies    Intolerances        REVIEW OF SYSTEMS:  Pt answering few yes/no questions. Unclear validity of ROS, but pt denies SOB, CP, abd pain, headache, fever.    Vital Signs Last 24 Hrs  T(C): 36.4 (06 Jan 2025 20:51), Max: 36.4 (06 Jan 2025 12:32)  T(F): 97.5 (06 Jan 2025 20:51), Max: 97.6 (06 Jan 2025 12:32)  HR: 80 (06 Jan 2025 20:51) (63 - 80)  BP: 149/87 (06 Jan 2025 20:51) (110/72 - 149/87)  BP(mean): --  RR: 18 (06 Jan 2025 20:51) (18 - 18)  SpO2: 99% (06 Jan 2025 20:51) (97% - 99%)    Parameters below as of 06 Jan 2025 20:51  Patient On (Oxygen Delivery Method): room air      PHYSICAL EXAM:  GENERAL: confused   HEENT:  moist mucous membranes  CHEST/LUNG:  CTA b/l, no rales, wheezes, or rhonchi  HEART:  RRR, S1, S2  ABDOMEN:  BS+, soft, no apparent tenderness, nondistended  EXTREMITIES: no edema, cyanosis, or apparent calf tenderness  NERVOUS SYSTEM: awake, confused, answering few yes/no questions; following some simple commands     LABS:                                              12.4   8.04  )-----------( 126      ( 06 Jan 2025 07:56 )             36.9     CBC Full  -  ( 06 Jan 2025 07:56 )  WBC Count : 8.04 K/uL  Hemoglobin : 12.4 g/dL  Hematocrit : 36.9 %  Platelet Count - Automated : 126 K/uL  Mean Cell Volume : 93.9 fl  Mean Cell Hemoglobin : 31.6 pg  Mean Cell Hemoglobin Concentration : 33.6 g/dL  Auto Neutrophil # : x  Auto Lymphocyte # : x  Auto Monocyte # : x  Auto Eosinophil # : x  Auto Basophil # : x  Auto Neutrophil % : x  Auto Lymphocyte % : x  Auto Monocyte % : x  Auto Eosinophil % : x  Auto Basophil % : x    01-06    145  |  115[H]  |  11  ----------------------------<  105[H]  3.8   |  26  |  0.72    Ca    9.4      06 Jan 2025 07:56  Phos  2.2     01-06  Mg     1.9     01-06        CAPILLARY BLOOD GLUCOSE        Culture - Urine (collected 03 Jan 2025 13:13)  Source: Clean Catch Clean Catch (Midstream)  Final Report (04 Jan 2025 18:09):    No growth    Culture - Blood (collected 03 Jan 2025 12:00)  Source: .Blood BLOOD  Preliminary Report (06 Jan 2025 19:01):    No growth at 72 Hours    Culture - Blood (collected 03 Jan 2025 11:45)  Source: .Blood BLOOD  Preliminary Report (06 Jan 2025 19:01):    No growth at 72 Hours              RADIOLOGY & ADDITIONAL TESTS:    Personally reviewed.     Consultant(s) Notes Reviewed:  [x] YES  [ ] NO

## 2025-01-07 LAB
ANION GAP SERPL CALC-SCNC: 3 MMOL/L — LOW (ref 5–17)
BUN SERPL-MCNC: 8 MG/DL — SIGNIFICANT CHANGE UP (ref 7–23)
CALCIUM SERPL-MCNC: 9.4 MG/DL — SIGNIFICANT CHANGE UP (ref 8.5–10.1)
CHLORIDE SERPL-SCNC: 114 MMOL/L — HIGH (ref 96–108)
CO2 SERPL-SCNC: 26 MMOL/L — SIGNIFICANT CHANGE UP (ref 22–31)
CREAT SERPL-MCNC: 0.62 MG/DL — SIGNIFICANT CHANGE UP (ref 0.5–1.3)
EGFR: 88 ML/MIN/1.73M2 — SIGNIFICANT CHANGE UP
GLUCOSE SERPL-MCNC: 75 MG/DL — SIGNIFICANT CHANGE UP (ref 70–99)
HCT VFR BLD CALC: 38.3 % — LOW (ref 39–50)
HGB BLD-MCNC: 12.9 G/DL — LOW (ref 13–17)
MCHC RBC-ENTMCNC: 31.5 PG — SIGNIFICANT CHANGE UP (ref 27–34)
MCHC RBC-ENTMCNC: 33.7 G/DL — SIGNIFICANT CHANGE UP (ref 32–36)
MCV RBC AUTO: 93.6 FL — SIGNIFICANT CHANGE UP (ref 80–100)
NRBC # BLD: 0 /100 WBCS — SIGNIFICANT CHANGE UP (ref 0–0)
PHOSPHATE SERPL-MCNC: 2.8 MG/DL — SIGNIFICANT CHANGE UP (ref 2.5–4.5)
PLATELET # BLD AUTO: 128 K/UL — LOW (ref 150–400)
POTASSIUM SERPL-MCNC: 4.2 MMOL/L — SIGNIFICANT CHANGE UP (ref 3.5–5.3)
POTASSIUM SERPL-SCNC: 4.2 MMOL/L — SIGNIFICANT CHANGE UP (ref 3.5–5.3)
RBC # BLD: 4.09 M/UL — LOW (ref 4.2–5.8)
RBC # FLD: 14.5 % — SIGNIFICANT CHANGE UP (ref 10.3–14.5)
SODIUM SERPL-SCNC: 143 MMOL/L — SIGNIFICANT CHANGE UP (ref 135–145)
WBC # BLD: 7.77 K/UL — SIGNIFICANT CHANGE UP (ref 3.8–10.5)
WBC # FLD AUTO: 7.77 K/UL — SIGNIFICANT CHANGE UP (ref 3.8–10.5)

## 2025-01-07 PROCEDURE — 99232 SBSQ HOSP IP/OBS MODERATE 35: CPT

## 2025-01-07 RX ADMIN — Medication 1 DROP(S): at 05:12

## 2025-01-07 RX ADMIN — Medication 1 APPLICATION(S): at 19:04

## 2025-01-07 RX ADMIN — ACETAMINOPHEN 500 MILLIGRAM(S): 80 SOLUTION/ DROPS ORAL at 12:00

## 2025-01-07 RX ADMIN — TAMSULOSIN HYDROCHLORIDE 0.4 MILLIGRAM(S): 0.4 CAPSULE ORAL at 21:41

## 2025-01-07 RX ADMIN — MEMANTINE HYDROCHLORIDE 5 MILLIGRAM(S): 14 CAPSULE, EXTENDED RELEASE ORAL at 18:52

## 2025-01-07 RX ADMIN — Medication 81 MILLIGRAM(S): at 12:00

## 2025-01-07 RX ADMIN — Medication 1 GRAM(S): at 18:52

## 2025-01-07 RX ADMIN — Medication 1 DROP(S): at 18:52

## 2025-01-07 RX ADMIN — ACETAMINOPHEN 500 MILLIGRAM(S): 80 SOLUTION/ DROPS ORAL at 21:41

## 2025-01-07 RX ADMIN — QUETIAPINE FUMARATE 25 MILLIGRAM(S): 100 TABLET, FILM COATED ORAL at 21:41

## 2025-01-07 RX ADMIN — ATORVASTATIN CALCIUM 80 MILLIGRAM(S): 40 TABLET, FILM COATED ORAL at 21:41

## 2025-01-07 RX ADMIN — MEMANTINE HYDROCHLORIDE 5 MILLIGRAM(S): 14 CAPSULE, EXTENDED RELEASE ORAL at 05:12

## 2025-01-07 RX ADMIN — Medication 1 APPLICATION(S): at 05:12

## 2025-01-07 RX ADMIN — Medication 5 MILLIGRAM(S): at 12:00

## 2025-01-07 RX ADMIN — ACETAMINOPHEN 500 MILLIGRAM(S): 80 SOLUTION/ DROPS ORAL at 13:00

## 2025-01-07 RX ADMIN — Medication 1 GRAM(S): at 05:12

## 2025-01-07 RX ADMIN — ENOXAPARIN SODIUM 40 MILLIGRAM(S): 60 INJECTION INTRAVENOUS; SUBCUTANEOUS at 21:43

## 2025-01-07 NOTE — PROGRESS NOTE ADULT - SUBJECTIVE AND OBJECTIVE BOX
Patient is a 89y old  Male who presents with a chief complaint of altered mental status.    Pt's son at bedside providing translation.   INTERVAL HPI/OVERNIGHT EVENTS: Pt answering some questions in English himself. Pt denies SOB, CP, abd pain, n/v, headache, fever, chills. Pt has underlying dementia and unclear validity of ROS.    MEDICATIONS  (STANDING):  acetaminophen     Tablet .. 500 milliGRAM(s) Oral every 12 hours  aspirin enteric coated 81 milliGRAM(s) Oral daily  atorvastatin 80 milliGRAM(s) Oral at bedtime  dorzolamide 2% Ophthalmic Solution 1 Drop(s) Both EYES <User Schedule>  enoxaparin Injectable 40 milliGRAM(s) SubCutaneous every 24 hours  finasteride 5 milliGRAM(s) Oral daily  fluocinonide 0.05% Cream 1 Application(s) Topical two times a day  memantine 5 milliGRAM(s) Oral two times a day  methenamine hippurate 1 Gram(s) Oral two times a day  QUEtiapine 25 milliGRAM(s) Oral at bedtime  tamsulosin 0.4 milliGRAM(s) Oral at bedtime    MEDICATIONS  (PRN):  acetaminophen     Tablet .. 650 milliGRAM(s) Oral every 6 hours PRN Temp greater or equal to 38C (100.4F), Mild Pain (1 - 3)       Allergies    No Known Allergies    Intolerances        REVIEW OF SYSTEMS:  Pt denies SOB, CP, abd pain, n/v, headache, fever, chills. Pt has underlying dementia and unclear validity of ROS.    Vital Signs Last 24 Hrs  T(C): 36.3 (07 Jan 2025 21:13), Max: 36.4 (07 Jan 2025 04:50)  T(F): 97.3 (07 Jan 2025 21:13), Max: 97.5 (07 Jan 2025 04:50)  HR: 67 (07 Jan 2025 21:13) (67 - 75)  BP: 125/65 (07 Jan 2025 21:13) (124/73 - 139/81)  BP(mean): --  RR: 18 (07 Jan 2025 21:13) (18 - 18)  SpO2: 99% (07 Jan 2025 21:13) (98% - 99%)    Parameters below as of 07 Jan 2025 21:13  Patient On (Oxygen Delivery Method): room air      PHYSICAL EXAM:  GENERAL: NAD  HEENT:  moist mucous membranes, anicteric  CHEST/LUNG:  CTA b/l, no rales, wheezes, or rhonchi  HEART:  RRR, S1, S2  ABDOMEN:  BS+, soft, no tenderness, nondistended  EXTREMITIES: no edema, cyanosis, or apparent calf tenderness  NERVOUS SYSTEM: answering simple questions and following commands appropriately      LABS:                                  12.9   7.77  )-----------( 128      ( 07 Jan 2025 07:35 )             38.3     CBC Full  -  ( 07 Jan 2025 07:35 )  WBC Count : 7.77 K/uL  Hemoglobin : 12.9 g/dL  Hematocrit : 38.3 %  Platelet Count - Automated : 128 K/uL  Mean Cell Volume : 93.6 fl  Mean Cell Hemoglobin : 31.5 pg  Mean Cell Hemoglobin Concentration : 33.7 g/dL  Auto Neutrophil # : x  Auto Lymphocyte # : x  Auto Monocyte # : x  Auto Eosinophil # : x  Auto Basophil # : x  Auto Neutrophil % : x  Auto Lymphocyte % : x  Auto Monocyte % : x  Auto Eosinophil % : x  Auto Basophil % : x    01-07    143  |  114[H]  |  8   ----------------------------<  75  4.2   |  26  |  0.62    Ca    9.4      07 Jan 2025 07:35  Phos  2.8     01-07  Mg     1.9     01-06          CAPILLARY BLOOD GLUCOSE        Culture - Urine (collected 03 Jan 2025 13:13)  Source: Clean Catch Clean Catch (Midstream)  Final Report (04 Jan 2025 18:09):    No growth    Culture - Blood (collected 03 Jan 2025 12:00)  Source: .Blood BLOOD  Preliminary Report (07 Jan 2025 19:00):    No growth at 4 days    Culture - Blood (collected 03 Jan 2025 11:45)  Source: .Blood BLOOD  Preliminary Report (07 Jan 2025 19:00):    No growth at 4 days            RADIOLOGY & ADDITIONAL TESTS:    Personally reviewed.     Consultant(s) Notes Reviewed:  [x] YES  [ ] NO     Patient is a 89y old  Male who presents with a chief complaint of altered mental status.     Pt's son at bedside providing translation.   INTERVAL HPI/OVERNIGHT EVENTS: Pt answering some questions in English himself. Pt denies SOB, CP, abd pain, n/v, headache, fever, chills. Pt has underlying dementia and unclear validity of ROS.    MEDICATIONS  (STANDING):  acetaminophen     Tablet .. 500 milliGRAM(s) Oral every 12 hours  aspirin enteric coated 81 milliGRAM(s) Oral daily  atorvastatin 80 milliGRAM(s) Oral at bedtime  dorzolamide 2% Ophthalmic Solution 1 Drop(s) Both EYES <User Schedule>  enoxaparin Injectable 40 milliGRAM(s) SubCutaneous every 24 hours  finasteride 5 milliGRAM(s) Oral daily  fluocinonide 0.05% Cream 1 Application(s) Topical two times a day  memantine 5 milliGRAM(s) Oral two times a day  methenamine hippurate 1 Gram(s) Oral two times a day  QUEtiapine 25 milliGRAM(s) Oral at bedtime  tamsulosin 0.4 milliGRAM(s) Oral at bedtime    MEDICATIONS  (PRN):  acetaminophen     Tablet .. 650 milliGRAM(s) Oral every 6 hours PRN Temp greater or equal to 38C (100.4F), Mild Pain (1 - 3)       Allergies    No Known Allergies    Intolerances        REVIEW OF SYSTEMS:  Pt denies SOB, CP, abd pain, n/v, headache, fever, chills. Pt has underlying dementia and unclear validity of ROS.    Vital Signs Last 24 Hrs  T(C): 36.3 (07 Jan 2025 21:13), Max: 36.4 (07 Jan 2025 04:50)  T(F): 97.3 (07 Jan 2025 21:13), Max: 97.5 (07 Jan 2025 04:50)  HR: 67 (07 Jan 2025 21:13) (67 - 75)  BP: 125/65 (07 Jan 2025 21:13) (124/73 - 139/81)  BP(mean): --  RR: 18 (07 Jan 2025 21:13) (18 - 18)  SpO2: 99% (07 Jan 2025 21:13) (98% - 99%)    Parameters below as of 07 Jan 2025 21:13  Patient On (Oxygen Delivery Method): room air      PHYSICAL EXAM:  GENERAL: NAD  HEENT:  moist mucous membranes, anicteric  CHEST/LUNG:  CTA b/l, no rales, wheezes, or rhonchi  HEART:  RRR, S1, S2  ABDOMEN:  BS+, soft, no tenderness, nondistended  EXTREMITIES: no edema, cyanosis, or apparent calf tenderness  NERVOUS SYSTEM: answering simple questions and following commands appropriately      LABS:                                  12.9   7.77  )-----------( 128      ( 07 Jan 2025 07:35 )             38.3     CBC Full  -  ( 07 Jan 2025 07:35 )  WBC Count : 7.77 K/uL  Hemoglobin : 12.9 g/dL  Hematocrit : 38.3 %  Platelet Count - Automated : 128 K/uL  Mean Cell Volume : 93.6 fl  Mean Cell Hemoglobin : 31.5 pg  Mean Cell Hemoglobin Concentration : 33.7 g/dL  Auto Neutrophil # : x  Auto Lymphocyte # : x  Auto Monocyte # : x  Auto Eosinophil # : x  Auto Basophil # : x  Auto Neutrophil % : x  Auto Lymphocyte % : x  Auto Monocyte % : x  Auto Eosinophil % : x  Auto Basophil % : x    01-07    143  |  114[H]  |  8   ----------------------------<  75  4.2   |  26  |  0.62    Ca    9.4      07 Jan 2025 07:35  Phos  2.8     01-07  Mg     1.9     01-06          CAPILLARY BLOOD GLUCOSE        Culture - Urine (collected 03 Jan 2025 13:13)  Source: Clean Catch Clean Catch (Midstream)  Final Report (04 Jan 2025 18:09):    No growth    Culture - Blood (collected 03 Jan 2025 12:00)  Source: .Blood BLOOD  Preliminary Report (07 Jan 2025 19:00):    No growth at 4 days    Culture - Blood (collected 03 Jan 2025 11:45)  Source: .Blood BLOOD  Preliminary Report (07 Jan 2025 19:00):    No growth at 4 days            RADIOLOGY & ADDITIONAL TESTS:    Personally reviewed.     Consultant(s) Notes Reviewed:  [x] YES  [ ] NO     Patient is a 89y old  Male who presents with a chief complaint of altered mental status.     Pt's son at bedside providing translation.   INTERVAL HPI/OVERNIGHT EVENTS: Pt answering some questions in English himself. Pt denies SOB, CP, abd pain, n/v, headache, fever, chills. Pt has underlying dementia and unclear validity of ROS.     MEDICATIONS  (STANDING):  acetaminophen     Tablet .. 500 milliGRAM(s) Oral every 12 hours  aspirin enteric coated 81 milliGRAM(s) Oral daily  atorvastatin 80 milliGRAM(s) Oral at bedtime  dorzolamide 2% Ophthalmic Solution 1 Drop(s) Both EYES <User Schedule>  enoxaparin Injectable 40 milliGRAM(s) SubCutaneous every 24 hours  finasteride 5 milliGRAM(s) Oral daily  fluocinonide 0.05% Cream 1 Application(s) Topical two times a day  memantine 5 milliGRAM(s) Oral two times a day  methenamine hippurate 1 Gram(s) Oral two times a day  QUEtiapine 25 milliGRAM(s) Oral at bedtime  tamsulosin 0.4 milliGRAM(s) Oral at bedtime    MEDICATIONS  (PRN):  acetaminophen     Tablet .. 650 milliGRAM(s) Oral every 6 hours PRN Temp greater or equal to 38C (100.4F), Mild Pain (1 - 3)       Allergies    No Known Allergies    Intolerances        REVIEW OF SYSTEMS:  Pt denies SOB, CP, abd pain, n/v, headache, fever, chills. Pt has underlying dementia and unclear validity of ROS.    Vital Signs Last 24 Hrs  T(C): 36.3 (07 Jan 2025 21:13), Max: 36.4 (07 Jan 2025 04:50)  T(F): 97.3 (07 Jan 2025 21:13), Max: 97.5 (07 Jan 2025 04:50)  HR: 67 (07 Jan 2025 21:13) (67 - 75)  BP: 125/65 (07 Jan 2025 21:13) (124/73 - 139/81)  BP(mean): --  RR: 18 (07 Jan 2025 21:13) (18 - 18)  SpO2: 99% (07 Jan 2025 21:13) (98% - 99%)    Parameters below as of 07 Jan 2025 21:13  Patient On (Oxygen Delivery Method): room air      PHYSICAL EXAM:  GENERAL: NAD  HEENT:  moist mucous membranes, anicteric  CHEST/LUNG:  CTA b/l, no rales, wheezes, or rhonchi  HEART:  RRR, S1, S2  ABDOMEN:  BS+, soft, no tenderness, nondistended  EXTREMITIES: no edema, cyanosis, or apparent calf tenderness  NERVOUS SYSTEM: answering simple questions and following commands appropriately      LABS:                                  12.9   7.77  )-----------( 128      ( 07 Jan 2025 07:35 )             38.3     CBC Full  -  ( 07 Jan 2025 07:35 )  WBC Count : 7.77 K/uL  Hemoglobin : 12.9 g/dL  Hematocrit : 38.3 %  Platelet Count - Automated : 128 K/uL  Mean Cell Volume : 93.6 fl  Mean Cell Hemoglobin : 31.5 pg  Mean Cell Hemoglobin Concentration : 33.7 g/dL  Auto Neutrophil # : x  Auto Lymphocyte # : x  Auto Monocyte # : x  Auto Eosinophil # : x  Auto Basophil # : x  Auto Neutrophil % : x  Auto Lymphocyte % : x  Auto Monocyte % : x  Auto Eosinophil % : x  Auto Basophil % : x    01-07    143  |  114[H]  |  8   ----------------------------<  75  4.2   |  26  |  0.62    Ca    9.4      07 Jan 2025 07:35  Phos  2.8     01-07  Mg     1.9     01-06          CAPILLARY BLOOD GLUCOSE        Culture - Urine (collected 03 Jan 2025 13:13)  Source: Clean Catch Clean Catch (Midstream)  Final Report (04 Jan 2025 18:09):    No growth    Culture - Blood (collected 03 Jan 2025 12:00)  Source: .Blood BLOOD  Preliminary Report (07 Jan 2025 19:00):    No growth at 4 days    Culture - Blood (collected 03 Jan 2025 11:45)  Source: .Blood BLOOD  Preliminary Report (07 Jan 2025 19:00):    No growth at 4 days            RADIOLOGY & ADDITIONAL TESTS:    Personally reviewed.     Consultant(s) Notes Reviewed:  [x] YES  [ ] NO

## 2025-01-07 NOTE — SOCIAL WORK PROGRESS NOTE - NSSWPROGRESSNOTE_GEN_ALL_CORE
CHRISTOPHER has sent to all [facility] contracted under pt's insurance plan [employer]/Med[employer], following 2 accepting, [facility]. Son exploring both facilities and will contact  [facility] [phone] to discuss possibility of facilities outside of plan. Once facility agreed upon, SW to contact [facility] and send clinicals to [phone]. Plan remains for [facility].

## 2025-01-07 NOTE — DISCHARGE NOTE PROVIDER - HOSPITAL COURSE
HPI as documented in H&P:  89 year old male with PMHx dementia, recurrent UTIs (recently with PICC line and s/p 2 weeks of invanz), BPH, CAD s/p 3 stents (2016), GERD, HLD, kidney stones, presenting with altered mental status. History obtained from family and chart. Per family, pt recently completed 2 weeks of invanz via PICC for UTI a few days ago, and since then pt has been notably declining with increased confusion, and family unable to care for patient at home. Pt was started on seroquel 25mg qhs a few days ago. Pt needs full assistance with feeding, pureed diet (no beef), 1:1 monitoring, and can take PO meds but needs crushed in applesauce. At time of encounter, pt not responding verbally, does shake head no when asked if he is in pain.    ED Course:   Vitals: BP: 146/86, HR: 74, Temp: 97, RR: 16, SpO2: 98% on ra   Labs: cl 112, alb 2.7, tbili 1.6, alk phos 132, ast 39   UA: trace LE  CXR: Prior left base infiltrate has largely cleared. Scattered slight scars remain and sharp deviation of the trachea to the right at the thoracic inlet is unchanged.  CT head: Stable head CT. No acute hemorrhage, vasogenic edema, or extra-axial collection. Chronic findings as above.  CT CAP: No acute abnormality.  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 CT Chest/Abd/P had no evidence of acute infection. UCx and BCx were negative. VS stable, afebrile. CT Head had no acute changes. TSH wnl. B12 was not low (>2000). Ammonia was mildly elevated but pt had no risk factor for hepatic encephalopathy. Given suspicion for carbapenem toxicity, pt was monitored without antibiotics or other interventions and was expected to improve as the ertapenem clears from his system. Each day, pt's mental status improved and he returned to baseline per his family. Physical therapy recommended [facility] that pt's family wished to pursue and pt was discharged to [facility]. HPI as documented in H&P:  89 year old male with PMHx dementia, recurrent UTIs (recently with PICC line and s/p 2 weeks of invanz), BPH, CAD s/p 3 stents (2016), GERD, HLD, kidney stones, presenting with altered mental status. History obtained from family and chart. Per family, pt recently completed 2 weeks of invanz via PICC for UTI a few days ago, and since then pt has been notably declining with increased confusion, and family unable to care for patient at home. Pt was started on seroquel 25mg qhs a few days ago. Pt needs full assistance with feeding, pureed diet (no beef), 1:1 monitoring, and can take PO meds but needs crushed in applesauce. At time of encounter, pt not responding verbally, does shake head no when asked if he is in pain.    ED Course:   Vitals: BP: 146/86, HR: 74, Temp: 97, RR: 16, SpO2: 98% on ra   Labs: cl 112, alb 2.7, tbili 1.6, alk phos 132, ast 39   UA: trace LE  CXR: Prior left base infiltrate has largely cleared. Scattered slight scars remain and sharp deviation of the trachea to the right at the thoracic inlet is unchanged.  CT head: Stable head CT. No acute hemorrhage, vasogenic edema, or extra-axial collection. Chronic findings as above.  CT CAP: No acute abnormality.  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 CT Chest/Abd/P had no evidence of acute infection. UCx and BCx were negative. VS stable, afebrile. CT Head had no acute changes. TSH wnl. B12 was not low (>2000). Ammonia was mildly elevated but pt had no risk factor for hepatic encephalopathy. Given suspicion for carbapenem toxicity, pt was monitored without antibiotics or other interventions and was expected to improve as the ertapenem clears from his system. Each day, pt's mental status improved and he returned to baseline per his family. Physical therapy recommended [facility] that pt's family wished to pursue and pt was discharged to [facility]. HPI as documented in H&P:  89 year old male with PMHx dementia, recurrent UTIs (recently with PICC line and s/p 2 weeks of invanz), BPH, CAD s/p 3 stents (2016), GERD, HLD, kidney stones, presenting with altered mental status. History obtained from family and chart. Per family, pt recently completed 2 weeks of invanz via PICC for UTI a few days ago, and since then pt has been notably declining with increased confusion, and family unable to care for patient at home. Pt was started on seroquel 25mg qhs a few days ago. Pt needs full assistance with feeding, pureed diet (no beef), 1:1 monitoring, and can take PO meds but needs crushed in applesauce. At time of encounter, pt not responding verbally, does shake head no when asked if he is in pain.    ED Course:   Vitals: BP: 146/86, HR: 74, Temp: 97, RR: 16, SpO2: 98% on ra   Labs: cl 112, alb 2.7, tbili 1.6, alk phos 132, ast 39   UA: trace LE  CXR: Prior left base infiltrate has largely cleared. Scattered slight scars remain and sharp deviation of the trachea to the right at the thoracic inlet is unchanged.  CT head: Stable head CT. No acute hemorrhage, vasogenic edema, or extra-axial collection. Chronic findings as above.  CT CAP: No acute abnormality.  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 CT Chest/Abd/P had no evidence of acute infection. UCx and BCx were negative. VS stable, afebrile. CT Head had no acute changes. TSH wnl. B12 was not low (>2000). Ammonia was mildly elevated but pt had no risk factor for hepatic encephalopathy. Given suspicion for carbapenem toxicity, pt was monitored without antibiotics or other interventions and was expected to improve as the ertapenem clears from his system. Each day, pt's mental status improved and he returned to baseline per his family. Physical therapy recommended [facility] that pt's family wished to pursue and pt was discharged to [facility].       VITALS:   T(C): 36.5 (01-09-25 @ 12:43), Max: 36.6 (01-09-25 @ 05:16)  HR: 88 (01-09-25 @ 12:43) (77 - 88)  BP: 118/74 (01-09-25 @ 12:43) (118/74 - 146/79)  RR: 18 (01-09-25 @ 12:43) (18 - 18)  SpO2: 96% (01-09-25 @ 12:43) (96% - 98%)    GENERAL: NAD  HEENT:  AT/NC, anicteric, moist mucous membranes, EOMI, PERRL, conjunctiva and sclera clear  CHEST/LUNG:  CTA b/l, no rales, wheezes, or rhonchi,  normal respiratory effort, no intercostal retractions  HEART:  RRR, S1, S2, no murmurs; no pitting edema  ABDOMEN:  BS+, soft, nontender, nondistended  MSK/EXTREMITIES: palpable peripheral pulses, no clubbing or cyanosis  NERVOUS SYSTEM: Alert, follows simple commands, grossly moves all extremities  PSYCH: flat affect, Alert & Awake; poor judgement

## 2025-01-07 NOTE — DISCHARGE NOTE PROVIDER - NSDCCPCAREPLAN_GEN_ALL_CORE_FT
PRINCIPAL DISCHARGE DIAGNOSIS  Diagnosis: Toxic metabolic encephalopathy  Assessment and Plan of Treatment:      PRINCIPAL DISCHARGE DIAGNOSIS  Diagnosis: Toxic metabolic encephalopathy  Assessment and Plan of Treatment: You were admitted to the hospital for a change in mental status likely due to your antibiotic medication  -Your symptoms improved during hositlaization  -Will stop your Seroquel as this was added for worsening mental status outpatient - you may need to resume this medication but follow up outpatient   -It is important to stay hydrated. Drink water  -Resume your home medications

## 2025-01-07 NOTE — DISCHARGE NOTE PROVIDER - NSDCFUSCHEDAPPT_GEN_ALL_CORE_FT
[facility]  UROLOGY [address] [place] [address] R  Scheduled Appointment: 01/15/2025    Hoenig, David  [facility]  UROLOGY [address] [place] [address] R  Scheduled Appointment: 01/15/2025

## 2025-01-07 NOTE — DISCHARGE NOTE PROVIDER - CARE PROVIDER_API CALL
Angeline Ness.  Internal Medicine  [address] [place] [id number]  Phone: [phone]  Fax: [phone]  Follow Up Time:

## 2025-01-07 NOTE — DISCHARGE NOTE PROVIDER - NSDCMRMEDTOKEN_GEN_ALL_CORE_FT
aspirin 81 mg oral tablet: 1 tab(s) orally once a day  atorvastatin 80 mg oral tablet: 1 tab(s) orally once a day (at bedtime)  Azopt 1% ophthalmic suspension: 1 dose(s) to each affected eye 2 times a day both eyes BID  finasteride 5 mg oral tablet: 1 tab(s) orally once a day  fluocinonide 0.05% topical cream: Apply topically to affected area 2 times a day  furosemide 20 mg oral tablet: 1 tab(s) orally once a day PRN  memantine 5 mg oral tablet: 1 tab(s) orally 2 times a day  multivitamin with minerals:   tamsulosin 0.4 mg oral capsule: 1 cap(s) orally once a day (at bedtime)   acetaminophen 325 mg oral tablet: 2 tab(s) orally every 6 hours As needed Temp greater or equal to 38C (100.4F), Mild Pain (1 - 3)  aspirin 81 mg oral tablet: 1 tab(s) orally once a day  atorvastatin 80 mg oral tablet: 1 tab(s) orally once a day (at bedtime)  Azopt 1% ophthalmic suspension: 1 dose(s) to each affected eye 2 times a day both eyes BID  finasteride 5 mg oral tablet: 1 tab(s) orally once a day  fluocinonide 0.05% topical cream: Apply topically to affected area 2 times a day  furosemide 20 mg oral tablet: 1 tab(s) orally once a day PRN  memantine 5 mg oral tablet: 1 tab(s) orally 2 times a day  methenamine hippurate 1 g oral tablet: 1 tab(s) orally 2 times a day  multivitamin with minerals:   tamsulosin 0.4 mg oral capsule: 1 cap(s) orally once a day (at bedtime)

## 2025-01-08 ENCOUNTER — TRANSCRIPTION ENCOUNTER (OUTPATIENT)
Age: 89
End: 2025-01-08

## 2025-01-08 DIAGNOSIS — Z51.5 ENCOUNTER FOR PALLIATIVE CARE: ICD-10-CM

## 2025-01-08 DIAGNOSIS — Z71.89 OTHER SPECIFIED COUNSELING: ICD-10-CM

## 2025-01-08 LAB
ALBUMIN SERPL ELPH-MCNC: 2.4 G/DL — LOW (ref 3.3–5)
ALP SERPL-CCNC: 106 U/L — SIGNIFICANT CHANGE UP (ref 40–120)
ALT FLD-CCNC: 22 U/L — SIGNIFICANT CHANGE UP (ref 12–78)
ANION GAP SERPL CALC-SCNC: 5 MMOL/L — SIGNIFICANT CHANGE UP (ref 5–17)
ANION GAP SERPL CALC-SCNC: 6 MMOL/L — SIGNIFICANT CHANGE UP (ref 5–17)
AST SERPL-CCNC: 47 U/L — HIGH (ref 15–37)
BILIRUB SERPL-MCNC: 1.1 MG/DL — SIGNIFICANT CHANGE UP (ref 0.2–1.2)
BUN SERPL-MCNC: 10 MG/DL — SIGNIFICANT CHANGE UP (ref 7–23)
BUN SERPL-MCNC: 15 MG/DL — SIGNIFICANT CHANGE UP (ref 7–23)
CALCIUM SERPL-MCNC: 9.7 MG/DL — SIGNIFICANT CHANGE UP (ref 8.5–10.1)
CALCIUM SERPL-MCNC: 9.9 MG/DL — SIGNIFICANT CHANGE UP (ref 8.5–10.1)
CHLORIDE SERPL-SCNC: 111 MMOL/L — HIGH (ref 96–108)
CHLORIDE SERPL-SCNC: 113 MMOL/L — HIGH (ref 96–108)
CO2 SERPL-SCNC: 25 MMOL/L — SIGNIFICANT CHANGE UP (ref 22–31)
CO2 SERPL-SCNC: 28 MMOL/L — SIGNIFICANT CHANGE UP (ref 22–31)
CREAT SERPL-MCNC: 0.78 MG/DL — SIGNIFICANT CHANGE UP (ref 0.5–1.3)
CREAT SERPL-MCNC: 1 MG/DL — SIGNIFICANT CHANGE UP (ref 0.5–1.3)
CULTURE RESULTS: SIGNIFICANT CHANGE UP
CULTURE RESULTS: SIGNIFICANT CHANGE UP
EGFR: 69 ML/MIN/1.73M2 — SIGNIFICANT CHANGE UP
EGFR: 82 ML/MIN/1.73M2 — SIGNIFICANT CHANGE UP
GLUCOSE SERPL-MCNC: 100 MG/DL — HIGH (ref 70–99)
GLUCOSE SERPL-MCNC: 89 MG/DL — SIGNIFICANT CHANGE UP (ref 70–99)
HCT VFR BLD CALC: 39.8 % — SIGNIFICANT CHANGE UP (ref 39–50)
HGB BLD-MCNC: 13.5 G/DL — SIGNIFICANT CHANGE UP (ref 13–17)
MAGNESIUM SERPL-MCNC: 2 MG/DL — SIGNIFICANT CHANGE UP (ref 1.6–2.6)
MAGNESIUM SERPL-MCNC: 2.1 MG/DL — SIGNIFICANT CHANGE UP (ref 1.6–2.6)
MCHC RBC-ENTMCNC: 31.5 PG — SIGNIFICANT CHANGE UP (ref 27–34)
MCHC RBC-ENTMCNC: 33.9 G/DL — SIGNIFICANT CHANGE UP (ref 32–36)
MCV RBC AUTO: 92.8 FL — SIGNIFICANT CHANGE UP (ref 80–100)
NRBC # BLD: 0 /100 WBCS — SIGNIFICANT CHANGE UP (ref 0–0)
PHOSPHATE SERPL-MCNC: 2.6 MG/DL — SIGNIFICANT CHANGE UP (ref 2.5–4.5)
PHOSPHATE SERPL-MCNC: 2.7 MG/DL — SIGNIFICANT CHANGE UP (ref 2.5–4.5)
PLATELET # BLD AUTO: 145 K/UL — LOW (ref 150–400)
POTASSIUM SERPL-MCNC: 3.6 MMOL/L — SIGNIFICANT CHANGE UP (ref 3.5–5.3)
POTASSIUM SERPL-MCNC: 3.8 MMOL/L — SIGNIFICANT CHANGE UP (ref 3.5–5.3)
POTASSIUM SERPL-SCNC: 3.6 MMOL/L — SIGNIFICANT CHANGE UP (ref 3.5–5.3)
POTASSIUM SERPL-SCNC: 3.8 MMOL/L — SIGNIFICANT CHANGE UP (ref 3.5–5.3)
PROT SERPL-MCNC: 5.8 G/DL — LOW (ref 6–8.3)
RBC # BLD: 4.29 M/UL — SIGNIFICANT CHANGE UP (ref 4.2–5.8)
RBC # FLD: 14.7 % — HIGH (ref 10.3–14.5)
SODIUM SERPL-SCNC: 144 MMOL/L — SIGNIFICANT CHANGE UP (ref 135–145)
SODIUM SERPL-SCNC: 144 MMOL/L — SIGNIFICANT CHANGE UP (ref 135–145)
SPECIMEN SOURCE: SIGNIFICANT CHANGE UP
SPECIMEN SOURCE: SIGNIFICANT CHANGE UP
WBC # BLD: 8.1 K/UL — SIGNIFICANT CHANGE UP (ref 3.8–10.5)
WBC # FLD AUTO: 8.1 K/UL — SIGNIFICANT CHANGE UP (ref 3.8–10.5)

## 2025-01-08 PROCEDURE — 99233 SBSQ HOSP IP/OBS HIGH 50: CPT | Mod: GC

## 2025-01-08 PROCEDURE — 99223 1ST HOSP IP/OBS HIGH 75: CPT

## 2025-01-08 RX ORDER — SODIUM CHLORIDE 9 MG/ML
1000 INJECTION, SOLUTION INTRAVENOUS ONCE
Refills: 0 | Status: COMPLETED | OUTPATIENT
Start: 2025-01-08 | End: 2025-01-08

## 2025-01-08 RX ADMIN — Medication 1 APPLICATION(S): at 05:43

## 2025-01-08 RX ADMIN — Medication 1 DROP(S): at 17:37

## 2025-01-08 RX ADMIN — ENOXAPARIN SODIUM 40 MILLIGRAM(S): 60 INJECTION INTRAVENOUS; SUBCUTANEOUS at 21:45

## 2025-01-08 RX ADMIN — MEMANTINE HYDROCHLORIDE 5 MILLIGRAM(S): 14 CAPSULE, EXTENDED RELEASE ORAL at 17:36

## 2025-01-08 RX ADMIN — Medication 1 GRAM(S): at 17:36

## 2025-01-08 RX ADMIN — QUETIAPINE FUMARATE 25 MILLIGRAM(S): 100 TABLET, FILM COATED ORAL at 21:47

## 2025-01-08 RX ADMIN — TAMSULOSIN HYDROCHLORIDE 0.4 MILLIGRAM(S): 0.4 CAPSULE ORAL at 21:45

## 2025-01-08 RX ADMIN — ACETAMINOPHEN 500 MILLIGRAM(S): 80 SOLUTION/ DROPS ORAL at 23:03

## 2025-01-08 RX ADMIN — SODIUM CHLORIDE 1000 MILLILITER(S): 9 INJECTION, SOLUTION INTRAVENOUS at 12:36

## 2025-01-08 RX ADMIN — Medication 1 GRAM(S): at 05:43

## 2025-01-08 RX ADMIN — Medication 81 MILLIGRAM(S): at 12:32

## 2025-01-08 RX ADMIN — ACETAMINOPHEN 500 MILLIGRAM(S): 80 SOLUTION/ DROPS ORAL at 09:02

## 2025-01-08 RX ADMIN — ATORVASTATIN CALCIUM 80 MILLIGRAM(S): 40 TABLET, FILM COATED ORAL at 21:46

## 2025-01-08 RX ADMIN — ACETAMINOPHEN 500 MILLIGRAM(S): 80 SOLUTION/ DROPS ORAL at 21:45

## 2025-01-08 RX ADMIN — Medication 1 DROP(S): at 05:43

## 2025-01-08 RX ADMIN — Medication 1 APPLICATION(S): at 17:36

## 2025-01-08 RX ADMIN — Medication 5 MILLIGRAM(S): at 12:32

## 2025-01-08 RX ADMIN — MEMANTINE HYDROCHLORIDE 5 MILLIGRAM(S): 14 CAPSULE, EXTENDED RELEASE ORAL at 05:43

## 2025-01-08 RX ADMIN — ACETAMINOPHEN 500 MILLIGRAM(S): 80 SOLUTION/ DROPS ORAL at 09:50

## 2025-01-08 NOTE — PROGRESS NOTE ADULT - TIME BILLING
Pt seen + examined on 1/5/25. Note written by attending, see above.  Time spent: 51min. Time spent discussing/coordinating care with consultants, CM/SW team, and counseling the patient's daughter then subsequently pt's family on medical condition -- suspected toxic metabolic encephalopathy due to ertapenem, CT findings, cultures.
Pt seen + examined on 1/7/25. Note written by attending, see above.  Time spent: 42min. Time spent discussing/coordinating care with consultants, CM/SW team, and counseling the patient's son and subsequently pt's daughter on medical condition -- suspected toxic metabolic encephalopathy due to ertapenem, CT findings, cultures, deconditioning, [facility].
Pt seen + examined on 1/6/25. Note written by attending, see above.  Time spent: 51min. Time spent discussing/coordinating care with consultants, CM/SW team, and counseling the patient's son and subsequently pt's daughter on medical condition -- suspected toxic metabolic encephalopathy due to ertapenem, CT findings, cultures, deconditioning, [facility].
Reviewing chart notes and data, reviewing telemetry monitor records, face to face time counseling the patient, coordinating care with SW/CM at [facility].
Pt seen + examined on 1/4/25. Note written by attending, see above.  Time spent: 51min. Time spent discussing/coordinating care with consultants, CM/SW team, and counseling the patient's daughter on medical condition -- suspected toxic metabolic encephalopathy due to ertapenem, CT findings, cultures.

## 2025-01-08 NOTE — PROGRESS NOTE ADULT - PROBLEM SELECTOR PLAN 5
- DVT ppx lovenox 40mg subq QD
Chronic  - Continue home tamsulosin, finasteride

## 2025-01-08 NOTE — PROGRESS NOTE ADULT - PROBLEM SELECTOR PLAN 2
Chronic  - Continue home ASA and lipitor  - Pt takes lasix PO prn, can start if needed
Chronic  - Continue home ASA and lipitor
Chronic  - Continue home ASA and lipitor  - Pt takes lasix PO prn, can start if needed

## 2025-01-08 NOTE — SOCIAL WORK PROGRESS NOTE - NSSWPROGRESSNOTE_GEN_ALL_CORE
pt medically accepted to [facility], documentation submitted by [facility] for authorization for [facility]. Plan remains for [facility] with authorization.  Pt medically accepted to [facility], documentation submitted by [facility] to [employer] for authorization for blanche. Plan remains for [facility] once auth is received.

## 2025-01-08 NOTE — PROGRESS NOTE ADULT - PROBLEM SELECTOR PLAN 4
Chronic  - Continue home tamsulosin, finasteride
Chronic  - Continue home lipitor

## 2025-01-08 NOTE — PROGRESS NOTE ADULT - PROBLEM SELECTOR PLAN 6
- DVT ppx lovenox 40mg subq QD
- DVT ppx lovenox 40mg subq QD    Disp: phys therapy rec [facility]; SW working with pt's family on obtaining placement    palliative care consult DNR/DNI
- DVT ppx lovenox 40mg subq QD    Disp: phys therapy rec [facility]; SW working with pt's family on [facility] choices
- DVT ppx lovenox 40mg subq QD    Disp: phys therapy rec [facility]; SW working with pt's family on obtaining placement

## 2025-01-08 NOTE — CONSULT NOTE ADULT - PROBLEM SELECTOR RECOMMENDATION 9
moderate/advanced, but still eating diet, taking meds by mouth and ambulatory with PT  does not meet hospice criteria  plan is for D/C to [facility] when medically stable

## 2025-01-08 NOTE — PROGRESS NOTE ADULT - SUBJECTIVE AND OBJECTIVE BOX
Patient is a 89y old  Male who presents with a chief complaint of altered mental status, advanced dementia (08 Jan 2025 11:33)      INTERVAL HPI/OVERNIGHT EVENTS: Patient seen and examined at bedside. No overnight events.  Rapid response called this AM for unresponsiveness likely related to hypotension after PT - back to baseline mental status AO2 , s/p bolus IVF      MEDICATIONS  (STANDING):  acetaminophen     Tablet .. 500 milliGRAM(s) Oral every 12 hours  aspirin enteric coated 81 milliGRAM(s) Oral daily  atorvastatin 80 milliGRAM(s) Oral at bedtime  dorzolamide 2% Ophthalmic Solution 1 Drop(s) Both EYES <User Schedule>  enoxaparin Injectable 40 milliGRAM(s) SubCutaneous every 24 hours  finasteride 5 milliGRAM(s) Oral daily  fluocinonide 0.05% Cream 1 Application(s) Topical two times a day  memantine 5 milliGRAM(s) Oral two times a day  methenamine hippurate 1 Gram(s) Oral two times a day  QUEtiapine 25 milliGRAM(s) Oral at bedtime  tamsulosin 0.4 milliGRAM(s) Oral at bedtime    MEDICATIONS  (PRN):  acetaminophen     Tablet .. 650 milliGRAM(s) Oral every 6 hours PRN Temp greater or equal to 38C (100.4F), Mild Pain (1 - 3)      Allergies    ertapenem (Other (Mod to Severe))    Intolerances        REVIEW OF SYSTEMS:  CONSTITUTIONAL: No fever or chills  CARDIOVASCULAR: No chest pain, palpitations  GASTROINTESTINAL: No abd pain, nausea, vomiting    Vital Signs Last 24 Hrs  T(C): 36.2 (08 Jan 2025 13:16), Max: 36.6 (08 Jan 2025 05:00)  T(F): 97.2 (08 Jan 2025 13:16), Max: 97.8 (08 Jan 2025 05:00)  HR: 87 (08 Jan 2025 13:16) (67 - 87)  BP: 94/53 (08 Jan 2025 13:16) (94/53 - 152/79)  BP(mean): --  RR: 18 (08 Jan 2025 13:16) (18 - 18)  SpO2: 100% (08 Jan 2025 13:16) (94% - 100%)    Parameters below as of 08 Jan 2025 13:16  Patient On (Oxygen Delivery Method): room air      I&O's Summary    08 Jan 2025 07:01  -  08 Jan 2025 19:03  --------------------------------------------------------  IN: 240 mL / OUT: 1 mL / NET: 239 mL          PHYSICAL EXAM:  GENERAL: NAD  HEENT:  AT/NC, anicteric, moist mucous membranes, EOMI, PERRL, conjunctiva and sclera clear  CHEST/LUNG:  CTA b/l, no rales, wheezes, or rhonchi,  normal respiratory effort, no intercostal retractions  HEART:  RRR, S1, S2, no murmurs; no pitting edema  ABDOMEN:  BS+, soft, nontender, nondistended;  MSK/EXTREMITIES: palpable peripheral pulses, no clubbing or cyanosis  NERVOUS SYSTEM: answers questions and follows commands, A&Ox2 grossly moves all extremities   PSYCH: Appropriate affect, Alert & Awake; fair judgement      LABS: Personally reviewed  CBC                        13.5   8.10  )-----------( 145      ( 08 Jan 2025 07:28 )             39.8     CMP  01-08    144  |  111  |  15  ----------------------------<  100  3.6   |  28  |  1.00    Ca    9.9      08 Jan 2025 12:31  Phos  2.6     01-08  Mg     2.0     01-08    TPro  5.8  /  Alb  2.4  /  TBili  1.1  /  DBili  x   /  AST  47  /  ALT  22  /  AlkPhos  106  01-08                    01-04 Chol 108 mg/dL LDL -- HDL 47 mg/dL Trig 54 mg/dL              POCT Blood Glucose.: 150 mg/dL (08 Jan 2025 11:32)      Urinalysis Basic - ( 08 Jan 2025 12:31 )    Color: x / Appearance: x / SG: x / pH: x  Gluc: 100 mg/dL / Ketone: x  / Bili: x / Urobili: x   Blood: x / Protein: x / Nitrite: x   Leuk Esterase: x / RBC: x / WBC x   Sq Epi: x / Non Sq Epi: x / Bacteria: x        Culture - Urine (collected 03 Jan 2025 13:13)  Source: Clean Catch Clean Catch (Midstream)  Final Report (04 Jan 2025 18:09):    No growth    Culture - Blood (collected 03 Jan 2025 12:00)  Source: .Blood BLOOD  Final Report (08 Jan 2025 19:01):    No growth at 5 days    Culture - Blood (collected 03 Jan 2025 11:45)  Source: .Blood BLOOD  Final Report (08 Jan 2025 19:01):    No growth at 5 days            Culture - Urine (collected 01-03-25 @ 13:13)  Source: Clean Catch Clean Catch (Midstream)  Final Report (01-04-25 @ 18:09):    No growth    Culture - Blood (collected 01-03-25 @ 12:00)  Source: .Blood BLOOD  Final Report (01-08-25 @ 19:01):    No growth at 5 days    Culture - Blood (collected 01-03-25 @ 11:45)  Source: .Blood BLOOD  Final Report (01-08-25 @ 19:01):    No growth at 5 days        RADIOLOGY & ADDITIONAL TESTS: Personally reviewed.     Consultant(s) Notes Reviewed:  [x] YES  [ ] NO   Discussed with SW/CM, RN

## 2025-01-08 NOTE — PROGRESS NOTE ADULT - REASON FOR ADMISSION
altered mental status, advanced dementia
acute encephalopathy

## 2025-01-08 NOTE — CONSULT NOTE ADULT - SUBJECTIVE AND OBJECTIVE BOX
HPI:  89 year old male with PMHx dementia, recurrent UTIs (recently with PICC line and s/p 2 weeks of invanz), BPH, CAD s/p 3 stents (2016), GERD, HLD, kidney stones, presenting with altered mental status. History obtained from family and chart. Per family, pt recently completed 2 weeks of invanz via PICC for UTI a few days ago, and since then pt has been notably declining with increased confusion, and family unable to care for patient at home. Pt was started on seroquel 25mg qhs a few days ago. Pt needs full assistance with feeding, pureed diet (no beef), 1:1 monitoring, and can take PO meds but needs crushed in applesauce. At time of encounter, pt not responding verbally, does shake head no when asked if he is in pain.    ED Course:   Vitals: BP: 146/86, HR: 74, Temp: 97, RR: 16, SpO2: 98% on ra   Labs: cl 112, alb 2.7, tbili 1.6, alk phos 132, ast 39   UA: trace LE  CXR: Prior left base infiltrate has largely cleared. Scattered slight scars remain and sharp deviation of the trachea to the right at the thoracic inlet is unchanged.  CT head: Stable head CT. No acute hemorrhage, vasogenic edema, or extra-axial collection. Chronic findings as above.  CT CAP: No acute abnormality.  Received in the ED: ofirmev 1g, NS bolus 2.3L (03 Jan 2025 16:13)    PERTINENT PM/SXH:   Benign prostatic hyperplasia, presence of lower urinary tract symptoms unspecified, unspecified morphology    Hyperlipidemia    GERD (gastroesophageal reflux disease)    CAD (coronary artery disease)    UTI (urinary tract infection)    Calculus of kidney    Calculus of ureter    Dementia    Kidney stones      H/O heart artery stent    History of prostate surgery    S/P ureteral stent placement    Stented coronary artery      FAMILY HISTORY:  FH: hypertension (Child)      Family Hx substance abuse [ ]yes [ ]no  ITEMS NOT CHECKED ARE NOT PRESENT    SOCIAL HISTORY:   Significant other/partner[ ]  Children[ ]  Nondenominational/Spirituality:  Substance hx:  [ ]   Tobacco hx:  [ ]   Alcohol hx: [ ]   Home Opioid hx:  [ ] I-Stop Reference No:  Living Situation: [ ]Home  [ ]Long term care  [ ]Rehab [ ]Other    ADVANCE DIRECTIVES:    DNR/MOLST  [ ]  Living Will  [ ]   DECISION MAKER(s):  [ ] Health Care Proxy(s)  [ ] Surrogate(s)  [ ] Guardian           Name(s): Phone Number(s):    BASELINE (I)ADL(s) (prior to admission):  [place]: [ ]Total  [ ] Moderate [ ]Dependent    Allergies    ertapenem (Other (Mod to Severe))    Intolerances    MEDICATIONS  (STANDING):  acetaminophen     Tablet .. 500 milliGRAM(s) Oral every 12 hours  aspirin enteric coated 81 milliGRAM(s) Oral daily  atorvastatin 80 milliGRAM(s) Oral at bedtime  dorzolamide 2% Ophthalmic Solution 1 Drop(s) Both EYES <User Schedule>  enoxaparin Injectable 40 milliGRAM(s) SubCutaneous every 24 hours  finasteride 5 milliGRAM(s) Oral daily  fluocinonide 0.05% Cream 1 Application(s) Topical two times a day  memantine 5 milliGRAM(s) Oral two times a day  methenamine hippurate 1 Gram(s) Oral two times a day  QUEtiapine 25 milliGRAM(s) Oral at bedtime  tamsulosin 0.4 milliGRAM(s) Oral at bedtime    MEDICATIONS  (PRN):  acetaminophen     Tablet .. 650 milliGRAM(s) Oral every 6 hours PRN Temp greater or equal to 38C (100.4F), Mild Pain (1 - 3)    PRESENT SYMPTOMS: [ ]Unable to self-report  [ ] CPOT [ ] PAINADs [ ] RDOS  Source if other than patient:  [ ]Family   [ ]Team     Pain: [ ]yes [ ]no  QOL impact -   Location -                    Aggravating factors -  Quality -  Radiation -  Timing-  Severity (0-10 scale):  Minimal acceptable level (0-10 scale):     CPOT:    https://www.sccm.org/getattachment/sbm19t89-6p6f-3m3d-0s1d-4037f2894p4b/Critical-Care-Pain-Observation-Tool-(CPOT)    PAIN AD Score:   http://geriatrictoolkit.[place].[facility]/cog/painad.pdf (press ctrl +  left click to view)    Dyspnea:                           [ ]Mild [ ]Moderate [ ]Severe      RDOS:  0 to 2  minimal or no respiratory distress   3  mild distress  4 to 6 moderate distress  >7 severe distress  https://homecareinformation.net/handouts/hen/Respiratory_Distress_Observation_Scale.pdf (Ctrl +  left click to view)     Anxiety:                             [ ]Mild [ ]Moderate [ ]Severe  Fatigue:                             [ ]Mild [ ]Moderate [ ]Severe  Nausea:                             [ ]Mild [ ]Moderate [ ]Severe  Loss of appetite:              [ ]Mild [ ]Moderate [ ]Severe  Constipation:                    [ ]Mild [ ]Moderate [ ]Severe    PCSSQ[Palliative Care Spiritual Screening Question]   Severity (0-10):  Score of 4 or > indicate consideration of Chaplaincy referral.  Chaplaincy Referral: [ ] yes [ ] refused [ ] following [ ] Deferred     Caregiver [place]? : [ ] yes [ ] no [ ] Deferred [ ] Declined             Social work referral [ ] Patient & Family Centered Care Referral [ ]     Anticipatory Grief present?:  [ ] yes [ ] no  [ ] Deferred                  Social work referral [ ] Chaplaincy Referral[ ]      Other Symptoms:  [ ]All other review of systems negative     Palliative Performance Status Version 2:         %    http://npcrc.org/files/news/palliative_performance_scale_ppsv2.pdf  PHYSICAL EXAM:  Vital Signs Last 24 Hrs  T(C): 36.6 (08 Jan 2025 05:00), Max: 36.6 (08 Jan 2025 05:00)  T(F): 97.8 (08 Jan 2025 05:00), Max: 97.8 (08 Jan 2025 05:00)  HR: 74 (08 Jan 2025 05:00) (67 - 74)  BP: 152/79 (08 Jan 2025 05:00) (125/65 - 152/79)  BP(mean): --  RR: 18 (08 Jan 2025 05:00) (18 - 18)  SpO2: 94% (08 Jan 2025 05:00) (94% - 99%)    Parameters below as of 08 Jan 2025 05:00  Patient On (Oxygen Delivery Method): room air     I&O's Summary    GENERAL: [ ]Cachexia    [ ]Alert  [ ]Oriented x   [ ]Lethargic  [ ]Unarousable  [ ]Verbal  [ ]Non-Verbal  Behavioral:   [ ] Anxiety  [ ] Delirium [ ] Agitation [ ] Other  HEENT:  [ ]Normal   [ ]Dry mouth   [ ]ET Tube/Trach  [ ]Oral lesions  PULMONARY:   [ ]Clear [ ]Tachypnea  [ ]Audible excessive secretions   [ ]Rhonchi        [ ]Right [ ]Left [ ]Bilateral  [ ]Crackles        [ ]Right [ ]Left [ ]Bilateral  [ ]Wheezing     [ ]Right [ ]Left [ ]Bilateral  [ ]Diminished breath sounds [ ]right [ ]left [ ]bilateral  CARDIOVASCULAR:    [ ]Regular [ ]Irregular [ ]Tachy  [ ]Jonathan [ ]Murmur [ ]Other  GASTROINTESTINAL:  [ ]Soft  [ ]Distended   [ ]+BS  [ ]Non tender [ ]Tender  [ ]Other [ ]PEG [ ]OGT/ NGT  Last BM:  GENITOURINARY:  [ ]Normal [ ] Incontinent   [ ]Oliguria/Anuria   [ ]Nj  MUSCULOSKELETAL:   [ ]Normal   [ ]Weakness  [ ]Bed/Wheelchair bound [ ]Edema  NEUROLOGIC:   [ ]No focal deficits  [ ]Cognitive impairment  [ ]Dysphagia [ ]Dysarthria [ ]Paresis [ ]Other   SKIN:   [ ]Normal  [ ]Rash  [ ]Other  [ ]Pressure ulcer(s)       Present on admission [ ]y [ ]n    CRITICAL CARE:  [ ] Shock Present  [ ]Septic [ ]Cardiogenic [ ]Neurologic [ ]Hypovolemic  [ ]  Vasopressors [ ]  Inotropes   [ ]Respiratory failure present [ ]Mechanical ventilation [ ]Non-invasive ventilatory support [ ]High flow    [ ]Acute  [ ]Chronic [ ]Hypoxic  [ ]Hypercarbic [ ]Other  [ ]Other organ failure     LABS:                        13.5   8.10  )-----------( 145      ( 08 Jan 2025 07:28 )             39.8   01-08    144  |  113[H]  |  10  ----------------------------<  89  3.8   |  25  |  0.78    Ca    9.7      08 Jan 2025 07:28  Phos  2.7     01-08  Mg     2.1     01-08        Urinalysis Basic - ( 08 Jan 2025 07:28 )    Color: x / Appearance: x / SG: x / pH: x  Gluc: 89 mg/dL / Ketone: x  / Bili: x / Urobili: x   Blood: x / Protein: x / Nitrite: x   Leuk Esterase: x / RBC: x / WBC x   Sq Epi: x / Non Sq Epi: x / Bacteria: x      RADIOLOGY & ADDITIONAL STUDIES:    PROTEIN CALORIE MALNUTRITION PRESENT: [ ]mild [ ]moderate [ ]severe [ ]underweight [ ]morbid obesity  https://www.andeal.org/vault/2440/web/files/ONC/Table_Clinical%20Characteristics%20to%20Document%20Malnutrition-White%20JV%20et%20al%202012.pdf    Height (cm): 180.3 (07-26-24 @ 09:08), 180.3 (02-29-24 @ 15:37), 170.2 (01-30-24 @ 06:23)  Weight (kg): 72.6 (01-03-25 @ 10:35), 81.6 (07-26-24 @ 09:08), 69.9 (02-29-24 @ 15:37)  BMI (kg/m2): 22.3 (01-03-25 @ 10:35), 25.1 (07-26-24 @ 09:08), 21.5 (02-29-24 @ 15:37)    [ ]PPSV2 < or = to 30% [ ]significant weight loss  [ ]poor nutritional intake  [ ]anasarca[ ]Artificial Nutrition      Other REFERRALS:  [ ]Hospice  [ ]Child Life  [ ]Social Work  [ ]Case management [ ]Holistic Therapy      HPI:  89 year old male with PMHx dementia, recurrent UTIs (recently with PICC line and s/p 2 weeks of invanz), BPH, CAD s/p 3 stents (2016), GERD, HLD, kidney stones, presenting with altered mental status. History obtained from family and chart. Per family, pt recently completed 2 weeks of invanz via PICC for UTI a few days ago, and since then pt has been notably declining with increased confusion, and family unable to care for patient at home. Pt was started on seroquel 25mg qhs a few days ago. Pt needs full assistance with feeding, pureed diet (no beef), 1:1 monitoring, and can take PO meds but needs crushed in applesauce. At time of encounter, pt not responding verbally, does shake head no when asked if he is in pain.    ED Course:   Vitals: BP: 146/86, HR: 74, Temp: 97, RR: 16, SpO2: 98% on ra   Labs: cl 112, alb 2.7, tbili 1.6, alk phos 132, ast 39   UA: trace LE  CXR: Prior left base infiltrate has largely cleared. Scattered slight scars remain and sharp deviation of the trachea to the right at the thoracic inlet is unchanged.  CT head: Stable head CT. No acute hemorrhage, vasogenic edema, or extra-axial collection. Chronic findings as above.  CT CAP: No acute abnormality.  Received in the ED: ofirmev 1g, NS bolus 2.3L (03 Jan 2025 16:13)    PERTINENT PM/SXH:   Benign prostatic hyperplasia, presence of lower urinary tract symptoms unspecified, unspecified morphology    Hyperlipidemia    GERD (gastroesophageal reflux disease)    CAD (coronary artery disease)    UTI (urinary tract infection)    Calculus of kidney    Calculus of ureter    Dementia    Kidney stones      H/O heart artery stent    History of prostate surgery    S/P ureteral stent placement    Stented coronary artery      FAMILY HISTORY:  FH: hypertension (Child)      Family Hx substance abuse [ ]yes x[ ]no  ITEMS NOT CHECKED ARE NOT PRESENT    SOCIAL HISTORY:   Significant other/partner[ ]  Children[ x]  Latter day/Spirituality:  Substance hx:  [ ]   Tobacco hx:  [ ]   Alcohol hx: [ ]   Home Opioid hx:  [ ] I-Stop Reference No:  Living Situation: [x ]Home  [ ]Long term care  [ ]Rehab [ ]Other    ADVANCE DIRECTIVES:    DNR/MOLST  [ ]  Living Will  [ ]   DECISION MAKER(s):  [ ] Health Care Proxy(s)  [x ] Surrogate(s)  [ ] Guardian           Name(s): Phone Number(s): Wade Das    BASELINE (I)ADL(s) (prior to admission):  [place]: [ ]Total  [x ] Moderate [ ]Dependent    Allergies    ertapenem (Other (Mod to Severe))    Intolerances    MEDICATIONS  (STANDING):  acetaminophen     Tablet .. 500 milliGRAM(s) Oral every 12 hours  aspirin enteric coated 81 milliGRAM(s) Oral daily  atorvastatin 80 milliGRAM(s) Oral at bedtime  dorzolamide 2% Ophthalmic Solution 1 Drop(s) Both EYES <User Schedule>  enoxaparin Injectable 40 milliGRAM(s) SubCutaneous every 24 hours  finasteride 5 milliGRAM(s) Oral daily  fluocinonide 0.05% Cream 1 Application(s) Topical two times a day  memantine 5 milliGRAM(s) Oral two times a day  methenamine hippurate 1 Gram(s) Oral two times a day  QUEtiapine 25 milliGRAM(s) Oral at bedtime  tamsulosin 0.4 milliGRAM(s) Oral at bedtime    MEDICATIONS  (PRN):  acetaminophen     Tablet .. 650 milliGRAM(s) Oral every 6 hours PRN Temp greater or equal to 38C (100.4F), Mild Pain (1 - 3)    PRESENT SYMPTOMS: [ ]Unable to self-report  [ ] CPOT [ ] PAINADs [ ] RDOS  Source if other than patient:  [ ]Family   [ ]Team     Pain: [ ]yes [x]no  QOL impact -   Location -                    Aggravating factors -  Quality -  Radiation -  Timing-  Severity (0-10 scale):  Minimal acceptable level (0-10 scale):     CPOT:    https://www.sccm.org/getattachment/idp13r64-1q1d-7o4o-3j8q-4662n8157m3z/Critical-Care-Pain-Observation-Tool-(CPOT)    PAIN AD Score:   http://geriatrictoolkit.[facility]/cog/painad.pdf (press ctrl +  left click to view)    Dyspnea:                           [ ]Mild [ ]Moderate [ ]Severe      RDOS:  0 to 2  minimal or no respiratory distress   3  mild distress  4 to 6 moderate distress  >7 severe distress  https://homecareinformation.net/handouts/hen/Respiratory_Distress_Observation_Scale.pdf (Ctrl +  left click to view)     Anxiety:                             [ ]Mild [ ]Moderate [ ]Severe  Fatigue:                             [ ]Mild [ ]Moderate [ ]Severe  Nausea:                             [ ]Mild [ ]Moderate [ ]Severe  Loss of appetite:              [ ]Mild [ ]Moderate [ ]Severe  Constipation:                    [ ]Mild [ ]Moderate [ ]Severe    PCSSQ[Palliative Care Spiritual Screening Question]   Severity (0-10):  Score of 4 or > indicate consideration of Chaplaincy referral.  Chaplaincy Referral: [ ] yes [ ] refused [ ] following [x ] Deferred     Caregiver [place]? : [ ] yes [x ] no [ ] Deferred [ ] Declined             Social work referral [ ] Patient & Family Centered Care Referral [ ]     Anticipatory Grief present?:  [ ] yes [ x] no  [ ] Deferred                  Social work referral [ ] Chaplaincy Referral[ ]      Other Symptoms:  [ ]All other review of systems negative     Palliative Performance Status Version 2:     30    %    http://npcrc.org/files/news/palliative_performance_scale_ppsv2.pdf  PHYSICAL EXAM:  Vital Signs Last 24 Hrs  T(C): 36.6 (08 Jan 2025 05:00), Max: 36.6 (08 Jan 2025 05:00)  T(F): 97.8 (08 Jan 2025 05:00), Max: 97.8 (08 Jan 2025 05:00)  HR: 74 (08 Jan 2025 05:00) (67 - 74)  BP: 152/79 (08 Jan 2025 05:00) (125/65 - 152/79)  BP(mean): --  RR: 18 (08 Jan 2025 05:00) (18 - 18)  SpO2: 94% (08 Jan 2025 05:00) (94% - 99%)    Parameters below as of 08 Jan 2025 05:00  Patient On (Oxygen Delivery Method): room air     I&O's Summary    GENERAL: [ x]Cachexia    [x ]Alert  [ ]Oriented x   [ ]Lethargic  [ ]Unarousable  [ ]Verbal  [ ]Non-Verbal  Behavioral:   [ ] Anxiety  [ ] Delirium [ ] Agitation [ ] Other  HEENT:  [ ]Normal   [ x]Dry mouth   [ ]ET Tube/Trach  [ ]Oral lesions  PULMONARY:   [ ]Clear [ ]Tachypnea  [ ]Audible excessive secretions   [ ]Rhonchi        [ ]Right [ ]Left [ ]Bilateral  [ ]Crackles        [ ]Right [ ]Left [ ]Bilateral  [ ]Wheezing     [ ]Right [ ]Left [ ]Bilateral  [x ]Diminished breath sounds [ ]right [ ]left [ ]bilateral  CARDIOVASCULAR:    [x ]Regular [ ]Irregular [ ]Tachy  [ ]Jonathan [ ]Murmur [ ]Other  GASTROINTESTINAL:  [x ]Soft  [ ]Distended   [ x]+BS  [ ]Non tender [ ]Tender  [ ]Other [ ]PEG [ ]OGT/ NGT  Last BM:  GENITOURINARY:  [ ]Normal [ x] Incontinent   [ ]Oliguria/Anuria   [ ]Nj  MUSCULOSKELETAL:   [ ]Normal   [x ]Weakness  [ ]Bed/Wheelchair bound [ ]Edema  NEUROLOGIC:   [ ]No focal deficits  [x ]Cognitive impairment  [ x]Dysphagia [ ]Dysarthria [ ]Paresis [ ]Other   SKIN:   [ ]Normal  [ ]Rash  [ ]Other  [ ]Pressure ulcer(s)       Present on admission [ ]y [ ]n    CRITICAL CARE:  [ ] Shock Present  [ ]Septic [ ]Cardiogenic [ ]Neurologic [ ]Hypovolemic  [ ]  Vasopressors [ ]  Inotropes   [ ]Respiratory failure present [ ]Mechanical ventilation [ ]Non-invasive ventilatory support [ ]High flow    [ ]Acute  [ ]Chronic [ ]Hypoxic  [ ]Hypercarbic [ ]Other  [ ]Other organ failure     LABS:                        13.5   8.10  )-----------( 145      ( 08 Jan 2025 07:28 )             39.8   01-08    144  |  113[H]  |  10  ----------------------------<  89  3.8   |  25  |  0.78    Ca    9.7      08 Jan 2025 07:28  Phos  2.7     01-08  Mg     2.1     01-08        Urinalysis Basic - ( 08 Jan 2025 07:28 )    Color: x / Appearance: x / SG: x / pH: x  Gluc: 89 mg/dL / Ketone: x  / Bili: x / Urobili: x   Blood: x / Protein: x / Nitrite: x   Leuk Esterase: x / RBC: x / WBC x   Sq Epi: x / Non Sq Epi: x / Bacteria: x      RADIOLOGY & ADDITIONAL STUDIES:  < from: CT Abdomen and Pelvis w/ IV Cont (01.03.25 @ 15:10) >    ACC: [id number] EXAM:  CT ABDOMEN AND PELVIS IC   ORDERED BY: NICK ALAS     ACC: [id number] EXAM:  CT CHEST IC   ORDERED BY: NICK ALAS     PROCEDURE DATE:  01/03/2025          INTERPRETATION:  CLINICAL INFORMATION: ams PCT    COMPARISON: 1.30.24.    CONTRAST/COMPLICATIONS:  IV Contrast: Omnipaque 350 (accession [id number]), IV contrast documented   in unlinked concurrent exam (accession [id number])  90 cc administered   10   cc discarded  Oral Contrast: NONE  .    PROCEDURE:  CT of the Chest, Abdomen and Pelvis was performed.  Sagittal and coronal reformats were performed.    FINDINGS:  CHEST:  LUNGS AND LARGE AIRWAYS: Patent central airways. Scattered reticulation   and septal thickening.  PLEURA: No pleural effusion.  VESSELS: Within normal limits.  HEART: Heart size is normal.  No pericardial effusion.  MEDIASTINUM AND QUETA: No lymphadenopathy.  CHEST WALL AND LOWER NECK: Within normal limits.    ABDOMEN AND PELVIS:  LIVER: Within normal limits.  BILE DUCTS: Normal caliber.  GALLBLADDER:Cholelithiasis.  SPLEEN: Within normal limits.  PANCREAS: Within normal limits.  ADRENALS: Within normal limits.  KIDNEYS/URETERS: Atrophic left kidney with lower pole scarring. A few   cysts are noted. A few subcentimeter calculi.    BLADDER: A few diverticula.  REPRODUCTIVE ORGANS: The prostate is enlarged.    BOWEL: No bowel obstruction. Large hiatal hernia. Rectum distended with   stool.  PERITONEUM/RETROPERITONEUM: Within normal limits.  VESSELS:  Within normal limits.  ABDOMINAL WALL: Smallright inguinal hernia. Left inguinal hernia repair.  BONES: Within normal limits.    IMPRESSION: No acute abnormality.        --- End of Report ---            DAVID PETERSEN MD; Attending Radiologist  This document has been electronically signed. Jan 32025  3:28PM    < end of copied text >      PROTEIN CALORIE MALNUTRITION PRESENT: [ ]mild [ ]moderate [ ]severe [ ]underweight [ ]morbid obesity  https://www.andeal.org/vault/2440/web/files/ONC/Table_Clinical%20Characteristics%20to%20Document%20Malnutrition-White%20JV%20et%20al%202012.pdf    Height (cm): 180.3 (07-26-24 @ 09:08), 180.3 (02-29-24 @ 15:37), 170.2 (01-30-24 @ 06:23)  Weight (kg): 72.6 (01-03-25 @ 10:35), 81.6 (07-26-24 @ 09:08), 69.9 (02-29-24 @ 15:37)  BMI (kg/m2): 22.3 (01-03-25 @ 10:35), 25.1 (07-26-24 @ 09:08), 21.5 (02-29-24 @ 15:37)    [ x]PPSV2 < or = to 30% [ ]significant weight loss  [x ]poor nutritional intake  [ ]anasarca[ ]Artificial Nutrition      Other REFERRALS:  [ ]Hospice  [ ]Child Life  [x ]Social Work  [ ]Case management [ ]Holistic Therapy

## 2025-01-08 NOTE — CONSULT NOTE ADULT - CONVERSATION DETAILS
Palliative SW spoke with daughter on this date, reviewed that patient currently in RRT with episode of unresponsiveness.  Reviewed MOLST wishes with family and daughter confirms patient is DNR/DNI. She is aware of current episode and asked for team to keep her apprised. she is on her way into the hospital.

## 2025-01-08 NOTE — CONSULT NOTE ADULT - ASSESSMENT
89 year old male with PMHx dementia, recurrent UTIs (recently with PICC line and s/p 2 weeks of invanz), BPH, CAD s/p 3 stents (2016), GERD, HLD, kidney stones, presenting with altered mental status. palliative consulted for GOC

## 2025-01-08 NOTE — CONSULT NOTE ADULT - PROBLEM SELECTOR RECOMMENDATION 2
ACP reviewed with daughter on this date  DNR/DNI confirmed, MOLST completed  rest of care per primary team

## 2025-01-08 NOTE — PROGRESS NOTE ADULT - PROBLEM SELECTOR PLAN 1
- discussed with pt's daughter who described a worsening encephalopathy toward the end of the 2-week Invanz course   - currently pt afebrile, no leukocytosis, no signs of acute infection on CT Chest/Abd/P   - BCx NGTD and UCx negative Pt with increased confusion, disorientation, advanced dementia, family unable to care for patient at home  - CT head: Stable head CT. No acute hemorrhage, vasogenic edema, or extra-axial collection. Chronic changes.  - suspect pt has toxic metabolic encephalopathy due to ertapenem adverse reaction   - pt no longer on ertapenem; will await improvement in coming days  - Pureed diet (no beef), crush PO meds in apple sauce, pt needs full assistance with feeding  - Continue memantine, seroquel 25mg qhs (of note, seroquel was recently added to outpatient regimen for this particular worsening encephalopathy)  - given the ativan 0.25mg IV dose pt received overnight caused him to be lethargic for a prolonged time, pt's daughter wishes to not use again  - pt with poor po intake currently, will give IVF for hydration   - neuro exam is challenging due to poor cooperation, but patient appears to be moving all 4 extremities against gravity.   - Hiprex for UTI ppx, given recurrent/recent infection - no UTI currently  - if not improving during the weekend, consider MRI Brain on Monday  - PT rec ELIANE  - neuro eval
- discussed with pt's family who described a worsening encephalopathy toward the end of the 2-week Invanz course   - pt afebrile, no leukocytosis, no signs of acute infection on CT Chest/Abd/P   - BCx NGTD and UCx negative   - on admission, pt had greatly increased confusion, disorientation on top of baseline dementia, family unable to care for patient at home  - CT head: Stable head CT. No acute hemorrhage, vasogenic edema, or extra-axial collection. Chronic changes.  - B12 normal, TSH normal, ammonia level mildly elevated  - suspect pt had toxic metabolic encephalopathy due to ertapenem adverse reaction / toxicity  - pt no longer on ertapenem and has been improving each subsequent day in the hospital likely as the drug clears from the pt's system  - Continue memantine, seroquel 25mg qhs (of note, seroquel was recently added to outpatient regimen for this particular worsening encephalopathy, so will consider tapering off)  - ativan 0.25mg IV dose pt received in beginning of admission caused him to be lethargic for a prolonged time, will avoid benzos   - pt with decreased po intake initially and given IVF for hydration -- now mentating much better and eating, so off IVF  - Hiprex for UTI ppx, given recurrent/recent infection - no UTI currently  - pt with significant improvement mentally each day. today at approximately his baseline mentation per pt's family  - PT sarah beth DEL RIO -- SW working on disposition
- discussed with pt's daughter who described a worsening encephalopathy toward the end of the 2-week Invanz course   - pt afebrile, no leukocytosis, no signs of acute infection on CT Chest/Abd/P   - BCx NGTD and UCx negative   - on admission, pt had greatly increased confusion, disorientation on top of baseline dementia, family unable to care for patient at home  - CT head: Stable head CT. No acute hemorrhage, vasogenic edema, or extra-axial collection. Chronic changes.  - B12 normal, TSH normal, ammonia level mildly elevated  - suspect pt has toxic metabolic encephalopathy due to ertapenem adverse reaction / toxicity  - pt no longer on ertapenem; will await improvement in coming days  - Pureed diet (no beef), crush PO meds in apple sauce, pt needs full assistance with feeding  - Continue memantine, seroquel 25mg qhs (of note, seroquel was recently added to outpatient regimen for this particular worsening encephalopathy)  - ativan 0.25mg IV dose pt received in beginning of admission caused him to be lethargic for a prolonged time, will avoid benzos   - pt with decreased po intake initially and given IVF for hydration -- now mentating better and eating, so off IVF  - Hiprex for UTI ppx, given recurrent/recent infection - no UTI currently  - pt with significant improvement mentally each day. today more awake/alert, answering some questions and following some commands   - PT rec ELIANE  - neuro eval  - mental status with gradual improvement
- discussed with pt's daughter who described a worsening encephalopathy toward the end of the 2-week Invanz course   - currently pt afebrile, no leukocytosis, no signs of acute infection on CT Chest/Abd/P   - BCx NGTD and UCx negative   - Pt with increased confusion, disorientation, advanced dementia, family unable to care for patient at home  - CT head: Stable head CT. No acute hemorrhage, vasogenic edema, or extra-axial collection. Chronic changes.  - B12 normal, TSH normal, ammonia level mildly elevated  - suspect pt has toxic metabolic encephalopathy due to ertapenem adverse reaction   - pt no longer on ertapenem; will await improvement in coming days  - Pureed diet (no beef), crush PO meds in apple sauce, pt needs full assistance with feeding  - Continue memantine, seroquel 25mg qhs (of note, seroquel was recently added to outpatient regimen for this particular worsening encephalopathy)  - given the ativan 0.25mg IV dose pt received overnight caused him to be lethargic for a prolonged time, pt's daughter wishes to not use again  - pt with decreased po intake currently, will give IVF for hydration   - neuro exam is challenging due to poor cooperation, but patient appears to be moving all 4 extremities against gravity.   - Hiprex for UTI ppx, given recurrent/recent infection - no UTI currently  - if not improving during the weekend, consider MRI Brain on Monday  - PT rec ELIANE  - neuro eval  - mental status with gradual improvement
- discussed with pt's family who described a worsening encephalopathy toward the end of the 2-week Invanz course   - pt afebrile, no leukocytosis, no signs of acute infection on CT Chest/Abd/P   - BCx NGTD and UCx negative   - on admission, pt had greatly increased confusion, disorientation on top of baseline dementia, family unable to care for patient at home  - CT head: Stable head CT. No acute hemorrhage, vasogenic edema, or extra-axial collection. Chronic changes.  - B12 normal, TSH normal, ammonia level mildly elevated  - suspect pt had toxic metabolic encephalopathy due to ertapenem adverse reaction / toxicity  - pt no longer on ertapenem and has been improving each subsequent day in the hospital likely as the drug clears from the pt's system  - Continue memantine, seroquel 25mg qhs (of note, seroquel was recently added to outpatient regimen for this particular worsening encephalopathy, so will consider tapering off)  - ativan 0.25mg IV dose pt received in beginning of admission caused him to be lethargic for a prolonged time, will avoid benzos   - pt with decreased po intake initially and given IVF for hydration -- now mentating much better and eating, so off IVF  - Hiprex for UTI ppx, given recurrent/recent infection - no UTI currently  - pt with significant improvement mentally each day. today at approximately his baseline mentation per pt's family  - PT sarah beth DEL RIO -- SW working on disposition

## 2025-01-08 NOTE — PROGRESS NOTE ADULT - PROBLEM SELECTOR PLAN 3
- repleted with KPhos  - monitor BMP and lytes
Chronic  - Continue home lipitor
- repleted with KPhos  - monitor BMP and lytes

## 2025-01-08 NOTE — CONSULT NOTE ADULT - PROBLEM SELECTOR RECOMMENDATION 3
reconsult if needed.  Carolina Shrestha MD, [facility]; Palliative Care Attending, Ethicist. [phone]

## 2025-01-08 NOTE — PROGRESS NOTE ADULT - PROBLEM SELECTOR PROBLEM 4
HLD (hyperlipidemia)
HLD (hyperlipidemia)
BPH (benign prostatic hyperplasia)
HLD (hyperlipidemia)
HLD (hyperlipidemia)

## 2025-01-08 NOTE — CHART NOTE - NSCHARTNOTEFT_GEN_A_CORE
Resident Rapid Response Note    Rapid response was called at 11:29 for unresposiveness.    Events leading up to Rapid Response: patient was alert sitting in the chair after walking with PT and found to be unresponsive.     Patient was seen and examined at the bedside by the rapid response team. ICU PA at bedside.    Rapid Response Vital Signs:  BP: 96/51  HR: 67  RR: 20  SpO2: 95 % on NRB  Temp: n/a  FS: 150        Physical Exam:  Gen: elderly appearing, now alert, opening eyes to verbal stimuli  HEENT: NCAT  Cardio: regular rate and rhythm, +s1s2, no murmurs, rubs, or gallops  Pulm: CTA b/l, no wheezes, rales or rhonchi  Abdomen: soft, nontender, nondistended  Extremities: no cyanosis or edema  Neuro: Alert on exam, opening eyes to verbal stimuli  Skin: warm and dry      Assessment/Plan:  89yo M with PMH of dementia, recurrent UTIs (recently with PICC line and s/p 2 weeks of Invanz), BPH, CAD s/p 3 stents (2016), GERD, HLD, kidney stones, presenting with altered mental status a/w acute encephalopathy likely toxic metabolic encephalopathy due to carbapenem toxicity.  Rapid response called for unresponsiveness.    - likely 2/2 hypotension given BP 96/51, which is an acute change from baseline.   - patient regained consciousness on exam  - 1L LR bolus ordered  - STAT CMP, Mg Phos ordered    -RN to call if any changes.  -Discussed with Dr. Portillo agrees with above plan  -Family updated by Dr. Gann Resident Rapid Response Note    Rapid response was called at 11:29 for unresponsiveness.    Events leading up to Rapid Response: patient was alert sitting in the chair after walking with PT and found to be unresponsive.     Patient was seen and examined at the bedside by the rapid response team. ICU PA at bedside.    Rapid Response Vital Signs:  BP: 96/51  HR: 67  RR: 20  SpO2: 95 % on NRB  Temp: n/a  FS: 150        Physical Exam:  Gen: elderly appearing, now alert, opening eyes to verbal stimuli  HEENT: NCAT  Cardio: regular rate and rhythm, +s1s2, no murmurs, rubs, or gallops  Pulm: CTA b/l, no wheezes, rales or rhonchi  Abdomen: soft, nontender, nondistended  Extremities: no cyanosis or edema  Neuro: Alert on exam, opening eyes to verbal stimuli  Skin: warm and dry      Assessment/Plan:  89yo M with PMH of dementia, recurrent UTIs (recently with PICC line and s/p 2 weeks of Invanz), BPH, CAD s/p 3 stents (2016), GERD, HLD, kidney stones, presenting with altered mental status a/w acute encephalopathy likely toxic metabolic encephalopathy due to carbapenem toxicity.  Rapid response called for unresponsiveness.    - likely 2/2 hypotension given BP 96/51, which is an acute change from baseline.   - patient regained consciousness on exam  - 1L LR bolus ordered  - STAT CMP, Mg Phos ordered    -RN to call if any changes.  -Discussed with Dr. Portillo agrees with above plan  -Family updated by Dr. Gann      ###ADDENDUM 1343###  - Patient was re-evaluated at bedside, resting comfortably, awake and alert, responding to external stimuli   - Physical exam remains unchanged from prior, pt remains on 2L NC, sating well   - Most recent BP still remains soft 94/53 --> obtain manual blood pressure with correct cuff size   - Consider starting maintenance IVF if BP remains soft   - STAT Labs reviewed; CBC, CMP, Mag, Phos WNL   - RN to call with changes

## 2025-01-08 NOTE — PROGRESS NOTE ADULT - ASSESSMENT
89yo M with PMH of dementia, recurrent UTIs (recently with PICC line and s/p 2 weeks of Invanz), BPH, CAD s/p 3 stents (2016), GERD, HLD, kidney stones, presenting with altered mental status a/w acute encephalopathy. 
89yo M with PMH of dementia, recurrent UTIs (recently with PICC line and s/p 2 weeks of Invanz), BPH, CAD s/p 3 stents (2016), GERD, HLD, kidney stones, presenting with altered mental status a/w acute encephalopathy. 
89yo M with PMH of dementia, recurrent UTIs (recently with PICC line and s/p 2 weeks of Invanz), BPH, CAD s/p 3 stents (2016), GERD, HLD, kidney stones, presenting with altered mental status a/w acute encephalopathy likely toxic metabolic encephalopathy due to carbapenem toxicity. 
89yo M with PMH of dementia, recurrent UTIs (recently with PICC line and s/p 2 weeks of Invanz), BPH, CAD s/p 3 stents (2016), GERD, HLD, kidney stones, presenting with altered mental status a/w acute encephalopathy likely toxic metabolic encephalopathy due to carbapenem toxicity. 
89yo M with PMH of dementia, recurrent UTIs (recently with PICC line and s/p 2 weeks of Invanz), BPH, CAD s/p 3 stents (2016), GERD, HLD, kidney stones, presenting with altered mental status a/w acute encephalopathy likely toxic metabolic encephalopathy due to carbapenem toxicity.

## 2025-01-09 ENCOUNTER — TRANSCRIPTION ENCOUNTER (OUTPATIENT)
Age: 89
End: 2025-01-09

## 2025-01-09 VITALS
DIASTOLIC BLOOD PRESSURE: 74 MMHG | TEMPERATURE: 98 F | HEART RATE: 88 BPM | SYSTOLIC BLOOD PRESSURE: 118 MMHG | RESPIRATION RATE: 18 BRPM | OXYGEN SATURATION: 96 %

## 2025-01-09 LAB
ANION GAP SERPL CALC-SCNC: 3 MMOL/L — LOW (ref 5–17)
BUN SERPL-MCNC: 13 MG/DL — SIGNIFICANT CHANGE UP (ref 7–23)
CALCIUM SERPL-MCNC: 9.7 MG/DL — SIGNIFICANT CHANGE UP (ref 8.5–10.1)
CHLORIDE SERPL-SCNC: 115 MMOL/L — HIGH (ref 96–108)
CO2 SERPL-SCNC: 28 MMOL/L — SIGNIFICANT CHANGE UP (ref 22–31)
CREAT SERPL-MCNC: 0.75 MG/DL — SIGNIFICANT CHANGE UP (ref 0.5–1.3)
EGFR: 83 ML/MIN/1.73M2 — SIGNIFICANT CHANGE UP
GLUCOSE SERPL-MCNC: 91 MG/DL — SIGNIFICANT CHANGE UP (ref 70–99)
HCT VFR BLD CALC: 39.2 % — SIGNIFICANT CHANGE UP (ref 39–50)
HGB BLD-MCNC: 13.2 G/DL — SIGNIFICANT CHANGE UP (ref 13–17)
MCHC RBC-ENTMCNC: 31.4 PG — SIGNIFICANT CHANGE UP (ref 27–34)
MCHC RBC-ENTMCNC: 33.7 G/DL — SIGNIFICANT CHANGE UP (ref 32–36)
MCV RBC AUTO: 93.1 FL — SIGNIFICANT CHANGE UP (ref 80–100)
NRBC # BLD: 0 /100 WBCS — SIGNIFICANT CHANGE UP (ref 0–0)
PLATELET # BLD AUTO: 143 K/UL — LOW (ref 150–400)
POTASSIUM SERPL-MCNC: 3.8 MMOL/L — SIGNIFICANT CHANGE UP (ref 3.5–5.3)
POTASSIUM SERPL-SCNC: 3.8 MMOL/L — SIGNIFICANT CHANGE UP (ref 3.5–5.3)
RBC # BLD: 4.21 M/UL — SIGNIFICANT CHANGE UP (ref 4.2–5.8)
RBC # FLD: 14.6 % — HIGH (ref 10.3–14.5)
SODIUM SERPL-SCNC: 146 MMOL/L — HIGH (ref 135–145)
WBC # BLD: 8.25 K/UL — SIGNIFICANT CHANGE UP (ref 3.8–10.5)
WBC # FLD AUTO: 8.25 K/UL — SIGNIFICANT CHANGE UP (ref 3.8–10.5)

## 2025-01-09 PROCEDURE — 85610 PROTHROMBIN TIME: CPT

## 2025-01-09 PROCEDURE — 84100 ASSAY OF PHOSPHORUS: CPT

## 2025-01-09 PROCEDURE — 97530 THERAPEUTIC ACTIVITIES: CPT

## 2025-01-09 PROCEDURE — 74177 CT ABD & PELVIS W/CONTRAST: CPT | Mod: MC

## 2025-01-09 PROCEDURE — 85027 COMPLETE CBC AUTOMATED: CPT

## 2025-01-09 PROCEDURE — 87086 URINE CULTURE/COLONY COUNT: CPT

## 2025-01-09 PROCEDURE — 80053 COMPREHEN METABOLIC PANEL: CPT

## 2025-01-09 PROCEDURE — 82962 GLUCOSE BLOOD TEST: CPT

## 2025-01-09 PROCEDURE — 93005 ELECTROCARDIOGRAM TRACING: CPT

## 2025-01-09 PROCEDURE — 99239 HOSP IP/OBS DSCHRG MGMT >30: CPT | Mod: GC

## 2025-01-09 PROCEDURE — 85730 THROMBOPLASTIN TIME PARTIAL: CPT

## 2025-01-09 PROCEDURE — 71260 CT THORAX DX C+: CPT | Mod: MC

## 2025-01-09 PROCEDURE — 36415 COLL VENOUS BLD VENIPUNCTURE: CPT

## 2025-01-09 PROCEDURE — 80061 LIPID PANEL: CPT

## 2025-01-09 PROCEDURE — 97162 PT EVAL MOD COMPLEX 30 MIN: CPT

## 2025-01-09 PROCEDURE — 82248 BILIRUBIN DIRECT: CPT

## 2025-01-09 PROCEDURE — 84443 ASSAY THYROID STIM HORMONE: CPT

## 2025-01-09 PROCEDURE — 99285 EMERGENCY DEPT VISIT HI MDM: CPT

## 2025-01-09 PROCEDURE — 83605 ASSAY OF LACTIC ACID: CPT

## 2025-01-09 PROCEDURE — 83735 ASSAY OF MAGNESIUM: CPT

## 2025-01-09 PROCEDURE — 87637 SARSCOV2&INF A&B&RSV AMP PRB: CPT

## 2025-01-09 PROCEDURE — 82607 VITAMIN B-12: CPT

## 2025-01-09 PROCEDURE — 80048 BASIC METABOLIC PNL TOTAL CA: CPT

## 2025-01-09 PROCEDURE — 70450 CT HEAD/BRAIN W/O DYE: CPT | Mod: MC

## 2025-01-09 PROCEDURE — 82140 ASSAY OF AMMONIA: CPT

## 2025-01-09 PROCEDURE — 87040 BLOOD CULTURE FOR BACTERIA: CPT

## 2025-01-09 PROCEDURE — 97116 GAIT TRAINING THERAPY: CPT

## 2025-01-09 PROCEDURE — 71045 X-RAY EXAM CHEST 1 VIEW: CPT

## 2025-01-09 PROCEDURE — 81001 URINALYSIS AUTO W/SCOPE: CPT

## 2025-01-09 PROCEDURE — 83036 HEMOGLOBIN GLYCOSYLATED A1C: CPT

## 2025-01-09 PROCEDURE — 85025 COMPLETE CBC W/AUTO DIFF WBC: CPT

## 2025-01-09 PROCEDURE — 96374 THER/PROPH/DIAG INJ IV PUSH: CPT

## 2025-01-09 RX ORDER — ATORVASTATIN CALCIUM 40 MG/1
1 TABLET, FILM COATED ORAL
Qty: 0 | Refills: 0 | DISCHARGE
Start: 2025-01-09

## 2025-01-09 RX ORDER — ACETAMINOPHEN 80 MG/.8ML
2 SOLUTION/ DROPS ORAL
Qty: 0 | Refills: 0 | DISCHARGE
Start: 2025-01-09

## 2025-01-09 RX ADMIN — ACETAMINOPHEN 500 MILLIGRAM(S): 80 SOLUTION/ DROPS ORAL at 13:09

## 2025-01-09 RX ADMIN — Medication 1 DROP(S): at 05:47

## 2025-01-09 RX ADMIN — ACETAMINOPHEN 500 MILLIGRAM(S): 80 SOLUTION/ DROPS ORAL at 14:09

## 2025-01-09 RX ADMIN — Medication 1 DROP(S): at 17:59

## 2025-01-09 RX ADMIN — MEMANTINE HYDROCHLORIDE 5 MILLIGRAM(S): 14 CAPSULE, EXTENDED RELEASE ORAL at 17:56

## 2025-01-09 RX ADMIN — Medication 81 MILLIGRAM(S): at 13:09

## 2025-01-09 RX ADMIN — Medication 1 GRAM(S): at 05:47

## 2025-01-09 RX ADMIN — Medication 5 MILLIGRAM(S): at 13:09

## 2025-01-09 RX ADMIN — Medication 1 GRAM(S): at 17:56

## 2025-01-09 RX ADMIN — Medication 1 APPLICATION(S): at 05:47

## 2025-01-09 RX ADMIN — MEMANTINE HYDROCHLORIDE 5 MILLIGRAM(S): 14 CAPSULE, EXTENDED RELEASE ORAL at 05:47

## 2025-01-09 NOTE — DISCHARGE NOTE NURSING/CASE MANAGEMENT/SOCIAL WORK - PATIENT PORTAL LINK FT
You can access the FollowMyHealth Patient Portal offered by [facility] by registering at the following website: [email or web address]. By joining [facility]’s FollowMyHealth portal, you will also be able to view your health information using other applications (apps) compatible with our system.

## 2025-01-09 NOTE — DISCHARGE NOTE NURSING/CASE MANAGEMENT/SOCIAL WORK - FINANCIAL ASSISTANCE
[facility] provides services at a reduced cost to those who are determined to be eligible through [facility]’s financial assistance program. Information regarding [facility]’s financial assistance program can be found by going to [email or web address] or by calling [phone].

## 2025-01-09 NOTE — SOCIAL WORK PROGRESS NOTE - NSSWPROGRESSNOTE_GEN_ALL_CORE
Pt for dc today at 3pm via [facility] ambulance to [facility]. Pt and family in agreement. All paperwork to occupy pt. No further SW services indicated at this time.  Pt for dc today for [place] ELIANE. Auth received from [facility] Auth number [id number]. 14 days 1/9-1/22 Level 2.  [facility] to  at 3pm. Pt and family in agreement. All paperwork to occupy pt. No further SW services indicated at this time.

## 2025-01-09 NOTE — DISCHARGE NOTE NURSING/CASE MANAGEMENT/SOCIAL WORK - NSDCPEFALRISK_GEN_ALL_CORE
For information on Fall & Injury Prevention, visit: [email or web address] OR  [email or web address] OR  https://www.cdc.gov/steadi/patient.html

## 2025-01-14 ENCOUNTER — RX RENEWAL (OUTPATIENT)
Age: 89
End: 2025-01-14

## 2025-01-15 ENCOUNTER — APPOINTMENT (OUTPATIENT)
Dept: UROLOGY | Facility: CLINIC | Age: 89
End: 2025-01-15

## 2025-03-25 NOTE — ASU PATIENT PROFILE, ADULT - NS PRO AD ANY ON CHART
ibuprofen (ADVIL;MOTRIN) 200 MG tablet Take by mouth as needed    Automatic Reconciliation, Ar       Family History:   No family history on file.    Social History:      Social History     Tobacco Use    Smoking status: Never    Smokeless tobacco: Never   Substance Use Topics    Alcohol use: Not on file         Allergies:      Allergies   Allergen Reactions    Iodine Anaphylaxis    Shellfish Allergy Other (See Comments)     Other reaction(s): Anaphylactic shock-Allergy    Soap Anaphylaxis and Other (See Comments)     No Betadine--Due to Severe Iodine Allergy    Tuberculin, Ppd Anaphylaxis     Other reaction(s): Anaphylactic shock-Allergy    Niacin Other (See Comments)    Pravastatin Other (See Comments)    Codeine Nausea And Vomiting          ROS:  Patient Health Form has been filled out, reviewed, signed and included in the chart.  ROS findings related to musculoskeletal problems were discussed with the patient.  Patient advised to discuss non-orthopedic complaints with primary care physician.      Objective:     Vitals:   Ht 1.6 m (5' 3\")   Wt 67.2 kg (148 lb 3.2 oz)   BMI 26.25 kg/m²     General: Awake and alert. AAOx3.   The patient ambulates with an antalgic gait.  Psych: Mood appropriate  HEENT: Normocephalic, atraumatic  Neck: Supple  CV/Pulm: Breathing even and unlabored  Abdomen: Nondistended  Circulation: Normal without obvious arterial or venous deficiency. Pulses palpable bilateral lower extremities.  Lymphatic: No obvious lymphedema or lymphadenopathy noted.  Skin: No obvious lacerations or rashes noted.  Musculoskeletal: No obvious deformity or pain with active movement of the upper extremities.  Neuro: No obvious deficiency or weakness noted of the upper or lower extremities    Hip Exam:   Exam of the hip reveals anterior groin pain with resisted leg raise and FADIR testing. Internal and external rotation is decreased in the hip. No evidence of arterial of venous insufficiency. DP/PT pulses 
No

## 2025-05-08 NOTE — DISCHARGE NOTE NURSING/CASE MANAGEMENT/SOCIAL WORK - FLU SEASON?
[facility] [place] [facility]  [facility]  [address]  [facility] [address]  Dept: [phone]  Dept Fax: [phone]  Loc: [phone]    Visit Date: 5/8/2025    SUBJECTIVE DATA     CHIEF COMPLAINT:    Chief Complaint   Patient presents with    Anxiety    Depression    Follow-up         History obtained from: patient        HISTORY OF PRESENT ILLNESS:    Arnav Johnson is a 61 y.o. male who presents to the office for follow up for medication management of depression and anxiety.  Patient reports medication compliance, denies side effects. States he stopped the buspar \"I didn't like how it made me feel.\"     Reports mood is \"ok\"   -Endorses feeling sad , down and  depressed some days \"but it is better.\"   -Denies anhedonia  -Reports poor concentration, states he continues to struggle with poor memory   -Denies changes in appetite  -Endorses feelings of guilt at times   -Denies feeling hopeless or helpless  -Reports energy and motivation are low  -Denies thoughts of harm to self or others  -Reports a history of mood worsening during fall/winter months; states he notices \"some\" improvements in his mood on \"nice days\"      Sleep  -Denies trouble initiating sleep. Reports trouble maintaining sleep, states he is able to return to sleep without difficulty  -Reports he has not been feeling rested in the mornings  -He continues working 2nd shift  -reports sleeping average of 8-9 hours broken   -History of sleep apnea, reports compliance with CPAP       Reports anxiety is improving  -States \"I have been using skills to manage my anxiety and it helps.\"   -Patient reports worrying trouble and trouble controlling worry at times   -Denies being easily irritated or annoyed overall   -Reports feeling on edge and nervous at times, usually this is triggered by specific situations   -States \"I still struggle with crowds and going to into crowded stores. I can't do it. If it is busy I  Yes...

## 2025-07-15 NOTE — DISCHARGE NOTE ADULT - NS MD DC FALL RISK RISK
For information on Fall & Injury Prevention, visit [email or web address]
none

## (undated) DEVICE — SYR LUER LOK 10CC

## (undated) DEVICE — SYR ASEPTO

## (undated) DEVICE — VENODYNE/SCD SLEEVE CALF MEDIUM

## (undated) DEVICE — TUBING RANGER FLUID IRRIGATION SET DISP

## (undated) DEVICE — SYR LUER LOK 30CC

## (undated) DEVICE — SOL IRR BAG H2O 3000ML

## (undated) DEVICE — PACK CYSTO

## (undated) DEVICE — VENODYNE/SCD SLEEVE CALF LARGE

## (undated) DEVICE — GOWN TRIMAX LG

## (undated) DEVICE — DRAPE C ARM UNIVERSAL

## (undated) DEVICE — WARMING BLANKET UPPER ADULT

## (undated) DEVICE — GOWN LG

## (undated) DEVICE — IRR BULB PATHFINDER + 10"

## (undated) DEVICE — GLV 7 PROTEXIS (WHITE)

## (undated) DEVICE — SOL INJ NS 0.9% 1000ML

## (undated) DEVICE — DRAPE EQUIPMENT BANDED BAG 30 X 30" (SHOWER CAP)

## (undated) DEVICE — GLV 8 PROTEXIS (WHITE)

## (undated) DEVICE — DRAPE DRAINAGE BAG URO CATCH II

## (undated) DEVICE — PLV-SCD MACHINE: Type: DURABLE MEDICAL EQUIPMENT

## (undated) DEVICE — FOLEY HOLDER STATLOCK 2 WAY ADULT

## (undated) DEVICE — TUBING SUCTION 20FT

## (undated) DEVICE — POSITIONER FOAM HEADREST (PINK)

## (undated) DEVICE — SOL IRR POUR H2O 1500ML

## (undated) DEVICE — ACMI SELF-SEALING SEAL UP TO 7FR

## (undated) DEVICE — PRESSURE INFUSOR BAG 3000ML

## (undated) DEVICE — POSITIONER FOAM EGG CRATE ULNAR 2PCS (PINK)

## (undated) DEVICE — DRAPE LINGEMAN TUR

## (undated) DEVICE — PREP BETADINE KIT